# Patient Record
Sex: FEMALE | Race: WHITE | NOT HISPANIC OR LATINO | Employment: OTHER | URBAN - METROPOLITAN AREA
[De-identification: names, ages, dates, MRNs, and addresses within clinical notes are randomized per-mention and may not be internally consistent; named-entity substitution may affect disease eponyms.]

---

## 2016-10-12 LAB
BASOPHILS NFR BLD AUTO: ABNORMAL %
EOSINOPHIL NFR BLD AUTO: ABNORMAL %
ERYTHROCYTE [DISTWIDTH] IN BLOOD BY AUTOMATED COUNT: 16.1 %
HCT VFR BLD AUTO: 41 %
HEMOGLOBIN: 12.6 G/DL (ref 11.7–15.7)
LYMPHOCYTES NFR BLD AUTO: ABNORMAL %
MCH RBC QN AUTO: 25.7 PG
MCHC RBC AUTO-ENTMCNC: 30.7 G/DL
MCV RBC AUTO: 84 FL
MONOCYTES NFR BLD AUTO: ABNORMAL %
NEUTROPHILS NFR BLD AUTO: ABNORMAL %
PLATELET COUNT - QUEST: 345 10^9/L (ref 150–450)
RBC # BLD AUTO: 4.9 10^12/L
WBC # BLD AUTO: 7.5 10^9/L

## 2017-01-03 ENCOUNTER — OFFICE VISIT - HEALTHEAST (OUTPATIENT)
Dept: GERIATRICS | Facility: CLINIC | Age: 77
End: 2017-01-03

## 2017-01-03 ENCOUNTER — OFFICE VISIT (OUTPATIENT)
Dept: NEUROSURGERY | Facility: CLINIC | Age: 77
End: 2017-01-03

## 2017-01-03 VITALS — HEIGHT: 59 IN | DIASTOLIC BLOOD PRESSURE: 71 MMHG | HEART RATE: 71 BPM | SYSTOLIC BLOOD PRESSURE: 133 MMHG

## 2017-01-03 DIAGNOSIS — R53.81 PHYSICAL DECONDITIONING: ICD-10-CM

## 2017-01-03 DIAGNOSIS — G91.2 NPH (NORMAL PRESSURE HYDROCEPHALUS) (H): Primary | ICD-10-CM

## 2017-01-03 DIAGNOSIS — M25.552 HIP PAIN, LEFT: ICD-10-CM

## 2017-01-03 DIAGNOSIS — D50.8 OTHER IRON DEFICIENCY ANEMIA: ICD-10-CM

## 2017-01-03 DIAGNOSIS — M54.41 ACUTE MIDLINE LOW BACK PAIN WITH RIGHT-SIDED SCIATICA: ICD-10-CM

## 2017-01-03 DIAGNOSIS — E55.9 VITAMIN D DEFICIENCY: ICD-10-CM

## 2017-01-03 DIAGNOSIS — R29.898 LEG WEAKNESS, BILATERAL: ICD-10-CM

## 2017-01-03 DIAGNOSIS — C18.7 MALIGNANT NEOPLASM OF SIGMOID COLON (H): ICD-10-CM

## 2017-01-03 DIAGNOSIS — I10 ESSENTIAL HYPERTENSION: ICD-10-CM

## 2017-01-03 DIAGNOSIS — R26.81 GAIT INSTABILITY: ICD-10-CM

## 2017-01-03 DIAGNOSIS — R26.9 GAIT DISTURBANCE: ICD-10-CM

## 2017-01-03 ASSESSMENT — PAIN SCALES - GENERAL: PAINLEVEL: MILD PAIN (3)

## 2017-01-03 NOTE — PROGRESS NOTES
January 3, 2017            Parvez Francis MD    [facility]    [facility] [place]  [address]       RE: Amparo Pachecoobdulia       Dear Dr. Francis:      I had the pleasure of meeting with Ms. Sharma in Neurosurgery Clinic today for evaluation of possible normal pressure hydrocephalus.  As you are aware, Ms. Sharma is a 76-year-old female with a history of chronically progressive gait instability, incontinence and memory problems.  The patient states that these symptoms have been present over the last couple of years.  Most recently, the patient has had a number of falls, which have resulted in her being wheelchair bound at current.  She is incontinent of urine 100% of the time now.      She was initially evaluated by Dr. Gruber, the neurologist present.  At that time, it was felt by Dr. Gruber that Ms. Sharma was suffering from normal pressure hydrocephalus, and was referred to Neurosurgery Clinic.  As a result of one of her falls, she ended up dehiscing a wound on her arm, which resulted in her developing cellulitis.  She was again seen while she was hospitalized for cellulitis for normal pressure hydrocephalus; however, at that time it was determined that she would not be a good surgical candidate given ongoing treatment for bacterial infection.  She has currently concluded her course of antibiotics, and no longer has active cellulitis.  The patient denies currently being on any blood thinners.      On physical exam today in the clinic, Ms. Sharma is alert and appropriately communicative.  Cranial nerves II-XII are intact.  Sensation intact in upper and lower extremities bilaterally.  Strength 5/5 in upper and lower extremities bilaterally.  The patient does have delayed rapid alternating hand movements, as well as apraxia with finger-to-nose testing.  The patient did present in a wheelchair.  DTRs were normal and symmetric throughout.      An MRI dated 10/31/2016 was available  for me to review today.  This study demonstrates clear bilateral ventriculomegaly with subependymal fluid collections.  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  Anterior horns are particularly blunted.      In my assessment, Ms. Coppola may indeed be suffering from a normal pressure hydrocephalus.  As you are aware, there is not a good diagnostic test for normal pressure hydrocephalus, so the patient will need to be admitted for a trial of CSF diversion and assessment for resolution of her symptoms.     My plan for Ms. Coppola is to have her undergo neuropsych testing.  Following this initial round of testing, she will be admitted to the hospital, and a lumbar drain will be placed for approximately 5 days.  We will divert CSF, and monitor her while she is inpatient for improvement of her ability to ambulate and other such metrics.  Near the end of her 5-day hospital course, she will also receive a repeat neuropsych evaluation.  Following this trial of CSF diversion, the results will be assessed, and I will have a repeat conversation with the patient regarding the potential efficacy of shunt placement for permanent CSF diversion.      Thank you very much for allowing me to be involved in the care of this patient.  In total, I spent approximately 35 minutes with this patient, with the majority of that time spent in consultation and development of a treatment plan.       Dictated by Chelly Adler MD, Resident         ZAHRAA BERMEO MD       As dictated by CHELLY ADLER MD            D: 2017 13:16   T: 2017 15:02   MT: LUCIE      Name:     ATILIO COPPOLA   MRN:      [id number]        Account:      [id number]   :      1940           Service Date: 2017      Document: [id number]

## 2017-01-03 NOTE — Clinical Note
1/3/2017       RE: Amparo Sharma  [address]   [place] [address]     Dear Colleague,    Thank you for referring your patient, Amparo Sharma, to the [facility] NEUROSURGERY at [facility]. Please see a copy of my visit note below.    January 3, 2017            Parvez Francis MD    [facility]    [address] [place]  [address]       RE: Amparo Zachary       Dear Dr. Francis:      I had the pleasure of meeting with Ms. Sharma in Neurosurgery Clinic today for evaluation of possible normal pressure hydrocephalus.  As you are aware, Ms. Sharma is a 76-year-old female with a history of chronically progressive gait instability, incontinence and memory problems.  The patient states that these symptoms have been present over the last couple of years.  Most recently, the patient has had a number of falls, which have resulted in her being wheelchair bound at current.  She is incontinent of urine 100% of the time now.      She was initially evaluated by Dr. Gruber, the neurologist present.  At that time, it was felt by Dr. Gruber that Ms. Sharma was suffering from normal pressure hydrocephalus, and was referred to Neurosurgery Clinic.  As a result of one of her falls, she ended up dehiscing a wound on her arm, which resulted in her developing cellulitis.  She was again seen while she was hospitalized for cellulitis for normal pressure hydrocephalus; however, at that time it was determined that she would not be a good surgical candidate given ongoing treatment for bacterial infection.  She has currently concluded her course of antibiotics, and no longer has active cellulitis.  The patient denies currently being on any blood thinners.      On physical exam today in the clinic, Ms. Sharma is alert and appropriately communicative.  Cranial nerves II-XII are intact.  Sensation intact in upper and lower extremities  bilaterally.  Strength 5/5 in upper and lower extremities bilaterally.  The patient does have delayed rapid alternating hand movements, as well as apraxia with finger-to-nose testing.  The patient did present in a wheelchair.  DTRs were normal and symmetric throughout.      An MRI dated 10/31/2016 was available for me to review today.  This study demonstrates clear bilateral ventriculomegaly with subependymal fluid collections.  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  Anterior horns are particularly blunted.      In my assessment, Ms. Coppola may indeed be suffering from a normal pressure hydrocephalus.  As you are aware, there is not a good diagnostic test for normal pressure hydrocephalus, so the patient will need to be admitted for a trial of CSF diversion and assessment for resolution of her symptoms.     My plan for Ms. Coppola is to have her undergo neuropsych testing.  Following this initial round of testing, she will be admitted to the hospital, and a lumbar drain will be placed for approximately 5 days.  We will divert CSF, and monitor her while she is inpatient for improvement of her ability to ambulate and other such metrics.  Near the end of her 5-day hospital course, she will also receive a repeat neuropsych evaluation.  Following this trial of CSF diversion, the results will be assessed, and I will have a repeat conversation with the patient regarding the potential efficacy of shunt placement for permanent CSF diversion.      Thank you very much for allowing me to be involved in the care of this patient.  In total, I spent approximately 35 minutes with this patient, with the majority of that time spent in consultation and development of a treatment plan.       Dictated by Chelly Adler MD, Resident         ZAHRAA BERMEO MD       As dictated by CHELLY ADLER MD            D: 01/03/2017 13:16   T: 01/03/2017 15:02   MT: LUCIE      Name:     TAILIO COPPOLA   MRN:      [id number]         Account:      [id number]   :      1940           Service Date: 2017      Document: [id number]      Clinic visit note was dictated.  Dictation #[id number].

## 2017-01-03 NOTE — NURSING NOTE
Chief Complaint   Patient presents with     Consult     abnormal MRI of head, gait tiuratraci Scruggs, ARCHANA

## 2017-01-06 ENCOUNTER — OFFICE VISIT - HEALTHEAST (OUTPATIENT)
Dept: GERIATRICS | Facility: CLINIC | Age: 77
End: 2017-01-06

## 2017-01-06 DIAGNOSIS — R26.81 GAIT INSTABILITY: ICD-10-CM

## 2017-01-06 DIAGNOSIS — R53.81 PHYSICAL DECONDITIONING: ICD-10-CM

## 2017-01-06 DIAGNOSIS — I10 ESSENTIAL HYPERTENSION: ICD-10-CM

## 2017-01-10 ENCOUNTER — OFFICE VISIT - HEALTHEAST (OUTPATIENT)
Dept: GERIATRICS | Facility: CLINIC | Age: 77
End: 2017-01-10

## 2017-01-10 DIAGNOSIS — R32 URINARY INCONTINENCE: ICD-10-CM

## 2017-01-10 DIAGNOSIS — I10 ESSENTIAL HYPERTENSION: ICD-10-CM

## 2017-01-10 DIAGNOSIS — R26.81 GAIT INSTABILITY: ICD-10-CM
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
## 2017-01-10 DIAGNOSIS — R53.81 PHYSICAL DECONDITIONING: ICD-10-CM

## 2017-01-18 ENCOUNTER — OFFICE VISIT - HEALTHEAST (OUTPATIENT)
Dept: GERIATRICS | Facility: CLINIC | Age: 77
End: 2017-01-18

## 2017-01-18 DIAGNOSIS — R25.1 TREMOR: ICD-10-CM

## 2017-01-18 DIAGNOSIS — F31.9 BIPOLAR DISORDER (H): ICD-10-CM

## 2017-01-18 DIAGNOSIS — R32 URINARY INCONTINENCE: ICD-10-CM

## 2017-01-18 DIAGNOSIS — R26.81 GAIT INSTABILITY: ICD-10-CM

## 2017-01-20 ENCOUNTER — OFFICE VISIT - HEALTHEAST (OUTPATIENT)
Dept: GERIATRICS | Facility: CLINIC | Age: 77
End: 2017-01-20

## 2017-01-20 DIAGNOSIS — J45.909 ASTHMA: ICD-10-CM

## 2017-01-20 DIAGNOSIS — R25.1 TREMOR: ICD-10-CM

## 2017-01-20 DIAGNOSIS — R26.81 GAIT INSTABILITY: ICD-10-CM

## 2017-01-20 DIAGNOSIS — R53.81 PHYSICAL DECONDITIONING: ICD-10-CM

## 2017-01-20 DIAGNOSIS — I10 ESSENTIAL HYPERTENSION: ICD-10-CM

## 2017-01-20 DIAGNOSIS — R29.898 LEG WEAKNESS, BILATERAL: ICD-10-CM

## 2017-01-20 DIAGNOSIS — I73.9 PVD (PERIPHERAL VASCULAR DISEASE) (H): ICD-10-CM

## 2017-01-20 DIAGNOSIS — R29.6 FREQUENT FALLS: ICD-10-CM

## 2017-01-20 DIAGNOSIS — G89.29 CHRONIC PAIN: ICD-10-CM

## 2017-01-23 ENCOUNTER — DOCUMENTATION ONLY (OUTPATIENT)
Dept: NEUROSURGERY | Facility: CLINIC | Age: 77
End: 2017-01-23

## 2017-01-24 ENCOUNTER — OFFICE VISIT - HEALTHEAST (OUTPATIENT)
Dept: GERIATRICS | Facility: CLINIC | Age: 77
End: 2017-01-24

## 2017-01-24 DIAGNOSIS — R26.81 GAIT INSTABILITY: ICD-10-CM

## 2017-01-24 DIAGNOSIS — J45.909 ASTHMA: ICD-10-CM

## 2017-01-24 DIAGNOSIS — R41.89 COGNITIVE DECLINE: ICD-10-CM

## 2017-01-24 DIAGNOSIS — G91.2 NPH (NORMAL PRESSURE HYDROCEPHALUS) (H): ICD-10-CM

## 2017-01-30 ENCOUNTER — ANESTHESIA EVENT (OUTPATIENT)
Dept: SURGERY | Facility: CLINIC | Age: 77
DRG: 033 | End: 2017-01-30
Payer: MEDICARE

## 2017-01-30 ENCOUNTER — OFFICE VISIT (OUTPATIENT)
Dept: SURGERY | Facility: CLINIC | Age: 77
End: 2017-01-30

## 2017-01-30 ENCOUNTER — RESULTS ONLY (OUTPATIENT)
Dept: SURGERY | Facility: CLINIC | Age: 77
End: 2017-01-30

## 2017-01-30 ENCOUNTER — ALLIED HEALTH/NURSE VISIT (OUTPATIENT)
Dept: SURGERY | Facility: CLINIC | Age: 77
End: 2017-01-30

## 2017-01-30 VITALS
BODY MASS INDEX: 33.38 KG/M2 | HEART RATE: 70 BPM | HEIGHT: 60 IN | DIASTOLIC BLOOD PRESSURE: 78 MMHG | WEIGHT: 170 LBS | RESPIRATION RATE: 16 BRPM | OXYGEN SATURATION: 95 % | TEMPERATURE: 98.3 F | SYSTOLIC BLOOD PRESSURE: 134 MMHG

## 2017-01-30 DIAGNOSIS — Z01.818 PREOP GENERAL PHYSICAL EXAM: ICD-10-CM

## 2017-01-30 DIAGNOSIS — Z01.818 PREOP GENERAL PHYSICAL EXAM: Primary | ICD-10-CM

## 2017-01-30 LAB
ANION GAP SERPL CALCULATED.3IONS-SCNC: 7 MMOL/L (ref 3–14)
BUN SERPL-MCNC: 18 MG/DL (ref 7–30)
CALCIUM SERPL-MCNC: 9.3 MG/DL (ref 8.5–10.1)
CHLORIDE SERPL-SCNC: 100 MMOL/L (ref 94–109)
CO2 SERPL-SCNC: 28 MMOL/L (ref 20–32)
CREAT SERPL-MCNC: 0.71 MG/DL (ref 0.52–1.04)
ERYTHROCYTE [DISTWIDTH] IN BLOOD BY AUTOMATED COUNT: 15.4 % (ref 10–15)
GFR SERPL CREATININE-BSD FRML MDRD: 80 ML/MIN/1.7M2
GLUCOSE SERPL-MCNC: 94 MG/DL (ref 70–99)
HCT VFR BLD AUTO: 40.6 % (ref 35–47)
HGB BLD-MCNC: 12.6 G/DL (ref 11.7–15.7)
INR PPP: 0.95 (ref 0.86–1.14)
INTERPRETATION ECG - MUSE: NORMAL
MCH RBC QN AUTO: 27.4 PG (ref 26.5–33)
MCHC RBC AUTO-ENTMCNC: 31 G/DL (ref 31.5–36.5)
MCV RBC AUTO: 88 FL (ref 78–100)
PLATELET # BLD AUTO: 354 10E9/L (ref 150–450)
POTASSIUM SERPL-SCNC: 4.6 MMOL/L (ref 3.4–5.3)
RBC # BLD AUTO: 4.6 10E12/L (ref 3.8–5.2)
SODIUM SERPL-SCNC: 135 MMOL/L (ref 133–144)
WBC # BLD AUTO: 8.7 10E9/L (ref 4–11)

## 2017-01-30 ASSESSMENT — LIFESTYLE VARIABLES: TOBACCO_USE: 1

## 2017-01-30 NOTE — PATIENT INSTRUCTIONS
Using an Incentive Spirometer: 5x'/s day and each time, 5-10 x's.  Soon after your surgery, a nurse or therapist will teach you breathing exercises. These keep your lungs clear, strengthen your breathing muscles, and help prevent complications.  The exercises include doing a deep-breathing exercise using a device called an incentive spirometer.  To do these exercises, you will breathe in through your mouth and not your nose. The incentive spirometer only works correctly if you breathe in through your mouth.  Four steps to clear lungs     Deep breathing expands the lungs, aids circulation, and helps prevent pneumonia.   1. Exhale normally.    Relax and breathe out.  2. Place your lips tightly around the mouthpiece.    Make sure the device is upright and not tilted.  3. Inhale as much air as you can through the mouthpiece (don't breath through your nose).    Inhale slowly and deeply.    Hold your breath long enough to keep the balls or disk raised for at least 3 seconds.    If you re inhaling too quickly, your device may make a tone. If you hear this tone, inhale more slowly.  4. Repeat the exercise regularly.    Do this exercise every hour while you're awake, or as your health care provider instructs.    You will also be taught coughing exercises and be asked to do them regularly on your own.    3797-4523 [employer]. [address] [place] [address], [place] [id number]. All rights reserved. This information is not intended as a substitute for professional medical care. Always follow your healthcare professional's instructions.    AFTER YOUR SURGERY  Breathing exercises   Breathing exercises help you recover faster. Take deep breaths and let the air out slowly. This will:     Help you wake up after surgery.    Help prevent complications like pneumonia.  Preventing complications will help you go home sooner.   We may give you a breathing device (incentive spirometer) to encourage you to breathe deeply.   Nausea  and vomiting   You may feel sick to your stomach after surgery; if so, let your nurse know.    Pain control:  After surgery, you may have pain. Our goal is to help you manage your pain. Pain medicine will help you feel comfortable enough to do activities that will help you heal.  These activities may include breathing exercises, walking and physical therapy.   To help your health care team treat your pain we will ask: 1) If you have pain  2) where it is located 3) describe your pain in your words  Methods of pain control include medications given by mouth, vein or by nerve block for some surgeries.  We may give you a pain control pump that will:  1) Deliver the medicine through a tube placed in your vein  2) Control the amount of medicine you receive  3) Allow you to push a button to deliver a dose of pain medicine  Sequential Compression Device (SCD) or Pneumo Boots:  You may need to wear SCD S on your legs or feet. These are wraps connected to a machine that pumps in air and releases it. The repeated pumping helps prevent blood clots from forming.   Preparing for Your Surgery      Name:  Amparo Sharma   MRN:  [id number]   :  1940   Today's Date:  2017     Arriving for surgery:  Surgery date:  17  Surgery time: 0900 AM  Arrival time:  0700 AM  Please come to:       [facility] [address] [place] [address]  [address]    -   parking is available in front of the hospital from 5:15 am to 8:00 pm    -  Stop at the Information Desk in the lobby    -   Inform the information person that you are here for surgery. An escort to  will be provided. If you would not like an escort, please proceed to 3C on the 3rd floor. [phone]     What can I eat or drink?  -  You may have solid food or milk products until 8 hours prior to your surgery. 12   -  You may have water, apple juice or 7up/Sprite until 2 hours prior to your surgery. 0700 AM  Friday    Which medicines can I take?  -  Do NOT take these medications in the morning, the day of surgery:  STOP EXEDRINE, HOLD FOLIC ACID AND MULTI VIT. As of 01/30/17     -  Please take these medications the day of surgery:  SCHEDULED MEDS, MAY TAKE ALEVE OR IBUPROFEN UNTIL 02/02/17 (REVIEW WITH PRIMARY/KARLA SAMPSON)    How do I prepare myself?  -  Take two showers: one the night before surgery; and one the morning of surgery.         Use Scrubcare or Hibiclens to wash from neck down.  You may use your own shampoo and conditioner. No other hair products.   -  Do NOT use lotion, powder, deodorant, or antiperspirant the day of your surgery.  -  Do NOT wear any makeup, fingernail polish or jewelry.  -  Begin using Incentive Spirometer 1 week prior to surgery.  Use 4 times per day, up to 5-10 breaths each time.  Bring Incentive Spirometer to hospital.  -Do not bring your own medications to the hospital, except for inhalers and eye drops.  -  Bring your ID and insurance card.    Questions or Concerns:  If you have questions or concerns, please call the  Preoperative Assessment Center, Monday-Friday 7AM-7PM:  [phone]

## 2017-01-30 NOTE — MR AVS SNAPSHOT
After Visit Summary   1/30/2017    Amparo Sharma    MRN: [id number]           Patient Information     Date Of Birth          1940        Visit Information        Provider Department      1/30/2017 10:00 AM Rn, [facility]        Care Instructions      Using an Incentive Spirometer: 5x'/s day and each time, 5-10 x's.  Soon after your surgery, a nurse or therapist will teach you breathing exercises. These keep your lungs clear, strengthen your breathing muscles, and help prevent complications.  The exercises include doing a deep-breathing exercise using a device called an incentive spirometer.  To do these exercises, you will breathe in through your mouth and not your nose. The incentive spirometer only works correctly if you breathe in through your mouth.  Four steps to clear lungs     Deep breathing expands the lungs, aids circulation, and helps prevent pneumonia.   1. Exhale normally.    Relax and breathe out.  2. Place your lips tightly around the mouthpiece.    Make sure the device is upright and not tilted.  3. Inhale as much air as you can through the mouthpiece (don't breath through your nose).    Inhale slowly and deeply.    Hold your breath long enough to keep the balls or disk raised for at least 3 seconds.    If you re inhaling too quickly, your device may make a tone. If you hear this tone, inhale more slowly.  4. Repeat the exercise regularly.    Do this exercise every hour while you're awake, or as your health care provider instructs.    You will also be taught coughing exercises and be asked to do them regularly on your own.    7049-5709 [employer]. [address] [place] [address], [place] [address]. All rights reserved. This information is not intended as a substitute for professional medical care. Always follow your healthcare professional's instructions.    AFTER YOUR SURGERY  Breathing exercises   Breathing exercises help you  recover faster. Take deep breaths and let the air out slowly. This will:     Help you wake up after surgery.    Help prevent complications like pneumonia.  Preventing complications will help you go home sooner.   We may give you a breathing device (incentive spirometer) to encourage you to breathe deeply.   Nausea and vomiting   You may feel sick to your stomach after surgery; if so, let your nurse know.    Pain control:  After surgery, you may have pain. Our goal is to help you manage your pain. Pain medicine will help you feel comfortable enough to do activities that will help you heal.  These activities may include breathing exercises, walking and physical therapy.   To help your health care team treat your pain we will ask: 1) If you have pain  2) where it is located 3) describe your pain in your words  Methods of pain control include medications given by mouth, vein or by nerve block for some surgeries.  We may give you a pain control pump that will:  1) Deliver the medicine through a tube placed in your vein  2) Control the amount of medicine you receive  3) Allow you to push a button to deliver a dose of pain medicine  Sequential Compression Device (SCD) or Pneumo Boots:  You may need to wear SCD S on your legs or feet. These are wraps connected to a machine that pumps in air and releases it. The repeated pumping helps prevent blood clots from forming.   Preparing for Your Surgery      Name:  Amparo Sharma   MRN:  [id number]   :  1940   Today's Date:  2017     Arriving for surgery:  Surgery date:  17  Surgery time: 0900 AM  Arrival time:  0700 AM  Please come to:       [facility] [address] [place] [address]  [address]       parking is available in front of the hospital from 5:15 am to 8:00 pm    -  Stop at the Information Desk in the lobby    -   Inform the information person that you are here for surgery. An escort to  will be  provided. If you would not like an escort, please proceed to 3C on the 3rd floor. [phone]     What can I eat or drink?  -  You may have solid food or milk products until 8 hours prior to your surgery. 12 [place] Thursday  -  You may have water, apple juice or 7up/Sprite until 2 hours prior to your surgery. 0700 AM Friday    Which medicines can I take?  -  Do NOT take these medications in the morning, the day of surgery:  STOP EXEDRINE, HOLD FOLIC ACID AND MULTI VIT. As of 01/30/17     -  Please take these medications the day of surgery:  SCHEDULED MEDS, MAY TAKE ALEVE OR IBUPROFEN UNTIL 02/02/17 (REVIEW WITH PRIMARY/KARLA SAMPSON)    How do I prepare myself?  -  Take two showers: one the night before surgery; and one the morning of surgery.         Use Scrubcare or Hibiclens to wash from neck down.  You may use your own shampoo and conditioner. No other hair products.   -  Do NOT use lotion, powder, deodorant, or antiperspirant the day of your surgery.  -  Do NOT wear any makeup, fingernail polish or jewelry.  -  Begin using Incentive Spirometer 1 week prior to surgery.  Use 4 times per day, up to 5-10 breaths each time.  Bring Incentive Spirometer to hospital.  -Do not bring your own medications to the hospital, except for inhalers and eye drops.  -  Bring your ID and insurance card.    Questions or Concerns:  If you have questions or concerns, please call the  Preoperative Assessment Center, Monday-Friday 7AM-7PM:  [phone]                        Follow-ups after your visit        Your next 10 appointments already scheduled     Jan 30, 2017 11:40 AM   (Arrive by 11:25 AM)   PAC Anesthesia Consult with  Pac Anesthesiologist   [facility] Preoperative Assessment Center ([facility] [place])    [address]  4th Floor  [facility] [address] [phone]            Jan 30, 2017 12:00 PM   (Arrive by 11:45 AM)   PAC Pharmacist with  Pac Pharmacist   [facility] [place] (  [facility] [place])    [address]  4th Floor  [facility] [id number] [phone]            Jan 30, 2017 12:30 PM   LAB with UC LAB   [facility] ([facility]    [address]  1st Floor  [facility] [id number] [phone]           Patient must bring picture ID.  Patient should be prepared to give a urine specimen  Please do not eat 10-12 hours before your appointment if you are coming in fasting for labs on lipids, cholesterol, or glucose (sugar).  Pregnant women should follow their Care Team instructions. Water with medications is okay. Do not drink coffee or other fluids.   If you have concerns about taking  your medications, please ask at office or if scheduling via [employer], send a message by clicking on Secure Messaging, Message Your Care Team.            Feb 03, 2017   Procedure with Rc Dozier MD   [facility], [place], Same Day Surgery (--)    [address] [id number] [phone]              Who to contact     Please call your clinic at [phone] to:    Ask questions about your health    Make or cancel appointments    Discuss your medicines    Learn about your test results    Speak to your doctor   If you have compliments or concerns about an experience at your clinic, or if you wish to file a complaint, please contact [facility] Patient Relations at [phone] or email us at [email or web address]         Additional Information About Your Visit        [employer] Information     [employer] is an electronic gateway that provides easy, online access to your medical records. With [employer], you can request a clinic appointment, read your test results, renew a prescription or communicate with your care team.     To sign up for [employer] visit the website at [email or web address]   You will be asked to enter the access code listed below, as well as some personal information. Please  follow the directions to create your username and password.     Your access code is: [email or web address]  Expires: 2017  6:30 AM     Your access code will  in 90 days. If you need help or a new code, please contact your [facility] or call [phone] for assistance.        Care EveryWhere ID     This is your Care EveryWhere ID. This could be used by other organizations to access your [place] medical records  [id number]         Blood Pressure from Last 3 Encounters:   17 134/78   17 133/71   12/15/16 161/84    Weight from Last 3 Encounters:   17 77.111 kg (170 lb)   16 77.52 kg (170 lb 14.4 oz)              Today, you had the following     No orders found for display       Primary Care Provider Office Phone # Fax #    Parvez Francis [phone]       [place] [address] [facility] [address]        Thank you!     Thank you for choosing [facility] PREOPERAT[facility]ESSMENT [facility]  for your care. Our goal is always to provide you with excellent care. Hearing back from our patients is one way we can continue to improve our services. Please take a few minutes to complete the written survey that you may receive in the mail after your visit with us. Thank you!             Your Updated Medication List - Protect others around you: Learn how to safely use, store and throw away your medicines at www.disposemymeds.org.          This list is accurate as of: 17 10:51 AM.  Always use your most recent med list.                   Brand Name Dispense Instructions for use    ABILIFY PO      Take 5 mg by mouth every morning       AMLODIPINE BESYLATE PO      Take 10 mg by mouth every morning       aspirin-acetaminophen-caffeine 250-250-65 MG per tablet    EXCEDRIN MIGRAINE     Take 1 tablet by mouth every 6 hours as needed for headaches       DONEPEZIL HCL PO      Take 10 mg by mouth At Bedtime       fluticasone 110 MCG/ACT Inhaler     FLOVENT HFA     Inhale 2 puffs into the lungs as needed       FOLIC ACID PO      Take 1 mg by mouth       hydrocortisone 2.5 % cream      Apply topically 2 times daily       lactobacillus rhamnosus (GG) capsule      Take 1 capsule by mouth 2 times daily       LAMICTAL PO      Take 150 mg by mouth in the morning and 100 mg by mouth in the evening       Levothyroxine Sodium 75 MCG Caps      Take 75 mcg by mouth every morning       MULTIVITAMIN PO      Take by mouth daily       PROZAC PO      Take 40 mg by mouth every morning       RANITIDINE HCL PO      Take 300 mg by mouth 2 times daily       SPIRIVA HANDIHALER IN      Inhale 1 puff into the lungs as needed       TYLENOL PO      Take 500 mg by mouth every 8 hours as needed for mild pain or fever       WELLBUTRIN XL PO      Take 150 mg by mouth every morning

## 2017-01-30 NOTE — ANESTHESIA PREPROCEDURE EVALUATION
Anesthesia Evaluation     . Pt has had prior anesthetic. Type: General and MAC      ROS/MED HX    ENT/Pulmonary:     (+)tobacco use, Past use 0.25 PP for 25 years, quit 1997 packs/day  Intermittent asthma Treatment: Inhaler prn,  , . .    Neurologic:     (+)other neuro short-term memory loss, gait imbalance    Cardiovascular:  - neg cardiovascular ROS   (+) ----. : . . . :. . Previous cardiac testing Echodate:7/10/2017results:EF 60%date: results:ECG reviewed date:1/30/2017 results: date: results:          METS/Exercise Tolerance:  1 - Eating, dressing   Hematologic:     (+) History of Transfusion no previous transfusion reaction -      Musculoskeletal:   (+) , , other musculoskeletal- W/C due to gait instability      GI/Hepatic:     (+) GERD Asymptomatic on medication, Other GI/Hepatic umbilical hernia      Renal/Genitourinary:     (+) Other Renal/ Genitourinary, incontinence      Endo:     (+) Obesity, .      Psychiatric:     (+) psychiatric history depression      Infectious Disease:  - neg infectious disease ROS       Malignancy:   (+) Malignancy History of GI  GI CA status post Surgery,         Other:    (+) No chance of pregnancy C-spine cleared: N/A, no H/O Chronic Pain,other significant disability Wheelchair bound             Physical Exam  Normal systems: cardiovascular, pulmonary and dental    Airway   Mallampati: II  TM distance: >3 FB  Neck ROM: full    Dental     Cardiovascular   Rhythm and rate: regular and normal      Pulmonary    breath sounds clear to auscultation    Other findings: WBC      8.7   1/30/2017  RBC     4.60   1/30/2017  HGB     12.6   1/30/2017  HCT     40.6   1/30/2017  MCV       88   1/30/2017  MCH     27.4   1/30/2017  MCHC     31.0   1/30/2017  RDW     15.4   1/30/2017  PLT      354   1/30/2017  PLT      345   10/12/2016    Last Basic Metabolic Panel:  NA      135   1/30/2017   POTASSIUM      4.6   1/30/2017  CHLORIDE      100   1/30/2017  MARTHA      9.3   1/30/2017  CO2       28    1/30/2017  BUN       18   1/30/2017  CR     0.71   1/30/2017  GLC       94   1/30/2017    INR    0.95    1/30/2017      Echo 7/10/2016  Conclusions    Summary  Normal left ventricular size.  Normal left ventricular wall thickness.  No regional wall motion abnormalities.  Left ventricular ejection fraction is visually estimated at 60%.  Normal right ventricular size with preserved systolic function.    Technically difficult examination. No previous study for comparison.    Findings    Left Ventricle  Normal left ventricular size.  Normal left ventricular wall thickness.  No regional wall motion abnormalities.  Left ventricular ejection fraction is visually estimated at 60%.    Right Ventricle  Normal right ventricular size with preserved systolic function.    Left Atrium  Mildly enlarged left atrium.  No evidence of atrial septal defect.    Right Atrium  Right atrium is not clearly visualized.    Great Vessels  Aortic root dimension is within normal limits.    Aortic Valve  Tricuspid aortic valve.  No aortic stenosis present.  No evidence of aortic valve regurgitation.    Mitral Valve  Mitral valve leaflets are mildly thickened with preserved leaflet  mobility.  Trace mitral regurgitation is present.    Tricuspid Valve  Tricuspid valve is structurally normal.  No evidence of tricuspid stenosis.  Trace tricuspid regurgitation.    Pulmonic Valve  The pulmonic valve is not well visualized.    Pericardial Effusion  No pericardial effusion.               PAC Discussion and Assessment    ASA Classification: 3  Case is suitable for:   Anesthetic techniques and relevant risks discussed: GA  Invasive monitoring and risk discussed: Yes  Types:   Possibility and Risk of blood transfusion discussed: Yes  NPO instructions given:   Additional anesthetic preparation and risks discussed:   Needs early admission to pre-op area:   Other:     PAC Resident/NP Anesthesia Assessment:  Amparo Sharma is a 77 yo female scheduled for Lumbar  Drain for Spinal Fluid Diversion on 2/3/2017 by Dr. Dozier. PAC referral by Dr. Dozier for assessment and optimization of anesthesia due to asthma. Previous anesthesia without complications.     Cardiac: HTN, well controlled wit amlodipine. EKG today NSR. Echo done at [facility] 2016 with EF 60%, results above.  Pulmonary: asthma, intermittent. Uses inhaler infrequently in spring and fall.  GI: GERD, PRN ranitidine with relief. Large abdominal hernia colon cancer, s/p resection 2015.  Endo: hypothyroidism, daily synthroid.  Neuro/psych: history of ETOH abuse, in remission. Depression. Pt also has short term memory loss and gait imbalance.   Use W/C most of the time due to balance issue. Feasible airway.     Pt also evaluated by Dr. Collado. See recommendations below. CBC, BMP and INR drawn today.  I spent 20 minutes face to face with pt, assessing, examining, and educating      Reviewed and Signed by PAC Mid-Level Provider/Resident  Mid-Level Provider/Resident: KASANDRA De La Fuente  Date: 1/30/2017  Time: 1130    Attending Anesthesiologist Anesthesia Assessment:  76 year old for lumbar drain in management of unsteadiness, gait imbalance and normal pressure hydrocephalus. Chart reviewed, patient seen and evaluated; agree with above assessment.    Patient is appropriate for the planned procedure without further workup or medical management change. The final anesthesia plan will be determined by the physician anesthesiologist caring for the patient on the day of surgery.    Reviewed and Signed by PAC Anesthesiologist  Anesthesiologist: Gilda Collado MD  Date: 1/30/2017  Time:   Pass/Fail: Pass  Disposition:     PAC Pharmacist Assessment:        Pharmacist:   Date:   Time:      Anesthesia Plan      History & Physical Review      ASA Status:  3 .        Plan for MAC with Intravenous induction. Maintenance will be Balanced.  Reason for MAC:  Deep or markedly invasive procedure (G8)  PONV prophylaxis:  Ondansetron (or other  5HT-3) and Other (See comment) (TIVA)       Postoperative Care      Consents                          .

## 2017-01-30 NOTE — ADDENDUM NOTE
Addendum  created 01/30/17 1307 by Gilda Collado MD    Modules edited: Clinical Notes    Clinical Notes:  File: [id number]

## 2017-01-30 NOTE — H&P
Pre-Operative H & P     CC:  Preoperative exam to assess for increased cardiopulmonary risk while undergoing surgery and anesthesia.    Date of Encounter: 1/30/2017  Primary Care Physician:  Parvez Francis  Amparo Sharma is a 76 year old female who presents for pre-operative H & P in preparation for Lumbar Drain for Spinal Fluid Diversion  with Dr. Dozier on 2/3/17 at [facility].     History is obtained from the patient.   Pt currently at [facility]. Concern about increased loss of balance and falling. Claims that PT has not helped. Neuro evaluating if hydrocephalus cause for symptoms.        Past Medical History  Past Medical History   Diagnosis Date     Falls frequently      Loss of balance      Memory loss      Bladder incontinence 2013     History of colon cancer      s/p resection 1/2015, treated with 5-6 cycles of Folfox     Arthritis      Asthma      Alcoholism in remission (H)      Depression      GERD (gastroesophageal reflux disease)      Abdominal hernia      HTN (hypertension)        Past Surgical History  Past Surgical History   Procedure Laterality Date     Hysterectomy       Multiple knee surgeries       Rotator cuff surgeries       Cholecystectomy       Colon cancer resection  1/2015       Hx of Blood transfusions/reactions: yes, 10 years ago, no reaction     Hx of abnormal bleeding or anti-platelet use: none      Menstrual history: No LMP recorded. Patient has had a hysterectomy.:     Steroid use in the last year: none    Personal or FH with difficulty with Anesthesia:  none    Prior to Admission Medications  Current Outpatient Prescriptions   Medication Sig Dispense Refill     lactobacillus rhamnosus, GG, (CULTURELL) capsule Take 1 capsule by mouth 2 times daily       AMLODIPINE BESYLATE PO Take 10 mg by mouth every morning        DONEPEZIL HCL PO Take 10 mg by mouth At Bedtime       FOLIC ACID PO Take 1 mg by mouth       RANITIDINE  HCL PO Take 300 mg by mouth 2 times daily        Tiotropium Bromide Monohydrate (SPIRIVA HANDIHALER IN) Inhale 1 puff into the lungs as needed        hydrocortisone 2.5 % cream Apply topically 2 times daily       aspirin-acetaminophen-caffeine (EXCEDRIN MIGRAINE) 250-250-65 MG per tablet Take 1 tablet by mouth every 6 hours as needed for headaches       fluticasone (FLOVENT HFA) 110 MCG/ACT inhaler Inhale 2 puffs into the lungs as needed       ARIPiprazole (ABILIFY PO) Take 5 mg by mouth every morning        BuPROPion HCl (WELLBUTRIN XL PO) Take 150 mg by mouth every morning        Multiple Vitamins-Minerals (MULTIVITAMIN PO) Take by mouth daily       LamoTRIgine (LAMICTAL PO) Take 150 mg by mouth in the morning and 100 mg by mouth in the evening       Levothyroxine Sodium 75 MCG CAPS Take 75 mcg by mouth every morning        FLUoxetine HCl (PROZAC PO) Take 40 mg by mouth every morning        Acetaminophen (TYLENOL PO) Take 500 mg by mouth every 8 hours as needed for mild pain or fever          Allergies  Allergies   Allergen Reactions     Amantadine      Antihistamine Allergy Relief [Diphenhydramine] Other (See Comments)     hyperactivity     Codeine Itching     Demerol [Meperidine] Nausea     Hmg-Coa-R Inhibitors Other (See Comments)     Rhabdo     Talwin [Pentazocine] Other (See Comments)     drowsiness     Tramadol        Social History  Social History     Social History     Marital Status:      Spouse Name: N/A     Number of Children: N/A     Years of Education: N/A     Occupational History     Not on file.     Social History Main Topics     Smoking status: Former Smoker -- 0.25 packs/day     Types: Cigarettes     Start date: 01/01/1980     Quit date: 01/01/1997     Smokeless tobacco: Not on file     Alcohol Use: Not on file     Drug Use: Not on file     Sexual Activity: Not on file     Other Topics Concern     Not on file     Social History Narrative       Family History  No family history on  "file.          Anesthesia Evaluation     . Pt has had prior anesthetic.       ROS/MED HX    ENT/Pulmonary:     (+)tobacco use, Past use 0.25 PP for 25 years, quit 1997 packs/day  Intermittent asthma Treatment: Inhaler prn,  , . .    Neurologic:     (+)other neuro short-term memory loss, gait imbalance    Cardiovascular:  - neg cardiovascular ROS   (+) ----. : . . . :. . Previous cardiac testing date:results:date: results:ECG reviewed date:1/30/2017 results: date: results:          METS/Exercise Tolerance:  1 - Eating, dressing   Hematologic:     (+) History of Transfusion no previous transfusion reaction -      Musculoskeletal:   (+) , , other musculoskeletal- W/C due to gait instability      GI/Hepatic:     (+) GERD Asymptomatic on medication, Other GI/Hepatic umbilical hernia      Renal/Genitourinary:     (+) Other Renal/ Genitourinary, incontinence      Endo:     (+) Obesity, .      Psychiatric:     (+) psychiatric history depression      Infectious Disease:  - neg infectious disease ROS       Malignancy:   (+) Malignancy History of GI  GI CA status post Surgery,         Other:    (+) No chance of pregnancy C-spine cleared: N/A, no H/O Chronic Pain,other significant disability Wheelchair bound             Physical Exam  Normal systems: cardiovascular, pulmonary and dental    Airway   Mallampati: II  TM distance: >3 FB  Neck ROM: full    Dental   Normal    Cardiovascular   Rhythm and rate: regular and normal      Pulmonary    breath sounds clear to auscultation               The complete review of systems is negative other than noted in the HPI or here.   Temp: 98.3  F (36.8  C) Temp src: Oral BP: 134/78 mmHg Pulse: 70   Resp: 16 SpO2: 95 %         170 lbs 0 oz  5' 0\"   Body mass index is 33.2 kg/(m^2).       Physical Exam  Constitutional: Awake, alert, cooperative, no apparent distress, and appears stated age.  Eyes: Pupils equal, round and reactive to light, extra ocular muscles intact, sclera clear, conjunctiva " normal.  HENT: Normocephalic, oral pharynx with moist mucus membranes, good dentition. No goiter appreciated.   Respiratory: Clear to auscultation bilaterally, no crackles or wheezing.  Cardiovascular: Regular rate and rhythm, normal S1 and S2, and no murmur noted.  Carotids +2, no bruits. No edema. Palpable pulses to radial  DP and PT arteries.   GI: Normal bowel sounds, soft, non-distended, non-tender, no masses palpated, no hepatosplenomegaly.  Large ventral hernia. Surgical scars: abdomen, bilateral shoulder, bilateral knees  Lymph/Hematologic: No cervical lymphadenopathy and no supraclavicular lymphadenopathy.  Genitourinary:  deferred  Skin: Warm and dry.  No rashes at anticipated surgical site.   Musculoskeletal: Full ROM of neck. There is no redness, warmth, or swelling of the joints. General weakness  Neurologic: Awake, alert, oriented to name, place and time. Cranial nerves II-XII are grossly intact. Gait is unsteady.  Neuropsychiatric: Calm, cooperative. Normal affect.     Labs: (personally reviewed)  WBC      8.7   2017  RBC     4.60   2017  HGB     12.6   2017  HCT     40.6   2017  MCV       88   2017  MCH     27.4   2017  MCHC     31.0   2017  RDW     15.4   2017  PLT      354   2017  PLT      345   10/12/2016    Last Basic Metabolic Panel:  NA      135   2017   POTASSIUM      4.6   2017  CHLORIDE      100   2017  MARTHA      9.3   2017  CO2       28   2017  BUN       18   2017  CR     0.71   2017  GLC       94   2017    INR    0.95    2017    EKG: Personally reviewed but formal cardiology read pendin2017 NSR    Cardiac echo:   Echo 7/10/2016  Conclusions    Summary  Normal left ventricular size.  Normal left ventricular wall thickness.  No regional wall motion abnormalities.  Left ventricular ejection fraction is visually estimated at 60%.  Normal right ventricular size with preserved systolic  function.    Technically difficult examination. No previous study for comparison.    Findings    Left Ventricle  Normal left ventricular size.  Normal left ventricular wall thickness.  No regional wall motion abnormalities.  Left ventricular ejection fraction is visually estimated at 60%.    Right Ventricle  Normal right ventricular size with preserved systolic function.    Left Atrium  Mildly enlarged left atrium.  No evidence of atrial septal defect.    Right Atrium  Right atrium is not clearly visualized.    Great Vessels  Aortic root dimension is within normal limits.    Aortic Valve  Tricuspid aortic valve.  No aortic stenosis present.  No evidence of aortic valve regurgitation.    Mitral Valve  Mitral valve leaflets are mildly thickened with preserved leaflet  mobility.  Trace mitral regurgitation is present.    Tricuspid Valve  Tricuspid valve is structurally normal.  No evidence of tricuspid stenosis.  Trace tricuspid regurgitation.    Pulmonic Valve  The pulmonic valve is not well visualized.    Pericardial Effusion  No pericardial effusion.        ASSESSMENT and PLAN  Amparo Sharma is a 76 year old female scheduled to undergo Lumbar Drain for Spinal Fluid Diversion. She has the following specific operative considerations:   - RCRI : Low serious cardiac risks.  0.4% risk of major adverse cardiac event.   - Anesthesia considerations:  Refer to PAC assessment in anesthesia records  - VTE risk: 0.5% due to age, however, immobility may put pt at increased risk  - CECELIA # of risks 2/8 = 25%  - Post-op delirium risk: high risk due to age  - Risk of PONV score = 2.  If > 2, anti-emetic intervention recommended.        Previous anesthesia without complications.   Cardiac: HTN, well controlled wit amlodipine. EKG today NSR. Echo done at [facility] 2016 with EF 60%, results above.  Pulmonary: asthma, intermittent. Uses inhaler infrequently in spring and fall.  GI: GERD, PRN ranitidine with relief. Large abdominal  hernia colon cancer, s/p resection 2015.  Endo: hypothyroidism, daily synthroid.  Neuro/psych: history of ETOH abuse, in remission. Depression. Pt also has short term memory loss and gait imbalance.   Use W/C most of the time due to balance issue. Feasible airway.    Pt optimized for surgery. AVS with information on surgery time/arrival time, meds and NPO status given by nursing staff      Patient was discussed with Dr Collado.    KASANDRA Arriola CNS  Preoperative Assessment Center  [facility]  Clinic and Surgery Center  Phone: [phone]  Fax: [phone]

## 2017-01-31 ENCOUNTER — OFFICE VISIT - HEALTHEAST (OUTPATIENT)
Dept: GERIATRICS | Facility: CLINIC | Age: 77
End: 2017-01-31

## 2017-01-31 DIAGNOSIS — R26.81 GAIT INSTABILITY: ICD-10-CM

## 2017-01-31 DIAGNOSIS — R53.81 PHYSICAL DECONDITIONING: ICD-10-CM

## 2017-01-31 DIAGNOSIS — I10 ESSENTIAL HYPERTENSION: ICD-10-CM

## 2017-01-31 DIAGNOSIS — G91.2 NPH (NORMAL PRESSURE HYDROCEPHALUS) (H): ICD-10-CM

## 2017-01-31 LAB — INTERPRETATION ECG - MUSE: NORMAL

## 2017-02-01 ENCOUNTER — TELEPHONE (OUTPATIENT)
Dept: NEUROSURGERY | Facility: CLINIC | Age: 77
End: 2017-02-01

## 2017-02-03 ENCOUNTER — ANESTHESIA (OUTPATIENT)
Dept: SURGERY | Facility: CLINIC | Age: 77
DRG: 033 | End: 2017-02-03
Payer: MEDICARE

## 2017-02-03 ENCOUNTER — HOSPITAL ENCOUNTER (INPATIENT)
Facility: CLINIC | Age: 77
LOS: 6 days | Discharge: NURSING, REHAB OR LONG-TERM CARE | DRG: 033 | End: 2017-02-10
Attending: NEUROLOGICAL SURGERY | Admitting: NEUROLOGICAL SURGERY
Payer: MEDICARE

## 2017-02-03 ENCOUNTER — APPOINTMENT (OUTPATIENT)
Dept: OCCUPATIONAL THERAPY | Facility: CLINIC | Age: 77
DRG: 033 | End: 2017-02-03
Attending: STUDENT IN AN ORGANIZED HEALTH CARE EDUCATION/TRAINING PROGRAM
Payer: MEDICARE

## 2017-02-03 DIAGNOSIS — Z98.2 S/P VENTRICULOPERITONEAL SHUNT: Primary | ICD-10-CM

## 2017-02-03 PROBLEM — G91.2 NPH (NORMAL PRESSURE HYDROCEPHALUS) (H): Status: ACTIVE | Noted: 2017-02-03

## 2017-02-03 LAB
ABO + RH BLD: NORMAL
ABO + RH BLD: NORMAL
BLD GP AB SCN SERPL QL: NORMAL
BLOOD BANK CMNT PATIENT-IMP: NORMAL
SPECIMEN EXP DATE BLD: NORMAL

## 2017-02-03 PROCEDURE — 97165 OT EVAL LOW COMPLEX 30 MIN: CPT | Mod: GO | Performed by: OCCUPATIONAL THERAPIST

## 2017-02-03 PROCEDURE — 25000132 ZZH RX MED GY IP 250 OP 250 PS 637: Mod: GY | Performed by: STUDENT IN AN ORGANIZED HEALTH CARE EDUCATION/TRAINING PROGRAM

## 2017-02-03 PROCEDURE — 86850 RBC ANTIBODY SCREEN: CPT | Performed by: STUDENT IN AN ORGANIZED HEALTH CARE EDUCATION/TRAINING PROGRAM

## 2017-02-03 PROCEDURE — 36415 COLL VENOUS BLD VENIPUNCTURE: CPT | Performed by: STUDENT IN AN ORGANIZED HEALTH CARE EDUCATION/TRAINING PROGRAM

## 2017-02-03 PROCEDURE — A9270 NON-COVERED ITEM OR SERVICE: HCPCS | Mod: GY | Performed by: STUDENT IN AN ORGANIZED HEALTH CARE EDUCATION/TRAINING PROGRAM

## 2017-02-03 PROCEDURE — 86900 BLOOD TYPING SEROLOGIC ABO: CPT | Performed by: STUDENT IN AN ORGANIZED HEALTH CARE EDUCATION/TRAINING PROGRAM

## 2017-02-03 PROCEDURE — 40000133 ZZH STATISTIC OT WARD VISIT: Performed by: OCCUPATIONAL THERAPIST

## 2017-02-03 PROCEDURE — 86901 BLOOD TYPING SEROLOGIC RH(D): CPT | Performed by: STUDENT IN AN ORGANIZED HEALTH CARE EDUCATION/TRAINING PROGRAM

## 2017-02-03 PROCEDURE — 97535 SELF CARE MNGMENT TRAINING: CPT | Mod: GO | Performed by: OCCUPATIONAL THERAPIST

## 2017-02-03 PROCEDURE — 25000128 H RX IP 250 OP 636: Performed by: STUDENT IN AN ORGANIZED HEALTH CARE EDUCATION/TRAINING PROGRAM

## 2017-02-03 RX ORDER — ACETAMINOPHEN 500 MG
500 TABLET ORAL EVERY 8 HOURS PRN
Status: DISCONTINUED | OUTPATIENT
Start: 2017-02-03 | End: 2017-02-03

## 2017-02-03 RX ORDER — ACETAMINOPHEN 325 MG/1
650 TABLET ORAL EVERY 6 HOURS PRN
Status: DISCONTINUED | OUTPATIENT
Start: 2017-02-03 | End: 2017-02-08 | Stop reason: DRUGHIGH

## 2017-02-03 RX ORDER — LEVOTHYROXINE SODIUM 75 UG/1
75 TABLET ORAL EVERY MORNING
Status: DISCONTINUED | OUTPATIENT
Start: 2017-02-04 | End: 2017-02-10 | Stop reason: HOSPADM

## 2017-02-03 RX ORDER — SODIUM CHLORIDE 9 MG/ML
INJECTION, SOLUTION INTRAVENOUS CONTINUOUS
Status: DISCONTINUED | OUTPATIENT
Start: 2017-02-03 | End: 2017-02-08

## 2017-02-03 RX ORDER — DONEPEZIL HYDROCHLORIDE 10 MG/1
10 TABLET, FILM COATED ORAL AT BEDTIME
Status: DISCONTINUED | OUTPATIENT
Start: 2017-02-03 | End: 2017-02-10 | Stop reason: HOSPADM

## 2017-02-03 RX ORDER — ONDANSETRON 4 MG/1
4 TABLET, ORALLY DISINTEGRATING ORAL EVERY 6 HOURS PRN
Status: DISCONTINUED | OUTPATIENT
Start: 2017-02-03 | End: 2017-02-10 | Stop reason: HOSPADM

## 2017-02-03 RX ORDER — AMLODIPINE BESYLATE 10 MG/1
10 TABLET ORAL EVERY MORNING
Status: DISCONTINUED | OUTPATIENT
Start: 2017-02-04 | End: 2017-02-10 | Stop reason: HOSPADM

## 2017-02-03 RX ORDER — FOLIC ACID 1 MG/1
1 TABLET ORAL EVERY MORNING
Status: DISCONTINUED | OUTPATIENT
Start: 2017-02-04 | End: 2017-02-10 | Stop reason: HOSPADM

## 2017-02-03 RX ORDER — TIOTROPIUM BROMIDE 18 UG/1
18 CAPSULE ORAL; RESPIRATORY (INHALATION) DAILY
Status: DISCONTINUED | OUTPATIENT
Start: 2017-02-03 | End: 2017-02-10 | Stop reason: HOSPADM

## 2017-02-03 RX ORDER — NALOXONE HYDROCHLORIDE 0.4 MG/ML
.1-.4 INJECTION, SOLUTION INTRAMUSCULAR; INTRAVENOUS; SUBCUTANEOUS
Status: DISCONTINUED | OUTPATIENT
Start: 2017-02-03 | End: 2017-02-10 | Stop reason: HOSPADM

## 2017-02-03 RX ORDER — ARIPIPRAZOLE 5 MG/1
5 TABLET ORAL AT BEDTIME
Status: DISCONTINUED | OUTPATIENT
Start: 2017-02-03 | End: 2017-02-10 | Stop reason: HOSPADM

## 2017-02-03 RX ORDER — PROCHLORPERAZINE MALEATE 5 MG
5 TABLET ORAL EVERY 6 HOURS PRN
Status: DISCONTINUED | OUTPATIENT
Start: 2017-02-03 | End: 2017-02-10 | Stop reason: HOSPADM

## 2017-02-03 RX ORDER — FLUTICASONE PROPIONATE 110 UG/1
2 AEROSOL, METERED RESPIRATORY (INHALATION) EVERY 12 HOURS
Status: DISCONTINUED | OUTPATIENT
Start: 2017-02-03 | End: 2017-02-10 | Stop reason: HOSPADM

## 2017-02-03 RX ORDER — LAMOTRIGINE 150 MG/1
150 TABLET ORAL EVERY MORNING
Status: DISCONTINUED | OUTPATIENT
Start: 2017-02-04 | End: 2017-02-10 | Stop reason: HOSPADM

## 2017-02-03 RX ORDER — LAMOTRIGINE 100 MG/1
100 TABLET ORAL EVERY EVENING
Status: DISCONTINUED | OUTPATIENT
Start: 2017-02-03 | End: 2017-02-10 | Stop reason: HOSPADM

## 2017-02-03 RX ORDER — ONDANSETRON 2 MG/ML
4 INJECTION INTRAMUSCULAR; INTRAVENOUS EVERY 6 HOURS PRN
Status: DISCONTINUED | OUTPATIENT
Start: 2017-02-03 | End: 2017-02-10 | Stop reason: HOSPADM

## 2017-02-03 RX ORDER — BUPROPION HYDROCHLORIDE 150 MG/1
150 TABLET ORAL EVERY MORNING
Status: DISCONTINUED | OUTPATIENT
Start: 2017-02-04 | End: 2017-02-10 | Stop reason: HOSPADM

## 2017-02-03 RX ADMIN — MULTIVITAMIN 15 ML: LIQUID ORAL at 21:04

## 2017-02-03 RX ADMIN — DONEPEZIL HYDROCHLORIDE 10 MG: 10 TABLET, FILM COATED ORAL at 21:04

## 2017-02-03 RX ADMIN — ACETAMINOPHEN 650 MG: 325 TABLET, FILM COATED ORAL at 21:04

## 2017-02-03 RX ADMIN — FLUTICASONE PROPIONATE 2 PUFF: 110 AEROSOL, METERED RESPIRATORY (INHALATION) at 21:04

## 2017-02-03 RX ADMIN — ARIPIPRAZOLE 5 MG: 5 TABLET ORAL at 21:04

## 2017-02-03 RX ADMIN — RANITIDINE HYDROCHLORIDE 150 MG: 150 TABLET, FILM COATED ORAL at 21:04

## 2017-02-03 RX ADMIN — LAMOTRIGINE 100 MG: 100 TABLET ORAL at 22:05

## 2017-02-03 RX ADMIN — SODIUM CHLORIDE: 9 INJECTION, SOLUTION INTRAVENOUS at 15:20

## 2017-02-03 ASSESSMENT — PAIN DESCRIPTION - DESCRIPTORS
DESCRIPTORS: CONSTANT
DESCRIPTORS: ACHING;CONSTANT

## 2017-02-03 NOTE — PROGRESS NOTES
[facility]      OUTPATIENT OCCUPATIONAL THERAPY  EVALUATION  PLAN OF TREATMENT FOR OUTPATIENT REHABILITATION  (COMPLETE FOR INITIAL CLAIMS ONLY)  Patient's Last Name, First Name, M.I.  YOB: 1940  Amparo Sharma                          Provider's Name  [facility] Medical Record No.  [id number]                               Onset Date:  02/03/17   Start of Care Date:   2/3/17     Type:     ___PT   _X_OT   ___SLP Medical Diagnosis:                        NPH   OT Diagnosis:  decreased ADL I and safety   Visits from SOC:  1   _________________________________________________________________________________  Plan of Treatment/Functional Goals    Planned Interventions: ADL retraining, cognition,       Goals: See Occupational Therapy Goals on Care Plan in [employer] electronic health record.    Therapy Frequency: daily  Predicted Duration of Therapy Intervention: 2/17/17  _________________________________________________________________________________    I CERTIFY THE NEED FOR THESE SERVICES FURNISHED UNDER        THIS PLAN OF TREATMENT AND WHILE UNDER MY CARE     (Physician co-signature of this document indicates review and certification of the therapy plan).                 ,      Referring Physician: Deanna Hayward MD            Initial Assessment        See Occupational Therapy evaluation dated   in Epic electronic health record.

## 2017-02-03 NOTE — LETTER
Transition Communication Hand-off for Care Transitions to Next Level of Care Provider    Name: Amparo Sharma  MRN #: [id number]  Primary Care Provider: Parvez Francis     Primary Clinic: [facility] [address] [address]     Reason for Hospitalization:  NPH (normal pressure hydrocephalus) [G91.2]  S/P ventriculoperitoneal shunt [Z98.2]  Admit Date/Time: 2/3/2017 10:48 AM  Discharge Date: 2/10/17  Payor Source: Payor: MEDICARE / Plan: MEDICARE / Product Type: Medicare /              Reason for Communication Hand-off Referral:     Social Work Services Discharge Note      Patient Name:  Amparo Sharma     Anticipated Discharge Date:  2/10/17    Discharge Disposition:   [facility] ([phone])    Following MD:  Facility Assignment     Pre-Admission Screening (PAS) online form has been completed.  The Level of Care (LOC) is:  Determined  Confirmation Code is:  Not indicated as pt is a return to the care facility       Additional Services/Equipment Arranged:  Pt offered w/c transport.  Pt declined and indicates that her daughter's boyfriend will provide her transport to the receiving facility between 8:30 and 9am.     Patient / Family response to discharge plan:  Pt voices understanding of the discharge plan and agreement with the discharge plan.     Persons notified of above discharge plan:  Pt, 6A nursing and Dr. Mckenzie.  Pt independently informed her daughter of the discharge plan.  SW confirmed acceptance to [facility], with Pilar, Admissions Coordinator.    Staff Discharge Instructions:  Please fax discharge orders and signed hard scripts for any controlled substances (SW will complete this task).  Please print a packet and send with patient.     CTS Handoff completed:  YES    Medicare Notice of Rights provided to the patient/family:  YES    CINDY Pittman  Social Work, 6A  Phone:   [phone]  Pager:  [phone]  2/10/2017

## 2017-02-03 NOTE — IP AVS SNAPSHOT
` `     UNIT [facility] [facility]: [phone]                 INTERAGENCY TRANSFER FORM - NOTES (H&P, Discharge Summary, Consults, Procedures, Therapies)   2/3/2017                    Hospital Admission Date: 2/3/2017  AMPARO COPPOLA   : 1940  Sex: Female        Patient PCP Information     Provider PCP Type    [facility]         History & Physicals      H&P by Antonietta Daniels APRN CNS at 2017 11:07 AM     Author:  Antonietta Daniels APRN CNS Service:  (none) Author Type:  Clinical Nurse Specialist    Filed:  2017 12:47 PM Note Time:  2017 11:07 AM Status:  Signed    :  Antonietta Daniels APRN CNS (Clinical Nurse Specialist)             Pre-Operative H & P     CC:  Preoperative exam to assess for increased cardiopulmonary risk while undergoing surgery and anesthesia.    Date of Encounter: 2017  Primary Care Physician:  Parvez Francis    [facility]  Amparo Coppola is a 76 year old female who presents for pre-operative H & P in preparation for Lumbar Drain for Spinal Fluid Diversion  with Dr. Dozier on 2/3/17 at [facility].     History is obtained from the patient.   Pt currently at [facility]. Concern about increased loss of balance and falling. Claims that PT has not helped. Neuro evaluating if hydrocephalus cause for symptoms.        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 Patient has had a hysterectomy.:     Steroid use in the last year: none    Personal or FH with difficulty with Anesthesia:  none    Prior to Admission Medications  Current Outpatient Prescriptions   Medication Sig Dispense Refill     lactobacillus rhamnosus, GG, (CULTURELL) capsule Take 1 capsule by mouth 2 times daily       AMLODIPINE BESYLATE PO Take 10 mg by mouth every morning        DONEPEZIL HCL PO Take 10 mg by mouth At Bedtime       FOLIC ACID PO Take 1 mg by mouth       RANITIDINE HCL PO Take 300 mg by mouth 2 times daily        Tiotropium Bromide Monohydrate (SPIRIVA HANDIHALER IN) Inhale 1 puff into the lungs as needed        hydrocortisone 2.5 % cream Apply topically 2 times daily       aspirin-acetaminophen-caffeine (EXCEDRIN MIGRAINE) 250-250-65 MG per tablet Take 1 tablet by mouth every 6 hours as needed for headaches       fluticasone (FLOVENT HFA) 110 MCG/ACT inhaler Inhale 2 puffs into the lungs as needed       ARIPiprazole (ABILIFY PO) Take 5 mg by mouth every morning        BuPROPion HCl (WELLBUTRIN XL PO) Take 150 mg by mouth every morning        Multiple Vitamins-Minerals (MULTIVITAMIN PO) Take by mouth daily       LamoTRIgine (LAMICTAL PO) Take 150 mg by mouth in the morning and 100 mg by mouth in the evening       Levothyroxine Sodium 75 MCG CAPS Take 75 mcg by mouth every morning        FLUoxetine HCl (PROZAC PO) Take 40 mg by mouth every morning        Acetaminophen (TYLENOL PO) Take 500 mg by mouth every 8 hours as needed for mild pain or fever          Allergies  Allergies   Allergen Reactions     Amantadine      Antihistamine Allergy Relief [Diphenhydramine] Other (See Comments)     hyperactivity     Codeine Itching     Demerol [Meperidine] Nausea     Hmg-Coa-R  Inhibitors Other (See Comments)     Rhabdo     Leona [Pentazocine] Other (See Comments)     drowsiness     Tramadol        Social History  Social History     Social History     Marital Status:      Spouse Name: N/A     Number of Children: N/A     Years of Education: N/A     Occupational History     Not on file.     Social History Main Topics     Smoking status: Former Smoker -- 0.25 packs/day     Types: Cigarettes     Start date: 01/01/1980     Quit date: 01/01/1997     Smokeless tobacco: Not on file     Alcohol Use: Not on file     Drug Use: Not on file     Sexual Activity: Not on file     Other Topics Concern     Not on file     Social History Narrative       Family History  No family history on file.          Anesthesia Evaluation     . Pt has had prior anesthetic.       ROS/MED HX    ENT/Pulmonary:     (+)tobacco use, Past use 0.25 PP for 25 years, quit 1997 packs/day  Intermittent asthma Treatment: Inhaler prn,  , . .    Neurologic:     (+)other neuro short-term memory loss, gait imbalance    Cardiovascular:  - neg cardiovascular ROS   (+) ----. : . . . :. . Previous cardiac testing date:results:date: results:ECG reviewed date:1/30/2017 results: date: results:          METS/Exercise Tolerance:  1 - Eating, dressing   Hematologic:     (+) History of Transfusion no previous transfusion reaction -      Musculoskeletal:   (+) , , other musculoskeletal- W/C due to gait instability      GI/Hepatic:     (+) GERD Asymptomatic on medication, Other GI/Hepatic umbilical hernia      Renal/Genitourinary:     (+) Other Renal/ Genitourinary, incontinence      Endo:     (+) Obesity, .      Psychiatric:     (+) psychiatric history depression      Infectious Disease:  - neg infectious disease ROS       Malignancy:   (+) Malignancy History of GI  GI CA status post Surgery,         Other:    (+) No chance of pregnancy C-spine cleared: N/A, no H/O Chronic Pain,other significant disability Wheelchair bound    "          Physical Exam  Normal systems: cardiovascular, pulmonary and dental    Airway   Mallampati: II  TM distance: >3 FB  Neck ROM: full    Dental   Normal    Cardiovascular   Rhythm and rate: regular and normal      Pulmonary    breath sounds clear to auscultation               The complete review of systems is negative other than noted in the HPI or here.   Temp: 98.3  F (36.8  C) Temp src: Oral BP: 134/78 mmHg Pulse: 70   Resp: 16 SpO2: 95 %         170 lbs 0 oz  5' 0\"   Body mass index is 33.2 kg/(m^2).       Physical Exam  Constitutional: Awake, alert, cooperative, no apparent distress, and appears stated age.  Eyes: Pupils equal, round and reactive to light, extra ocular muscles intact, sclera clear, conjunctiva normal.  HENT: Normocephalic, oral pharynx with moist mucus membranes, good dentition. No goiter appreciated.   Respiratory: Clear to auscultation bilaterally, no crackles or wheezing.  Cardiovascular: Regular rate and rhythm, normal S1 and S2, and no murmur noted.  Carotids +2, no bruits. No edema. Palpable pulses to radial  DP and PT arteries.   GI: Normal bowel sounds, soft, non-distended, non-tender, no masses palpated, no hepatosplenomegaly.  Large ventral hernia. Surgical scars: abdomen, bilateral shoulder, bilateral knees  Lymph/Hematologic: No cervical lymphadenopathy and no supraclavicular lymphadenopathy.  Genitourinary:  deferred  Skin: Warm and dry.  No rashes at anticipated surgical site.   Musculoskeletal: Full ROM of neck. There is no redness, warmth, or swelling of the joints. General weakness  Neurologic: Awake, alert, oriented to name, place and time. Cranial nerves II-XII are grossly intact. Gait is unsteady.  Neuropsychiatric: Calm, cooperative. Normal affect.     Labs: (personally reviewed)  WBC      8.7   1/30/2017  RBC     4.60   1/30/2017  HGB     12.6   1/30/2017  HCT     40.6   1/30/2017  MCV       88   1/30/2017  MCH     27.4   1/30/2017  MCHC     31.0   1/30/2017  RDW    "  15.4   2017  PLT      354   2017  PLT      345   10/12/2016    Last Basic Metabolic Panel:  NA      135   2017   POTASSIUM      4.6   2017  CHLORIDE      100   2017  MARTHA      9.3   2017  CO2       28   2017  BUN       18   2017  CR     0.71   2017  GLC       94   2017    INR    0.95    2017    EKG: Personally reviewed but formal cardiology read pendin2017 NSR    Cardiac echo:   Echo 7/10/2016  Conclusions    Summary  Normal left ventricular size.  Normal left ventricular wall thickness.  No regional wall motion abnormalities.  Left ventricular ejection fraction is visually estimated at 60%.  Normal right ventricular size with preserved systolic function.    Technically difficult examination. No previous study for comparison.    Findings    Left Ventricle  Normal left ventricular size.  Normal left ventricular wall thickness.  No regional wall motion abnormalities.  Left ventricular ejection fraction is visually estimated at 60%.    Right Ventricle  Normal right ventricular size with preserved systolic function.    Left Atrium  Mildly enlarged left atrium.  No evidence of atrial septal defect.    Right Atrium  Right atrium is not clearly visualized.    Great Vessels  Aortic root dimension is within normal limits.    Aortic Valve  Tricuspid aortic valve.  No aortic stenosis present.  No evidence of aortic valve regurgitation.    Mitral Valve  Mitral valve leaflets are mildly thickened with preserved leaflet  mobility.  Trace mitral regurgitation is present.    Tricuspid Valve  Tricuspid valve is structurally normal.  No evidence of tricuspid stenosis.  Trace tricuspid regurgitation.    Pulmonic Valve  The pulmonic valve is not well visualized.    Pericardial Effusion  No pericardial effusion.        ASSESSMENT and PLAN  Amparo Sharma is a 76 year old female scheduled to undergo Lumbar Drain for Spinal Fluid Diversion. She has the following  specific operative considerations:   - RCRI : Low serious cardiac risks.  0.4% risk of major adverse cardiac event.   - Anesthesia considerations:  Refer to PAC assessment in anesthesia records  - VTE risk: 0.5% due to age, however, immobility may put pt at increased risk  - CECELIA # of risks 2/8 = 25%  - Post-op delirium risk: high risk due to age  - Risk of PONV score = 2.  If > 2, anti-emetic intervention recommended.        Previous anesthesia without complications.   Cardiac: HTN, well controlled wit amlodipine. EKG today NSR. Echo done at [facility] 2016 with EF 60%, results above.  Pulmonary: asthma, intermittent. Uses inhaler infrequently in spring and fall.  GI: GERD, PRN ranitidine with relief. Large abdominal hernia colon cancer, s/p resection 2015.  Endo: hypothyroidism, daily synthroid.  Neuro/psych: history of ETOH abuse, in remission. Depression. Pt also has short term memory loss and gait imbalance.   Use W/C most of the time due to balance issue. Feasible airway.    Pt optimized for surgery. AVS with information on surgery time/arrival time, meds and NPO status given by nursing staff      Patient was discussed with Dr Collado.    KASANDRA Arriola CNS  Preoperative Assessment Center  [facility]  Phone: [phone]  Fax: [phone]             Discharge Summaries     No notes of this type exist for this encounter.      Consult Notes     No notes of this type exist for this encounter.         Progress Notes - Physician (Notes from 02/07/17 through 02/10/17)      Progress Notes by Lilian Smith BSW at 2/9/2017  4:35 PM     Author:  Lilian Smith BSW Service:  (none) Author Type:      Filed:  2/9/2017  4:48 PM Note Time:  2/9/2017  4:35 PM Status:  Signed    :  Lilian Smith BSW ()           Social Work Services Progress Note    Hospital Day: 7    Collaborated with:  Dr. Mckenzie, pt, Admissions at [facility] (Pilar [phone])    Data:  Pt was admitted to [facility] from [facility].  Pt [facility] at [facility].    Intervention:  Spoke with Dr. Mckenzie who indicates that pt is medically ready for discharge. Spoke with Pilar in Admissions at [facility], who indicates that pt has a bed hold. Pilar indicates that they can accept pt for admit today.  Met with pt.  Pt voices frustration that she was not told by MD this am that she was ready for discharge.  Pt states that she was previously told that she would not discharge until tomorrow.  Pt states that she will appeal the discharge if she cannot remain hospitalized until tomorrow as she now feels rushed and does not feel that this is good customer service.  Based on this, arrangements will be made for pt to discharge tomorrow to [facility] [address].  Dr. Mckenzie voices agreement with this plan. Pt will arrange for her daughters boyfriend to provide her with transport to [facility], in the am. SW has updated Pilar at [facility] and 6A nursing.    Assessment:  Pt has a bed hold at [facility].  Pt is not open to discharge today as she states that MD did not tell her this am that she was ready for discharge and thus, she didn't have time to prepare for it.    Plan:    Anticipated Disposition:  Placement at [facility] [address] [facility] on 2/10/17.    Barriers to d/c plan:  Pt is unwilling to discharge today as she states that MD did not tell her this am that she was ready for discharge.  Thus, pt states that she now feels rushed and will appeal the discharge if she cannot remain hospitalized until tomorrow.    Follow Up:  SW will coordinate discharge.    CINDY Pittman  Social Work, 6A  Phone:  [phone]  Pager:  [phone]  2/9/2017             Progress Notes by Deanna Hayward MD at 2/8/2017  1:29 PM     Author:  Deanna Hayward MD Service:  Neurosurgery Author Type:  Resident    Filed:   2/8/2017  1:30 PM Note Time:  2/8/2017  1:29 PM Status:  Attested    :  Deanna Hayward MD (Resident) Cosigner:  Emeka Godoy MD at 2/8/2017  4:30 PM    Attestation signed by Emeka Godoy MD at 2/8/2017  4:30 PM        Attestation:  ----- Service Performed and Documented by Resident or Fellow ------                        Neurosurgery Brief Note    Removed lumbar drain in operating room after completion of ventriculoperitoneal shunt placement. Absorbable suture placed.     Deanna Hayward MD       Progress Notes by Schwab, Natice, RN at 2/8/2017  9:45 AM     Author:  Schwab, Natice, RN Service:  (none) Author Type:  Registered Nurse    Filed:  2/8/2017  9:46 AM Note Time:  2/8/2017  9:45 AM Status:  Signed    :  Schwab, Natice, RN (Registered Nurse)           Pt sent to  at 0930. LD clamped for transport; 4mL out prior to transfer.        Progress Notes by Tian Miranda OT at 2/3/2017  1:56 PM     Author:  Tian Miranda OT Service:  Acute IP Rehab Author Type:  Occupational Therapist    Filed:  2/3/2017  1:56 PM Note Time:  2/3/2017  1:56 PM Status:  Attested    :  Tian Miranda OT (Occupational Therapist) Cosigner:  Deanna Hayward MD at 2/7/2017 12:42 PM    Attestation signed by Deanna Hayward MD at 2/7/2017 12:42 PM        Attestation:  Physician Attestation  I agree with the information in this note.    Deanna Hayward                                                                                                      [facility]      OUTPATIENT OCCUPATIONAL THERAPY  EVALUATION  PLAN OF TREATMENT FOR OUTPATIENT REHABILITATION  (COMPLETE FOR INITIAL CLAIMS ONLY)  Patient's Last Name, First Name, M.I.  YOB: 1940  Amparo Sharma                          Provider's Name  [facility] Medical Record No.  [id number]                               Onset Date:  02/03/17   Start of Care Date:   2/3/17      Type:     ___PT   _X_OT   ___SLP Medical Diagnosis:                        NPH   OT Diagnosis:  decreased ADL I and safety   Visits from SOC:  1   _________________________________________________________________________________  Plan of Treatment/Functional Goals    Planned Interventions: ADL retraining, cognition,       Goals: See Occupational Therapy Goals on Care Plan in [facility] electronic health record.    Therapy Frequency: daily  Predicted Duration of Therapy Intervention: 2/17/17  _________________________________________________________________________________    I CERTIFY THE NEED FOR THESE SERVICES FURNISHED UNDER        THIS PLAN OF TREATMENT AND WHILE UNDER MY CARE     (Physician co-signature of this document indicates review and certification of the therapy plan).                 ,      Referring Physician: Deanna Hayward MD            Initial Assessment        See Occupational Therapy evaluation dated   in Epic electronic health record.                             Procedure Notes     No notes of this type exist for this encounter.         Progress Notes - Therapies (Notes from 02/07/17 through 02/10/17)      Progress Notes by Tian Miranda OT at 2/3/2017  1:56 PM     Author:  Tian Miranda OT Service:  Acute IP Rehab Author Type:  Occupational Therapist    Filed:  2/3/2017  1:56 PM Note Time:  2/3/2017  1:56 PM Status:  Attested    :  Tian Miranda OT (Occupational Therapist) Cosigner:  Deanna Hayward MD at 2/7/2017 12:42 PM    Attestation signed by Deanna Hayward MD at 2/7/2017 12:42 PM        Attestation:  Physician Attestation  I agree with the information in this note.    Deanna Hayward                                                                                                      [facility]      OUTPATIENT OCCUPATIONAL THERAPY  EVALUATION  PLAN OF TREATMENT FOR OUTPATIENT REHABILITATION  (COMPLETE FOR INITIAL CLAIMS ONLY)  Patient's Last  Name, First Name, M.I.  YOB: 1940  Amparo Sharma                          Provider's Name  [facility] Medical Record No.  [id number]                               Onset Date:  02/03/17   Start of Care Date:   2/3/17     Type:     ___PT   _X_OT   ___SLP Medical Diagnosis:                        NPH   OT Diagnosis:  decreased ADL I and safety   Visits from SOC:  1   _________________________________________________________________________________  Plan of Treatment/Functional Goals    Planned Interventions: ADL retraining, cognition,       Goals: See Occupational Therapy Goals on Care Plan in [facility] electronic health record.    Therapy Frequency: daily  Predicted Duration of Therapy Intervention: 2/17/17  _________________________________________________________________________________    I CERTIFY THE NEED FOR THESE SERVICES FURNISHED UNDER        THIS PLAN OF TREATMENT AND WHILE UNDER MY CARE     (Physician co-signature of this document indicates review and certification of the therapy plan).                 ,      Referring Physician: Deanna Hayward MD            Initial Assessment        See Occupational Therapy evaluation dated   in Epic electronic health record.

## 2017-02-03 NOTE — IP AVS SNAPSHOT
UNIT 6A [facility] BANK: [phone]                                              INTERAGENCY TRANSFER FORM - LAB / IMAGING / EKG / EMG RESULTS   2/3/2017                    Hospital Admission Date: 2/3/2017  ATILIO COPPOLA   : 1940  Sex: Female        Attending Provider: Rc Dozier MD     Allergies:  Amantadine, Antihistamine Allergy Relief, Codeine, Demerol, Hmg-coa-r Inhibitors, Talwin, Tramadol, Valium    Infection:  MRSA-Contact Isolation   Service:  NEUROSURGERY    Ht:  --   Wt:  77.792 kg (171 lb 8 oz)   Admission Wt:  77.792 kg (171 lb 8 oz)    BMI:  33.49 kg/m 2   BSA:  1.81 m 2            Patient PCP Information     Provider PCP Type    [facility]         Lab Results - 3 Days (17 - 17)      Basic metabolic panel [[id number]] (Abnormal)  Resulted: 17 1202, Result status: Final result    Ordering provider: Deanna Hayward MD  17 Resulting lab: [facility]    Specimen Information    Type Source Collected On   Blood  17 1105          Components       Value Reference Range Flag Lab   Sodium 135 133 - 144 mmol/L  51   Potassium 4.0 3.4 - 5.3 mmol/L  51   Chloride 100 94 - 109 mmol/L  51   Carbon Dioxide 26 20 - 32 mmol/L  51   Anion Gap 9 3 - 14 mmol/L  51   Glucose 100 70 - 99 mg/dL H 51   Urea Nitrogen 19 7 - 30 mg/dL  51   Creatinine 0.82 0.52 - 1.04 mg/dL  51   GFR Estimate 68 >60 mL/min/1.7m2  51   Comment:  Non  GFR Calc   GFR Estimate If Black 82 >60 mL/min/1.7m2  51   Comment:  African American GFR Calc   Calcium 9.0 8.5 - 10.1 mg/dL  51            Magnesium [[id number]]  Resulted: 17 1202, Result status: Final result    Ordering provider: Deanna Hayward MD  17 Resulting lab: [facility]    Specimen Information    Type Source Collected On   Blood  17 1105          Components       Value Reference Range Flag Lab    Magnesium 2.0 1.6 - 2.3 mg/dL  51            Phosphorus [[id number]]  Resulted: 02/09/17 1202, Result status: Final result    Ordering provider: Deanna Hayward MD  02/09/17 0100 Resulting lab: [facility]    Specimen Information    Type Source Collected On   Blood  02/09/17 1105          Components       Value Reference Range Flag Lab   Phosphorus 3.3 2.5 - 4.5 mg/dL  51            INR [[id number]]  Resulted: 02/09/17 1149, Result status: Final result    Ordering provider: Deanna Hayward MD  02/09/17 0100 Resulting lab: [facility]    Specimen Information    Type Source Collected On   Blood  02/09/17 1105          Components       Value Reference Range Flag Lab   INR 1.12 0.86 - 1.14   51            Partial thromboplastin time [[id number]]  Resulted: 02/09/17 1149, Result status: Final result    Ordering provider: Deanna Hayward MD  02/09/17 0100 Resulting lab: [facility]    Specimen Information    Type Source Collected On   Blood  02/09/17 1105          Components       Value Reference Range Flag Lab   PTT 30 22 - 37 sec  51            CBC with platelets differential [[id number]] (Abnormal)  Resulted: 02/09/17 1138, Result status: Final result    Ordering provider: Deanna Hayward MD  02/09/17 0100 Resulting lab: [facility]    Specimen Information    Type Source Collected On   Blood  02/09/17 1105          Components       Value Reference Range Flag Lab   WBC 11.6 4.0 - 11.0 10e9/L H 51   RBC Count 4.23 3.8 - 5.2 10e12/L  51   Hemoglobin 11.6 11.7 - 15.7 g/dL L 51   Hematocrit 37.6 35.0 - 47.0 %  51   MCV 89 78 - 100 fl  51   MCH 27.4 26.5 - 33.0 pg  51   MCHC 30.9 31.5 - 36.5 g/dL L 51   RDW 15.9 10.0 - 15.0 % H 51   Platelet Count 294 150 - 450 10e9/L  51   Diff Method Automated Method   51   % Neutrophils 73.8 %  51   % Lymphocytes 11.6 %  51   % Monocytes 12.9 %  51   %  Eosinophils 0.9 %  51   % Basophils 0.5 %  51   % Immature Granulocytes 0.3 %  51   Nucleated RBCs 0 0 /100  51   Absolute Neutrophil 8.6 1.6 - 8.3 10e9/L H 51   Absolute Lymphocytes 1.4 0.8 - 5.3 10e9/L  51   Absolute Monocytes 1.5 0.0 - 1.3 10e9/L H 51   Absolute Eosinophils 0.1 0.0 - 0.7 10e9/L  51   Absolute Basophils 0.1 0.0 - 0.2 10e9/L  51   Abs Immature Granulocytes 0.0 0 - 0.4 10e9/L  51   Absolute Nucleated RBC 0.0   51            Basic metabolic panel [[id number]] (Abnormal)  Resulted: 02/08/17 0755, Result status: Final result    Ordering provider: Rc Dozier MD  02/08/17 0531 Resulting lab: [facility]    Specimen Information    Type Source Collected On   Blood  02/08/17 0705          Components       Value Reference Range Flag Lab   Sodium 137 133 - 144 mmol/L  51   Potassium 4.4 3.4 - 5.3 mmol/L  51   Chloride 104 94 - 109 mmol/L  51   Carbon Dioxide 25 20 - 32 mmol/L  51   Anion Gap 8 3 - 14 mmol/L  51   Glucose 102 70 - 99 mg/dL H 51   Urea Nitrogen 20 7 - 30 mg/dL  51   Creatinine 0.69 0.52 - 1.04 mg/dL  51   GFR Estimate 83 >60 mL/min/1.7m2  51   Comment:  Non  GFR Calc   GFR Estimate If Black -- >60 mL/min/1.7m2  51   Result:         >90   GFR Calc     Calcium 9.0 8.5 - 10.1 mg/dL  51   Result:              INR [[id number]]  Resulted: 02/08/17 0735, Result status: Final result    Ordering provider: Rc Dozier MD  02/08/17 0531 Resulting lab: [facility]    Specimen Information    Type Source Collected On   Blood  02/08/17 0705          Components       Value Reference Range Flag Lab   INR 1.03 0.86 - 1.14   51            CBC with platelets differential [[id number]] (Abnormal)  Resulted: 02/08/17 0733, Result status: Final result    Ordering provider: Rc Dozier MD  02/08/17 0531 Resulting lab: [facility]    Specimen Information    Type  Source Collected On   Blood  02/08/17 0705          Components       Value Reference Range Flag Lab   WBC 8.0 4.0 - 11.0 10e9/L  51   RBC Count 4.25 3.8 - 5.2 10e12/L  51   Hemoglobin 11.7 11.7 - 15.7 g/dL  51   Hematocrit 37.8 35.0 - 47.0 %  51   MCV 89 78 - 100 fl  51   MCH 27.5 26.5 - 33.0 pg  51   MCHC 31.0 31.5 - 36.5 g/dL L 51   RDW 15.8 10.0 - 15.0 % H 51   Platelet Count 283 150 - 450 10e9/L  51   Diff Method Automated Method   51   % Neutrophils 72.9 %  51   % Lymphocytes 15.1 %  51   % Monocytes 6.3 %  51   % Eosinophils 4.8 %  51   % Basophils 0.5 %  51   % Immature Granulocytes 0.4 %  51   Nucleated RBCs 0 0 /100  51   Absolute Neutrophil 5.8 1.6 - 8.3 10e9/L  51   Absolute Lymphocytes 1.2 0.8 - 5.3 10e9/L  51   Absolute Monocytes 0.5 0.0 - 1.3 10e9/L  51   Absolute Eosinophils 0.4 0.0 - 0.7 10e9/L  51   Absolute Basophils 0.0 0.0 - 0.2 10e9/L  51   Abs Immature Granulocytes 0.0 0 - 0.4 10e9/L  51   Absolute Nucleated RBC 0.0   51            Testing Performed By     Lab - Abbreviation Name Director Address Valid Date Range    51 - Unknown [facility] Unknown [address] [facility] [id number] 12/31/14 1010 - Present            Unresulted Labs     None         Imaging Results - 3 Days (02/09/17 - 02/08/17)      XR Shunt Malfunction Surgery Survey [[id number]]  Resulted: 02/09/17 0914, Result status: Final result    Ordering provider: Carson Mckenzie MD  02/09/17 0712 Resulted by: Moses Galindo MD    Performed: 02/09/17 0825 - 02/09/17 0839 Resulting lab: RADIOLOGY RESULTS    Narrative:       Shunt malfunction survey    History: post-op shunt placement.  Comparison:   none    Findings: There is a right frontal shunt catheter in the skull that  appears continuous in the cranial and cervical portions. [place]  present.    In the thorax, the shunt appears continous. Cardiac silhouette appears  unremarkable. Pulmonary vasculature appears unremarkable.   There is  questionable infiltrate in the left lower lobe along left  hemidiaphragm, and there may be fluid in the right minor fissure  suggesting pleural fluid. While there is no previous shunt series  study, there is a previous chest x-ray that shows these findings are  new, from one day earlier.    In the abdomen, the course of the shunt appears continuous. Bowel gas  pattern appears within normal limits, although a fair amount of bowel  gas and dilatation of the colon is present. The tip of the catheter is  visualized, and is located in the right lower quadrant. Hip prosthesis  present on the right, and multiple surgical clips scattered throughout  the abdomen and pelvis present. Multilevel lumbar degenerative disease  present.      Impression:       Impression:   1.  The shunt appears continuous throughout its course in the head,  neck, chest and abdomen.   2.  Within the chest, right pleural effusion is suspected.   3. Within the chest, there is likely a left lower lobe new infiltrate.  4. Mildly enlarged bowel gas pattern within the colon on the right.  5. Lumbar vertebral degenerative disease.    CAMILO AGEE MD    Specimen Information    Type Source Collected On                  CT Head w/o Contrast [[id number]]  Resulted: 02/08/17 1007, Result status: Final result    Ordering provider: Deanna Hayward MD  02/08/17 0005 Resulted by: Carson Riley MD Salavati, Ali, MD    Performed: 02/08/17 0448 - 02/08/17 0739 Resulting lab: RADIOLOGY RESULTS    Narrative:       Stealth CT with imaging for purposes of stereotactic evaluation    History: surgical planning for  shunt.  Please acquire thin slices  for stealth WITH fiducials    Comparison: MRI outside on 10/31/2016    Technique: CT imaging performed with axial, sagittal, and coronal  reconstructed images obtained without intravenous contrast.    Findings: No intracranial hemorrhage or midline shift or mass effect.  Redemonstration of enlarged  lateral and third ventricles out  proportionate to the cerebral sulci, widening of sylvian fissures and  effacement of superior frontal gyri, suggestive of NPH.      Impression:       Impression: No acute intracranial pathology. 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 Imaging performed  primarily for the purposes of stereotactic localization.    I have personally reviewed the examination and initial interpretation  and I agree with the findings.    ESCOBAR MCKENNA MD    Specimen Information    Type Source Collected On                  XR Chest Port 1 View [[id number]]  Resulted: 02/08/17 0752, Result status: Final result    Ordering provider: Pascual Guzman MD  02/08/17 0530 Resulted by: Javi Edmondson MD Smith, Kendell Miller DO    Performed: 02/08/17 0607 - 02/08/17 0621 Resulting lab: RADIOLOGY RESULTS    Narrative:       XR CHEST PORT 1 VW  2/8/2017 6:21 AM      HISTORY: Pre-op    COMPARISON: None    FINDINGS:   The lungs and pleural spaces are clear. No focal airspace opacity. No  pleural effusion. The central airway is clear. No pneumothorax.     The heart and pulmonary vascularity appear unremarkable. Relative  elevation of the right hemidiaphragm, no previous studies to compare.    No acute bony abnormality other than degenerative changes of the left  humeral head with sclerosis and osteophyte formation. Gentle mid  thoracic scoliosis convex to the right.      Impression:       IMPRESSION:   No acute cardiopulmonary abnormality.     I have personally reviewed the examination and initial interpretation  and I agree with the findings.    JAVI EDMONDSON MD    Specimen Information    Type Source Collected On                  Testing Performed By     Lab - Abbreviation Name Director Address Valid Date Range    104 - Rad lts RADIOLOGY RESULTS Unknown Unknown 02/16/05 1553 - Present            Encounter-Level Documents:     There are no encounter-level  documents.      Order-Level Documents:     There are no order-level documents.

## 2017-02-03 NOTE — PROGRESS NOTES
" 02/03/17 1155   Quick Adds   Type of Visit Initial Occupational Therapy Evaluation   Living Environment   Lives With alone   Living Arrangements independent living facility   Home Accessibility no concerns;grab bars present (bathtub);grab bars present (toilet)   Number of Stairs to Enter Home 0   Number of Stairs Within Home 0   Transportation Available family or friend will provide   Living Environment Comment No A at [facility]   Self-Care   Dominant Hand right   Usual Activity Tolerance good   Current Activity Tolerance poor   Regular Exercise yes   Activity/Exercise Type (5xweek PT at [facility])   Exercise Amount/Frequency 5-7 times/wk   Equipment Currently Used at Home raised toilet;grab bar;shower chair;walker, rolling;wheelchair   Activity/Exercise/Self-Care Comment recently \"passed\" OT at [facility] within 2 weeks   Functional Level Prior   Ambulation 3-->assistive equipment and person   Transferring 2-->assistive person   Toileting 1-->assistive equipment   Bathing 3-->assistive equipment and person   Dressing 0-->independent   Eating 0-->independent   Communication 0-->understands/communicates without difficulty   Swallowing 0-->swallows foods/liquids without difficulty   Cognition 1 - attention or memory deficits   Fall history within last six months yes   Number of times patient has fallen within last six months (2-3 x day since last june)   Which of the above functional risks had a recent onset or change? ambulation;transferring;fall history   General Information   Onset of Illness/Injury or Date of Surgery - Date 02/03/17   Referring Physician Deanna Hayward MD   Patient/Family Goals Statement to get home safely/walk   Additional Occupational Profile Info/Pertinent History of Current Problem no infomation available   Precautions/Limitations fall precautions   Cognitive Status Examination   Orientation orientation to person, place and time   Level of Consciousness alert   Visual Perception   Visual Perception Wears " glasses;No deficits were identified   Sensory Examination   Sensory Comments Pt reports occasional ulnar numbness when leaning on elbows (encouraged to avoid or use cushion)   Pain Assessment   Patient Currently in Pain No   Integumentary/Edema   Integumentary/Edema no deficits were identifed   Range of Motion (ROM)   ROM Comment R rotator cuff deficit, shoulder flexion limited to 45 deg   Strength   Strength Comments WFL, generally weak   Muscle Tone Assessment   Muscle Tone Comments WNL   Coordination   Upper Extremity Coordination No deficits were identified   Transfer Skill: Sit to Stand   Level of [place]: Sit/Stand minimum assist (75% patients effort)   Lower Body Dressing   Level of [place]: Dress Lower Body moderate assist (50% patients effort)   Instrumental Activities of Daily Living (IADL)   Previous Responsibilities (Pt reported previous I, some A from family)   Activities of Daily Living Analysis   Impairments Contributing to Impaired Activities of Daily Living balance impaired;strength decreased;ROM decreased   General Therapy Interventions   Planned Therapy Interventions ADL retraining;cognition   Clinical Impression   Criteria for Skilled Therapeutic Interventions Met yes, treatment indicated   OT Diagnosis decreased ADL I and safety   Influenced by the following impairments cognition, weakness, balance   Assessment of Occupational Performance 3-5 Performance Deficits   Identified Performance Deficits dressing, bathing, leisure, home mgmt, toileting   Clinical Decision Making (Complexity) Low complexity   Therapy Frequency daily   Predicted Duration of Therapy Intervention (days/wks) 2/17/17   Anticipated Equipment Needs at Discharge (none)   Anticipated Discharge Disposition Transitional Care Facility   Risks and Benefits of Treatment have been explained. Yes   Patient, Family & other staff in agreement with plan of care Yes   Clinical Impression Comments Pt would benefit from skilled OT  "to help increase ADL I and tolerance   [facility] AM-PAC TM \"6 Clicks\"   2016, Trustees of [facility], under license to [employer].  All rights reserved.   6 Clicks Short Forms Daily Activity Inpatient Short Form   [facility] AM-PAC  \"6 Clicks\" Daily Activity Inpatient Short Form   1. Putting on and taking off regular lower body clothing? 2 - A Lot   2. Bathing (including washing, rinsing, drying)? 2 - A Lot   3. Toileting, which includes using toilet, bedpan or urinal? 2 - A Lot   4. Putting on and taking off regular upper body clothing? 3 - A Little   5. Taking care of personal grooming such as brushing teeth? 3 - A Little   6. Eating meals? 4 - None   Daily Activity Raw Score (Score out of 24.Lower scores equate to lower levels of function) 16   Total Evaluation Time   Total Evaluation Time (Minutes) 10     "

## 2017-02-03 NOTE — IP AVS SNAPSHOT
` `     [address] [facility] BANK: [phone]            Medication Administration Report for Amparo Sharma as of 02/10/17 0754   Legend:    Given Hold Not Given Due Canceled Entry Other Actions    Time Time (Time) Time  Time-Action       Inactive    Active    Linked        Medications 02/04/17 02/05/17 02/06/17 02/07/17 02/08/17 02/09/17 02/10/17    acetaminophen (TYLENOL) tablet 975 mg  Dose: 975 mg Freq: EVERY 8 HOURS Route: PO  Start: 02/08/17 1600   End: 02/11/17 1559   Admin Instructions: Do not use if patient has an active opioid/acetaminophen analgesic order for pain  Maximum acetaminophen dose from all sources = 75 mg/kg/day not to exceed 4 grams/day.         1648 (975 mg)-Given        0205 (975 mg)-Given       0749 (975 mg)-Given       1456 (975 mg)-Given              (2357)-Not Given        0751 (975 mg)-Given       [ ] 1600           amLODIPine (NORVASC) tablet 10 mg  Dose: 10 mg Freq: EVERY MORNING Route: PO  Start: 02/04/17 0800    0812 (10 mg)-Given        0733-Auto Hold       0800-Automatically Held       1006-Unhold       1050 (10 mg)-Given        0727 (10 mg)-Given        0737 (10 mg)-Given        0743 (10 mg)-Given       0942-Auto Hold       1516-Unhold        0749 (10 mg)-Given        0751 (10 mg)-Given           ARIPiprazole (ABILIFY) tablet 5 mg  Dose: 5 mg Freq: AT BEDTIME Route: PO  Start: 02/03/17 2200    2138 (5 mg)-Given        0733-Auto Hold       1006-Unhold       2106 (5 mg)-Given        2100 (5 mg)-Given        2109 (5 mg)-Given        0942-Auto Hold       1516-Unhold       2142 (5 mg)-Given        2126 (5 mg)-Given        [ ] 2000           buPROPion (WELLBUTRIN XL) 24 hr tablet 150 mg  Dose: 150 mg Freq: EVERY MORNING Route: PO  Start: 02/04/17 0800    0811 (150 mg)-Given        0733-Auto Hold       0800-Automatically Held       1006-Unhold       1050 (150 mg)-Given        0727 (150 mg)-Given        0737 (150 mg)-Given        0744 (150 mg)-Given       0942-Auto Hold        1516-Unhold        0749 (150 mg)-Given        0752 (150 mg)-Given           donepezil (ARICEPT) tablet 10 mg  Dose: 10 mg Freq: AT BEDTIME Route: PO  Start: 02/03/17 2200    2138 (10 mg)-Given        0733-Auto Hold       1006-Unhold       2106 (10 mg)-Given        2100 (10 mg)-Given        2109 (10 mg)-Given        0942-Auto Hold       1516-Unhold       2142 (10 mg)-Given        2130 (10 mg)-Given        [ ] 2200           FLUoxetine (PROzac) capsule 40 mg  Dose: 40 mg Freq: EVERY MORNING Route: PO  Start: 02/04/17 0800    0812 (40 mg)-Given        0733-Auto Hold       0800-Automatically Held       1006-Unhold       1049 (40 mg)-Given        0727 (40 mg)-Given        0737 (40 mg)-Given        0743 (40 mg)-Given       0942-Auto Hold       1516-Unhold        0749 (40 mg)-Given        0751 (40 mg)-Given           fluticasone (FLOVENT HFA) 110 MCG/ACT Inhaler 2 puff  Dose: 2 puff Freq: EVERY 12 HOURS Route: IN  Start: 02/03/17 2000   Admin Instructions: Rinse mouth after use.     (0813)-Not Given       1944 (2 puff)-Given        0733-Auto Hold       0800-Automatically Held       1006-Unhold       1049 (2 puff)-Given       1926 (2 puff)-Given        0728 (2 puff)-Given       (2010)-Not Given        0737 (2 puff)-Given       (2108)-Not Given        0744 (2 puff)-Given       0942-Auto Hold       1516-Unhold       2326 (2 puff)-Given        (0750)-Not Given       2130 (2 puff)-Given        0750 (2 puff)-Given       [ ] 2000           folic acid (FOLVITE) tablet 1 mg  Dose: 1 mg Freq: EVERY MORNING Route: PO  Start: 02/04/17 0800    0812 (1 mg)-Given        0733-Auto Hold       0800-Automatically Held       1006-Unhold       1050 (1 mg)-Given        0727 (1 mg)-Given        0737 (1 mg)-Given        0743 (1 mg)-Given       0942-Auto Hold       1516-Unhold        0749 (1 mg)-Given        0751 (1 mg)-Given           lamoTRIgine (LaMICtal) tablet 100 mg  Dose: 100 mg Freq: EVERY EVENING Route: PO  Start: 02/03/17 2045    1940  "(100 mg)-Given        0733-Auto Hold       1006-Unhold       1926 (100 mg)-Given        2009 (100 mg)-Given        2109 (100 mg)-Given        0942-Auto Hold       1516-Unhold       2142 (100 mg)-Given        2125 (100 mg)-Given        [ ] 2000           lamoTRIgine (LaMICtal) tablet 150 mg  Dose: 150 mg Freq: EVERY MORNING Route: PO  Start: 02/04/17 0800    0811 (150 mg)-Given        0733-Auto Hold       0800-Automatically Held       1006-Unhold       1049 (150 mg)-Given        0727 (150 mg)-Given        0737 (150 mg)-Given        0743 (150 mg)-Given       0942-Auto Hold       1516-Unhold        0749 (150 mg)-Given        0752 (150 mg)-Given           levothyroxine (SYNTHROID/LEVOTHROID) tablet 75 mcg  Dose: 75 mcg Freq: EVERY MORNING Route: PO  Start: 02/04/17 0800   Admin Instructions: Formulary alternate for LEVOTHYROXINE SODIUM 75 MCG PO CAPS     0812 (75 mcg)-Given        0733-Auto Hold       0800-Automatically Held       1006-Unhold       1050 (75 mcg)-Given        0727 (75 mcg)-Given        0737 (75 mcg)-Given        0743 (75 mcg)-Given       0942-Auto Hold       1516-Unhold        0750 (75 mcg)-Given        0751 (75 mcg)-Given           lidocaine (LMX4) kit  Freq: EVERY 1 HOUR PRN Route: Top  PRN Reason: pain  PRN Comment: with VAD insertion or accessing implanted port.  Start: 02/08/17 1516   Admin Instructions: Do NOT give if patient has a history of allergy to any local anesthetic or any \"iker\" product.   Apply 30 minutes prior to VAD insertion or port access.  MAX Dose:  2.5 g (  of 5 g tube)               lidocaine 1 % 1 mL  Dose: 1 mL Freq: EVERY 1 HOUR PRN Route: OTHER  PRN Comment: mild pain with VAD insertion or accessing implanted port  Start: 02/08/17 1516   Admin Instructions: Do NOT give if patient has a history of allergy to any local anesthetic or any \"iker\" product. MAX dose 1 mL subcutaneous OR intradermal in divided doses.               multivitamin, therapeutic (THERA-VIT) tablet 1 " tablet  Dose: 1 tablet Freq: DAILY Route: PO  Start: 02/05/17 1115             0727 (1 tablet)-Given        0737 (1 tablet)-Given        0743 (1 tablet)-Given       0942-Auto Hold       1516-Unhold        0750 (1 tablet)-Given        0751 (1 tablet)-Given           naloxone (NARCAN) injection 0.1-0.4 mg  Dose: 0.1-0.4 mg Freq: EVERY 2 MIN PRN Route: IV  PRN Reason: opioid reversal  Start: 02/03/17 1109   Admin Instructions: For respiratory rate LESS than or EQUAL to 8.  Partial reversal dose:  0.1 mg titrated q 2 minutes for Analgesia Side Effects Monitoring Sedation Level of 3 (frequently drowsy, arousable, drifts to sleep during conversation).Full reversal dose:  0.4 mg bolus for Analgesia Side Effects Monitoring Sedation Level of 4 (somnolent, minimal or no response to stimulation).      0733-Auto Hold       1006-Unhold          0942-Auto Hold       1516-Unhold             ondansetron (ZOFRAN-ODT) ODT tab 4 mg  Dose: 4 mg Freq: EVERY 6 HOURS PRN Route: PO  PRN Reason: nausea  Start: 02/03/17 1109   Admin Instructions: This is Step 1 of nausea and vomiting management.  If nausea not resolved in 15 minutes, go to Step 2 prochlorperazine (COMPAZINE). Do not push through foil backing. Peel back foil and gently remove. Place on tongue immediately. Administration with liquid unnecessary      0733-Auto Hold       1006-Unhold          0942-Auto Hold       1516-Unhold            Or  ondansetron (ZOFRAN) injection 4 mg  Dose: 4 mg Freq: EVERY 6 HOURS PRN Route: IV  PRN Reasons: nausea,vomiting  Start: 02/03/17 1109   Admin Instructions: This is Step 1 of nausea and vomiting management.  If nausea not resolved in 15 minutes, go to Step 2 prochlorperazine (COMPAZINE).      0733-Auto Hold       1006-Unhold          0942-Auto Hold       1516-Unhold             oxyCODONE (ROXICODONE) IR tablet 5-10 mg  Dose: 5-10 mg Freq: EVERY 4 HOURS PRN Route: PO  PRN Reason: moderate to severe pain  Start: 02/08/17 1516   Admin  Instructions: Hold while on PCA or with regular IV opioid dosing.  IF CrCl UNKNOWN start at lowest end of dosing range.         1648 (10 mg)-Given       2143 (10 mg)-Given        0205 (10 mg)-Given       0634 (10 mg)-Given       1739 (5 mg)-Given            phenol (CHLORASEPTIC) 1.4 % spray 1 mL  Dose: 1 spray Freq: EVERY 1 HOUR PRN Route: MT  PRN Reason: sore throat  Start: 02/08/17 1637        2143 (1 mL)-Given       2328 (1 mL)-Given        0205 (1 mL)-Given       0441 (1 mL)-Given       0634 (1 mL)-Given       2133 (1 mL)-Given            prochlorperazine (COMPAZINE) injection 5 mg  Dose: 5 mg Freq: EVERY 6 HOURS PRN Route: IV  PRN Reasons: nausea,vomiting  Start: 02/03/17 1109   Admin Instructions: This is Step 2 of nausea and vomiting management.   If nausea not resolved in 15 minutes, give metoclopramide (REGLAN) if ordered (step 3 of nausea and vomiting management)      0733-Auto Hold       1006-Unhold          0942-Auto Hold       1516-Unhold            Or  prochlorperazine (COMPAZINE) tablet 5 mg  Dose: 5 mg Freq: EVERY 6 HOURS PRN Route: PO  PRN Reasons: nausea,vomiting  Start: 02/03/17 1109   Admin Instructions: This is Step 2 of nausea and vomiting management.   If nausea not resolved in 15 minutes, give metoclopramide (REGLAN) if ordered (step 3 of nausea and vomiting management)      0733-Auto Hold       1006-Unhold          0942-Auto Hold       1516-Unhold             ranitidine (ZANTAC) tablet 150 mg  Dose: 150 mg Freq: 2 TIMES DAILY Route: PO  Start: 02/03/17 1115   Admin Instructions: May also be given IV     0812 (150 mg)-Given       1944 (150 mg)-Given        0733-Auto Hold       0800-Automatically Held       1006-Unhold       1050 (150 mg)-Given       1925 (150 mg)-Given        0727 (150 mg)-Given       2009 (150 mg)-Given        0841 (150 mg)-Given       2109 (150 mg)-Given        0743 (150 mg)-Given       0942-Auto Hold       1516-Unhold       2143 (150 mg)-Given        0749 (150  mg)-Given       2126 (150 mg)-Given        0751 (150 mg)-Given       [ ] 2000           senna-docusate (SENOKOT-S;PERICOLACE) 8.6-50 MG per tablet 1-2 tablet  Dose: 1-2 tablet Freq: 2 TIMES DAILY Route: PO  Start: 02/08/17 2000   Admin Instructions: Start with 1 tablet PO BID, If no bowel movement in 24 hours, increase to 2 tablets PO BID.  Hold for loose stools.         2143 (1 tablet)-Given        (0750)-Not Given       (2126)-Not Given               0751 (2 tablet)-Given       [ ] 2000           sodium chloride (PF) 0.9% PF flush 3 mL  Dose: 3 mL Freq: EVERY 8 HOURS Route: IV  Start: 02/03/17 1115   Admin Instructions: to lock peripheral IV dormant line.  Also Ordered Q1H PRN     (0216)-Not Given       (1236)-Not Given       (1940)-Not Given        (0449)-Not Given       0733-Auto Hold       1006-Unhold       (1116)-Not Given       (1922)-Not Given        0005 (3 mL)-Given       0729 (3 mL)-Given       (1547)-Not Given [C]       2349 (3 mL)-Given        0737 (3 mL)-Given       (1524)-Not Given [C]        0136 (3 mL)-Given       0744 (3 mL)-Given       0942-Auto Hold       1516-Unhold       (1600)-Not Given       (2329)-Not Given        (0751)-Not Given       1719 (3 mL)-Given       2357 (3 mL)-Given        (0756)-Not Given       [ ] 1600           tiotropium (SPIRIVA) capsule 18 mcg  Dose: 18 mcg Freq: DAILY Route: IN  Start: 02/03/17 1845    (0809)-Not Given        0733-Auto Hold       0800-Automatically Held       1006-Unhold        0728 (18 mcg)-Given        0737 (18 mcg)-Given        0744 (18 mcg)-Given       0942-Auto Hold       1516-Unhold        (0750)-Not Given        0749 (18 mcg)-Given          Future Medications  Medications 02/04/17 02/05/17 02/06/17 02/07/17 02/08/17 02/09/17 02/10/17       acetaminophen (TYLENOL) tablet 650 mg  Dose: 650 mg Freq: EVERY 4 HOURS PRN Route: PO  PRN Reason: other  PRN Comment: surgical pain  Start: 02/11/17 0000   Admin Instructions: May give first dose 4 hours after  last scheduled dose of acetaminophen.  Maximum acetaminophen dose from all sources = 75 mg/kg/day not to exceed 4 grams/day.          (2124)-Not Given [C]           Completed Medications  Medications 02/04/17 02/05/17 02/06/17 02/07/17 02/08/17 02/09/17 02/10/17         Dose: 2 g Freq: PRE-OP/PRE-PROCEDURE Route: IV  Indications of Use: SURGICAL PROPHYLAXIS  Start: 02/08/17 0530   End: 02/08/17 1145   Admin Instructions: Give first dose within 1 hour PRIOR to incision. If patient weight is greater than or equal to 120 kg increase dose to 3 g.         1145 (2 g)-Given               Dose: 10 mg Freq: ONCE PRN Route: IV  PRN Reason: high blood pressure  PRN Comment: for Systemic Blood Pressure greater than 160 and Heart Rate greater than 60 bpm.    Start: 02/08/17 1404   End: 02/08/17 1413   Admin Instructions: For PACU USE ONLY.  DC WHEN TRANSFERRED TO FLOOR.         1413 (10 mg)-Given            Discontinued Medications  Medications 02/04/17 02/05/17 02/06/17 02/07/17 02/08/17 02/09/17 02/10/17         Rate: 50 mL/hr Freq: CONTINUOUS Route: IV  Start: 02/08/17 1400   End: 02/09/17 0614        1458 ( )-New Bag       1600 ( )-Rate/Dose Verify        0614-Med Discontinued          Rate: 75 mL/hr Freq: CONTINUOUS Route: IV  Last Dose: Stopped (02/05/17 1926)  Start: 02/03/17 1115   End: 02/08/17 1812    0000 ( )-Rate/Dose Verify       0100 ( )-Rate/Dose Verify       0200 ( )-Rate/Dose Verify       0300 ( )-Rate/Dose Verify       0408 ( )-New Bag       0500 ( )-Rate/Dose Verify       0600 ( )-Rate/Dose Verify       0700 ( )-Rate/Dose Verify       0813 ( )-Rate/Dose Verify       1237 ( )-Rate/Dose Verify       1342 ( )-Rate/Dose Verify       2138 ( )-New Bag        0958 ( )-Restarted       1117 ( )-Rate/Dose Verify       1926-Stopped [C]          1812-Med Discontinued           Dose: 650 mg Freq: EVERY 6 HOURS PRN Route: PO  PRN Reason: mild pain  Start: 02/03/17 1109   End: 02/08/17 1532   Admin Instructions: Do not use  if patient has an active opioid/acetaminophen analgesic order for pain.  Maximum acetaminophen dose from all sources = 75 mg/kg/day not to exceed 4 grams/day.     0225 (650 mg)-Given       0818 (650 mg)-Given       1836 (650 mg)-Given        0231 (650 mg)-Given       0733-Auto Hold       1006-Unhold       1459 (650 mg)-Given       2106 (650 mg)-Given        1648 (650 mg)-Given        1254 (650 mg)-Given        0942-Auto Hold       1516-Unhold       1532-Med Discontinued           Dose: 1 g Freq: SEE ADMIN INSTRUCTIONS Route: IV  Indications of Use: SURGICAL PROPHYLAXIS  Start: 02/08/17 0530   End: 02/08/17 1337   Admin Instructions: Intra-Op Dose.  Give every 2 hours while patient in surgery, starting 2 hours after pre-op dose.  DO NOT GIVE intra-op dose if CrCl less than 10 mL/min (on dialysis).  If CrCL less than 50 mL/min, double the time interval between doses.         1337-Med Discontinued           Dose: 25-50 mcg Freq: EVERY 2 MIN PRN Route: IV  PRN Reason: other  PRN Comment: acute pain  Start: 02/08/17 1404   End: 02/08/17 1516   Admin Instructions: MAX cumulative dose = 250 mcg.    Use Fentanyl initially, as a short acting agent for acute pain control.  If insufficient, or a longer acting agent is needed, begin Morphine or Hydromorphone if ordered.         1516-Med Discontinued           Freq: PRN  Start: 02/08/17 1215   End: 02/08/17 1516        1215 (500 mL)-Given [C]       1516-Med Discontinued           Dose: 2.5-5 mg Freq: EVERY 10 MIN PRN Route: IV  PRN Reason: high blood pressure  PRN Comment: for Systolic Blood Pressure greater than 160 and Heart Rate greater than 60 bpm.  Start: 02/08/17 1404   End: 02/08/17 1516   Admin Instructions: Max cumulative dose = 20 mg.  FOR USE IN PACU ONLY, DC WHEN TRANSFERRED TO FLOOR.         1516-Med Discontinued           Rate: 100 mL/hr Freq: CONTINUOUS Route: IV  Start: 02/08/17 1415   End: 02/08/17 1516   Admin Instructions: Continue until IV catheter is weaned                 1516-Med Discontinued           Freq: PRN  Start: 02/08/17 1205   End: 02/08/17 1516        1205 (10 mL)-Given       1516-Med Discontinued           Dose: 0.1-0.4 mg Freq: EVERY 2 MIN PRN Route: IV  PRN Reason: opioid reversal  Start: 02/08/17 1516   End: 02/08/17 1531   Admin Instructions: For respiratory rate LESS than or EQUAL to 8.  Partial reversal dose:  0.1 mg titrated q 2 minutes for Analgesia Side Effects Monitoring Sedation Level of 3 (frequently drowsy, arousable, drifts to sleep during conversation).Full reversal dose:  0.4 mg bolus for Analgesia Side Effects Monitoring Sedation Level of 4 (somnolent, minimal or no response to stimulation).         1531-Med Discontinued           Dose: 4 mg Freq: EVERY 30 MIN PRN Route: PO  PRN Reason: nausea  Start: 02/08/17 1404   End: 02/08/17 1516   Admin Instructions: MAX total dose = 8 mg, including OR dosing. If not resolved in 15 minutes, then go to step 2 (Prochlorperazine if ordered).         1516-Med Discontinued        Or    Dose: 4 mg Freq: EVERY 30 MIN PRN Route: IV  PRN Reason: nausea  Start: 02/08/17 1404   End: 02/08/17 1516   Admin Instructions: MAX total dose = 8 mg, including OR dosing. If not resolved in 15 minutes, then go to step 2 (Prochlorperazine if ordered).         1516-Med Discontinued           Dose: 4 mg Freq: EVERY 6 HOURS PRN Route: PO  PRN Reason: nausea  Start: 02/08/17 1516   End: 02/08/17 1533   Admin Instructions: This is Step 1 of nausea and vomiting management.  If nausea not resolved in 15 minutes, go to Step 2 prochlorperazine (COMPAZINE). Do not push through foil backing. Peel back foil and gently remove. Place on tongue immediately. Administration with liquid unnecessary         1533-Med Discontinued        Or    Dose: 4 mg Freq: EVERY 6 HOURS PRN Route: IV  PRN Reasons: nausea,vomiting  Start: 02/08/17 1516   End: 02/08/17 1533   Admin Instructions: This is Step 1 of nausea and vomiting management.  If nausea not  resolved in 15 minutes, go to Step 2 prochlorperazine (COMPAZINE).         1533-Med Discontinued           Dose: 5 mg Freq: EVERY 6 HOURS PRN Route: IV  PRN Reasons: nausea,vomiting  Start: 02/08/17 1404   End: 02/08/17 1516   Admin Instructions: This is Step 2 of the nausea and vomiting protocol.   If nausea not resolved in 15 minutes, give Metoclopramide if ordered (step 3 of nausea and vomiting protocol)         1516-Med Discontinued           Dose: 5 mg Freq: EVERY 6 HOURS PRN Route: IV  PRN Reasons: nausea,vomiting  Start: 02/08/17 1516   End: 02/08/17 1533   Admin Instructions: This is Step 2 of nausea and vomiting management.   If nausea not resolved in 15 minutes, give metoclopramide (REGLAN) if ordered (step 3 of nausea and vomiting management)         1533-Med Discontinued        Or    Dose: 5 mg Freq: EVERY 6 HOURS PRN Route: PO  PRN Reasons: nausea,vomiting  Start: 02/08/17 1516   End: 02/08/17 1533   Admin Instructions: This is Step 2 of nausea and vomiting management.   If nausea not resolved in 15 minutes, give metoclopramide (REGLAN) if ordered (step 3 of nausea and vomiting management)         1533-Med Discontinued           Dose: 3 mL Freq: EVERY 8 HOURS Route: IK  Start: 02/08/17 1530   End: 02/08/17 1531   Admin Instructions: And Q1H PRN, to lock peripheral IV dormant line.                1531-Med Discontinued           Dose: 3 mL Freq: EVERY 1 HOUR PRN Route: IK  PRN Reason: line flush  PRN Comment: for peripheral IV flush post IV meds  Start: 02/08/17 1516   End: 02/08/17 1531        1531-Med Discontinued           Dose: 3 mL Freq: EVERY 1 HOUR PRN Route: IV  PRN Reason: line flush  PRN Comment: to lock peripheral IV dormant line.  Start: 02/03/17 1109   End: 02/08/17 1811   Admin Instructions: Also Ordered EVERY 8 HOURS.      0733-Auto Hold       1006-Unhold          0942-Auto Hold       1516-Unhold       1811-Med Discontinued           Dose: 3 mL Freq: EVERY 1 HOUR PRN Route: IV  PRN Reasons:  line flush,post meds or blood draw  Start: 02/03/17 1109   End: 02/08/17 1811   Admin Instructions: for peripheral IV line flush post IV meds      0733-Auto Hold       1006-Unhold          0942-Auto Hold       1516-Unhold       1811-Med Discontinued      Medications 02/04/17 02/05/17 02/06/17 02/07/17 02/08/17 02/09/17 02/10/17

## 2017-02-03 NOTE — IP AVS SNAPSHOT
` ` Patient Information     Patient Name Sex     Amparo Sharma ([id number]) Female 1940       Room Bed    6218 6218-01      Patient Demographics     Address Phone    [address] \[facility]  [facility] [address] [phone] (Home)  [phone] (Mobile)      Patient Ethnicity & Race     Ethnic Group Patient Race    American White      Emergency Contact(s)     Name Relation Home Work Mobile    Nohemi Elliott Daughter [phone]    Enoch Elliott Son   [phone]      Documents on File        Status Date Received Description       Documents for the Patient    Consent for Services - [facility] Received 11/10/15     External Medication Information Consent       Consent for Services - [facility] Received 12/09/15 GENERAL CONSENT FORM: SHARED EHR - ENGLISH    Consent for Services/Privacy Notice - Hospital/Clinic-[facility] Received 16     Privacy Notice - [facility] Received 16     Consent for EHR Access Received 16     Insurance Card Received 16 medicare    Patient ID Received 16     [facility] Specified Other       Consent for Services/Privacy Notice - Hospital/Clinic       Insurance Card Received 16 bcbs    Occupational Therapy Certification Sent 02/03/17 2/3/17       Documents for the Encounter    CMS IM for Patient Signature Received 17       Admission Information     Attending Provider Admitting Provider Admission Type Admission Date/Time    Rc Dozier MD Grande, Andrew Walker, MD Urgent 17  1048    Discharge Date Hospital Service Auth/Cert Status Service Area     [facility]    Unit Room/Bed Admission Status    UU U6A 6218/6218-01 Admission (Confirmed)            Admission     Complaint    normal pressure hydrocephalus evaluation, NPH (normal pressure hydrocephalus), NPH (normal pressure hydrocephalus), Normal pressure hydrocephalus, Normal Pressure Hydrocephalus , S/P ventriculoperitoneal shunt       Hospital Account     Name Acct ID Class Status Primary Coverage    Amparo Sharma Margaret [id number] Inpatient Open MEDICARE - MEDICARE            Guarantor Account (for Hospital Account #[id number])     Name Relation to Pt Service Area Active? Acct Type    Amparo Sharma Latashahi Self FCS Yes Personal/Family    Address Phone          [address]   [place], [place] [id number] [phone](H)              Coverage Information (for Hospital Account #[id number])     1. MEDICARE/MEDICARE     F/O Payor/Plan Precert #    MEDICARE/MEDICARE     Subscriber Subscriber #    Amparo Sharma [id number]    Address Phone    [employer]  [address]  [place] [address] [phone]          2. BCBS/BCBS OUT OF STATE     F/O Payor/Plan Precert #    BCBS/BCBS OUT OF STATE     Subscriber Subscriber #    Amparo Sharma [id number]    Address Phone    [address] [address]  [place], [place] [id number] [phone]

## 2017-02-03 NOTE — PLAN OF CARE
Problem: Goal Outcome Summary  Goal: Goal Outcome Summary  OT6D: Recommend pt discharge to TCU. Pt limited by weakness, R UE ROM, balance and cognition. Pt reports falling 2-3x a day since last June. Pt scored 18/30 on the MoCA which indicates a moderate neurocognitive deficit.

## 2017-02-03 NOTE — PROGRESS NOTES
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  She was evaluated on the inpatient unit.    BACKGROUND INFORMATION / INTERVIEW FINDINGS    Records indicate that Ms. Sharma has been admitted to [facility] due to probable normal pressure hydrocephalus. MRI of her brain on December 19, 2016 documented  1. Unchanged, moderate lateral and third ventricular dilation slightly out of proportion to the degree of cerebral cortical sulcal depth. While a component of this, undoubtedly, due to a component of parietal predominant cerebral parenchymal volume loss, an underlying component of hydrocephalus cannot be completely excluded. Please correlate clinically. 2. Scattered areas of punctate and confluent white matter T2 hyperintensity are unchanged, likely reflecting moderate microangiopathic change. More confluent areas of bilateral periventricular white matter T2 hyperintensity could also represent transependymal extravasation CSF in the setting of hydrocephalus.  Neurosurgery plans to place a lumbar drain. Her other medical history includes hypertension, GERD, bipolar/major depressive disorder, and hypothyroidism. Concerns have been expressed about her cognition in the setting of probable normal pressure hydrocephalus. The current evaluation was requested by Simon Hayward and Rc Dozier, in this context.    On interview, Ms. Sharma reported that she began noticing balance problems approximately five years ago following an episode of rhabdomyolysis. She noted that over the years she began having increased falls, and has now been having several falls per day. She reported that she has  incontinence, which started four years ago. She indicated that she underwent a number of treatments for this issue, but it did not resolve. Regarding cognition, she described increased difficulties with mathematics, focus, maintaining her thought process, memory, and fine motor dexterity. With respect to mood, she noted that she had ups and downs in her emotions over the years, and self-medicated with alcohol. She stated that she had two psychiatric hospitalizations (in 2007 and 2009) due to suicidal ideation. She reported that she was diagnosed with bipolar disorder in approximate 2008 or 2009. She reported that she is currently being treated for depression with a number of psychotropic medications. She had been under the care of a psychiatrist, but her medications are now managed by her primary care doctor after she [place] a different part of the [place]. She indicated that her psychiatric symptoms are currently controlled. She denied suicidal ideation.    Regarding other medical background, she denied prior TBI with loss of consciousness, stroke, or seizure. Per records, her current medications include acetaminophen, amlodipine, aripiprazole, aspirin-acetaminophen-caffeine, bupropion, donepezil, fluoxetine, fluticasone, folic acid, hydrocortisone, lactobacillus rhamnosus, lamotrigine, levothyroxine, multivitamin, naloxone, ondansetron, prochlorperazine, ranitidine, and tiotropium bromide. She denied current alcohol, tobacco, or illicit drug use. She reported that she has been sober from alcohol use for three years. She stated that she has had a number of alcohol treatments over the years, dating back to 1974. She reported that she had her last relapse with alcohol three years ago.    Ms. Sharma is currently living in a transitional care facility, but had been living in an independent living apartment. She reported that she hopes to return to independent living following her treatments in the hospital. She  was forced to move to a facility with increased support due to her falls. She now has assistance with some basic daily activities. She manages her own medications. Her son is her power of  and manages her finances. She prepares her own meals. She does not drive. By way of background, she has been  twice. She has a 47-year-old son, and a 43-year-old daughter. Her daughter lives locally. She graduated from high school, and then completed a three-year nursing school program to become a registered nurse. 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 Three years ago, she completed a bachelor s degree in psychology from the [employer]. 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 She retired four years ago.    BEHAVIORAL OBSERVATIONS  Ms. Sharma was polite and cooperative with the exam. Her speech was notable for mild difficulties with word finding, but was otherwise normal. Thought processes were broadly normal. Fine motor dexterity was slowed. Her mood was euthymic with congruent affect. Her effort was good. The current results are felt to be an accurate depiction of her cognitive functioning.     RESULTS OF EXAM  Ms. Sharma s performances on measures of neuropsychological functioning were as follows:      She was fully oriented to time, place, and various aspects of personal information. Performance on a measure of single word reading was average. Auditory attention for digits was low average. Mental calculations were average. Learning of words in a list format was average. Short delayed recall of list words was low average. Percent retention of list words was borderline impaired. Delayed recognition of list words was low average. Learning of simple geometric figures and their spatial locations was borderline impaired. Delayed recall of these figures and their locations was  borderline impaired. Delayed recognition of the figures was performed within normal limits. Her drawing of a complicated geometric figure was borderline impaired, and notable for inattention to the figure s details and spatial relations. Visual problem solving with blocks was borderline impaired. Comprehension of phrases and short stories was impaired. Verbal associative fluency was impaired. Speeded visual sequencing under focused attention was average. A similar measure with a divided attention component was impaired, and discontinued at the test s time limit. Speeded word reading was average. Speeded color naming was performed in the low average to average range. Speeded inhibition of an overlearned response was borderline impaired. Speeded matching and cancellation was average. Speeded visuomotor coding was average. Fine motor dexterity was severely impaired, bilaterally, with four peg drops for the right hand, and five peg drops for the left hand.    She endorsed items consistent with mild symptoms of depression and moderate symptoms of anxiety on self-report measures.    IMPRESSIONS  Ms. Sharma demonstrated weaknesses that are consistent with, but not  classic  for normal pressure hydrocephalus. It is conceivable that there are also contributions from cerebrovascular disease, past alcohol abuse, and severe mental illness.  In the context of an average range baseline, deficits were identified in executive functioning and bilateral fine motor dexterity. Relative weaknesses were identified in visual problem solving, memory, and verbal comprehension. Other cognitive abilities were normal and performed in keeping with her baseline. She is reporting elevated symptoms of depression and anxiety. It could be the case that acute psychological factors are contributing to some of the variability in her profile, although I do not think that these factors are entirely responsible for the above noted cognitive  deficits.    RECOMMENDATIONS  Preliminary results were provided to Dr. Pascual Guzman over the telephone on 02/03/2017, and all questions were answered.    1. If medically indicated, her cognition could benefit from neurosurgical intervention for her hydrocephalus.     2. In spite of treatment, Ms. Sharma remains depressed and anxious. If indicated, consideration could be given to modified treatment of her mental health.    3. Along similar lines, consideration could be given to referral for psychotherapy services. The patients is encouraged to contact me at the number above if she is interested in a referral.    4. Follow-up neuropsychological evaluation is recommended following placement of the lumbar drain. Additionally, I would be pleased to see her in the future if changes are noted or if clinically indicated.    Jose Juarez, Ph.D., L.P., [facility]   / Licensed Psychologist [id number]  Department of Rehabilitation Medicine  Division of Adult Neuropsychology  [facility]    Time spent:  three hours professional time, including interview, record review, data integration, and report writing (CPT 19248); two hours of testing administered by a psychometrist and interpreted by a neuropsychologist (CPT 30734). Diagnoses: G91.2, R41.844, F33.0, F41.9.

## 2017-02-03 NOTE — IP AVS SNAPSHOT
` `           [address] [facility]: [phone]                                              INTERAGENCY TRANSFER FORM - NURSING   2/3/2017                    Hospital Admission Date: 2/3/2017  ATILIO COPPOLA   : 1940  Sex: Female        Attending Provider: Rc Dozier MD     Allergies:  Amantadine, Antihistamine Allergy Relief, Codeine, Demerol, Hmg-coa-r Inhibitors, Talwin, Tramadol, Valium    Infection:  MRSA-Contact Isolation   Service:  NEUROSURGERY    Ht:  --   Wt:  77.792 kg (171 lb 8 oz)   Admission Wt:  77.792 kg (171 lb 8 oz)    BMI:  33.49 kg/m 2   BSA:  1.81 m 2            Patient PCP Information     Provider PCP Type    Parvez Lyons      Current Code Status     Date Active Code Status Order ID Comments User Context       Prior      Code Status History     Date Active Date Inactive Code Status Order ID Comments User Context    2/10/2017  6:53 AM  Full Code [id number]  Carson Mckenzie MD Outpatient    2017  4:17 PM 2/10/2017  6:53 AM Full Code [id number]  Carson Mckenzie MD Outpatient    2/3/2017 11:09 AM 2017  4:17 PM Full Code [id number]  Deanna Hayward MD Inpatient    12/15/2016  1:29 PM 2/3/2017 11:09 AM Full Code [id number]  Wilmer High DO Outpatient    2016  8:01 PM 12/15/2016  1:29 PM Full Code [id number]  Tyra Eastman MD Inpatient      Advance Directives        Does patient have a scanned Advance Directive/ACP document in EPIC?           Yes        Hospital Problems as of 2/10/2017              Priority Class Noted POA    NPH (normal pressure hydrocephalus) Medium  2/3/2017 Yes    S/P ventriculoperitoneal shunt Medium  2017 Yes      Non-Hospital Problems as of 2/10/2017              Priority Class Noted    Gait disturbance Medium  11/10/2015    Malignant neoplasm of sigmoid colon (H) Medium  11/10/2015    Memory loss Medium  11/10/2015    Knee pain, unspecified laterality Medium  2015    Acute midline low back pain with  "right-sided sciatica Medium  10/12/2016    Abnormal magnetic resonance imaging of head Medium  11/16/2016    Wound infection Medium  12/12/2016      Immunizations     None         END      ASSESSMENT     Discharge Profile Flowsheet     EXPECTED DISCHARGE     Patient's communication style  spoken language (English or Bilingual) 01/30/17 1045    Expected Discharge Date  02/10/17 02/07/17 0542   SKIN      DISCHARGE NEEDS ASSESSMENT     Inspection  Full 02/10/17 0317    Equipment Currently Used at Home  raised toilet;grab bar;shower chair;walker, rolling;wheelchair 02/04/17 1149   Skin WDL  ex 02/10/17 0317    Transportation Available  family or friend will provide 02/04/17 1149   Skin Integrity  incision(s) 02/10/17 0317    GASTROINTESTINAL (ADULT,PEDIATRIC,OB)     Additional Documentation  Drains (LDA) (lumbar drain) 02/05/17 1027    GI WDL  WDL 02/10/17 0317   Skin Temperature  warm 02/10/17 0317    Abdominal Appearance  other (see comments) (hernia) 02/09/17 0135   SAFETY      Last Bowel Movement  02/08/17 02/09/17 1734   Safety WDL  WDL 02/10/17 0317    COMMUNICATION ASSESSMENT     Safety Factors  bed in low position;wheels locked;call light in reach;upper side rails raised x 2;ID band on 02/10/17 0317                 Assessment WDL (Within Defined Limits) Definitions           Safety WDL     Effective: 09/28/15    Row Information: <b>WDL Definition:</b> Bed in low position, wheels locked; call light in reach; upper side rails up x 2; ID band on<br> <font color=\"gray\"><i>Item=AS safety wdl>>List=AS safety wdl>>Version=F14</i></font>      Skin WDL     Effective: 09/28/15    Row Information: <b>WDL Definition:</b> Warm; dry; intact; elastic; without discoloration; pressure points without redness<br> <font color=\"gray\"><i>Item=AS skin wdl>>List=AS skin wdl>>Version=F14</i></font>      Vitals     Vital Signs Flowsheet     QUICK ADDS     CLINICALLY ALIGNED PAIN ASSESSMENT (CAPA) ([facility] ADULTS ONLY)   "    Quick Adds  Comments 02/04/17 1403   Comfort  comfortably manageable 02/09/17 2241    COMMENTS     Change in Pain  getting better 02/09/17 2241    Comments  PT -standing BP 02/09/17 1014   Pain Control  fully effective 02/09/17 2241    VITAL SIGNS     Functioning  can do most things, but pain gets in the way of some 02/09/17 2241    Temp  99.3  F (37.4  C) 02/10/17 0732   Sleep  awake with occasional pain 02/07/17 1305    Temp src  Axillary 02/10/17 0732   ANALGESIA SIDE EFFECTS MONITORING      Resp  24 02/10/17 0732   Side Effects Monitoring: Respiratory Quality  R 02/09/17 2241    Pulse  80 02/09/17 1550   Side Effects Monitoring: Respiratory Depth  N 02/09/17 2241    Heart Rate  66 02/10/17 0732   Side Effects Monitoring: Sedation Level  1 02/09/17 2241    Pulse/Heart Rate Source  Monitor 02/10/17 0732   HEIGHT AND WEIGHT      BP  143/72 mmHg 02/10/17 0732   Height  1.524 m (5') 01/30/17 1017    BP Location  Right arm 02/10/17 0732   Weight  77.792 kg (171 lb 8 oz) (with shoes) 02/07/17 1248    Patient Position  Lying 02/08/17 1515   POSITIONING      OXYGEN THERAPY     Body Position  independently positioning 02/10/17 0220    SpO2  93 % 02/10/17 0732   Head of Bed (HOB)  HOB at 20-30 degrees 02/10/17 0317    O2 Device  None (Room air) 02/10/17 0732   Chair  Upright in chair 02/09/17 1116    Oxygen Delivery  1 LPM 02/09/17 1355   Positioning/Transfer Devices  pillows 02/09/17 0135    PAIN/COMFORT     DAILY CARE      Patient Currently in Pain  denies 02/10/17 0220   Activity Type  up to commode 02/10/17 0317    Preferred Pain Scale  CAPA (Clinically Aligned Pain Assessment) ([facility] Adults Only) 02/09/17 1741   Activity Level of Assistance  assistance, 1 person 02/10/17 0317    Pain Location  Head (head and abdomen incision pain.) 02/09/17 1741   ECG      Pain Orientation  Right 02/09/17 1741   ECG Rhythm  Normal sinus rhythm 02/08/17 1515    Pain Descriptors  Aching;Sharp 02/09/17 1741    Ectopy  None 02/08/17 1515    Pain Intervention(s)  Medication (See eMAR) 02/09/17 0443   Lead Monitored  Lead II;V 5 02/08/17 1515    Response to Interventions  Decrease in pain 02/09/17 0814                 Patient Lines/Drains/Airways Status    Active LINES/DRAINS/AIRWAYS     Name: Placement date: Placement time: Site: Days: Last dressing change:    Peripheral IV 12/13/16 Right;Posterior Upper forearm 12/13/16  1738  Upper forearm  58     Peripheral IV 02/03/17 Right Upper forearm 02/03/17  1354  Upper forearm  6     Wound 12/12/16 Left Elbow Laceration 12/12/16    Elbow  60     Wound 02/04/17 Lower Back 02/04/17  1747  Back  5     Incision/Surgical Site 02/08/17 Right Abdomen 02/08/17  1304   1     Incision/Surgical Site 02/08/17 Right Head 02/08/17  1304   1             Patient Lines/Drains/Airways Status    Active PICC/CVC     **None**            Intake/Output Detail Report     Date Intake     Output     Net    Shift P.O. I.V. IV Piggyback Total Urine Drains Blood Total       Day 02/09/17 0000 - 02/09/17 0659 220 400 -- 620 -- -- -- -- 620    Lashawn 02/09/17 0700 - 02/09/17 1459 700 -- -- 700 550 -- -- 550 150    Noc 02/09/17 1500 - 02/09/17 2359 720 -- -- 720 600 -- -- 600 120    Day 02/10/17 0000 - 02/10/17 0659 -- -- -- -- 400 -- -- 400 -400    Lashawn 02/10/17 0700 - 02/10/17 1459 -- -- -- -- -- -- -- -- 0      Last Void/BM       Most Recent Value    Urine Occurrence 1 at 02/10/2017 0300    Stool Occurrence 1 at 02/06/2017 1400      Case Management/Discharge Planning     Case Management/Discharge Planning Flowsheet     REFERRAL INFORMATION     FINAL RESOURCES      Admission Type  inpatient 02/06/17 1343   Equipment Currently Used at Home  raised toilet;grab bar;shower chair;walker, rolling;wheelchair 02/04/17 1149    LIVING ENVIRONMENT     [facility] CAREGIVER      Lives With  alone 02/04/17 1149   Filed Complexity Screen Score  8 02/06/17 1351    Living Arrangements  independent living facility 02/04/17 1149   ABUSE  RISK SCREEN      COPING/STRESS     QUESTION TO PATIENT:  Has a member of your family or a partner(now or in the past) intimidated, hurt, manipulated, or controlled you in any way?  no 02/03/17 1113    Major Change/Loss/Stressor  denies 02/03/17 1113   QUESTION TO PATIENT: Do you feel safe going back to the place where you are living?  yes 02/03/17 1113    EXPECTED DISCHARGE     OBSERVATION: Is there reason to believe there has been maltreatment of a vulnerable adult (ie. Physical/Sexual/Emotional abuse, self neglect, lack of adequate food, shelter, medical care, or financial exploitation)?  no 02/03/17 1113    Expected Discharge Date  02/10/17 02/07/17 0542   (R) MENTAL HEALTH SUICIDE RISK      DISCHARGE PLANNING     Are you depressed or being treated for depression?  No 02/03/17 1113    Transportation Available  family or friend will provide 02/04/17 1143

## 2017-02-03 NOTE — IP AVS SNAPSHOT
UNIT [address] [facility]: [phone]                                              INTERAGENCY TRANSFER FORM - PHYSICIAN ORDERS   2/3/2017                    Hospital Admission Date: 2/3/2017  ATILIO COPPOLA   : 1940  Sex: Female        Attending Provider: Rc Dozier MD     Allergies:  Amantadine, Antihistamine Allergy Relief, Codeine, Demerol, Hmg-coa-r Inhibitors, Talwin, Tramadol, Valium    Infection:  MRSA-Contact Isolation   Service:  NEUROSURGERY    Ht:  --   Wt:  77.792 kg (171 lb 8 oz)   Admission Wt:  77.792 kg (171 lb 8 oz)    BMI:  33.49 kg/m 2   BSA:  1.81 m 2            Patient PCP Information     Provider PCP Type    [facility]      ED Clinical Impression     Diagnosis Description Comment Added By Time Added    S/P ventriculoperitoneal shunt [Z98.2] S/P ventriculoperitoneal shunt [Z98.2]  Carson Mckenzie MD 2017  4:05 PM      Hospital Problems as of 2/10/2017              Priority Class Noted POA    NPH (normal pressure hydrocephalus) Medium  2/3/2017 Yes    S/P ventriculoperitoneal shunt Medium  2017 Yes      Non-Hospital Problems as of 2/10/2017              Priority Class Noted    Gait disturbance Medium  11/10/2015    Malignant neoplasm of sigmoid colon (H) Medium  11/10/2015    Memory loss Medium  11/10/2015    Knee pain, unspecified laterality Medium  2015    Acute midline low back pain with right-sided sciatica Medium  10/12/2016    Abnormal magnetic resonance imaging of head Medium  2016    Wound infection Medium  2016      Code Status History     Date Active Date Inactive Code Status Order ID Comments User Context    2/10/2017  6:53 AM  Full Code [id number]  Carson Mckenzie MD Outpatient    2017  4:17 PM 2/10/2017  6:53 AM Full Code [id number]  Carson Mckenzie MD Outpatient    2/3/2017 11:09 AM 2017  4:17 PM Full Code [id number]  Deanna Hayward MD Inpatient    12/15/2016  1:29 PM 2/3/2017 11:09 AM Full Code  [id number]  Lennox WilmerDO Outpatient    12/12/2016  8:01 PM 12/15/2016  1:29 PM Full Code [id number]  Tyra Eastman MD Inpatient         Medication Review      START taking        Dose / Directions Comments    ondansetron 4 MG ODT tab   Commonly known as:  ZOFRAN-ODT   Used for:  S/P ventriculoperitoneal shunt        Dose:  4 mg   Take 1 tablet (4 mg) by mouth every 6 hours as needed for nausea   Quantity:  40 tablet   Refills:  0        oxyCODONE 5 MG IR tablet   Commonly known as:  ROXICODONE   Used for:  S/P ventriculoperitoneal shunt        Dose:  5-10 mg   Take 1-2 tablets (5-10 mg) by mouth every 4 hours as needed for moderate to severe pain   Quantity:  50 tablet   Refills:  0        senna-docusate 8.6-50 MG per tablet   Commonly known as:  SENOKOT-S;PERICOLACE   Used for:  S/P ventriculoperitoneal shunt        Dose:  1-2 tablet   Take 1-2 tablets by mouth 2 times daily   Quantity:  40 tablet   Refills:  0    Take while on narcotics.         CONTINUE these medications which have NOT CHANGED        Dose / Directions Comments    ABILIFY PO   Used for:  Hip pain, left, Malignant neoplasm of sigmoid colon (H), Gait disturbance, Other iron deficiency anemia, Acute midline low back pain with right-sided sciatica, Leg weakness, bilateral, Vitamin D deficiency        Dose:  5 mg   Take 5 mg by mouth At Bedtime   Refills:  0        AMLODIPINE BESYLATE PO        Dose:  10 mg   Take 10 mg by mouth every morning   Refills:  0        aspirin-acetaminophen-caffeine 250-250-65 MG per tablet   Commonly known as:  EXCEDRIN MIGRAINE   Used for:  Hip pain, left, Malignant neoplasm of sigmoid colon (H), Gait disturbance, Other iron deficiency anemia, Acute midline low back pain with right-sided sciatica, Leg weakness, bilateral, Vitamin D deficiency        Dose:  1 tablet   Take 1 tablet by mouth every 6 hours as needed for headaches   Refills:  0        DONEPEZIL HCL PO        Dose:  10 mg   Take 10 mg by mouth At Bedtime    Refills:  0        fluticasone 110 MCG/ACT Inhaler   Commonly known as:  FLOVENT HFA   Used for:  Hip pain, left, Malignant neoplasm of sigmoid colon (H), Gait disturbance, Other iron deficiency anemia, Acute midline low back pain with right-sided sciatica, Leg weakness, bilateral, Vitamin D deficiency        Dose:  2 puff   Inhale 2 puffs into the lungs as needed   Refills:  0        FOLIC ACID PO        Dose:  1 mg   Take 1 mg by mouth   Refills:  0        hydrocortisone 2.5 % cream        Apply topically 2 times daily   Refills:  0        lactobacillus rhamnosus (GG) capsule   Used for:  Cellulitis of left elbow        Dose:  1 capsule   Take 1 capsule by mouth 2 times daily   Refills:  0        LAMICTAL PO   Used for:  Gait disturbance, Memory loss, Malignant neoplasm of sigmoid colon (H)        Take 150 mg by mouth in the morning and 100 mg by mouth in the evening   Refills:  0        Levothyroxine Sodium 75 MCG Caps   Used for:  Gait disturbance, Memory loss, Malignant neoplasm of sigmoid colon (H)        Dose:  75 mcg   Take 75 mcg by mouth every morning   Refills:  0        MULTIVITAMIN PO   Used for:  Gait disturbance, Memory loss, Malignant neoplasm of sigmoid colon (H)        Take by mouth daily   Refills:  0        PROZAC PO   Used for:  Gait disturbance, Memory loss, Malignant neoplasm of sigmoid colon (H)        Dose:  40 mg   Take 40 mg by mouth every morning   Refills:  0        RANITIDINE HCL PO        Dose:  300 mg   Take 300 mg by mouth 2 times daily   Refills:  0        SPIRIVA HANDIHALER IN        Dose:  1 puff   Inhale 1 puff into the lungs as needed   Refills:  0        TYLENOL PO   Used for:  Gait disturbance, Memory loss, Malignant neoplasm of sigmoid colon (H)        Dose:  500 mg   Take 500 mg by mouth every 8 hours as needed for mild pain or fever   Refills:  0        WELLBUTRIN XL PO   Used for:  Hip pain, left, Malignant neoplasm of sigmoid colon (H), Gait disturbance, Other iron  deficiency anemia, Acute midline low back pain with right-sided sciatica, Leg weakness, bilateral, Vitamin D deficiency        Dose:  150 mg   Take 150 mg by mouth every morning   Refills:  0                Summary of Visit     Reason for your hospital stay       You had a ventriculperitoneal shunt placed on 2/8/2017 by Dr. Dozier.       Reason for your hospital stay       For a  shunt             After Care     Activity - Up with nursing assistance           Activity       Your activity upon discharge: activity as tolerated and no driving for today       Advance Diet as Tolerated       Follow this diet upon discharge: Regular       Diet Instructions       Resume pre-procedure diet       Diet       Follow this diet upon discharge: Regular       Dressing       Keep dressing clean and dry.  Dressing / incisional care as instructed by RN and or Surgeon       Fall precautions           General info for SNF       Length of Stay Estimate: Short Term Care: Estimated # of Days <30  Condition at Discharge: Improving  Level of care:skilled   Rehabilitation Potential: Excellent  Admission H&P remains valid and up-to-date: Yes  Recent Chemotherapy: N/A  Use Nursing Home Standing Orders: Yes       Ice to affected area       Ice to operative site PRN       No Alcohol       For 24 hours post procedure       Wound care (specify)       Site:   Abdomen and Scalp  Instructions:  Keep clean and dry.       Wound care and dressings       Instructions to care for your wound at home: Keep scalp incision clean and dry. Sutures will be removed at your post-operative visit in 2 weeks.             Referrals     Occupational Therapy Adult Consult       Evaluate and treat as clinically indicated.    Reason:  Pt with normal pressure hydrocephalus; s/p  shunt.       Physical Therapy Adult Consult       Evaluate and treat as clinically indicated.    Reason: Pt with normal pressure hydrocephalus; s/p  shunt.             Follow-Up Appointment  Instructions     Future Labs/Procedures    Adult [facility] Follow-up and recommended labs and tests     Comments:    You underwent surgery place a  ventriculoperitoneal by Rc Dozier MD    - You should be seen by your primary care physician or our clinic nurse 2 weeks after surgery for suture removal and wound check.   - You will have follow up with your surgeon in 4-6 weeks after surgery, and at that time we may have a CT scan done for routine follow up. This will be decided at your first visit back to clinic.   - Many shunts have programmable valves which are changed using strong magnets. If you are scheduled to undergo an MRI, please call our office so that we can make an appointment to have this reprogrammed within 48 hours.    - Please ask your surgeon or clinic for a card to identify the type of shunt you have and it s setting.    -If you have not heard from our clinic about your follow up visit by 3-4 days following your discharge, please call our clinic at [phone] to schedule an appointment with Rc Dozier MD    - If you have not heard from our clinic about your follow up visit by 3-4 days following your discharge, please call our clinic at [phone] to schedule an appointment with the Neurosurgery teams.       After discharge, your activity restrictions are:   -We encourage short frequent walks, increasing as tolerated.  - No driving until you are seen in clinic and cleared by your neurosurgeon.  If you have had a seizure, you may not drive for at least 3 months according to [place] law.    - No strenuous activity.  - No lifting more than 10 pounds until you are seen in clinic (a gallon of milk weighs approximately 8 pounds)    Wound cares after surgery  - You are ok to shower, but do not soak your incisions. Pat them dry if they get damp.   - Avoid coloring your hair or permanent styling until cleared by your surgeon  - No baths, hot tubs or pools for 4-6 weeks after surgery.        Call [phone] or after 5:00 pm or on weekends call [phone] and ask for the neurosurgery resident on call. Thank You.    Follow Up and recommended labs and tests     Comments:    You underwent surgery place a  ventriculoperitoneal by Rc Dozier MD    - You should be seen by your primary care physician or our clinic nurse 2 weeks after surgery for suture removal and wound check.   - You will have follow up with your surgeon in 4-6 weeks after surgery, and at that time we may have a CT scan done for routine follow up. This will be decided at your first visit back to clinic.   - Many shunts have programmable valves which are changed using strong magnets. If you are scheduled to undergo an MRI, please call our office so that we can make an appointment to have this reprogrammed within 48 hours.    - Please ask your surgeon or clinic for a card to identify the type of shunt you have and it s setting.    -If you have not heard from our clinic about your follow up visit by 3-4 days following your discharge, please call our clinic at [phone] to schedule an appointment with Rc Dozier MD    - If you have not heard from our clinic about your follow up visit by 3-4 days following your discharge, please call our clinic at [phone] to schedule an appointment with the Neurosurgery teams.       After discharge, your activity restrictions are:   -We encourage short frequent walks, increasing as tolerated.  - No driving until you are seen in clinic and cleared by your neurosurgeon.  If you have had a seizure, you may not drive for at least 3 months according to [place] law.    - No strenuous activity.  - No lifting more than 10 pounds until you are seen in clinic (a gallon of milk weighs approximately 8 pounds)    Wound cares after surgery  - You are ok to shower, but do not soak your incisions. Pat them dry if they get damp.   - Avoid coloring your hair or permanent styling until cleared by your  surgeon  - No baths, hot tubs or pools for 4-6 weeks after surgery.       Call [phone] or after 5:00 pm or on weekends call [phone] and ask for the neurosurgery resident on call. Thank You.      Follow-Up Appointment Instructions     Adult [facility] Follow-up and recommended labs and tests       You underwent surgery place a  ventriculoperitoneal by Rc Dozier MD    - You should be seen by your primary care physician or our clinic nurse 2 weeks after surgery for suture removal and wound check.   - You will have follow up with your surgeon in 4-6 weeks after surgery, and at that time we may have a CT scan done for routine follow up. This will be decided at your first visit back to clinic.   - Many shunts have programmable valves which are changed using strong magnets. If you are scheduled to undergo an MRI, please call our office so that we can make an appointment to have this reprogrammed within 48 hours.    - Please ask your surgeon or clinic for a card to identify the type of shunt you have and it s setting.    -If you have not heard from our clinic about your follow up visit by 3-4 days following your discharge, please call our clinic at [phone] to schedule an appointment with Rc Dozier MD    - If you have not heard from our clinic about your follow up visit by 3-4 days following your discharge, please call our clinic at [phone] to schedule an appointment with the Neurosurgery teams.       After discharge, your activity restrictions are:   -We encourage short frequent walks, increasing as tolerated.  - No driving until you are seen in clinic and cleared by your neurosurgeon.  If you have had a seizure, you may not drive for at least 3 months according to [place] law.    - No strenuous activity.  - No lifting more than 10 pounds until you are seen in clinic (a gallon of milk weighs approximately 8 pounds)    Wound cares after surgery  - You are ok to shower, but do not soak your  incisions. Pat them dry if they get damp.   - Avoid coloring your hair or permanent styling until cleared by your surgeon  - No baths, hot tubs or pools for 4-6 weeks after surgery.       Call [phone] or after 5:00 pm or on weekends call [phone] and ask for the neurosurgery resident on call. Thank You.       Follow Up and recommended labs and tests       You underwent surgery place a  ventriculoperitoneal by Rc Dozier MD    - You should be seen by your primary care physician or our clinic nurse 2 weeks after surgery for suture removal and wound check.   - You will have follow up with your surgeon in 4-6 weeks after surgery, and at that time we may have a CT scan done for routine follow up. This will be decided at your first visit back to clinic.   - Many shunts have programmable valves which are changed using strong magnets. If you are scheduled to undergo an MRI, please call our office so that we can make an appointment to have this reprogrammed within 48 hours.    - Please ask your surgeon or clinic for a card to identify the type of shunt you have and it s setting.    -If you have not heard from our clinic about your follow up visit by 3-4 days following your discharge, please call our clinic at [phone] to schedule an appointment with Rc Dozier MD    - If you have not heard from our clinic about your follow up visit by 3-4 days following your discharge, please call our clinic at [phone] to schedule an appointment with the Neurosurgery teams.       After discharge, your activity restrictions are:   -We encourage short frequent walks, increasing as tolerated.  - No driving until you are seen in clinic and cleared by your neurosurgeon.  If you have had a seizure, you may not drive for at least 3 months according to [place] law.    - No strenuous activity.  - No lifting more than 10 pounds until you are seen in clinic (a gallon of milk weighs approximately 8 pounds)    Wound cares  after surgery  - You are ok to shower, but do not soak your incisions. Pat them dry if they get damp.   - Avoid coloring your hair or permanent styling until cleared by your surgeon  - No baths, hot tubs or pools for 4-6 weeks after surgery.       Call [phone] or after 5:00 pm or on weekends call [phone] and ask for the neurosurgery resident on call. Thank You.             Statement of Approval     Ordered          02/10/17 0654  I have reviewed and agree with all the recommendations and orders detailed in this document.   EFFECTIVE NOW     Approved and electronically signed by:  Carson Mckenzie MD

## 2017-02-04 ENCOUNTER — APPOINTMENT (OUTPATIENT)
Dept: PHYSICAL THERAPY | Facility: CLINIC | Age: 77
DRG: 033 | End: 2017-02-04
Attending: STUDENT IN AN ORGANIZED HEALTH CARE EDUCATION/TRAINING PROGRAM
Payer: MEDICARE

## 2017-02-04 PROCEDURE — 12000008 ZZH R&B INTERMEDIATE UMMC

## 2017-02-04 PROCEDURE — 40000193 ZZH STATISTIC PT WARD VISIT

## 2017-02-04 PROCEDURE — 25000132 ZZH RX MED GY IP 250 OP 250 PS 637: Mod: GY | Performed by: STUDENT IN AN ORGANIZED HEALTH CARE EDUCATION/TRAINING PROGRAM

## 2017-02-04 PROCEDURE — A9270 NON-COVERED ITEM OR SERVICE: HCPCS | Mod: GY | Performed by: STUDENT IN AN ORGANIZED HEALTH CARE EDUCATION/TRAINING PROGRAM

## 2017-02-04 PROCEDURE — 97116 GAIT TRAINING THERAPY: CPT | Mod: GP

## 2017-02-04 PROCEDURE — 97162 PT EVAL MOD COMPLEX 30 MIN: CPT | Mod: GP

## 2017-02-04 PROCEDURE — 25000125 ZZHC RX 250

## 2017-02-04 PROCEDURE — 25000125 ZZHC RX 250: Performed by: STUDENT IN AN ORGANIZED HEALTH CARE EDUCATION/TRAINING PROGRAM

## 2017-02-04 PROCEDURE — 25000128 H RX IP 250 OP 636: Performed by: STUDENT IN AN ORGANIZED HEALTH CARE EDUCATION/TRAINING PROGRAM

## 2017-02-04 PROCEDURE — 97112 NEUROMUSCULAR REEDUCATION: CPT | Mod: GP

## 2017-02-04 RX ORDER — LIDOCAINE HYDROCHLORIDE 10 MG/ML
INJECTION, SOLUTION EPIDURAL; INFILTRATION; INTRACAUDAL; PERINEURAL
Status: COMPLETED
Start: 2017-02-04 | End: 2017-02-04

## 2017-02-04 RX ORDER — FENTANYL CITRATE 50 UG/ML
25-50 INJECTION, SOLUTION INTRAMUSCULAR; INTRAVENOUS
Status: COMPLETED | OUTPATIENT
Start: 2017-02-04 | End: 2017-02-04

## 2017-02-04 RX ORDER — FLUMAZENIL 0.1 MG/ML
0.2 INJECTION, SOLUTION INTRAVENOUS
Status: DISPENSED | OUTPATIENT
Start: 2017-02-04 | End: 2017-02-06

## 2017-02-04 RX ORDER — FENTANYL CITRATE 50 UG/ML
25 INJECTION, SOLUTION INTRAMUSCULAR; INTRAVENOUS EVERY 5 MIN PRN
Status: DISPENSED | OUTPATIENT
Start: 2017-02-04 | End: 2017-02-05

## 2017-02-04 RX ORDER — NALOXONE HYDROCHLORIDE 0.4 MG/ML
.1-.4 INJECTION, SOLUTION INTRAMUSCULAR; INTRAVENOUS; SUBCUTANEOUS
Status: DISCONTINUED | OUTPATIENT
Start: 2017-02-04 | End: 2017-02-04

## 2017-02-04 RX ADMIN — ARIPIPRAZOLE 5 MG: 5 TABLET ORAL at 21:38

## 2017-02-04 RX ADMIN — FLUTICASONE PROPIONATE 2 PUFF: 110 AEROSOL, METERED RESPIRATORY (INHALATION) at 19:44

## 2017-02-04 RX ADMIN — ACETAMINOPHEN 650 MG: 325 TABLET, FILM COATED ORAL at 02:25

## 2017-02-04 RX ADMIN — RANITIDINE HYDROCHLORIDE 150 MG: 150 TABLET, FILM COATED ORAL at 19:44

## 2017-02-04 RX ADMIN — DONEPEZIL HYDROCHLORIDE 10 MG: 10 TABLET, FILM COATED ORAL at 21:38

## 2017-02-04 RX ADMIN — ACETAMINOPHEN 650 MG: 325 TABLET, FILM COATED ORAL at 18:36

## 2017-02-04 RX ADMIN — AMLODIPINE BESYLATE 10 MG: 10 TABLET ORAL at 08:12

## 2017-02-04 RX ADMIN — RANITIDINE HYDROCHLORIDE 150 MG: 150 TABLET, FILM COATED ORAL at 08:12

## 2017-02-04 RX ADMIN — SODIUM CHLORIDE: 9 INJECTION, SOLUTION INTRAVENOUS at 21:38

## 2017-02-04 RX ADMIN — BUPROPION HYDROCHLORIDE 150 MG: 150 TABLET, FILM COATED, EXTENDED RELEASE ORAL at 08:11

## 2017-02-04 RX ADMIN — MIDAZOLAM 3 MG: 1 INJECTION INTRAMUSCULAR; INTRAVENOUS at 17:42

## 2017-02-04 RX ADMIN — LIDOCAINE HYDROCHLORIDE: 10 INJECTION, SOLUTION EPIDURAL; INFILTRATION; INTRACAUDAL; PERINEURAL at 17:44

## 2017-02-04 RX ADMIN — FENTANYL CITRATE 125 MCG: 50 INJECTION, SOLUTION INTRAMUSCULAR; INTRAVENOUS at 17:39

## 2017-02-04 RX ADMIN — LEVOTHYROXINE SODIUM 75 MCG: 75 TABLET ORAL at 08:12

## 2017-02-04 RX ADMIN — LAMOTRIGINE 150 MG: 150 TABLET ORAL at 08:11

## 2017-02-04 RX ADMIN — SODIUM CHLORIDE: 9 INJECTION, SOLUTION INTRAVENOUS at 04:08

## 2017-02-04 RX ADMIN — FLUOXETINE 40 MG: 20 CAPSULE ORAL at 08:12

## 2017-02-04 RX ADMIN — LAMOTRIGINE 100 MG: 100 TABLET ORAL at 19:44

## 2017-02-04 RX ADMIN — FOLIC ACID 1 MG: 1 TABLET ORAL at 08:12

## 2017-02-04 RX ADMIN — ACETAMINOPHEN 650 MG: 325 TABLET, FILM COATED ORAL at 08:18

## 2017-02-04 RX ADMIN — MIDAZOLAM 1 MG: 1 INJECTION INTRAMUSCULAR; INTRAVENOUS at 17:41

## 2017-02-04 RX ADMIN — FENTANYL CITRATE 50 MCG: 50 INJECTION, SOLUTION INTRAMUSCULAR; INTRAVENOUS at 17:38

## 2017-02-04 RX ADMIN — MULTIVITAMIN 15 ML: LIQUID ORAL at 08:11

## 2017-02-04 ASSESSMENT — VISUAL ACUITY
OU: NORMAL ACUITY;BASELINE;GLASSES

## 2017-02-04 ASSESSMENT — PAIN DESCRIPTION - DESCRIPTORS
DESCRIPTORS: CONSTANT;SHARP
DESCRIPTORS: ACHING

## 2017-02-04 NOTE — PROGRESS NOTES
[facility], [place]   Neurosurgery Daily Note          Assessment and Plan:   The patient is admitted from Dr. Dozier's clinic for NPH workup; Neuropsychiatry testing suggests cognitive impairment that may be on the spectrum of NPH.      - lumbar drain placement today   - will need post procedure neuropsych testing  - will continue to monitor       John (Jack) M. Leschke        Interval History:   No events overnight.             Review of Systems:   The Review of Systems is otherwise negative beyond that which has been previously stated.          Medications:   I have reviewed this patient's current medications.    Current Facility-Administered Medications   Medication Dose Route Frequency     sodium chloride (PF)  3 mL Intravenous Q8H     ranitidine  150 mg Oral BID     amLODIPine (NORVASC) tablet 10 mg  10 mg Oral QAM     buPROPion (WELLBUTRIN XL) 24 hr tablet 150 mg  150 mg Oral QAM     donepezil (ARICEPT) tablet 10 mg  10 mg Oral At Bedtime     FLUoxetine (PROzac) capsule 40 mg  40 mg Oral QAM     fluticasone  2 puff Inhalation Q12H     folic acid (FOLVITE) tablet 1 mg  1 mg Oral QAM     lamoTRIgine (LaMICtal) tablet 100 mg  100 mg Oral QPM     levothyroxine  75 mcg Oral QAM     multivitamins with minerals   Oral Daily     tiotropium  18 mcg Inhalation Daily     lamoTRIgine  150 mg Oral QAM     ARIPiprazole  5 mg Oral At Bedtime        Physical Exam:  General:   Comfortable respiratory effort; normal cardiac rhythm.     Mental Status:   awake, alert and oriented x3.   fluent, communicative, intact comprehension.    Cranial Nerves:  equal and reactive pupils   extraocular movements preserved   no dysarthria.     Motor/Sensory:  5/5 strength throughout upper and lower extremities.  No deficits to pain, soft touch.     Reflexes: symmetric          Data:   The labs and imaging for this patient have been reviewed directly.

## 2017-02-04 NOTE — PROGRESS NOTES
Admitted for lumbar drain placement.NPO since midnight. IVF infusing. C/o of R knee pain treated with Tylenol with relief. Pt A&O,assist x2 to commode. Will continue plan of care

## 2017-02-04 NOTE — PROGRESS NOTES
Patient admitted for pre-procedure prep for lumbar drain. OT and Neurp psych consults done. A&Ox4 but forgetful. Assist x2. Commode at bedside. Incontinent at times. Pain reported in right knee. Declined pain medications. Regular diet ordered. NPO at midnight. VSS. NPO at midnight.

## 2017-02-04 NOTE — PROGRESS NOTES
02/04/17 1100   Quick Adds   Type of Visit Initial PT Evaluation   Living Environment   Lives With alone   Living Arrangements independent living facility   Home Accessibility no concerns;grab bars present (bathtub);grab bars present (toilet)   Number of Stairs to Enter Home 0   Number of Stairs Within Home 0   Stair Railings at Home none   Transportation Available family or friend will provide   Living Environment Comment Pt lives IND in apt.  Recently this year has been expiriencing multiple falls /day   Self-Care   Usual Activity Tolerance good   Current Activity Tolerance fair   Regular Exercise no   Equipment Currently Used at Home raised toilet;grab bar;shower chair;walker, rolling;wheelchair   Activity/Exercise/Self-Care Comment Recently admitted from TCU.     Functional Level Prior   Ambulation 1-->assistive equipment   Transferring 1-->assistive equipment   Toileting 1-->assistive equipment   Cognition 0 - no cognition issues reported   Fall history within last six months yes   Number of times patient has fallen within last six months (2-3 x day since last june)   Which of the above functional risks had a recent onset or change? ambulation;transferring;fall history   Prior Functional Level Comment Patient currently uses wheelchair for primary mode of mobility, previously used walker and before that a cane. Patient verbalizes independence with transfers but highly unsafe due to history of falls.    General Information   Onset of Illness/Injury or Date of Surgery - Date 02/03/17   Referring Physician Deanna Hayward MD   Patient/Family Goals Statement return to apt IND   Pertinent History of Current Problem (include personal factors and/or comorbidities that impact the POC) Records indicate that Ms. Sharma has been admitted to [facility] due to probable normal pressure hydrocephalus. MRI of her brain on December 19, 2016 documented  1. Unchanged, moderate lateral and third ventricular dilation  slightly out of proportion to the degree of cerebral cortical sulcal depth. While a component of this, undoubtedly, due to a component of parietal predominant cerebral parenchymal volume loss, an underlying component of hydrocephalus cannot be completely excluded. Please correlate clinically. 2. Scattered areas of punctate and confluent white matter T2 hyperintensity are unchanged, likely reflecting moderate microangiopathic change. More confluent areas of bilateral periventricular white matter T2 hyperintensity could also represent transependymal extravasation CSF in the setting of hydrocephalus.  Neurosurgery plans to place a lumbar drain. Her other medical history includes hypertension, GERD, bipolar/major depressive disorder, and hypothyroidism. Concerns have been expressed about her cognition in the setting of probable normal pressure hydrocephalus. The current evaluation was requested by Simon Hayward and Rc Dozier, in this context.   Precautions/Limitations fall precautions   Heart Disease Risk Factors Age;Medical history   General Observations Pt is fully oriented with appropriate communication during session.  RN states that pt PLOF reports are conflicting as to what was reported to PT.  Pt states significant lower previous functinon to RN.    Cognitive Status Examination   Orientation orientation to person, place and time   Level of Consciousness alert   Follows Commands and Answers Questions 100% of the time   Personal Safety and Judgment intact   Memory intact   Posture    Posture Not impaired   Range of Motion (ROM)   ROM Comment WFL.  Difficulty with AROM during R knee extension due to pain   Strength   Strength Comments LLE: 5/5 throughout. RLE: knee flex/ex:4/5; df/pf:4-/5; hip abd/flex:4-/5.   Bed Mobility   Bed Mobility Comments IND with HOB flat   Transfer Skills   Transfer Comments STS with FWW and SBAx1   Gait   Gait Comments Ambulates 150ft with FWW and CGAx1. Reports pain in R knee.   "  Balance   Balance Comments Knee buckling and leans L during Rhomberg stance   Sensory Examination   Sensory Perception Comments LLE: intact.  RLE: dull sensation throughout. decreased sensation present proximal thigh comparted to distal leg.    General Therapy Interventions   Planned Therapy Interventions balance training;gait training;neuromuscular re-education;strengthening;transfer training;risk factor education;home program guidelines;progressive activity/exercise   Clinical Impression   Criteria for Skilled Therapeutic Intervention yes, treatment indicated   PT Diagnosis impaired functional mobility   Influenced by the following impairments medical diagnosis, R sided weakness and diminished sensation, occasional cognitive changes   Functional limitations due to impairments IND mobility   Clinical Presentation Evolving/Changing   Clinical Presentation Rationale Pt being tested with lumbar drain in order to find appropriate course of medical treatment.    Clinical Decision Making (Complexity) Moderate complexity   Therapy Frequency` daily   Predicted Duration of Therapy Intervention (days/wks) 2/11/17   Anticipated Discharge Disposition Transitional Care Facility   Risk & Benefits of therapy have been explained Yes   Patient, Family & other staff in agreement with plan of care Yes   [facility] [employer] TM \"6 Clicks\"   2016, Trustees of [facility], under license to [employer].  All rights reserved.   6 Clicks Short Forms Basic Mobility Inpatient Short Form   [facility] AM-PAC  \"6 Clicks\" V.2 Basic Mobility Inpatient Short Form   1. Turning from your back to your side while in a flat bed without using bedrails? 4 - None   2. Moving from lying on your back to sitting on the side of a flat bed without using bedrails? 4 - None   3. Moving to and from a bed to a chair (including a wheelchair)? 3 - A Little   4. Standing up from a chair using your arms (e.g., wheelchair, or bedside chair)? 3 - A " Little   5. To walk in hospital room? 3 - A Little   6. Climbing 3-5 steps with a railing? 1 - Total   Basic Mobility Raw Score (Score out of 24.Lower scores equate to lower levels of function) 18   Total Evaluation Time   Total Evaluation Time (Minutes) 10

## 2017-02-04 NOTE — PLAN OF CARE
Problem: Goal Outcome Summary  Goal: Goal Outcome Summary  Pt transferred from  at 1330. Plan for LD placement. Hx of increased falls and gait instability since June. Participating in rehab at [facility]. R side slightly weaker and L. All other neuros intact. AOx4. Pleasant. Explained LD procedure and precautions following placement. Up with 1 walker to bedside commode. IVF 75ml/hr. NPO since midnight. Continue to monitor.

## 2017-02-04 NOTE — PLAN OF CARE
Problem: Goal Outcome Summary  Goal: Goal Outcome Summary  PT 6C: PT orders received, eval and treat completed.  Ambulates 150ft with FWW and CGAx1.  Balance interventions performed in order to increase stability with functional mobility. RLE weakness and sensation deficits.  Reports LOB toward R with 2-3 falls/day.  Cognition appropriate and oriented x3 during session.  PT recommends d/c to TCU when medically stable.

## 2017-02-05 ENCOUNTER — APPOINTMENT (OUTPATIENT)
Dept: GENERAL RADIOLOGY | Facility: CLINIC | Age: 77
DRG: 033 | End: 2017-02-05
Attending: NEUROLOGICAL SURGERY
Payer: MEDICARE

## 2017-02-05 ENCOUNTER — APPOINTMENT (OUTPATIENT)
Dept: OCCUPATIONAL THERAPY | Facility: CLINIC | Age: 77
DRG: 033 | End: 2017-02-05
Attending: NEUROLOGICAL SURGERY
Payer: MEDICARE

## 2017-02-05 ENCOUNTER — APPOINTMENT (OUTPATIENT)
Dept: PHYSICAL THERAPY | Facility: CLINIC | Age: 77
DRG: 033 | End: 2017-02-05
Attending: NEUROLOGICAL SURGERY
Payer: MEDICARE

## 2017-02-05 PROCEDURE — 40000133 ZZH STATISTIC OT WARD VISIT

## 2017-02-05 PROCEDURE — 97530 THERAPEUTIC ACTIVITIES: CPT | Mod: GP

## 2017-02-05 PROCEDURE — 40000170 ZZH STATISTIC PRE-PROCEDURE ASSESSMENT II: Performed by: NEUROLOGICAL SURGERY

## 2017-02-05 PROCEDURE — 97532 ZZHC OT COGNITIVE SKILLS EA 15 MIN: CPT | Mod: GO

## 2017-02-05 PROCEDURE — 71000015 ZZH RECOVERY PHASE 1 LEVEL 2 EA ADDTL HR: Performed by: NEUROLOGICAL SURGERY

## 2017-02-05 PROCEDURE — 25000125 ZZHC RX 250: Performed by: NURSE ANESTHETIST, CERTIFIED REGISTERED

## 2017-02-05 PROCEDURE — 71000014 ZZH RECOVERY PHASE 1 LEVEL 2 FIRST HR: Performed by: NEUROLOGICAL SURGERY

## 2017-02-05 PROCEDURE — 25000132 ZZH RX MED GY IP 250 OP 250 PS 637: Mod: GY | Performed by: STUDENT IN AN ORGANIZED HEALTH CARE EDUCATION/TRAINING PROGRAM

## 2017-02-05 PROCEDURE — 40000277 XR SURGERY CARM FLUORO LESS THAN 5 MIN W STILLS: Mod: TC

## 2017-02-05 PROCEDURE — A9270 NON-COVERED ITEM OR SERVICE: HCPCS | Mod: GY | Performed by: STUDENT IN AN ORGANIZED HEALTH CARE EDUCATION/TRAINING PROGRAM

## 2017-02-05 PROCEDURE — 36000059 ZZH SURGERY LEVEL 3 EA 15 ADDTL MIN UMMC: Performed by: NEUROLOGICAL SURGERY

## 2017-02-05 PROCEDURE — 37000009 ZZH ANESTHESIA TECHNICAL FEE, EACH ADDTL 15 MIN: Performed by: NEUROLOGICAL SURGERY

## 2017-02-05 PROCEDURE — 27210794 ZZH OR GENERAL SUPPLY STERILE: Performed by: NEUROLOGICAL SURGERY

## 2017-02-05 PROCEDURE — 97110 THERAPEUTIC EXERCISES: CPT | Mod: GP

## 2017-02-05 PROCEDURE — 12000003 ZZH R&B CRITICAL UMMC

## 2017-02-05 PROCEDURE — 37000008 ZZH ANESTHESIA TECHNICAL FEE, 1ST 30 MIN: Performed by: NEUROLOGICAL SURGERY

## 2017-02-05 PROCEDURE — 36000057 ZZH SURGERY LEVEL 3 1ST 30 MIN - UMMC: Performed by: NEUROLOGICAL SURGERY

## 2017-02-05 PROCEDURE — 40000193 ZZH STATISTIC PT WARD VISIT

## 2017-02-05 PROCEDURE — 25000125 ZZHC RX 250: Performed by: NEUROLOGICAL SURGERY

## 2017-02-05 PROCEDURE — 25000128 H RX IP 250 OP 636: Performed by: NURSE ANESTHETIST, CERTIFIED REGISTERED

## 2017-02-05 PROCEDURE — 97112 NEUROMUSCULAR REEDUCATION: CPT | Mod: GP

## 2017-02-05 PROCEDURE — 009U30Z DRAINAGE OF SPINAL CANAL WITH DRAINAGE DEVICE, PERCUTANEOUS APPROACH: ICD-10-PCS | Performed by: NEUROLOGICAL SURGERY

## 2017-02-05 RX ORDER — ONDANSETRON 2 MG/ML
4 INJECTION INTRAMUSCULAR; INTRAVENOUS EVERY 30 MIN PRN
Status: DISCONTINUED | OUTPATIENT
Start: 2017-02-05 | End: 2017-02-05 | Stop reason: HOSPADM

## 2017-02-05 RX ORDER — FENTANYL CITRATE 50 UG/ML
INJECTION, SOLUTION INTRAMUSCULAR; INTRAVENOUS PRN
Status: DISCONTINUED | OUTPATIENT
Start: 2017-02-05 | End: 2017-02-05

## 2017-02-05 RX ORDER — LIDOCAINE HYDROCHLORIDE AND EPINEPHRINE 10; 10 MG/ML; UG/ML
INJECTION, SOLUTION INFILTRATION; PERINEURAL PRN
Status: DISCONTINUED | OUTPATIENT
Start: 2017-02-05 | End: 2017-02-05 | Stop reason: HOSPADM

## 2017-02-05 RX ORDER — SODIUM CHLORIDE, SODIUM LACTATE, POTASSIUM CHLORIDE, CALCIUM CHLORIDE 600; 310; 30; 20 MG/100ML; MG/100ML; MG/100ML; MG/100ML
INJECTION, SOLUTION INTRAVENOUS CONTINUOUS
Status: DISCONTINUED | OUTPATIENT
Start: 2017-02-05 | End: 2017-02-05 | Stop reason: HOSPADM

## 2017-02-05 RX ORDER — CEFAZOLIN SODIUM 1 G/3ML
1 INJECTION, POWDER, FOR SOLUTION INTRAMUSCULAR; INTRAVENOUS SEE ADMIN INSTRUCTIONS
Status: DISCONTINUED | OUTPATIENT
Start: 2017-02-05 | End: 2017-02-05 | Stop reason: HOSPADM

## 2017-02-05 RX ORDER — SODIUM CHLORIDE 9 MG/ML
INJECTION, SOLUTION INTRAVENOUS CONTINUOUS
Status: DISCONTINUED | OUTPATIENT
Start: 2017-02-05 | End: 2017-02-05 | Stop reason: HOSPADM

## 2017-02-05 RX ORDER — CEFAZOLIN SODIUM 2 G/100ML
2 INJECTION, SOLUTION INTRAVENOUS
Status: DISCONTINUED | OUTPATIENT
Start: 2017-02-05 | End: 2017-02-05 | Stop reason: HOSPADM

## 2017-02-05 RX ORDER — SODIUM CHLORIDE 9 MG/ML
INJECTION, SOLUTION INTRAVENOUS CONTINUOUS PRN
Status: DISCONTINUED | OUTPATIENT
Start: 2017-02-05 | End: 2017-02-05

## 2017-02-05 RX ORDER — MULTIVITAMIN,THERAPEUTIC
1 TABLET ORAL DAILY
Status: DISCONTINUED | OUTPATIENT
Start: 2017-02-05 | End: 2017-02-10 | Stop reason: HOSPADM

## 2017-02-05 RX ORDER — PROPOFOL 10 MG/ML
INJECTION, EMULSION INTRAVENOUS PRN
Status: DISCONTINUED | OUTPATIENT
Start: 2017-02-05 | End: 2017-02-05

## 2017-02-05 RX ORDER — ONDANSETRON 4 MG/1
4 TABLET, ORALLY DISINTEGRATING ORAL EVERY 30 MIN PRN
Status: DISCONTINUED | OUTPATIENT
Start: 2017-02-05 | End: 2017-02-05 | Stop reason: HOSPADM

## 2017-02-05 RX ORDER — LABETALOL HYDROCHLORIDE 5 MG/ML
10 INJECTION, SOLUTION INTRAVENOUS
Status: DISCONTINUED | OUTPATIENT
Start: 2017-02-05 | End: 2017-02-05 | Stop reason: HOSPADM

## 2017-02-05 RX ORDER — FENTANYL CITRATE 50 UG/ML
25-50 INJECTION, SOLUTION INTRAMUSCULAR; INTRAVENOUS
Status: DISCONTINUED | OUTPATIENT
Start: 2017-02-05 | End: 2017-02-05 | Stop reason: HOSPADM

## 2017-02-05 RX ADMIN — ACETAMINOPHEN 650 MG: 325 TABLET, FILM COATED ORAL at 02:31

## 2017-02-05 RX ADMIN — FOLIC ACID 1 MG: 1 TABLET ORAL at 10:50

## 2017-02-05 RX ADMIN — ACETAMINOPHEN 650 MG: 325 TABLET, FILM COATED ORAL at 21:06

## 2017-02-05 RX ADMIN — LAMOTRIGINE 100 MG: 100 TABLET ORAL at 19:26

## 2017-02-05 RX ADMIN — PROPOFOL 40 MG: 10 INJECTION, EMULSION INTRAVENOUS at 08:45

## 2017-02-05 RX ADMIN — LEVOTHYROXINE SODIUM 75 MCG: 75 TABLET ORAL at 10:50

## 2017-02-05 RX ADMIN — RANITIDINE HYDROCHLORIDE 150 MG: 150 TABLET, FILM COATED ORAL at 19:25

## 2017-02-05 RX ADMIN — FLUTICASONE PROPIONATE 2 PUFF: 110 AEROSOL, METERED RESPIRATORY (INHALATION) at 19:26

## 2017-02-05 RX ADMIN — PROPOFOL 50 MG: 10 INJECTION, EMULSION INTRAVENOUS at 08:32

## 2017-02-05 RX ADMIN — ARIPIPRAZOLE 5 MG: 5 TABLET ORAL at 21:06

## 2017-02-05 RX ADMIN — ACETAMINOPHEN 650 MG: 325 TABLET, FILM COATED ORAL at 14:59

## 2017-02-05 RX ADMIN — DONEPEZIL HYDROCHLORIDE 10 MG: 10 TABLET, FILM COATED ORAL at 21:06

## 2017-02-05 RX ADMIN — FENTANYL CITRATE 100 MCG: 50 INJECTION, SOLUTION INTRAMUSCULAR; INTRAVENOUS at 08:12

## 2017-02-05 RX ADMIN — FLUTICASONE PROPIONATE 2 PUFF: 110 AEROSOL, METERED RESPIRATORY (INHALATION) at 10:49

## 2017-02-05 RX ADMIN — RANITIDINE HYDROCHLORIDE 150 MG: 150 TABLET, FILM COATED ORAL at 10:50

## 2017-02-05 RX ADMIN — SODIUM CHLORIDE: 9 INJECTION, SOLUTION INTRAVENOUS at 08:08

## 2017-02-05 RX ADMIN — AMLODIPINE BESYLATE 10 MG: 10 TABLET ORAL at 10:50

## 2017-02-05 RX ADMIN — PROPOFOL 30 MG: 10 INJECTION, EMULSION INTRAVENOUS at 08:38

## 2017-02-05 RX ADMIN — PROPOFOL 40 MG: 10 INJECTION, EMULSION INTRAVENOUS at 08:52

## 2017-02-05 RX ADMIN — LAMOTRIGINE 150 MG: 150 TABLET ORAL at 10:49

## 2017-02-05 RX ADMIN — BUPROPION HYDROCHLORIDE 150 MG: 150 TABLET, FILM COATED, EXTENDED RELEASE ORAL at 10:50

## 2017-02-05 RX ADMIN — FLUOXETINE 40 MG: 20 CAPSULE ORAL at 10:49

## 2017-02-05 ASSESSMENT — VISUAL ACUITY
OU: NORMAL ACUITY;GLASSES
OU: NORMAL ACUITY;GLASSES
OU: NORMAL ACUITY;BASELINE;GLASSES
OU: NORMAL ACUITY;BASELINE;GLASSES

## 2017-02-05 NOTE — BRIEF OP NOTE
[facility], [place]    Brief Operative Note    Pre-operative diagnosis: Normal pressure hydrocephalus  Post-operative diagnosis Normal pressure hydrocephalus  Procedure: Procedure(s):  Lumbar drain placement with fluroscopy - Wound Class: I-Clean  Surgeon: Surgeon(s) and Role:     * Raji Newton MD - Primary     * Cnorad Pang MD  Anesthesia: MAC   Estimated blood loss: 1 ml  Drains: LD drain  Specimens: * No specimens in log *  Findings:   Good CSF flow.  Complications: None.

## 2017-02-05 NOTE — PLAN OF CARE
Problem: Goal Outcome Summary  Goal: Goal Outcome Summary  OT6A:  Administered MoCA.  Pt scored 24/30  (26/30 is the norm) with deficits in attention, language fluency, memory and delayed recall.  Reviewed results and how they may apply in functional tasks.  Pt verbalized understanding.  D/C: TCU due to need for assistance in ADLs and added cog deficits.

## 2017-02-05 NOTE — PLAN OF CARE
Problem: Goal Outcome Summary  Goal: Goal Outcome Summary  Outcome: No Change  Hx of increased falls and gait instability since June. Participating in rehab at [facility]. POD #0 LD placement. LD at 5cm above shoulder, draining between 10-12 ml clear drainage. Dressing CDI. LD precautions explained, pt states she understands. Denies pain. R side slightly weaker and L. All other neuros intact. Pt reports increased RLE activity and no pain while up after LD placement.  AOx4. Up with 1 walker to bedside commode. Voiding and incontinence (baseline). IVF 75ml/hr. Regular diet. Up in chair this afternoon. Continue to monitor.

## 2017-02-05 NOTE — PLAN OF CARE
Problem: Goal Outcome Summary  Goal: Goal Outcome Summary  Pt is A&O X4. VS WNL. Neuro unchanged: RLE and LLE 4/5, denies numbness and tingling. Complained of headache after LP, Tylenol given with relief.  On a regular diet,  good appetite. Pt will be NPO midnight for LD placement. Transfer two person assist, commode voiding spontaneously, incontinent at times (baseline) and had a BM. Lower back LP dressing changed X2 with minimal bloody drainage, charge nurse aware.  Bed alarm on for safety. PIV NS infusing at 75 ml/hr. Continue to monitor and follow POC.

## 2017-02-05 NOTE — OP NOTE
DATE OF SURGERY:  02/05/2017      PREOPERATIVE DIAGNOSIS:  Normal pressure hydrocephalus.      POSTOPERATIVE DIAGNOSIS:  Normal pressure hydrocephalus.      PROCEDURE:   1.  Lumbar drain placement.   2.  Intraoperative use of C-arm     STAFF SURGEON:  Raji Newton MD      RESIDENT SURGEON:  Conrad Pang MD      ANESTHESIA:  MAC.      ESTIMATED BLOOD LOSS:  1 mL      INDICATIONS FOR SURGERY:  Ms. Amparo Sharma is a 76-year-old lady who has been coming to Dr. Dozier's clinic for normal pressure hydrocephalus workup.  To further evaluate NPH, the patient consented for above-mentioned procedure after risks and benefits were explained to her and her family.      PROCEDURE:  The patient was brought to the operating room table by our Anesthesia colleagues.  She underwent conscious sedation.  She was then positioned for the procedure.  Surgical site was then prepped and draped in the standard sterile fashion.  Appropriate timeout was conducted.  Six mL of 1% lidocaine with 1:100,000 epinephrine was given for local.  We then brought the C-arm in. Under the guidance of C-arm, we initially advanced the Tuohy needle toward the interlaminar space at L4-L5, and into intradural space.  However, we did not get any CSF return.  We pulled out through the needle and we redirected toward L3-4 level.  We got into the intradural space and good flow of CSF was noted.  We then advanced the lumbar drain catheter.  Its good intradural location was confirmed with x-ray image.  The Tuohy needle was then pulled out.  The lumbar drain catheter was secured to the patient's skin and then it was connected to the Soto drain.  The surgical site was then cleaned with wet and dry.  Dry dressing was applied.  The patient was then placed on a hospital bed and then taken to the recovery room in stable condition.  The patient tolerated the procedure well without complication.  Counts were correct x2 at the end of procedure.  There was a good CSF flow  at the end of procedure.      Dr. Roberts was present for the entire procedure.         BALJEET ROBERTS MD       As dictated by RADHA SHANE MD            D: 2017 09:27   T: 2017 09:54   MT: [id number]      Name:     ATILIO COPPOLA   MRN:      [id number]        Account:        [id number]   :      1940           Procedure Date: 2017      Document: [id number]

## 2017-02-05 NOTE — PLAN OF CARE
Problem: Goal Outcome Summary  Goal: Goal Outcome Summary  PT 6A: Ambulates 160ft in 3 min, 15 sec with FWW and CGAx1.  RN present during session to manage lumbar drain.  Rhomberg balance activities performed with noted improvements on R side.  Sensation and MMT tests conclude improved results on RLE.  Sensation is normal and equal to LLE.  HEP performed today.  Pt denies any pain in R knee today and states RLE feels stronger with normal sensation.  PT recommends d/c to TCU in order to continue improvements with functional endurance and strength.

## 2017-02-05 NOTE — PLAN OF CARE
Problem: Goal Outcome Summary  Goal: Goal Outcome Summary  Outcome: No Change  AVSS. Pt sleepy throughout shift - Oriented to self and hospital. Neuros unchanged with garbled speech, LUE 3/5, RUE 2/5, BLE 3/5. Denies pain and nausea   Zonegram given late d/t nausea/ emesis on evening shift. On DD3 with nectar thick liquids per report . Room service with assist ordered. Needs to be fed and encouragement to eat when not nauseated. R PICC infusing D5 at 150mL/hr.  Pt up with heavy assist of 2, GB, and walker   to wear helmet when OOB. Wetzel intact with good UOP. 2+ edema in BLE and scrotum. On strict I&O. T&R q 2hrs. Does not use call light; frequent rounding done and near nurse station. Continue with POC.

## 2017-02-05 NOTE — PLAN OF CARE
Problem: Goal Outcome Summary  Goal: Goal Outcome Summary  Outcome: No Change  Pt is A&O X4. AVSS. Neuro unchanged: RLE and LLE 4/5, denies numbness and tingling. Complained of back pain at puncture site from LD attempt, Tylenol given with relief. Pt NPO since midnight for LD placement later today. Transfers two person assist, voiding spontaneously via commode, incontinent at times (baseline). Lower back LP dressing c-d-i. PIV NS infusing at 75 ml/hr. Will continue to monitor and follow with POC.

## 2017-02-05 NOTE — ANESTHESIA CARE TRANSFER NOTE
Patient: Amparo Sharma    Procedure(s):  Lumbar drain placement with fluroscopy - Wound Class: I-Clean    Diagnosis: Normal pressure hydrocephalus  Diagnosis Additional Information: No value filed.    Anesthesia Type:   MAC     Note:  Airway :Room Air  Patient transferred to:PACU  Comments: Anesthesia Care Transfer Note    Patient: Amparo Sharma    Transferred to: PACU    Patient vital signs: stable    Airway: none    Monitors on, VSS, stable spontaneous respirations  Rohit Whyte CRNA  2/5/2017 9:15 AM              Vitals: (Last set prior to Anesthesia Care Transfer)    CRNA VITALS  2/5/2017 0842 - 2/5/2017 0915      2/5/2017             Resp Rate (set): 10                Electronically Signed By: KASANDRA Dey CRNA  February 5, 2017  9:15 AM

## 2017-02-05 NOTE — PROGRESS NOTES
[facility], [place]   Neurosurgery Daily Note          Assessment and Plan:   The patient is admitted from Dr. Dozier's clinic for NPH workup; Neuropsychiatry testing suggests cognitive impairment that may be on the spectrum of NPH. PPD #0 for lumbar drain placement today, for NPH trial.     - Monitor LD output  - Neuro checks Q 4 hr  - will need post procedure neuropsych testing daily  - PT daily  - OT daily  - Ok for diet  - No anticoagulation while lumbar drain in place   - PT/OT    Deanna Hayward MD        Interval History:   No events overnight.             Review of Systems:   The Review of Systems is otherwise negative beyond that which has been previously stated.          Medications:   I have reviewed this patient's current medications.    Current Facility-Administered Medications   Medication Dose Route Frequency     multivitamin, therapeutic  1 tablet Oral Daily     sodium chloride (PF)  3 mL Intravenous Q8H     ranitidine  150 mg Oral BID     amLODIPine (NORVASC) tablet 10 mg  10 mg Oral QAM     buPROPion (WELLBUTRIN XL) 24 hr tablet 150 mg  150 mg Oral QAM     donepezil (ARICEPT) tablet 10 mg  10 mg Oral At Bedtime     FLUoxetine (PROzac) capsule 40 mg  40 mg Oral QAM     fluticasone  2 puff Inhalation Q12H     folic acid (FOLVITE) tablet 1 mg  1 mg Oral QAM     lamoTRIgine (LaMICtal) tablet 100 mg  100 mg Oral QPM     levothyroxine  75 mcg Oral QAM     tiotropium  18 mcg Inhalation Daily     lamoTRIgine  150 mg Oral QAM     ARIPiprazole  5 mg Oral At Bedtime        Physical Exam:  General:   Comfortable respiratory effort; normal cardiac rhythm.     Mental Status:   awake, alert and oriented x3.   fluent, communicative, intact comprehension.    Cranial Nerves:  equal and reactive pupils   extraocular movements preserved   no dysarthria.     Motor/Sensory:  5/5 strength throughout upper and lower extremities.  No deficits to pain, soft touch.     Reflexes: symmetric           Data:   The labs and imaging for this patient have been reviewed directly.

## 2017-02-05 NOTE — ANESTHESIA POSTPROCEDURE EVALUATION
Patient: Amparo Masciocchi Sacino    Procedure(s):  Lumbar drain placement with fluroscopy - Wound Class: I-Clean    Diagnosis:Normal pressure hydrocephalus  Diagnosis Additional Information: No value filed.    Anesthesia Type:  MAC    Note:  Anesthesia Post Evaluation    Patient location during evaluation: PACU  Patient participation: Able to fully participate in evaluation  Level of consciousness: awake and alert  Pain management: adequate  Airway patency: patent  Cardiovascular status: acceptable  Respiratory status: acceptable  Hydration status: acceptable  PONV: none     Anesthetic complications: None          Last vitals:  Filed Vitals:    02/04/17 2253 02/04/17 2300 02/05/17 0357   BP: 114/48  133/56   Pulse:      Temp: 36.4  C (97.5  F)  36.4  C (97.6  F)   Resp: 16  16   SpO2: 89% 94% 96%         Electronically Signed By: Vimal Tabor MD  February 5, 2017  9:22 AM

## 2017-02-06 ENCOUNTER — APPOINTMENT (OUTPATIENT)
Dept: PHYSICAL THERAPY | Facility: CLINIC | Age: 77
DRG: 033 | End: 2017-02-06
Attending: NEUROLOGICAL SURGERY
Payer: MEDICARE

## 2017-02-06 ENCOUNTER — APPOINTMENT (OUTPATIENT)
Dept: OCCUPATIONAL THERAPY | Facility: CLINIC | Age: 77
DRG: 033 | End: 2017-02-06
Attending: NEUROLOGICAL SURGERY
Payer: MEDICARE

## 2017-02-06 LAB
ALBUMIN UR-MCNC: ABNORMAL MG/DL
ALBUMIN UR-MCNC: NEGATIVE MG/DL
APPEARANCE UR: ABNORMAL
APPEARANCE UR: CLEAR
BACTERIA #/AREA URNS HPF: ABNORMAL /HPF
BILIRUB UR QL STRIP: ABNORMAL
BILIRUB UR QL STRIP: NEGATIVE
COLOR UR AUTO: ABNORMAL
COLOR UR AUTO: ABNORMAL
GLUCOSE UR STRIP-MCNC: ABNORMAL MG/DL
GLUCOSE UR STRIP-MCNC: NEGATIVE MG/DL
HGB UR QL STRIP: ABNORMAL
HGB UR QL STRIP: NEGATIVE
KETONES UR STRIP-MCNC: ABNORMAL MG/DL
KETONES UR STRIP-MCNC: NEGATIVE MG/DL
LEUKOCYTE ESTERASE UR QL STRIP: ABNORMAL
LEUKOCYTE ESTERASE UR QL STRIP: NEGATIVE
MUCOUS THREADS #/AREA URNS LPF: PRESENT /LPF
NITRATE UR QL: ABNORMAL
NITRATE UR QL: NEGATIVE
PH UR STRIP: 5.5 PH (ref 5–7)
PH UR STRIP: ABNORMAL PH (ref 5–7)
RBC #/AREA URNS AUTO: 1 /HPF (ref 0–2)
SP GR UR STRIP: 1.01 (ref 1–1.03)
SP GR UR STRIP: ABNORMAL (ref 1–1.03)
SQUAMOUS #/AREA URNS AUTO: 1 /HPF (ref 0–1)
URN SPEC COLLECT METH UR: ABNORMAL
URN SPEC COLLECT METH UR: ABNORMAL
UROBILINOGEN UR STRIP-MCNC: ABNORMAL MG/DL (ref 0–2)
UROBILINOGEN UR STRIP-MCNC: NORMAL MG/DL (ref 0–2)
WBC #/AREA URNS AUTO: <1 /HPF (ref 0–2)

## 2017-02-06 PROCEDURE — 25000132 ZZH RX MED GY IP 250 OP 250 PS 637: Mod: GY | Performed by: STUDENT IN AN ORGANIZED HEALTH CARE EDUCATION/TRAINING PROGRAM

## 2017-02-06 PROCEDURE — 12000003 ZZH R&B CRITICAL UMMC

## 2017-02-06 PROCEDURE — A9270 NON-COVERED ITEM OR SERVICE: HCPCS | Mod: GY | Performed by: STUDENT IN AN ORGANIZED HEALTH CARE EDUCATION/TRAINING PROGRAM

## 2017-02-06 PROCEDURE — 40000133 ZZH STATISTIC OT WARD VISIT: Performed by: OCCUPATIONAL THERAPIST

## 2017-02-06 PROCEDURE — 40000193 ZZH STATISTIC PT WARD VISIT

## 2017-02-06 PROCEDURE — 97530 THERAPEUTIC ACTIVITIES: CPT | Mod: GP

## 2017-02-06 PROCEDURE — 81001 URINALYSIS AUTO W/SCOPE: CPT | Performed by: STUDENT IN AN ORGANIZED HEALTH CARE EDUCATION/TRAINING PROGRAM

## 2017-02-06 PROCEDURE — 97535 SELF CARE MNGMENT TRAINING: CPT | Mod: GO | Performed by: OCCUPATIONAL THERAPIST

## 2017-02-06 PROCEDURE — 97116 GAIT TRAINING THERAPY: CPT | Mod: GP

## 2017-02-06 RX ADMIN — LAMOTRIGINE 100 MG: 100 TABLET ORAL at 20:09

## 2017-02-06 RX ADMIN — FOLIC ACID 1 MG: 1 TABLET ORAL at 07:27

## 2017-02-06 RX ADMIN — AMLODIPINE BESYLATE 10 MG: 10 TABLET ORAL at 07:27

## 2017-02-06 RX ADMIN — THERA TABS 1 TABLET: TAB at 07:27

## 2017-02-06 RX ADMIN — TIOTROPIUM BROMIDE 18 MCG: 18 CAPSULE ORAL; RESPIRATORY (INHALATION) at 07:28

## 2017-02-06 RX ADMIN — RANITIDINE HYDROCHLORIDE 150 MG: 150 TABLET, FILM COATED ORAL at 20:09

## 2017-02-06 RX ADMIN — LEVOTHYROXINE SODIUM 75 MCG: 75 TABLET ORAL at 07:27

## 2017-02-06 RX ADMIN — RANITIDINE HYDROCHLORIDE 150 MG: 150 TABLET, FILM COATED ORAL at 07:27

## 2017-02-06 RX ADMIN — FLUOXETINE 40 MG: 20 CAPSULE ORAL at 07:27

## 2017-02-06 RX ADMIN — LAMOTRIGINE 150 MG: 150 TABLET ORAL at 07:27

## 2017-02-06 RX ADMIN — BUPROPION HYDROCHLORIDE 150 MG: 150 TABLET, FILM COATED, EXTENDED RELEASE ORAL at 07:27

## 2017-02-06 RX ADMIN — FLUTICASONE PROPIONATE 2 PUFF: 110 AEROSOL, METERED RESPIRATORY (INHALATION) at 07:28

## 2017-02-06 RX ADMIN — DONEPEZIL HYDROCHLORIDE 10 MG: 10 TABLET, FILM COATED ORAL at 21:00

## 2017-02-06 RX ADMIN — ARIPIPRAZOLE 5 MG: 5 TABLET ORAL at 21:00

## 2017-02-06 RX ADMIN — ACETAMINOPHEN 650 MG: 325 TABLET, FILM COATED ORAL at 16:48

## 2017-02-06 ASSESSMENT — VISUAL ACUITY
OU: NORMAL ACUITY
OU: NORMAL ACUITY
OU: NORMAL ACUITY;GLASSES
OU: NORMAL ACUITY
OU: NORMAL ACUITY

## 2017-02-06 NOTE — PLAN OF CARE
Problem: Goal Outcome Summary  Goal: Goal Outcome Summary  OT:  Patient seen for daily cognitive screen:    2/6/17: MOCA version 7.2: 21/30 2/5/17: MOCA  24/30  2/3/17: MOCA 18/30

## 2017-02-06 NOTE — PLAN OF CARE
Problem: Goal Outcome Summary  Goal: Goal Outcome Summary  Outcome: Improving  Pt is A&O X4. VS WNL. Neuro unchanged generalized weakness. LD at 5 cm, dressing CDI, output ranging from 10-20 ml clear. Complained of back pain, Tylenol given with relief.  On a regular diet,  good appetite. Transfer one person assist only with RN, ambulated hallway and sat in the chair for 2 hours. Commode voiding spontaneously.  Incontinent at times but baseline per pt.  Calm and cooperative. Pulse oximetry  and PCD s on. PIV SL . Continue to monitor and follow POC

## 2017-02-06 NOTE — PLAN OF CARE
Problem: Goal Outcome Summary  Goal: Goal Outcome Summary  PT 6A: Continued progress made in PT session today.  Sensation in BLE continues to be intact with 100% accuracy; no differences L to R.  All MMT 5/5 besides R hip ABD: 4/5.  Ambulates 160ft with FWW, CGAx1 with shoes donned in order to decrease R lateral foot pain.  Performs ambulation in 1 min 48 sec today; significant improvement.  Rhomberg balance activities with continued increased stability on RLE.  PT currently recommends TCU in order to continue maximizing functional mobility.  Possibility with continued participation in PT, pt could return home with OP PT and PCA services.

## 2017-02-06 NOTE — PLAN OF CARE
Problem: Goal Outcome Summary  Goal: Goal Outcome Summary  Outcome: No Change  POD #1 LD placement. VSS; maintaining sats in mid 90's on RA. Neuro's intact aside from mild R sided wkns. LD at 5cm above shoulder; outputs ranging 9-19 mL of clear-pink tinged drainage; dressing c/d/i. C/o HA this evening; well managed with Tylenol x1. PIV SL. Adequate PO. Voiding spontaneously; occasionally incont; brief in place. BM x2. Up with assist of 1 GB and walker; up in chair much of shift; ambulated x3. Will continue to monitor and update MD's accordingly.

## 2017-02-06 NOTE — PROGRESS NOTES
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  She was evaluated on the inpatient unit.    BACKGROUND INFORMATION / INTERVIEW FINDINGS    Ms. Sharma underwent placement of the lumbar drain for likely normal pressure hydrocephalus yesterday, February 5, 2017. I saw her for a neuropsychological evaluation on February 3, 2017, prior to placement of this drain. In brief, my evaluation documented weaknesses that were felt to be consistent with, but not  classic  for normal pressure hydrocephalus. The possibility was raised that there could also have been contributions from cerebrovascular disease, past alcohol abuse, and severe mental illness. In the context of an average range baseline, deficits were identified in executive functioning and bilateral fine motor dexterity. Relative weaknesses were identified in visual problem solving, memory, and verbal comprehension. Other cognitive abilities were normal and performed in keeping with her baseline. She also reported elevated symptoms of depression and anxiety. Please see my evaluation note for more details and history. As previously noted, her other medical history includes hypertension, GERD, bipolar/major depressive disorder, and hypothyroidism. The current follow-up evaluation was requested by Dr. Deanna Hayward, in this context.    On interview, Ms. Sharma confirmed that she had undergone placement of the lumbar drain yesterday. She reported that she feels better since this procedure. She noted that she was able to walk around the nurse s station yesterday, whereas she was unable to walk at all prior to her procedure. She noted ongoing troubles with her  knee and her leg, but again indicated that there has been considerable improvement in her ability to ambulate. She denied having any falls. She stated that her bladder is still giving her problems. Regarding cognition, she reported that her thinking is clearer. She described improved memory, concentration, and focus. She otherwise denied having noticed cognitive changes. She reported that her mood is okay. She reported feeling better than she did prior to the procedure.    Ms. Sharma otherwise denied changes in medical background, living situation, independence, educational or occupational history since the time of the previous evaluation.    BEHAVIORAL OBSERVATIONS  Ms. Sharma was polite and cooperative with the exam. A mild tremor was noted in her hands. Her speech was notable for mild difficulties with word finding, but was otherwise normal. Comprehension was broadly normal. Thought processes were notable for forgetting of task instructions on a couple of measures. Fine motor dexterity was slowed. Her mood was euthymic with congruent affect. Her effort was good. The current results are felt to be an accurate depiction of her cognitive functioning.     RESULTS OF EXAM  Ms. Sharma s performances on measures of neuropsychological functioning were as follows:      She was fully oriented to time, place, and various aspects of personal information. Auditory attention for digits was low average. Learning of words in a list format was low average. Delayed recall of list words was low average. Percent retention of list words was borderline impaired. Delayed recognition of list words was low average. Learning of simple geometric figures and their spatial locations was impaired. Delayed recall of these figures and their locations was impaired. Percent retention of the figures was borderline impaired. Delayed recognition of the figures was borderline impaired. Her drawing of a complicated geometric figure was severely  impaired and notable for poor planning and appreciation of the figure s gestalt. Visual problem solving with blocks was average. Verbal associative fluency was impaired. Speeded visual sequencing under focused attention was performed in the low average to average range. A similar measure with a divided attention component was impaired, with four errors. Speeded word reading was average. Speeded color naming was average. Speeded inhibition of an overlearned response was performed in the borderline impaired to low average range. Speeded visuomotor coding was low average. Fine motor dexterity was impaired, bilaterally.    She endorsed items consistent with minimal symptoms of depression and minimal symptoms of anxiety on self-report measures.    IMPRESSIONS  Ms. Sharma demonstrated stable to slightly worsened cognition relative to her pre-procedure exam on 02/03/2017. Slightly worsened performances were notable on measures of memory, visual problem solving, and some aspects of executive functioning. Fine motor skills and other cognitive abilities were essentially the same. These results  contrast to the patient s report of improvements. She is, however, reporting reduced symptoms of depression and anxiety. It could be the case that this improvement in her mood and her report of improved gait are providing the impression to the patient that she has made improvements in cognition as well. Overall, her cognitive weaknesses could be multifactorial, with contributions from longstanding NPH, cerebrovascular disease, past alcohol abuse, and mental illness.    RECOMMENDATIONS    1. Follow-up neuropsychological evaluation could be considered in the future as indicated.     Jose Juarez, Ph.D., L.P., ABPP-CN   / Licensed Psychologist [id number]  Department of Rehabilitation Medicine  Division of Adult Neuropsychology  [facility]    Time spent:  two hours professional time, including  interview, record review, data integration, and report writing (CPT 83119); two hours of testing administered by a psychometrist and interpreted by a neuropsychologist (CPT 66376). Diagnoses: G91.2, R41.844.

## 2017-02-06 NOTE — PROGRESS NOTES
[facility], [place]   Neurosurgery Daily Note          Assessment and Plan:   The patient is admitted from Dr. Dozier's clinic for NPH workup; Neuropsychiatry testing suggests cognitive impairment that may be on the spectrum of NPH. PPD #1 for lumbar drain placement today, for NPH trial.     - Monitor LD output  - Neuro checks Q 4 hr  - will need post procedure neuropsych testing daily  - PT daily  - OT daily  - Ok for diet  - No anticoagulation while lumbar drain in place   - PT/OT  - follow up final plans with Dr. Dozier         Interval History:   No events overnight.             Review of Systems:   The Review of Systems is otherwise negative beyond that which has been previously stated.          Medications:   I have reviewed this patient's current medications.    Current Facility-Administered Medications   Medication Dose Route Frequency     multivitamin, therapeutic  1 tablet Oral Daily     sodium chloride (PF)  3 mL Intravenous Q8H     ranitidine  150 mg Oral BID     amLODIPine (NORVASC) tablet 10 mg  10 mg Oral QAM     buPROPion (WELLBUTRIN XL) 24 hr tablet 150 mg  150 mg Oral QAM     donepezil (ARICEPT) tablet 10 mg  10 mg Oral At Bedtime     FLUoxetine (PROzac) capsule 40 mg  40 mg Oral QAM     fluticasone  2 puff Inhalation Q12H     folic acid (FOLVITE) tablet 1 mg  1 mg Oral QAM     lamoTRIgine (LaMICtal) tablet 100 mg  100 mg Oral QPM     levothyroxine  75 mcg Oral QAM     tiotropium  18 mcg Inhalation Daily     lamoTRIgine  150 mg Oral QAM     ARIPiprazole  5 mg Oral At Bedtime        Physical Exam:  General:   Comfortable respiratory effort; normal cardiac rhythm.     Mental Status:   awake, alert and oriented x3.   fluent, communicative, intact comprehension.    Cranial Nerves:  equal and reactive pupils   extraocular movements preserved   no dysarthria.     Motor/Sensory:  5/5 strength throughout upper and lower extremities.  No deficits to pain, soft touch.      Reflexes: symmetric          Data:   The labs and imaging for this patient have been reviewed directly.

## 2017-02-07 ENCOUNTER — APPOINTMENT (OUTPATIENT)
Dept: OCCUPATIONAL THERAPY | Facility: CLINIC | Age: 77
DRG: 033 | End: 2017-02-07
Attending: NEUROLOGICAL SURGERY
Payer: MEDICARE

## 2017-02-07 ENCOUNTER — ANESTHESIA EVENT (OUTPATIENT)
Dept: SURGERY | Facility: CLINIC | Age: 77
DRG: 033 | End: 2017-02-07
Payer: MEDICARE

## 2017-02-07 ENCOUNTER — APPOINTMENT (OUTPATIENT)
Dept: PHYSICAL THERAPY | Facility: CLINIC | Age: 77
DRG: 033 | End: 2017-02-07
Attending: NEUROLOGICAL SURGERY
Payer: MEDICARE

## 2017-02-07 PROCEDURE — 97530 THERAPEUTIC ACTIVITIES: CPT | Mod: GP

## 2017-02-07 PROCEDURE — 40000133 ZZH STATISTIC OT WARD VISIT: Performed by: OCCUPATIONAL THERAPIST

## 2017-02-07 PROCEDURE — 12000003 ZZH R&B CRITICAL UMMC

## 2017-02-07 PROCEDURE — 25000132 ZZH RX MED GY IP 250 OP 250 PS 637: Mod: GY | Performed by: STUDENT IN AN ORGANIZED HEALTH CARE EDUCATION/TRAINING PROGRAM

## 2017-02-07 PROCEDURE — 40000193 ZZH STATISTIC PT WARD VISIT

## 2017-02-07 PROCEDURE — A9270 NON-COVERED ITEM OR SERVICE: HCPCS | Mod: GY | Performed by: STUDENT IN AN ORGANIZED HEALTH CARE EDUCATION/TRAINING PROGRAM

## 2017-02-07 PROCEDURE — 97535 SELF CARE MNGMENT TRAINING: CPT | Mod: GO | Performed by: OCCUPATIONAL THERAPIST

## 2017-02-07 PROCEDURE — 97112 NEUROMUSCULAR REEDUCATION: CPT | Mod: GP

## 2017-02-07 PROCEDURE — 97116 GAIT TRAINING THERAPY: CPT | Mod: GP

## 2017-02-07 RX ADMIN — TIOTROPIUM BROMIDE 18 MCG: 18 CAPSULE ORAL; RESPIRATORY (INHALATION) at 07:37

## 2017-02-07 RX ADMIN — FOLIC ACID 1 MG: 1 TABLET ORAL at 07:37

## 2017-02-07 RX ADMIN — ARIPIPRAZOLE 5 MG: 5 TABLET ORAL at 21:09

## 2017-02-07 RX ADMIN — THERA TABS 1 TABLET: TAB at 07:37

## 2017-02-07 RX ADMIN — RANITIDINE HYDROCHLORIDE 150 MG: 150 TABLET, FILM COATED ORAL at 08:41

## 2017-02-07 RX ADMIN — DONEPEZIL HYDROCHLORIDE 10 MG: 10 TABLET, FILM COATED ORAL at 21:09

## 2017-02-07 RX ADMIN — FLUOXETINE 40 MG: 20 CAPSULE ORAL at 07:37

## 2017-02-07 RX ADMIN — RANITIDINE HYDROCHLORIDE 150 MG: 150 TABLET, FILM COATED ORAL at 21:09

## 2017-02-07 RX ADMIN — BUPROPION HYDROCHLORIDE 150 MG: 150 TABLET, FILM COATED, EXTENDED RELEASE ORAL at 07:37

## 2017-02-07 RX ADMIN — LAMOTRIGINE 100 MG: 100 TABLET ORAL at 21:09

## 2017-02-07 RX ADMIN — LEVOTHYROXINE SODIUM 75 MCG: 75 TABLET ORAL at 07:37

## 2017-02-07 RX ADMIN — LAMOTRIGINE 150 MG: 150 TABLET ORAL at 07:37

## 2017-02-07 RX ADMIN — ACETAMINOPHEN 650 MG: 325 TABLET, FILM COATED ORAL at 12:54

## 2017-02-07 RX ADMIN — AMLODIPINE BESYLATE 10 MG: 10 TABLET ORAL at 07:37

## 2017-02-07 RX ADMIN — FLUTICASONE PROPIONATE 2 PUFF: 110 AEROSOL, METERED RESPIRATORY (INHALATION) at 07:37

## 2017-02-07 ASSESSMENT — VISUAL ACUITY
OU: NORMAL ACUITY

## 2017-02-07 NOTE — PLAN OF CARE
Problem: Goal Outcome Summary  Goal: Goal Outcome Summary  OT:  Patient seen for daily cognitive screen:    2/7/16:  18/30 MOCA version 7.3  2/6/17:  21/30 MOCA version 7.2  2/5/17:  24/30 MOCA   2/3/17:  18/30 MOCA

## 2017-02-07 NOTE — PLAN OF CARE
"Problem: Goal Outcome Summary  Goal: Goal Outcome Summary  PT 6A: Ambulates 320ft with FWW and CGAx1.  160ft walked in 2 min. 8 seconds.  Remainder of gait is much slower.  C/o pain/ache in R knee and hip during mobility.  States it is \"arthritic pain from the weather\".  MMT and sensation show no changes from yesterday.  Demonstrates decreased stability today during balance activities secondary to arthritic joint pain in R knee and hip.  PT reccommended d/c to TCU when medically stable in order to increase functional mobility and activity tolerance.          "

## 2017-02-07 NOTE — PLAN OF CARE
Problem: Goal Outcome Summary  Goal: Goal Outcome Summary  Outcome: Improving  AVSS, on 1.5L NC while sleeping. A&Ox4. Neuros intact besides right generalized weakness. Pt stated strength is improving. LD open at 5cm above shoulder with clear output ranging from 5-17ml/hr. LD dressing CDI. Denied any HA pain overnight. Up with A1, GB to pivot to commode, when up ambulating needs walker. IV SL. On a regular diet with good PO intake. Voiding spon. Slight incontinence x1, brief in place. Pt stated she is feeling slightly anxious about the idea of a shunt, education and support given. Pulse ox and BA on. Continue with POC.

## 2017-02-07 NOTE — PROGRESS NOTES
[facility], [place]   Neurosurgery Daily Note          Assessment and Plan:   The patient is admitted from Dr. Dozier's clinic for NPH workup; Neuropsychiatry testing suggests cognitive impairment that may be on the spectrum of NPH. PPD #2 for lumbar drain placement today, for NPH trial.     - Monitor LD output  - Neuro checks Q 4 hr  - neuropsych testing - cognition largely unchanged, but PT shows improvement and improved mobility  - schedule OR for Wednesday   - Ok for diet  - No anticoagulation while lumbar drain in place   - PT/OT        Interval History:   No acute events overnight              Review of Systems:   The Review of Systems is otherwise negative beyond that which has been previously stated.          Medications:   I have reviewed this patient's current medications.    Current Facility-Administered Medications   Medication Dose Route Frequency     multivitamin, therapeutic  1 tablet Oral Daily     sodium chloride (PF)  3 mL Intravenous Q8H     ranitidine  150 mg Oral BID     amLODIPine (NORVASC) tablet 10 mg  10 mg Oral QAM     buPROPion (WELLBUTRIN XL) 24 hr tablet 150 mg  150 mg Oral QAM     donepezil (ARICEPT) tablet 10 mg  10 mg Oral At Bedtime     FLUoxetine (PROzac) capsule 40 mg  40 mg Oral QAM     fluticasone  2 puff Inhalation Q12H     folic acid (FOLVITE) tablet 1 mg  1 mg Oral QAM     lamoTRIgine (LaMICtal) tablet 100 mg  100 mg Oral QPM     levothyroxine  75 mcg Oral QAM     tiotropium  18 mcg Inhalation Daily     lamoTRIgine  150 mg Oral QAM     ARIPiprazole  5 mg Oral At Bedtime        Physical Exam:  General:   Comfortable respiratory effort; normal cardiac rhythm.     Mental Status:   awake, alert and oriented x3.   fluent, communicative, intact comprehension.    Cranial Nerves:  equal and reactive pupils   extraocular movements preserved   no dysarthria.     Motor/Sensory:  5/5 strength throughout upper and lower extremities.  No deficits to pain, soft  touch.     Reflexes: symmetric          Data:   The labs and imaging for this patient have been reviewed directly.

## 2017-02-07 NOTE — PLAN OF CARE
Problem: Goal Outcome Summary  Goal: Goal Outcome Summary  Outcome: No Change  POD #2 LD placement. VSS; maintaining sats in mid 90's on RA. Neuro's intact aside from mild R sided wkns. LD at 5cm above shoulder; outputs ranging 7-20 mL of clear drainage; dressing c/d/i. C/o HA x1; well managed with Tylenol. PIV SL. Adequate PO. Voiding spontaneously; occasionally incont; brief in place. BM x1. Up with assist of 1 GB and walker; up in chair much of shift; ambulated x2. Possible shunt placement tmrw. Will continue to monitor and update MD's accordingly.

## 2017-02-08 ENCOUNTER — ANESTHESIA (OUTPATIENT)
Dept: SURGERY | Facility: CLINIC | Age: 77
DRG: 033 | End: 2017-02-08
Payer: MEDICARE

## 2017-02-08 ENCOUNTER — APPOINTMENT (OUTPATIENT)
Dept: GENERAL RADIOLOGY | Facility: CLINIC | Age: 77
DRG: 033 | End: 2017-02-08
Attending: NEUROLOGICAL SURGERY
Payer: MEDICARE

## 2017-02-08 ENCOUNTER — APPOINTMENT (OUTPATIENT)
Dept: CT IMAGING | Facility: CLINIC | Age: 77
DRG: 033 | End: 2017-02-08
Attending: STUDENT IN AN ORGANIZED HEALTH CARE EDUCATION/TRAINING PROGRAM
Payer: MEDICARE

## 2017-02-08 PROBLEM — Z98.2 S/P VENTRICULOPERITONEAL SHUNT: Status: ACTIVE | Noted: 2017-02-08

## 2017-02-08 LAB
ABO + RH BLD: NORMAL
ABO + RH BLD: NORMAL
ANION GAP SERPL CALCULATED.3IONS-SCNC: 8 MMOL/L (ref 3–14)
BASOPHILS # BLD AUTO: 0 10E9/L (ref 0–0.2)
BASOPHILS NFR BLD AUTO: 0.5 %
BLD GP AB SCN SERPL QL: NORMAL
BLOOD BANK CMNT PATIENT-IMP: NORMAL
BUN SERPL-MCNC: 20 MG/DL (ref 7–30)
CALCIUM SERPL-MCNC: 9 MG/DL (ref 8.5–10.1)
CHLORIDE SERPL-SCNC: 104 MMOL/L (ref 94–109)
CO2 SERPL-SCNC: 25 MMOL/L (ref 20–32)
CREAT SERPL-MCNC: 0.69 MG/DL (ref 0.52–1.04)
DIFFERENTIAL METHOD BLD: ABNORMAL
EOSINOPHIL # BLD AUTO: 0.4 10E9/L (ref 0–0.7)
EOSINOPHIL NFR BLD AUTO: 4.8 %
ERYTHROCYTE [DISTWIDTH] IN BLOOD BY AUTOMATED COUNT: 15.8 % (ref 10–15)
GFR SERPL CREATININE-BSD FRML MDRD: 83 ML/MIN/1.7M2
GLUCOSE SERPL-MCNC: 102 MG/DL (ref 70–99)
HCT VFR BLD AUTO: 37.8 % (ref 35–47)
HGB BLD-MCNC: 11.7 G/DL (ref 11.7–15.7)
IMM GRANULOCYTES # BLD: 0 10E9/L (ref 0–0.4)
IMM GRANULOCYTES NFR BLD: 0.4 %
INR PPP: 1.03 (ref 0.86–1.14)
INTERPRETATION ECG - MUSE: NORMAL
LYMPHOCYTES # BLD AUTO: 1.2 10E9/L (ref 0.8–5.3)
LYMPHOCYTES NFR BLD AUTO: 15.1 %
MCH RBC QN AUTO: 27.5 PG (ref 26.5–33)
MCHC RBC AUTO-ENTMCNC: 31 G/DL (ref 31.5–36.5)
MCV RBC AUTO: 89 FL (ref 78–100)
MONOCYTES # BLD AUTO: 0.5 10E9/L (ref 0–1.3)
MONOCYTES NFR BLD AUTO: 6.3 %
NEUTROPHILS # BLD AUTO: 5.8 10E9/L (ref 1.6–8.3)
NEUTROPHILS NFR BLD AUTO: 72.9 %
NRBC # BLD AUTO: 0 10*3/UL
NRBC BLD AUTO-RTO: 0 /100
PLATELET # BLD AUTO: 283 10E9/L (ref 150–450)
POTASSIUM SERPL-SCNC: 4.4 MMOL/L (ref 3.4–5.3)
RBC # BLD AUTO: 4.25 10E12/L (ref 3.8–5.2)
SODIUM SERPL-SCNC: 137 MMOL/L (ref 133–144)
SPECIMEN EXP DATE BLD: NORMAL
WBC # BLD AUTO: 8 10E9/L (ref 4–11)

## 2017-02-08 PROCEDURE — 36415 COLL VENOUS BLD VENIPUNCTURE: CPT | Performed by: NEUROLOGICAL SURGERY

## 2017-02-08 PROCEDURE — 70450 CT HEAD/BRAIN W/O DYE: CPT

## 2017-02-08 PROCEDURE — 25000125 ZZHC RX 250: Performed by: NURSE ANESTHETIST, CERTIFIED REGISTERED

## 2017-02-08 PROCEDURE — A9270 NON-COVERED ITEM OR SERVICE: HCPCS | Mod: GY | Performed by: STUDENT IN AN ORGANIZED HEALTH CARE EDUCATION/TRAINING PROGRAM

## 2017-02-08 PROCEDURE — 80048 BASIC METABOLIC PNL TOTAL CA: CPT | Performed by: NEUROLOGICAL SURGERY

## 2017-02-08 PROCEDURE — 25800025 ZZH RX 258: Performed by: NURSE ANESTHETIST, CERTIFIED REGISTERED

## 2017-02-08 PROCEDURE — 71000017 ZZH RECOVERY PHASE 1 LEVEL 3 EA ADDTL HR: Performed by: NEUROLOGICAL SURGERY

## 2017-02-08 PROCEDURE — 25000565 ZZH ISOFLURANE, EA 15 MIN: Performed by: NEUROLOGICAL SURGERY

## 2017-02-08 PROCEDURE — 37000009 ZZH ANESTHESIA TECHNICAL FEE, EACH ADDTL 15 MIN: Performed by: NEUROLOGICAL SURGERY

## 2017-02-08 PROCEDURE — 25000132 ZZH RX MED GY IP 250 OP 250 PS 637: Mod: GY | Performed by: NEUROLOGICAL SURGERY

## 2017-02-08 PROCEDURE — 71000016 ZZH RECOVERY PHASE 1 LEVEL 3 FIRST HR: Performed by: NEUROLOGICAL SURGERY

## 2017-02-08 PROCEDURE — 86850 RBC ANTIBODY SCREEN: CPT | Performed by: NEUROLOGICAL SURGERY

## 2017-02-08 PROCEDURE — 25000128 H RX IP 250 OP 636: Performed by: ANESTHESIOLOGY

## 2017-02-08 PROCEDURE — 36000076 ZZH SURGERY LEVEL 6 EA 15 ADDTL MIN - UMMC: Performed by: NEUROLOGICAL SURGERY

## 2017-02-08 PROCEDURE — 86900 BLOOD TYPING SEROLOGIC ABO: CPT | Performed by: NEUROLOGICAL SURGERY

## 2017-02-08 PROCEDURE — 27210794 ZZH OR GENERAL SUPPLY STERILE: Performed by: NEUROLOGICAL SURGERY

## 2017-02-08 PROCEDURE — 27810169 ZZH OR IMPLANT GENERAL: Performed by: NEUROLOGICAL SURGERY

## 2017-02-08 PROCEDURE — 00160J6 BYPASS CEREBRAL VENTRICLE TO PERITONEAL CAVITY WITH SYNTHETIC SUBSTITUTE, OPEN APPROACH: ICD-10-PCS | Performed by: NEUROLOGICAL SURGERY

## 2017-02-08 PROCEDURE — 85025 COMPLETE CBC W/AUTO DIFF WBC: CPT | Performed by: NEUROLOGICAL SURGERY

## 2017-02-08 PROCEDURE — 40000275 ZZH STATISTIC RCP TIME EA 10 MIN

## 2017-02-08 PROCEDURE — 86901 BLOOD TYPING SEROLOGIC RH(D): CPT | Performed by: NEUROLOGICAL SURGERY

## 2017-02-08 PROCEDURE — 25000128 H RX IP 250 OP 636: Performed by: NURSE ANESTHETIST, CERTIFIED REGISTERED

## 2017-02-08 PROCEDURE — 25000132 ZZH RX MED GY IP 250 OP 250 PS 637: Mod: GY | Performed by: STUDENT IN AN ORGANIZED HEALTH CARE EDUCATION/TRAINING PROGRAM

## 2017-02-08 PROCEDURE — C1713 ANCHOR/SCREW BN/BN,TIS/BN: HCPCS | Performed by: NEUROLOGICAL SURGERY

## 2017-02-08 PROCEDURE — 36000074 ZZH SURGERY LEVEL 6 1ST 30 MIN - UMMC: Performed by: NEUROLOGICAL SURGERY

## 2017-02-08 PROCEDURE — 80048 BASIC METABOLIC PNL TOTAL CA: CPT | Performed by: STUDENT IN AN ORGANIZED HEALTH CARE EDUCATION/TRAINING PROGRAM

## 2017-02-08 PROCEDURE — 25000128 H RX IP 250 OP 636: Performed by: NEUROLOGICAL SURGERY

## 2017-02-08 PROCEDURE — 27210995 ZZH RX 272: Performed by: NEUROLOGICAL SURGERY

## 2017-02-08 PROCEDURE — 71010 XR CHEST PORT 1 VW: CPT

## 2017-02-08 PROCEDURE — 85610 PROTHROMBIN TIME: CPT | Performed by: NEUROLOGICAL SURGERY

## 2017-02-08 PROCEDURE — 40000171 ZZH STATISTIC PRE-PROCEDURE ASSESSMENT III: Performed by: NEUROLOGICAL SURGERY

## 2017-02-08 PROCEDURE — 12000008 ZZH R&B INTERMEDIATE UMMC

## 2017-02-08 PROCEDURE — 25000125 ZZHC RX 250: Performed by: NEUROLOGICAL SURGERY

## 2017-02-08 PROCEDURE — 8E09XBG COMPUTER ASSISTED PROCEDURE OF HEAD AND NECK REGION, WITH COMPUTERIZED TOMOGRAPHY: ICD-10-PCS | Performed by: NEUROLOGICAL SURGERY

## 2017-02-08 PROCEDURE — 93005 ELECTROCARDIOGRAM TRACING: CPT

## 2017-02-08 PROCEDURE — 25000128 H RX IP 250 OP 636: Performed by: STUDENT IN AN ORGANIZED HEALTH CARE EDUCATION/TRAINING PROGRAM

## 2017-02-08 PROCEDURE — 37000008 ZZH ANESTHESIA TECHNICAL FEE, 1ST 30 MIN: Performed by: NEUROLOGICAL SURGERY

## 2017-02-08 DEVICE — IMP SCR SYN MATRIX LOW PRO 1.5X04MM SELF DRILL 04.503.104.01: Type: IMPLANTABLE DEVICE | Site: SKULL | Status: FUNCTIONAL

## 2017-02-08 DEVICE — IMP BUR HOLE COVER FOR SHUNT 17MM LOW PROFILE TI 421.554: Type: IMPLANTABLE DEVICE | Site: SKULL | Status: FUNCTIONAL

## 2017-02-08 DEVICE — SHUNT CATH VENTRICULAR BACTISEAL 82-3073: Type: IMPLANTABLE DEVICE | Site: BRAIN | Status: FUNCTIONAL

## 2017-02-08 DEVICE — SHUNT CATH PERITONEAL BACTISEAL 82-3074: Type: IMPLANTABLE DEVICE | Site: BRAIN | Status: FUNCTIONAL

## 2017-02-08 DEVICE — SHUNT VALVE STRATA ADJ REGULAR 42866: Type: IMPLANTABLE DEVICE | Site: BRAIN | Status: FUNCTIONAL

## 2017-02-08 RX ORDER — ACETAMINOPHEN 325 MG/1
975 TABLET ORAL EVERY 8 HOURS
Status: DISCONTINUED | OUTPATIENT
Start: 2017-02-08 | End: 2017-02-10 | Stop reason: HOSPADM

## 2017-02-08 RX ORDER — SODIUM CHLORIDE, SODIUM LACTATE, POTASSIUM CHLORIDE, CALCIUM CHLORIDE 600; 310; 30; 20 MG/100ML; MG/100ML; MG/100ML; MG/100ML
INJECTION, SOLUTION INTRAVENOUS CONTINUOUS
Status: DISCONTINUED | OUTPATIENT
Start: 2017-02-08 | End: 2017-02-08 | Stop reason: HOSPADM

## 2017-02-08 RX ORDER — ONDANSETRON 2 MG/ML
INJECTION INTRAMUSCULAR; INTRAVENOUS PRN
Status: DISCONTINUED | OUTPATIENT
Start: 2017-02-08 | End: 2017-02-08

## 2017-02-08 RX ORDER — NEOSTIGMINE METHYLSULFATE 1 MG/ML
VIAL (ML) INJECTION PRN
Status: DISCONTINUED | OUTPATIENT
Start: 2017-02-08 | End: 2017-02-08

## 2017-02-08 RX ORDER — SODIUM CHLORIDE 9 MG/ML
INJECTION, SOLUTION INTRAVENOUS CONTINUOUS
Status: DISCONTINUED | OUTPATIENT
Start: 2017-02-08 | End: 2017-02-09

## 2017-02-08 RX ORDER — FENTANYL CITRATE 50 UG/ML
INJECTION, SOLUTION INTRAMUSCULAR; INTRAVENOUS PRN
Status: DISCONTINUED | OUTPATIENT
Start: 2017-02-08 | End: 2017-02-08

## 2017-02-08 RX ORDER — OXYCODONE HYDROCHLORIDE 5 MG/1
5-10 TABLET ORAL EVERY 4 HOURS PRN
Status: DISCONTINUED | OUTPATIENT
Start: 2017-02-08 | End: 2017-02-10 | Stop reason: HOSPADM

## 2017-02-08 RX ORDER — ONDANSETRON 4 MG/1
4 TABLET, ORALLY DISINTEGRATING ORAL EVERY 6 HOURS PRN
Status: DISCONTINUED | OUTPATIENT
Start: 2017-02-08 | End: 2017-02-08

## 2017-02-08 RX ORDER — ONDANSETRON 4 MG/1
4 TABLET, ORALLY DISINTEGRATING ORAL EVERY 30 MIN PRN
Status: DISCONTINUED | OUTPATIENT
Start: 2017-02-08 | End: 2017-02-08 | Stop reason: HOSPADM

## 2017-02-08 RX ORDER — AMOXICILLIN 250 MG
1-2 CAPSULE ORAL 2 TIMES DAILY
Status: DISCONTINUED | OUTPATIENT
Start: 2017-02-08 | End: 2017-02-10 | Stop reason: HOSPADM

## 2017-02-08 RX ORDER — FENTANYL CITRATE 50 UG/ML
25-50 INJECTION, SOLUTION INTRAMUSCULAR; INTRAVENOUS
Status: DISCONTINUED | OUTPATIENT
Start: 2017-02-08 | End: 2017-02-08 | Stop reason: HOSPADM

## 2017-02-08 RX ORDER — LIDOCAINE HYDROCHLORIDE 20 MG/ML
INJECTION, SOLUTION INFILTRATION; PERINEURAL PRN
Status: DISCONTINUED | OUTPATIENT
Start: 2017-02-08 | End: 2017-02-08

## 2017-02-08 RX ORDER — ONDANSETRON 2 MG/ML
4 INJECTION INTRAMUSCULAR; INTRAVENOUS EVERY 6 HOURS PRN
Status: DISCONTINUED | OUTPATIENT
Start: 2017-02-08 | End: 2017-02-08

## 2017-02-08 RX ORDER — CEFAZOLIN SODIUM 1 G/3ML
1 INJECTION, POWDER, FOR SOLUTION INTRAMUSCULAR; INTRAVENOUS SEE ADMIN INSTRUCTIONS
Status: DISCONTINUED | OUTPATIENT
Start: 2017-02-08 | End: 2017-02-08 | Stop reason: HOSPADM

## 2017-02-08 RX ORDER — ONDANSETRON 2 MG/ML
4 INJECTION INTRAMUSCULAR; INTRAVENOUS EVERY 30 MIN PRN
Status: DISCONTINUED | OUTPATIENT
Start: 2017-02-08 | End: 2017-02-08 | Stop reason: HOSPADM

## 2017-02-08 RX ORDER — PROPOFOL 10 MG/ML
INJECTION, EMULSION INTRAVENOUS PRN
Status: DISCONTINUED | OUTPATIENT
Start: 2017-02-08 | End: 2017-02-08

## 2017-02-08 RX ORDER — LIDOCAINE 40 MG/G
CREAM TOPICAL
Status: DISCONTINUED | OUTPATIENT
Start: 2017-02-08 | End: 2017-02-10 | Stop reason: HOSPADM

## 2017-02-08 RX ORDER — LIDOCAINE HYDROCHLORIDE AND EPINEPHRINE 10; 10 MG/ML; UG/ML
INJECTION, SOLUTION INFILTRATION; PERINEURAL PRN
Status: DISCONTINUED | OUTPATIENT
Start: 2017-02-08 | End: 2017-02-08 | Stop reason: HOSPADM

## 2017-02-08 RX ORDER — SODIUM CHLORIDE, SODIUM LACTATE, POTASSIUM CHLORIDE, CALCIUM CHLORIDE 600; 310; 30; 20 MG/100ML; MG/100ML; MG/100ML; MG/100ML
INJECTION, SOLUTION INTRAVENOUS CONTINUOUS PRN
Status: DISCONTINUED | OUTPATIENT
Start: 2017-02-08 | End: 2017-02-08

## 2017-02-08 RX ORDER — HYDRALAZINE HYDROCHLORIDE 20 MG/ML
2.5-5 INJECTION INTRAMUSCULAR; INTRAVENOUS EVERY 10 MIN PRN
Status: DISCONTINUED | OUTPATIENT
Start: 2017-02-08 | End: 2017-02-08 | Stop reason: HOSPADM

## 2017-02-08 RX ORDER — ACETAMINOPHEN 325 MG/1
650 TABLET ORAL EVERY 4 HOURS PRN
Status: DISCONTINUED | OUTPATIENT
Start: 2017-02-11 | End: 2017-02-10 | Stop reason: HOSPADM

## 2017-02-08 RX ORDER — PROCHLORPERAZINE MALEATE 5 MG
5 TABLET ORAL EVERY 6 HOURS PRN
Status: DISCONTINUED | OUTPATIENT
Start: 2017-02-08 | End: 2017-02-08

## 2017-02-08 RX ORDER — DEXAMETHASONE SODIUM PHOSPHATE 4 MG/ML
INJECTION, SOLUTION INTRA-ARTICULAR; INTRALESIONAL; INTRAMUSCULAR; INTRAVENOUS; SOFT TISSUE PRN
Status: DISCONTINUED | OUTPATIENT
Start: 2017-02-08 | End: 2017-02-08

## 2017-02-08 RX ORDER — CEFAZOLIN SODIUM 2 G/100ML
2 INJECTION, SOLUTION INTRAVENOUS
Status: COMPLETED | OUTPATIENT
Start: 2017-02-08 | End: 2017-02-08

## 2017-02-08 RX ORDER — GLYCOPYRROLATE 0.2 MG/ML
INJECTION, SOLUTION INTRAMUSCULAR; INTRAVENOUS PRN
Status: DISCONTINUED | OUTPATIENT
Start: 2017-02-08 | End: 2017-02-08

## 2017-02-08 RX ORDER — LABETALOL HYDROCHLORIDE 5 MG/ML
10 INJECTION, SOLUTION INTRAVENOUS
Status: COMPLETED | OUTPATIENT
Start: 2017-02-08 | End: 2017-02-08

## 2017-02-08 RX ORDER — NALOXONE HYDROCHLORIDE 0.4 MG/ML
.1-.4 INJECTION, SOLUTION INTRAMUSCULAR; INTRAVENOUS; SUBCUTANEOUS
Status: DISCONTINUED | OUTPATIENT
Start: 2017-02-08 | End: 2017-02-08

## 2017-02-08 RX ADMIN — ROCURONIUM BROMIDE 40 MG: 10 INJECTION INTRAVENOUS at 11:09

## 2017-02-08 RX ADMIN — FENTANYL CITRATE 75 MCG: 50 INJECTION, SOLUTION INTRAMUSCULAR; INTRAVENOUS at 11:09

## 2017-02-08 RX ADMIN — PHENOL 1 ML: 1.5 LIQUID ORAL at 23:28

## 2017-02-08 RX ADMIN — LAMOTRIGINE 100 MG: 100 TABLET ORAL at 21:42

## 2017-02-08 RX ADMIN — FLUOXETINE 40 MG: 20 CAPSULE ORAL at 07:43

## 2017-02-08 RX ADMIN — LEVOTHYROXINE SODIUM 75 MCG: 75 TABLET ORAL at 07:43

## 2017-02-08 RX ADMIN — RANITIDINE HYDROCHLORIDE 150 MG: 150 TABLET, FILM COATED ORAL at 07:43

## 2017-02-08 RX ADMIN — FLUTICASONE PROPIONATE 2 PUFF: 110 AEROSOL, METERED RESPIRATORY (INHALATION) at 07:44

## 2017-02-08 RX ADMIN — CEFAZOLIN SODIUM 2 G: 2 INJECTION, SOLUTION INTRAVENOUS at 11:45

## 2017-02-08 RX ADMIN — FENTANYL CITRATE 25 MCG: 50 INJECTION, SOLUTION INTRAMUSCULAR; INTRAVENOUS at 12:13

## 2017-02-08 RX ADMIN — FOLIC ACID 1 MG: 1 TABLET ORAL at 07:43

## 2017-02-08 RX ADMIN — AMLODIPINE BESYLATE 10 MG: 10 TABLET ORAL at 07:43

## 2017-02-08 RX ADMIN — FENTANYL CITRATE 50 MCG: 50 INJECTION, SOLUTION INTRAMUSCULAR; INTRAVENOUS at 13:25

## 2017-02-08 RX ADMIN — FLUTICASONE PROPIONATE 2 PUFF: 110 AEROSOL, METERED RESPIRATORY (INHALATION) at 23:26

## 2017-02-08 RX ADMIN — THERA TABS 1 TABLET: TAB at 07:43

## 2017-02-08 RX ADMIN — LAMOTRIGINE 150 MG: 150 TABLET ORAL at 07:43

## 2017-02-08 RX ADMIN — LABETALOL HYDROCHLORIDE 10 MG: 5 INJECTION, SOLUTION INTRAVENOUS at 14:13

## 2017-02-08 RX ADMIN — PHENOL 1 ML: 1.5 LIQUID ORAL at 21:43

## 2017-02-08 RX ADMIN — FENTANYL CITRATE 50 MCG: 50 INJECTION, SOLUTION INTRAMUSCULAR; INTRAVENOUS at 13:10

## 2017-02-08 RX ADMIN — TIOTROPIUM BROMIDE 18 MCG: 18 CAPSULE ORAL; RESPIRATORY (INHALATION) at 07:44

## 2017-02-08 RX ADMIN — RANITIDINE HYDROCHLORIDE 150 MG: 150 TABLET, FILM COATED ORAL at 21:43

## 2017-02-08 RX ADMIN — PROPOFOL 150 MG: 10 INJECTION, EMULSION INTRAVENOUS at 11:09

## 2017-02-08 RX ADMIN — DEXAMETHASONE SODIUM PHOSPHATE 6 MG: 4 INJECTION, SOLUTION INTRAMUSCULAR; INTRAVENOUS at 11:45

## 2017-02-08 RX ADMIN — OXYCODONE HYDROCHLORIDE 10 MG: 5 TABLET ORAL at 21:43

## 2017-02-08 RX ADMIN — SODIUM CHLORIDE: 9 INJECTION, SOLUTION INTRAVENOUS at 14:58

## 2017-02-08 RX ADMIN — BUPROPION HYDROCHLORIDE 150 MG: 150 TABLET, FILM COATED, EXTENDED RELEASE ORAL at 07:44

## 2017-02-08 RX ADMIN — ONDANSETRON 4 MG: 2 INJECTION INTRAMUSCULAR; INTRAVENOUS at 11:45

## 2017-02-08 RX ADMIN — ROCURONIUM BROMIDE 20 MG: 10 INJECTION INTRAVENOUS at 11:56

## 2017-02-08 RX ADMIN — ACETAMINOPHEN 975 MG: 325 TABLET, FILM COATED ORAL at 16:48

## 2017-02-08 RX ADMIN — PHENYLEPHRINE HYDROCHLORIDE 100 MCG: 10 INJECTION, SOLUTION INTRAMUSCULAR; INTRAVENOUS; SUBCUTANEOUS at 11:59

## 2017-02-08 RX ADMIN — GLYCOPYRROLATE 0.8 MG: 0.2 INJECTION, SOLUTION INTRAMUSCULAR; INTRAVENOUS at 12:57

## 2017-02-08 RX ADMIN — Medication 4 MG: at 12:57

## 2017-02-08 RX ADMIN — ARIPIPRAZOLE 5 MG: 5 TABLET ORAL at 21:42

## 2017-02-08 RX ADMIN — DONEPEZIL HYDROCHLORIDE 10 MG: 10 TABLET, FILM COATED ORAL at 21:42

## 2017-02-08 RX ADMIN — SENNOSIDES AND DOCUSATE SODIUM 1 TABLET: 8.6; 5 TABLET ORAL at 21:43

## 2017-02-08 RX ADMIN — OXYCODONE HYDROCHLORIDE 10 MG: 5 TABLET ORAL at 16:48

## 2017-02-08 RX ADMIN — FENTANYL CITRATE 50 MCG: 50 INJECTION, SOLUTION INTRAMUSCULAR; INTRAVENOUS at 12:30

## 2017-02-08 RX ADMIN — LIDOCAINE HYDROCHLORIDE 80 MG: 20 INJECTION, SOLUTION INFILTRATION; PERINEURAL at 11:09

## 2017-02-08 RX ADMIN — SODIUM CHLORIDE, POTASSIUM CHLORIDE, SODIUM LACTATE AND CALCIUM CHLORIDE: 600; 310; 30; 20 INJECTION, SOLUTION INTRAVENOUS at 13:25

## 2017-02-08 RX ADMIN — SODIUM CHLORIDE, POTASSIUM CHLORIDE, SODIUM LACTATE AND CALCIUM CHLORIDE: 600; 310; 30; 20 INJECTION, SOLUTION INTRAVENOUS at 10:51

## 2017-02-08 ASSESSMENT — VISUAL ACUITY
OU: BASELINE
OU: NORMAL ACUITY
OU: BASELINE
OU: NORMAL ACUITY
OU: NORMAL ACUITY
OU: BASELINE
OU: BASELINE

## 2017-02-08 NOTE — PROGRESS NOTES
Neurosurgery Brief Note    Removed lumbar drain in operating room after completion of ventriculoperitoneal shunt placement. Absorbable suture placed.     Deanna Hayward MD

## 2017-02-08 NOTE — PLAN OF CARE
Problem: Goal Outcome Summary  Goal: Goal Outcome Summary  Outcome: No Change  VSS. Neuro's intact aside from mild R sided wkns. LD at 5cm above shoulder; outputs ranging 7-20 mL of clear drainage; dressing c/d/i. No c/o pain. PIV SL. NPO pending shunt placement. Voiding spontaneously; occasionally incont; brief in place. Up with assist of 1 GB and walker. Sent to  for shunt placement at 0930.     Arrived from PACU 1515. VSS; 2 L NC; incont upon arrival. Brief changed. Report given to on-coming nurse

## 2017-02-08 NOTE — PLAN OF CARE
Problem: Goal Outcome Summary  Goal: Goal Outcome Summary  PT 6A: Cancel. Pt in OR most of day and unavailable to be seen.  Will reschedule tomorrow.

## 2017-02-08 NOTE — PLAN OF CARE
Problem: Goal Outcome Summary  Goal: Goal Outcome Summary  OT 6A: Cancel. Patient off floor at procedure throughout day, will reschedule OT treatment for tomorrow.

## 2017-02-08 NOTE — ANESTHESIA POSTPROCEDURE EVALUATION
Patient: Amparo Sharma    Procedure(s):  Stealth Assisted Right Frontal Ventriculoperitoneal Shunt,removal of lumbar drain - Wound Class: I-Clean    Diagnosis:Normal Pressure Hydrocephalus   Diagnosis Additional Information: No value filed.    Anesthesia Type:  General    Note:  Anesthesia Post Evaluation    Patient location during evaluation: PACU  Patient participation: Able to fully participate in evaluation  Level of consciousness: awake and alert  Pain management: adequate  Airway patency: patent  Cardiovascular status: acceptable and hemodynamically stable  Respiratory status: acceptable and spontaneous ventilation  Hydration status: acceptable  PONV: none     Anesthetic complications: None          Last vitals:  Filed Vitals:    02/08/17 0742 02/08/17 1338 02/08/17 1345   BP: 138/78 198/112 178/102   Pulse: 74     Temp: 36.9  C (98.4  F) 36.4  C (97.5  F)    Resp: 16 14 16   SpO2: 97% 92% 100%         Electronically Signed By: Nano Grullon MD  February 8, 2017  2:07 PM

## 2017-02-08 NOTE — PROGRESS NOTES
[facility], [place]   Neurosurgery Daily Note          Assessment and Plan:   The patient is admitted from Dr. Dozier's clinic for NPH workup; Neuropsychiatry testing suggests cognitive impairment that may be on the spectrum of NPH. PPD #3 for lumbar drain placement today, for NPH trial.       - neuropsych testing - cognition largely unchanged, but PT shows improvement and improved mobility  - OR today         Interval History:   No acute events overnight              Review of Systems:   The Review of Systems is otherwise negative beyond that which has been previously stated.          Medications:   I have reviewed this patient's current medications.    Current Facility-Administered Medications   Medication Dose Route Frequency     ceFAZolin  2 g Intravenous Pre-Op/Pre-procedure x 1 dose     ceFAZolin  1 g Intravenous See Admin Instructions     multivitamin, therapeutic  1 tablet Oral Daily     sodium chloride (PF)  3 mL Intravenous Q8H     ranitidine  150 mg Oral BID     amLODIPine (NORVASC) tablet 10 mg  10 mg Oral QAM     buPROPion (WELLBUTRIN XL) 24 hr tablet 150 mg  150 mg Oral QAM     donepezil (ARICEPT) tablet 10 mg  10 mg Oral At Bedtime     FLUoxetine (PROzac) capsule 40 mg  40 mg Oral QAM     fluticasone  2 puff Inhalation Q12H     folic acid (FOLVITE) tablet 1 mg  1 mg Oral QAM     lamoTRIgine (LaMICtal) tablet 100 mg  100 mg Oral QPM     levothyroxine  75 mcg Oral QAM     tiotropium  18 mcg Inhalation Daily     lamoTRIgine  150 mg Oral QAM     ARIPiprazole  5 mg Oral At Bedtime        Physical Exam:  General:   Comfortable respiratory effort; normal cardiac rhythm.     Mental Status:   awake, alert and oriented x3.   fluent, communicative, intact comprehension.    Cranial Nerves:  equal and reactive pupils   extraocular movements preserved   no dysarthria.     Motor/Sensory:  5/5 strength throughout upper and lower extremities.  No deficits to pain, soft touch.     Reflexes:  symmetric          Data:   The labs and imaging for this patient have been reviewed directly.

## 2017-02-08 NOTE — ANESTHESIA CARE TRANSFER NOTE
Patient: Amparo Sharma    Procedure(s):  Stealth Assisted Right Frontal Ventriculoperitoneal Shunt,removal of lumbar drain - Wound Class: I-Clean    Diagnosis: Normal Pressure Hydrocephalus   Diagnosis Additional Information: No value filed.    Anesthesia Type:   General     Note:  Airway :Face Mask  Patient transferred to:PACU  Comments: Pt denies pain or nausea. Report given to RN.       Vitals: (Last set prior to Anesthesia Care Transfer)    CRNA VITALS  2/8/2017 1303 - 2/8/2017 1340      2/8/2017             Pulse: 114    SpO2: 98 %    Resp Rate (observed): (!) 6                Electronically Signed By: KASANDRA Mera CRNA  February 8, 2017  1:40 PM

## 2017-02-08 NOTE — BRIEF OP NOTE
[facility], [place]    Brief Operative Note    Pre-operative diagnosis: Normal Pressure Hydrocephalus   Post-operative diagnosis Normal Pressure Hydrocephalus  Procedure: Procedure(s):  Stealth Assisted Right Frontal Ventriculoperitoneal Shunt  - Wound Class: I-Clean  Surgeon: Surgeon(s) and Role:     * Rc Dozier MD - Primary     * Deanna Hayward MD - Resident - Assisting     * Emeka Godoy MD - Resident - Assisting  Anesthesia: General   Estimated blood loss: 20 ml  Drains: None  Specimens: None  Findings:   None.  Complications: None.  Implants: Codman Bactiseal  shunt catheter and Medtronic strata valve set at 1.0.

## 2017-02-08 NOTE — ANESTHESIA PREPROCEDURE EVALUATION
Anesthesia Evaluation     . Pt has had prior anesthetic. Type: General and MAC      ROS/MED HX    ENT/Pulmonary:       Neurologic:     (+)dementia, other neuro hydrocephalus, fall history    Cardiovascular:     (+) hypertension----. : . . . :. .       METS/Exercise Tolerance:     Hematologic:     (+) History of Transfusion no previous transfusion reaction -      Musculoskeletal:         GI/Hepatic:     (+) GERD Asymptomatic on medication,       Renal/Genitourinary:         Endo:     (+) thyroid problem hypothyroidism, .      Psychiatric:     (+) psychiatric history other (comment)      Infectious Disease:   (+) VRE,       Malignancy:         Other:               Physical Exam  Normal systems: cardiovascular, pulmonary and dental    Airway   Mallampati: II  TM distance: >3 FB  Neck ROM: full    Dental     Cardiovascular       Pulmonary                     Anesthesia Plan      History & Physical Review  History and physical reviewed and following examination; no interval change.    ASA Status:  2 .    NPO Status:  > 8 hours    Plan for General with Intravenous and Propofol induction. Maintenance will be Balanced.    PONV prophylaxis:  Ondansetron (or other 5HT-3) and Dexamethasone or Solumedrol  Additional equipment: 2nd IV      Postoperative Care      Consents                          .

## 2017-02-08 NOTE — PLAN OF CARE
Problem: Goal Outcome Summary  Goal: Goal Outcome Summary  Outcome: No Change  AVSS, did not need O2 overnight. A&Ox4. Neuros intact besides right generalized weakness. Pt stated strength is improving. LD open at 5cm above shoulder with clear output ranging from 0-17ml/hr. Please see previous note. LD dressing CDI. Denied any HA pain overnight. Up with A1to pivot to commode, when up ambulating needs walker and GB. IV SL. Has been NPO since midnight for shunt placement today. Voiding spon, occasional incontinence, brief on. Scrub, EKG, and chest Xray completed this AM. Awaiting transport to go to CT and have fiducials placed. OR will get pt around 0900. Pulse ox and BA on. Continue with POC.    .

## 2017-02-09 ENCOUNTER — APPOINTMENT (OUTPATIENT)
Dept: GENERAL RADIOLOGY | Facility: CLINIC | Age: 77
DRG: 033 | End: 2017-02-09
Attending: NEUROLOGICAL SURGERY
Payer: MEDICARE

## 2017-02-09 ENCOUNTER — APPOINTMENT (OUTPATIENT)
Dept: PHYSICAL THERAPY | Facility: CLINIC | Age: 77
DRG: 033 | End: 2017-02-09
Attending: NEUROLOGICAL SURGERY
Payer: MEDICARE

## 2017-02-09 ENCOUNTER — APPOINTMENT (OUTPATIENT)
Dept: OCCUPATIONAL THERAPY | Facility: CLINIC | Age: 77
DRG: 033 | End: 2017-02-09
Attending: NEUROLOGICAL SURGERY
Payer: MEDICARE

## 2017-02-09 LAB
ANION GAP SERPL CALCULATED.3IONS-SCNC: 9 MMOL/L (ref 3–14)
APTT PPP: 30 SEC (ref 22–37)
BASOPHILS # BLD AUTO: 0.1 10E9/L (ref 0–0.2)
BASOPHILS NFR BLD AUTO: 0.5 %
BUN SERPL-MCNC: 19 MG/DL (ref 7–30)
CALCIUM SERPL-MCNC: 9 MG/DL (ref 8.5–10.1)
CHLORIDE SERPL-SCNC: 100 MMOL/L (ref 94–109)
CO2 SERPL-SCNC: 26 MMOL/L (ref 20–32)
CREAT SERPL-MCNC: 0.82 MG/DL (ref 0.52–1.04)
DIFFERENTIAL METHOD BLD: ABNORMAL
EOSINOPHIL # BLD AUTO: 0.1 10E9/L (ref 0–0.7)
EOSINOPHIL NFR BLD AUTO: 0.9 %
ERYTHROCYTE [DISTWIDTH] IN BLOOD BY AUTOMATED COUNT: 15.9 % (ref 10–15)
GFR SERPL CREATININE-BSD FRML MDRD: 68 ML/MIN/1.7M2
GLUCOSE SERPL-MCNC: 100 MG/DL (ref 70–99)
HCT VFR BLD AUTO: 37.6 % (ref 35–47)
HGB BLD-MCNC: 11.6 G/DL (ref 11.7–15.7)
IMM GRANULOCYTES # BLD: 0 10E9/L (ref 0–0.4)
IMM GRANULOCYTES NFR BLD: 0.3 %
INR PPP: 1.12 (ref 0.86–1.14)
LYMPHOCYTES # BLD AUTO: 1.4 10E9/L (ref 0.8–5.3)
LYMPHOCYTES NFR BLD AUTO: 11.6 %
MAGNESIUM SERPL-MCNC: 2 MG/DL (ref 1.6–2.3)
MCH RBC QN AUTO: 27.4 PG (ref 26.5–33)
MCHC RBC AUTO-ENTMCNC: 30.9 G/DL (ref 31.5–36.5)
MCV RBC AUTO: 89 FL (ref 78–100)
MONOCYTES # BLD AUTO: 1.5 10E9/L (ref 0–1.3)
MONOCYTES NFR BLD AUTO: 12.9 %
NEUTROPHILS # BLD AUTO: 8.6 10E9/L (ref 1.6–8.3)
NEUTROPHILS NFR BLD AUTO: 73.8 %
NRBC # BLD AUTO: 0 10*3/UL
NRBC BLD AUTO-RTO: 0 /100
PHOSPHATE SERPL-MCNC: 3.3 MG/DL (ref 2.5–4.5)
PLATELET # BLD AUTO: 294 10E9/L (ref 150–450)
POTASSIUM SERPL-SCNC: 4 MMOL/L (ref 3.4–5.3)
RBC # BLD AUTO: 4.23 10E12/L (ref 3.8–5.2)
SODIUM SERPL-SCNC: 135 MMOL/L (ref 133–144)
WBC # BLD AUTO: 11.6 10E9/L (ref 4–11)

## 2017-02-09 PROCEDURE — 85025 COMPLETE CBC W/AUTO DIFF WBC: CPT | Performed by: STUDENT IN AN ORGANIZED HEALTH CARE EDUCATION/TRAINING PROGRAM

## 2017-02-09 PROCEDURE — 12000008 ZZH R&B INTERMEDIATE UMMC

## 2017-02-09 PROCEDURE — A9270 NON-COVERED ITEM OR SERVICE: HCPCS | Mod: GY | Performed by: STUDENT IN AN ORGANIZED HEALTH CARE EDUCATION/TRAINING PROGRAM

## 2017-02-09 PROCEDURE — 85730 THROMBOPLASTIN TIME PARTIAL: CPT | Performed by: STUDENT IN AN ORGANIZED HEALTH CARE EDUCATION/TRAINING PROGRAM

## 2017-02-09 PROCEDURE — 80048 BASIC METABOLIC PNL TOTAL CA: CPT | Performed by: STUDENT IN AN ORGANIZED HEALTH CARE EDUCATION/TRAINING PROGRAM

## 2017-02-09 PROCEDURE — 97530 THERAPEUTIC ACTIVITIES: CPT | Mod: GP

## 2017-02-09 PROCEDURE — 97116 GAIT TRAINING THERAPY: CPT | Mod: GP

## 2017-02-09 PROCEDURE — 70250 X-RAY EXAM OF SKULL: CPT

## 2017-02-09 PROCEDURE — 40000133 ZZH STATISTIC OT WARD VISIT: Performed by: OCCUPATIONAL THERAPIST

## 2017-02-09 PROCEDURE — 97535 SELF CARE MNGMENT TRAINING: CPT | Mod: GO | Performed by: OCCUPATIONAL THERAPIST

## 2017-02-09 PROCEDURE — 83735 ASSAY OF MAGNESIUM: CPT | Performed by: STUDENT IN AN ORGANIZED HEALTH CARE EDUCATION/TRAINING PROGRAM

## 2017-02-09 PROCEDURE — 97530 THERAPEUTIC ACTIVITIES: CPT | Mod: GO | Performed by: OCCUPATIONAL THERAPIST

## 2017-02-09 PROCEDURE — 36415 COLL VENOUS BLD VENIPUNCTURE: CPT | Performed by: STUDENT IN AN ORGANIZED HEALTH CARE EDUCATION/TRAINING PROGRAM

## 2017-02-09 PROCEDURE — 74020 XR SHUNT MALFUNCTION SURVEY: CPT

## 2017-02-09 PROCEDURE — 40000193 ZZH STATISTIC PT WARD VISIT

## 2017-02-09 PROCEDURE — 84100 ASSAY OF PHOSPHORUS: CPT | Performed by: STUDENT IN AN ORGANIZED HEALTH CARE EDUCATION/TRAINING PROGRAM

## 2017-02-09 PROCEDURE — 85610 PROTHROMBIN TIME: CPT | Performed by: STUDENT IN AN ORGANIZED HEALTH CARE EDUCATION/TRAINING PROGRAM

## 2017-02-09 PROCEDURE — 25000132 ZZH RX MED GY IP 250 OP 250 PS 637: Mod: GY | Performed by: STUDENT IN AN ORGANIZED HEALTH CARE EDUCATION/TRAINING PROGRAM

## 2017-02-09 RX ORDER — OXYCODONE HYDROCHLORIDE 5 MG/1
5-10 TABLET ORAL EVERY 4 HOURS PRN
Qty: 50 TABLET | Refills: 0 | Status: SHIPPED | OUTPATIENT
Start: 2017-02-09 | End: 2017-02-10

## 2017-02-09 RX ORDER — ONDANSETRON 4 MG/1
4 TABLET, ORALLY DISINTEGRATING ORAL EVERY 6 HOURS PRN
Qty: 40 TABLET | Refills: 0 | Status: SHIPPED
Start: 2017-02-09 | End: 2022-04-08

## 2017-02-09 RX ORDER — AMOXICILLIN 250 MG
1-2 CAPSULE ORAL 2 TIMES DAILY
Qty: 40 TABLET | Refills: 0 | Status: SHIPPED
Start: 2017-02-09 | End: 2017-02-27

## 2017-02-09 RX ADMIN — PHENOL 1 ML: 1.5 LIQUID ORAL at 06:34

## 2017-02-09 RX ADMIN — LEVOTHYROXINE SODIUM 75 MCG: 75 TABLET ORAL at 07:50

## 2017-02-09 RX ADMIN — PHENOL 1 ML: 1.5 LIQUID ORAL at 02:05

## 2017-02-09 RX ADMIN — PHENOL 1 ML: 1.5 LIQUID ORAL at 04:41

## 2017-02-09 RX ADMIN — FLUOXETINE 40 MG: 20 CAPSULE ORAL at 07:49

## 2017-02-09 RX ADMIN — OXYCODONE HYDROCHLORIDE 10 MG: 5 TABLET ORAL at 06:34

## 2017-02-09 RX ADMIN — RANITIDINE HYDROCHLORIDE 150 MG: 150 TABLET, FILM COATED ORAL at 07:49

## 2017-02-09 RX ADMIN — ACETAMINOPHEN 975 MG: 325 TABLET, FILM COATED ORAL at 14:56

## 2017-02-09 RX ADMIN — ACETAMINOPHEN 975 MG: 325 TABLET, FILM COATED ORAL at 02:05

## 2017-02-09 RX ADMIN — FLUTICASONE PROPIONATE 2 PUFF: 110 AEROSOL, METERED RESPIRATORY (INHALATION) at 21:30

## 2017-02-09 RX ADMIN — FOLIC ACID 1 MG: 1 TABLET ORAL at 07:49

## 2017-02-09 RX ADMIN — DONEPEZIL HYDROCHLORIDE 10 MG: 10 TABLET, FILM COATED ORAL at 21:30

## 2017-02-09 RX ADMIN — RANITIDINE HYDROCHLORIDE 150 MG: 150 TABLET, FILM COATED ORAL at 21:26

## 2017-02-09 RX ADMIN — LAMOTRIGINE 100 MG: 100 TABLET ORAL at 21:25

## 2017-02-09 RX ADMIN — OXYCODONE HYDROCHLORIDE 10 MG: 5 TABLET ORAL at 02:05

## 2017-02-09 RX ADMIN — ARIPIPRAZOLE 5 MG: 5 TABLET ORAL at 21:26

## 2017-02-09 RX ADMIN — BUPROPION HYDROCHLORIDE 150 MG: 150 TABLET, FILM COATED, EXTENDED RELEASE ORAL at 07:49

## 2017-02-09 RX ADMIN — AMLODIPINE BESYLATE 10 MG: 10 TABLET ORAL at 07:49

## 2017-02-09 RX ADMIN — OXYCODONE HYDROCHLORIDE 5 MG: 5 TABLET ORAL at 17:39

## 2017-02-09 RX ADMIN — LAMOTRIGINE 150 MG: 150 TABLET ORAL at 07:49

## 2017-02-09 RX ADMIN — THERA TABS 1 TABLET: TAB at 07:50

## 2017-02-09 RX ADMIN — ACETAMINOPHEN 975 MG: 325 TABLET, FILM COATED ORAL at 07:49

## 2017-02-09 RX ADMIN — PHENOL 1 ML: 1.5 LIQUID ORAL at 21:33

## 2017-02-09 ASSESSMENT — VISUAL ACUITY
OU: BASELINE
OU: BASELINE

## 2017-02-09 NOTE — PROGRESS NOTES
[facility], [place]   Neurosurgery Daily Note          Assessment and Plan:   The patient is admitted from Dr. Dozier's clinic for NPH workup; Neuropsychiatry testing suggests cognitive impairment that may be on the spectrum of NPH. Lumbar drain was converted to a  shunt on 2/8.        - monitor neurologic status post op  - shunt series  - follow up in 2 weeks for suture removal and 1 month with repeat HCT  - advance activity, diet, ensure voiding, pain control         Interval History:   No acute events overnight              Review of Systems:   The Review of Systems is otherwise negative beyond that which has been previously stated.          Medications:   I have reviewed this patient's current medications.    Current Facility-Administered Medications   Medication Dose Route Frequency     acetaminophen  975 mg Oral Q8H     senna-docusate  1-2 tablet Oral BID     multivitamin, therapeutic  1 tablet Oral Daily     sodium chloride (PF)  3 mL Intravenous Q8H     ranitidine  150 mg Oral BID     amLODIPine (NORVASC) tablet 10 mg  10 mg Oral QAM     buPROPion (WELLBUTRIN XL) 24 hr tablet 150 mg  150 mg Oral QAM     donepezil (ARICEPT) tablet 10 mg  10 mg Oral At Bedtime     FLUoxetine (PROzac) capsule 40 mg  40 mg Oral QAM     fluticasone  2 puff Inhalation Q12H     folic acid (FOLVITE) tablet 1 mg  1 mg Oral QAM     lamoTRIgine (LaMICtal) tablet 100 mg  100 mg Oral QPM     levothyroxine  75 mcg Oral QAM     tiotropium  18 mcg Inhalation Daily     lamoTRIgine  150 mg Oral QAM     ARIPiprazole  5 mg Oral At Bedtime        Physical Exam:  General:   Comfortable respiratory effort; normal cardiac rhythm.     Mental Status:   awake, alert and oriented x3.   fluent, communicative, intact comprehension.    Cranial Nerves:  equal and reactive pupils   extraocular movements preserved   no dysarthria.     Motor/Sensory:  5/5 strength throughout upper and lower extremities.  No deficits to pain,  soft touch.     Reflexes: symmetric          Data:   The labs and imaging for this patient have been reviewed directly.

## 2017-02-09 NOTE — PLAN OF CARE
Problem: Pain, Acute (Adult)  Goal: Identify Related Risk Factors and Signs and Symptoms  Related risk factors and signs and symptoms are identified upon initiation of Human Response Clinical Practice Guideline (CPG)   VSS, pt requiring 1-2L O2 to keeps sats above 90%. Pain controlled with tylenol. Neuros unchanged; generalized weakness. Head, abd and back dressing CDI. Good po intake. Last scanned bladder for 50 ml at 1130, pt will try and void in the next half hour. Had BM yesterday. Up with 1, gb and walker. Walked halls with therapy, sat in chair most of day. Good po intake. Plan is to DC to rehab. Pt called approprietly during day.

## 2017-02-09 NOTE — PLAN OF CARE
Problem: Goal Outcome Summary  Goal: Goal Outcome Summary  OT:  Recommend TCU.  Patient pleasant, cognitively doing well.  Patient stood at sink 20 minutes for ADLs with CGA, assisted with LB dressing as patient typically uses AE.  Patient ambulated 75' after this with slow mobility, need to sit down due to fatigue and O2 at 88% on RA when returned to room.

## 2017-02-09 NOTE — PROGRESS NOTES
Social Work Services Progress Note    Hospital Day: 7    Collaborated with:  Dr. Mckenzie, pt, Admissions at [facility] (Pilar [phone])    Data:  Pt was admitted to [facility] from [facility].  Pt was placed at [facility] for rehab placement.    Intervention:  Spoke with Dr. Mckenzie who indicates that pt is medically ready for discharge. Spoke with Pilar in Admissions at [facility], who indicates that pt has a bed hold. Pilar indicates that they can accept pt for admit today.  Met with pt.  Pt voices frustration that she was not told by MD this am that she was ready for discharge.  Pt states that she was previously told that she would not discharge until tomorrow.  Pt states that she will appeal the discharge if she cannot remain hospitalized until tomorrow as she now feels rushed and does not feel that this is good customer service.  Based on this, arrangements will be made for pt to discharge tomorrow to [facility] on [address].  Dr. Mckenzie voices agreement with this plan. Pt will arrange for her daughters boyfriend to provide her with transport to [facility], in the am. SW has updated Pilar at [facility] and 6A nursing.    Assessment:  Pt has a bed hold at [facility].  Pt is not open to discharge today as she states that MD did not tell her this am that she was ready for discharge and thus, she didn't have time to prepare for it.    Plan:    Anticipated Disposition:  Placement at [facility] on 2/10/17.    Barriers to d/c plan:  Pt is unwilling to discharge today as she states that MD did not tell her this am that she was ready for discharge.  Thus, pt states that she now feels rushed and will appeal the discharge if she cannot remain hospitalized until tomorrow.    Follow Up:  [facility] will coordinate discharge.    CINDY Pittman  Social Work, 6A  Phone:  [phone]  Pager:  [phone]  2/9/2017

## 2017-02-09 NOTE — OP NOTE
DATE OF PROCEDURE:  2/8/2017      PREOPERATIVE DIAGNOSIS:  Normal pressure hydrocephalus.      POSTOPERATIVE DIAGNOSIS:  Normal pressure hydrocephalus.      PROCEDURE PERFORMED:   1.  Right-sided frontal ventriculostomy placement with Strata set at 1.0.     2.  Use of intraoperative ventriculoperitoneal shunt placement.   3.  Use of intraoperative neuronavigation.      SURGEON:  Rc Dozier MD      ASSISTANTS:   1.  Deanna Hayward MD   2.  Emeka Godoy MD      OPERATIVE INDICATIONS:  Ms. Amparo Sharma is a 76-year-old lady who presented to the Neurosurgery Clinic and saw Dr. Rc Dozier for concerns of normal pressure hydrocephalus.  She underwent admission to the Neurosurgery Service and a lumbar drain placement and evaluation by physical therapy and cognitive therapy.  Cognitively she was seen to be slightly worsened after lumbar drainage, but on physical therapy assessment she was seen to have improvement in her gait and strength.  She was therefore consented for placement of a ventriculoperitoneal shunt after risks, benefits and alternatives for the procedure were explained to them in detail.      SUMMARY:  Ms. Sharma was brought to the operating room by our Anesthesia colleagues.  Her lumbar drain was clamped prior to her arrival.  She received a dose of preoperative antibiotics and underwent general endotracheal anesthesia and was positioned supine on the operative bed with her head turned to the left and placed on a horseshoe.  The fiducial marks in her head were used to register her head in the Stealth system and the incision was planned in a curvilinear manner in the right frontal aspect of her scalp.  Also an abdominal incision was planned about 3 cm above the level of the umbilicus and 4 cm lateral to the right side.  Sterile prepping and draping was completed of the scalp as well as neck and chest and abdomen.  Then local anesthetic was injected along the curvilinear scalp incision and as well as  the abdominal incision.  A #10 blade was used to open up the abdominal  incision and dissection was performed with the help of monopolar cautery to the underlying subcutaneous, Venus's fascia and Camper's fascia was identified.  Using mosquito the external oblique and internal oblique fascia were identified and cut.  The preperitoneal layer was also confirmed, this was cut and the peritoneum entered and the peritoneum itself with the peritoneal fat was evident.  A Penfield 4 was placed here to confirm the same.      Then the curvilinear incision on the scalp was opened up and subcutaneous pocket created in the right frontal aspect.  Then tunneling was performed from the abdominal incision up to the right posterior aspect of the scalp in the parietal region and a #10 blade was used to make a small new incision in the right posterior parietal aspect where the tunneler seemed to point.  Then the stylet of the tunneler was removed and through the tubing Bactiseal peritoneal catheter was passed.  The end of the peritoneal catheter was passed from the right parietal incision up to the right frontal incision with the help of snap.  This was then attached to a Strata valve, which was set at 1.0 and secured with the above silk tie.  Then drilling with the help of  was performed in the right frontal aspect and the dura was coagulated with the bipolar cautery and opened with the help of an 11 blade and again coagulated.  Then, under Stealth guidance ventricular catheter was placed in the frontal horn of the right lateral ventricle and easy CSF flow was noted.  The ventricular catheter was cut appropriately and secured to the valve with the help of silk tie.  The elbow connector was left on the ventricular catheter.      Irrigation was performed at this and then at the abdominal incision.  The peritoneal catheter was placed inside and once good CSF drainage was confirmed from the distal end of the peritoneal  catheter, it was placed inside the peritoneum. The preperitoneal layer was closed with the help of 3-0 silk stitch as a pursestring stitch.  The 2 layers of fascia were also closed with help of 3-0 silk stitches in a pursestring manner and the subcutaneous tissue was closed with 3-0 Vicryl in an inverted interrupted manner.  The skin incision was closed with 4-0 Monocryl in running subcuticular manner.  Wet and dry dressings were applied and Dermabond was placed on top.      Then the scalp irrigation was performed and the subgaleal tissue was closed with 2-0 Vicryl in an inverted interrupted manner and the skin itself was closed in a running continuous fashion.  Wet and dry dressings were applied and ChloraPrep was used to clean the incision and right parietal aspect of the scalp was also open.      Dr. Zahraa Bermeo was present and scrubbed in for most of procedure, including all its critical portions, and immediately available for the rest of it.         ZAHRAA BERMEO MD       As dictated by CHRIS BRAR MD            D: 2017 16:37   T: 2017 17:29   MT: NISREEN      Name:     ATILIO COPPOLA   MRN:      [id number]        Account:        [id number]   :      1940           Procedure Date: 2017      Document: [id number]

## 2017-02-09 NOTE — PLAN OF CARE
Problem: Goal Outcome Summary  Goal: Goal Outcome Summary  Outcome: Improving  Temp high 100.4, pulse , SaO2 91% on 2 lpm/NC-95% on 3 lpm/NC, BP high 191/89 (patient in pain)- stable at 135/61 (pain relieved).  Sore throat pain controlled with scheduled tylenol, oxycodone and Chloraseptic throat spray.  Patient denied any other pain.  Tolerated liquids and applesauce fair but no appetite.  IV fluid infused at 50 ml/hr.  Neuros intact except right side 4/5 strength.  Urine incontinence, bladder scan 362 ml, straight cath 700 ml.  No BM but passed flatus.  TCDB Q 2hr, encouraged use of IS.  SCD's on.  Refused to get out of bed this evening.  Right abdominal incision intact with liquid bandage, no drainage.  Right head incision dressing intact, no new drainage.  LD site dressing intact, no new drainage.  Denied drainage from nose, down back of throat.  Plan for shunt survey in AM.  Continue to monitor as ordered and follow POC.

## 2017-02-09 NOTE — PLAN OF CARE
Problem: Goal Outcome Summary  Goal: Goal Outcome Summary  Outcome: No Change  AVSS, on 2L O2 stating mid 90's. A&Ox4. Neuros intact besides slight right generalized weakness. Pt stated strength keeps improving. POD #1 right frontal  shunt. Right head dressing CDI. RLQ abdominal incision CDI with liquid bandage, no drainage. LD removed yesterday in OR, dressing CDI. Denied any s/s of CSF leak. C/o throat and abdominal pain. Using PRN oxy and chloraseptic spray, also has scheduled tylenol. Up with A1 to pivot to commode, when up ambulating needs walker and GB. IV infusing 50ml/hr. Regular diet, good PO intake. No incontinence overnight, bladder scanned at 0700 for 600ml. Pt voided 250ml on bedside commode and was straight cathed for 300ml. Pulse ox, PCD's, and BA on. Repositioned per pt request. Continue with POC.

## 2017-02-09 NOTE — PLAN OF CARE
Problem: Goal Outcome Summary  Goal: Goal Outcome Summary  PT 6A: Pt agreeable to PT. No reports of dizziness. LE strength is 5/5. Standing orthostatic BP taken and was within normal limits. Directed her in work on sit to stand movements to improve transfer technique and safety along with LE strength. She achieved a normal transfer technique with normal balance. She ambulated ~ 160' with FWW and CGA of 1 and worked on gait pattern, posture, and usman. She fatigued by ~ 140 - 150' and needed a short seated rest.    Recommend TCU when ready for discharge to progress work on balance, LE strengthening, and functional mobility.

## 2017-02-10 ENCOUNTER — TELEPHONE (OUTPATIENT)
Dept: NEUROSURGERY | Facility: CLINIC | Age: 77
End: 2017-02-10

## 2017-02-10 VITALS
HEART RATE: 80 BPM | OXYGEN SATURATION: 93 % | WEIGHT: 171.5 LBS | RESPIRATION RATE: 24 BRPM | SYSTOLIC BLOOD PRESSURE: 143 MMHG | DIASTOLIC BLOOD PRESSURE: 72 MMHG | TEMPERATURE: 99.3 F | BODY MASS INDEX: 33.49 KG/M2

## 2017-02-10 DIAGNOSIS — Z98.2 S/P VENTRICULOPERITONEAL SHUNT: Primary | ICD-10-CM

## 2017-02-10 DIAGNOSIS — G91.2 NPH (NORMAL PRESSURE HYDROCEPHALUS) (H): Primary | ICD-10-CM

## 2017-02-10 PROCEDURE — A9270 NON-COVERED ITEM OR SERVICE: HCPCS | Mod: GY | Performed by: STUDENT IN AN ORGANIZED HEALTH CARE EDUCATION/TRAINING PROGRAM

## 2017-02-10 PROCEDURE — 25000132 ZZH RX MED GY IP 250 OP 250 PS 637: Mod: GY | Performed by: STUDENT IN AN ORGANIZED HEALTH CARE EDUCATION/TRAINING PROGRAM

## 2017-02-10 RX ORDER — OXYCODONE HYDROCHLORIDE 5 MG/1
5-10 TABLET ORAL EVERY 4 HOURS PRN
Qty: 50 TABLET | Refills: 0 | Status: SHIPPED | OUTPATIENT
Start: 2017-02-10 | End: 2017-02-27

## 2017-02-10 RX ADMIN — ACETAMINOPHEN 975 MG: 325 TABLET, FILM COATED ORAL at 07:51

## 2017-02-10 RX ADMIN — AMLODIPINE BESYLATE 10 MG: 10 TABLET ORAL at 07:51

## 2017-02-10 RX ADMIN — SENNOSIDES AND DOCUSATE SODIUM 2 TABLET: 8.6; 5 TABLET ORAL at 07:51

## 2017-02-10 RX ADMIN — RANITIDINE HYDROCHLORIDE 150 MG: 150 TABLET, FILM COATED ORAL at 07:51

## 2017-02-10 RX ADMIN — BUPROPION HYDROCHLORIDE 150 MG: 150 TABLET, FILM COATED, EXTENDED RELEASE ORAL at 07:52

## 2017-02-10 RX ADMIN — THERA TABS 1 TABLET: TAB at 07:51

## 2017-02-10 RX ADMIN — FLUOXETINE 40 MG: 20 CAPSULE ORAL at 07:51

## 2017-02-10 RX ADMIN — FOLIC ACID 1 MG: 1 TABLET ORAL at 07:51

## 2017-02-10 RX ADMIN — TIOTROPIUM BROMIDE 18 MCG: 18 CAPSULE ORAL; RESPIRATORY (INHALATION) at 07:49

## 2017-02-10 RX ADMIN — FLUTICASONE PROPIONATE 2 PUFF: 110 AEROSOL, METERED RESPIRATORY (INHALATION) at 07:50

## 2017-02-10 RX ADMIN — LAMOTRIGINE 150 MG: 150 TABLET ORAL at 07:52

## 2017-02-10 RX ADMIN — LEVOTHYROXINE SODIUM 75 MCG: 75 TABLET ORAL at 07:51

## 2017-02-10 NOTE — TELEPHONE ENCOUNTER
Neurosurgery Discharge Coordination Note       Responsible Attending physician: Rc Dozier MD    Operation performed: LUMBAR DRAIN/CSF DIVERSION    Date of Discharge: 2/10/17    Discharge to: [facility]     Current status: Staff states she feels ok.  Denies redness, swelling,increased tenderness or elevated temp. Reports Incision CDI without signs of infection.   Denies current bowel or bladder issues      Discharge instructions and medications reviewed with patient.  Follow up appointments/imaging/testes needed:     RN triage/on call number given: [phone]/ [phone]

## 2017-02-10 NOTE — PLAN OF CARE
Problem: Goal Outcome Summary  Goal: Goal Outcome Summary  Outcome: No Change  Pt sleeping well overnight, VSS, Up to BSC x2 with assist of 1. SL x1. Able to make needs known, plans to d/c around 0800 when son arrives.

## 2017-02-10 NOTE — PROGRESS NOTES
Social Work Services Discharge Note      Patient Name:  Amparo Sharma     Anticipated Discharge Date:  2/10/17    Discharge Disposition:   [facility] ([phone])    Following MD:  Facility Assignment     Pre-Admission Screening (PAS) online form has been completed.  The Level of Care (LOC) is:  Determined  Confirmation Code is:  Not indicated as pt is a return to the care facility       Additional Services/Equipment Arranged:  Pt offered w/c transport.  Pt declined and indicates that her daughter's boyfriend will provide her transport to the receiving facility between 8:30 and 9am.     Patient / Family response to discharge plan:  Pt voices understanding of the discharge plan and agreement with the discharge plan.     Persons notified of above discharge plan:  Pt, 6A nursing and Dr. Mckenzie.  Pt independently informed her daughter of the discharge plan.  SW confirmed acceptance to [facility], with Pilar, Admissions Coordinator.    Staff Discharge Instructions:  Please fax discharge orders and signed hard scripts for any controlled substances (SW will complete this task).  Please print a packet and send with patient.     CTS Handoff completed:  YES    Medicare Notice of Rights provided to the patient/family:  YES    CINDY Pittman  Social Work, 6A  Phone:  [phone]  Pager:  [phone]  2/10/2017

## 2017-02-10 NOTE — PLAN OF CARE
Problem: Goal Outcome Summary  Goal: Goal Outcome Summary  OT 6A: Cancel. Patient discharged to TCU.  Occupational Therapy Discharge Summary    Reason for therapy discharge:    Discharged to transitional care facility.    Progress towards therapy goal(s). See goals on Care Plan in [facility] electronic health record for goal details.  Goals partially met.  Barriers to achieving goals:   discharge from facility.    Therapy recommendation(s):    Continued therapy is recommended.  Rationale/Recommendations:  Cognition and to increased independence in ADLs..

## 2017-02-10 NOTE — PLAN OF CARE
Problem: Goal Outcome Summary  Goal: Goal Outcome Summary  Physical Therapy Discharge Summary    Reason for therapy discharge:    Discharged to transitional care facility.    Progress towards therapy goal(s). See goals on Care Plan in [facility] electronic health record for goal details.  Goals not met.  Barriers to achieving goals:   discharge from facility.    Therapy recommendation(s):    Continued therapy is recommended.  Rationale/Recommendations:  TCU to increase functional strength, gait, ADL (I).

## 2017-02-10 NOTE — PLAN OF CARE
Problem: Goal Outcome Summary  Goal: Goal Outcome Summary  Outcome: Adequate for Discharge Date Met:  02/10/17  D/I:Abdomen incision with glue in place. Head incision with sutures. Back dressing removed and has suture in place there. Instructed on showering tomorrow.Got up with one assist and walker, got dressed and daughter came to get to take to [facility].  P:See discharge note.    Problem: Discharge Planning  Goal: Discharge Planning (Adult, OB, Behavioral, Peds)  Outcome: Completed Date Met:  02/10/17  D/I:Discussed showering for tomorrow. Discussed RTC for suture removal. Daughter given packet of DC instructions for [facility].  P:Daughter is driving her there.

## 2017-02-10 NOTE — PLAN OF CARE
Problem: Goal Outcome Summary  Goal: Goal Outcome Summary  Outcome: Improving  Alert and oriented x 4. Vital signs stable. On room air. Sat. Above 92 % on room air. Head and abdomen incisional pain fully managed. Good oral intake.Transfers to chair/commode/bed with assist of one person and gait belt. Voided 250 cc this shift. Patient encouraged to increase oral fluid intake. Plan: Discharge tomorrow, private ride/daughter will be here  at 8 am and this made aware to charge RN  .Continue care.

## 2017-02-10 NOTE — PROGRESS NOTES
[facility], [place]   Neurosurgery Daily Note          Assessment and Plan:   The patient is admitted from Dr. Dozier's clinic for NPH workup; Neuropsychiatry testing suggests cognitive impairment that may be on the spectrum of NPH. Lumbar drain was converted to a  shunt on 2/8.        - planning for discharge today   - shunt series stable  - follow up in 2 weeks for suture removal and 1 month with repeat HCT  - advance activity, diet, ensure voiding, pain control         Interval History:   No acute events overnight              Review of Systems:   The Review of Systems is otherwise negative beyond that which has been previously stated.          Medications:   I have reviewed this patient's current medications.    Current Facility-Administered Medications   Medication Dose Route Frequency     acetaminophen  975 mg Oral Q8H     senna-docusate  1-2 tablet Oral BID     multivitamin, therapeutic  1 tablet Oral Daily     sodium chloride (PF)  3 mL Intravenous Q8H     ranitidine  150 mg Oral BID     amLODIPine (NORVASC) tablet 10 mg  10 mg Oral QAM     buPROPion (WELLBUTRIN XL) 24 hr tablet 150 mg  150 mg Oral QAM     donepezil (ARICEPT) tablet 10 mg  10 mg Oral At Bedtime     FLUoxetine (PROzac) capsule 40 mg  40 mg Oral QAM     fluticasone  2 puff Inhalation Q12H     folic acid (FOLVITE) tablet 1 mg  1 mg Oral QAM     lamoTRIgine (LaMICtal) tablet 100 mg  100 mg Oral QPM     levothyroxine  75 mcg Oral QAM     tiotropium  18 mcg Inhalation Daily     lamoTRIgine  150 mg Oral QAM     ARIPiprazole  5 mg Oral At Bedtime        Physical Exam:  General:   Comfortable respiratory effort; normal cardiac rhythm.     Mental Status:   awake, alert and oriented x3.   fluent, communicative, intact comprehension.    Cranial Nerves:  equal and reactive pupils   extraocular movements preserved   no dysarthria.     Motor/Sensory:  5/5 strength throughout upper and lower extremities.  No deficits to pain,  soft touch.     Reflexes: symmetric          Data:   The labs and imaging for this patient have been reviewed directly.

## 2017-02-13 ENCOUNTER — CARE COORDINATION (OUTPATIENT)
Dept: CARDIOLOGY | Facility: CLINIC | Age: 77
End: 2017-02-13

## 2017-02-13 ENCOUNTER — AMBULATORY - HEALTHEAST (OUTPATIENT)
Dept: ADMINISTRATIVE | Facility: CLINIC | Age: 77
End: 2017-02-13

## 2017-02-13 NOTE — DISCHARGE SUMMARY
[facility] Discharge Summary and Instructions    Amparo Sharma MRN# [id number]   Age: 76 year old YOB: 1940     Date of Admission:  2/3/2017  Date of Discharge::  2/10/2017  9:24 AM  Admitting Physician:  Rc Dozier MD  Discharge Physician:  Rc Dozier MD          Admission Diagnoses:   NPH (normal pressure hydrocephalus) [G91.2]  S/P ventriculoperitoneal shunt [Z98.2]          Discharge Diagnosis:   NPH (normal pressure hydrocephalus) [G91.2]  S/P ventriculoperitoneal shunt [Z98.2]          Procedures:   1. Right-sided frontal ventriculostomy placement with Strata set at 1.0.   2. Use of intraoperative ventriculoperitoneal shunt placement.   3. Use of intraoperative neuronavigation.   4. Lumbar puncture.          Brief History of Illness:   Ms. Amparo Sharma is a 76-year-old lady who presented to the Neurosurgery Clinic and saw Dr. Rc Dozier for concerns of normal pressure hydrocephalus. She underwent admission to the Neurosurgery Service and a lumbar drain placement and evaluation by physical therapy and cognitive therapy. Cognitively she was seen to be slightly worsened after lumbar drainage, but on physical therapy assessment she was seen to have improvement in her gait and strength. She was therefore consented for placement of a ventriculoperitoneal shunt after risks, benefits and alternatives for the procedure were explained to them in detail.           Hospital Course:   Patient underwent lumbar drain placement under fluoroscopy on 2/5/2017 to assess improvement of symptoms. The patient reported subjective improvement and physcial and occupational therapy notes from the time also describe more objective improvement. She then agreed to shunt placement.    The operation was uncomplicated and she was admitted to the floor. On post operative day 2, she was ambulating, voiding without a shelton, eating a regular diet, pain was well controlled and therefore she  was discharged back to TCU.           Discharge Medications:     Discharge Medication List as of 2/10/2017  8:43 AM      START taking these medications    Details   ondansetron (ZOFRAN-ODT) 4 MG ODT tab Take 1 tablet (4 mg) by mouth every 6 hours as needed for nausea, Disp-40 tablet, R-0, Fax      senna-docusate (SENOKOT-S;PERICOLACE) 8.6-50 MG per tablet Take 1-2 tablets by mouth 2 times daily, Disp-40 tablet, R-0, FaxTake while on narcotics.      oxyCODONE (ROXICODONE) 5 MG IR tablet Take 1-2 tablets (5-10 mg) by mouth every 4 hours as needed for moderate to severe pain, Disp-50 tablet, R-0, Local Print         CONTINUE these medications which have NOT CHANGED    Details   lactobacillus rhamnosus, GG, (CULTURELL) capsule Take 1 capsule by mouth 2 times daily, Transitional      AMLODIPINE BESYLATE PO Take 10 mg by mouth every morning , Historical      DONEPEZIL HCL PO Take 10 mg by mouth At Bedtime, Historical      FOLIC ACID PO Take 1 mg by mouth, Historical      RANITIDINE HCL PO Take 300 mg by mouth 2 times daily , Historical      Tiotropium Bromide Monohydrate (SPIRIVA HANDIHALER IN) Inhale 1 puff into the lungs as needed , Historical      hydrocortisone 2.5 % cream Apply topically 2 times dailyHistorical      aspirin-acetaminophen-caffeine (EXCEDRIN MIGRAINE) 250-250-65 MG per tablet Take 1 tablet by mouth every 6 hours as needed for headaches, Historical      fluticasone (FLOVENT HFA) 110 MCG/ACT inhaler Inhale 2 puffs into the lungs as needed, Historical      ARIPiprazole (ABILIFY PO) Take 5 mg by mouth At Bedtime , Historical      BuPROPion HCl (WELLBUTRIN XL PO) Take 150 mg by mouth every morning , Historical      Multiple Vitamins-Minerals (MULTIVITAMIN PO) Take by mouth daily, Historical      LamoTRIgine (LAMICTAL PO) Take 150 mg by mouth in the morning and 100 mg by mouth in the evening, Historical      Levothyroxine Sodium 75 MCG CAPS Take 75 mcg by mouth every morning , Historical      FLUoxetine HCl  (PROZAC PO) Take 40 mg by mouth every morning , Historical      Acetaminophen (TYLENOL PO) Take 500 mg by mouth every 8 hours as needed for mild pain or fever , Historical                     Discharge Instructions and Follow-Up:   Discharge diet: Regular   Discharge activity: You may advance activity as tolerated. No strenuous exercise or heay lifting greater than 10 lbs for 4 weeks or until seen and cleared in clinic.   Discharge follow-up: Follow-up with Dr. Rc Dozier MD in 2 weeks   Wound care: Ok to shower,however no scrubbing of the wound and no soaking of the wound, meaning no bathtubs or swimming pools. Pat dry only. Leave wound open to air.  Sutures are not absorbable and need to be removed in 2 weeks. If patient still at rehab by this time, the sutures may be removed by the rehab physician if he or she considers that the wound has healed completely.     Please call if you have:  1. increased pain, redness, drainage, swelling at your incision  2. fevers > 101.5 F degrees  3. with any questions or concerns.  You may reach the Neurosurgery clinic at [phone] during regular work hours. ER at [phone].    and ask for the Neurosurgery Resident on call at [phone], during off hours or weekends.         Discharge Disposition:   Discharged back to previous TCU.

## 2017-02-14 ENCOUNTER — OFFICE VISIT - HEALTHEAST (OUTPATIENT)
Dept: GERIATRICS | Facility: CLINIC | Age: 77
End: 2017-02-14

## 2017-02-14 DIAGNOSIS — J45.909 ASTHMA: ICD-10-CM

## 2017-02-14 DIAGNOSIS — I10 ESSENTIAL HYPERTENSION: ICD-10-CM

## 2017-02-14 DIAGNOSIS — R26.81 GAIT INSTABILITY: ICD-10-CM

## 2017-02-14 DIAGNOSIS — R05.9 COUGH: ICD-10-CM

## 2017-02-14 DIAGNOSIS — G91.2 NPH (NORMAL PRESSURE HYDROCEPHALUS) (H): ICD-10-CM

## 2017-02-14 DIAGNOSIS — R53.81 PHYSICAL DECONDITIONING: ICD-10-CM

## 2017-02-15 ENCOUNTER — OFFICE VISIT (OUTPATIENT)
Dept: NEUROSURGERY | Facility: CLINIC | Age: 77
End: 2017-02-15

## 2017-02-15 ENCOUNTER — AMBULATORY - HEALTHEAST (OUTPATIENT)
Dept: GERIATRICS | Facility: CLINIC | Age: 77
End: 2017-02-15

## 2017-02-15 VITALS
BODY MASS INDEX: 33.75 KG/M2 | SYSTOLIC BLOOD PRESSURE: 123 MMHG | RESPIRATION RATE: 20 BRPM | TEMPERATURE: 97.7 F | OXYGEN SATURATION: 92 % | WEIGHT: 171.9 LBS | HEART RATE: 70 BPM | DIASTOLIC BLOOD PRESSURE: 76 MMHG | HEIGHT: 60 IN

## 2017-02-15 DIAGNOSIS — G91.2 NPH (NORMAL PRESSURE HYDROCEPHALUS) (H): Primary | ICD-10-CM

## 2017-02-15 DIAGNOSIS — Z98.2 S/P VENTRICULOPERITONEAL SHUNT: ICD-10-CM

## 2017-02-15 ASSESSMENT — PAIN SCALES - GENERAL: PAINLEVEL: NO PAIN (1)

## 2017-02-15 NOTE — MR AVS SNAPSHOT
After Visit Summary   2/15/2017    Amparo Sharma    MRN: [id number]           Patient Information     Date Of Birth          1940        Visit Information        Provider Department      2/15/2017 4:30 PM Natalie Mackey PA-C M [facility] Neurosurgery         Follow-ups after your visit        Your next 10 appointments already scheduled     Feb 15, 2017  4:30 PM CST   (Arrive by 4:15 PM)   Return Visit with KAYLI Bosch [facility] Neurosurgery ([facility])    [address] [place] [id number] [phone]            Mar 15, 2017  3:20 PM CDT   (Arrive by 3:05 PM)   Post-Op with MD JOESPH Lozano [facility] Neurosurgery ([facility])    [address] [place] [id number] [phone]              Who to contact     Please call your clinic at [phone] to:    Ask questions about your health    Make or cancel appointments    Discuss your medicines    Learn about your test results    Speak to your doctor   If you have compliments or concerns about an experience at your clinic, or if you wish to file a complaint, please contact [facility] Phys[facility] Patient Relations at [phone] or email us at [email or web address]         Additional Information About Your Visit        MyChart Information     Qosmost is an electronic gateway that provides easy, online access to your medical records. With GridNetworks, you can request a clinic appointment, read your test results, renew a prescription or communicate with your care team.     To sign up for Qosmost visit the website at [email or web address]   You will be asked to enter the access code listed below, as well as some personal information. Please follow the directions to create your username and password.     Your access code is: [email or web address]  Expires: 2017  6:30 AM     Your access code will   in 90 days. If you need help or a new code, please contact your [facility] or call [phone] for assistance.        Care EveryWhere ID     This is your Care EveryWhere ID. This could be used by other organizations to access your [place] medical records  [id number]        Your Vitals Were     Pulse Temperature Respirations Height Pulse Oximetry Breastfeeding?    70 97.7  F (36.5  C) (Oral) 20 1.524 m (5') 92% No    BMI (Body Mass Index)                   33.57 kg/m2            Blood Pressure from Last 3 Encounters:   02/15/17 123/76   02/10/17 143/72   01/30/17 134/78    Weight from Last 3 Encounters:   02/15/17 78 kg (171 lb 14.4 oz)   02/07/17 77.8 kg (171 lb 8 oz)   01/30/17 77.1 kg (170 lb)              Today, you had the following     No orders found for display       Primary Care Provider Office Phone # Fax #    Parvez PORTER Francis [phone]       [place] [address] [place] [address]        Thank you!     Thank you for choosing [facility]  for your care. Our goal is always to provide you with excellent care. Hearing back from our patients is one way we can continue to improve our services. Please take a few minutes to complete the written survey that you may receive in the mail after your visit with us. Thank you!             Your Updated Medication List - Protect others around you: Learn how to safely use, store and throw away your medicines at www.disposemymeds.org.          This list is accurate as of: 2/15/17  4:13 PM.  Always use your most recent med list.                   Brand Name Dispense Instructions for use    ABILIFY PO      Take 5 mg by mouth At Bedtime       AMLODIPINE BESYLATE PO      Take 10 mg by mouth every morning       aspirin-acetaminophen-caffeine 250-250-65 MG per tablet    EXCEDRIN MIGRAINE     Take 1 tablet by mouth every 6 hours as needed for headaches       DONEPEZIL HCL PO      Take 10 mg by mouth  At Bedtime       fluticasone 110 MCG/ACT Inhaler    FLOVENT HFA     Inhale 2 puffs into the lungs as needed       FOLIC ACID PO      Take 1 mg by mouth       hydrocortisone 2.5 % cream      Apply topically 2 times daily       lactobacillus rhamnosus (GG) capsule      Take 1 capsule by mouth 2 times daily       LAMICTAL PO      Take 150 mg by mouth in the morning and 100 mg by mouth in the evening       Levothyroxine Sodium 75 MCG Caps      Take 75 mcg by mouth every morning       MULTIVITAMIN PO      Take by mouth daily       ondansetron 4 MG ODT tab    ZOFRAN-ODT    40 tablet    Take 1 tablet (4 mg) by mouth every 6 hours as needed for nausea       oxyCODONE 5 MG IR tablet    ROXICODONE    50 tablet    Take 1-2 tablets (5-10 mg) by mouth every 4 hours as needed for moderate to severe pain       PROZAC PO      Take 40 mg by mouth every morning       RANITIDINE HCL PO      Take 300 mg by mouth 2 times daily       senna-docusate 8.6-50 MG per tablet    SENOKOT-S;PERICOLACE    40 tablet    Take 1-2 tablets by mouth 2 times daily       SPIRIVA HANDIHALER IN      Inhale 1 puff into the lungs as needed       TYLENOL PO      Take 500 mg by mouth every 8 hours as needed for mild pain or fever       WELLBUTRIN XL PO      Take 150 mg by mouth every morning

## 2017-02-15 NOTE — LETTER
2/15/2017       RE: Amparo Sharma  [address]   [place] [address]     Dear Colleague,    Thank you for referring your patient, Amparo Sharma, to the [facility] NEUROSURGERY at [facility]. Please see a copy of my visit note below.      Dictated    Again, thank you for allowing me to participate in the care of your patient.      Sincerely,    Natalie Mackey PA-C

## 2017-02-15 NOTE — NURSING NOTE
Chief Complaint   Patient presents with     RECHECK     UMP- 2 WEEKS POST-OP/ SUTURE REMOVAL AND WOUND CHECK

## 2017-02-15 NOTE — LETTER
2/15/2017      RE: Amparo Sharma  [address]   [place] [address]         Dictated    COMPLAINT:  Followup from  shunt placement.      SURGERY PERFORMED:  02/05/2017 by Dr. Dozier for normal pressure hydrocephalus.  Codman Bactiseal  shunt catheter and Medtronic Strata valve set at 1.0.      Postoperatively, this nice lady did very well with her indicating that her thinking is clearer, her walking is more stable, she has not had much change in her urinary incontinence but overall, she is feeling much better.  She had a CT scan of the head today and presents for suture removal.      Past medical history, family history, social history, problem list, medication, allergies are all reviewed in Epic.      PHYSICAL EXAMINATION:   VITAL SIGNS:  Blood pressure 123/76, pulse 70, respirations 20.   This is a well-developed, well-nourished, pleasant, white female in no acute distress, found seated comfortably in a wheelchair.  She is able to sit and rise with some minimal assistance.  Her gait is still a little unsteady.  She has beautifully healed incisions on the scalp and in the abdomen.  The Medtronic Strata valve was checked today and is set at the intraoperative level, 1.0.  It is left the same.      IMAGING:  CT scan of the head performed today is compared with a CT head from 02/03/2017.  There is the ventriculostomy catheter with the tip in the foramen of Monro, ventricles are the same size, no intracranial pathology is noted.      IMPRESSION:  Doing well status post  shunt with her Medtronic Strata valve set at 1.0.  Her followup CT scan is stable, but clinically she is improved and is feeling better about things in general.      PLAN:  We will ask this nice lady to return to see Dr. Dozier in about a month.  She does not need any new imaging at that time.  She has been supplied with business cards and telephone numbers and is aware that she should call should she have questions,  comments or concerns.  We appreciate the opportunity to participate in the care of this very pleasant patient.  We thank you for your confidence in the [facility] Department of Neurosurgery.         CEE SIM PA-C             D: 02/15/2017 16:54   T: 2017 11:22   MT: BAUDILIO      Name:     ATILIO COPPOLA   MRN:      [id number]        Account:      [id number]   :      1940           Service Date: 02/15/2017      Document: [id number]

## 2017-02-16 NOTE — PROGRESS NOTES
NEUROSURGERY POST OP VISIT  DATE OF VISIT  2/15/17    SURGERY PERFORMED:  02/05/2017 by Dr. Dozier for normal pressure hydrocephalus.  Codman Bactiseal  shunt catheter and Medtronic Strata valve set at 1.0.    HPI: Followup from  shunt placement. Postoperatively, this nice lady did very well with her indicating that her thinking is clearer, her walking is more stable, she has not had much change in her urinary incontinence but overall, she is feeling much better.  She had a CT scan of the head today and presents for suture removal.      HISTORIES:  Past medical history, family history, social history, problem list, medication, allergies are all reviewed in Epic.      PHYSICAL EXAMINATION:   VITAL SIGNS:  Blood pressure 123/76, pulse 70, respirations 20.   This is a well-developed, well-nourished, pleasant, white female in no acute distress, found seated comfortably in a wheelchair.  She is able to sit and rise with some minimal assistance.  Her gait is still a little unsteady.  She has beautifully healed incisions on the scalp and in the abdomen.  The Medtronic Strata valve was checked today and is set at the intraoperative level, 1.0.  It is left the same.      IMAGING:  CT scan of the head performed today is compared with a CT head from 02/03/2017.  There is the ventriculostomy catheter with the tip in the foramen of Monro, ventricles are the same size, no intracranial pathology is noted.      IMPRESSION:  Doing well status post  shunt with her Medtronic Strata valve set at 1.0.  Her followup CT scan is stable, but clinically she is improved and is feeling better about things in general.      PLAN:  We will ask this nice lady to return to see Dr. Dozier in about a month.  Assuming all is well she may be able to return PRN after that. She does not need any new imaging at that time.  She has been supplied with business cards and telephone numbers and is aware that she should call should she have questions,  comments or concerns.  We appreciate the opportunity to participate in the care of this very pleasant patient.  We thank you for your confidence in the [facility] Department of Neurosurgery.         CEE SIM PA-C             D: 02/15/2017 16:54   T: 2017 11:22   MT: BAUDILIO      Name:     ATILIO COPPOLA   MRN:      [id number]        Account:      [id number]   :      1940           Service Date: 02/15/2017      Document: [id number]

## 2017-02-17 ENCOUNTER — OFFICE VISIT - HEALTHEAST (OUTPATIENT)
Dept: GERIATRICS | Facility: CLINIC | Age: 77
End: 2017-02-17

## 2017-02-17 DIAGNOSIS — G91.2 NPH (NORMAL PRESSURE HYDROCEPHALUS) (H): ICD-10-CM

## 2017-02-17 DIAGNOSIS — I73.9 PVD (PERIPHERAL VASCULAR DISEASE) (H): ICD-10-CM

## 2017-02-17 DIAGNOSIS — R26.81 GAIT INSTABILITY: ICD-10-CM

## 2017-02-17 DIAGNOSIS — K46.9 ABDOMINAL HERNIA: ICD-10-CM

## 2017-02-17 DIAGNOSIS — I10 ESSENTIAL HYPERTENSION: ICD-10-CM

## 2017-02-21 ENCOUNTER — OFFICE VISIT - HEALTHEAST (OUTPATIENT)
Dept: GERIATRICS | Facility: CLINIC | Age: 77
End: 2017-02-21

## 2017-02-21 DIAGNOSIS — R26.89 BALANCE DISORDER: ICD-10-CM

## 2017-02-21 DIAGNOSIS — K46.9 ABDOMINAL HERNIA: ICD-10-CM

## 2017-02-21 DIAGNOSIS — G91.2 NPH (NORMAL PRESSURE HYDROCEPHALUS) (H): ICD-10-CM

## 2017-02-21 DIAGNOSIS — I10 ESSENTIAL HYPERTENSION: ICD-10-CM

## 2017-02-24 ENCOUNTER — OFFICE VISIT - HEALTHEAST (OUTPATIENT)
Dept: GERIATRICS | Facility: CLINIC | Age: 77
End: 2017-02-24

## 2017-02-24 DIAGNOSIS — R26.81 GAIT INSTABILITY: ICD-10-CM

## 2017-02-24 DIAGNOSIS — R53.81 PHYSICAL DECONDITIONING: ICD-10-CM

## 2017-02-24 DIAGNOSIS — K46.9 ABDOMINAL HERNIA: ICD-10-CM

## 2017-02-24 DIAGNOSIS — G91.2 NPH (NORMAL PRESSURE HYDROCEPHALUS) (H): ICD-10-CM

## 2017-02-27 ENCOUNTER — OFFICE VISIT (OUTPATIENT)
Dept: SURGERY | Facility: CLINIC | Age: 77
End: 2017-02-27

## 2017-02-27 ENCOUNTER — RECORDS - HEALTHEAST (OUTPATIENT)
Dept: ADMINISTRATIVE | Facility: OTHER | Age: 77
End: 2017-02-27

## 2017-02-27 VITALS
BODY MASS INDEX: 30.73 KG/M2 | TEMPERATURE: 98.2 F | DIASTOLIC BLOOD PRESSURE: 69 MMHG | HEART RATE: 68 BPM | HEIGHT: 60 IN | SYSTOLIC BLOOD PRESSURE: 128 MMHG | WEIGHT: 156.5 LBS

## 2017-02-27 DIAGNOSIS — K43.9 ABDOMINAL WALL HERNIA: Primary | ICD-10-CM

## 2017-02-27 ASSESSMENT — PAIN SCALES - GENERAL: PAINLEVEL: MILD PAIN (2)

## 2017-02-27 NOTE — NURSING NOTE
Chief Complaint   Patient presents with     Consult     consult       Vitals:    02/27/17 1619   BP: 128/69   BP Location: Right arm   Patient Position: Chair   Pulse: 68   Temp: 98.2  F (36.8  C)   Weight: 156 lb 8 oz   Height: 5'       Body mass index is 30.56 kg/(m^2).    Kolton Peterson MA

## 2017-02-27 NOTE — LETTER
2/27/2017       RE: Amparo Sharma  [address]   [place] [address]     Dear Colleague,    Thank you for referring your patient, Amparo Sharma, to the [facility] GENERAL SURGERY at [facility]. Please see a copy of my visit note below.    Patient here for assessment of hernia.  Had colon cancer surgery a few years ago which went well.  Developed a bulge in the incision which has been getting bigger over past month.  Some pressure feeling in abdomen and area of the hernia, worse on eating.  Ranitidine helps.  loose bowel movements over past month.  Some of her clothes dont fit as well in light of this increased size of hernia.  pmh - signifcant for normal pressure hydrocephalus.   Nonsmoker.  On exam, she has a definite hernia, possible two defects on each side of incision from her hernia surgery.    A/p - ventral hernia, mild symptoms.  It is possible that her symptoms are not from this.   I think it is reasonable to proceed with CT scan to assess the extent of this hernia.  If her symptoms go away over next few weeks, then watchful waiting would be appropriate.  If however they persist, then she may benefit from surgery.   This may have to be laparoscopic/robotic, the decision would be based on CT findings.  This was discussed with her.  Signs/symptoms of incarceration/strangulation was discussed extensively with patient and she knows to seek immediate medial assessment if that occurs.   Will discuss her on our Tuesday meetings when her CT scan is done to plan next step.    Again, thank you for allowing me to participate in the care of your patient.      Sincerely,    Linh Ramos MD

## 2017-02-28 ENCOUNTER — OFFICE VISIT - HEALTHEAST (OUTPATIENT)
Dept: GERIATRICS | Facility: CLINIC | Age: 77
End: 2017-02-28

## 2017-02-28 DIAGNOSIS — K46.9 ABDOMINAL HERNIA: ICD-10-CM

## 2017-02-28 DIAGNOSIS — R26.89 BALANCE DISORDER: ICD-10-CM

## 2017-02-28 DIAGNOSIS — R26.81 GAIT INSTABILITY: ICD-10-CM

## 2017-02-28 DIAGNOSIS — I10 ESSENTIAL HYPERTENSION: ICD-10-CM

## 2017-03-03 ENCOUNTER — OFFICE VISIT - HEALTHEAST (OUTPATIENT)
Dept: GERIATRICS | Facility: CLINIC | Age: 77
End: 2017-03-03

## 2017-03-03 DIAGNOSIS — R29.898 LEG WEAKNESS, BILATERAL: ICD-10-CM

## 2017-03-03 DIAGNOSIS — R26.81 GAIT INSTABILITY: ICD-10-CM

## 2017-03-03 DIAGNOSIS — I87.8 VENOUS STASIS: ICD-10-CM

## 2017-03-03 DIAGNOSIS — G91.2 NPH (NORMAL PRESSURE HYDROCEPHALUS) (H): ICD-10-CM

## 2017-03-03 NOTE — PROGRESS NOTES
Patient here for assessment of hernia.  Had colon cancer surgery a few years ago which went well.  Developed a bulge in the incision which has been getting bigger over past month.  Some pressure feeling in abdomen and area of the hernia, worse on eating.  Ranitidine helps.  loose bowel movements over past month.  Some of her clothes dont fit as well in light of this increased size of hernia.  pmh - signifcant for normal pressure hydrocephalus.   Nonsmoker.  On exam, she has a definite hernia, possible two defects on each side of incision from her hernia surgery.    A/p - ventral hernia, mild symptoms.  It is possible that her symptoms are not from this.   I think it is reasonable to proceed with CT scan to assess the extent of this hernia.  If her symptoms go away over next few weeks, then watchful waiting would be appropriate.  If however they persist, then she may benefit from surgery.   This may have to be laparoscopic/robotic, the decision would be based on CT findings.  This was discussed with her.  Signs/symptoms of incarceration/strangulation was discussed extensively with patient and she knows to seek immediate medial assessment if that occurs.   Will discuss her on our Tuesday meetings when her CT scan is done to plan next step.

## 2017-03-06 ENCOUNTER — AMBULATORY - HEALTHEAST (OUTPATIENT)
Dept: GERIATRICS | Facility: CLINIC | Age: 77
End: 2017-03-06

## 2017-03-13 ENCOUNTER — CARE COORDINATION (OUTPATIENT)
Dept: SURGERY | Facility: CLINIC | Age: 77
End: 2017-03-13

## 2017-03-13 DIAGNOSIS — R14.0 ABDOMINAL BLOATING: Primary | ICD-10-CM

## 2017-03-14 ENCOUNTER — TELEPHONE (OUTPATIENT)
Dept: ADMINISTRATIVE | Facility: CLINIC | Age: 77
End: 2017-03-14

## 2017-03-14 NOTE — TELEPHONE ENCOUNTER
Called patient.  Still has similar symptoms, some slightly better.  No nausea nor vomiting.  Feels bloated after eating.  As this is not bothering her too much, I thiink it is reasonable to have her see GI as I don't want to pin all her symptoms on her hernia.  Discussed plan with her.  Will have one of my clinic nurses set up a visit for patient.  She was understanding of discussion and agreeable to proceed.

## 2017-03-14 NOTE — PROGRESS NOTES
Referral placed for GI Consult per Dr. Ramos's request.     Once the patient has been evaluated by GI Dr. Ramos would like to review opinion and then will make further recommendations regarding the hernia.

## 2017-03-27 ENCOUNTER — RECORDS - HEALTHEAST (OUTPATIENT)
Dept: ADMINISTRATIVE | Facility: OTHER | Age: 77
End: 2017-03-27

## 2017-03-27 ENCOUNTER — OFFICE VISIT (OUTPATIENT)
Dept: NEUROSURGERY | Facility: CLINIC | Age: 77
End: 2017-03-27

## 2017-03-27 VITALS — DIASTOLIC BLOOD PRESSURE: 79 MMHG | HEIGHT: 60 IN | SYSTOLIC BLOOD PRESSURE: 130 MMHG | HEART RATE: 64 BPM

## 2017-03-27 DIAGNOSIS — Z98.2 S/P VENTRICULOPERITONEAL SHUNT: Primary | ICD-10-CM

## 2017-03-27 ASSESSMENT — PAIN SCALES - GENERAL: PAINLEVEL: MILD PAIN (2)

## 2017-03-27 NOTE — MR AVS SNAPSHOT
After Visit Summary   3/27/2017    Amparo Sharma    MRN: [id number]           Patient Information     Date Of Birth          1940        Visit Information        Provider Department      3/27/2017 12:40 PM Rc Dozier MD [facility] Neurosurgery        Today's Diagnoses     S/P ventriculoperitoneal shunt    -  1       Follow-ups after your visit        Follow-up notes from your care team     Return in about 6 months (around 2017).      Your next 10 appointments already scheduled     2017 10:00 AM CDT   (Arrive by 9:45 AM)   New Patient Visit with KASANDRA Martínez [facility] Gastroenterology and IBD ([facility])    [address] [place] [id number] [phone]              Who to contact     Please call your clinic at [phone] to:    Ask questions about your health    Make or cancel appointments    Discuss your medicines    Learn about your test results    Speak to your doctor   If you have compliments or concerns about an experience at your clinic, or if you wish to file a complaint, please contact [facility] Physicians Patient Relations at [phone] or email us at [email or web address]         Additional Information About Your Visit        MyChart Information     Everestt is an electronic gateway that provides easy, online access to your medical records. With Guidesly, you can request a clinic appointment, read your test results, renew a prescription or communicate with your care team.     To sign up for Everestt visit the website at [email or web address]   You will be asked to enter the access code listed below, as well as some personal information. Please follow the directions to create your username and password.     Your access code is: [email or web address]  Expires: 2017  7:30 AM     Your access code will  in 90 days. If you need help or a new code, please  contact your [facility] Clinic or call [phone] for assistance.        Care EveryWhere ID     This is your Care EveryWhere ID. This could be used by other organizations to access your [place] medical records  [id number]        Your Vitals Were     Pulse Height                64 1.524 m (5')           Blood Pressure from Last 3 Encounters:   03/27/17 130/79   02/27/17 128/69   02/15/17 123/76    Weight from Last 3 Encounters:   02/27/17 71 kg (156 lb 8 oz)   02/15/17 78 kg (171 lb 14.4 oz)   02/07/17 77.8 kg (171 lb 8 oz)              Today, you had the following     No orders found for display       Primary Care Provider Office Phone # Fax #    Parvez Francis [phone]       [facility] [address] [place] [address]        Thank you!     Thank you for choosing [facility]  for your care. Our goal is always to provide you with excellent care. Hearing back from our patients is one way we can continue to improve our services. Please take a few minutes to complete the written survey that you may receive in the mail after your visit with us. Thank you!             Your Updated Medication List - Protect others around you: Learn how to safely use, store and throw away your medicines at www.disposemymeds.org.          This list is accurate as of: 3/27/17 11:59 PM.  Always use your most recent med list.                   Brand Name Dispense Instructions for use    ABILIFY PO      Take 5 mg by mouth At Bedtime       AMLODIPINE BESYLATE PO      Take 10 mg by mouth every morning       aspirin-acetaminophen-caffeine 250-250-65 MG per tablet    EXCEDRIN MIGRAINE     Take 1 tablet by mouth every 6 hours as needed for headaches       DONEPEZIL HCL PO      Take 10 mg by mouth At Bedtime       fluticasone 110 MCG/ACT Inhaler    FLOVENT HFA     Inhale 2 puffs into the lungs as needed       FOLIC ACID PO      Take 1 mg by mouth       hydrocortisone 2.5 %  cream      Apply topically 2 times daily       lactobacillus rhamnosus (GG) capsule      Take 1 capsule by mouth 2 times daily       LAMICTAL PO      Take 150 mg by mouth in the morning and 100 mg by mouth in the evening       Levothyroxine Sodium 75 MCG Caps      Take 75 mcg by mouth every morning       MOBIC PO      Take 15 mg by mouth daily       MULTIVITAMIN PO      Take by mouth daily       ondansetron 4 MG ODT tab    ZOFRAN-ODT    40 tablet    Take 1 tablet (4 mg) by mouth every 6 hours as needed for nausea       PROZAC PO      Take 40 mg by mouth every morning       RANITIDINE HCL PO      Take 300 mg by mouth 2 times daily       SPIRIVA HANDIHALER IN      Inhale 1 puff into the lungs as needed       TYLENOL PO      Take 500 mg by mouth every 8 hours as needed for mild pain or fever       WELLBUTRIN XL PO      Take 150 mg by mouth every morning

## 2017-03-27 NOTE — LETTER
3/27/2017       RE: Amparo Sharma  [address]   [place] [address]     Dear Colleague,    Thank you for referring your patient, Amparo Sharma, to the [facility] NEUROSURGERY at [facility]. Please see a copy of my visit note below.    March 27, 2017            Alex Gruber MD   [facility]    [address]   [place] [address]      RE:  Amparo Sharma      Dear Dr. Gruber:      I had the pleasure to see Amparo today in followup after placement of ventriculoperitoneal shunt.      Briefly, Amparo is a 76-year-old woman who lives over the [facility].  She has had a history of gait imbalance, memory difficulty and incontinence which prompted a workup of normal pressure hydrocephalus.  She is felt to have normal pressure hydrocephalus, and you had sent her to me for a lumbar drain trial which we had performed at the [facility].  The trial seemed to help her, and so we moved forward with placement of ventriculoperitoneal shunt.  She is now seen in 6-week followup.        Since the placement of the shunt, she and her daughter both say that she is walking significantly better.  Her memory has improved, and overall she feels a lot better.  I am very encouraged by this at this point in time.  She has still been a transitional care facility and will be moving home, I believe, here shortly.  I have encouraged her to continue to work with Physical Therapy and Occupational Therapy in an outpatient setting.      She lives about 1/2 mile from me, and so it has been fund talking to her about the [place] area.  In fact there is a path that runs essentially from where she lives to where my house is.  I have told her that a good goal for the summer would be to walk up to our house and check in with me.  She said she will take on that challenge.  Otherwise, I will plan to see her in 6 months in clinic for routine  followup.      Overall, I spent approximately 30 minutes with Amparo the majority of this time spent in consultation and developing a treatment plan.           ZAHRAA BERMEO MD       cc: Parvez Francis MD         D: 2017 12:52   T: 2017 10:33   MT: NATIVIDAD      Name:     AMPARO COPPOLA   MRN:      [id number]        Account:      [id number]   :      1940           Service Date: 2017      Document: [id number]

## 2017-03-27 NOTE — NURSING NOTE
Chief Complaint   Patient presents with     RECHECK     6 WK POST OP SUTURE REMOVAL AND WOUND CHECK/ DOS 02/05/2017/  SHUNT/ NPH    Esperanza Scruggs CMA

## 2017-03-28 NOTE — PROGRESS NOTES
March 27, 2017            Alex Gruber MD   [facility]    [address]   [place] [address]      RE:  Amparo Coppola      Dear Dr. Gruber:      I had the pleasure to see Amparo today in followup after placement of ventriculoperitoneal shunt.      Briefly, Amparo is a 76-year-old woman who lives over the [facility].  She has had a history of gait imbalance, memory difficulty and incontinence which prompted a workup of normal pressure hydrocephalus.  She is felt to have normal pressure hydrocephalus, and you had sent her to me for a lumbar drain trial which we had performed at the [facility].  The trial seemed to help her, and so we moved forward with placement of ventriculoperitoneal shunt.  She is now seen in 6-week followup.        Since the placement of the shunt, she and her daughter both say that she is walking significantly better.  Her memory has improved, and overall she feels a lot better.  I am very encouraged by this at this point in time.  She has still been a transitional care facility and will be moving home, I believe, here shortly.  I have encouraged her to continue to work with Physical Therapy and Occupational Therapy in an outpatient setting.      She lives about 1/2 mile from me, and so it has been fund talking to her about the [place] area.  In fact there is a path that runs essentially from where she lives to where my house is.  I have told her that a good goal for the summer would be to walk up to our house and check in with me.  She said she will take on that challenge.  Otherwise, I will plan to see her in 6 months in clinic for routine followup.      Overall, I spent approximately 30 minutes with Amparo the majority of this time spent in consultation and developing a treatment plan.      cc: MD ZAHRAA Null MD             D: 03/27/2017 12:52   T: 03/28/2017 10:33   MT: NATIVIDAD      Name:     PEDRO COPPOLACY   MRN:       [id number]        Account:      [id number]   :      1940           Service Date: 2017      Document: [id number]

## 2017-04-07 ENCOUNTER — TRANSFERRED RECORDS (OUTPATIENT)
Dept: HEALTH INFORMATION MANAGEMENT | Facility: CLINIC | Age: 77
End: 2017-04-07

## 2017-04-17 ENCOUNTER — TELEPHONE (OUTPATIENT)
Dept: NEUROSURGERY | Facility: CLINIC | Age: 77
End: 2017-04-17

## 2017-04-17 NOTE — TELEPHONE ENCOUNTER
Amparo has fallen 3 times in the last week.  She is also complaining of dizziness.  She has a fairly new shunt.  We will bring her into see Dr Dozier this week

## 2017-04-19 ENCOUNTER — RECORDS - HEALTHEAST (OUTPATIENT)
Dept: ADMINISTRATIVE | Facility: OTHER | Age: 77
End: 2017-04-19

## 2017-04-21 ENCOUNTER — TELEPHONE (OUTPATIENT)
Dept: NEUROSURGERY | Facility: CLINIC | Age: 77
End: 2017-04-21

## 2017-04-21 NOTE — TELEPHONE ENCOUNTER
Called to check in with patient to see how she is doing after her shunt was re-programmed to the setting of 1.5 on 4/19/17 by Sujata Mccoy NP. Patient states that she has seen much improvement in her dizziness as well as her unsteadiness. She reports that she has not had a fall and feels better since her reprogramming. She denies any headaches, increased confusion, or worsening gait or balance. She reports that her incontinence has not improved. Patient was advised to continue to monitor and call us if there are any new or worsening symptoms or seek medical attention at the ED after hours. She verbalized understanding and was encouraged to call with further questions or concerns. Writer will update Sujata Mccoy NP.     Chacha Mcelroy RN

## 2017-05-01 ENCOUNTER — TELEPHONE (OUTPATIENT)
Dept: NEUROSURGERY | Facility: CLINIC | Age: 77
End: 2017-05-01

## 2017-05-01 DIAGNOSIS — G91.2 NPH (NORMAL PRESSURE HYDROCEPHALUS) (H): Primary | ICD-10-CM

## 2017-05-01 NOTE — TELEPHONE ENCOUNTER
Received a call from patient who states that in the last few days she feels that she has fallen 2 times and become more unsteady. This was reviewed with Dr. Dozier and Sujata Mccoy. We will plan to obtain a head CT and  her for follow-up in clinic with Sujata on Wednesday Patient agreeable to plan and will call with further questions or concerns.     Chacha Mcelroy RN

## 2017-05-03 ENCOUNTER — OFFICE VISIT (OUTPATIENT)
Dept: NEUROSURGERY | Facility: CLINIC | Age: 77
End: 2017-05-03

## 2017-05-03 VITALS
HEART RATE: 62 BPM | SYSTOLIC BLOOD PRESSURE: 136 MMHG | DIASTOLIC BLOOD PRESSURE: 65 MMHG | HEIGHT: 60 IN | WEIGHT: 166 LBS | BODY MASS INDEX: 32.59 KG/M2

## 2017-05-03 DIAGNOSIS — G91.9 HYDROCEPHALUS (H): Primary | ICD-10-CM

## 2017-05-03 DIAGNOSIS — Z09 SURGICAL FOLLOWUP: ICD-10-CM

## 2017-05-03 ASSESSMENT — PAIN SCALES - GENERAL: PAINLEVEL: MILD PAIN (2)

## 2017-05-03 NOTE — PROGRESS NOTES
PROGRESS NOTE    CHIEF COMPLAINT: Recurrent light headedness and s/p fall x 4.    HISTORY OF PRESENT ILLNESS:  I saw Mrs. 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    This is a 76 year old female with several year history of gait imbalance, poor memory, and urinary incontinence. Her neurologist Alex Patel suspected that she has normal pressure hydrocephalus and referred her to Dr. Dozier for a lumbar drain (LD) trial. Her gait and balance reportedly improved during the trial and the decision was made to place a right  shunt on 2/8/2017.    The patient was seen by Dr. Dozier on 3/27/2017 and was happy with the improved gait and balance training with PT at a shunt pressure setting of 1.0 until about the 2nd week of 4/2017 when she started to have intermittent light headiness and fell three times within a week without LOC and changes in memory from her baseline.  I saw her on 4/19/2017 for this reason and had increased her shunt pressure setting to 1.5 base on her symptoms. Her light headedness and associated imbalance reportedly improved for a couple days and then regressed. She said she had since fallen four more times due to light headedness; this occured when she arised from sitting to standing position and then tried to turn around without using a walker. One time she hit the back of her head on a corner of a wall and it bled. She denies open wounds on the scalp at present, however, the area is still painful. She denies LOC associated with any of the falls. She said her PT noticed that her balance has gotten worse. She admits that the intermittent light headedness made her feel unsafe to walk. She denies headache, nausea, vomiting, and changes in vision. She further denies a sensation of heaviness in her feet when she walked (like what she experienced before the surgery). She is seeing a urologist for management of her chronic incontinence.    FOCUS NEUROLOGIC EXAMINATION:  She  is alert and oriented to person, place, and time. Mood and affect are normal. Speech is fluent. Fund of knowledge is appropriate to her age. She is able to name current and past [place] president, perform word recalls and serial subtraction. Head is normal cephalic.The previously noted small marble size bump in the mid inferior occiput is healed There is a small dry blood scab noted in the right mid occiput area.    Cranial nerve II- XII are grossly intact. She exhibits no finger to nose dysmetria and no pronator drift. She had to rock her self back and forth a few times to get to the edge of chair before getting up and holding on to her walker. Gait is wide, slow and unsteady. She immediately complained of increase light headedness upon standing. She has slight Romberg sway.   Her shunt site on the right upper lateral skull is nicely healed. Strata pressure setting was verified at the level of 1.5.    IMAGING: I reviewed her CT and report obtained today- 5/3/2017. The study showed no intracranial hemorrhage, no mass effect, no midline shift, and no change in size and configuration of the ventricles.     ASSESSMENT AND PLAN OF CARE:  Based on her clinical presentation and examination, I think it is reasonable to re-programm her setting up to 2.0 to see this will improve her condition over the next few days. I will have our RN coordinator call and follow up with her. I have also asked her and her friend to monitor and report any unusual headache, increased confusion, and worsening gait/balance to our office immediately or ED after hours. She agreed.    Case and plan of care were discussed with Dr. Dozier.      Sujata Mccoy, DNP, APRN, FNP-BC  [facility]  Department of Neurosurgery

## 2017-05-03 NOTE — PATIENT INSTRUCTIONS
Patient will call our office in a couple days with her progress or earlier with any concerns and/or questions.

## 2017-05-03 NOTE — MR AVS SNAPSHOT
After Visit Summary   5/3/2017    Amparo Sharma    MRN: [id number]           Patient Information     Date Of Birth          1940        Visit Information        Provider Department      5/3/2017 1:00 PM Sujata Mccoy APRN CNP [facility] Neurosurgery        Today's Diagnoses     Hydrocephalus    -  1    Surgical followup          Care Instructions    Patient will call our office in a couple days with her progress or earlier with any concerns and/or questions.        Follow-ups after your visit        Your next 10 appointments already scheduled     Jul 13, 2017 10:00 AM CDT   (Arrive by 9:45 AM)   New Patient Visit with KASANDRA Martínez CNP [facility] Gastroenterology and IBD ([facility])    [address] [place] [address]  [address] [facility] [address] [phone]            Sep 25, 2017  1:20 PM CDT   (Arrive by 1:05 PM)   Return Visit with MD JOESPH Lozano [facility] Neurosurgery ([facility])    [address] [place] [address]  [address] [facility] [address] [phone]              Who to contact     Please call your clinic at [phone] to:    Ask questions about your health    Make or cancel appointments    Discuss your medicines    Learn about your test results    Speak to your doctor   If you have compliments or concerns about an experience at your clinic, or if you wish to file a complaint, please contact [facility] Patient Relations at [phone] or email us at [email or web address]         Additional Information About Your Visit        MyChart Information     [employer] is an electronic gateway that provides easy, online access to your medical records. With [employer], you can request a clinic appointment, read your test results, renew a prescription or communicate with your care team.     To sign up for [employer] visit the website at [email or web address]   You will be  asked to enter the access code listed below, as well as some personal information. Please follow the directions to create your username and password.     Your access code is: [email or web address]  Expires: 2017  6:30 AM     Your access code will  in 90 days. If you need help or a new code, please contact your [facility] or call [phone] for assistance.        Care EveryWhere ID     This is your Care EveryWhere ID. This could be used by other organizations to access your [facility] medical records  [id number]        Your Vitals Were     Pulse Height BMI (Body Mass Index)             62 1.524 m (5') 32.42 kg/m2          Blood Pressure from Last 3 Encounters:   17 136/65   17 140/84   17 130/79    Weight from Last 3 Encounters:   17 75.3 kg (166 lb)   17 71 kg (156 lb 8 oz)   02/15/17 78 kg (171 lb 14.4 oz)              Today, you had the following     No orders found for display       Primary Care Provider Office Phone # Fax #    Parvez Francis [phone]       [facility] [address] [id number]        Thank you!     Thank you for choosing [facility]  for your care. Our goal is always to provide you with excellent care. Hearing back from our patients is one way we can continue to improve our services. Please take a few minutes to complete the written survey that you may receive in the mail after your visit with us. Thank you!             Your Updated Medication List - Protect others around you: Learn how to safely use, store and throw away your medicines at www.disposemymeds.org.          This list is accurate as of: 5/3/17 11:59 PM.  Always use your most recent med list.                   Brand Name Dispense Instructions for use    ABILIFY PO      Take 5 mg by mouth At Bedtime       AMLODIPINE BESYLATE PO      Take 10 mg by mouth every morning       aspirin-acetaminophen-caffeine 250-250-65 MG per  tablet    EXCEDRIN MIGRAINE     Take 1 tablet by mouth every 6 hours as needed for headaches       DONEPEZIL HCL PO      Take 10 mg by mouth At Bedtime       fluticasone 110 MCG/ACT Inhaler    FLOVENT HFA     Inhale 2 puffs into the lungs as needed       FOLIC ACID PO      Take 1 mg by mouth       hydrocortisone 2.5 % cream      Apply topically 2 times daily       lactobacillus rhamnosus (GG) capsule      Take 1 capsule by mouth 2 times daily       LAMICTAL PO      Take 150 mg by mouth in the morning and 100 mg by mouth in the evening       Levothyroxine Sodium 75 MCG Caps      Take 75 mcg by mouth every morning       MOBIC PO      Take 15 mg by mouth daily       MULTIVITAMIN PO      Take by mouth daily       ondansetron 4 MG ODT tab    ZOFRAN-ODT    40 tablet    Take 1 tablet (4 mg) by mouth every 6 hours as needed for nausea       PROZAC PO      Take 40 mg by mouth every morning       RANITIDINE HCL PO      Take 300 mg by mouth 2 times daily       SPIRIVA HANDIHALER IN      Inhale 1 puff into the lungs as needed       TYLENOL PO      Take 500 mg by mouth every 8 hours as needed for mild pain or fever       WELLBUTRIN XL PO      Take 150 mg by mouth every morning

## 2017-05-03 NOTE — NURSING NOTE
Chief Complaint   Patient presents with     RECHECK     [facility] RETURN - Shunt Recheck     Raiza Marino MA

## 2017-05-08 ENCOUNTER — TELEPHONE (OUTPATIENT)
Dept: NEUROSURGERY | Facility: CLINIC | Age: 77
End: 2017-05-08

## 2017-05-08 NOTE — TELEPHONE ENCOUNTER
Called patient to check in to see how she is doing since her shunt readjustment last week with Sujata Mccoy NP. Patient reports that she is doing well and believes that her balance is improving. She denies any falls, unusual headache, increased confusion, and worsening gait/balance. Patient was encouraged to continue to monitor and call if there are any new or worsening symptoms or seek medical attention at ER on off clinic hours. Patient was encouraged to call with further questions or concerns. Will update Sujata Mccoy NP.     Chacha Mcelroy RN

## 2017-05-18 ENCOUNTER — TRANSFERRED RECORDS (OUTPATIENT)
Dept: HEALTH INFORMATION MANAGEMENT | Facility: CLINIC | Age: 77
End: 2017-05-18

## 2017-05-19 ENCOUNTER — TELEPHONE (OUTPATIENT)
Dept: NEUROSURGERY | Facility: CLINIC | Age: 77
End: 2017-05-19

## 2017-05-19 NOTE — TELEPHONE ENCOUNTER
Patient called to ask Dr. Dozier if it would be okay for her to undergo an interstim trial of neurosacral modulation to help with her incontinence. She states that she saw her urologist who requested that she ask Dr. Dozier prior to moving forward with the interstim trial of neurosacral modulation. Reviewed with Dr. Dozier and he okayed patient to have this with her urologist. This was shared with patient and she was encouraged to call with further questions or concerns.     Chacha Mcelroy RN

## 2017-06-02 ENCOUNTER — DOCUMENTATION ONLY (OUTPATIENT)
Dept: OTHER | Facility: CLINIC | Age: 77
End: 2017-06-02

## 2017-06-02 PROBLEM — Z71.89 ADVANCED DIRECTIVES, COUNSELING/DISCUSSION: Chronic | Status: ACTIVE | Noted: 2017-06-02

## 2017-06-05 ENCOUNTER — TRANSFERRED RECORDS (OUTPATIENT)
Dept: HEALTH INFORMATION MANAGEMENT | Facility: CLINIC | Age: 77
End: 2017-06-05

## 2017-07-06 ENCOUNTER — PRE VISIT (OUTPATIENT)
Dept: GASTROENTEROLOGY | Facility: CLINIC | Age: 77
End: 2017-07-06

## 2017-07-06 NOTE — TELEPHONE ENCOUNTER
1.  Date/reason for appt: 7/13/17 - Abd Bloating  2.  Referring provider: Dr. Linh Ramos  3.  Call to patient (Yes / No - short description): no, pt is referred  4.  Previous care at / records requested from: [email or web address] -- Records and CT Abd/Pelvis 3/6/17 in Epic/Pacs

## 2017-07-12 ENCOUNTER — TELEPHONE (OUTPATIENT)
Dept: GASTROENTEROLOGY | Facility: CLINIC | Age: 77
End: 2017-07-12

## 2017-07-13 ENCOUNTER — OFFICE VISIT (OUTPATIENT)
Dept: GASTROENTEROLOGY | Facility: CLINIC | Age: 77
End: 2017-07-13

## 2017-07-13 ENCOUNTER — RECORDS - HEALTHEAST (OUTPATIENT)
Dept: ADMINISTRATIVE | Facility: OTHER | Age: 77
End: 2017-07-13

## 2017-07-13 ENCOUNTER — TELEPHONE (OUTPATIENT)
Dept: GASTROENTEROLOGY | Facility: CLINIC | Age: 77
End: 2017-07-13

## 2017-07-13 VITALS
HEIGHT: 60 IN | OXYGEN SATURATION: 96 % | TEMPERATURE: 98.5 F | SYSTOLIC BLOOD PRESSURE: 138 MMHG | DIASTOLIC BLOOD PRESSURE: 77 MMHG | HEART RATE: 55 BPM | WEIGHT: 167.9 LBS | BODY MASS INDEX: 32.96 KG/M2

## 2017-07-13 DIAGNOSIS — R13.19 ESOPHAGEAL DYSPHAGIA: Primary | ICD-10-CM

## 2017-07-13 DIAGNOSIS — R11.0 NAUSEA: ICD-10-CM

## 2017-07-13 DIAGNOSIS — K21.9 GASTROESOPHAGEAL REFLUX DISEASE, ESOPHAGITIS PRESENCE NOT SPECIFIED: ICD-10-CM

## 2017-07-13 RX ORDER — DIFLORASONE DIACETATE 0.5 MG/G
1 OINTMENT TOPICAL DAILY
COMMUNITY
End: 2018-09-11

## 2017-07-13 ASSESSMENT — ENCOUNTER SYMPTOMS
HEADACHES: 0
DISTURBANCES IN COORDINATION: 1
BOWEL INCONTINENCE: 0
DYSURIA: 0
DIARRHEA: 1
CONSTIPATION: 1
MUSCLE WEAKNESS: 0
JOINT SWELLING: 0
LOSS OF CONSCIOUSNESS: 0
NUMBNESS: 0
MYALGIAS: 0
WEAKNESS: 0
DIZZINESS: 0
BLOOD IN STOOL: 0
MEMORY LOSS: 1
DIFFICULTY URINATING: 1
PARALYSIS: 0
HEMATURIA: 0
BLOATING: 1
TREMORS: 0
SPEECH CHANGE: 0
ABDOMINAL PAIN: 1
NECK PAIN: 0
JAUNDICE: 0
RECTAL PAIN: 0
VOMITING: 0
STIFFNESS: 1
SEIZURES: 0
TINGLING: 0
BACK PAIN: 0
ARTHRALGIAS: 1
MUSCLE CRAMPS: 0
HEARTBURN: 1
FLANK PAIN: 0
RECTAL BLEEDING: 0
NAUSEA: 1

## 2017-07-13 ASSESSMENT — PAIN SCALES - GENERAL: PAINLEVEL: MILD PAIN (2)

## 2017-07-13 NOTE — MR AVS SNAPSHOT
After Visit Summary   7/13/2017    Amparo Sharma    MRN: [id number]           Patient Information     Date Of Birth          1940        Visit Information        Provider Department      7/13/2017 10:00 AM Maria De Jesus Seo APRN CNP [facility]        Today's Diagnoses     Esophageal dysphagia    -  1    Gastroesophageal reflux disease, esophagitis presence not specified        Nausea          Care Instructions    UPPER ENDOSCOPY (also known as EGD- esophageogastroduodenoscopy)    If you do not receive a call to schedule this within two business days, please call Endoscopy .       You will need a  to bring you home from the exam.  You may not take a cab home unless you have an adult with you.    If you have diabetes or are on blood thinners: talk with your doctor at least one week before the exam.  They may want you to change your medicine before the test.    This exam looks at the lining of your esophagus, stomach and duodenum (first part of the small intestine).    Do not eat of drink anything for 6-8 hours before your exam.    During this procedure, the doctor will help you to swallow a flexible tube called an endoscope. This endoscope has a small camera that lets the doctor see inside your body. The exam last from 10-20 minutes.    A small IV will be placed in your hand or arm, and medicine will be given to you through this IV to help you relax and reduce pain. They will spray your throat with numbing medicine and ask you to swallow to assist the doctor in passing the tube into your stomach. Also, biopsies may be taken to test in the lab and pictures may be taken of your esophagus, stomach, and duodenum.    You will rest in the recovery room for 30-60 minutes following the exam. Your throat may be numb for a short while and may be sore for a few days.       Re BM   Soluble fiber sops up water and gives soft stool and formed stool.    This also  "supports healthy gut bacteria.    Is a prebiotic that supports the \"pro-biotics\"  Psyllium (Metamucil) is most natural.   Powder - mix and drink immediately, -  or use the capsules or tablets.  or  Wheat dextrin (Benefiber) which has no taste or texture.   Available as powder, capsule, chewable.  When in doubt, return to powder.    Take the dose on the label.  If it causes distress, start at half the dose and work up to full dose.  If you have no improvement after two weeks, increase the dose and be sure to use it twice daily.  You may feel bloat at the beginning.   Improvement comes gradually.  Continue this regularly for a couple months before deciding whether it is helpful for you.    Any time there is urgent loose stool, write down what you have eaten and drunk in the previous 24 hours and note what your bowel habit has been the 3 days before. Keep these records for review to see whether a food or food group is causing the episode or whether stool overload is causing a natural \"clean out\".    Call as needed    Return to GI Clinic 2-3 months.      [facility]   For appointments call Tia, [phone]  Coordinator  [phone] Gloria or call -  GI Nurse Triage  [phone] for Medical Questions, # 3  Fax results to                   Follow-ups after your visit        Additional Services     GASTROENTEROLOGY ADULT REF PROCEDURE ONLY       Last Lab Result: Creatinine (mg/dL)       Date                     Value                 02/09/2017               0.82             ----------  Body mass index is 32.79 kg/(m^2).     Needed:  No  Language:  English    Patient will be contacted to schedule procedure.     Please be aware that coverage of these services is subject to the terms and limitations of your health insurance plan.  Call member services at your health plan with any benefit or coverage questions.  Any procedures must be performed at a [facility] facility OR " coordinated by your clinic's referral office.    Please bring the following with you to your appointment:    (1) Any X-Rays, CTs or MRIs which have been performed.  Contact the facility where they were done to arrange for  prior to your scheduled appointment.    (2) List of current medications   (3) This referral request   (4) Any documents/labs given to you for this referral                  Follow-up notes from your care team     Return in about 2 months (around 2017).      Your next 10 appointments already scheduled     Sep 25, 2017  1:20 PM CDT   (Arrive by 1:05 PM)   Return Visit with Rc Dozier MD   [facility] ([facility])    [address] [facility] [address] [phone]              Who to contact     Please call your clinic at [phone] to:    Ask questions about your health    Make or cancel appointments    Discuss your medicines    Learn about your test results    Speak to your doctor   If you have compliments or concerns about an experience at your clinic, or if you wish to file a complaint, please contact [facility] Patient Relations at [phone] or email us at [email or web address]         Additional Information About Your Visit        MyChart Information     [employer] is an electronic gateway that provides easy, online access to your medical records. With [employer], you can request a clinic appointment, read your test results, renew a prescription or communicate with your care team.     To sign up for [employer] visit the website at [email or web address]   You will be asked to enter the access code listed below, as well as some personal information. Please follow the directions to create your username and password.     Your access code is: [email or web address]  Expires: 2017  6:30 AM     Your access code will  in 90 days. If you need help or a new code, please contact your  [facility] or call [phone] for assistance.        Care EveryWhere ID     This is your Care EveryWhere ID. This could be used by other organizations to access your [place] medical records  [id number]        Your Vitals Were     Pulse Temperature Height Pulse Oximetry BMI (Body Mass Index)       55 98.5  F (36.9  C) 1.524 m (5') 96% 32.79 kg/m2        Blood Pressure from Last 3 Encounters:   07/13/17 138/77   05/03/17 136/65   04/19/17 140/84    Weight from Last 3 Encounters:   07/13/17 76.2 kg (167 lb 14.4 oz)   05/03/17 75.3 kg (166 lb)   02/27/17 71 kg (156 lb 8 oz)              We Performed the Following     GASTROENTEROLOGY ADULT REF PROCEDURE ONLY        Primary Care Provider Office Phone # Fax #    Parvez Francis [phone]       [facility] [address] [address]        Equal Access to Services     FAWN WALSH : Hadii aad ku hadasho Soomaali, waaxda luqadaha, qaybta kaalmada adeegyada, waxay idiin hayaan adalid sanchez . So [facility] [phone].    ATENCIÓN: Si habla español, tiene a mcdonald disposición servicios gratuitos de asistencia lingüística. [facility] [phone].    We comply with applicable federal civil rights laws and [place] laws. We do not discriminate on the basis of race, color, national origin, age, disability sex, sexual orientation or gender identity.            Thank you!     Thank you for choosing [facility] GASTROENTEROLOGY [facility] IBD  for your care. Our goal is always to provide you with excellent care. Hearing back from our patients is one way we can continue to improve our services. Please take a few minutes to complete the written survey that you may receive in the mail after your visit with us. Thank you!             Your Updated Medication List - Protect others around you: Learn how to safely use, store and throw away your medicines at www.disposemymeds.org.          This list is accurate as of: 7/13/17  11:12 AM.  Always use your most recent med list.                   Brand Name Dispense Instructions for use Diagnosis    ABILIFY PO      Take 5 mg by mouth At Bedtime    Hip pain, left, Malignant neoplasm of sigmoid colon (H), Gait disturbance, Other iron deficiency anemia, Acute midline low back pain with right-sided sciatica, Leg weakness, bilateral, Vitamin D deficiency       AMLODIPINE BESYLATE PO      Take 10 mg by mouth every morning        aspirin-acetaminophen-caffeine 250-250-65 MG per tablet    EXCEDRIN MIGRAINE     Take 1 tablet by mouth every 6 hours as needed for headaches    Hip pain, left, Malignant neoplasm of sigmoid colon (H), Gait disturbance, Other iron deficiency anemia, Acute midline low back pain with right-sided sciatica, Leg weakness, bilateral, Vitamin D deficiency       diflorasone 0.05 % ointment    PSORCON     Apply 1 applicator topically daily        DONEPEZIL HCL PO      Take 10 mg by mouth At Bedtime        fluticasone 110 MCG/ACT Inhaler    FLOVENT HFA     Inhale 2 puffs into the lungs as needed    Hip pain, left, Malignant neoplasm of sigmoid colon (H), Gait disturbance, Other iron deficiency anemia, Acute midline low back pain with right-sided sciatica, Leg weakness, bilateral, Vitamin D deficiency       FOLIC ACID PO      Take 1 mg by mouth        hydrocortisone 2.5 % cream      Apply topically 2 times daily        lactobacillus rhamnosus (GG) capsule      Take 1 capsule by mouth 2 times daily    Cellulitis of left elbow       LAMICTAL PO      Take 150 mg by mouth in the morning and 100 mg by mouth in the evening    Gait disturbance, Memory loss, Malignant neoplasm of sigmoid colon (H)       Levothyroxine Sodium 75 MCG Caps      Take 75 mcg by mouth every morning    Gait disturbance, Memory loss, Malignant neoplasm of sigmoid colon (H)       MULTIVITAMIN PO      Take by mouth daily    Gait disturbance, Memory loss, Malignant neoplasm of sigmoid colon (H)       ondansetron 4 MG ODT  tab    ZOFRAN-ODT    40 tablet    Take 1 tablet (4 mg) by mouth every 6 hours as needed for nausea    S/P ventriculoperitoneal shunt       PROZAC PO      Take 40 mg by mouth every morning    Gait disturbance, Memory loss, Malignant neoplasm of sigmoid colon (H)       RANITIDINE HCL PO      Take 300 mg by mouth 2 times daily        SPIRIVA HANDIHALER IN      Inhale 1 puff into the lungs as needed        TYLENOL PO      Take 500 mg by mouth every 8 hours as needed for mild pain or fever    Gait disturbance, Memory loss, Malignant neoplasm of sigmoid colon (H)       WELLBUTRIN XL PO      Take 150 mg by mouth every morning    Hip pain, left, Malignant neoplasm of sigmoid colon (H), Gait disturbance, Other iron deficiency anemia, Acute midline low back pain with right-sided sciatica, Leg weakness, bilateral, Vitamin D deficiency

## 2017-07-13 NOTE — PATIENT INSTRUCTIONS
"UPPER ENDOSCOPY (also known as EGD- esophageogastroduodenoscopy)    If you do not receive a call to schedule this within two business days, please call Endoscopy .       You will need a  to bring you home from the exam.  You may not take a cab home unless you have an adult with you.    If you have diabetes or are on blood thinners: talk with your doctor at least one week before the exam.  They may want you to change your medicine before the test.    This exam looks at the lining of your esophagus, stomach and duodenum (first part of the small intestine).    Do not eat of drink anything for 6-8 hours before your exam.    During this procedure, the doctor will help you to swallow a flexible tube called an endoscope. This endoscope has a small camera that lets the doctor see inside your body. The exam last from 10-20 minutes.    A small IV will be placed in your hand or arm, and medicine will be given to you through this IV to help you relax and reduce pain. They will spray your throat with numbing medicine and ask you to swallow to assist the doctor in passing the tube into your stomach. Also, biopsies may be taken to test in the lab and pictures may be taken of your esophagus, stomach, and duodenum.    You will rest in the recovery room for 30-60 minutes following the exam. Your throat may be numb for a short while and may be sore for a few days.       Re BM   Soluble fiber sops up water and gives soft stool and formed stool.    This also supports healthy gut bacteria.    Is a prebiotic that supports the \"pro-biotics\"  Psyllium (Metamucil) is most natural.   Powder - mix and drink immediately, -  or use the capsules or tablets.  or  Wheat dextrin (Benefiber) which has no taste or texture.   Available as powder, capsule, chewable.  When in doubt, return to powder.    Take the dose on the label.  If it causes distress, start at half the dose and work up to full dose.  If you have no improvement after " "two weeks, increase the dose and be sure to use it twice daily.  You may feel bloat at the beginning.   Improvement comes gradually.  Continue this regularly for a couple months before deciding whether it is helpful for you.    Any time there is urgent loose stool, write down what you have eaten and drunk in the previous 24 hours and note what your bowel habit has been the 3 days before. Keep these records for review to see whether a food or food group is causing the episode or whether stool overload is causing a natural \"clean out\".    Call as needed    Return to GI Clinic 2-3 months.      [facility]   For appointments call Tia, [phone]  Coordinator  [phone] Gloria or call -  GI Nurse Triage  [phone] for Medical Questions, # 3  Fax results to           "

## 2017-07-13 NOTE — NURSING NOTE
Chief Complaint   Patient presents with     Consult     consult       Vitals:    07/13/17 1019   BP: 138/77   Pulse: 55   Temp: 98.5  F (36.9  C)   SpO2: 96%   Weight: 167 lb 14.4 oz   Height: 5'       Body mass index is 32.79 kg/(m^2).    Kolton Peterson MA

## 2017-07-13 NOTE — PROGRESS NOTES
Chief Complaint: Bloat    Referring Physician: Dr. Teran    History Of Present Illness: Amparo is a 76-year-old woman seen by  [facility] regarding ventral hernia in February. Thereafter she had CT abdomen demonstrating complex upper abdominal hernia with intestine and lower abdominal hernias with fat. She comes now with her daughter, Nohemi. She describes worsening of her hernias with increasing pain since her visit with Dr. Teran. She reports recommendation from Dr. Teran for her to have surgery for her hernias.    Amparo has a couple kinds of pain. She has pain in the hernia itself much of the time. She also has a retroxiphoid pain with intensity which may decrease with significant pressure or is it a distraction?. Amparo was having vomiting frequently while in rehabilitation for her knees and hips but this has stopped. The change decreasing vomiting was to a bland diet with no further spice or salad. She tolerates oatmeal, whole gr bread but not white bread, a few fruits. The pain in the hernia becomes quite intense after eating and resolves gradually over an hour or so.    Amparo has history of heartburn and is now using ranitidine 150 mg 3 times daily most often. This fairly well controls her heartburn. Omeprazole was of no benefit when at rehabilitation in [place]. Dose unknown. She does not use alcohol or tobacco but uses mint, never in the evening.    Amparo has nausea occasionally after meals, yesterday 1.5 hours after eating. It decreased an hour or more later when she returned home and had ranitidine and evening medications. She has early satiety but clear since of hunger and appetite at other times. She has never had an endoscopy and does not believe she was ever tested for H. pylori.    Amparo has dysphagia to solids including hard-boiled eggs, chicken, a hamburger. This is ongoing and she has needed to drink foods down. She has never had upper GI endoscopy. She does not choke with  liquids or cough with ingestions.    Amparo has alternating diarrhea and constipation. She has cramping abdomen primarily with bowel movements. She has history of segmental resection and chemotherapy for sigmoid cancer and is followed now by Dr. Stauffer in [place]. Last colonoscopy at [facility] was February or March 2016. Laboratory studies have been negative for her specific cancer markers since then.     Medical History: Reviewed and annotated. She denies significant cardiac or respiratory history, no obstructive sleep apnea.  Surgical History: Reviewed and partially annotated.   Medications reviewed. None is likely to contribute to her symptoms. She is not on any anticoagulant or insulin.  Adverse drug reactions reviewed.    Family History: Father had peptic ulcer, brother had many GI symptoms in earlier years. Negative for Autoimmune Disease other than her own thyroid disease. Family history is negative for colon cancer other than her own.    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 No current alcohol or tobacco; quit tobacco in the 1990's. A former alcoholic.    REVIEW OF SYSTEMS: Review of Systems   Gastrointestinal: Positive for abdominal pain, constipation, diarrhea, heartburn and nausea. Negative for blood in stool, melena and vomiting.   Genitourinary: Positive for urgency. Negative for dysuria, flank pain and hematuria.   Musculoskeletal: Negative for back pain, myalgias and neck pain.   Neurological: Negative for dizziness, tingling, tremors, speech change, seizures, loss of consciousness, weakness and headaches.   Psychiatric/Behavioral: Positive for memory loss.     OBJECTIVE: /77  Pulse 55  Temp 98.5  F (36.9  C)  Ht 1.524 m (5')  Wt 76.2 kg (167 lb 14.4 oz)  SpO2 96%  BMI 32.79 kg/m2  Clean, pleasant, somewhat stooped woman here with her daughter, Nohemi, and using a walker.  Eyes are clear.  Speech is fluent and logical and she  acknowledges memory loss at times.  Breathing is grossly normal.  Abdomen has protuberant hernia in the upper abdomen where she also points to her tenderness and pain.    Results:  CT abdomen with no contrast 06-Mar-17 with complex upper abdominal hernia containing primarily small intestine, minor lower abdominal hernias which are fat-containing. Stomach protrudes into onset of the upper hernia. Upper hernia content rests below the level of the abdominal wall herniation. There is only possibly a very small hiatal hernia, nothing more than that. There is considerable vascular calcification in the lower aorta.     Assessment:  76-year-old woman with both stomach and upper intestinal pain likely related to ingestion and her hernia who also has a retroxiphoid pain and esophageal dysphagia after long-standing history of smoking, alcohol abuse, and acid reflux. She no longer focuses on the bloat for which she was referred. She does have alternating loose stool and constipation which are not further detailed today.    Plan:  1. Upper GI endoscopy with monitored anesthesia care due to dysphagia  2. Continue her ranitidine, 3 times daily okay.  3. Follow GERD precautions, as she is with the exception of using mint.  4. Add soluble fiber. Notes provided.  5. Any time she has more urgent loose stool, record ingestions of the previous 24 hours and bowel habit of the previous 3 days.  6. Schedule GI return appointment within 2 months.  7. I will make contact with Dr. Teran regarding questions pertinent to her comfort for the meantime. Use of any abdominal binder would require cautious placement with upward placement of the hernia content, a difficult and risky action.    We discussed the history/results, assessment and plan. We reviewed his CT images directly. Questions were answered. Written notes were provided.    There were no barriers to learning found.    Total visit 45 minutes with counseling time 30 minutes.

## 2017-07-13 NOTE — LETTER
7/13/2017       RE: Amparo Sharma    [address] [facility] [address]     Dear Colleague,    Thank you for referring your patient, Amparo Sharma, to the [facility] GASTROENTEROLOGY AND IBD at [facility]. Please see a copy of my visit note below.    Chief Complaint: Bloat    Referring Physician: Dr. Teran    History Of Present Illness: Amparo is a 76-year-old woman seen by  [facility] regarding ventral hernia in February. Thereafter she had CT abdomen demonstrating complex upper abdominal hernia with intestine and lower abdominal hernias with fat. She comes now with her daughter, Nohemi. She describes worsening of her hernias with increasing pain since her visit with Dr. Teran. She reports recommendation from Dr. Teran for her to have surgery for her hernias.    Amparo has a couple kinds of pain. She has pain in the hernia itself much of the time. She also has a retroxiphoid pain with intensity which may decrease with significant pressure or is it a distraction?. Amparo was having vomiting frequently while in rehabilitation for her knees and hips but this has stopped. The change decreasing vomiting was to a bland diet with no further spice or salad. She tolerates oatmeal, whole gr bread but not white bread, a few fruits. The pain in the hernia becomes quite intense after eating and resolves gradually over an hour or so.    Amparo has history of heartburn and is now using ranitidine 150 mg 3 times daily most often. This fairly well controls her heartburn. Omeprazole was of no benefit when at rehabilitation in [place]. Dose unknown. She does not use alcohol or tobacco but uses mint, never in the evening.    Amparo has nausea occasionally after meals, yesterday 1.5 hours after eating. It decreased an hour or more later when she returned home and had ranitidine and evening medications. She has early satiety but clear since of hunger  and appetite at other times. She has never had an endoscopy and does not believe she was ever tested for H. pylori.    Amparo has dysphagia to solids including hard-boiled eggs, chicken, a hamburger. This is ongoing and she has needed to drink foods down. She has never had upper GI endoscopy. She does not choke with liquids or cough with ingestions.    Amparo has alternating diarrhea and constipation. She has cramping abdomen primarily with bowel movements. She has history of segmental resection and chemotherapy for sigmoid cancer and is followed now by Dr. Stauffer in [place]. Last colonoscopy at [facility]-year tl was February or March 2016. Laboratory studies have been negative for her specific cancer markers since then.     Medical History: Reviewed and annotated. She denies significant cardiac or respiratory history, no obstructive sleep apnea.  Surgical History: Reviewed and partially annotated.   Medications reviewed. None is likely to contribute to her symptoms. She is not on any anticoagulant or insulin.  Adverse drug reactions reviewed.    Family History: Father had peptic ulcer, brother had many GI symptoms in earlier years. Negative for Autoimmune Disease other than her own thyroid disease. Family history is negative for colon cancer other than her own.    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 No current alcohol or tobacco; quit tobacco in the 1990's. A former alcoholic.    REVIEW OF SYSTEMS: Review of Systems   Gastrointestinal: Positive for abdominal pain, constipation, diarrhea, heartburn and nausea. Negative for blood in stool, melena and vomiting.   Genitourinary: Positive for urgency. Negative for dysuria, flank pain and hematuria.   Musculoskeletal: Negative for back pain, myalgias and neck pain.   Neurological: Negative for dizziness, tingling, tremors, speech change, seizures, loss of consciousness, weakness and headaches.   Psychiatric/Behavioral:  Positive for memory loss.     OBJECTIVE: /77  Pulse 55  Temp 98.5  F (36.9  C)  Ht 1.524 m (5')  Wt 76.2 kg (167 lb 14.4 oz)  SpO2 96%  BMI 32.79 kg/m2  Clean, pleasant, somewhat stooped woman here with her daughter, Nohemi, and using a walker.  Eyes are clear.  Speech is fluent and logical and she acknowledges memory loss at times.  Breathing is grossly normal.  Abdomen has protuberant hernia in the upper abdomen where she also points to her tenderness and pain.    Results:  CT abdomen with no contrast 06-Mar-17 with complex upper abdominal hernia containing primarily small intestine, minor lower abdominal hernias which are fat-containing. Stomach protrudes into onset of the upper hernia. Upper hernia content rests below the level of the abdominal wall herniation. There is only possibly a very small hiatal hernia, nothing more than that. There is considerable vascular calcification in the lower aorta.     Assessment:  76-year-old woman with both stomach and upper intestinal pain likely related to ingestion and her hernia who also has a retroxiphoid pain and esophageal dysphagia after long-standing history of smoking, alcohol abuse, and acid reflux. She no longer focuses on the bloat for which she was referred. She does have alternating loose stool and constipation which are not further detailed today.    Plan:  1. Upper GI endoscopy with monitored anesthesia care due to dysphagia  2. Continue her ranitidine, 3 times daily okay.  3. Follow GERD precautions, as she is with the exception of using mint.  4. Add soluble fiber. Notes provided.  5. Any time she has more urgent loose stool, record ingestions of the previous 24 hours and bowel habit of the previous 3 days.  6. Schedule GI return appointment within 2 months.  7. I will make contact with Dr. Teran regarding questions pertinent to her comfort for the meantime. Use of any abdominal binder would require cautious placement with upward placement  of the hernia content, a difficult and risky action.    We discussed the history/results, assessment and plan. We reviewed his CT images directly. Questions were answered. Written notes were provided.    There were no barriers to learning found.    Total visit 45 minutes with counseling time 30 minutes.    Again, thank you for allowing me to participate in the care of your patient.      Sincerely,    KASANDRA Martínez CNP

## 2017-07-14 ENCOUNTER — CARE COORDINATION (OUTPATIENT)
Dept: SURGERY | Facility: CLINIC | Age: 77
End: 2017-07-14

## 2017-07-14 ENCOUNTER — TELEPHONE (OUTPATIENT)
Dept: GASTROENTEROLOGY | Facility: CLINIC | Age: 77
End: 2017-07-14

## 2017-07-14 ENCOUNTER — TELEPHONE (OUTPATIENT)
Dept: SURGERY | Facility: CLINIC | Age: 77
End: 2017-07-14

## 2017-07-14 NOTE — PROGRESS NOTES
Dr. Ramos reviewed GI consult note and would like this patient to meet with Dr. Astorga to discuss possible hernia repair.  She is becoming increasingly symptomatic.  Spoke with patient and arranged consultation with Dr. Astorga.

## 2017-07-14 NOTE — TELEPHONE ENCOUNTER
Patient saw Clare Seo of GI yesterday, endoscopy scheduled for end of August.  Called patient to see how she is doing, her pain associated with the hernia is clearly worse, the hernia is bigger.  Using an abdominal binder has made pain worse, therefore she should just avoid that.  Will have her see one of my partners for assessment of laparoscopic hernia repair.  She is agreeable to that plan.

## 2017-07-24 ENCOUNTER — RECORDS - HEALTHEAST (OUTPATIENT)
Dept: ADMINISTRATIVE | Facility: OTHER | Age: 77
End: 2017-07-24

## 2017-07-24 ENCOUNTER — OFFICE VISIT (OUTPATIENT)
Dept: SURGERY | Facility: CLINIC | Age: 77
End: 2017-07-24

## 2017-07-24 VITALS
BODY MASS INDEX: 33.12 KG/M2 | HEART RATE: 65 BPM | HEIGHT: 60 IN | OXYGEN SATURATION: 95 % | DIASTOLIC BLOOD PRESSURE: 76 MMHG | SYSTOLIC BLOOD PRESSURE: 132 MMHG | WEIGHT: 168.7 LBS | TEMPERATURE: 98.3 F

## 2017-07-24 DIAGNOSIS — K43.9 VENTRAL HERNIA WITHOUT OBSTRUCTION OR GANGRENE: Primary | ICD-10-CM

## 2017-07-24 ASSESSMENT — PAIN SCALES - GENERAL: PAINLEVEL: MILD PAIN (3)

## 2017-07-24 NOTE — MR AVS SNAPSHOT
After Visit Summary   7/24/2017    Amparo Sharma    MRN: [id number]           Patient Information     Date Of Birth          1940        Visit Information        Provider Department      7/24/2017 11:00 AM Que Astorga MD [facility] General Surgery        Today's Diagnoses     Ventral hernia without obstruction or gangrene    -  1       Follow-ups after your visit        Your next 10 appointments already scheduled     Sep 07, 2017   Procedure with Brook Marsh MD   [facility], [place], Endoscopy ([facility], [facility])    [address] [id number] [phone]           The [facility] is located on the corner of [address] and [address] on the [address] [facility]. It is easily accessible from virtually any point in the [place] area, via [address].            Sep 25, 2017  1:20 PM CDT   (Arrive by 1:05 PM)   Return Visit with Rc Dozier MD   [facility] Neurosurgery ([facility])    [address] [id number] [phone]              Who to contact     Please call your clinic at [phone] to:    Ask questions about your health    Make or cancel appointments    Discuss your medicines    Learn about your test results    Speak to your doctor   If you have compliments or concerns about an experience at your clinic, or if you wish to file a complaint, please contact [facility] Patient Relations at [phone] or email us at [email or web address]         Additional Information About Your Visit        MyChart Information     [employer] is an electronic gateway that provides easy, online access to your medical records. With [employer], you can request a clinic appointment, read your test results, renew a prescription or communicate with your care team.     To sign up for [employer] visit  the website at [email or web address]   You will be asked to enter the access code listed below, as well as some personal information. Please follow the directions to create your username and password.     Your access code is: [email or web address]  Expires: 2017  6:30 AM     Your access code will  in 90 days. If you need help or a new code, please contact your [facility] or call [phone] for assistance.        Care EveryWhere ID     This is your Care EveryWhere ID. This could be used by other organizations to access your [place] medical records  [id number]        Your Vitals Were     Pulse Temperature Height Pulse Oximetry BMI (Body Mass Index)       65 98.3  F (36.8  C) 5' 95% 32.95 kg/m2        Blood Pressure from Last 3 Encounters:   17 132/76   17 138/77   17 136/65    Weight from Last 3 Encounters:   17 168 lb 11.2 oz   17 167 lb 14.4 oz   17 166 lb              Today, you had the following     No orders found for display         Today's Medication Changes          These changes are accurate as of: 17 11:42 AM.  If you have any questions, ask your nurse or doctor.               Start taking these medicines.        Dose/Directions    order for DME   Used for:  Ventral hernia without obstruction or gangrene   Started by:  Que Astorga MD        Abdominal Binder - Medium   Quantity:  1 each   Refills:  3            Where to get your medicines      Some of these will need a paper prescription and others can be bought over the counter.  Ask your nurse if you have questions.     Bring a paper prescription for each of these medications     order for DME                Primary Care Provider Office Phone # Fax #    Parvez Francis [phone]        [address] [address]        Equal Access to Services     FAWN WALSH : narda Villalba,  dom carrillo, param sanchez ah. [facility] [phone].    ATENCIÓN: Si keturah connor, tiene a mcdonald disposición servicios gratuitos de asistencia lingüística. Killian al [phone].    We comply with applicable federal civil rights laws and [place] laws. We do not discriminate on the basis of race, color, national origin, age, disability sex, sexual orientation or gender identity.            Thank you!     Thank you for choosing [facility]  for your care. Our goal is always to provide you with excellent care. Hearing back from our patients is one way we can continue to improve our services. Please take a few minutes to complete the written survey that you may receive in the mail after your visit with us. Thank you!             Your Updated Medication List - Protect others around you: Learn how to safely use, store and throw away your medicines at www.disposemymeds.org.          This list is accurate as of: 7/24/17 11:42 AM.  Always use your most recent med list.                   Brand Name Dispense Instructions for use Diagnosis    ABILIFY PO      Take 5 mg by mouth At Bedtime    Hip pain, left, Malignant neoplasm of sigmoid colon (H), Gait disturbance, Other iron deficiency anemia, Acute midline low back pain with right-sided sciatica, Leg weakness, bilateral, Vitamin D deficiency       AMLODIPINE BESYLATE PO      Take 10 mg by mouth every morning        aspirin-acetaminophen-caffeine 250-250-65 MG per tablet    EXCEDRIN MIGRAINE     Take 1 tablet by mouth every 6 hours as needed for headaches    Hip pain, left, Malignant neoplasm of sigmoid colon (H), Gait disturbance, Other iron deficiency anemia, Acute midline low back pain with right-sided sciatica, Leg weakness, bilateral, Vitamin D deficiency       diflorasone 0.05 % ointment    PSORCON     Apply 1 applicator topically daily        DONEPEZIL HCL PO      Take 10 mg by mouth At Bedtime        fluticasone 110  MCG/ACT Inhaler    FLOVENT HFA     Inhale 2 puffs into the lungs as needed    Hip pain, left, Malignant neoplasm of sigmoid colon (H), Gait disturbance, Other iron deficiency anemia, Acute midline low back pain with right-sided sciatica, Leg weakness, bilateral, Vitamin D deficiency       FOLIC ACID PO      Take 1 mg by mouth        hydrocortisone 2.5 % cream      Apply topically 2 times daily        lactobacillus rhamnosus (GG) capsule      Take 1 capsule by mouth 2 times daily    Cellulitis of left elbow       LAMICTAL PO      Take 150 mg by mouth in the morning and 100 mg by mouth in the evening    Gait disturbance, Memory loss, Malignant neoplasm of sigmoid colon (H)       Levothyroxine Sodium 75 MCG Caps      Take 75 mcg by mouth every morning    Gait disturbance, Memory loss, Malignant neoplasm of sigmoid colon (H)       MULTIVITAMIN PO      Take by mouth daily    Gait disturbance, Memory loss, Malignant neoplasm of sigmoid colon (H)       ondansetron 4 MG ODT tab    ZOFRAN-ODT    40 tablet    Take 1 tablet (4 mg) by mouth every 6 hours as needed for nausea    S/P ventriculoperitoneal shunt       order for DME     1 each    Abdominal Binder - Medium    Ventral hernia without obstruction or gangrene       PROZAC PO      Take 40 mg by mouth every morning    Gait disturbance, Memory loss, Malignant neoplasm of sigmoid colon (H)       RANITIDINE HCL PO      Take 300 mg by mouth 2 times daily        SPIRIVA HANDIHALER IN      Inhale 1 puff into the lungs as needed        TYLENOL PO      Take 500 mg by mouth every 8 hours as needed for mild pain or fever    Gait disturbance, Memory loss, Malignant neoplasm of sigmoid colon (H)       WELLBUTRIN XL PO      Take 150 mg by mouth every morning    Hip pain, left, Malignant neoplasm of sigmoid colon (H), Gait disturbance, Other iron deficiency anemia, Acute midline low back pain with right-sided sciatica, Leg weakness, bilateral, Vitamin D deficiency

## 2017-07-24 NOTE — PROGRESS NOTES
Amparo Sharma is a 76 year old female with a 1 year history of a epigastric abdominal mass associated with the following symptoms of lump.    Finding was not work related.  Onset did not occur with lifting.  Obstructive symptoms:  no  Urinary difficulties:  no  Chronic cough: no  Constipation:  occasionally  Current level of activity:  Low, knee problems, had gained weight in last few months.    Past medical history, medications, allergies, family history, and social history were reviewed with the patient. Colon ca s/p resection.    ROS: 10 point review of systems negative except noted in HPI  PHYSICAL EXAM  BMI 33  General appearance- pleasant, alert, and in no distress.  Skin- Skin color, texture, and turgor decreased  Neck- Neck is supple with no obvious adenopathy.  Lungs- Respiratory effort unlabored.  Gait- uses walker  Abdomen - overweight, upper abdomen hernia wide based.  CT reviewed, hernia has diastasis associated.  Impression: Incisional ventral hernia in elderly patient with pain symptoms probably multifactorial. Doubt hernia is primary cause.  Given increased risk would recommend weight to be optimal I.e., 20 lb loss to goal of BMI 30.  Binder for now.  Follow up with me in 6 months.  The total time spent with this patient was 30 minutes.  Of this time, greater than 50% was spent counseling and coordinating care.

## 2017-07-24 NOTE — LETTER
7/24/2017       RE: mAparo Sharma    [address] [place]. \[facility]"" [id number]     Dear Colleague,    Thank you for referring your patient, Amparo Sharma, to the [facility] GENERAL SURGERY at [facility]. Please see a copy of my visit note below.    Amparo Sharma is a 76 year old female with a 1 year history of a epigastric abdominal mass associated with the following symptoms of lump.    Finding was not work related.  Onset did not occur with lifting.  Obstructive symptoms:  no  Urinary difficulties:  no  Chronic cough: no  Constipation:  occasionally  Current level of activity:  Low, knee problems, had gained weight in last few months.    Past medical history, medications, allergies, family history, and social history were reviewed with the patient. Colon ca s/p resection.    ROS: 10 point review of systems negative except noted in HPI  PHYSICAL EXAM  BMI 33  General appearance- pleasant, alert, and in no distress.  Skin- Skin color, texture, and turgor decreased  Neck- Neck is supple with no obvious adenopathy.  Lungs- Respiratory effort unlabored.  Gait- uses walker  Abdomen - overweight, upper abdomen hernia wide based.  CT reviewed, hernia has diastasis associated.  Impression: Incisional ventral hernia in elderly patient with pain symptoms probably multifactorial. Doubt hernia is primary cause.  Given increased risk would recommend weight to be optimal I.e., 20 lb loss to goal of BMI 30.  Binder for now.  Follow up with me in 6 months.  The total time spent with this patient was 30 minutes.  Of this time, greater than 50% was spent counseling and coordinating care.      Again, thank you for allowing me to participate in the care of your patient.      Sincerely,    Que Astorga MD

## 2017-07-24 NOTE — NURSING NOTE
Chief Complaint   Patient presents with     RECHECK     f/u hernia       Vitals:    07/24/17 1111   BP: 132/76   BP Location: Left arm   Patient Position: Chair   Cuff Size: Adult Regular   Pulse: 65   Temp: 98.3  F (36.8  C)   SpO2: 95%   Weight: 168 lb 11.2 oz   Height: 5'       Body mass index is 32.95 kg/(m^2).    Kolton Peterson MA

## 2017-07-31 ENCOUNTER — CARE COORDINATION (OUTPATIENT)
Dept: SURGERY | Facility: CLINIC | Age: 77
End: 2017-07-31

## 2017-07-31 NOTE — PROGRESS NOTES
Received a call from Leslie from [facility]. She had a few questions regarding patients use of an abdominal binder. The questions are new Medicare guidelines. Questions were answered.

## 2017-08-24 ENCOUNTER — RECORDS - HEALTHEAST (OUTPATIENT)
Dept: ADMINISTRATIVE | Facility: OTHER | Age: 77
End: 2017-08-24

## 2017-08-24 ENCOUNTER — OFFICE VISIT (OUTPATIENT)
Dept: NEUROSURGERY | Facility: CLINIC | Age: 77
End: 2017-08-24

## 2017-08-24 VITALS
HEIGHT: 61 IN | WEIGHT: 168 LBS | BODY MASS INDEX: 31.72 KG/M2 | HEART RATE: 66 BPM | SYSTOLIC BLOOD PRESSURE: 126 MMHG | DIASTOLIC BLOOD PRESSURE: 62 MMHG

## 2017-08-24 DIAGNOSIS — G91.9 HYDROCEPHALUS (H): Primary | ICD-10-CM

## 2017-08-24 DIAGNOSIS — Z98.2 S/P VP SHUNT: ICD-10-CM

## 2017-08-24 NOTE — MR AVS SNAPSHOT
After Visit Summary   8/24/2017    Amparo Sharma    MRN: [id number]           Patient Information     Date Of Birth          1940        Visit Information        Provider Department      8/24/2017 9:00 AM Sujata Mccoy APRN CNP [facility] Neurosurgery        Today's Diagnoses     Hydrocephalus    -  1    S/P  shunt          Care Instructions    1. Follow up with orthopedist in [facility] for your right knee and right hip issues.  2. Discontinue the abdominal binder.  3. Follow up with Dr. Dozier on 9/25/2017 as schedule or sooner if you have concerns and or questions.          Follow-ups after your visit        Your next 10 appointments already scheduled     Sep 07, 2017   Procedure with Brook Marsh MD   [facility] [place], Endoscopy ([facility])    [address] [address] [phone]           The [facility] is located on the corner of [address] and [address] on the [facility]. It is easily accessible from virtually any point in the [facility] area, via [employer] and [employer].            Sep 25, 2017  1:20 PM CDT   (Arrive by 1:05 PM)   Return Visit with Rc Dozier MD   [facility] Neurosurgery ([facility])    [address] [address] [phone]              Who to contact     Please call your clinic at [phone] to:    Ask questions about your health    Make or cancel appointments    Discuss your medicines    Learn about your test results    Speak to your doctor   If you have compliments or concerns about an experience at your clinic, or if you wish to file a complaint, please contact [facility] Patient Relations at [phone] or email us at [email or web address]         Additional Information About Your Visit        MyChart Information      "[employer] is an electronic gateway that provides easy, online access to your medical records. With [employer], you can request a clinic appointment, read your test results, renew a prescription or communicate with your care team.     To sign up for [employer] visit the website at [email or web address]   You will be asked to enter the access code listed below, as well as some personal information. Please follow the directions to create your username and password.     Your access code is: [email or web address]  Expires: 2017  6:30 AM     Your access code will  in 90 days. If you need help or a new code, please contact your [facility] or call [phone] for assistance.        Care EveryWhere ID     This is your Care EveryWhere ID. This could be used by other organizations to access your [place] medical records  [id number]        Your Vitals Were     Pulse Height BMI (Body Mass Index)             66 1.549 m (5' 1\") 31.74 kg/m2          Blood Pressure from Last 3 Encounters:   17 126/62   17 132/76   17 138/77    Weight from Last 3 Encounters:   17 76.2 kg (168 lb)   17 76.5 kg (168 lb 11.2 oz)   17 76.2 kg (167 lb 14.4 oz)              Today, you had the following     No orders found for display       Primary Care Provider Office Phone # Fax #    Parvez Francis [phone]       [facility] [address] [address]        Equal Access to Services     COY WALSH : Hadii yury phoenix hadasho Soomaali, waaxda luqadaha, qaybta kaalmada ademarlon, param jiménez. So [facility] [phone].    ATENCIÓN: Si habla español, tiene a mcdonald disposición servicios gratuitos de asistencia lingüística. Llame al [phone].    We comply with applicable federal civil rights laws and [place] laws. We do not discriminate on the basis of race, color, national origin, age, disability sex, sexual orientation or " gender identity.            Thank you!     Thank you for choosing [facility] NEUROSURGERY  for your care. Our goal is always to provide you with excellent care. Hearing back from our patients is one way we can continue to improve our services. Please take a few minutes to complete the written survey that you may receive in the mail after your visit with us. Thank you!             Your Updated Medication List - Protect others around you: Learn how to safely use, store and throw away your medicines at www.disposemymeds.org.          This list is accurate as of: 8/24/17 11:59 PM.  Always use your most recent med list.                   Brand Name Dispense Instructions for use Diagnosis    ABILIFY PO      Take 5 mg by mouth At Bedtime    Hip pain, left, Malignant neoplasm of sigmoid colon (H), Gait disturbance, Other iron deficiency anemia, Acute midline low back pain with right-sided sciatica, Leg weakness, bilateral, Vitamin D deficiency       AMLODIPINE BESYLATE PO      Take 10 mg by mouth every morning        aspirin-acetaminophen-caffeine 250-250-65 MG per tablet    EXCEDRIN MIGRAINE     Take 1 tablet by mouth every 6 hours as needed for headaches    Hip pain, left, Malignant neoplasm of sigmoid colon (H), Gait disturbance, Other iron deficiency anemia, Acute midline low back pain with right-sided sciatica, Leg weakness, bilateral, Vitamin D deficiency       diflorasone 0.05 % ointment    PSORCON     Apply 1 applicator topically daily        DONEPEZIL HCL PO      Take 10 mg by mouth At Bedtime        fluticasone 110 MCG/ACT Inhaler    FLOVENT HFA     Inhale 2 puffs into the lungs as needed    Hip pain, left, Malignant neoplasm of sigmoid colon (H), Gait disturbance, Other iron deficiency anemia, Acute midline low back pain with right-sided sciatica, Leg weakness, bilateral, Vitamin D deficiency       FOLIC ACID PO      Take 1 mg by mouth        hydrocortisone 2.5 % cream      Apply topically 2 times daily         lactobacillus rhamnosus (GG) capsule      Take 1 capsule by mouth 2 times daily    Cellulitis of left elbow       LAMICTAL PO      Take 150 mg by mouth in the morning and 100 mg by mouth in the evening    Gait disturbance, Memory loss, Malignant neoplasm of sigmoid colon (H)       Levothyroxine Sodium 75 MCG Caps      Take 75 mcg by mouth every morning    Gait disturbance, Memory loss, Malignant neoplasm of sigmoid colon (H)       MULTIVITAMIN PO      Take by mouth daily    Gait disturbance, Memory loss, Malignant neoplasm of sigmoid colon (H)       ondansetron 4 MG ODT tab    ZOFRAN-ODT    40 tablet    Take 1 tablet (4 mg) by mouth every 6 hours as needed for nausea    S/P ventriculoperitoneal shunt       order for DME     1 each    Abdominal Binder - Medium    Ventral hernia without obstruction or gangrene       PROZAC PO      Take 40 mg by mouth every morning    Gait disturbance, Memory loss, Malignant neoplasm of sigmoid colon (H)       RANITIDINE HCL PO      Take 300 mg by mouth 2 times daily        SPIRIVA HANDIHALER IN      Inhale 1 puff into the lungs as needed        TYLENOL PO      Take 500 mg by mouth every 8 hours as needed for mild pain or fever    Gait disturbance, Memory loss, Malignant neoplasm of sigmoid colon (H)       WELLBUTRIN XL PO      Take 150 mg by mouth every morning    Hip pain, left, Malignant neoplasm of sigmoid colon (H), Gait disturbance, Other iron deficiency anemia, Acute midline low back pain with right-sided sciatica, Leg weakness, bilateral, Vitamin D deficiency

## 2017-08-24 NOTE — LETTER
8/24/2017       RE: Amparo Sharma  [address]  [place] [address]     Dear Colleague,    Thank you for referring your patient, Amparo Sharma, to the [facility] NEUROSURGERY at [facility]. Please see a copy of my visit note below.    NEUROSURGERY PROGRESS NOTE    CHIEF COMPLAINT: Recurrent light headedness and s/p fall x 5 since July, 2017.    HISTORY OF PRESENT ILLNESS:  I saw Mrs. Sharma accompanied by her future son-in-law in further followup of her shunted NPH.    In brief, this is a pleasant 76 year old female with history of NPH, who underwent  shunt placement by Dr. Dozier on 2/8/2017. The shunt pressure was originally set at 1.0. Postoperatively, her balance seemed to wax and wane periodically. As a result, she has had several falls. In addition, she complained of light headedness upon standing. For this reason, we readjusted the pressure setting to 1.5 on 4/19/2017 and then 2.0 on 5/3/2017. She was doing well after the last adjustment until late July, 2017. She said she fell three times at home when she turned around to reach the ironing board without holding on to her walker. She recently moved into a SNF about 2 weeks ago where she had fallen twice. She said her right knee felt wobbly before she fell to the floor. She sustained quite a bit of bruise in the right arm subsequent to the last fall. She denies LOC with each fall. She has chronic pain and limited mobility in the joints after the right rotator cuff repair, right hip placement and several right knee surgeries. She has not seen her orthopedist Dr. Thornton of [facility] since 10/2016. She also complains of light headedness when she bends her head down. This seems to be more noticeable after she started to wear the tight abdominal binder, which was prescribed for her enlarged ventral hernia in mid July. She denies headache, nausea, vomiting, and changes in vision. She  further denies a sensation of heaviness in her feet when she walks. She said she used to feel that her feet are stuck on the floor when she walks before the shunt, which is not the case over the past couple of months.   FOCUS NEUROLOGIC EXAMINATION:  She is alert and oriented to person, place, and time. Mood and affect are normal. Speech is fluent. Fund of knowledge is appropriate to her age. She has bruise in the right upper extremity from the fall.  Cranial nerve II- XII are grossly intact. She exhibits no finger to nose dysmetria and no pronator drift. She was able to hold on to the walker and stood up from the chair slowly. It was noted that her right knee is somewhat bent inward in the upright position. Gait is wide, slow but steady. She was able to take full steps with a 2 wheel walker and walked out of the exam room onto the hallway for about 45 feet. She then turned around without difficulties and walk the same distance back to the exam room. She has no Romberg sway.   Her shunt site on the right upper lateral skull is nicely healed. Strata pressure setting was verified at the level of 2.0.      IMAGING: I reviewed her CT and report from 5/3/2017 again. The study showed no intracranial hemorrhage, no mass effect, no midline shift, and no change in size and configuration of the ventricles.       ASSESSMENT AND PLAN OF CARE:  Based on her clinical presentation and examination, I told her that I do not think her light headedness and balance is shunt related. I think the abdominal binder and her chronic right knee problem may be attributing to the symptoms that she has been experiencing. For this reason, I am in favor to leave the shunt setting at 2.0 as it is.     I suggest that she stops using the abdominal binder, which may have been the cause of her light headedness when she bends her head over. In fact, review of Dr. Veras, her general surgeon's phone call note from 7/14/2017, indicated that he  wanted her to stop wearing the binder. In addition, I recommend that she makes a follow up appointment with her orthopedist Dr. Thornton in [facility] to recheck her right knee and hip and perhaps seeing a physiatrist. I told her that weight loss would be beneficial for her knee as well. She is agreeable to the plan.     She already has a routine follow up scheduled with Dr. Dozier on 9/25/2017. I encouraged her to complete the orthopedic checkup before then. If her symptoms still persist or do not improve, we can reassess the function of her shunt.     Again, thank you for allowing me to participate in the care of your patient.      Sincerely,    KASANDRA Head CNP

## 2017-08-24 NOTE — PROGRESS NOTES
NEUROSURGERY PROGRESS NOTE    CHIEF COMPLAINT: Recurrent light headedness and s/p fall x 5 since July, 2017.    HISTORY OF PRESENT ILLNESS:  I saw Mrs. Sharma accompanied by her future son-in-law in further followup of her shunted NPH.    In brief, this is a pleasant 76 year old female with history of NPH, who underwent  shunt placement by Dr. Dozier on 2/8/2017. The shunt pressure was originally set at 1.0. Postoperatively, her balance seemed to wax and wane periodically. As a result, she has had several falls. In addition, she complained of light headedness upon standing. For this reason, we readjusted the pressure setting to 1.5 on 4/19/2017 and then 2.0 on 5/3/2017. She was doing well after the last adjustment until late July, 2017. She said she fell three times at home when she turned around to reach the ironing board without holding on to her walker. She recently moved into a SNF about 2 weeks ago where she had fallen twice. She said her right knee felt wobbly before she fell to the floor. She sustained quite a bit of bruise in the right arm subsequent to the last fall. She denies LOC with each fall. She has chronic pain and limited mobility in the joints after the right rotator cuff repair, right hip placement and several right knee surgeries. She has not seen her orthopedist Dr. Thornton of [facility] since 10/2016. She also complains of light headedness when she bends her head down. This seems to be more noticeable after she started to wear the tight abdominal binder, which was prescribed for her enlarged ventral hernia in mid July. She denies headache, nausea, vomiting, and changes in vision. She further denies a sensation of heaviness in her feet when she walks. She said she used to feel that her feet are stuck on the floor when she walks before the shunt, which is not the case over the past couple of months.       FOCUS NEUROLOGIC EXAMINATION:  She is alert and oriented to person, place, and  time. Mood and affect are normal. Speech is fluent. Fund of knowledge is appropriate to her age. She has bruise in the right upper extremity from the fall.  Cranial nerve II- XII are grossly intact. She exhibits no finger to nose dysmetria and no pronator drift. She was able to hold on to the walker and stood up from the chair slowly. It was noted that her right knee is somewhat bent inward in the upright position. Gait is wide, slow but steady. She was able to take full steps with a 2 wheel walker and walked out of the exam room onto the hallway for about 45 feet. She then turned around without difficulties and walk the same distance back to the exam room. She has no Romberg sway.   Her shunt site on the right upper lateral skull is nicely healed. Strata pressure setting was verified at the level of 2.0.      IMAGING: I reviewed her CT and report from 5/3/2017 again. The study showed no intracranial hemorrhage, no mass effect, no midline shift, and no change in size and configuration of the ventricles.       ASSESSMENT AND PLAN OF CARE:  Based on her clinical presentation and examination, I told her that I do not think her light headedness and balance is shunt related. I think the abdominal binder and her chronic right knee problem may be attributing to the symptoms that she has been experiencing. For this reason, I am in favor to leave the shunt setting at 2.0 as it is.     I suggest that she stops using the abdominal binder, which may have been the cause of her light headedness when she bends her head over. In fact, review of Dr. Veras, her general surgeon's phone call note from 7/14/2017, indicated that he wanted her to stop wearing the binder. In addition, I recommend that she makes a follow up appointment with her orthopedist Dr. Thornton in [facility] to recheck her right knee and hip and perhaps seeing a physiatrist. I told her that weight loss would be beneficial for her knee as well. She is  agreeable to the plan.     She already has a routine follow up scheduled with Dr. Dozier on 9/25/2017. I encouraged her to complete the orthopedic checkup before then. If her symptoms still persist or do not improve, we can reassess the function of her shunt.       Sujata Mccoy, DNP, APRN, FNP-BC  [facility]  Department of Neurosurgery

## 2017-08-25 NOTE — PATIENT INSTRUCTIONS
1. Follow up with orthopedist in [facility] for your right knee and right hip issues.  2. Discontinue the abdominal binder.  3. Follow up with Dr. Dozier on 9/25/2017 as schedule or sooner if you have concerns and or questions.

## 2017-08-30 ENCOUNTER — TELEPHONE (OUTPATIENT)
Dept: GASTROENTEROLOGY | Facility: CLINIC | Age: 77
End: 2017-08-30

## 2017-08-30 NOTE — TELEPHONE ENCOUNTER
Patient scheduled for EGD    Indication for procedure. Esophageal dysphagia, Gastroesophageal reflux disease, esophagitis presence not specified    Referring Provider. Maria De Jesus Seo APRN CNP    ? Not Needed    Arrival time verified? Yes, 0930    Facility location verified? Yes, [address]    Instructions given regarding prep and procedure    Prep Type NPO    Are you taking any anticoagulants or blood thinners? No    Instructions given? N/A    Electronic implanted devices? No    Pre procedure teaching completed? Yes    Transportation from procedure? Daughter    H&P / Pre op physical completed? Completed on 08/16/17

## 2017-09-06 ENCOUNTER — ANESTHESIA EVENT (OUTPATIENT)
Dept: GASTROENTEROLOGY | Facility: CLINIC | Age: 77
End: 2017-09-06
Payer: MEDICARE

## 2017-09-07 ENCOUNTER — SURGERY (OUTPATIENT)
Age: 77
End: 2017-09-07

## 2017-09-07 ENCOUNTER — HOSPITAL ENCOUNTER (OUTPATIENT)
Facility: CLINIC | Age: 77
Discharge: HOME | End: 2017-09-07
Attending: INTERNAL MEDICINE | Admitting: INTERNAL MEDICINE
Payer: MEDICARE

## 2017-09-07 ENCOUNTER — ANESTHESIA (OUTPATIENT)
Dept: GASTROENTEROLOGY | Facility: CLINIC | Age: 77
End: 2017-09-07
Payer: MEDICARE

## 2017-09-07 VITALS
TEMPERATURE: 97.8 F | SYSTOLIC BLOOD PRESSURE: 125 MMHG | DIASTOLIC BLOOD PRESSURE: 73 MMHG | RESPIRATION RATE: 5 BRPM | HEART RATE: 59 BPM | OXYGEN SATURATION: 91 %

## 2017-09-07 LAB — UPPER GI ENDOSCOPY: NORMAL

## 2017-09-07 PROCEDURE — 88342 IMHCHEM/IMCYTCHM 1ST ANTB: CPT | Performed by: INTERNAL MEDICINE

## 2017-09-07 PROCEDURE — 25000125 ZZHC RX 250: Performed by: NURSE ANESTHETIST, CERTIFIED REGISTERED

## 2017-09-07 PROCEDURE — 88305 TISSUE EXAM BY PATHOLOGIST: CPT | Performed by: INTERNAL MEDICINE

## 2017-09-07 PROCEDURE — 37000008 ZZH ANESTHESIA TECHNICAL FEE, 1ST 30 MIN: Performed by: INTERNAL MEDICINE

## 2017-09-07 PROCEDURE — 25000128 H RX IP 250 OP 636: Performed by: NURSE ANESTHETIST, CERTIFIED REGISTERED

## 2017-09-07 PROCEDURE — 43239 EGD BIOPSY SINGLE/MULTIPLE: CPT | Performed by: INTERNAL MEDICINE

## 2017-09-07 RX ORDER — PROPOFOL 10 MG/ML
INJECTION, EMULSION INTRAVENOUS CONTINUOUS PRN
Status: DISCONTINUED | OUTPATIENT
Start: 2017-09-07 | End: 2017-09-07

## 2017-09-07 RX ORDER — MEPERIDINE HYDROCHLORIDE 25 MG/ML
12.5 INJECTION INTRAMUSCULAR; INTRAVENOUS; SUBCUTANEOUS
Status: CANCELLED | OUTPATIENT
Start: 2017-09-07

## 2017-09-07 RX ORDER — SODIUM CHLORIDE, SODIUM LACTATE, POTASSIUM CHLORIDE, CALCIUM CHLORIDE 600; 310; 30; 20 MG/100ML; MG/100ML; MG/100ML; MG/100ML
INJECTION, SOLUTION INTRAVENOUS CONTINUOUS PRN
Status: DISCONTINUED | OUTPATIENT
Start: 2017-09-07 | End: 2017-09-07

## 2017-09-07 RX ORDER — ONDANSETRON 2 MG/ML
INJECTION INTRAMUSCULAR; INTRAVENOUS PRN
Status: DISCONTINUED | OUTPATIENT
Start: 2017-09-07 | End: 2017-09-07

## 2017-09-07 RX ORDER — SODIUM CHLORIDE, SODIUM LACTATE, POTASSIUM CHLORIDE, CALCIUM CHLORIDE 600; 310; 30; 20 MG/100ML; MG/100ML; MG/100ML; MG/100ML
INJECTION, SOLUTION INTRAVENOUS CONTINUOUS
Status: CANCELLED | OUTPATIENT
Start: 2017-09-07

## 2017-09-07 RX ORDER — FENTANYL CITRATE 50 UG/ML
25-50 INJECTION, SOLUTION INTRAMUSCULAR; INTRAVENOUS
Status: CANCELLED | OUTPATIENT
Start: 2017-09-07

## 2017-09-07 RX ORDER — LIDOCAINE HYDROCHLORIDE 20 MG/ML
INJECTION, SOLUTION INFILTRATION; PERINEURAL PRN
Status: DISCONTINUED | OUTPATIENT
Start: 2017-09-07 | End: 2017-09-07

## 2017-09-07 RX ORDER — PROPOFOL 10 MG/ML
INJECTION, EMULSION INTRAVENOUS PRN
Status: DISCONTINUED | OUTPATIENT
Start: 2017-09-07 | End: 2017-09-07

## 2017-09-07 RX ORDER — ONDANSETRON 4 MG/1
4 TABLET, ORALLY DISINTEGRATING ORAL EVERY 30 MIN PRN
Status: CANCELLED | OUTPATIENT
Start: 2017-09-07

## 2017-09-07 RX ORDER — GLYCOPYRROLATE 0.2 MG/ML
INJECTION, SOLUTION INTRAMUSCULAR; INTRAVENOUS PRN
Status: DISCONTINUED | OUTPATIENT
Start: 2017-09-07 | End: 2017-09-07

## 2017-09-07 RX ORDER — ONDANSETRON 2 MG/ML
4 INJECTION INTRAMUSCULAR; INTRAVENOUS EVERY 30 MIN PRN
Status: CANCELLED | OUTPATIENT
Start: 2017-09-07

## 2017-09-07 RX ORDER — NALOXONE HYDROCHLORIDE 0.4 MG/ML
.1-.4 INJECTION, SOLUTION INTRAMUSCULAR; INTRAVENOUS; SUBCUTANEOUS
Status: CANCELLED | OUTPATIENT
Start: 2017-09-07 | End: 2017-09-08

## 2017-09-07 RX ADMIN — PROPOFOL 30 MG: 10 INJECTION, EMULSION INTRAVENOUS at 10:49

## 2017-09-07 RX ADMIN — BENZOCAINE 1 EACH: 220 SPRAY, METERED PERIODONTAL at 10:40

## 2017-09-07 RX ADMIN — Medication 0.1 MG: at 10:46

## 2017-09-07 RX ADMIN — PROPOFOL 20 MG: 10 INJECTION, EMULSION INTRAVENOUS at 10:50

## 2017-09-07 RX ADMIN — SODIUM CHLORIDE, POTASSIUM CHLORIDE, SODIUM LACTATE AND CALCIUM CHLORIDE: 600; 310; 30; 20 INJECTION, SOLUTION INTRAVENOUS at 10:40

## 2017-09-07 RX ADMIN — ONDANSETRON 4 MG: 2 INJECTION INTRAMUSCULAR; INTRAVENOUS at 10:54

## 2017-09-07 RX ADMIN — LIDOCAINE HYDROCHLORIDE 40 MG: 20 INJECTION, SOLUTION INFILTRATION; PERINEURAL at 10:47

## 2017-09-07 RX ADMIN — PROPOFOL 75 MCG/KG/MIN: 10 INJECTION, EMULSION INTRAVENOUS at 10:48

## 2017-09-07 NOTE — ANESTHESIA PREPROCEDURE EVALUATION
Anesthesia Evaluation     . Pt has had prior anesthetic. Type: General and MAC           ROS/MED HX    ENT/Pulmonary:       Neurologic:     (+)dementia, other neuro hydrocephalus, fall history    Cardiovascular:     (+) hypertension----. : . . . :. .       METS/Exercise Tolerance:     Hematologic:     (+) History of Transfusion no previous transfusion reaction -      Musculoskeletal:         GI/Hepatic:     (+) GERD Asymptomatic on medication,       Renal/Genitourinary:         Endo:     (+) thyroid problem hypothyroidism, .      Psychiatric:     (+) psychiatric history other (comment)      Infectious Disease:   (+) VRE,       Malignancy:         Other:                     Physical Exam  Normal systems: cardiovascular, pulmonary and dental    Airway   Mallampati: II  TM distance: >3 FB  Neck ROM: full    Dental     Cardiovascular   Rhythm and rate: regular and normal      Pulmonary                     Anesthesia Plan      History & Physical Review  History and physical reviewed and following examination; no interval change.    ASA Status:  3 .        Plan for MAC with Propofol and Intravenous induction. Maintenance will be TIVA.  Reason for MAC:  Chronic cardiopulmonary disease (G9)  PONV prophylaxis:  Ondansetron (or other 5HT-3)       Postoperative Care      Consents  Anesthetic plan, risks, benefits and alternatives discussed with:  Patient.  Use of blood products discussed: No .   .

## 2017-09-07 NOTE — ANESTHESIA POSTPROCEDURE EVALUATION
Patient: Amparo Sharma    Procedure(s):  EGD - Wound Class: II-Clean Contaminated    Diagnosis:Esophageal Dysphagia, Gastroesophageal reflux disease, esophagitis presence not specified,   Diagnosis Additional Information: No value filed.    Anesthesia Type:  MAC    Note:  Anesthesia Post Evaluation    Patient location during evaluation: Phase 2  Patient participation: Able to fully participate in evaluation  Level of consciousness: awake and alert  Pain management: adequate  Airway patency: patent  Cardiovascular status: acceptable  Respiratory status: acceptable  Hydration status: acceptable  PONV: none     Anesthetic complications: None          Last vitals:  Vitals:    09/07/17 1023 09/07/17 1108   BP: 114/54 113/50   Pulse: 59    Resp: 16 13   Temp: 36.6  C (97.8  F)    SpO2: 96% 98%         Electronically Signed By: Oly Otoole MD  September 7, 2017  11:13 AM

## 2017-09-07 NOTE — DISCHARGE INSTRUCTIONS
[facility] Upper Endoscopy (EGD) with Monitored Anesthesia Care  For 24 hours after your procedure  Sedation:  1. Get plenty of rest. A responsible adult must stay with you for at least 24 hours after you leave the hospital.   2. Do not drive or use heavy equipment. If you have weakness or tingling, don't drive or use heavy equipment until this feeling goes away.  3. Do not drink alcohol.  4. Avoid strenuous or risky activities. Ask for help when climbing stairs.   5. You may feel lightheaded. IF so, sit for a few minutes before standing. Have someone help you get up.   6. If you have nausea (feel sick to your stomach): Drink only clear liquids such as apple juice, ginger ale, broth or 7-Up. Rest may also help. Be sure to drink enough fluids. Move to a regular diet as you feel able.  7. You may have a slight fever. Call the doctor if your fever is over 100 F (37.7 C) (taken under the tongue) or lasts longer than 24 hours.  8. You may have a dry mouth, a sore throat, muscle aches or trouble sleeping. These should go away after 24 hours.  9. Do not make important or legal decisions.   Procedural:  1. Wait one hour before eating or drinking. Start with sips of water. When your gag reflex has returned, you may return to your normal diet, medicines, and light exercise.  2. Some bloating is normal. You may have large burps or pass air.  3. You may have a sore throat for 2 to 3 days. If so, it may help to:    Avoid hot liquids for 24 hours.    Use sore throat lozenges.    Gargle as need with salt water up to 4 times a day. Mix 1 cup of warm water with 1 teaspoon of salt. Do not swallow.  4. You may take Tylenol (acetaminophen) for pain unless your doctor has told you not to.   Do not take aspirin or ibuprofen (Advil, Motrin, or other anti-inflammatory  drugs) for __3___ days.  Call right away if you have:  1. Unusual pain in belly or chest pain not relieved with passing air.  2. Severe throat pain or trouble  swallowing.  3. Black stools (tar-like looking bowel movement).  4. Headache for over 24 hours.    Follow-up:  _x___ We took small tissue or fluid samples. Your doctor will call you with the results within two weeks.    If you have severe pain, bleeding, vomit blood, or shortness of breath, go to an emergency room.  If you have questions, call:  Endoscopy: Monday to Friday, 7 a.m. to 4:30 p.m. [phone] (We may have to call you back)  After hours: Hospital  [phone] (Ask for the GI fellow on call)

## 2017-09-07 NOTE — IP AVS SNAPSHOT
MRN:[id number]                      After Visit Summary   9/7/2017    Amparo Sharma    MRN: [id number]           Thank you!     Thank you for choosing [facility] for your care. Our goal is always to provide you with excellent care. Hearing back from our patients is one way we can continue to improve our services. Please take a few minutes to complete the written survey that you may receive in the mail after you visit with us. Thank you!        Patient Information     Date Of Birth          1940        About your hospital stay     You were admitted on:  September 7, 2017 You last received care in the:  [facility], [place], Endoscopy    You were discharged on:  September 7, 2017       Who to Call     For medical emergencies, please call 911.  For non-urgent questions about your medical care, please call your primary care provider or clinic, [phone]  For questions related to your surgery, please call your surgery clinic        Attending Provider     Provider Specialty    Brook Marsh MD Internal Medicine       Primary Care Provider Office Phone # Fax #    Parvez Francis [phone]      Your next 10 appointments already scheduled     Sep 25, 2017  1:20 PM CDT   (Arrive by 1:05 PM)   Return Visit with Rc Dozier MD   [facility] Neurosurgery ([facility])    [address] [place] [id number] [phone]              Further instructions from your care team       [facility] Upper Endoscopy (EGD) with Monitored Anesthesia Care  For 24 hours after your procedure  Sedation:  1. Get plenty of rest. A responsible adult must stay with you for at least 24 hours after you leave the hospital.   2. Do not drive or use heavy equipment. If you have weakness or tingling, don't drive or use heavy equipment until this feeling goes away.  3. Do not drink alcohol.  4. Avoid strenuous or risky activities. Ask for help when  climbing stairs.   5. You may feel lightheaded. IF so, sit for a few minutes before standing. Have someone help you get up.   6. If you have nausea (feel sick to your stomach): Drink only clear liquids such as apple juice, ginger ale, broth or 7-Up. Rest may also help. Be sure to drink enough fluids. Move to a regular diet as you feel able.  7. You may have a slight fever. Call the doctor if your fever is over 100 F (37.7 C) (taken under the tongue) or lasts longer than 24 hours.  8. You may have a dry mouth, a sore throat, muscle aches or trouble sleeping. These should go away after 24 hours.  9. Do not make important or legal decisions.   Procedural:  1. Wait one hour before eating or drinking. Start with sips of water. When your gag reflex has returned, you may return to your normal diet, medicines, and light exercise.  2. Some bloating is normal. You may have large burps or pass air.  3. You may have a sore throat for 2 to 3 days. If so, it may help to:    Avoid hot liquids for 24 hours.    Use sore throat lozenges.    Gargle as need with salt water up to 4 times a day. Mix 1 cup of warm water with 1 teaspoon of salt. Do not swallow.  4. You may take Tylenol (acetaminophen) for pain unless your doctor has told you not to.   Do not take aspirin or ibuprofen (Advil, Motrin, or other anti-inflammatory  drugs) for __3___ days.  Call right away if you have:  1. Unusual pain in belly or chest pain not relieved with passing air.  2. Severe throat pain or trouble swallowing.  3. Black stools (tar-like looking bowel movement).  4. Headache for over 24 hours.    Follow-up:  _x___ We took small tissue or fluid samples. Your doctor will call you with the results within two weeks.    If you have severe pain, bleeding, vomit blood, or shortness of breath, go to an emergency room.  If you have questions, call:  Endoscopy: Monday to Friday, 7 a.m. to 4:30 p.m. [phone] (We may have to call you back)  After hours: [facility]  " [phone] (Ask for the GI fellow on call)            Pending Results     No orders found from 2017 to 2017.            Admission Information     Date & Time Provider Department Dept. Phone    2017 Brook Marsh MD [facility], [place], Endoscopy [phone]      Your Vitals Were     Blood Pressure Pulse Temperature Respirations Pulse Oximetry       113/50 59 97.8  F (36.6  C) (Oral) 13 98%       MyChart Information     [employer] lets you send messages to your doctor, view your test results, renew your prescriptions, schedule appointments and more. To sign up, go to [email or web address] . Click on \"Log in\" on the left side of the screen, which will take you to the Welcome page. Then click on \"Sign up Now\" on the right side of the page.     You will be asked to enter the access code listed below, as well as some personal information. Please follow the directions to create your username and password.     Your access code is: [email or web address]  Expires: 2017  6:30 AM     Your access code will  in 90 days. If you need help or a new code, please call your [place] clinic or [phone].        Care EveryWhere ID     This is your Care EveryWhere ID. This could be used by other organizations to access your [place] medical records  [id number]        Equal Access to Services     [facility]: Hadii yury phoenix hadasho Soemiliali, waaxda luqadaha, qaybta kaalmada adejamiayada, param sanchez . So [facility] [phone].    ATENCIÓN: Si habla español, tiene a mcdonald disposición servicios gratuitos de asistencia lingüística. Llame al [phone].    We comply with applicable federal civil rights laws and [place] laws. We do not discriminate on the basis of race, color, national origin, age, disability sex, sexual orientation or gender identity.               Review of your medicines      UNREVIEWED medicines. Ask your doctor about these medicines        Dose / Directions "    ABILIFY PO   Used for:  Hip pain, left, Malignant neoplasm of sigmoid colon (H), Gait disturbance, Other iron deficiency anemia, Acute midline low back pain with right-sided sciatica, Leg weakness, bilateral, Vitamin D deficiency        Dose:  5 mg   Take 5 mg by mouth At Bedtime   Refills:  0       AMLODIPINE BESYLATE PO        Dose:  10 mg   Take 10 mg by mouth every morning   Refills:  0       aspirin-acetaminophen-caffeine 250-250-65 MG per tablet   Commonly known as:  EXCEDRIN MIGRAINE   Used for:  Hip pain, left, Malignant neoplasm of sigmoid colon (H), Gait disturbance, Other iron deficiency anemia, Acute midline low back pain with right-sided sciatica, Leg weakness, bilateral, Vitamin D deficiency        Dose:  1 tablet   Take 1 tablet by mouth every 6 hours as needed for headaches   Refills:  0       diflorasone 0.05 % ointment   Commonly known as:  PSORCON        Dose:  1 applicator   Apply 1 applicator topically daily   Refills:  0       DONEPEZIL HCL PO        Dose:  10 mg   Take 10 mg by mouth At Bedtime   Refills:  0       fluticasone 110 MCG/ACT Inhaler   Commonly known as:  FLOVENT HFA   Used for:  Hip pain, left, Malignant neoplasm of sigmoid colon (H), Gait disturbance, Other iron deficiency anemia, Acute midline low back pain with right-sided sciatica, Leg weakness, bilateral, Vitamin D deficiency        Dose:  2 puff   Inhale 2 puffs into the lungs as needed   Refills:  0       FOLIC ACID PO        Dose:  1 mg   Take 1 mg by mouth   Refills:  0       hydrocortisone 2.5 % cream        Apply topically 2 times daily   Refills:  0       lactobacillus rhamnosus (GG) capsule   Used for:  Cellulitis of left elbow        Dose:  1 capsule   Take 1 capsule by mouth 2 times daily   Refills:  0       LAMICTAL PO   Used for:  Gait disturbance, Memory loss, Malignant neoplasm of sigmoid colon (H)        Take 150 mg by mouth in the morning and 100 mg by mouth in the evening   Refills:  0       Levothyroxine  Sodium 75 MCG Caps   Used for:  Gait disturbance, Memory loss, Malignant neoplasm of sigmoid colon (H)        Dose:  75 mcg   Take 75 mcg by mouth every morning   Refills:  0       MULTIVITAMIN PO   Used for:  Gait disturbance, Memory loss, Malignant neoplasm of sigmoid colon (H)        Take by mouth daily   Refills:  0       ondansetron 4 MG ODT tab   Commonly known as:  ZOFRAN-ODT   Used for:  S/P ventriculoperitoneal shunt        Dose:  4 mg   Take 1 tablet (4 mg) by mouth every 6 hours as needed for nausea   Quantity:  40 tablet   Refills:  0       PROZAC PO   Used for:  Gait disturbance, Memory loss, Malignant neoplasm of sigmoid colon (H)        Dose:  40 mg   Take 40 mg by mouth every morning   Refills:  0       RANITIDINE HCL PO        Dose:  300 mg   Take 300 mg by mouth 2 times daily   Refills:  0       SPIRIVA HANDIHALER IN        Dose:  1 puff   Inhale 1 puff into the lungs as needed   Refills:  0       TYLENOL PO   Used for:  Gait disturbance, Memory loss, Malignant neoplasm of sigmoid colon (H)        Dose:  500 mg   Take 500 mg by mouth every 8 hours as needed for mild pain or fever   Refills:  0       WELLBUTRIN XL PO   Used for:  Hip pain, left, Malignant neoplasm of sigmoid colon (H), Gait disturbance, Other iron deficiency anemia, Acute midline low back pain with right-sided sciatica, Leg weakness, bilateral, Vitamin D deficiency        Dose:  150 mg   Take 150 mg by mouth every morning   Refills:  0         CONTINUE these medicines which have NOT CHANGED        Dose / Directions    order for DME   Used for:  Ventral hernia without obstruction or gangrene        Abdominal Binder - Medium   Quantity:  1 each   Refills:  3                Protect others around you: Learn how to safely use, store and throw away your medicines at www.disposemymeds.org.             Medication List: This is a list of all your medications and when to take them. Check marks below indicate your daily home schedule. Keep  this list as a reference.      Medications           Morning Afternoon Evening Bedtime As Needed    ABILIFY PO   Take 5 mg by mouth At Bedtime                                AMLODIPINE BESYLATE PO   Take 10 mg by mouth every morning                                aspirin-acetaminophen-caffeine 250-250-65 MG per tablet   Commonly known as:  EXCEDRIN MIGRAINE   Take 1 tablet by mouth every 6 hours as needed for headaches                                diflorasone 0.05 % ointment   Commonly known as:  PSORCON   Apply 1 applicator topically daily                                DONEPEZIL HCL PO   Take 10 mg by mouth At Bedtime                                fluticasone 110 MCG/ACT Inhaler   Commonly known as:  FLOVENT HFA   Inhale 2 puffs into the lungs as needed                                FOLIC ACID PO   Take 1 mg by mouth                                hydrocortisone 2.5 % cream   Apply topically 2 times daily                                lactobacillus rhamnosus (GG) capsule   Take 1 capsule by mouth 2 times daily                                LAMICTAL PO   Take 150 mg by mouth in the morning and 100 mg by mouth in the evening                                Levothyroxine Sodium 75 MCG Caps   Take 75 mcg by mouth every morning                                MULTIVITAMIN PO   Take by mouth daily                                ondansetron 4 MG ODT tab   Commonly known as:  ZOFRAN-ODT   Take 1 tablet (4 mg) by mouth every 6 hours as needed for nausea                                order for DME   Abdominal Binder - Medium                                PROZAC PO   Take 40 mg by mouth every morning                                RANITIDINE HCL PO   Take 300 mg by mouth 2 times daily                                SPIRIVA HANDIHALER IN   Inhale 1 puff into the lungs as needed                                TYLENOL PO   Take 500 mg by mouth every 8 hours as needed for mild pain or fever                                 WELLBUTRIN XL PO   Take 150 mg by mouth every morning

## 2017-09-07 NOTE — ANESTHESIA CARE TRANSFER NOTE
Patient: Amparo Sharma    Procedure(s):  EGD - Wound Class: II-Clean Contaminated    Diagnosis: Esophageal Dysphagia, Gastroesophageal reflux disease, esophagitis presence not specified,   Diagnosis Additional Information: No value filed.    Anesthesia Type:   MAC     Note:  Airway :Nasal Cannula  Patient transferred to:Phase II  Comments: Pt alert, conversing with staff, breathing spontaneously on 3L O2 via NC. VSS. Report shared with RN.      Vitals: (Last set prior to Anesthesia Care Transfer)    CRNA VITALS  9/7/2017 1034 - 9/7/2017 1108      9/7/2017             Pulse: 55    Ht Rate: 55    SpO2: 96 %    Resp Rate (observed): (!)  2    Resp Rate (set): 10                Electronically Signed By: KASANDRA Sawant CRNA  September 7, 2017  11:08 AM

## 2017-09-07 NOTE — LETTER
September 12, 2017       TO: Amparo Coppola  [address]  [place] [address]       Dear Ms. Coppola,    We are writing to inform you of your test results.  Biopsies were unremarkable. No infection. No cancer. For the ulcer, recommend daily ppi (omeprazole 20 mg po daily or equivalent) and repeat EGD in 2-3 months to ensure healing.    Please follow-up with Maria De Jesus Seo CNP or Dr. Francis about starting PPI if you have not heard from either of them by the time you receive this letter and to ensure order is in for follow-up EGD.    Resulted Orders   Surgical pathology exam   Result Value Ref Range    Copath Report       Patient Name: AMPARO COPPOLA  MR#: [id number]  Specimen #: [id number]  Collected: 9/7/2017  Received: 9/7/2017  Reported: 9/8/2017 16:48  Ordering Phy(s): UMBERTO ALVAREZ    For improved result formatting, select 'View Enhanced Report Format'  under Linked Documents section.    SPECIMEN(S):  A: Duodenal biopsy  B: Pre-pyloric ulcer biopsy  C: Esophageal biopsy, middle    FINAL DIAGNOSIS:  A. Duodenum, biopsy:  - Small intestinal mucosa without diagnostic abnormality  - Negative for celiac disease, active or chronic duodenitis.    B. Stomach, pre-pyloric ulcer, biopsy:  - Mild reactive gastropathy without significant inflammation  - Immunohistochemical stain negative for H. Pylori organisms  - Negative for metaplasia, dysplasia or malignancy.    C. Esophageal, middle, biopsy:  - Esophageal mucosa without diagnostic abnormality  - Negative for reflux changes or eosinophilic esophagitis  - Negative for dysplasia or malignancy.    I have personally reviewed all specimen s and/or slides, including the  listed special stains, and used them with my medical judgement to  determine or confirm the final diagnosis.    Electronically signed out by:    Jose Byers M.D., [facility]    CLINICAL HISTORY:  The patient is a 77 year old female who presented with   "dysphagia,  diarrhea and dyspepsia.  Upper GI endoscopy revealed mildly tortuous esophagus with mildly  abnormal esophageal motility. Bleeding gastric ulcers were present.  Duodenum was unremarkable.  GROSS:  A: The specimen is received in formalin with proper patient  identification, labeled \"small intestine, duodenum\".  The specimen  consists of three pink-tan soft tissues ranging in size from 0.1-0.2 cm  in greatest dimension, which are entirely submitted in cassette A1.    B: The specimen is received in formalin with proper patient  identification, labeled \"prepyloric ulcer\".  The specimen consists of  two pink-tan soft tissues averaging 0.1 cm in greatest dimension, which  are entirely submitted i n cassette B1.    C: The specimen is received in formalin with proper patient  identification, labeled \"esophagus, middle\".  The specimen consists of  five white-tan soft tissues range in size from 0.2-0.4 cm in greatest  dimension, which are entirely submitted in cassette C1. (Dictated by:  Shahana Ni 9/7/2017 03:46 PM)    MICROSCOPIC:  Microscopic examination was performed.    CPT Codes:  A: 01665-HQ2  B: 55827-FQ3, 33524-TGG  C: 71039-DB3    TESTING LAB LOCATION:  [facility] [address] [place]   [id number] [phone]    COLLECTION SITE:  Client: [facility]  Location: [facility] (B)             It was a pleasure to see you at your recent visit. Please let me know if you have any questions or concerns.     Clinic Staff - [phone]     Sincerely,     Brook Marsh MD  [address] [place] [id number]          "

## 2017-09-07 NOTE — IP AVS SNAPSHOT
[facility], [place], Endoscopy    [address] [address]    Phone:  [phone]                                       After Visit Summary   9/7/2017    Amparo Sharma    MRN: [id number]           After Visit Summary Signature Page     I have received my discharge instructions, and my questions have been answered. I have discussed any challenges I see with this plan with the nurse or doctor.    ..........................................................................................................................................  Patient/Patient Representative Signature      ..........................................................................................................................................  Patient Representative Print Name and Relationship to Patient    ..................................................               ................................................  Date                                            Time    ..........................................................................................................................................  Reviewed by Signature/Title    ...................................................              ..............................................  Date                                                            Time

## 2017-09-08 LAB — COPATH REPORT: NORMAL

## 2017-09-12 NOTE — ADDENDUM NOTE
Encounter addended by: Sarah Germain LPN on: 9/12/2017  8:40 AM<BR>     Actions taken: Results reviewed in IB, Letter status changed

## 2017-09-25 ENCOUNTER — OFFICE VISIT (OUTPATIENT)
Dept: NEUROSURGERY | Facility: CLINIC | Age: 77
End: 2017-09-25

## 2017-09-25 ENCOUNTER — RECORDS - HEALTHEAST (OUTPATIENT)
Dept: ADMINISTRATIVE | Facility: OTHER | Age: 77
End: 2017-09-25

## 2017-09-25 VITALS
DIASTOLIC BLOOD PRESSURE: 64 MMHG | WEIGHT: 174 LBS | SYSTOLIC BLOOD PRESSURE: 129 MMHG | HEART RATE: 70 BPM | HEIGHT: 61 IN | BODY MASS INDEX: 32.85 KG/M2

## 2017-09-25 DIAGNOSIS — Z98.2 S/P VP SHUNT: Primary | ICD-10-CM

## 2017-09-25 ASSESSMENT — PAIN SCALES - GENERAL: PAINLEVEL: MODERATE PAIN (4)

## 2017-09-25 NOTE — LETTER
2017       RE: Amparo Coppola  [address]   [place] [address]     Dear Colleague,    Thank you for referring your patient, Amparo Coppola, to the [facility] NEUROSURGERY at [facility]. Please see a copy of my visit note below.    2017            Parvez Francis MD   [facility]    [address] [place] [address]      RE:  Amparo Coppola      Dear Dr. Francis:      I had the pleasure to see Amparo today in my Neurosurgical Clinic for followup evaluation of a ventriculoperitoneal shunt for normal pressure hydrocephalus.      Amparo had originally presented to me with normal pressure hydrocephalus.  We placed a ventriculoperitoneal shunt, and it was programmed at 1.0 at that time.  Following placement of this, she and also her daughter noticed significant improvement in her gait and in her memory as well.  Over the last several months, she seems to be falling more.  This does correspond to a period of time where we were increasing her shunt setting to 2.0.  Today, she notes that her memory also is worse than it has been over the last couple months.  In general, it sounds like she is falling more and her memory is worse, and this is collaborated from her daughter.  Given the fact that she is at 2.0 right now, I am going to decrease her shunt setting today to 1.5 and then have her come back and see me in 3 months and get a repeat CT scan at that time.      In clinic, I have dialed her down from 2.0 to 1.5 today.      Overall, I spent approximately 30 minutes with Amparo with the majority of this time spent in consultation and developing a treatment plan.         ZAHRAA BERMEO MD             D: 2017 13:55   T: 2017 07:14   MT: NATIVIDAD      Name:     AMPARO COPPOLA   MRN:      [id number]        Account:      [id number]   :      1940           Service Date: 2017      Document: [id number]

## 2017-09-25 NOTE — MR AVS SNAPSHOT
After Visit Summary   9/25/2017    Amparo Sharma    MRN: [id number]           Patient Information     Date Of Birth          1940        Visit Information        Provider Department      9/25/2017 1:20 PM Rc Dozier MD [facility]        Today's Diagnoses     S/P  shunt    -  1       Follow-ups after your visit        Follow-up notes from your care team     Return in about 3 months (around 12/25/2017).      Your next 10 appointments already scheduled     Jan 03, 2018 12:40 PM CST   CT HEAD W/O CONTRAST with UCCT1   [facility] ([facility])    [address] [place] [address] [phone]           Please bring any scans or X-rays taken at other hospitals, if similar tests were done. Also bring a list of your medicines, including vitamins, minerals and over-the-counter drugs. It is safest to leave personal items at home.  Be sure to tell your doctor:   If you have any allergies.   If there s any chance you are pregnant.   If you are breastfeeding.   If you have any special needs.  You do not need to do anything special to prepare.  Please wear loose clothing, such as a sweat suit or jogging clothes. Avoid snaps, zippers and other metal. We may ask you to undress and put on a hospital gown.            Jan 03, 2018  1:20 PM CST   (Arrive by 1:05 PM)   Return Visit with Rc Dozier MD   [facility] ([facility])    [address] [place] [address] [phone]              Future tests that were ordered for you today     Open Future Orders        Priority Expected Expires Ordered    CT Head w/o Contrast Routine  9/25/2018 9/25/2017            Who to contact     Please call your clinic at [phone] to:    Ask questions about your health    Make or cancel appointments    Discuss your medicines    Learn about your test  "results    Speak to your doctor   If you have compliments or concerns about an experience at your clinic, or if you wish to file a complaint, please contact [facility] Patient Relations at [phone] or email us at [email or web address]         Additional Information About Your Visit        Logicworkshart Information     Quepasat is an electronic gateway that provides easy, online access to your medical records. With TastyKhana, you can request a clinic appointment, read your test results, renew a prescription or communicate with your care team.     To sign up for TastyKhana visit the website at [email or web address]   You will be asked to enter the access code listed below, as well as some personal information. Please follow the directions to create your username and password.     Your access code is: [email or web address]  Expires: 2017  6:30 AM     Your access code will  in 90 days. If you need help or a new code, please contact your [facility] or call [phone] for assistance.        Care EveryWhere ID     This is your Care EveryWhere ID. This could be used by other organizations to access your [place] medical records  [id number]        Your Vitals Were     Pulse Height BMI (Body Mass Index)             70 1.549 m (5' 1\") 32.88 kg/m2          Blood Pressure from Last 3 Encounters:   17 129/64   17 125/73   17 126/62    Weight from Last 3 Encounters:   17 78.9 kg (174 lb)   17 76.2 kg (168 lb)   17 76.5 kg (168 lb 11.2 oz)               Primary Care Provider Office Phone # Fax #    Parvez PORTER Tito [phone]       [facility] [address] [facility] [address]        Equal Access to Services     FAWN WALSH : Sheng Ann, wamalikda luqadaha, qaybta kaalmainocente carrillo, param jiménez. [facility] [phone].    ATENCIÓN: Si keturah connor, " tiene a mcdonald disposición servicios gratuitos de asistencia lingüística. Killian grace [phone].    We comply with applicable federal civil rights laws and [place] laws. We do not discriminate on the basis of race, color, national origin, age, disability sex, sexual orientation or gender identity.            Thank you!     Thank you for choosing [facility]  for your care. Our goal is always to provide you with excellent care. Hearing back from our patients is one way we can continue to improve our services. Please take a few minutes to complete the written survey that you may receive in the mail after your visit with us. Thank you!             Your Updated Medication List - Protect others around you: Learn how to safely use, store and throw away your medicines at www.disposemymeds.org.          This list is accurate as of: 9/25/17  2:03 PM.  Always use your most recent med list.                   Brand Name Dispense Instructions for use Diagnosis    ABILIFY PO      Take 5 mg by mouth At Bedtime    Hip pain, left, Malignant neoplasm of sigmoid colon (H), Gait disturbance, Other iron deficiency anemia, Acute midline low back pain with right-sided sciatica, Leg weakness, bilateral, Vitamin D deficiency       AMLODIPINE BESYLATE PO      Take 10 mg by mouth every morning        aspirin-acetaminophen-caffeine 250-250-65 MG per tablet    EXCEDRIN MIGRAINE     Take 1 tablet by mouth every 6 hours as needed for headaches    Hip pain, left, Malignant neoplasm of sigmoid colon (H), Gait disturbance, Other iron deficiency anemia, Acute midline low back pain with right-sided sciatica, Leg weakness, bilateral, Vitamin D deficiency       diflorasone 0.05 % ointment    PSORCON     Apply 1 applicator topically daily        DONEPEZIL HCL PO      Take 10 mg by mouth At Bedtime        fluticasone 110 MCG/ACT Inhaler    FLOVENT HFA     Inhale 2 puffs into the lungs as needed    Hip pain, left, Malignant neoplasm of sigmoid  colon (H), Gait disturbance, Other iron deficiency anemia, Acute midline low back pain with right-sided sciatica, Leg weakness, bilateral, Vitamin D deficiency       FOLIC ACID PO      Take 1 mg by mouth        hydrocortisone 2.5 % cream      Apply topically 2 times daily        lactobacillus rhamnosus (GG) capsule      Take 1 capsule by mouth 2 times daily    Cellulitis of left elbow       LAMICTAL PO      Take 150 mg by mouth in the morning and 100 mg by mouth in the evening    Gait disturbance, Memory loss, Malignant neoplasm of sigmoid colon (H)       Levothyroxine Sodium 75 MCG Caps      Take 75 mcg by mouth every morning    Gait disturbance, Memory loss, Malignant neoplasm of sigmoid colon (H)       MULTIVITAMIN PO      Take by mouth daily    Gait disturbance, Memory loss, Malignant neoplasm of sigmoid colon (H)       ondansetron 4 MG ODT tab    ZOFRAN-ODT    40 tablet    Take 1 tablet (4 mg) by mouth every 6 hours as needed for nausea    S/P ventriculoperitoneal shunt       order for DME     1 each    Abdominal Binder - Medium    Ventral hernia without obstruction or gangrene       PROZAC PO      Take 40 mg by mouth every morning    Gait disturbance, Memory loss, Malignant neoplasm of sigmoid colon (H)       RANITIDINE HCL PO      Take 300 mg by mouth 2 times daily        SPIRIVA HANDIHALER IN      Inhale 1 puff into the lungs as needed        TYLENOL PO      Take 500 mg by mouth every 8 hours as needed for mild pain or fever    Gait disturbance, Memory loss, Malignant neoplasm of sigmoid colon (H)       WELLBUTRIN XL PO      Take 150 mg by mouth every morning    Hip pain, left, Malignant neoplasm of sigmoid colon (H), Gait disturbance, Other iron deficiency anemia, Acute midline low back pain with right-sided sciatica, Leg weakness, bilateral, Vitamin D deficiency

## 2017-09-26 NOTE — PROGRESS NOTES
2017            Parvez Francis MD   [facility]    [address] [place] [address]      RE:  Amparo Zachary      Dear Dr. Francis:      I had the pleasure to see Amparo today in my Neurosurgical Clinic for followup evaluation of a ventriculoperitoneal shunt for normal pressure hydrocephalus.      Amparo had originally presented to me with normal pressure hydrocephalus.  We placed a ventriculoperitoneal shunt, and it was programmed at 1.0 at that time.  Following placement of this, she and also her daughter noticed significant improvement in her gait and in her memory as well.  Over the last several months, she seems to be falling more.  This does correspond to a period of time where we were increasing her shunt setting to 2.0.  Today, she notes that her memory also is worse than it has been over the last couple months.  In general, it sounds like she is falling more and her memory is worse, and this is collaborated from her daughter.  Given the fact that she is at 2.0 right now, I am going to decrease her shunt setting today to 1.5 and then have her come back and see me in 3 months and get a repeat CT scan at that time.      In clinic, I have dialed her down from 2.0 to 1.5 today.      Overall, I spent approximately 30 minutes with Amparo with the majority of this time spent in consultation and developing a treatment plan.         ZAHRAA BERMEO MD             D: 2017 13:55   T: 2017 07:14   MT: NATIVIDAD      Name:     AMPARO COPPOLA   MRN:      [id number]        Account:      [id number]   :      1940           Service Date: 2017      Document: [id number]

## 2017-12-22 ENCOUNTER — TELEPHONE (OUTPATIENT)
Dept: GASTROENTEROLOGY | Facility: CLINIC | Age: 77
End: 2017-12-22

## 2017-12-22 DIAGNOSIS — K25.9 GASTRIC ULCER WITHOUT HEMORRHAGE OR PERFORATION, UNSPECIFIED CHRONICITY: Primary | ICD-10-CM

## 2018-01-03 ENCOUNTER — RECORDS - HEALTHEAST (OUTPATIENT)
Dept: ADMINISTRATIVE | Facility: OTHER | Age: 78
End: 2018-01-03

## 2018-01-03 ENCOUNTER — OFFICE VISIT (OUTPATIENT)
Dept: NEUROSURGERY | Facility: CLINIC | Age: 78
End: 2018-01-03
Payer: MEDICARE

## 2018-01-03 ENCOUNTER — RADIANT APPOINTMENT (OUTPATIENT)
Dept: CT IMAGING | Facility: CLINIC | Age: 78
End: 2018-01-03
Attending: NEUROLOGICAL SURGERY
Payer: MEDICARE

## 2018-01-03 ENCOUNTER — TELEPHONE (OUTPATIENT)
Dept: GASTROENTEROLOGY | Facility: CLINIC | Age: 78
End: 2018-01-03

## 2018-01-03 VITALS
SYSTOLIC BLOOD PRESSURE: 121 MMHG | BODY MASS INDEX: 33.62 KG/M2 | HEIGHT: 61 IN | HEART RATE: 65 BPM | DIASTOLIC BLOOD PRESSURE: 63 MMHG | WEIGHT: 178.1 LBS

## 2018-01-03 DIAGNOSIS — Z98.2 S/P VP SHUNT: ICD-10-CM

## 2018-01-03 DIAGNOSIS — Z98.2 S/P VP SHUNT: Primary | ICD-10-CM

## 2018-01-03 ASSESSMENT — PAIN SCALES - GENERAL: PAINLEVEL: MILD PAIN (3)

## 2018-01-03 NOTE — LETTER
1/3/2018       RE: Amparo Coppola  [address] \[facility]""Y   [place] [address]     Dear Colleague,    Thank you for referring your patient, Amparo Coppola, to the [facility]UROS[facility]ERY at [facility]. Please see a copy of my visit note below.    January 3, 2018      RE:  Amparo Coppola       Dear Dr. Francis:      I had the pleasure to see Amparo back my clinic today for followup after normal pressure hydrocephalus and placement of a ventricular peritoneal shunt.  I think overall she is doing better.  She says that her falling has stopped, although she does continue to use a walker.  Her daughter or her niece who is with her says that her memory is much better than it was before.  She does still have trouble with walking, but she has bad knees.  She was recently at the orthopedic surgeon's office who said that knee replacements are okay but the muscles around the knees weak.  They have recommended that she return to physical therapy.  I would agree with the assessment to go back to physical therapy to help her with her ambulation.      I reviewed her CT from today, and it shows that the ventricles are stable in size.      Overall, I think she is doing very well status post  shunt.  I will plan to see her back in around  in the summertime.      Overall, I spent approximately 25 minutes with Amparo with the majority of this time spent in consultation and developing a treatment plan.      Thank you for your trust and the opportunity to participate in Amparo's care.  If you have any further questions or concerns, please feel free to contact me on my cell phone at [phone].         ZAHRAA BERMEO MD             D: 2018 13:51   T: 2018 07:11   MT: NATIVIDAD      Name:     AMPARO COPPOLA   MRN:      [id number]        Account:      [id number]   :      1940           Service Date: 2018      Document: [id number]       Again, thank you for  allowing me to participate in the care of your patient.      Sincerely,    Rc Dozier MD    CC:  Parvez Francis MD   [facility]    [address] [place] [address]

## 2018-01-03 NOTE — MR AVS SNAPSHOT
"              After Visit Summary   1/3/2018    Amparo Sharma    MRN: [id number]           Patient Information     Date Of Birth          1940        Visit Information        Provider Department      1/3/2018 1:20 PM Rc Dozier MD [facility]        Today's Diagnoses     S/P  shunt    -  1       Follow-ups after your visit        Who to contact     Please call your clinic at [phone] to:    Ask questions about your health    Make or cancel appointments    Discuss your medicines    Learn about your test results    Speak to your doctor   If you have compliments or concerns about an experience at your clinic, or if you wish to file a complaint, please contact [facility] Patient Relations at [phone] or email us at [email or web address]         Additional Information About Your Visit        MyChart Information     Issue is an electronic gateway that provides easy, online access to your medical records. With Issue, you can request a clinic appointment, read your test results, renew a prescription or communicate with your care team.     To sign up for "Mobile Location, IP"t visit the website at [email or web address]   You will be asked to enter the access code listed below, as well as some personal information. Please follow the directions to create your username and password.     Your access code is: [email or web address]  Expires: 3/20/2018  6:30 AM     Your access code will  in 90 days. If you need help or a new code, please contact your [facility] or call [phone] for assistance.        Care EveryWhere ID     This is your Care EveryWhere ID. This could be used by other organizations to access your [place] medical records  [id number]        Your Vitals Were     Pulse Height BMI (Body Mass Index)             65 1.549 m (5' 1\") 33.65 kg/m2          Blood Pressure from Last 3 Encounters:   18 121/63 "   09/25/17 129/64   09/07/17 125/73    Weight from Last 3 Encounters:   01/03/18 80.8 kg (178 lb 1.6 oz)   09/25/17 78.9 kg (174 lb)   08/24/17 76.2 kg (168 lb)              Today, you had the following     No orders found for display       Primary Care Provider Office Phone # Fax #    Parvez Francis [phone]       [facility] [address] [facility] [address]        Equal Access to Services     FAWN WALSH : Hadii aad ku hadasho Soomaali, waaxda luqadaha, qaybta kaalmada adeegyada, waxlionel steiner haymagn adalid sanchez . So [facility] [phone].    ATENCIÓN: Si habla español, tiene a mcdonald disposición servicios gratuitos de asistencia lingüística. Llame al [phone].    We comply with applicable federal civil rights laws and [place] laws. We do not discriminate on the basis of race, color, national origin, age, disability, sex, sexual orientation, or gender identity.            Thank you!     Thank you for choosing [facility]  for your care. Our goal is always to provide you with excellent care. Hearing back from our patients is one way we can continue to improve our services. Please take a few minutes to complete the written survey that you may receive in the mail after your visit with us. Thank you!             Your Updated Medication List - Protect others around you: Learn how to safely use, store and throw away your medicines at www.disposemymeds.org.          This list is accurate as of: 1/3/18 11:59 PM.  Always use your most recent med list.                   Brand Name Dispense Instructions for use Diagnosis    ABILIFY PO      Take 5 mg by mouth At Bedtime    Hip pain, left, Malignant neoplasm of sigmoid colon (H), Gait disturbance, Other iron deficiency anemia, Acute midline low back pain with right-sided sciatica, Leg weakness, bilateral, Vitamin D deficiency       ALBUTEROL IN           AMLODIPINE BESYLATE PO      Take 10 mg by mouth every morning         AMOXICILLIN PO      Take 500 mg by mouth as needed        ARTIFICIAL TEARS OP           aspirin-acetaminophen-caffeine 250-250-65 MG per tablet    EXCEDRIN MIGRAINE     Take 1 tablet by mouth every 6 hours as needed for headaches    Hip pain, left, Malignant neoplasm of sigmoid colon (H), Gait disturbance, Other iron deficiency anemia, Acute midline low back pain with right-sided sciatica, Leg weakness, bilateral, Vitamin D deficiency       COQ10 PO      Take 200 mg by mouth daily        DICLOFENAC SODIUM PO      Take 50 mg by mouth 2 times daily        diflorasone 0.05 % ointment    PSORCON     Apply 1 applicator topically daily        DONEPEZIL HCL PO      Take 10 mg by mouth At Bedtime        fluticasone 110 MCG/ACT Inhaler    FLOVENT HFA     Inhale 2 puffs into the lungs as needed    Hip pain, left, Malignant neoplasm of sigmoid colon (H), Gait disturbance, Other iron deficiency anemia, Acute midline low back pain with right-sided sciatica, Leg weakness, bilateral, Vitamin D deficiency       FOLIC ACID PO      Take 1 mg by mouth        hydrocortisone 2.5 % cream      Apply topically 2 times daily        lactobacillus rhamnosus (GG) capsule      Take 1 capsule by mouth 2 times daily    Cellulitis of left elbow       LAMICTAL PO      Take 150 mg by mouth in the morning and 100 mg by mouth in the evening    Gait disturbance, Memory loss, Malignant neoplasm of sigmoid colon (H)       Levothyroxine Sodium 75 MCG Caps      Take 75 mcg by mouth every morning    Gait disturbance, Memory loss, Malignant neoplasm of sigmoid colon (H)       MULTIVITAMIN PO      Take by mouth daily    Gait disturbance, Memory loss, Malignant neoplasm of sigmoid colon (H)       ondansetron 4 MG ODT tab    ZOFRAN-ODT    40 tablet    Take 1 tablet (4 mg) by mouth every 6 hours as needed for nausea    S/P ventriculoperitoneal shunt       order for DME     1 each    Abdominal Binder - Medium    Ventral hernia without obstruction or gangrene        PROZAC PO      Take 40 mg by mouth every morning    Gait disturbance, Memory loss, Malignant neoplasm of sigmoid colon (H)       RANITIDINE HCL PO      Take 300 mg by mouth 2 times daily        SPIRIVA HANDIHALER IN      Inhale 1 puff into the lungs as needed        TYLENOL PO      Take 500 mg by mouth every 8 hours as needed for mild pain or fever    Gait disturbance, Memory loss, Malignant neoplasm of sigmoid colon (H)       WELLBUTRIN XL PO      Take 150 mg by mouth every morning    Hip pain, left, Malignant neoplasm of sigmoid colon (H), Gait disturbance, Other iron deficiency anemia, Acute midline low back pain with right-sided sciatica, Leg weakness, bilateral, Vitamin D deficiency

## 2018-01-03 NOTE — TELEPHONE ENCOUNTER
Follow up call. Patient states she is taking zantac instead of Prilosec and is feeling well. She stated that she does not want to have another endoscopy. Continues to take excedrin for her knee pain. Advised that NSAIDS are not recommended.

## 2018-01-11 ENCOUNTER — TRANSFERRED RECORDS (OUTPATIENT)
Dept: HEALTH INFORMATION MANAGEMENT | Facility: CLINIC | Age: 78
End: 2018-01-11

## 2018-01-15 ENCOUNTER — TELEPHONE (OUTPATIENT)
Dept: SURGERY | Facility: CLINIC | Age: 78
End: 2018-01-15

## 2018-01-15 NOTE — TELEPHONE ENCOUNTER
Per task, I called the patient and left a message for her with an appointment for her to see Dr. Astorga on 01/19/2018 at 11:30 am. I asked her to call [phone] to confirm this appointment.

## 2018-01-16 NOTE — TELEPHONE ENCOUNTER
Patient called and cannot make appointment on 01/19/2018. She also informed me that she has not lost the weight and felt that she should not see Dr. Astorga until she lost twenty pounds. I let her know that was my understanding as well. I have cancelled her appointment. We spoke about her contacting us should she find that she has lost weight. She will call [phone] when she feels she can schedule.

## 2018-02-01 ENCOUNTER — TRANSFERRED RECORDS (OUTPATIENT)
Dept: HEALTH INFORMATION MANAGEMENT | Facility: CLINIC | Age: 78
End: 2018-02-01

## 2018-03-16 ENCOUNTER — OFFICE VISIT (OUTPATIENT)
Dept: NEUROLOGY | Facility: CLINIC | Age: 78
End: 2018-03-16
Payer: MEDICARE

## 2018-03-16 VITALS
DIASTOLIC BLOOD PRESSURE: 74 MMHG | SYSTOLIC BLOOD PRESSURE: 132 MMHG | WEIGHT: 178 LBS | BODY MASS INDEX: 33.61 KG/M2 | HEART RATE: 76 BPM | HEIGHT: 61 IN

## 2018-03-16 DIAGNOSIS — Z98.2 S/P VENTRICULOPERITONEAL SHUNT: Primary | ICD-10-CM

## 2018-03-16 DIAGNOSIS — G93.40 ENCEPHALOPATHY: ICD-10-CM

## 2018-03-16 ASSESSMENT — PAIN SCALES - GENERAL: PAINLEVEL: MILD PAIN (2)

## 2018-03-16 NOTE — LETTER
"3/16/2018       RE: Amparo Sharma  [address]   [place] [address]     Dear Colleague,    Thank you for referring your patient, Amparo Sharma, to the [facility] NEUROLOGY at [facility]. Please see a copy of my visit note below.    Service Date: 2018       RE: Amparo Sharma   MRN: [id number]   : 1940      Dear Dr. Francis:      Thank you for referring Amparo Sharma back for neurologic consultation on 2018. Her chief complaint is that of increasing gait disturbance and memory loss.     I have not seen the patient since she had her ventriculoperitoneal shunt placed for normal pressure hydrocephalus.  I had referred the patient to Dr. Dozier and they are quite pleased with his care.  The patient has now been in assisted living as you are aware at [facility].  She did improve markedly in terms of her balance after the shunt that was placed on .  Her memory also improved.  Unfortunately, she had no improvement in her chronic urinary incontinence.  She seems to have now gotten worse.  She has suffered a number of falls.  Fortunately, she has not had any head trauma.  She is now confined basically to a wheelchair as the help at the nursing home are quite convinced that she may strike her head.  She said she has been \"held up many times.\"  She feels lightheaded at times.  She denied to me true vertigo.  There is nothing to suggest headache.  She has had no stroke symptoms with focal weakness or paresthesias.  I reviewed on the Epic website her blood tests and on 12/15/2016 and her B12 level was 814 picograms.  On 2018, her hemoglobin was 11.7.  She had a normal metabolic profile except her sodium was 130.8 and her glucose was 130.2.  Her TSH was last checked on 2016 and it was 1.74.  The patient and her daughter did discuss her memory issues.  She sometimes does seem to lose train of " thought during conversations and she definitely has a short-term memory issue.  She has been on amantadine in the past but it was raised inappropriately and she became quite confused on the medication.  I cannot tell when it was stopped.  The patient has been on Abilify 10 mg once a day, amlodipine, bupropion  mg, Centrum Silver, Coenzyme Q10, Voltaren, and donepezil 10 mg.  She has been on it for 6 weeks.  This probably has not helped.  The patient has had no dyspepsia from the medication nor has she had any unusual dreams or diarrhea.  She did see Dr. Alexandre since Dr. Smith left [facility].  He felt that she was not given enough Botox on 08/17/2017.  This evidently worked for 48 hours.  She is scheduled now to have a larger dose of Botox for her urinary incontinence problems.  The patient said that her psychiatric disease seems to be under good control.  She denied any ups or downs emotionally or any significant depression.  She has not had any overt seizures.      Neurologic examination revealed a pleasant woman.  Her blood pressure supine was 146/73 with a pulse 58 seated.  After waiting 3 minutes it was 138/55 with a pulse of 72, then standing and waiting 3 minutes it was 177/82 with a pulse of 78.  She had a normal Mini Cog and scored 5/5.  She could tell me about Trump and recent world affairs and situations that have surrounded him.  She was able to tell me the states that touch [place].  Her gait is better, although it is still impaired.  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  She does rely on her wheelchair.  She had swaying on Romberg and a positive pull test.  She does not have rigidity.  Strength testing showed she cannot elevate her arms because of rotator cuff disease.  Otherwise, she had normal strength.  She had mild palmomental reflexes but no other frontal lobe release signs.  Cranial nerve examination was unremarkable and she had normal extraocular movements, including upward gaze.  She has  symmetric face and fluent speech.  Muscle stretch reflexes were hypoactive in the arms and absent in the legs.  Her toes were downward going to plantar stimulation.  She had normal vibratory and position sense testing.      IMPRESSION:   1.  Possible shunt malfunction.   2.  Chronic urinary incontinence.      It is hard to tell if the patient has a shunt malfunction or could just have progression of her neurologic disease and may have underlying degenerative brain disease.  She did have significant atrophy on previous MRI scans, but did have also findings to suggest normal pressure hydrocephalus.  I recommended that she have now evaluation by Dr. Dozier again.  She is scheduled for early summer.  Hopefully, he will be able to get this done sooner.  The patient is going to have a 3-hour EEG to help make sure she does not have any nonconvulsive seizures causing her issues.  I have asked that she have followup with me when she sees Dr. Dozier.      Thank you for allowing me to see this patient.      I spent 35 minutes with the patient today.  Over 50% of the time this involved counseling and coordination of care.         D: 2018   T: 2018   MT: SHONA      Name:     ATILIO COPPOLA   MRN:      [id number]        Account:      [id number]   :      1940           Service Date: 2018      Document: [id number]        Again, thank you for allowing me to participate in the care of your patient.      Sincerely,    Alex Gruber MD    CC:  Parvez Francis MD   [facility]    [address] [place] [address]

## 2018-03-16 NOTE — MR AVS SNAPSHOT
After Visit Summary   3/16/2018    Amparo Sharma    MRN: [id number]           Patient Information     Date Of Birth          1940        Visit Information        Provider Department      3/16/2018 9:30 AM Alex Gruber MD [facility] Neurology        Today's Diagnoses     S/P ventriculoperitoneal shunt    -  1    Encephalopathy           Follow-ups after your visit        Additional Services     NEUROSURGERY REFERRAL       Your provider has referred you to: [facility]: Neurosurgery Clinic [facility] [phone]   [email or web address]    Please be aware that coverage of these services is subject to the terms and limitations of your health insurance plan.  Call member services at your health plan with any benefit or coverage questions.      Please bring the following with you to your appointment:    (1) Any X-Rays, CTs or MRIs which have been performed.  Contact the facility where they were done to arrange for  prior to your scheduled appointment.   (2) List of current medications  (3) This referral request   (4) Any documents/labs given to you for this referral                  Follow-up notes from your care team     Return in about 7 weeks (around 5/3/2018).      Your next 10 appointments already scheduled     Apr 05, 2018  8:30 AM CDT   (Arrive by 8:15 AM)   In Lab Video Visit with  EEG TECH 2   EEG [facility] OUTPATIENT ([facility])    [address] [place] [address] [id number] [phone]            Apr 11, 2018 12:00 PM CDT   (Arrive by 11:45 AM)   Return Visit with Natalie Mackey PA-C   [facility] Neurosurgery ([facility] [place])    [address] [place] [address] [id number] [phone]            May 04, 2018  9:30 AM CDT   (Arrive by 9:15 AM)   Return Visit with Alex Gruber MD   [facility] Neurology ([facility]  "Center)    [address] [facility] [id number] [phone]            2018  1:20 PM CDT   (Arrive by 1:05 PM)   Return Visit with Rc Dozier MD   [facility] ([facility] [place])    [address]  [address] [facility] [id number] [phone]              Future tests that were ordered for you today     Open Future Orders        Priority Expected Expires Ordered    EEG Routine  3/16/2019 3/16/2018            Who to contact     Please call your clinic at [phone] to:    Ask questions about your health    Make or cancel appointments    Discuss your medicines    Learn about your test results    Speak to your doctor            Additional Information About Your Visit        Tuan800hart Information     Washiot is an electronic gateway that provides easy, online access to your medical records. With ClearRisk, you can request a clinic appointment, read your test results, renew a prescription or communicate with your care team.     To sign up for ClearRisk visit the website at [email or web address]   You will be asked to enter the access code listed below, as well as some personal information. Please follow the directions to create your username and password.     Your access code is: [email or web address]  Expires: 3/20/2018  7:30 AM     Your access code will  in 90 days. If you need help or a new code, please contact your [facility] or call [phone] for assistance.        Care EveryWhere ID     This is your Care EveryWhere ID. This could be used by other organizations to access your [facility] medical records  [id number]        Your Vitals Were     Pulse Height BMI (Body Mass Index)             76 1.549 m (5' 1\") 33.63 kg/m2          Blood Pressure from Last 3 Encounters:   18 132/74   18 121/63   17 129/64    Weight from Last 3 Encounters:   18 80.7 kg (178 lb)   18 80.8 kg " (178 lb 1.6 oz)   09/25/17 78.9 kg (174 lb)              We Performed the Following     NEUROSURGERY REFERRAL        Primary Care Provider Office Phone # Fax #    Parvez Francis [phone]       [facility] [address] [address]        Equal Access to Services     FAWN WALSH : Hadii aad ku hadasho Soomaali, waaxda luqadaha, qaybta kaalmada adeegyada, waxay idiin hayaan adeeg khbhanush la'aan ah. So [facility] [phone].    ATENCIÓN: Si habla español, tiene a mcdonald disposición servicios gratuitos de asistencia lingüística. [facility] [phone].    We comply with applicable federal civil rights laws and [place] laws. We do not discriminate on the basis of race, color, national origin, age, disability, sex, sexual orientation, or gender identity.            Thank you!     Thank you for choosing [facility] NEUROLOGY  for your care. Our goal is always to provide you with excellent care. Hearing back from our patients is one way we can continue to improve our services. Please take a few minutes to complete the written survey that you may receive in the mail after your visit with us. Thank you!             Your Updated Medication List - Protect others around you: Learn how to safely use, store and throw away your medicines at www.disposemymeds.org.          This list is accurate as of 3/16/18 10:50 AM.  Always use your most recent med list.                   Brand Name Dispense Instructions for use Diagnosis    ABILIFY PO      Take 5 mg by mouth At Bedtime    Hip pain, left, Malignant neoplasm of sigmoid colon (H), Gait disturbance, Other iron deficiency anemia, Acute midline low back pain with right-sided sciatica, Leg weakness, bilateral, Vitamin D deficiency       ALBUTEROL IN           AMLODIPINE BESYLATE PO      Take 10 mg by mouth every morning        AMOXICILLIN PO      Take 500 mg by mouth as needed        ARTIFICIAL TEARS OP           aspirin-acetaminophen-caffeine 250-250-65  MG per tablet    EXCEDRIN MIGRAINE     Take 1 tablet by mouth every 6 hours as needed for headaches    Hip pain, left, Malignant neoplasm of sigmoid colon (H), Gait disturbance, Other iron deficiency anemia, Acute midline low back pain with right-sided sciatica, Leg weakness, bilateral, Vitamin D deficiency       COQ10 PO      Take 200 mg by mouth daily        DICLOFENAC SODIUM PO      Take 50 mg by mouth 2 times daily        diflorasone 0.05 % ointment    PSORCON     Apply 1 applicator topically daily        DONEPEZIL HCL PO      Take 10 mg by mouth At Bedtime        fluticasone 110 MCG/ACT Inhaler    FLOVENT HFA     Inhale 2 puffs into the lungs as needed    Hip pain, left, Malignant neoplasm of sigmoid colon (H), Gait disturbance, Other iron deficiency anemia, Acute midline low back pain with right-sided sciatica, Leg weakness, bilateral, Vitamin D deficiency       FOLIC ACID PO      Take 1 mg by mouth        hydrocortisone 2.5 % cream      Apply topically 2 times daily        lactobacillus rhamnosus (GG) capsule      Take 1 capsule by mouth 2 times daily    Cellulitis of left elbow       LAMICTAL PO      Take 150 mg by mouth in the morning and 100 mg by mouth in the evening    Gait disturbance, Memory loss, Malignant neoplasm of sigmoid colon (H)       Levothyroxine Sodium 75 MCG Caps      Take 75 mcg by mouth every morning    Gait disturbance, Memory loss, Malignant neoplasm of sigmoid colon (H)       MULTIVITAMIN PO      Take by mouth daily    Gait disturbance, Memory loss, Malignant neoplasm of sigmoid colon (H)       ondansetron 4 MG ODT tab    ZOFRAN-ODT    40 tablet    Take 1 tablet (4 mg) by mouth every 6 hours as needed for nausea    S/P ventriculoperitoneal shunt       order for DME     1 each    Abdominal Binder - Medium    Ventral hernia without obstruction or gangrene       PROZAC PO      Take 40 mg by mouth every morning    Gait disturbance, Memory loss, Malignant neoplasm of sigmoid colon (H)        RANITIDINE HCL PO      Take 300 mg by mouth 2 times daily        SPIRIVA HANDIHALER IN      Inhale 1 puff into the lungs as needed        TYLENOL PO      Take 500 mg by mouth every 8 hours as needed for mild pain or fever    Gait disturbance, Memory loss, Malignant neoplasm of sigmoid colon (H)       WELLBUTRIN XL PO      Take 150 mg by mouth every morning    Hip pain, left, Malignant neoplasm of sigmoid colon (H), Gait disturbance, Other iron deficiency anemia, Acute midline low back pain with right-sided sciatica, Leg weakness, bilateral, Vitamin D deficiency

## 2018-04-23 ENCOUNTER — OFFICE VISIT (OUTPATIENT)
Dept: NEUROLOGY | Facility: CLINIC | Age: 78
End: 2018-04-23
Attending: PSYCHIATRY & NEUROLOGY
Payer: MEDICARE

## 2018-04-23 DIAGNOSIS — G93.40 ENCEPHALOPATHY: ICD-10-CM

## 2018-04-23 DIAGNOSIS — Z98.2 S/P VENTRICULOPERITONEAL SHUNT: ICD-10-CM

## 2018-04-23 NOTE — MR AVS SNAPSHOT
After Visit Summary   4/23/2018    Amparo Sharma    MRN: [id number]           Patient Information     Date Of Birth          1940        Visit Information        Provider Department      4/23/2018 8:30 AM  EEG TECH 2 EEG [facility] OUTPATIENT        Today's Diagnoses     Encephalopathy        S/P ventriculoperitoneal shunt           Follow-ups after your visit        Your next 10 appointments already scheduled     May 02, 2018 11:00 AM CDT   (Arrive by 10:45 AM)   Return Visit with KASANDRA Almanzar CNP   [facility] Neurosurgery [facility])    [address] [place] [id number] [phone]            May 04, 2018  9:30 AM CDT   (Arrive by 9:15 AM)   Return Visit with Alex Gruber MD   [facility] Neurology [facility])    [address] [place] [id number] [phone]            Jun 04, 2018  1:20 PM CDT   (Arrive by 1:05 PM)   Return Visit with Rc Dozier MD   [facility] Neurosurgery [facility])    [address] [place] [id number] [phone]              Who to contact     Please call your clinic at [phone] to:    Ask questions about your health    Make or cancel appointments    Discuss your medicines    Learn about your test results    Speak to your doctor            Additional Information About Your Visit        MyChart Information     Catapultt is an electronic gateway that provides easy, online access to your medical records. With Giving Assistant, you can request a clinic appointment, read your test results, renew a prescription or communicate with your care team.     To sign up for Catapultt visit the website at [email or web address]   You will be asked to enter the access code listed below, as well as some personal information. Please follow the directions to create your username and password.     Your  access code is: [email or web address]  Expires: 2018  6:30 AM     Your access code will  in 90 days. If you need help or a new code, please contact your [facility] or call [phone] for assistance.        Care EveryWhere ID     This is your Care EveryWhere ID. This could be used by other organizations to access your [facility] medical records  [id number]         Blood Pressure from Last 3 Encounters:   No data found for BP    Weight from Last 3 Encounters:   No data found for Wt              Today, you had the following     No orders found for display       Primary Care Provider Office Phone # Fax #    Parvez Francis [phone]       [place] [address] [facility] [address]        Equal Access to Services     FAWN WALSH : Hadii aad ku hadasho Soomaali, waaxda luqadaha, qaybta kaalmada adeegyada, waxay idiin hayaan adejamia sanchez . So [facility] [phone].    ATENCIÓN: Si habla español, tiene a mcdonald disposición servicios gratuitos de asistencia lingüística. Llame al [phone].    We comply with applicable federal civil rights laws and [place] laws. We do not discriminate on the basis of race, color, national origin, age, disability, sex, sexual orientation, or gender identity.            Thank you!     Thank you for choosing EEG [facility] OUTPATIENT  for your care. Our goal is always to provide you with excellent care. Hearing back from our patients is one way we can continue to improve our services. Please take a few minutes to complete the written survey that you may receive in the mail after your visit with us. Thank you!             Your Updated Medication List - Protect others around you: Learn how to safely use, store and throw away your medicines at www.disposemymeds.org.          This list is accurate as of 18 11:59 PM.  Always use your most recent med list.                   Brand Name Dispense Instructions for use Diagnosis     ABILIFY PO      Take 5 mg by mouth At Bedtime    Hip pain, left, Malignant neoplasm of sigmoid colon (H), Gait disturbance, Other iron deficiency anemia, Acute midline low back pain with right-sided sciatica, Leg weakness, bilateral, Vitamin D deficiency       ALBUTEROL IN           AMLODIPINE BESYLATE PO      Take 10 mg by mouth every morning        AMOXICILLIN PO      Take 500 mg by mouth as needed        ARTIFICIAL TEARS OP           aspirin-acetaminophen-caffeine 250-250-65 MG per tablet    EXCEDRIN MIGRAINE     Take 1 tablet by mouth every 6 hours as needed for headaches    Hip pain, left, Malignant neoplasm of sigmoid colon (H), Gait disturbance, Other iron deficiency anemia, Acute midline low back pain with right-sided sciatica, Leg weakness, bilateral, Vitamin D deficiency       COQ10 PO      Take 200 mg by mouth daily        DICLOFENAC SODIUM PO      Take 50 mg by mouth 2 times daily        diflorasone 0.05 % ointment    PSORCON     Apply 1 applicator topically daily        DONEPEZIL HCL PO      Take 10 mg by mouth At Bedtime        fluticasone 110 MCG/ACT Inhaler    FLOVENT HFA     Inhale 2 puffs into the lungs as needed    Hip pain, left, Malignant neoplasm of sigmoid colon (H), Gait disturbance, Other iron deficiency anemia, Acute midline low back pain with right-sided sciatica, Leg weakness, bilateral, Vitamin D deficiency       FOLIC ACID PO      Take 1 mg by mouth        hydrocortisone 2.5 % cream      Apply topically 2 times daily        lactobacillus rhamnosus (GG) capsule      Take 1 capsule by mouth 2 times daily    Cellulitis of left elbow       LAMICTAL PO      Take 150 mg by mouth in the morning and 100 mg by mouth in the evening    Gait disturbance, Memory loss, Malignant neoplasm of sigmoid colon (H)       Levothyroxine Sodium 75 MCG Caps      Take 75 mcg by mouth every morning    Gait disturbance, Memory loss, Malignant neoplasm of sigmoid colon (H)       MULTIVITAMIN PO      Take by mouth  daily    Gait disturbance, Memory loss, Malignant neoplasm of sigmoid colon (H)       ondansetron 4 MG ODT tab    ZOFRAN-ODT    40 tablet    Take 1 tablet (4 mg) by mouth every 6 hours as needed for nausea    S/P ventriculoperitoneal shunt       order for DME     1 each    Abdominal Binder - Medium    Ventral hernia without obstruction or gangrene       PROZAC PO      Take 40 mg by mouth every morning    Gait disturbance, Memory loss, Malignant neoplasm of sigmoid colon (H)       RANITIDINE HCL PO      Take 300 mg by mouth 2 times daily        SPIRIVA HANDIHALER IN      Inhale 1 puff into the lungs as needed        TYLENOL PO      Take 500 mg by mouth every 8 hours as needed for mild pain or fever    Gait disturbance, Memory loss, Malignant neoplasm of sigmoid colon (H)       WELLBUTRIN XL PO      Take 150 mg by mouth every morning    Hip pain, left, Malignant neoplasm of sigmoid colon (H), Gait disturbance, Other iron deficiency anemia, Acute midline low back pain with right-sided sciatica, Leg weakness, bilateral, Vitamin D deficiency

## 2018-04-23 NOTE — Clinical Note
4/23/2018       RE: Amparo Sharma  [address]   [place] [address]     Dear Colleague,    Thank you for referring your patient, Amparo Sharma, to the EEG [facility] OUTPATIENT at [facility]. Please see a copy of my visit note below.    CPT: 30038-76 (3 hour in lab video)  CSC/VEEG/CHRISTAL Ashley reading    Again, thank you for allowing me to participate in the care of your patient.      Sincerely,    EEG Tech

## 2018-04-23 NOTE — LETTER
Date:May 1, 2018      Patient was self referred, no letter generated. Do not send.        [facility] Health Information

## 2018-04-24 NOTE — PROCEDURES
Procedure Date: 2018      TYPE OF STUDY:  Outpatient video EEG monitoring.        EEG #:           DURATION OF STUDY:  2 hours 1 minute 4 seconds.      HISTORY:  Video EEG monitoring performed on Amparo Coppola who is a 77-year-old with periods of memory loss, falls and intermittent confusion.  She is being evaluated for epilepsy.  She is currently being treated with lamotrigine.  Referring physician is Alex Gruber MD.        TECHNICAL SUMMARY:  EEG was recorded from scalp electrodes placed according to the 10-20 international system.  Additional electrodes were utilized for referencing, artifact detection, and recording from other cerebral regions.  Video was continuously recorded.  Video was reviewed for clinical correlation and to assist with interpretation.      FINDINGS:  Low voltage EEG while patient is alert and awake.  With eye closure, brief periods of 9 Hz posterior dominant rhythm are seen but these are never sustained for more than 3 seconds or so.  The patient is drowsy during most of the recording with slow eye movements, desynchronized background and shifting slow activity.  Occasional 3-4 Hz delta is seen during drowsiness but persistence is less than 5% and it shifts from side-to-side.  Somewhat deeper sleep is recorded for a period of time with abundant positive occipital sharp transients of sleep and poorly formed vertex waves.  Sleep spindles are not seen.      Hyperventilation is not performed.  Photic stimulation does not induce any abnormalities.        OTHER INTERICTAL ABNORMALITIES:  No epileptiform discharges.      ICTAL ABNORMALITIES:  No electrographic seizures.      IMPRESSION:  Normal.  No epileptiform activity or seizures.         JAMAL VAUGHN MD             D: 2018   T: 2018   MT: evan      Name:     AMPARO COPPOLA   MRN:      [id number]        Account:        [id number]   :      1940           Procedure Date: 2018      Document:  [id number]

## 2018-05-04 ENCOUNTER — OFFICE VISIT (OUTPATIENT)
Dept: NEUROLOGY | Facility: CLINIC | Age: 78
End: 2018-05-04
Payer: MEDICARE

## 2018-05-04 ENCOUNTER — RECORDS - HEALTHEAST (OUTPATIENT)
Dept: ADMINISTRATIVE | Facility: OTHER | Age: 78
End: 2018-05-04

## 2018-05-04 VITALS
SYSTOLIC BLOOD PRESSURE: 146 MMHG | DIASTOLIC BLOOD PRESSURE: 87 MMHG | WEIGHT: 174.4 LBS | HEIGHT: 61 IN | HEART RATE: 77 BPM | BODY MASS INDEX: 32.93 KG/M2

## 2018-05-04 DIAGNOSIS — G91.2 NPH (NORMAL PRESSURE HYDROCEPHALUS) (H): Primary | ICD-10-CM

## 2018-05-04 RX ORDER — DOCUSATE SODIUM 100 MG/1
100 CAPSULE, LIQUID FILLED ORAL 2 TIMES DAILY PRN
COMMUNITY
Start: 2018-04-20 | End: 2020-10-06

## 2018-05-04 ASSESSMENT — PAIN SCALES - GENERAL: PAINLEVEL: MILD PAIN (2)

## 2018-05-04 NOTE — MR AVS SNAPSHOT
"              After Visit Summary   2018    Amparo Sharma    MRN: [id number]           Patient Information     Date Of Birth          1940        Visit Information        Provider Department      2018 9:30 AM Alex Gruber MD [facility] Neurology        Today's Diagnoses     NPH (normal pressure hydrocephalus)    -  1       Follow-ups after your visit        Future tests that were ordered for you today     Open Future Orders        Priority Expected Expires Ordered    NM CSF Shunt Evaluation Routine 5/15/2018 2019 2018            Who to contact     Please call your clinic at [phone] to:    Ask questions about your health    Make or cancel appointments    Discuss your medicines    Learn about your test results    Speak to your doctor            Additional Information About Your Visit        MyChart Information     [employer] is an electronic gateway that provides easy, online access to your medical records. With [employer], you can request a clinic appointment, read your test results, renew a prescription or communicate with your care team.     To sign up for [employer] visit the website at [email or web address]   You will be asked to enter the access code listed below, as well as some personal information. Please follow the directions to create your username and password.     Your access code is: [email or web address]  Expires: 2018  6:30 AM     Your access code will  in 90 days. If you need help or a new code, please contact your [facility] or call [phone] for assistance.        Care EveryWhere ID     This is your Care EveryWhere ID. This could be used by other organizations to access your [facility] medical records  [id number]        Your Vitals Were     Pulse Height BMI (Body Mass Index)             77 1.549 m (5' 1\") 32.95 kg/m2          Blood Pressure from Last 3 Encounters:   18 150/90   18 146/87   18 132/74    " Weight from Last 3 Encounters:   05/08/18 78.9 kg (174 lb)   05/04/18 79.1 kg (174 lb 6.4 oz)   03/16/18 80.7 kg (178 lb)              Today, you had the following     No orders found for display       Primary Care Provider Office Phone # Fax #    Parvez Francis [phone]       [facility] [address] [address]        Equal Access to Services     FAWN WALSH : Hadii aad ku hadasho Soomaali, waaxda luqadaha, qaybta kaalmada adeegyada, waxay idiin hayaan adeeg kharash la'paulino . So [facility] [phone].    ATENCIÓN: Si habla español, tiene a mcdonald disposición servicios gratuitos de asistencia lingüística. [facility] [phone].    We comply with applicable federal civil rights laws and [place] laws. We do not discriminate on the basis of race, color, national origin, age, disability, sex, sexual orientation, or gender identity.            Thank you!     Thank you for choosing [facility] NEUROLOGY  for your care. Our goal is always to provide you with excellent care. Hearing back from our patients is one way we can continue to improve our services. Please take a few minutes to complete the written survey that you may receive in the mail after your visit with us. Thank you!             Your Updated Medication List - Protect others around you: Learn how to safely use, store and throw away your medicines at www.disposemymeds.org.          This list is accurate as of 5/4/18 11:59 PM.  Always use your most recent med list.                   Brand Name Dispense Instructions for use Diagnosis    ABILIFY PO      Take 5 mg by mouth At Bedtime    Hip pain, left, Malignant neoplasm of sigmoid colon (H), Gait disturbance, Other iron deficiency anemia, Acute midline low back pain with right-sided sciatica, Leg weakness, bilateral, Vitamin D deficiency       ALBUTEROL IN           AMLODIPINE BESYLATE PO      Take 10 mg by mouth every morning        AMOXICILLIN PO      Take 500 mg by mouth as  needed        ARTIFICIAL TEARS OP           aspirin-acetaminophen-caffeine 250-250-65 MG per tablet    EXCEDRIN MIGRAINE     Take 1 tablet by mouth every 6 hours as needed for headaches    Hip pain, left, Malignant neoplasm of sigmoid colon (H), Gait disturbance, Other iron deficiency anemia, Acute midline low back pain with right-sided sciatica, Leg weakness, bilateral, Vitamin D deficiency       COQ10 PO      Take 200 mg by mouth daily        DICLOFENAC SODIUM PO      Take 50 mg by mouth 2 times daily        diflorasone 0.05 % ointment    PSORCON     Apply 1 applicator topically daily        docusate sodium 100 MG capsule    COLACE     Take 100 mg by mouth        DONEPEZIL HCL PO      Take 10 mg by mouth At Bedtime        fluticasone 110 MCG/ACT Inhaler    FLOVENT HFA     Inhale 2 puffs into the lungs as needed    Hip pain, left, Malignant neoplasm of sigmoid colon (H), Gait disturbance, Other iron deficiency anemia, Acute midline low back pain with right-sided sciatica, Leg weakness, bilateral, Vitamin D deficiency       FOLIC ACID PO      Take 1 mg by mouth        hydrocortisone 2.5 % cream      Apply topically 2 times daily        lactobacillus rhamnosus (GG) capsule      Take 1 capsule by mouth 2 times daily    Cellulitis of left elbow       LAMICTAL PO      Take 150 mg by mouth in the morning and 100 mg by mouth in the evening    Gait disturbance, Memory loss, Malignant neoplasm of sigmoid colon (H)       Levothyroxine Sodium 75 MCG Caps      Take 75 mcg by mouth every morning    Gait disturbance, Memory loss, Malignant neoplasm of sigmoid colon (H)       MULTIVITAMIN PO      Take by mouth daily    Gait disturbance, Memory loss, Malignant neoplasm of sigmoid colon (H)       ondansetron 4 MG ODT tab    ZOFRAN-ODT    40 tablet    Take 1 tablet (4 mg) by mouth every 6 hours as needed for nausea    S/P ventriculoperitoneal shunt       order for DME     1 each    Abdominal Binder - Medium    Ventral hernia without  obstruction or gangrene       PROZAC PO      Take 40 mg by mouth every morning    Gait disturbance, Memory loss, Malignant neoplasm of sigmoid colon (H)       RANITIDINE HCL PO      Take 300 mg by mouth 2 times daily        SPIRIVA HANDIHALER IN      Inhale 1 puff into the lungs as needed        TYLENOL PO      Take 500 mg by mouth every 8 hours as needed for mild pain or fever    Gait disturbance, Memory loss, Malignant neoplasm of sigmoid colon (H)       WELLBUTRIN XL PO      Take 150 mg by mouth every morning    Hip pain, left, Malignant neoplasm of sigmoid colon (H), Gait disturbance, Other iron deficiency anemia, Acute midline low back pain with right-sided sciatica, Leg weakness, bilateral, Vitamin D deficiency

## 2018-05-04 NOTE — LETTER
2018       RE: Amparo Sharma  [address]   [place] [address]     Dear Colleague,    Thank you for referring your patient, Amparo Sharma, to the [facility] NEUROLOGY at [facility]. Please see a copy of my visit note below.    Service Date: 2018      RE: Amparo Sharma   MRN: [id number]    1940      Dear Dr. Francis:      This is in regard to followup on Amparo Sharma.  The patient returned with chief complaint of worsening balance, prior treated normal pressure hydrocephalus, as well as memory loss.      The patient is due to see Dr. Dozier, her neurosurgeon, shortly.  Unfortunately, her gait has continued to worsen.  She has now totally walker dependent.  She said in the nursing home the aides follow her with the wheelchair so she can fall back into it if she has an issue.  She is not safe to walk alone.  The patient feels that her memory is about the same and she said she has good long-term memory, but poor short-term memory.  The patient did have benefit in terms of her chronic urinary incontinence with botulinum injections through her urologist, Dr. Bailey.  She felt that probably the dose was not enough as she is now incontinent again and she is looking forward to having another injection.  The patient denied any headache or stroke-like symptoms.  She did discuss with me prior benefit from using amantadine, but then becoming quite confused when evidently the dosage was increased by another physician.  It was not directly involved in her care at that time.  I did explain to her that amantadine did have a narrow therapeutic level and unfortunately at higher doses it can cause severe encephalopathy.        Neurologic examination revealed a pleasant woman.  She has a severe dyspraxia of gait and has a tendency to fall backwards.  She had a positive pull test.  She has mild cogwheeling and some minimal rigidity involving  her limbs.  She has good facial expression and negative Myerson sign.  The patient had normal extraocular movements except for decreased upward gaze.  The patient's blood pressure initially was 146/87 with a pulse of 77.  Then with repeat testing by me with a soft cuff, it was 120/80 with a regular pulse.  She has pain in the right rotator cuff and had trouble elevating the right arm.  Otherwise, strength testing was normal.  The patient had a regular cardiac rhythm without gallops or murmurs.  She knew it was May but missed the date in May, but knew it was  and the correct day of the week.  She could recall 2/3 objects after 5 minutes and with prompting 3/3 objects.  She could tell me about Trump and issues with the presidency.  She was able to tell me the 4 states that touch [place].  She initially had some trouble drawing a clock correctly and added an extra hand but then she realized that she made a mistake and could draw it correctly.      IMPRESSION:  Normal pressure hydrocephalus.      The patient has normal pressure hydrocephalus and unfortunately her gait has started to deteriorate again.  If this cannot be corrected by adjustment of her shunt, I have recommended that she be seen in our specialty [facility] here at [facility] in the Department of Neurology.  She is going to keep in touch with me by phone through her daughter.  The patient does understand my concerns and I will continue to follow her if needed.  At this point, I will not restart her amantadine.      Thank you for allowing me to see this patient.      I spent 25 minutes with the patient today.  Over 50% of the time this involved counseling and coordination of care.         D: 2018   T: 2018   MT: SHONA      Name:     ATILIO COPPOLA   MRN:      [id number]        Account:      [id number]   :      1940           Service Date: 2018      Document: [id number]       Again, thank you for allowing me to participate in the  care of your patient.      Sincerely,    Alex Gruber MD    CC:  Parvez Francis MD   [facility]   [address] [place] [address]

## 2018-05-07 NOTE — PROGRESS NOTES
Service Date: 2018      Parvez Francis MD   [facility]   [address] [place] [address]      RE: Amparo Sharma   MRN: [id number]    1940      Dear Dr. Francis:      This is in regard to followup on Amparo Sharma.  The patient returned with chief complaint of worsening balance, prior treated normal pressure hydrocephalus, as well as memory loss.      The patient is due to see Dr. Dozier, her neurosurgeon, shortly.  Unfortunately, her gait has continued to worsen.  She has now totally walker dependent.  She said in the nursing home the aides follow her with the wheelchair so she can fall back into it if she has an issue.  She is not safe to walk alone.  The patient feels that her memory is about the same and she said she has good long-term memory, but poor short-term memory.  The patient did have benefit in terms of her chronic urinary incontinence with botulinum injections through her urologist, Dr. Bailey.  She felt that probably the dose was not enough as she is now incontinent again and she is looking forward to having another injection.  The patient denied any headache or stroke-like symptoms.  She did discuss with me prior benefit from using amantadine, but then becoming quite confused when evidently the dosage was increased by another physician.  It was not directly involved in her care at that time.  I did explain to her that amantadine did have a narrow therapeutic level and unfortunately at higher doses it can cause severe encephalopathy.        Neurologic examination revealed a pleasant woman.  She has a severe dyspraxia of gait and has a tendency to fall backwards.  She had a positive pull test.  She has mild cogwheeling and some minimal rigidity involving her limbs.  She has good facial expression and negative Myerson sign.  The patient had normal extraocular movements except for decreased upward gaze.  The patient's blood pressure initially was 146/87 with a  pulse of 77.  Then with repeat testing by me with a soft cuff, it was 120/80 with a regular pulse.  She has pain in the right rotator cuff and had trouble elevating the right arm.  Otherwise, strength testing was normal.  The patient had a regular cardiac rhythm without gallops or murmurs.  She knew it was May but missed the date in May, but knew it was  and the correct day of the week.  She could recall 2/3 objects after 5 minutes and with prompting 3/3 objects.  She could tell me about Trump and issues with the presidency.  She was able to tell me the 4 states that touch [place].  She initially had some trouble drawing a clock correctly and added an extra hand but then she realized that she made a mistake and could draw it correctly.      IMPRESSION:  Normal pressure hydrocephalus.      The patient has normal pressure hydrocephalus and unfortunately her gait has started to deteriorate again.  If this cannot be corrected by adjustment of her shunt, I have recommended that she be seen in our specialty [facility] here at [facility] in the Department of Neurology.  She is going to keep in touch with me by phone through her daughter.  The patient does understand my concerns and I will continue to follow her if needed.  At this point, I will not restart her amantadine.      Thank you for allowing me to see this patient.      Sincerely yours,      Fernandez Gruber MD        I spent 25 minutes with the patient today.  Over 50% of the time this involved counseling and coordination of care.         FERNANDEZ GRUBER MD             D: 2018   T: 2018   MT: SHONA      Name:     ATILIO COPPOLA   MRN:      [id number]        Account:      [id number]   :      1940           Service Date: 2018      Document: [id number]

## 2018-05-08 ENCOUNTER — OFFICE VISIT (OUTPATIENT)
Dept: NEUROSURGERY | Facility: CLINIC | Age: 78
End: 2018-05-08
Payer: MEDICARE

## 2018-05-08 ENCOUNTER — RECORDS - HEALTHEAST (OUTPATIENT)
Dept: ADMINISTRATIVE | Facility: OTHER | Age: 78
End: 2018-05-08

## 2018-05-08 VITALS
SYSTOLIC BLOOD PRESSURE: 150 MMHG | HEIGHT: 61 IN | BODY MASS INDEX: 32.85 KG/M2 | HEART RATE: 80 BPM | DIASTOLIC BLOOD PRESSURE: 90 MMHG | WEIGHT: 174 LBS

## 2018-05-08 DIAGNOSIS — Z98.2 PRESENCE OF CEREBROSPINAL FLUID DRAINAGE DEVICE: Primary | ICD-10-CM

## 2018-05-08 ASSESSMENT — PAIN SCALES - GENERAL: PAINLEVEL: NO PAIN (0)

## 2018-05-08 NOTE — LETTER
"5/8/2018       RE: Amparo Sharma  [address]   [place] [address]     Dear Colleague,    Thank you for referring your patient, Amparo Sharma, to the [facility] NEUROSURGERY at [facility]. Please see a copy of my visit note below.    Dictated note: #[id number]    Webster \"Eyal\" MD [place]   Neurosurgery, PGY-1    Please contact neurosurgery resident on call with questions.    Dial * * [phone], enter [phone] when prompted.       Service Date: 05/08/2018      Parvez Francis MD   [facility]    [address] [place] [address]      RE:  Amparo Sharma      Dear Dr. Francis:      Thank you for the opportunity to see Amparo Sharma in Neurosurgery Clinic today.  As you know, she is a 77-year-old female with a history of normal pressure hydrocephalus, status post ventriculoperitoneal shunt in 02/2017.  This was performed after a lumbar drain trial which demonstrated improvement in her gait.  However, the patient states that she has been having worsening of her gait necessitating use of a walker more frequently.  She states that she is being followed with a wheelchair at her assisted living facility, which she was not previously using.  She has also had issues with urinary incontinence which has been ongoing since at least the 90s.  She has undergone Botox injections which provide temporary relief of her incontinence for several months.  However, she has noticed recurrence of her symptoms.  She has been seen by Dr. Gruber and has undergone an electroencephalogram which did not demonstrate any seizures or abnormal findings.      On physical exam, the patient is awake, alert and in no acute distress.  She is oriented x2 to self and place.  She is not oriented to date.  She has a symmetric brow lift, tongue protrusion, smile, and palate elevation.  She has symmetric sternocleidomastoid strength.  She has 5/5 strength in the bilateral upper and lower " extremities.  Her sensation is intact.  She does exhibit some bradykinesia, especially in the lower extremities.  She is ambulating today with a walker.  Her reflexes are 0+ in the bilateral biceps, brachioradialis and patellar tendons.  She has no clonus bilaterally.  She has no Christianson's bilaterally.      She has no imaging for review today.      Amparo Coppola is a 77-year-old female, status post ventriculoperitoneal shunt in 2017 for normal pressure hydrocephalus who presents in clinic with concern of ongoing gait difficulties.  Given her persistent symptoms, we will pursue investigation of her shunt with a nuclear medicine study to definitively establish whether the shunt is functional.  We will plan to see her back in clinic at the completion of the study to discuss the results and the next options for management of her ongoing gait disturbance.      The patient was seen and discussed with Rc Dozier MD.      Dictated by Darin Burrell MD, Resident       D: 2018   T: 2018   MT: NATIVIDAD      Name:     AMPARO COPPOLA   MRN:      [id number]        Account:      [id number]   :      1940           Service Date: 2018      Document: [id number]

## 2018-05-08 NOTE — MR AVS SNAPSHOT
After Visit Summary   2018    Amprao Sharma    MRN: [id number]           Patient Information     Date Of Birth          1940        Visit Information        Provider Department      2018 10:50 AM Rc Dozier MD [facility] Neurosurgery        Today's Diagnoses     Presence of cerebrospinal fluid drainage device    -  1      Care Instructions    1.  Schedule Nuclear Medicine CSF Shunt Evaluation Test  2.  Follow up with Dr. Dozier post testing, in about 2-3 weeks.    3.  Call Heather Care Coordinator for questions/concerns .    Thank you            Follow-ups after your visit        Follow-up notes from your care team     Return in about 2 weeks (around 2018).      Future tests that were ordered for you today     Open Future Orders        Priority Expected Expires Ordered    NM CSF Shunt Evaluation Routine 5/15/2018 2019 2018            Who to contact     Please call your clinic at [phone] to:    Ask questions about your health    Make or cancel appointments    Discuss your medicines    Learn about your test results    Speak to your doctor            Additional Information About Your Visit        MyChart Information     TrustAlert is an electronic gateway that provides easy, online access to your medical records. With TrustAlert, you can request a clinic appointment, read your test results, renew a prescription or communicate with your care team.     To sign up for TrustAlert visit the website at [email or web address]   You will be asked to enter the access code listed below, as well as some personal information. Please follow the directions to create your username and password.     Your access code is: [email or web address]  Expires: 2018  6:30 AM     Your access code will  in 90 days. If you need help or a new code, please contact your [facility] or call [phone] for assistance.        Care EveryWhere ID     This  "is your Care EveryWhere ID. This could be used by other organizations to access your [place] medical records  [id number]        Your Vitals Were     Pulse Height BMI (Body Mass Index)             80 1.549 m (5' 1\") 32.88 kg/m2          Blood Pressure from Last 3 Encounters:   05/08/18 150/90   05/04/18 146/87   03/16/18 132/74    Weight from Last 3 Encounters:   05/08/18 78.9 kg (174 lb)   05/04/18 79.1 kg (174 lb 6.4 oz)   03/16/18 80.7 kg (178 lb)               Primary Care Provider Office Phone # Fax #    Parvez Francis [phone]       [place] [id number] [facility] [address]        Equal Access to Services     FAWN WALSH : Hadii yury phoenix hadasho Soomaali, waaxda luqadaha, qaybta kaalmada adeegyada, param sanchez . So [facility] [phone].    ATENCIÓN: Si habla español, tiene a mcdonald disposición servicios gratuitos de asistencia lingüística. Killian al [phone].    We comply with applicable federal civil rights laws and [place] laws. We do not discriminate on the basis of race, color, national origin, age, disability, sex, sexual orientation, or gender identity.            Thank you!     Thank you for choosing [facility]  for your care. Our goal is always to provide you with excellent care. Hearing back from our patients is one way we can continue to improve our services. Please take a few minutes to complete the written survey that you may receive in the mail after your visit with us. Thank you!             Your Updated Medication List - Protect others around you: Learn how to safely use, store and throw away your medicines at www.disposemymeds.org.          This list is accurate as of 5/8/18 11:38 AM.  Always use your most recent med list.                   Brand Name Dispense Instructions for use Diagnosis    ABILIFY PO      Take 5 mg by mouth At Bedtime    Hip pain, left, Malignant neoplasm of sigmoid colon (H), Gait disturbance, " Other iron deficiency anemia, Acute midline low back pain with right-sided sciatica, Leg weakness, bilateral, Vitamin D deficiency       ALBUTEROL IN           AMLODIPINE BESYLATE PO      Take 10 mg by mouth every morning        AMOXICILLIN PO      Take 500 mg by mouth as needed        ARTIFICIAL TEARS OP           aspirin-acetaminophen-caffeine 250-250-65 MG per tablet    EXCEDRIN MIGRAINE     Take 1 tablet by mouth every 6 hours as needed for headaches    Hip pain, left, Malignant neoplasm of sigmoid colon (H), Gait disturbance, Other iron deficiency anemia, Acute midline low back pain with right-sided sciatica, Leg weakness, bilateral, Vitamin D deficiency       COQ10 PO      Take 200 mg by mouth daily        DICLOFENAC SODIUM PO      Take 50 mg by mouth 2 times daily        diflorasone 0.05 % ointment    PSORCON     Apply 1 applicator topically daily        docusate sodium 100 MG capsule    COLACE     Take 100 mg by mouth        DONEPEZIL HCL PO      Take 10 mg by mouth At Bedtime        fluticasone 110 MCG/ACT Inhaler    FLOVENT HFA     Inhale 2 puffs into the lungs as needed    Hip pain, left, Malignant neoplasm of sigmoid colon (H), Gait disturbance, Other iron deficiency anemia, Acute midline low back pain with right-sided sciatica, Leg weakness, bilateral, Vitamin D deficiency       FOLIC ACID PO      Take 1 mg by mouth        hydrocortisone 2.5 % cream      Apply topically 2 times daily        lactobacillus rhamnosus (GG) capsule      Take 1 capsule by mouth 2 times daily    Cellulitis of left elbow       LAMICTAL PO      Take 150 mg by mouth in the morning and 100 mg by mouth in the evening    Gait disturbance, Memory loss, Malignant neoplasm of sigmoid colon (H)       Levothyroxine Sodium 75 MCG Caps      Take 75 mcg by mouth every morning    Gait disturbance, Memory loss, Malignant neoplasm of sigmoid colon (H)       MULTIVITAMIN PO      Take by mouth daily    Gait disturbance, Memory loss, Malignant  neoplasm of sigmoid colon (H)       ondansetron 4 MG ODT tab    ZOFRAN-ODT    40 tablet    Take 1 tablet (4 mg) by mouth every 6 hours as needed for nausea    S/P ventriculoperitoneal shunt       order for DME     1 each    Abdominal Binder - Medium    Ventral hernia without obstruction or gangrene       PROZAC PO      Take 40 mg by mouth every morning    Gait disturbance, Memory loss, Malignant neoplasm of sigmoid colon (H)       RANITIDINE HCL PO      Take 300 mg by mouth 2 times daily        SPIRIVA HANDIHALER IN      Inhale 1 puff into the lungs as needed        TYLENOL PO      Take 500 mg by mouth every 8 hours as needed for mild pain or fever    Gait disturbance, Memory loss, Malignant neoplasm of sigmoid colon (H)       WELLBUTRIN XL PO      Take 150 mg by mouth every morning    Hip pain, left, Malignant neoplasm of sigmoid colon (H), Gait disturbance, Other iron deficiency anemia, Acute midline low back pain with right-sided sciatica, Leg weakness, bilateral, Vitamin D deficiency

## 2018-05-08 NOTE — PATIENT INSTRUCTIONS
1.  Schedule Nuclear Medicine CSF Shunt Evaluation Test  2.  Follow up with Dr. Dozier post testing, in about 2-3 weeks.    3.  Call Heather Care Coordinator for questions/concerns .    Thank you

## 2018-05-08 NOTE — PROGRESS NOTES
"Dictated note: #[id number]    Webster \"Eyal\" MD [facility]   Neurosurgery, PGY-1    Please contact neurosurgery resident on call with questions.    Dial * * *[phone], enter [phone] when prompted.     "

## 2018-05-09 NOTE — PROGRESS NOTES
Service Date: 05/08/2018      Parvez Francis MD   [facility]    [address] [place] [address]      RE:  Amparo Pachecoobdulia      Dear Dr. Francis:      Thank you for the opportunity to see Amparo Sharma in Neurosurgery Clinic today.  As you know, she is a 77-year-old female with a history of normal pressure hydrocephalus, status post ventriculoperitoneal shunt in 02/2017.  This was performed after a lumbar drain trial which demonstrated improvement in her gait.  However, the patient states that she has been having worsening of her gait necessitating use of a walker more frequently.  She states that she is being followed with a wheelchair at her assisted living facility, which she was not previously using.  She has also had issues with urinary incontinence which has been ongoing since at least the 90s.  She has undergone Botox injections which provide temporary relief of her incontinence for several months.  However, she has noticed recurrence of her symptoms.  She has been seen by Dr. Gruber and has undergone an electroencephalogram which did not demonstrate any seizures or abnormal findings.      On physical exam, the patient is awake, alert and in no acute distress.  She is oriented x2 to self and place.  She is not oriented to date.  She has a symmetric brow lift, tongue protrusion, smile, and palate elevation.  She has symmetric sternocleidomastoid strength.  She has 5/5 strength in the bilateral upper and lower extremities.  Her sensation is intact.  She does exhibit some bradykinesia, especially in the lower extremities.  She is ambulating today with a walker.  Her reflexes are 0+ in the bilateral biceps, brachioradialis and patellar tendons.  She has no clonus bilaterally.  She has no Christianson's bilaterally.      She has no imaging for review today.      Amparo Sharma is a 77-year-old female, status post ventriculoperitoneal shunt in 02/2017 for normal pressure hydrocephalus who presents in  clinic with concern of ongoing gait difficulties.  Given her persistent symptoms, we will pursue investigation of her shunt with a nuclear medicine study to definitively establish whether the shunt is functional.  We will plan to see her back in clinic at the completion of the study to discuss the results and the next options for management of her ongoing gait disturbance.      The patient was seen and discussed with Rc Dozier MD.      Dictated by Darin Burrell MD, Resident       D: 2018   T: 2018   MT: NATIVIDAD      Name:     ATILIO COPPOLA   MRN:      [id number]        Account:      [id number]   :      1940           Service Date: 2018      Document: [id number]

## 2018-05-14 ENCOUNTER — HOSPITAL ENCOUNTER (OUTPATIENT)
Dept: NUCLEAR MEDICINE | Facility: CLINIC | Age: 78
Setting detail: NUCLEAR MEDICINE
Discharge: HOME | End: 2018-05-14
Attending: STUDENT IN AN ORGANIZED HEALTH CARE EDUCATION/TRAINING PROGRAM | Admitting: STUDENT IN AN ORGANIZED HEALTH CARE EDUCATION/TRAINING PROGRAM
Payer: MEDICARE

## 2018-05-14 DIAGNOSIS — Z98.2 PRESENCE OF CEREBROSPINAL FLUID DRAINAGE DEVICE: ICD-10-CM

## 2018-05-14 PROCEDURE — A9548 IN111 PENTETATE: HCPCS | Performed by: STUDENT IN AN ORGANIZED HEALTH CARE EDUCATION/TRAINING PROGRAM

## 2018-05-14 PROCEDURE — 34300033 ZZH RX 343: Performed by: STUDENT IN AN ORGANIZED HEALTH CARE EDUCATION/TRAINING PROGRAM

## 2018-05-14 PROCEDURE — 61070 BRAIN CANAL SHUNT PROCEDURE: CPT

## 2018-05-14 RX ORDER — INDIUM IN-111 PENTETATE DISODIUM 1 MCI/ML
500 SOLUTION INTRATHECAL ONCE
Status: COMPLETED | OUTPATIENT
Start: 2018-05-14 | End: 2018-05-14

## 2018-05-14 RX ADMIN — INDIUM IN-111 PENTETATE DISODIUM 500 UCI.: 1 SOLUTION INTRATHECAL at 14:05

## 2018-05-22 ENCOUNTER — RECORDS - HEALTHEAST (OUTPATIENT)
Dept: ADMINISTRATIVE | Facility: OTHER | Age: 78
End: 2018-05-22

## 2018-06-04 ENCOUNTER — RECORDS - HEALTHEAST (OUTPATIENT)
Dept: ADMINISTRATIVE | Facility: OTHER | Age: 78
End: 2018-06-04

## 2018-06-04 ENCOUNTER — OFFICE VISIT (OUTPATIENT)
Dept: NEUROSURGERY | Facility: CLINIC | Age: 78
End: 2018-06-04
Payer: MEDICARE

## 2018-06-04 VITALS
HEART RATE: 77 BPM | DIASTOLIC BLOOD PRESSURE: 87 MMHG | SYSTOLIC BLOOD PRESSURE: 174 MMHG | BODY MASS INDEX: 32.47 KG/M2 | WEIGHT: 172 LBS | HEIGHT: 61 IN

## 2018-06-04 DIAGNOSIS — G91.2 NPH (NORMAL PRESSURE HYDROCEPHALUS) (H): Primary | ICD-10-CM

## 2018-06-04 NOTE — MR AVS SNAPSHOT
"              After Visit Summary   6/4/2018    Amparo Sharma    MRN: [id number]           Patient Information     Date Of Birth          1940        Visit Information        Provider Department      6/4/2018 1:20 PM Rc Dozier MD [facility] Neurosurgery        Care Instructions    Please make a f/u appointment with Dr. Gruber and our schedulers can help you with that today before you leave.     As needed follow up with Dr. Dozier.    Please don't hesitate to contact Heather Narayan RN at [phone]    Thank you for trusting us with your care.           Follow-ups after your visit        Follow-up notes from your care team     Return if symptoms worsen or fail to improve.      Your next 10 appointments already scheduled     Jun 06, 2018  9:30 AM CDT   (Arrive by 9:15 AM)   Return Visit with Alex Gruber MD   [facility] Neurology ([facility] [place])    [address] [place] [id number] [phone]              Who to contact     Please call your clinic at [phone] to:    Ask questions about your health    Make or cancel appointments    Discuss your medicines    Learn about your test results    Speak to your doctor            Additional Information About Your Visit        Care EveryWhere ID     This is your Care EveryWhere ID. This could be used by other organizations to access your [place] medical records  [id number]        Your Vitals Were     Pulse Height BMI (Body Mass Index)             77 1.549 m (5' 1\") 32.5 kg/m2          Blood Pressure from Last 3 Encounters:   06/04/18 174/87   05/22/18 136/83   05/08/18 150/90    Weight from Last 3 Encounters:   06/04/18 78 kg (172 lb)   05/22/18 79.8 kg (176 lb)   05/08/18 78.9 kg (174 lb)              Today, you had the following     No orders found for display       Primary Care Provider Office Phone # Fax #    Parvez Francis [phone]       [facility] [address] [facility] [address]        Equal Access to Services     FAWN WALSH : Hadii aad ku hadrachelalberta Seth, wamalikda luqadaha, qaybta sandramayankinocente carrillo, param perezbhanueliseo jiménez. So [facility] [phone].    ATENCIÓN: Si habla español, tiene a mcdonald disposición servicios gratuitos de asistencia lingüística. [facility] [phone].    We comply with applicable federal civil rights laws and [place] laws. We do not discriminate on the basis of race, color, national origin, age, disability, sex, sexual orientation, or gender identity.            Thank you!     Thank you for choosing [facility]  for your care. Our goal is always to provide you with excellent care. Hearing back from our patients is one way we can continue to improve our services. Please take a few minutes to complete the written survey that you may receive in the mail after your visit with us. Thank you!             Your Updated Medication List - Protect others around you: Learn how to safely use, store and throw away your medicines at www.disposemymeds.org.          This list is accurate as of 6/4/18  2:36 PM.  Always use your most recent med list.                   Brand Name Dispense Instructions for use Diagnosis    ABILIFY PO      Take 15 mg by mouth daily    Hip pain, left, Malignant neoplasm of sigmoid colon (H), Gait disturbance, Other iron deficiency anemia, Acute midline low back pain with right-sided sciatica, Leg weakness, bilateral, Vitamin D deficiency       ALBUTEROL IN           AMLODIPINE BESYLATE PO      Take 10 mg by mouth every morning        AMOXICILLIN PO      Take 500 mg by mouth as needed        ARTIFICIAL TEARS OP           aspirin-acetaminophen-caffeine 250-250-65 MG per tablet    EXCEDRIN MIGRAINE     Take 1 tablet by mouth every 6 hours as needed for headaches    Hip pain, left, Malignant neoplasm of sigmoid colon (H), Gait disturbance, Other iron deficiency anemia, Acute midline low back pain  with right-sided sciatica, Leg weakness, bilateral, Vitamin D deficiency       COQ10 PO      Take 200 mg by mouth daily        DICLOFENAC SODIUM PO      Take 50 mg by mouth 2 times daily        diflorasone 0.05 % ointment    PSORCON     Apply 1 applicator topically daily        docusate sodium 100 MG capsule    COLACE     Take 100 mg by mouth        DONEPEZIL HCL PO      Take 10 mg by mouth At Bedtime        fluticasone 110 MCG/ACT Inhaler    FLOVENT HFA     Inhale 2 puffs into the lungs as needed    Hip pain, left, Malignant neoplasm of sigmoid colon (H), Gait disturbance, Other iron deficiency anemia, Acute midline low back pain with right-sided sciatica, Leg weakness, bilateral, Vitamin D deficiency       FOLIC ACID PO      Take 1 mg by mouth        hydrocortisone 2.5 % cream      Apply topically 2 times daily        lactobacillus rhamnosus (GG) capsule      Take 1 capsule by mouth 2 times daily    Cellulitis of left elbow       LAMICTAL PO      Take 150 mg by mouth in the morning and 100 mg by mouth in the evening    Gait disturbance, Memory loss, Malignant neoplasm of sigmoid colon (H)       Levothyroxine Sodium 75 MCG Caps      Take 75 mcg by mouth every morning    Gait disturbance, Memory loss, Malignant neoplasm of sigmoid colon (H)       MULTIVITAMIN PO      Take by mouth daily    Gait disturbance, Memory loss, Malignant neoplasm of sigmoid colon (H)       ondansetron 4 MG ODT tab    ZOFRAN-ODT    40 tablet    Take 1 tablet (4 mg) by mouth every 6 hours as needed for nausea    S/P ventriculoperitoneal shunt       order for DME     1 each    Abdominal Binder - Medium    Ventral hernia without obstruction or gangrene       PROZAC PO      Take 40 mg by mouth every morning    Gait disturbance, Memory loss, Malignant neoplasm of sigmoid colon (H)       RANITIDINE HCL PO      Take 300 mg by mouth 2 times daily        SPIRIVA HANDIHALER IN      Inhale 1 puff into the lungs as needed        TYLENOL PO      Take 500  mg by mouth every 8 hours as needed for mild pain or fever    Gait disturbance, Memory loss, Malignant neoplasm of sigmoid colon (H)       WELLBUTRIN XL PO      Take 150 mg by mouth every morning    Hip pain, left, Malignant neoplasm of sigmoid colon (H), Gait disturbance, Other iron deficiency anemia, Acute midline low back pain with right-sided sciatica, Leg weakness, bilateral, Vitamin D deficiency

## 2018-06-04 NOTE — PATIENT INSTRUCTIONS
Please make a f/u appointment with Dr. Gruber and our schedulers can help you with that today before you leave.     As needed follow up with Dr. Dozier.    Please don't hesitate to contact Heather Narayan RN at [phone]    Thank you for trusting us with your care.

## 2018-06-04 NOTE — LETTER
2018       RE: Amparo Coppola  [address]  [place] [address]     Dear Colleague,    Thank you for referring your patient, Amparo Coppola, to the [facility] NEUROSURGERY at [facility]. Please see a copy of my visit note below.    Service Date: 2018      Parvez Francis MD      RE:  Amparonilay Coppola      Dear Dr. Francis:        I had the pleasure of seeing Amparo today in my Neurosurgical Clinic for evaluation of normal pressure hydrocephalus.      She has been having worsening gait over the last several months and the question is whether or not she has an occlusion of her ventriculoperitoneal shunt that was placed for normal pressure hydrocephalus.  I had this evaluated with a nuclear medicine study that demonstrates that the shunt is working well.      She continues to have balance problems and has had increased tremor at this point in time.  I do not think that this is associated with a shunt malfunction, nor with her normal pressure hydrocephalus.  My recommendation is that she follow up again with Dr. Alex Gruber for further evaluation of the balance and trembling.      She will follow up on a p.r.n. basis.      Overall, I spent approximately 25 minutes with Amparo, with the majority of this time spent in consultation and developing a treatment plan.      Thank you for your trust and the opportunity to participate in Amparo's care.  If you have any further questions or concerns, please feel free to contact me on my cell phone at [phone].         ZAHRAA BERMEO MD             D: 2018   T: 2018   MT: LG      Name:     AMPARO COPPOLA   MRN:      [id number]        Account:      [id number]   :      1940           Service Date: 2018      Document: [id number]       Again, thank you for allowing me to participate in the care of your patient.      Sincerely,    Zahraa Bermeo MD

## 2018-06-05 NOTE — PROGRESS NOTES
Service Date: 2018      Parvez Francis MD      RE:  Amparo Coppola      Dear Dr. Francis:        I had the pleasure of seeing Amparo today in my Neurosurgical Clinic for evaluation of normal pressure hydrocephalus.      She has been having worsening gait over the last several months and the question is whether or not she has an occlusion of her ventriculoperitoneal shunt that was placed for normal pressure hydrocephalus.  I had this evaluated with a nuclear medicine study that demonstrates that the shunt is working well.      She continues to have balance problems and has had increased tremor at this point in time.  I do not think that this is associated with a shunt malfunction, nor with her normal pressure hydrocephalus.  My recommendation is that she follow up again with Dr. Alex Gruber for further evaluation of the balance and trembling.      She will follow up on a p.r.n. basis.      Overall, I spent approximately 25 minutes with Amparo, with the majority of this time spent in consultation and developing a treatment plan.      Thank you for your trust and the opportunity to participate in Amparo's care.  If you have any further questions or concerns, please feel free to contact me on my cell phone at [phone].         ZAHRAA BERMEO MD             D: 2018   T: 2018   MT: SYLVESTER      Name:     AMPARO COPPOLA   MRN:      [id number]        Account:      [id number]   :      1940           Service Date: 2018      Document: [id number]

## 2018-06-06 ENCOUNTER — TELEPHONE (OUTPATIENT)
Dept: NEUROLOGY | Facility: CLINIC | Age: 78
End: 2018-06-06

## 2018-06-06 ENCOUNTER — OFFICE VISIT (OUTPATIENT)
Dept: NEUROLOGY | Facility: CLINIC | Age: 78
End: 2018-06-06
Payer: MEDICARE

## 2018-06-06 ENCOUNTER — RECORDS - HEALTHEAST (OUTPATIENT)
Dept: ADMINISTRATIVE | Facility: OTHER | Age: 78
End: 2018-06-06

## 2018-06-06 VITALS
DIASTOLIC BLOOD PRESSURE: 86 MMHG | WEIGHT: 171.96 LBS | HEIGHT: 61 IN | SYSTOLIC BLOOD PRESSURE: 154 MMHG | HEART RATE: 74 BPM | BODY MASS INDEX: 32.47 KG/M2

## 2018-06-06 DIAGNOSIS — G91.2 NPH (NORMAL PRESSURE HYDROCEPHALUS) (H): Primary | ICD-10-CM

## 2018-06-06 DIAGNOSIS — R27.0 ATAXIA: ICD-10-CM

## 2018-06-06 ASSESSMENT — PAIN SCALES - GENERAL: PAINLEVEL: MILD PAIN (2)

## 2018-06-06 NOTE — MR AVS SNAPSHOT
"              After Visit Summary   6/6/2018    Amparo Sharma    MRN: [id number]           Patient Information     Date Of Birth          1940        Visit Information        Provider Department      6/6/2018 9:30 AM Alex Gruber MD [facility]        Today's Diagnoses     NPH (normal pressure hydrocephalus)    -  1    Ataxia           Follow-ups after your visit        Additional Services     NEUROLOGY ADULT REFERRAL       Your provider has referred you for the following:   Consult at Ataxia clinic [facility]    Please be aware that coverage of these services is subject to the terms and limitations of your health insurance plan.  Call member services at your health plan with any benefit or coverage questions.      Please bring the following with you to your appointment:    (1) Any X-Rays, CTs or MRIs which have been performed.  Contact the facility where they were done to arrange for  prior to your scheduled appointment.    (2) List of current medications  (3) This referral request   (4) Any documents/labs given to you for this referral                  Your next 10 appointments already scheduled     Jul 05, 2018  9:30 AM CDT   (Arrive by 9:15 AM)   Return Visit with Alex Gruber MD   [facility] ([facility])    [address] [place] [id number] [phone]              Who to contact     Please call your clinic at [phone] to:    Ask questions about your health    Make or cancel appointments    Discuss your medicines    Learn about your test results    Speak to your doctor            Additional Information About Your Visit        Care EveryWhere ID     This is your Care EveryWhere ID. This could be used by other organizations to access your [facility] medical records  [id number]        Your Vitals Were     Pulse Height BMI (Body Mass Index)             74 1.549 m (5' 1\") 32.49 kg/m2          Blood " Pressure from Last 3 Encounters:   06/06/18 154/86   06/04/18 174/87   05/22/18 136/83    Weight from Last 3 Encounters:   06/06/18 78 kg (171 lb 15.3 oz)   06/04/18 78 kg (172 lb)   05/22/18 79.8 kg (176 lb)              We Performed the Following     NEUROLOGY ADULT REFERRAL          Today's Medication Changes          These changes are accurate as of 6/6/18 10:39 AM.  If you have any questions, ask your nurse or doctor.               Start taking these medicines.        Dose/Directions    amantadine 50 MG/5ML syrup   Commonly known as:  SYMMETREL   Used for:  NPH (normal pressure hydrocephalus), Ataxia   Started by:  Alex Gruber MD        Dose:  50 mg   Take 5 mLs (50 mg) by mouth 2 times daily   Quantity:  600 mL   Refills:  3            Where to get your medicines      These medications were sent to [facility] - [place], [place] - [address] [facility] [address] [facility] [address], [facility] [address]     Phone:  [phone]     amantadine 50 MG/5ML syrup                Primary Care Provider Office Phone # Fax #    Parvez Francis [phone]       [facility] [address] [facility] [address]        Equal Access to Services     FAWN WALSH AH: Hadii yury ku hadasho Soomaali, waaxda luqadaha, qaybta kaalmada adeegyada, waxay idiin hayaan adalid jiménez. So [facility] [phone].    ATENCIÓN: Si habla español, tiene a mcdonald disposición servicios gratuitos de asistencia lingüística. Llame al [phone].    We comply with applicable federal civil rights laws and [place] laws. We do not discriminate on the basis of race, color, national origin, age, disability, sex, sexual orientation, or gender identity.            Thank you!     Thank you for choosing [facility] NEUROLOGY  for your care. Our goal is always to provide you with excellent care. Hearing back from our patients is one way we can continue to improve our services. Please take a few minutes to complete the written  survey that you may receive in the mail after your visit with us. Thank you!             Your Updated Medication List - Protect others around you: Learn how to safely use, store and throw away your medicines at www.disposemymeds.org.          This list is accurate as of 6/6/18 10:39 AM.  Always use your most recent med list.                   Brand Name Dispense Instructions for use Diagnosis    ABILIFY PO      Take 15 mg by mouth daily    Hip pain, left, Malignant neoplasm of sigmoid colon (H), Gait disturbance, Other iron deficiency anemia, Acute midline low back pain with right-sided sciatica, Leg weakness, bilateral, Vitamin D deficiency       ALBUTEROL IN           amantadine 50 MG/5ML syrup    SYMMETREL    600 mL    Take 5 mLs (50 mg) by mouth 2 times daily    NPH (normal pressure hydrocephalus), Ataxia       AMLODIPINE BESYLATE PO      Take 10 mg by mouth every morning        AMOXICILLIN PO      Take 500 mg by mouth as needed        ARTIFICIAL TEARS OP           aspirin-acetaminophen-caffeine 250-250-65 MG per tablet    EXCEDRIN MIGRAINE     Take 1 tablet by mouth every 6 hours as needed for headaches    Hip pain, left, Malignant neoplasm of sigmoid colon (H), Gait disturbance, Other iron deficiency anemia, Acute midline low back pain with right-sided sciatica, Leg weakness, bilateral, Vitamin D deficiency       COQ10 PO      Take 200 mg by mouth daily        DICLOFENAC SODIUM PO      Take 50 mg by mouth 2 times daily        diflorasone 0.05 % ointment    PSORCON     Apply 1 applicator topically daily        docusate sodium 100 MG capsule    COLACE     Take 100 mg by mouth        DONEPEZIL HCL PO      Take 10 mg by mouth At Bedtime        fluticasone 110 MCG/ACT Inhaler    FLOVENT HFA     Inhale 2 puffs into the lungs as needed    Hip pain, left, Malignant neoplasm of sigmoid colon (H), Gait disturbance, Other iron deficiency anemia, Acute midline low back pain with right-sided sciatica, Leg weakness,  bilateral, Vitamin D deficiency       FOLIC ACID PO      Take 1 mg by mouth        hydrocortisone 2.5 % cream      Apply topically 2 times daily        lactobacillus rhamnosus (GG) capsule      Take 1 capsule by mouth 2 times daily    Cellulitis of left elbow       LAMICTAL PO      Take 150 mg by mouth in the morning and 100 mg by mouth in the evening    Gait disturbance, Memory loss, Malignant neoplasm of sigmoid colon (H)       Levothyroxine Sodium 75 MCG Caps      Take 75 mcg by mouth every morning    Gait disturbance, Memory loss, Malignant neoplasm of sigmoid colon (H)       MULTIVITAMIN PO      Take by mouth daily    Gait disturbance, Memory loss, Malignant neoplasm of sigmoid colon (H)       ondansetron 4 MG ODT tab    ZOFRAN-ODT    40 tablet    Take 1 tablet (4 mg) by mouth every 6 hours as needed for nausea    S/P ventriculoperitoneal shunt       order for DME     1 each    Abdominal Binder - Medium    Ventral hernia without obstruction or gangrene       PROZAC PO      Take 40 mg by mouth every morning    Gait disturbance, Memory loss, Malignant neoplasm of sigmoid colon (H)       RANITIDINE HCL PO      Take 300 mg by mouth 2 times daily        SPIRIVA HANDIHALER IN      Inhale 1 puff into the lungs as needed        TYLENOL PO      Take 500 mg by mouth every 8 hours as needed for mild pain or fever    Gait disturbance, Memory loss, Malignant neoplasm of sigmoid colon (H)       WELLBUTRIN XL PO      Take 150 mg by mouth every morning    Hip pain, left, Malignant neoplasm of sigmoid colon (H), Gait disturbance, Other iron deficiency anemia, Acute midline low back pain with right-sided sciatica, Leg weakness, bilateral, Vitamin D deficiency

## 2018-06-06 NOTE — LETTER
2018       RE: Amparo Sharma  [address]  [place] [address]     Dear Colleague,    Thank you for referring your patient, Amparo Sharma, to the [facility] NEUROLOGY at [facility]. Please see a copy of my visit note below.    Service Date: 2018      RE: Amparo Sharma   MRN: [id number]   : 1940       Dear Dr. Francis:      This is in regard to followup on Amparo Sharma.  The patient returns today with a chief complaint of known treated normal hydrocephalus as well as worsening gait disturbance.      The patient did have followup now with Dr. Dozier, her neurosurgeon.  She was seen on 2018.  He noted that she did have evaluation with nuclear medicine study that demonstrated her shunt was working quite well.  The patient unfortunately continues to have increased balance issues.  She is basically dependent on her walker and does need assist standby.  She has not been seen yet in our Ataxia Clinic.  She did review with me her previous use of Sinemet, which did not help at all.  She had benefit from amantadine in low dosage but she said it was increased inappropriately when she was in a nursing home and she suffered confusion on a much higher dose.  I talked with her again about her issues in general and did suggest that she could retry amantadine, but make sure that the dosage was less than 100 mg per day in divided dosage or even less than that.  I did talk about the low therapeutic threshold and the problems with encephalopathy related to medication as well as worsening myoclonus and tremor that can sometimes happen with higher dosage medication.  She continues to suffer from bilateral knee pain.  She is not a candidate for further surgery evidently.  She saw Dr. Schafer, her surgeon and she has had now a total of 3 procedures on both knees separately.  She did note that she does have some episodic shaking, but she  described this to trying to get up.  She has not had any rand syncope.  The patient did have on 02/09/2018 a creatinine of 0.82 and a BUN of 19.      Neurologic examination showed the patient's initial blood pressure using the machine by the medical assistant was 154/86 with a pulse of 74.  Then she ended up having blood pressure for orthostasis.  Supine it was 167/90 and her pulse was 74.  Seated and waiting 3 minutes it was 144/85 with a pulse of 77 and then standing and waiting 3 minutes it was 163/92 with a pulse of 82.  She has severe gait instability and has a tendency to fall backward.  She cannot really get up out of a chair without assistance.  The patient needed assistance to get onto the exam table.  She did tell me that she feels her memory is now stable.  She was oriented to date and to time and to place.  She had excellent recall about Trump and issues regarding the presidency.  She could tell me the states that touch [place].  Initially she could recall 2/3 objects after 5 minutes and then with prompting 3/3 objects.  It took her a long time to draw a clock correctly but eventually she could draw one to make it show 4:30 as I suggested.  She does have mild rigidity.  The patient had good facial expression and negative glabellar tap sign.  She has good extraocular movements.  Strength testing was unremarkable.  She had a regular cardiac rhythm without gallops or murmurs and did not have cervical bruits.      IMPRESSION:  Worsening gait disturbance in the setting of previous partial success with shunt placement.      Unfortunately, the patient continues to deteriorate.  She will cautiously try low dose amantadine.  I have asked that she have a followup now in our Ataxia Clinic and I told her I would be happy to see her on a p.r.n. basis and again after she is seen there by other [facility] staff neurologists.      I did review with the patient her medications.  I did discuss the possibility of adjusting  them.  I did also go over from the Internet potential interactions of her medications with the use of amantadine.       Thank you for allowing me to see this patient.         I spent 30 minutes with the patient.  Over 50% of the time this involved counseling and coordination of care.         D: 2018   T: 2018   MT: AKA      Name:     ATILIO COPPOLA   MRN:      [id number]        Account:      [id number]   :      1940           Service Date: 2018      Document: [id number]        Again, thank you for allowing me to participate in the care of your patient.      Sincerely,    Alex Gruber MD    CC:  Parvez Francis MD   [facility]    [address] [place] [address]

## 2018-06-06 NOTE — TELEPHONE ENCOUNTER
[facility] Call Center    Phone Message    May a detailed message be left on voicemail: yes    Reason for Call: Other: Per call from Ching is requesting a call back to change amantadine (SYMMETREL) 50 MG/5ML syrup Rx to pill form.  She can be reached at the above #      Action Taken: Message routed to:  [facility]): Cardiology

## 2018-06-06 NOTE — TELEPHONE ENCOUNTER
[facility] Call Center    Phone Message    May a detailed message be left on voicemail: yes    Reason for Call: Other: Ching is following up on earlier calls. Wanting to hear back as soon as possible.  Her phone number is [phone].     Action Taken: Message routed to:  [facility]): Neurology

## 2018-06-06 NOTE — TELEPHONE ENCOUNTER
Called and told Ching we would follow up with Yasmani regarding the allergy concern and changing from syrup to pill form.    Vicky Tapia CMA  Patient Care Supervisor  Endocrinology & Diabetes   Neurology, Neurosurgery, PM&R

## 2018-06-06 NOTE — TELEPHONE ENCOUNTER
[facility] Call Center    Phone Message    May a detailed message be left on voicemail: no    Reason for Call: Medication Question or concern regarding medication   Prescription Clarification  Name of Medication: amantadine (SYMMETREL) 50 MG/5ML syrup  Prescribing Provider: Dr. Gruber   Pharmacy: [place], [place]   What on the order needs clarification? Ching from [facility] is following up on medication, but after sending the original message she also found out this medication is listed on her allergy list. She wants a call back asap to discuss what to do about prescribing this med due to an allergy.          Action Taken: Message routed to:  [facility]): [facility] UROLOGY ADULT [facility]

## 2018-06-07 ENCOUNTER — MEDICAL CORRESPONDENCE (OUTPATIENT)
Dept: HEALTH INFORMATION MANAGEMENT | Facility: CLINIC | Age: 78
End: 2018-06-07

## 2018-06-07 ENCOUNTER — CARE COORDINATION (OUTPATIENT)
Dept: NEUROLOGY | Facility: CLINIC | Age: 78
End: 2018-06-07

## 2018-06-07 ENCOUNTER — TELEPHONE (OUTPATIENT)
Dept: NEUROLOGY | Facility: CLINIC | Age: 78
End: 2018-06-07

## 2018-06-07 DIAGNOSIS — G91.2 NPH (NORMAL PRESSURE HYDROCEPHALUS) (H): Primary | ICD-10-CM

## 2018-06-07 RX ORDER — AMANTADINE HYDROCHLORIDE 100 MG/1
TABLET ORAL
Qty: 90 TABLET | Refills: 3 | Status: SHIPPED | OUTPATIENT
Start: 2018-06-07 | End: 2018-07-06 | Stop reason: SINTOL

## 2018-06-07 NOTE — TELEPHONE ENCOUNTER
Health Call Center    Phone Message    May a detailed message be left on voicemail: yes    Reason for Call: Medication Question or concern regarding medication   Prescription Clarification  Name of Medication: amantadine (SYMMETREL) 50 MG/5ML syrup   Prescribing Provider: Dr Gruber   Pharmacy: N/A   What on the order needs clarification? Ching - Nurse at [facility] where Pt lives needs call back asap today to change this medication from liquid to tablet -  # [phone]. She is not allergic to this medication but has overdosed on it before when liquid, so they need it in pills instead today asap please. Said she has called several times. Advised her that Nurse said she would talk to Dr Gruber today and get back with her (per notes in chart). Thanks!      Action Taken: Message routed to:  [facility]): Neurology Clinic

## 2018-06-07 NOTE — TELEPHONE ENCOUNTER
Talked with dontrell Tapia about this medication that Dr. Gruber ordered for patient and she is putting through the medication in Pill form for Dr. Gruber to sign. Patient can NOT have syrup form because she overdosed on it and the pill form is easier to reduce that. The Nursing home also requires a form for the Doctors instructions on the medication for them to dispense it. Awaiting the fax and forms to send back.

## 2018-06-07 NOTE — PROGRESS NOTES
Received fax today from [facility] for patient from Dr. Welsh removing Amantadine from the allergy list. Sent to [employer] to be uploaded into media.

## 2018-06-07 NOTE — TELEPHONE ENCOUNTER
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 Gema needs to discharge her from home care before she starts. Pt has referral from Dr Gruber to be seen by Dr Sommer. Gema can be reached at [phone].     Action Taken: Message routed to:  [facility]): Neurology

## 2018-06-08 NOTE — PROGRESS NOTES
Service Date: 2018      Parvez Francis MD   [facility]    [address] [place] [address]      RE: Amparo Sharma   MRN: [id number]   : 1940       Dear Dr. Francis:      This is in regard to followup on Amparo Sharma.  The patient returns today with a chief complaint of known treated normal hydrocephalus as well as worsening gait disturbance.      The patient did have followup now with Dr. Dozier, her neurosurgeon.  She was seen on 2018.  He noted that she did have evaluation with nuclear medicine study that demonstrated her shunt was working quite well.  The patient unfortunately continues to have increased balance issues.  She is basically dependent on her walker and does need assist standby.  She has not been seen yet in our Ataxia Clinic.  She did review with me her previous use of Sinemet, which did not help at all.  She had benefit from amantadine in low dosage but she said it was increased inappropriately when she was in a nursing home and she suffered confusion on a much higher dose.  I talked with her again about her issues in general and did suggest that she could retry amantadine, but make sure that the dosage was less than 100 mg per day in divided dosage or even less than that.  I did talk about the low therapeutic threshold and the problems with encephalopathy related to medication as well as worsening myoclonus and tremor that can sometimes happen with higher dosage medication.  She continues to suffer from bilateral knee pain.  She is not a candidate for further surgery evidently.  She saw Dr. Schafer, her surgeon and she has had now a total of 3 procedures on both knees separately.  She did note that she does have some episodic shaking, but she described this to trying to get up.  She has not had any rand syncope.  The patient did have on 2018 a creatinine of 0.82 and a BUN of 19.      Neurologic examination showed the patient's initial blood pressure  using the machine by the medical assistant was 154/86 with a pulse of 74.  Then she ended up having blood pressure for orthostasis.  Supine it was 167/90 and her pulse was 74.  Seated and waiting 3 minutes it was 144/85 with a pulse of 77 and then standing and waiting 3 minutes it was 163/92 with a pulse of 82.  She has severe gait instability and has a tendency to fall backward.  She cannot really get up out of a chair without assistance.  The patient needed assistance to get onto the exam table.  She did tell me that she feels her memory is now stable.  She was oriented to date and to time and to place.  She had excellent recall about Trump and issues regarding the presidency.  She could tell me the states that touch [place].  Initially she could recall 2/3 objects after 5 minutes and then with prompting 3/3 objects.  It took her a long time to draw a clock correctly but eventually she could draw one to make it show 4:30 as I suggested.  She does have mild rigidity.  The patient had good facial expression and negative glabellar tap sign.  She has good extraocular movements.  Strength testing was unremarkable.  She had a regular cardiac rhythm without gallops or murmurs and did not have cervical bruits.      IMPRESSION:  Worsening gait disturbance in the setting of previous partial success with shunt placement.      Unfortunately, the patient continues to deteriorate.  She will cautiously try low dose amantadine.  I have asked that she have a followup now in our Ataxia Clinic and I told her I would be happy to see her on a p.r.n. basis and again after she is seen there by other [email or web address] staff neurologists.      I did review with the patient her medications.  I did discuss the possibility of adjusting them.  I did also go over from the Internet potential interactions of her medications with the use of amantadine.       Thank you for allowing me to see this patient.      Sincerely yours,       Alex Gruber MD       I spent 30 minutes with the patient.  Over 50% of the time this involved counseling and coordination of care.         FERNANDEZ LASSITER MD             D: 2018   T: 2018   MT: AKA      Name:     ATILIO COPPOLA   MRN:      [id number]        Account:      [id number]   :      1940           Service Date: 2018      Document: [id number]

## 2018-06-08 NOTE — TELEPHONE ENCOUNTER
Called Gema from home care. She said she needs to cancel homecare before pt is seen in the ataxia clinic.  Gema was told this writer was unable to reach pt's daughter or pt so appointment has not been made. She will be called when pt is scheduled.

## 2018-06-08 NOTE — TELEPHONE ENCOUNTER
[facility] Call Center    Phone Message    May a detailed message be left on voicemail: yes    Reason for Call: Other: PT called to schedule appt with Dr. Sommer.  Please call her back to set up.     Action Taken: Message routed to:  [facility]): Neurology

## 2018-06-08 NOTE — TELEPHONE ENCOUNTER
Called pt again, no answer, left voice message to return call so an appointment can be set up in the ataxia clinic. Pt called back and an appointment was made with Dr. Casimiro Casillas for July 6th at 11am

## 2018-06-21 ENCOUNTER — TELEPHONE (OUTPATIENT)
Dept: NEUROLOGY | Facility: CLINIC | Age: 78
End: 2018-06-21

## 2018-06-21 NOTE — TELEPHONE ENCOUNTER
Health Call Center    Phone Message    May a detailed message be left on voicemail: yes    Reason for Call: Other: Pt is wondering if she should wait to see Dr. Gruber for her set appointment or if she should be seen sooner. Please call her back     Action Taken: Message routed to:  [facility]): Neurology

## 2018-06-21 NOTE — TELEPHONE ENCOUNTER
Left a message to tell her to sit if she feels dizzy to reduce chances of falling. I told her I would send a message to Dr. Gruber asking his recommendations or suggestions regarding them medication and that I would call her back.

## 2018-06-21 NOTE — TELEPHONE ENCOUNTER
Health Call Center    Phone Message    May a detailed message be left on voicemail: yes    Reason for Call: Other: Patient is calling regarding side effects she is having taking her medication amantadine HCl 100 MG TABS. Patient states she is having tremors, inability to hold silverware, and walking problems. Patient is having multiple symptoms and requesting a call back regarding this. Please follow up with patient       Action Taken: Message routed to:  [facility]): Neuro

## 2018-06-22 ENCOUNTER — MEDICAL CORRESPONDENCE (OUTPATIENT)
Dept: HEALTH INFORMATION MANAGEMENT | Facility: CLINIC | Age: 78
End: 2018-06-22

## 2018-06-22 ENCOUNTER — CARE COORDINATION (OUTPATIENT)
Dept: NEUROLOGY | Facility: CLINIC | Age: 78
End: 2018-06-22

## 2018-06-22 ENCOUNTER — TELEPHONE (OUTPATIENT)
Dept: NEUROLOGY | Facility: CLINIC | Age: 78
End: 2018-06-22

## 2018-06-22 NOTE — TELEPHONE ENCOUNTER
Talked with patient she gave me the number to talk to [employer] or other manager for a form that has to be signed by Dr. Gruber authorizing her to be able to stop taking the medication.     Called [phone] and they told me they will fax the form for him to sign and I will fax it back.     Amparo has an appt in the ataxia clinic in July.

## 2018-06-22 NOTE — PROGRESS NOTES
Faxed to [facility], (ray barrett RN) fax # [phone]. The signed form by Dr. Gruber for patient to stop the medication. Send today at 6462 on 6/22/18

## 2018-06-22 NOTE — TELEPHONE ENCOUNTER
----- Message from Alex Gruber MD sent at 6/22/2018  8:40 AM CDT -----  Regarding: FW: medication  Stop amantadine now; proceed with ataxia clinic consult[should be ordered]  ----- Message -----     From: Yaneth Kauffman LPN     Sent: 6/21/2018   3:49 PM       To: Alex Gruber MD  Subject: medication                                       Called patient and left a voicemail. Would you like her to go down on this medication due to side effects or would like her to stop it? Please let me know so I can call her back.     Thank you.     ========================================  [facility]     Phone Message     May a detailed message be left on voicemail: yes     Reason for Call: Other: Patient is calling regarding side effects she is having taking her medication amantadine HCl 100 MG TABS. Patient states she is having tremors, inability to hold silverware, and walking problems. Patient is having multiple symptoms and requesting a call back regarding this. Please follow up with patient        Action Taken: Message routed to:  [facility] ([facility] Neuro

## 2018-07-02 NOTE — TELEPHONE ENCOUNTER
FUTURE VISIT INFORMATION      FUTURE VISIT INFORMATION:    Date: 07/06/2018    Time:     Location:   REFERRAL INFORMATION:    Referring provider:  FERNANDEZ LASSITER    Referring providers clinic:      Reason for visit/diagnosis          RECORDS STATUS      Internal Records: [facility], Care Everywhere and PACS.

## 2018-07-06 ENCOUNTER — OFFICE VISIT (OUTPATIENT)
Dept: NEUROLOGY | Facility: CLINIC | Age: 78
End: 2018-07-06
Payer: MEDICARE

## 2018-07-06 ENCOUNTER — PRE VISIT (OUTPATIENT)
Dept: NEUROLOGY | Facility: CLINIC | Age: 78
End: 2018-07-06

## 2018-07-06 ENCOUNTER — TELEPHONE (OUTPATIENT)
Dept: NEUROLOGY | Facility: CLINIC | Age: 78
End: 2018-07-06

## 2018-07-06 ENCOUNTER — MEDICAL CORRESPONDENCE (OUTPATIENT)
Dept: HEALTH INFORMATION MANAGEMENT | Facility: CLINIC | Age: 78
End: 2018-07-06

## 2018-07-06 ENCOUNTER — RECORDS - HEALTHEAST (OUTPATIENT)
Dept: ADMINISTRATIVE | Facility: OTHER | Age: 78
End: 2018-07-06

## 2018-07-06 VITALS
BODY MASS INDEX: 32.13 KG/M2 | HEART RATE: 70 BPM | SYSTOLIC BLOOD PRESSURE: 154 MMHG | DIASTOLIC BLOOD PRESSURE: 80 MMHG | WEIGHT: 170.2 LBS | HEIGHT: 61 IN

## 2018-07-06 DIAGNOSIS — R26.9 GAIT DISTURBANCE: Primary | ICD-10-CM

## 2018-07-06 RX ORDER — DALFAMPRIDINE 10 MG/1
10 TABLET, FILM COATED, EXTENDED RELEASE ORAL 2 TIMES DAILY
Qty: 60 TABLET | Status: SHIPPED | OUTPATIENT
Start: 2018-07-06 | End: 2019-07-08

## 2018-07-06 ASSESSMENT — PAIN SCALES - GENERAL: PAINLEVEL: NO PAIN (1)

## 2018-07-06 NOTE — NURSING NOTE
Pt is here for an ataxia evaulation. Pt states she has occasional headaches. She said she double vision, her last eye exam was in 2017. She said she does not choke when she eats or drinks. She said her arm strength is weaker than a year ago. She said she last fell about 34 weeks ago, she was on amantidine and she had bad side effects, light headed, dizzy and fell 3 times in one weeks. She said she has problems with urination, she is being treated by a neurologist and is getting botox injections. She did try medications but they didn't work. She said she sleeps fine during the night, unless she has to get up and go to the bathroom. She just finished physical therapy yesterday.

## 2018-07-06 NOTE — MR AVS SNAPSHOT
"              After Visit Summary   7/6/2018    Amparo Sharma    MRN: [id number]           Patient Information     Date Of Birth          1940        Visit Information        Provider Department      7/6/2018 11:00 AM Lan Casillas MD [facility]        Today's Diagnoses     Gait disturbance    -  1       Follow-ups after your visit        Your next 10 appointments already scheduled     Aug 17, 2018 11:00 AM CDT   (Arrive by 10:45 AM)   Return Ataxia with MD [facility] ([facility])    [address] [place] [id number] [phone]              Who to contact     Please call your clinic at [phone] to:    Ask questions about your health    Make or cancel appointments    Discuss your medicines    Learn about your test results    Speak to your doctor            Additional Information About Your Visit        Care EveryWhere ID     This is your Care EveryWhere ID. This could be used by other organizations to access your [facility] medical records  [id number]        Your Vitals Were     Pulse Height BMI (Body Mass Index)             70 1.549 m (5' 1\") 32.16 kg/m2          Blood Pressure from Last 3 Encounters:   07/06/18 154/80   06/06/18 154/86   06/04/18 174/87    Weight from Last 3 Encounters:   07/06/18 77.2 kg (170 lb 3.2 oz)   06/06/18 78 kg (171 lb 15.3 oz)   06/04/18 78 kg (172 lb)              Today, you had the following     No orders found for display         Today's Medication Changes          These changes are accurate as of 7/6/18 11:59 PM.  If you have any questions, ask your nurse or doctor.               Start taking these medicines.        Dose/Directions    Dalfampridine 10 MG Tb12   Commonly known as:  AMPYRA   Used for:  Gait disturbance   Started by:  Lan Casillas MD        Dose:  10 mg   Take 1 tablet (10 mg) by mouth 2 times daily   Quantity:  60 tablet   Refills:  prn         Stop taking " these medicines if you haven't already. Please contact your care team if you have questions.     amantadine HCl 100 MG Tabs   Stopped by:  Lan Casillsa MD                Where to get your medicines      These medications were sent to [facility] - [place], [place] - [address]   [address] [facility] [address]    Hours:  Mon-Fri 8:30am-5:00pm Toll Free [phone] Phone:  [phone]     Dalfampridine 10 MG Tb12                Primary Care Provider Office Phone # Fax #    Parvez Francis [phone]       [facility] [address] [facility] [address]        Equal Access to Services     FAWN WALSH : Hadii yury ku hadasho Soomaali, waaxda luqadaha, qaybta kaalmada adeegyada, waxay idiin haypaulino sanchez . [facility] [phone].    ATENCIÓN: Si habla español, tiene a mcdonald disposición servicios gratuitos de asistencia lingüística. [facility] [phone].    We comply with applicable federal civil rights laws and [place] laws. We do not discriminate on the basis of race, color, national origin, age, disability, sex, sexual orientation, or gender identity.            Thank you!     Thank you for choosing [facility]  for your care. Our goal is always to provide you with excellent care. Hearing back from our patients is one way we can continue to improve our services. Please take a few minutes to complete the written survey that you may receive in the mail after your visit with us. Thank you!             Your Updated Medication List - Protect others around you: Learn how to safely use, store and throw away your medicines at www.disposemymeds.org.          This list is accurate as of 7/6/18 11:59 PM.  Always use your most recent med list.                   Brand Name Dispense Instructions for use Diagnosis    ABILIFY PO      Take 15 mg by mouth daily    Hip pain, left, Malignant neoplasm of sigmoid colon (H), Gait  disturbance, Other iron deficiency anemia, Acute midline low back pain with right-sided sciatica, Leg weakness, bilateral, Vitamin D deficiency       ALBUTEROL IN           AMLODIPINE BESYLATE PO      Take 10 mg by mouth every morning        AMOXICILLIN PO      Take 500 mg by mouth as needed        ARTIFICIAL TEARS OP           aspirin-acetaminophen-caffeine 250-250-65 MG per tablet    EXCEDRIN MIGRAINE     Take 1 tablet by mouth every 6 hours as needed for headaches    Hip pain, left, Malignant neoplasm of sigmoid colon (H), Gait disturbance, Other iron deficiency anemia, Acute midline low back pain with right-sided sciatica, Leg weakness, bilateral, Vitamin D deficiency       COQ10 PO      Take 200 mg by mouth daily        Dalfampridine 10 MG Tb12    AMPYRA    60 tablet    Take 1 tablet (10 mg) by mouth 2 times daily    Gait disturbance       DICLOFENAC SODIUM PO      Take 50 mg by mouth 2 times daily        diflorasone 0.05 % ointment    PSORCON     Apply 1 applicator topically daily        docusate sodium 100 MG capsule    COLACE     Take 100 mg by mouth        DONEPEZIL HCL PO      Take 10 mg by mouth At Bedtime        fluticasone 110 MCG/ACT Inhaler    FLOVENT HFA     Inhale 2 puffs into the lungs as needed    Hip pain, left, Malignant neoplasm of sigmoid colon (H), Gait disturbance, Other iron deficiency anemia, Acute midline low back pain with right-sided sciatica, Leg weakness, bilateral, Vitamin D deficiency       FOLIC ACID PO      Take 1 mg by mouth        hydrocortisone 2.5 % cream      Apply topically 2 times daily        lactobacillus rhamnosus (GG) capsule      Take 1 capsule by mouth 2 times daily    Cellulitis of left elbow       LAMICTAL PO      Take 150 mg by mouth in the morning and 100 mg by mouth in the evening    Gait disturbance, Memory loss, Malignant neoplasm of sigmoid colon (H)       Levothyroxine Sodium 75 MCG Caps      Take 75 mcg by mouth every morning    Gait disturbance, Memory  loss, Malignant neoplasm of sigmoid colon (H)       MULTIVITAMIN PO      Take by mouth daily    Gait disturbance, Memory loss, Malignant neoplasm of sigmoid colon (H)       ondansetron 4 MG ODT tab    ZOFRAN-ODT    40 tablet    Take 1 tablet (4 mg) by mouth every 6 hours as needed for nausea    S/P ventriculoperitoneal shunt       order for DME     1 each    Abdominal Binder - Medium    Ventral hernia without obstruction or gangrene       PROZAC PO      Take 40 mg by mouth every morning    Gait disturbance, Memory loss, Malignant neoplasm of sigmoid colon (H)       RANITIDINE HCL PO      Take 300 mg by mouth 2 times daily        SPIRIVA HANDIHALER IN      Inhale 1 puff into the lungs as needed        TYLENOL PO      Take 500 mg by mouth every 8 hours as needed for mild pain or fever    Gait disturbance, Memory loss, Malignant neoplasm of sigmoid colon (H)       WELLBUTRIN XL PO      Take 150 mg by mouth every morning    Hip pain, left, Malignant neoplasm of sigmoid colon (H), Gait disturbance, Other iron deficiency anemia, Acute midline low back pain with right-sided sciatica, Leg weakness, bilateral, Vitamin D deficiency
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
## 2018-07-07 ASSESSMENT — PATIENT HEALTH QUESTIONNAIRE - PHQ9: SUM OF ALL RESPONSES TO PHQ QUESTIONS 1-9: 3

## 2018-07-09 NOTE — TELEPHONE ENCOUNTER
Prior Authorization Approval    Authorization Effective Date: 7/9/2018  Authorization Expiration Date: 7/9/2019  Medication: dalfampridine (Ampyra) 10mg ER tablets   Approved Dose/Quantity: 60 per 30 days  Reference #: [facility] key# [id number]   Insurance Company: [employer] - Phone [phone] Fax [phone]  Expected CoPay: $0     CoPay Card Available: No   Foundation Assistance Needed:    Which Pharmacy is filling the prescription (Not needed for infusion/clinic administered): [facility] - [address] [place] [address]  Pharmacy Notified: Yes  Patient Notified:

## 2018-07-09 NOTE — TELEPHONE ENCOUNTER
PA Initiation    Medication: dalfampridine (Ampyra) 10mg ER tablets  Insurance Company: [employer] - Phone [phone] Fax [phone]  Pharmacy Filling the Rx: [facility] - [place], [place] - [address]  Filling Pharmacy Phone: [phone]  Filling Pharmacy Fax:    Start Date: 7/9/2018

## 2018-07-17 ENCOUNTER — TELEPHONE (OUTPATIENT)
Dept: NEUROLOGY | Facility: CLINIC | Age: 78
End: 2018-07-17

## 2018-07-17 NOTE — TELEPHONE ENCOUNTER
Health Call Center    Phone Message    May a detailed message be left on voicemail: yes    Reason for Call: Medication Question or concern regarding medication   Prescription Clarification  Name of Medication: Ampyra  Prescribing Provider: Dr. Casillas   Pharmacy: [address]   What on the order needs clarification? Pt is requesting to speak directly with a [facility] nurse about this.  Please call this Pt back.          Action Taken: Message routed to:  [facility] ([facility] Neuro

## 2018-07-17 NOTE — TELEPHONE ENCOUNTER
Letter of prior auth was scanned into pt's chart. Called [facility] order pharmacy and said they had spoken to pt twice this morning and gave her the phone number for [facility] pharmacy for her to call and tell them where to deliver her medication. This writer called pt, no answer, message was left for her to return the call.  Spoke to pt and then again to [facility] pharmacy. All questions were answered, pt will not have to pay anything for this med but she has to call [facility] back to answer questions on where she wants med to be mailed. The will send it overnight mail after they have spoken to pt. Pt was given this information and will call [facility].
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
## 2018-08-17 ENCOUNTER — RECORDS - HEALTHEAST (OUTPATIENT)
Dept: ADMINISTRATIVE | Facility: OTHER | Age: 78
End: 2018-08-17

## 2018-08-17 ENCOUNTER — OFFICE VISIT (OUTPATIENT)
Dept: NEUROLOGY | Facility: CLINIC | Age: 78
End: 2018-08-17
Payer: MEDICARE

## 2018-08-17 VITALS
WEIGHT: 170 LBS | SYSTOLIC BLOOD PRESSURE: 138 MMHG | HEIGHT: 61 IN | HEART RATE: 70 BPM | OXYGEN SATURATION: 94 % | BODY MASS INDEX: 32.1 KG/M2 | DIASTOLIC BLOOD PRESSURE: 83 MMHG

## 2018-08-17 DIAGNOSIS — R26.9 GAIT DISTURBANCE: Primary | ICD-10-CM

## 2018-08-17 ASSESSMENT — ANXIETY QUESTIONNAIRES
IF YOU CHECKED OFF ANY PROBLEMS ON THIS QUESTIONNAIRE, HOW DIFFICULT HAVE THESE PROBLEMS MADE IT FOR YOU TO DO YOUR WORK, TAKE CARE OF THINGS AT HOME, OR GET ALONG WITH OTHER PEOPLE: NOT DIFFICULT AT ALL
GAD7 TOTAL SCORE: 2
3. WORRYING TOO MUCH ABOUT DIFFERENT THINGS: SEVERAL DAYS
6. BECOMING EASILY ANNOYED OR IRRITABLE: NOT AT ALL
7. FEELING AFRAID AS IF SOMETHING AWFUL MIGHT HAPPEN: NOT AT ALL
5. BEING SO RESTLESS THAT IT IS HARD TO SIT STILL: NOT AT ALL
1. FEELING NERVOUS, ANXIOUS, OR ON EDGE: SEVERAL DAYS
2. NOT BEING ABLE TO STOP OR CONTROL WORRYING: NOT AT ALL

## 2018-08-17 ASSESSMENT — PATIENT HEALTH QUESTIONNAIRE - PHQ9: 5. POOR APPETITE OR OVEREATING: NOT AT ALL

## 2018-08-17 ASSESSMENT — PAIN SCALES - GENERAL: PAINLEVEL: NO PAIN (1)

## 2018-08-17 NOTE — LETTER
"8/17/2018       RE: Amparo Sharma  [address]  [place] [address]     Dear Colleague,    Thank you for referring your patient, Amparo Sharma, to the [facility] NEUROLOGY at [facility]. Please see a copy of my visit note below.    Service Date: 08/17/2018      HISTORY OF PRESENT ILLNESS:  Amparo Sharma was seen by me for the first time on 08/17/2018, having been referred by my colleague in the Neurology Department, Dr. Alex Gruber.  She was seen because of decline in her ability to walk, having a history of normal pressure hydrocephalus with improvement initially but then deterioration after that.  At this time, the patient provides a coherent interim history since her last visit with me.      At the time I saw Amparo she was showing considerable difficulty with gait imbalance and really was having trouble walking, not only because of her neurologic status, but because of knee joint problems as well.  I recommended that she continue physical therapy and that her therapist report back to me regarding her ability to use a walker.  Also, she was started on Ampyra for her gait disorder as it has worked quite effectively in patients with multiple sclerosis and who have a spastic type gait and some weakness associated.  Surprisingly, her insurance company, [employer], approved the Ampyra.      Ms. Sharma reports that she had significant improvement in her gait after she started the Ampyra, which was 4 days after I saw her.  She was taking 10 mg at 8:00 a.m. and 10 mg at 9:00 p.m.  She was more stable when walking and felt stronger on her feet.  She had no further falling but her balance was still \"shaky\".  Unfortunately, her right knee has continued to be a major problem for her.      The patient carried a note from Gema Issa, physical therapist with a home care agency called [facility].  Gema's call back phone number is " [phone].  Gema wanted to know what I would think about Amparo being cleared to walk around her apartment with a walker without assistance.  Ms. Issa also stated that she would like to continue with home care physical therapy.  She recommended twice a week versus 3 times a week.      CURRENT MEDICATIONS:   1.  Acetaminophen 500 mg p.o every 8 hours as needed for pain or fever.   2.  Albuterol inhaler to be taken as directed.   3.  Amlodipine besylate 10 mg in the morning.   4.  Amoxicillin 500 mg p.o as needed.   5.  Aripiprazole (Abilify) 15 mg daily.   6.  Excedrin Migraine (aspirin-acetaminophen-caffeine 250-250-65) 1 every 6 hours p.r.n. for headaches.   7.  Bupropion HCL (Wellbutrin-XL) 150 mg p.o every morning.   8.  Coenzyme Q10 (CoQ10 p.o.) 200 mg p.o. daily.   9.  Dalfampridine (Ampyra) 10 mg 1 p.o. 2 times daily as noted above.   10.  Diclofenac sodium 50 mg p.o. daily.   11.  Diflorasone (Psorcon) 0.05% ointment to be applied topically daily.   12.  Docusate sodium (Colace) 100 mg daily.   13.  Donepezil hydrochloride (Aricept) 10 mg at bedtime.   14.  Fluoxetine HCL (Prozac) 40 mg p.o. every morning.   15.  Fluticasone (Flovent HFA) 100 mcg per actuator, 2 puffs inhaled as needed.   16.  Folic acid 1 mg daily.   17.  Hydrocortisone 2.5% cream to be applied topically 2 times daily.   18.  Hypromellose (artificial tears) p.r.n.   19.  Lactobacillus rhamnosus capsule 1 capsule by mouth 2 times daily.   20.  Lamotrigine (Lamictal) 150 mg in the morning and 100 mg in the evening.   21.  Levothyroxine sodium (Synthroid) 75 mcg every morning before breakfast.   22.  Multivitamin and minerals 1 daily by mouth.   23.  Ondansetron (Zofran) 4 mg every 6 hours as needed for nausea.   24.  Ranitidine 300 mg 2 times daily.   25.  Spiriva Handihaler 1 puff inhaled into the lungs as needed.      PHYSICAL EXAMINATION:  Amparo is a very pleasant woman who is very interactive and a person who establishes good eye  contact and provides a sufficient chronologic history supported by report from her physical therapist.   VITAL SIGNS:  Blood pressure 138/83.  Pulse 70.  Height 5 feet 1 inch.  Weight 170 pounds.  BMI 30.12 kg/m2.   GENERAL:   A general physical examination was not performed as the patient has no new complaints in this respect.   NEUROLOGIC:  Reveals that the patient is quite well oriented and has a little better memory than at her previous exam when she had difficulty recalling 3 objects after distraction.  She was able to do better at the time of this examination.  Cranial nerve examination reveals diminished convergence and 2 beats of nystagmus on lateral gaze, but otherwise the cranial nerves are normal.  Reflexes are 2+ in the upper extremities, equal bilaterally, although the right brachioradialis is absent.  Knee jerks are 1+ and ankle jerks are absent.  There is no upgoing toe to plantar stimulation.  Muscle testing reveals marked weakness of the left anterior deltoid.  Coordination is good with respect to rapidity of eye blink, tongue wiggle and alternating movements in the upper extremities.  She has no dysmetria on finger-to-nose testing.  There is no evidence of dysarthria.  With respect to gait and station, she now is able to walk with some difficulty but traverses 25 feet in 7 seconds.  She is unable to 1-foot stand, nor can she tandem walk.      IMPRESSION:   1.  Gait disturbance associated with a history of normal pressure hydrocephalus, status post shunt surgery.   2.  Improvement in her gait with the addition of Ampyra to her regimen.   3.  Continuing knee joint problems.   4.  There may be some additional central nervous system dysfunction not related to normal pressure hydrocephalus since she had shown deterioration, even after initial improvement from the shunt, one could consider small vessel ischemic disease.      PLAN:   1.  A note is provided to Gema Issa PT, with respect to Amparo  being able to walk in her apartment with a walker without assistance.   2.  It is recommended the patient continue home care physical therapy 2 days a week (versus 3 days).   3.  An order is written for weekly to provide the patient with a knee orthotic brace.      Lan Russ MD       EVALUATION AND MANAGEMENT (E/M):   35345.         LAN RUSS MD             D: 2018   T: 2018   MT: AKA      Name:     ATILIO COPPOLA   MRN:      [id number]        Account:      [id number]   :      1940           Service Date: 2018      Document: [id number]

## 2018-08-17 NOTE — NURSING NOTE
Pt is here for a follow up after starting Ampyra in July. She said she can tell a difference in that her gait is stronger, no falls, physical therapy is finished as of 8/16/2018, and she said the therapist can even tell a difference. She is still in a wheel chair at the long term care facility and she is ready to use a walker and wants Dr. Casillas to write a letter stating that is ok. The physical therapist at her facility needs a written order. She has not experienced any side effects from the medication.

## 2018-08-17 NOTE — MR AVS SNAPSHOT
After Visit Summary   2018    Amparo Sharma    MRN: [id number]           Patient Information     Date Of Birth          1940        Visit Information        Provider Department      2018 11:00 AM Lan Casillas MD [facility] Neurology        Today's Diagnoses     Gait disturbance    -  1       Follow-ups after your visit        Your next 10 appointments already scheduled     Sep 11, 2018 11:30 AM CDT   (Arrive by 11:15 AM)   RETURN HERNIA SURGERY with Que Astorga MD   [facility] General Surgery ([facility])    [address] [place] [id number] [phone]           Do not wear perfume.            Oct 05, 2018 11:00 AM CDT   (Arrive by 10:45 AM)   Return Ataxia with Lan Casillas MD   [facility] Neurology ([facility])    [address] [place] [id number] [phone]              Who to contact     Please call your clinic at [phone] to:    Ask questions about your health    Make or cancel appointments    Discuss your medicines    Learn about your test results    Speak to your doctor            Additional Information About Your Visit        MyChart Information     [employer] is an electronic gateway that provides easy, online access to your medical records. With [employer]Magic, you can request a clinic appointment, read your test results, renew a prescription or communicate with your care team.     To sign up for [employer] visit the website at [email or web address]   You will be asked to enter the access code listed below, as well as some personal information. Please follow the directions to create your username and password.     Your access code is: [email or web address]  Expires: 2018 12:39 PM     Your access code will  in 90 days. If you need help or a new code, please contact your [facility] or call [phone] for assistance.       "  Care EveryWhere ID     This is your Care EveryWhere ID. This could be used by other organizations to access your [facility] medical records  [id number]        Your Vitals Were     Pulse Height Pulse Oximetry BMI (Body Mass Index)          70 1.549 m (5' 1\") 94% 32.12 kg/m2         Blood Pressure from Last 3 Encounters:   08/17/18 138/83   07/06/18 154/80   06/06/18 154/86    Weight from Last 3 Encounters:   08/17/18 77.1 kg (170 lb)   07/06/18 77.2 kg (170 lb 3.2 oz)   06/06/18 78 kg (171 lb 15.3 oz)              Today, you had the following     No orders found for display       Primary Care Provider Office Phone # Fax #    Parvez Francis [phone]       [facility] [address] [facility] [address]        Equal Access to Services     COY WALSH : Hadii yury ku hadasho Sojacek, waaxda luqadaha, qaybta kaalmada adeegyada, param steiner haypaulino sanchez . So [facility] [phone].    ATENCIÓN: Si habla español, tiene a mcdonald disposición servicios gratuitos de asistencia lingüística. Killian al [phone].    We comply with applicable federal civil rights laws and [place] laws. We do not discriminate on the basis of race, color, national origin, age, disability, sex, sexual orientation, or gender identity.            Thank you!     Thank you for choosing [facility] NEUROLOGY  for your care. Our goal is always to provide you with excellent care. Hearing back from our patients is one way we can continue to improve our services. Please take a few minutes to complete the written survey that you may receive in the mail after your visit with us. Thank you!             Your Updated Medication List - Protect others around you: Learn how to safely use, store and throw away your medicines at www.disposemymeds.org.          This list is accurate as of 8/17/18 11:59 PM.  Always use your most recent med list.                   Brand Name Dispense Instructions for use Diagnosis    ABILIFY PO "      Take 15 mg by mouth daily    Hip pain, left, Malignant neoplasm of sigmoid colon (H), Gait disturbance, Other iron deficiency anemia, Acute midline low back pain with right-sided sciatica, Leg weakness, bilateral, Vitamin D deficiency       ALBUTEROL IN           AMLODIPINE BESYLATE PO      Take 10 mg by mouth every morning        AMOXICILLIN PO      Take 500 mg by mouth as needed        ARTIFICIAL TEARS OP           aspirin-acetaminophen-caffeine 250-250-65 MG per tablet    EXCEDRIN MIGRAINE     Take 1 tablet by mouth every 6 hours as needed for headaches    Hip pain, left, Malignant neoplasm of sigmoid colon (H), Gait disturbance, Other iron deficiency anemia, Acute midline low back pain with right-sided sciatica, Leg weakness, bilateral, Vitamin D deficiency       COQ10 PO      Take 200 mg by mouth daily        Dalfampridine 10 MG Tb12    AMPYRA    60 tablet    Take 1 tablet (10 mg) by mouth 2 times daily    Gait disturbance       DICLOFENAC SODIUM PO      Take 50 mg by mouth 2 times daily        diflorasone 0.05 % ointment    PSORCON     Apply 1 applicator topically daily        docusate sodium 100 MG capsule    COLACE     Take 100 mg by mouth        DONEPEZIL HCL PO      Take 10 mg by mouth At Bedtime        fluticasone 110 MCG/ACT Inhaler    FLOVENT HFA     Inhale 2 puffs into the lungs as needed    Hip pain, left, Malignant neoplasm of sigmoid colon (H), Gait disturbance, Other iron deficiency anemia, Acute midline low back pain with right-sided sciatica, Leg weakness, bilateral, Vitamin D deficiency       FOLIC ACID PO      Take 1 mg by mouth        hydrocortisone 2.5 % cream      Apply topically 2 times daily        lactobacillus rhamnosus (GG) capsule      Take 1 capsule by mouth 2 times daily    Cellulitis of left elbow       LAMICTAL PO      Take 150 mg by mouth in the morning and 100 mg by mouth in the evening    Gait disturbance, Memory loss, Malignant neoplasm of sigmoid colon (H)        Levothyroxine Sodium 75 MCG Caps      Take 75 mcg by mouth every morning    Gait disturbance, Memory loss, Malignant neoplasm of sigmoid colon (H)       MULTIVITAMIN PO      Take by mouth daily    Gait disturbance, Memory loss, Malignant neoplasm of sigmoid colon (H)       ondansetron 4 MG ODT tab    ZOFRAN-ODT    40 tablet    Take 1 tablet (4 mg) by mouth every 6 hours as needed for nausea    S/P ventriculoperitoneal shunt       order for DME     1 each    Abdominal Binder - Medium    Ventral hernia without obstruction or gangrene       PROZAC PO      Take 40 mg by mouth every morning    Gait disturbance, Memory loss, Malignant neoplasm of sigmoid colon (H)       RANITIDINE HCL PO      Take 300 mg by mouth 2 times daily        SPIRIVA HANDIHALER IN      Inhale 1 puff into the lungs as needed        TYLENOL PO      Take 500 mg by mouth every 8 hours as needed for mild pain or fever    Gait disturbance, Memory loss, Malignant neoplasm of sigmoid colon (H)       WELLBUTRIN XL PO      Take 150 mg by mouth every morning    Hip pain, left, Malignant neoplasm of sigmoid colon (H), Gait disturbance, Other iron deficiency anemia, Acute midline low back pain with right-sided sciatica, Leg weakness, bilateral, Vitamin D deficiency

## 2018-08-17 NOTE — LETTER
August 17, 2018    Amparo Sharma  [address]  [place] [address]        Dear Gema Issa P.T  [facility]    In regards to Amparo Sharma, she may walk in her apartment with a walker unassisted.    She should continue with physical therapy at home 2 x per week.  She will be returning to the [facility] in October 2018.  I am writing a separate order for a knee brace to [employer].      DX: Gait disturbance with a history of falls      Thank you,        Marlen Casillas MD

## 2018-08-17 NOTE — LETTER
August 17, 2018    Amparo Sharma  [address]  [place] [address]      [facility]    Please fit Amparo Sharma with appropriate right knee brace for stability.    DX: Gait disturbance with history of falls.      Sincerely,        Dr. Marlen Casillas

## 2018-08-18 ASSESSMENT — ANXIETY QUESTIONNAIRES: GAD7 TOTAL SCORE: 2

## 2018-08-21 ENCOUNTER — TELEPHONE (OUTPATIENT)
Dept: NEUROLOGY | Facility: CLINIC | Age: 78
End: 2018-08-21

## 2018-08-21 NOTE — TELEPHONE ENCOUNTER
Health Call Center    Phone Message    May a detailed message be left on voicemail: yes    Reason for Call: Other: Patient called in to talk to [facility], she wants to know a date to see Dr. Casillas in October. Please give her a call back today between 10 & 11 or 1 & 2. Thanks!     Action Taken: Message routed to:  [facility] ([facility] Neurology

## 2018-08-21 NOTE — TELEPHONE ENCOUNTER
Called pt and informed her that she is scheduled to see Dr. Casimiro Casillas on Oct 5 at 11am.  She said that works in her schedule and will come to that appointment.

## 2018-08-30 ENCOUNTER — RECORDS - HEALTHEAST (OUTPATIENT)
Dept: LAB | Facility: CLINIC | Age: 78
End: 2018-08-30

## 2018-08-30 LAB
BASOPHILS # BLD AUTO: 0.1 THOU/UL (ref 0–0.2)
BASOPHILS NFR BLD AUTO: 1 % (ref 0–2)
C REACTIVE PROTEIN LHE: 0.4 MG/DL (ref 0–0.8)
EOSINOPHIL # BLD AUTO: 0.3 THOU/UL (ref 0–0.4)
EOSINOPHIL NFR BLD AUTO: 4 % (ref 0–6)
ERYTHROCYTE [DISTWIDTH] IN BLOOD BY AUTOMATED COUNT: 14.6 % (ref 11–14.5)
ERYTHROCYTE [SEDIMENTATION RATE] IN BLOOD BY WESTERGREN METHOD: 13 MM/HR (ref 0–20)
HCT VFR BLD AUTO: 40.4 % (ref 35–47)
HGB BLD-MCNC: 13 G/DL (ref 12–16)
LYMPHOCYTES # BLD AUTO: 1.9 THOU/UL (ref 0.8–4.4)
LYMPHOCYTES NFR BLD AUTO: 29 % (ref 20–40)
MCH RBC QN AUTO: 29 PG (ref 27–34)
MCHC RBC AUTO-ENTMCNC: 32.2 G/DL (ref 32–36)
MCV RBC AUTO: 90 FL (ref 80–100)
MONOCYTES # BLD AUTO: 0.7 THOU/UL (ref 0–0.9)
MONOCYTES NFR BLD AUTO: 11 % (ref 2–10)
NEUTROPHILS # BLD AUTO: 3.6 THOU/UL (ref 2–7.7)
NEUTROPHILS NFR BLD AUTO: 55 % (ref 50–70)
PLATELET # BLD AUTO: 252 THOU/UL (ref 140–440)
PMV BLD AUTO: 10.9 FL (ref 8.5–12.5)
RBC # BLD AUTO: 4.48 MILL/UL (ref 3.8–5.4)
WBC: 6.5 THOU/UL (ref 4–11)

## 2018-09-05 NOTE — PROGRESS NOTES
"Service Date: 08/17/2018      HISTORY OF PRESENT ILLNESS:  Amparo Sharma was seen by me for the first time on 08/17/2018, having been referred by my colleague in the Neurology Department, Dr. Alex Gruber.  She was seen because of decline in her ability to walk, having a history of normal pressure hydrocephalus with improvement initially but then deterioration after that.  At this time, the patient provides a coherent interim history since her last visit with me.      At the time I saw Amparo she was showing considerable difficulty with gait imbalance and really was having trouble walking, not only because of her neurologic status, but because of knee joint problems as well.  I recommended that she continue physical therapy and that her therapist report back to me regarding her ability to use a walker.  Also, she was started on Ampyra for her gait disorder as it has worked quite effectively in patients with multiple sclerosis and who have a spastic type gait and some weakness associated.  Surprisingly, her insurance company, [employer], approved the Ampyra.      Ms. Sharma reports that she had significant improvement in her gait after she started the Ampyra, which was 4 days after I saw her.  She was taking 10 mg at 8:00 a.m. and 10 mg at 9:00 p.m.  She was more stable when walking and felt stronger on her feet.  She had no further falling but her balance was still \"shaky\".  Unfortunately, her right knee has continued to be a major problem for her.      The patient carried a note from Gema Issa, physical therapist with a home care agency called [facility].  Gema's call back phone number is [phone].  Gema wanted to know what I would think about Amparo being cleared to walk around her apartment with a walker without assistance.  Ms. Issa also stated that she would like to continue with home care physical therapy.  She recommended twice a week versus 3 times a week.      CURRENT MEDICATIONS: "   1.  Acetaminophen 500 mg p.o every 8 hours as needed for pain or fever.   2.  Albuterol inhaler to be taken as directed.   3.  Amlodipine besylate 10 mg in the morning.   4.  Amoxicillin 500 mg p.o as needed.   5.  Aripiprazole (Abilify) 15 mg daily.   6.  Excedrin Migraine (aspirin-acetaminophen-caffeine 250-250-65) 1 every 6 hours p.r.n. for headaches.   7.  Bupropion HCL (Wellbutrin-XL) 150 mg p.o every morning.   8.  Coenzyme Q10 (CoQ10 p.o.) 200 mg p.o. daily.   9.  Dalfampridine (Ampyra) 10 mg 1 p.o. 2 times daily as noted above.   10.  Diclofenac sodium 50 mg p.o. daily.   11.  Diflorasone (Psorcon) 0.05% ointment to be applied topically daily.   12.  Docusate sodium (Colace) 100 mg daily.   13.  Donepezil hydrochloride (Aricept) 10 mg at bedtime.   14.  Fluoxetine HCL (Prozac) 40 mg p.o. every morning.   15.  Fluticasone (Flovent HFA) 100 mcg per actuator, 2 puffs inhaled as needed.   16.  Folic acid 1 mg daily.   17.  Hydrocortisone 2.5% cream to be applied topically 2 times daily.   18.  Hypromellose (artificial tears) p.r.n.   19.  Lactobacillus rhamnosus capsule 1 capsule by mouth 2 times daily.   20.  Lamotrigine (Lamictal) 150 mg in the morning and 100 mg in the evening.   21.  Levothyroxine sodium (Synthroid) 75 mcg every morning before breakfast.   22.  Multivitamin and minerals 1 daily by mouth.   23.  Ondansetron (Zofran) 4 mg every 6 hours as needed for nausea.   24.  Ranitidine 300 mg 2 times daily.   25.  Spiriva Handihaler 1 puff inhaled into the lungs as needed.      PHYSICAL EXAMINATION:  Amparo is a very pleasant woman who is very interactive and a person who establishes good eye contact and provides a sufficient chronologic history supported by report from her physical therapist.   VITAL SIGNS:  Blood pressure 138/83.  Pulse 70.  Height 5 feet 1 inch.  Weight 170 pounds.  BMI 30.12 kg/m2.   GENERAL:   A general physical examination was not performed as the patient has no new complaints in  this respect.   NEUROLOGIC:  Reveals that the patient is quite well oriented and has a little better memory than at her previous exam when she had difficulty recalling 3 objects after distraction.  She was able to do better at the time of this examination.  Cranial nerve examination reveals diminished convergence and 2 beats of nystagmus on lateral gaze, but otherwise the cranial nerves are normal.  Reflexes are 2+ in the upper extremities, equal bilaterally, although the right brachioradialis is absent.  Knee jerks are 1+ and ankle jerks are absent.  There is no upgoing toe to plantar stimulation.  Muscle testing reveals marked weakness of the left anterior deltoid.  Coordination is good with respect to rapidity of eye blink, tongue wiggle and alternating movements in the upper extremities.  She has no dysmetria on finger-to-nose testing.  There is no evidence of dysarthria.  With respect to gait and station, she now is able to walk with some difficulty but traverses 25 feet in 7 seconds.  She is unable to 1-foot stand, nor can she tandem walk.      IMPRESSION:   1.  Gait disturbance associated with a history of normal pressure hydrocephalus, status post shunt surgery.   2.  Improvement in her gait with the addition of Ampyra to her regimen.   3.  Continuing knee joint problems.   4.  There may be some additional central nervous system dysfunction not related to normal pressure hydrocephalus since she had shown deterioration, even after initial improvement from the shunt, one could consider small vessel ischemic disease.      PLAN:   1.  A note is provided to Gema Issa PT, with respect to Amparo being able to walk in her apartment with a walker without assistance.   2.  It is recommended the patient continue home care physical therapy 2 days a week (versus 3 days).   3.  An order is written for weekly to provide the patient with a knee orthotic brace.      Lan Casillas MD       EVALUATION AND MANAGEMENT  (E/M):   [id number].         SCAR RUSS MD             D: 2018   T: 2018   MT: AKA      Name:     ATILIO COPPOLA   MRN:      [id number]        Account:      [id number]   :      1940           Service Date: 2018      Document: [id number]

## 2018-09-10 ENCOUNTER — TELEPHONE (OUTPATIENT)
Dept: SURGERY | Facility: CLINIC | Age: 78
End: 2018-09-10

## 2018-09-10 NOTE — TELEPHONE ENCOUNTER
Established Patient Telephone Reminder Call    Date of call:  09/10/18  Phone numbers:  Home number on file [phone] (home)    Reached patient/confirmed appointment:  Yes  Appointment with:   Dr. Que Astorga  Reason for visit:  Discuss surgery

## 2018-09-11 ENCOUNTER — OFFICE VISIT (OUTPATIENT)
Dept: SURGERY | Facility: CLINIC | Age: 78
End: 2018-09-11
Payer: MEDICARE

## 2018-09-11 ENCOUNTER — RECORDS - HEALTHEAST (OUTPATIENT)
Dept: ADMINISTRATIVE | Facility: OTHER | Age: 78
End: 2018-09-11

## 2018-09-11 VITALS — HEIGHT: 61 IN | WEIGHT: 169.9 LBS | BODY MASS INDEX: 32.08 KG/M2

## 2018-09-11 DIAGNOSIS — K43.9 VENTRAL HERNIA WITHOUT OBSTRUCTION OR GANGRENE: Primary | ICD-10-CM

## 2018-09-11 RX ORDER — CETIRIZINE HYDROCHLORIDE 10 MG/1
10 TABLET ORAL DAILY
COMMUNITY

## 2018-09-11 NOTE — NURSING NOTE
"Chief Complaint   Patient presents with     Consult     Return hernia surgery patient, discuss surgery, scheduled per patient.       Vitals:    09/11/18 1127   Weight: 77.1 kg (169 lb 14.4 oz)   Height: 1.549 m (5' 1\")       Body mass index is 32.1 kg/(m^2).      Eddie Trejo, EMT                      "

## 2018-09-11 NOTE — PATIENT INSTRUCTIONS
You met with Dr. Que Astorga.      Today's visit instructions:    Return to the Surgery Clinic when you have lost 20 pounds for discussion of possible hernia repair.        If you have questions please contact Tony RN or Rebecca RN during regular clinic hours, Monday through Friday 7:30 AM - 4:00 PM, or you can contact us via Authy at anytime.       If you have urgent needs after-hours, weekends, or holidays please call the hospital at [phone] and ask to speak with our on-call General Surgery Team.    Appointment [phone], option #1   Nurse Advice (Tony or Rebecca): [phone]   Surgery Scheduler (Jessica): [phone]  Fax: [phone]

## 2018-09-11 NOTE — LETTER
9/11/2018       RE: Amparo Sharma  [address]  [place] [address]     Dear Colleague,    Thank you for referring your patient, Amparo Sharma, to the [facility] SURGERY at [facility]. Please see a copy of my visit note below.    Patient known to me from previous visit last year. No significant change since last visit. Concerned about look of hernia. Has not been using binder.  Has not lost any weight.  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 Andrea      Thyroid disease      Past Surgical History:   Procedure Laterality Date     CHOLECYSTECTOMY       colon cancer resection  1/2015     ESOPHAGOSCOPY, GASTROSCOPY, DUODENOSCOPY (EGD), COMBINED N/A 9/7/2017    Procedure: COMBINED ESOPHAGOSCOPY, GASTROSCOPY, DUODENOSCOPY (EGD), BIOPSY SINGLE OR MULTIPLE;  EGD;  Surgeon: Brook Marsh MD;  Location: [facility] GI     GENITOURINARY SURGERY       GI SURGERY       GYN SURGERY      endometriosis, hysterectomy     HYSTERECTOMY      total, related to endometriosis     INSERT DRAIN LUMBAR N/A 2/5/2017    Procedure: INSERT DRAIN LUMBAR;  Surgeon: Raji Newton MD;  Location: [facility] OR     multiple knee surgeries      total knees 4 L, 3 right, last one 2013     OPTICAL TRACKING SYSTEM IMPLANT SHUNT VENTRICULOPERITONEAL Right 2/8/2017    Procedure: OPTICAL TRACKING SYSTEM IMPLANT SHUNT VENTRICULOPERITONEAL;  Surgeon: Rc Dozier MD;   Location: [facility] OR     ORTHOPEDIC SURGERY      right hip replacement, both knees     rotator cuff surgeries         On exam today wide based upper midline hernia.    Impression: ventral hernia on watchful waiting protocol.  Goal is weight loss to 150 lbs. Encouraged work with PCP for weight management referral.  Encouraged use of binder.  The total time spent with this patient was 15 minutes.  Of this time, greater than 50% was spent counseling and coordinating care.        Again, thank you for allowing me to participate in the care of your patient.      Sincerely,    Que Astorga MD

## 2018-09-11 NOTE — PROGRESS NOTES
Patient known to me from previous visit last year. No significant change since last visit. Concerned about look of hernia. Has not been using binder.  Has not lost any weight.  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 Andrea      Thyroid disease      Past Surgical History:   Procedure Laterality Date     CHOLECYSTECTOMY       colon cancer resection  1/2015     ESOPHAGOSCOPY, GASTROSCOPY, DUODENOSCOPY (EGD), COMBINED N/A 9/7/2017    Procedure: COMBINED ESOPHAGOSCOPY, GASTROSCOPY, DUODENOSCOPY (EGD), BIOPSY SINGLE OR MULTIPLE;  EGD;  Surgeon: Brook Marsh MD;  Location:  GI     GENITOURINARY SURGERY       GI SURGERY       GYN SURGERY      endometriosis, hysterectomy     HYSTERECTOMY      total, related to endometriosis     INSERT DRAIN LUMBAR N/A 2/5/2017    Procedure: INSERT DRAIN LUMBAR;  Surgeon: Raji Newton MD;  Location: [facility] OR     multiple knee surgeries      total knees 4 L, 3 right, last one 2013     OPTICAL TRACKING SYSTEM IMPLANT SHUNT VENTRICULOPERITONEAL Right 2/8/2017    Procedure: OPTICAL TRACKING SYSTEM IMPLANT SHUNT VENTRICULOPERITONEAL;  Surgeon: Rc Dozier MD;  Location: [facility] OR     ORTHOPEDIC SURGERY      right hip replacement, both knees     rotator cuff surgeries         On exam today wide based upper midline hernia.    Impression: ventral hernia on watchful waiting protocol.  Goal is weight loss to 150 lbs. Encouraged work with PCP for weight management  referral.  Encouraged use of binder.  The total time spent with this patient was 15 minutes.  Of this time, greater than 50% was spent counseling and coordinating care.

## 2018-09-11 NOTE — MR AVS SNAPSHOT
After Visit Summary   2018    Amparo Sharma    MRN: [id number]           Patient Information     Date Of Birth          1940        Visit Information        Provider Department      2018 11:30 AM Que Astorga MD [facility] General Surgery        Today's Diagnoses     Ventral hernia without obstruction or gangrene    -  1      Care Instructions    You met with Dr. Que Astorga.      Today's visit instructions:    Return to the Surgery Clinic when you have lost 20 pounds for discussion of possible hernia repair.        If you have questions please contact Tony RN or Rebecca RN during regular clinic hours, Monday through Friday 7:30 AM - 4:00 PM, or you can contact us via An[employer]ova at anytime.       If you have urgent needs after-hours, weekends, or holidays please call the hospital at [phone] and ask to speak with our on-call General Surgery Team.    Appointment [phone], option #1   Nurse Advice (Tony or Rebecca): [phone]   Surgery Scheduler (Tyrone): [phone]  Fax: [phone]                      Follow-ups after your visit        Your next 10 appointments already scheduled     Oct 05, 2018 11:00 AM CDT   (Arrive by 10:45 AM)   Return Ataxia with Lan Casillas MD   [facility] Neurology ([facility])    [address] [place] [id number] [phone]              Who to contact     Please call your clinic at [phone] to:    Ask questions about your health    Make or cancel appointments    Discuss your medicines    Learn about your test results    Speak to your doctor            Additional Information About Your Visit        DoYouRememberhart Information     Antenova is an electronic gateway that provides easy, online access to your medical records. With Antenova, you can request a clinic appointment, read your test results, renew a prescription or communicate with your care team.     To sign up for  "MyChart visit the website at [email or web address]   You will be asked to enter the access code listed below, as well as some personal information. Please follow the directions to create your username and password.     Your access code is: [email or web address]  Expires: 2018 12:39 PM     Your access code will  in 90 days. If you need help or a new code, please contact your [facility] or call [phone] for assistance.        Care EveryWhere ID     This is your Care EveryWhere ID. This could be used by other organizations to access your [place] medical records  [id number]        Your Vitals Were     Height BMI (Body Mass Index)                1.549 m (5' 1\") 32.1 kg/m2           Blood Pressure from Last 3 Encounters:   18 138/83   18 154/80   18 154/86    Weight from Last 3 Encounters:   18 77.1 kg (169 lb 14.4 oz)   18 77.1 kg (170 lb)   18 77.2 kg (170 lb 3.2 oz)              Today, you had the following     No orders found for display       Primary Care Provider Office Phone # Fax #    Parvez Francis [phone]       [facility] [address] [facility] [address]        Equal Access to Services     FAWN WALSH AH: Hadii yury Ann, waaxda luqadaha, qaybta kaalmainocente carrillo, param jiménez. So [facility] [phone].    ATENCIÓN: Si habla español, tiene a mcdonald disposición servicios gratuitos de asistencia lingüística. Killian al [phone].    We comply with applicable federal civil rights laws and [place] laws. We do not discriminate on the basis of race, color, national origin, age, disability, sex, sexual orientation, or gender identity.            Thank you!     Thank you for choosing [facility]  for your care. Our goal is always to provide you with excellent care. Hearing back from our patients is one way we can continue to improve our services. " Please take a few minutes to complete the written survey that you may receive in the mail after your visit with us. Thank you!             Your Updated Medication List - Protect others around you: Learn how to safely use, store and throw away your medicines at www.disposemymeds.org.          This list is accurate as of 9/11/18 11:57 AM.  Always use your most recent med list.                   Brand Name Dispense Instructions for use Diagnosis    ABILIFY PO      Take 15 mg by mouth daily    Hip pain, left, Malignant neoplasm of sigmoid colon (H), Gait disturbance, Other iron deficiency anemia, Acute midline low back pain with right-sided sciatica, Leg weakness, bilateral, Vitamin D deficiency       ALBUTEROL IN           AMLODIPINE BESYLATE PO      Take 10 mg by mouth every morning        AMOXICILLIN PO      Take 500 mg by mouth as needed        ARTIFICIAL TEARS OP           aspirin-acetaminophen-caffeine 250-250-65 MG per tablet    EXCEDRIN MIGRAINE     Take 1 tablet by mouth every 6 hours as needed for headaches    Hip pain, left, Malignant neoplasm of sigmoid colon (H), Gait disturbance, Other iron deficiency anemia, Acute midline low back pain with right-sided sciatica, Leg weakness, bilateral, Vitamin D deficiency       COQ10 PO      Take 200 mg by mouth daily        Dalfampridine 10 MG Tb12    AMPYRA    60 tablet    Take 1 tablet (10 mg) by mouth 2 times daily    Gait disturbance       DICLOFENAC SODIUM PO      Take 50 mg by mouth 2 times daily        diflorasone 0.05 % ointment    PSORCON     Apply 1 applicator topically daily        docusate sodium 100 MG capsule    COLACE     Take 100 mg by mouth        DONEPEZIL HCL PO      Take 10 mg by mouth At Bedtime        fluticasone 110 MCG/ACT Inhaler    FLOVENT HFA     Inhale 2 puffs into the lungs as needed    Hip pain, left, Malignant neoplasm of sigmoid colon (H), Gait disturbance, Other iron deficiency anemia, Acute midline low back pain with right-sided  sciatica, Leg weakness, bilateral, Vitamin D deficiency       FOLIC ACID PO      Take 1 mg by mouth        hydrocortisone 2.5 % cream      Apply topically 2 times daily        lactobacillus rhamnosus (GG) capsule      Take 1 capsule by mouth 2 times daily    Cellulitis of left elbow       LAMICTAL PO      Take 150 mg by mouth in the morning and 100 mg by mouth in the evening    Gait disturbance, Memory loss, Malignant neoplasm of sigmoid colon (H)       Levothyroxine Sodium 75 MCG Caps      Take 75 mcg by mouth every morning    Gait disturbance, Memory loss, Malignant neoplasm of sigmoid colon (H)       MULTIVITAMIN PO      Take by mouth daily    Gait disturbance, Memory loss, Malignant neoplasm of sigmoid colon (H)       ondansetron 4 MG ODT tab    ZOFRAN-ODT    40 tablet    Take 1 tablet (4 mg) by mouth every 6 hours as needed for nausea    S/P ventriculoperitoneal shunt       order for DME     1 each    Abdominal Binder - Medium    Ventral hernia without obstruction or gangrene       PROZAC PO      Take 40 mg by mouth every morning    Gait disturbance, Memory loss, Malignant neoplasm of sigmoid colon (H)       RANITIDINE HCL PO      Take 300 mg by mouth 2 times daily        SPIRIVA HANDIHALER IN      Inhale 1 puff into the lungs as needed        TYLENOL PO      Take 500 mg by mouth every 8 hours as needed for mild pain or fever    Gait disturbance, Memory loss, Malignant neoplasm of sigmoid colon (H)       WELLBUTRIN XL PO      Take 150 mg by mouth every morning    Hip pain, left, Malignant neoplasm of sigmoid colon (H), Gait disturbance, Other iron deficiency anemia, Acute midline low back pain with right-sided sciatica, Leg weakness, bilateral, Vitamin D deficiency

## 2018-09-14 ENCOUNTER — MEDICAL CORRESPONDENCE (OUTPATIENT)
Dept: HEALTH INFORMATION MANAGEMENT | Facility: CLINIC | Age: 78
End: 2018-09-14

## 2018-09-25 ENCOUNTER — TELEPHONE (OUTPATIENT)
Dept: NEUROLOGY | Facility: CLINIC | Age: 78
End: 2018-09-25

## 2018-09-25 NOTE — TELEPHONE ENCOUNTER
Health Call Center    Phone Message    May a detailed message be left on voicemail: yes    Reason for Call: Other: Pt is requesting a call back she would like clarification on the appt time and date to see Dr. Casillas.      Action Taken: Message routed to:  [facility] Neurology      Called pt and gave her the time and the date of her appointment in Oct.

## 2018-10-05 ENCOUNTER — RECORDS - HEALTHEAST (OUTPATIENT)
Dept: ADMINISTRATIVE | Facility: OTHER | Age: 78
End: 2018-10-05

## 2018-10-05 ENCOUNTER — OFFICE VISIT (OUTPATIENT)
Dept: NEUROLOGY | Facility: CLINIC | Age: 78
End: 2018-10-05
Payer: MEDICARE

## 2018-10-05 VITALS
SYSTOLIC BLOOD PRESSURE: 116 MMHG | HEART RATE: 74 BPM | DIASTOLIC BLOOD PRESSURE: 73 MMHG | OXYGEN SATURATION: 96 % | WEIGHT: 174 LBS | BODY MASS INDEX: 32.85 KG/M2 | HEIGHT: 61 IN

## 2018-10-05 DIAGNOSIS — R26.9 GAIT DISTURBANCE: Primary | ICD-10-CM

## 2018-10-05 ASSESSMENT — PAIN SCALES - GENERAL: PAINLEVEL: NO PAIN (1)

## 2018-10-05 NOTE — MR AVS SNAPSHOT
"              After Visit Summary   10/5/2018    Amparo Sharma    MRN: [id number]           Patient Information     Date Of Birth          1940        Visit Information        Provider Department      10/5/2018 11:00 AM Lan Casillas MD [facility] Neurology        Today's Diagnoses     Gait disturbance    -  1       Follow-ups after your visit        Who to contact     Please call your clinic at [phone] to:    Ask questions about your health    Make or cancel appointments    Discuss your medicines    Learn about your test results    Speak to your doctor            Additional Information About Your Visit        MyChart Information     [employer]ingCheckout is an electronic gateway that provides easy, online access to your medical records. With ZingCheckout, you can request a clinic appointment, read your test results, renew a prescription or communicate with your care team.     To sign up for ZingCheckout visit the website at [email or web address]   You will be asked to enter the access code listed below, as well as some personal information. Please follow the directions to create your username and password.     Your access code is: [email or web address]  Expires: 2018 12:39 PM     Your access code will  in 90 days. If you need help or a new code, please contact your [facility] or call [phone] for assistance.        Care EveryWhere ID     This is your Care EveryWhere ID. This could be used by other organizations to access your [place] medical records  [id number]        Your Vitals Were     Pulse Height Pulse Oximetry BMI (Body Mass Index)          74 1.549 m (5' 1\") 96% 32.88 kg/m2         Blood Pressure from Last 3 Encounters:   10/05/18 116/73   18 138/83   18 154/80    Weight from Last 3 Encounters:   10/05/18 78.9 kg (174 lb)   18 77.1 kg (169 lb 14.4 oz)   18 77.1 kg (170 lb)              Today, you had the following     No orders found for " display       Primary Care Provider Office Phone # Fax #    Parvez Francis [phone]       [facility] [address] [facility] [address]        Equal Access to Services     FAWN WALSH : Hadii aad ku hadrachelo Soomaali, waaxda luqadaha, qaybta kaalmada adeegyrleeda, param worrell laWilmapaulino jiménez. So [facility] [phone].    ATENCIÓN: Si habla español, tiene a mcdonald disposición servicios gratuitos de asistencia lingüística. Llame al [phone].    We comply with applicable federal civil rights laws and [place] laws. We do not discriminate on the basis of race, color, national origin, age, disability, sex, sexual orientation, or gender identity.            Thank you!     Thank you for choosing [facility]  for your care. Our goal is always to provide you with excellent care. Hearing back from our patients is one way we can continue to improve our services. Please take a few minutes to complete the written survey that you may receive in the mail after your visit with us. Thank you!             Your Updated Medication List - Protect others around you: Learn how to safely use, store and throw away your medicines at www.disposemymeds.org.          This list is accurate as of 10/5/18 11:59 PM.  Always use your most recent med list.                   Brand Name Dispense Instructions for use Diagnosis    ABILIFY PO      Take 15 mg by mouth daily    Hip pain, left, Malignant neoplasm of sigmoid colon (H), Gait disturbance, Other iron deficiency anemia, Acute midline low back pain with right-sided sciatica, Leg weakness, bilateral, Vitamin D deficiency       ALBUTEROL IN      Inhale 2 puffs into the lungs every 4 hours as needed        AMLODIPINE BESYLATE PO      Take 2.5 mg by mouth 2 times daily        AMOXICILLIN PO      Take 500 mg by mouth as needed (Take 1 hour prior to dental appt)        ARTIFICIAL TEARS OP           aspirin-acetaminophen-caffeine 250-250-65 MG per tablet     EXCEDRIN MIGRAINE     Take 1 tablet by mouth every 6 hours as needed for headaches    Hip pain, left, Malignant neoplasm of sigmoid colon (H), Gait disturbance, Other iron deficiency anemia, Acute midline low back pain with right-sided sciatica, Leg weakness, bilateral, Vitamin D deficiency       cetirizine 10 MG tablet    zyrTEC     Take 10 mg by mouth 2 times daily        Dalfampridine 10 MG Tb12    AMPYRA    60 tablet    Take 1 tablet (10 mg) by mouth 2 times daily    Gait disturbance       DICLOFENAC SODIUM PO      Take 50 mg by mouth 2 times daily        docusate sodium 100 MG capsule    COLACE     Take 100 mg by mouth daily        DONEPEZIL HCL PO      Take 10 mg by mouth At Bedtime        LAMICTAL PO      Take 150 mg by mouth in the morning and 100 mg by mouth in the evening    Gait disturbance, Memory loss, Malignant neoplasm of sigmoid colon (H)       Levothyroxine Sodium 75 MCG Caps      Take 75 mcg by mouth every morning    Gait disturbance, Memory loss, Malignant neoplasm of sigmoid colon (H)       MULTIVITAMIN PO      Take by mouth daily    Gait disturbance, Memory loss, Malignant neoplasm of sigmoid colon (H)       ondansetron 4 MG ODT tab    ZOFRAN-ODT    40 tablet    Take 1 tablet (4 mg) by mouth every 6 hours as needed for nausea    S/P ventriculoperitoneal shunt       order for DME     1 each    Abdominal Binder - Medium    Ventral hernia without obstruction or gangrene       PROZAC PO      Take 40 mg by mouth every morning    Gait disturbance, Memory loss, Malignant neoplasm of sigmoid colon (H)       RANITIDINE HCL PO      Take 300 mg by mouth 2 times daily        SPIRIVA HANDIHALER IN      Inhale 1 puff into the lungs as needed        TYLENOL PO      Take 500 mg by mouth every 8 hours as needed for mild pain or fever    Gait disturbance, Memory loss, Malignant neoplasm of sigmoid colon (H)       WELLBUTRIN XL PO      Take 150 mg by mouth every morning    Hip pain, left, Malignant neoplasm of  sigmoid colon (H), Gait disturbance, Other iron deficiency anemia, Acute midline low back pain with right-sided sciatica, Leg weakness, bilateral, Vitamin D deficiency

## 2018-10-05 NOTE — LETTER
10/5/2018       RE: Amparo Sharma  [address]  [place] [facility] [address]     Dear Colleague,    Thank you for referring your patient, Amparo Sharma, to the [facility] NEUROLOGY at [facility]. Please see a copy of my visit note below.    Service Date: 10/05/2018      REASON FOR VISIT:  Ms. Amparo Sharma, a right-handed lady, returns for a followup visit having last been seen on 08/17/2018.  Prior to that, she saw me for the first time on 07/06/2018.  Amparo is unaccompanied by any relative at this time.  Nevertheless, her history is reliable and consistent with previous records.      HISTORY OF PRESENT ILLNESS:  The following is a brief review of her presenting history as noted on 07/06/2018:     1.  Ms. Sharma started to lose her balance in 2010 when she was living in [place].   2.  On 1 occasion in 02/2013, she was found in her residence on the floor and because of prolonged inactivity she suffered rhabdomyolysis.  She was down at least 8 hours.   3.  The patient hospitalized in [place], [place] and subsequently went through rehabilitation for 3 months.   4.  The patient returned to alcohol dependency whereas she had been dry for a number of years, having gone through [place] in mid 1970s.     5.  In 2015, she returned to [place] and was followed by Dr. Parvez Francis and subsequently referred to the Department of Neurology at [facility].     6.  Amparo was seen by Dr. Alex Gruber and at that time did mention some difficulty with memory.  He noted that she had overdosed on drugs and had tried to commit suicide a number of times between 2005 and 2013.  She had severe dyspraxia of gait.   7.  The patient was treated with Sinemet, but I am not sure when this was started and the patient states that it did not help her at all.     8.  She was placed on amantadine later and this gave her some relief but after a while she developed  "confusion when given inappropriate dose.     9.  Dr. Gruber saw the patient again in 10/2016 at which time he ordered an MRI of the head and this showed moderate ventricular dilatation out of proportion to the degree of supratentorial cortical volume loss.  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   10.  The patient was seen by Dr. Rc Segura of the Neurosurgery Department on 01/20/2017.  He opined that her clinical picture was consistent with normal pressure hydrocephalus.   11.  The patient was admitted for lumbar drain placement and evaluated by Physical Therapy and Neuropsychology.  Her gait and strength improved after the lumbar drainage but her cognition did not seem to improve.     12.  On 02/05/2017 she underwent placement of ventriculoperitoneal shunt and 10 days later was doing well and was clinically improved.     13.  CT of the head without contrast on 05/03/2017, the impression was, \"Right frontal approach ventriculostomy catheter is unchanged in position and ventricles are unchanged in size.\"     14.  Progress notes in 07/2017 indicated that she was having falls and had to end up in a skilled nursing facility.  She had also fallen twice there.     15.  On 08/24/2017, she was seen again by Neurosurgery and complained of lightheadedness when she bent down.   16.  The patient was seen by Dr. Segura on 01/03/2018 and he noted that she was doing better.  She continued to use a walker and had not been falling.     17.  Her primary physician, Dr. Parvez Francis, saw Ms. Sharma on 01/31/2018 because of a fall the night before.  Just after she had arisen from her bed she became lightheaded and fell to the floor.  Dr. Wilkerson opined that the patient had orthostatic hypotension, but all in all, she was improving.   18.  The patient was referred back to Dr. Gruber who saw her on 03/16/2018 at which time she complained of increasing gait disturbance and memory loss.  Unfortunately, she had suffered a " number of falls but sustained no new head trauma.  She was checked for orthostatic hypotension and this was not found.     19.  On 05/04/2018, Dr. Gruber saw the patient again where she complained of worsening balance and more memory loss.  She was totally walker-dependent.  The people at her residence indicated that she was not safe to walk alone and so she always had to have an attendant when walking.  Dr. Gruber referred her to the [facility] where she saw this examiner.   20.  On 06/04/2018, the patient seen again in the Neurosurgery Clinic and Dr. Dozier noted that she was having worsened gait.  A nuclear medicine study done to evaluate the ventriculoperitoneal shunt was unremarkable and the shunt was working well.   21.  Dr. Gruber again saw her prior to her ataxic 06/06/2018 and noted that her gait was worsening.  She had severe gait instability and a tendency to fall backward.  She needed assistance to get onto the examination table.   22.  When seen on 07/06/2018 by me in the [facility], she was walking fairly well with a walker.  She had stopped taking the amantadine and she indicates that her improvement resulted when she stopped the drug.      At the time of her visit with this examiner on 07/06/2018, the impression was multifactorial gait disturbance, imbalance and history of normal pressure hydrocephalus along with cognitive decline.  The patient was continued on physical therapy and started on dalfampridine (Ampyra) 10 mg 8:00 a.m. and 10 mg 8:00 p.m.      When seen on 08/17/2018 in the [facility], she reported significant improvement in her gait after starting the Ampyra.  She was more stable and felt stronger on her feet.  Unfortunately, her right knee was bothering her to a major extent.  The assessment was that her gait was indeed improved.  Also, there was discussion about the possibility that there were other conditions contributing to her mental status dysfunction.  It was  "recommended that she be continued with physical therapy 2 days a week and a note was written that the patient should have an orthotic brace for her right knee.      At the time of this visit, the patient states, \"My gait is much better.\"  Further, she indicates that she has had no falls and feels much more stable with her walker.  The physical therapist has been doing muscle building but I had a telephone conversation with her therapist who states that her right knee pain would be helped if she had a brace.  Apparently, my previous order for this was not received by the [employer].      Amparo further states, \"My balance is a big problem right now.\"  Further, she states that merely doing muscle building will not help her balance and she would like to return to the [facility], which she attended previously, although I have no record of that.      I did a brief review of systems.  The patient states that she has had some weight gain, but states that she is really quite inactive and does not burn off calories.  She still has some GI complaints with some heartburn.  With regard to musculoskeletal condition, she has joint pain, especially of the right knee and has some weakness of her legs.  On genitourinary history, she still has some urinary leakage and frequency and has to get up at night to urinate.  Psychologically, she has been doing well, although feels anxious.  Bottom line, she states, \"I want to get off having to be accompanied by an escort in the building where I live and I want to be more independent\".        CURRENT MEDICATIONS:  The same as at the time of her last visit with the exception of Ampyra 10 mg 8 a.m. and 8:00 p.m.  Otherwise, she continues on:   1.  Acetaminophen p.r.n.   2.  Albuterol p.r.n.   3.  Amlodipine.     4.  Amoxicillin p.r.n.   5.  Abilify.   6.  Excedrin Migraine p.r.n.   7.  Bupropion (Wellbutrin).     8.  Cetirizine.     9.  Diclofenac sodium.   "   10.  Colace.     11.  Donepezil (Aricept).     12.  Fluoxetine.     13.  Lamotrigine.     14.  Levothyroxine.   15.  Multivitamins with minerals.     16.  Ondansetron p.r.n.   17.  Ranitidine.     18.  Spiriva inhaler p.r.n.      PHYSICAL EXAMINATION:  Amparo is doing very well at the time of this visit with respect to her cognition.  I did not do a formal mental status examination, but she was accurate with respect to person, time and place and also was showing evidence of good attention and concentration.   VITAL SIGNS:  Blood pressure 118/73.  Pulse 74.  Weight 174 pounds.  Height 5 feet 1 inch.  BMI 32.95.      A PHQ-9 (Patient Health Questionnaire) was administered.  She had some problem with falling asleep and was feeling a lack of energy.  Otherwise, she had no significant problems.  Prior to doing the neurologic examination I tried to evaluate her right knee which is somewhat swollen and quite tender over the medial and lateral aspects.  It is even painful when the percussion hammer strikes the infrapatellar tendon.      Neurologic examination otherwise reveals normal cranial nerves with the exception of diminished convergence.  Reflexes are 2+ in the upper extremities except for an absent right brachioradialis.  Knee jerks are 1+ above while ankle jerks are absent.  There is no upgoing toe to plantar stimulation.  She does have weakness of the left anterior deltoid.  All in all, her coordination is good in the upper extremities and somewhat difficult to evaluate in the lower extremities, especially on the right side because of her knee pain.  She walks with a walker, doing quite well on her own and transfers from chair to walker without difficulty.  She traverses 25 feet in 9 seconds, which is 2 seconds slower than the last time, but she really appears quite stable with the use of the walker.      IMPRESSION:   1.  Gait disturbance associated with normal pressure hydrocephalus by history, status post shunt  surgery.   2.  Gait improvement with Ampyra.     3.  Imbalance, which she considers to be her major problem.   4.  Knee joint problems, especially on the right.   5.  There certainly appears to be some other central nervous system problem besides the normal pressure hydrocephalus.  This could be vascular with small vessel disease.      PLAN:   1.  Once again, a right knee brace has been ordered and this examiner will call weekly [facility] directly at [phone].   2.  The patient is once again referred for balance training to [facility], phone number [phone], fax number [phone].   3.  Decrease frequency of physical therapy to 1 time a week.   4.  I will direct physical therapy to determine when Amparo can walk in her residence without escort.   5.  Followup visit on 2018.      Once again, I appreciate the referral of this patient by Dr. Gruber and also the initial referral to Neurology by Dr. Parvez Francis.           D: 10/10/2018   T: 10/11/2018   MT: SHONA      Name:     AMPARO COPPOLA   MRN:      [id number]        Account:      [id number]   :      1940           Service Date: 10/05/2018      Document: [id number]       Again, thank you for allowing me to participate in the care of your patient.      Sincerely,    Lan Casillas MD    cc:   Parvez Francis MD   [facility]    [address] [place] [address]

## 2018-10-16 ENCOUNTER — TELEPHONE (OUTPATIENT)
Dept: NEUROLOGY | Facility: CLINIC | Age: 78
End: 2018-10-16

## 2018-10-16 ENCOUNTER — APPOINTMENT (OUTPATIENT)
Dept: CT IMAGING | Facility: CLINIC | Age: 78
End: 2018-10-16
Attending: EMERGENCY MEDICINE
Payer: MEDICARE

## 2018-10-16 ENCOUNTER — HOSPITAL ENCOUNTER (OUTPATIENT)
Facility: CLINIC | Age: 78
Setting detail: OBSERVATION
Discharge: HOME | End: 2018-10-17
Attending: EMERGENCY MEDICINE | Admitting: EMERGENCY MEDICINE
Payer: MEDICARE

## 2018-10-16 ENCOUNTER — APPOINTMENT (OUTPATIENT)
Dept: GENERAL RADIOLOGY | Facility: CLINIC | Age: 78
End: 2018-10-16
Attending: EMERGENCY MEDICINE
Payer: MEDICARE

## 2018-10-16 DIAGNOSIS — R26.89 IMBALANCE: Primary | ICD-10-CM

## 2018-10-16 DIAGNOSIS — R42 DIZZINESS: ICD-10-CM

## 2018-10-16 DIAGNOSIS — Z98.2 S/P VENTRICULOPERITONEAL SHUNT: ICD-10-CM

## 2018-10-16 DIAGNOSIS — G91.2 NPH (NORMAL PRESSURE HYDROCEPHALUS) (H): ICD-10-CM

## 2018-10-16 DIAGNOSIS — R26.2 INABILITY TO WALK: ICD-10-CM

## 2018-10-16 DIAGNOSIS — I10 BENIGN ESSENTIAL HYPERTENSION: ICD-10-CM

## 2018-10-16 DIAGNOSIS — Z91.81 HISTORY OF FALLING: ICD-10-CM

## 2018-10-16 DIAGNOSIS — M62.81 MUSCLE WEAKNESS (GENERALIZED): ICD-10-CM

## 2018-10-16 DIAGNOSIS — R26.9 GAIT DISTURBANCE: ICD-10-CM

## 2018-10-16 LAB
ALBUMIN SERPL-MCNC: 3.8 G/DL (ref 3.4–5)
ALP SERPL-CCNC: 115 U/L (ref 40–150)
ALT SERPL W P-5'-P-CCNC: 43 U/L (ref 0–50)
ANION GAP SERPL CALCULATED.3IONS-SCNC: 8 MMOL/L (ref 3–14)
APTT PPP: 28 SEC (ref 22–37)
AST SERPL W P-5'-P-CCNC: 30 U/L (ref 0–45)
BASOPHILS # BLD AUTO: 0.1 10E9/L (ref 0–0.2)
BASOPHILS NFR BLD AUTO: 0.8 %
BILIRUB SERPL-MCNC: 0.4 MG/DL (ref 0.2–1.3)
BUN SERPL-MCNC: 24 MG/DL (ref 7–30)
CALCIUM SERPL-MCNC: 9.9 MG/DL (ref 8.5–10.1)
CHLORIDE SERPL-SCNC: 103 MMOL/L (ref 94–109)
CO2 SERPL-SCNC: 24 MMOL/L (ref 20–32)
CREAT SERPL-MCNC: 0.91 MG/DL (ref 0.52–1.04)
DIFFERENTIAL METHOD BLD: ABNORMAL
EOSINOPHIL # BLD AUTO: 0.3 10E9/L (ref 0–0.7)
EOSINOPHIL NFR BLD AUTO: 3.6 %
ERYTHROCYTE [DISTWIDTH] IN BLOOD BY AUTOMATED COUNT: 14.6 % (ref 10–15)
GFR SERPL CREATININE-BSD FRML MDRD: 60 ML/MIN/1.7M2
GLUCOSE SERPL-MCNC: 92 MG/DL (ref 70–99)
HCT VFR BLD AUTO: 42 % (ref 35–47)
HGB BLD-MCNC: 13.2 G/DL (ref 11.7–15.7)
IMM GRANULOCYTES # BLD: 0 10E9/L (ref 0–0.4)
IMM GRANULOCYTES NFR BLD: 0.1 %
INR PPP: 1.05 (ref 0.86–1.14)
INTERPRETATION ECG - MUSE: NORMAL
LYMPHOCYTES # BLD AUTO: 1.8 10E9/L (ref 0.8–5.3)
LYMPHOCYTES NFR BLD AUTO: 21.9 %
MAGNESIUM SERPL-MCNC: 2 MG/DL (ref 1.6–2.3)
MCH RBC QN AUTO: 28.8 PG (ref 26.5–33)
MCHC RBC AUTO-ENTMCNC: 31.4 G/DL (ref 31.5–36.5)
MCV RBC AUTO: 92 FL (ref 78–100)
MONOCYTES # BLD AUTO: 0.8 10E9/L (ref 0–1.3)
MONOCYTES NFR BLD AUTO: 8.9 %
NEUTROPHILS # BLD AUTO: 5.4 10E9/L (ref 1.6–8.3)
NEUTROPHILS NFR BLD AUTO: 64.7 %
NRBC # BLD AUTO: 0 10*3/UL
NRBC BLD AUTO-RTO: 0 /100
PLATELET # BLD AUTO: 312 10E9/L (ref 150–450)
POTASSIUM SERPL-SCNC: 4.6 MMOL/L (ref 3.4–5.3)
PROT SERPL-MCNC: 7.6 G/DL (ref 6.8–8.8)
RBC # BLD AUTO: 4.59 10E12/L (ref 3.8–5.2)
SODIUM SERPL-SCNC: 136 MMOL/L (ref 133–144)
TROPONIN I SERPL-MCNC: <0.015 UG/L (ref 0–0.04)
TSH SERPL DL<=0.005 MIU/L-ACNC: 2.39 MU/L (ref 0.4–4)
WBC # BLD AUTO: 8.4 10E9/L (ref 4–11)

## 2018-10-16 PROCEDURE — 70450 CT HEAD/BRAIN W/O DYE: CPT

## 2018-10-16 PROCEDURE — 99285 EMERGENCY DEPT VISIT HI MDM: CPT | Mod: 25 | Performed by: EMERGENCY MEDICINE

## 2018-10-16 PROCEDURE — 74021 RADEX ABDOMEN 3+ VIEWS: CPT

## 2018-10-16 PROCEDURE — 25000132 ZZH RX MED GY IP 250 OP 250 PS 637: Mod: GY | Performed by: EMERGENCY MEDICINE

## 2018-10-16 PROCEDURE — 99285 EMERGENCY DEPT VISIT HI MDM: CPT | Mod: Z6 | Performed by: EMERGENCY MEDICINE

## 2018-10-16 PROCEDURE — 83735 ASSAY OF MAGNESIUM: CPT | Performed by: EMERGENCY MEDICINE

## 2018-10-16 PROCEDURE — 93005 ELECTROCARDIOGRAM TRACING: CPT | Performed by: EMERGENCY MEDICINE

## 2018-10-16 PROCEDURE — 85610 PROTHROMBIN TIME: CPT | Performed by: EMERGENCY MEDICINE

## 2018-10-16 PROCEDURE — 85025 COMPLETE CBC W/AUTO DIFF WBC: CPT | Performed by: EMERGENCY MEDICINE

## 2018-10-16 PROCEDURE — 85730 THROMBOPLASTIN TIME PARTIAL: CPT | Performed by: EMERGENCY MEDICINE

## 2018-10-16 PROCEDURE — 84443 ASSAY THYROID STIM HORMONE: CPT | Performed by: EMERGENCY MEDICINE

## 2018-10-16 PROCEDURE — 80053 COMPREHEN METABOLIC PANEL: CPT | Performed by: EMERGENCY MEDICINE

## 2018-10-16 PROCEDURE — 84484 ASSAY OF TROPONIN QUANT: CPT | Performed by: EMERGENCY MEDICINE

## 2018-10-16 PROCEDURE — A9270 NON-COVERED ITEM OR SERVICE: HCPCS | Mod: GY | Performed by: EMERGENCY MEDICINE

## 2018-10-16 RX ORDER — ACETAMINOPHEN 325 MG/1
650 TABLET ORAL ONCE
Status: COMPLETED | OUTPATIENT
Start: 2018-10-16 | End: 2018-10-16

## 2018-10-16 RX ORDER — CLONIDINE HYDROCHLORIDE 0.1 MG/1
0.1 TABLET ORAL ONCE
Status: COMPLETED | OUTPATIENT
Start: 2018-10-16 | End: 2018-10-16

## 2018-10-16 RX ADMIN — ACETAMINOPHEN 650 MG: 325 TABLET, FILM COATED ORAL at 23:24

## 2018-10-16 RX ADMIN — CLONIDINE HYDROCHLORIDE 0.1 MG: 0.1 TABLET ORAL at 20:24

## 2018-10-16 ASSESSMENT — ENCOUNTER SYMPTOMS
TREMORS: 1
DIAPHORESIS: 1
DIZZINESS: 1

## 2018-10-16 NOTE — IP AVS SNAPSHOT
MRN:[id number]                      After Visit Summary   10/16/2018    Amparo Sharma    MRN: [id number]           Thank you!     Thank you for choosing [facility] for your care. Our goal is always to provide you with excellent care. Hearing back from our patients is one way we can continue to improve our services. Please take a few minutes to complete the written survey that you may receive in the mail after you visit with us. Thank you!        Patient Information     Date Of Birth          1940        About your hospital stay     You were admitted on:  October 17, 2018 You last received care in the:  Unit [facility] Observation [facility]    You were discharged on:  October 17, 2018        Reason for your hospital stay       Falls, gait problems.  Neurology consulted, shunt images and head CT done, which were negative for acute abnormalities.  Labs checking for heart damage were negative.  Neurology recommended continuing the ampyra, stopping donepezil.  Resting echo negative for acute abnormalities.  PT consulted and recommended discharge back to [facility].  UA negative for infection.                  Who to Call     For medical emergencies, please call 911.  For non-urgent questions about your medical care, please call your primary care provider or clinic, [phone]          Attending Provider     Provider Specialty    José Cantrell MD Emergency Medicine    Abhi Marsh MD Emergency Medicine    Gloria Contreras MD Emergency Medicine       Primary Care Provider Office Phone # Fax #    Parvez Francis [phone]       When to contact your care team       Return to the ER if you have new or worsening chest pain, shortness of breath, jaw or arm pain, or fainting, falls, pain, injury, or unsafe at assisted living.                  After Care Instructions     Activity       Your activity upon discharge: activity as tolerated, use walker if possible when  ambulating to get stronger            Diet       Follow this diet upon discharge: Orders Placed This Encounter      Regular Diet Adult                  Follow-up Appointments     Adult [facility] Follow-up and recommended labs and tests       Follow up with primary care provider, Parvez Francis, within 7 days for hospital follow- up.      Appointments on [place] and/or [facility] (with [facility] or [facility] provider or service). Call [phone] if you haven't heard regarding these appointments within 7 days of discharge.                  Additional Services     Home Care OT Referral for Hospital Discharge       [facility]    OT to eval and treat    Your provider has ordered home care - occupational therapy. If you have not been contacted within 2 days of your discharge please call the department phone number listed on the top of this document.            Home Care PT Referral for Hospital Discharge       [facility]    PT to eval and treat    Your provider has ordered home care - physical therapy. If you have not been contacted within 2 days of your discharge please call the department phone number listed on the top of this document.                  Pending Results     No orders found for last 3 day(s).            Statement of Approval     Ordered          10/17/18 4082  I have reviewed and agree with all the recommendations and orders detailed in this document.  EFFECTIVE NOW     Approved and electronically signed by:  Yadi Hammond APRN CNP             Admission Information     Date & Time Provider Department Dept. Phone    10/16/2018 Gloria Contreras MD Unit [facility]D Observation [facility] [place] [phone]      Your Vitals Were     Blood Pressure Temperature Respirations Weight Pulse Oximetry BMI (Body Mass Index)    142/73 97.7  F (36.5  C) (Oral) 12 80 kg (176 lb 5.9 oz) 94% 33.32 kg/m2      MyChart Information     [employer] lets you send messages to your doctor, view your test results, renew  "your prescriptions, schedule appointments and more. To sign up, go to [email or web address] . Click on \"Log in\" on the left side of the screen, which will take you to the Welcome page. Then click on \"Sign up Now\" on the right side of the page.     You will be asked to enter the access code listed below, as well as some personal information. Please follow the directions to create your username and password.     Your access code is: [email or web address]  Expires: 2018 12:39 PM     Your access code will  in 90 days. If you need help or a new code, please call your [place] clinic or [phone].        Care EveryWhere ID     This is your Care EveryWhere ID. This could be used by other organizations to access your [place] medical records  [id number]        Equal Access to Services     FAWN WALSH : Sheng Ann, narda rehman, dom carrillo, param sanchez . So [facility] [phone].    ATENCIÓN: Si habla español, tiene a mcdonald disposición servicios gratuitos de asistencia lingüística. Llame al [phone].    We comply with applicable federal civil rights laws and [place] laws. We do not discriminate on the basis of race, color, national origin, age, disability, sex, sexual orientation, or gender identity.               Review of your medicines      CONTINUE these medicines which have NOT CHANGED        Dose / Directions    ABILIFY PO   Used for:  Hip pain, left, Malignant neoplasm of sigmoid colon (H), Gait disturbance, Other iron deficiency anemia, Acute midline low back pain with right-sided sciatica, Leg weakness, bilateral, Vitamin D deficiency        Dose:  15 mg   Take 15 mg by mouth daily   Refills:  0       ALBUTEROL IN        Dose:  2 puff   Inhale 2 puffs into the lungs every 4 hours as needed   Refills:  0       AMLODIPINE BESYLATE PO        Dose:  2.5 mg   Take 2.5 mg by mouth 2 times daily   Refills:  0       AMOXICILLIN PO        Dose:  500 " mg   Take 500 mg by mouth as needed (Take 1 hour prior to dental appt)   Refills:  0       ARTIFICIAL TEARS OP        Refills:  0       aspirin-acetaminophen-caffeine 250-250-65 MG per tablet   Commonly known as:  EXCEDRIN MIGRAINE   Used for:  Hip pain, left, Malignant neoplasm of sigmoid colon (H), Gait disturbance, Other iron deficiency anemia, Acute midline low back pain with right-sided sciatica, Leg weakness, bilateral, Vitamin D deficiency        Dose:  1 tablet   Take 1 tablet by mouth every 6 hours as needed for headaches   Refills:  0       cetirizine 10 MG tablet   Commonly known as:  zyrTEC        Dose:  10 mg   Take 10 mg by mouth 2 times daily   Refills:  0       Dalfampridine 10 MG Tb12   Commonly known as:  AMPYRA   Used for:  Gait disturbance        Dose:  10 mg   Take 1 tablet (10 mg) by mouth 2 times daily   Quantity:  60 tablet   Refills:  prn       DICLOFENAC SODIUM PO        Dose:  50 mg   Take 50 mg by mouth 2 times daily   Refills:  0       docusate sodium 100 MG capsule   Commonly known as:  COLACE        Dose:  100 mg   Take 100 mg by mouth daily   Refills:  0       LAMICTAL PO   Used for:  Gait disturbance, Memory loss, Malignant neoplasm of sigmoid colon (H)        Take 150 mg by mouth in the morning and 100 mg by mouth in the evening   Refills:  0       Levothyroxine Sodium 75 MCG Caps   Used for:  Gait disturbance, Memory loss, Malignant neoplasm of sigmoid colon (H)        Dose:  75 mcg   Take 75 mcg by mouth every morning   Refills:  0       MULTIVITAMIN PO   Used for:  Gait disturbance, Memory loss, Malignant neoplasm of sigmoid colon (H)        Take by mouth daily   Refills:  0       ondansetron 4 MG ODT tab   Commonly known as:  ZOFRAN-ODT   Used for:  S/P ventriculoperitoneal shunt        Dose:  4 mg   Take 1 tablet (4 mg) by mouth every 6 hours as needed for nausea   Quantity:  40 tablet   Refills:  0       order for DME   Used for:  Ventral hernia without obstruction or gangrene         Abdominal Binder - Medium   Quantity:  1 each   Refills:  3       PROZAC PO   Used for:  Gait disturbance, Memory loss, Malignant neoplasm of sigmoid colon (H)        Dose:  40 mg   Take 40 mg by mouth every morning   Refills:  0       RANITIDINE HCL PO        Dose:  300 mg   Take 300 mg by mouth 2 times daily   Refills:  0       SPIRIVA HANDIHALER IN        Dose:  1 puff   Inhale 1 puff into the lungs as needed   Refills:  0       TYLENOL PO   Used for:  Gait disturbance, Memory loss, Malignant neoplasm of sigmoid colon (H)        Dose:  500 mg   Take 500 mg by mouth every 8 hours as needed for mild pain or fever   Refills:  0       WELLBUTRIN XL PO   Used for:  Hip pain, left, Malignant neoplasm of sigmoid colon (H), Gait disturbance, Other iron deficiency anemia, Acute midline low back pain with right-sided sciatica, Leg weakness, bilateral, Vitamin D deficiency        Dose:  150 mg   Take 150 mg by mouth every morning   Refills:  0         STOP taking     DONEPEZIL HCL PO                    Protect others around you: Learn how to safely use, store and throw away your medicines at www.disposemymeds.org.             Medication List: This is a list of all your medications and when to take them. Check marks below indicate your daily home schedule. Keep this list as a reference.      Medications           Morning Afternoon Evening Bedtime As Needed    ABILIFY PO   Take 15 mg by mouth daily   Last time this was given:  15 mg on 10/17/2018  8:55 AM                                ALBUTEROL IN   Inhale 2 puffs into the lungs every 4 hours as needed                                AMLODIPINE BESYLATE PO   Take 2.5 mg by mouth 2 times daily   Last time this was given:  2.5 mg on 10/17/2018  8:55 AM                                AMOXICILLIN PO   Take 500 mg by mouth as needed (Take 1 hour prior to dental appt)                                ARTIFICIAL TEARS OP                                 aspirin-acetaminophen-caffeine 250-250-65 MG per tablet   Commonly known as:  EXCEDRIN MIGRAINE   Take 1 tablet by mouth every 6 hours as needed for headaches                                cetirizine 10 MG tablet   Commonly known as:  zyrTEC   Take 10 mg by mouth 2 times daily                                Dalfampridine 10 MG Tb12   Commonly known as:  AMPYRA   Take 1 tablet (10 mg) by mouth 2 times daily                                DICLOFENAC SODIUM PO   Take 50 mg by mouth 2 times daily                                docusate sodium 100 MG capsule   Commonly known as:  COLACE   Take 100 mg by mouth daily   Last time this was given:  100 mg on 10/17/2018  8:51 AM                                LAMICTAL PO   Take 150 mg by mouth in the morning and 100 mg by mouth in the evening   Last time this was given:  150 mg on 10/17/2018  8:55 AM                                Levothyroxine Sodium 75 MCG Caps   Take 75 mcg by mouth every morning                                MULTIVITAMIN PO   Take by mouth daily                                ondansetron 4 MG ODT tab   Commonly known as:  ZOFRAN-ODT   Take 1 tablet (4 mg) by mouth every 6 hours as needed for nausea                                order for DME   Abdominal Binder - Medium                                PROZAC PO   Take 40 mg by mouth every morning   Last time this was given:  40 mg on 10/17/2018  8:52 AM                                RANITIDINE HCL PO   Take 300 mg by mouth 2 times daily   Last time this was given:  300 mg on 10/17/2018  8:55 AM                                SPIRIVA HANDIHALER IN   Inhale 1 puff into the lungs as needed                                TYLENOL PO   Take 500 mg by mouth every 8 hours as needed for mild pain or fever   Last time this was given:  650 mg on 10/16/2018 11:24 PM                                WELLBUTRIN XL PO   Take 150 mg by mouth every morning   Last time this was given:  150 mg on 10/17/2018  8:55 AM

## 2018-10-16 NOTE — IP AVS SNAPSHOT
[address] Observ[address] [address]    Phone:  [phone]    Fax:  [phone]                                       After Visit Summary   10/16/2018    Amparo Sharma    MRN: [id number]           After Visit Summary Signature Page     I have received my discharge instructions, and my questions have been answered. I have discussed any challenges I see with this plan with the nurse or doctor.    ..........................................................................................................................................  Patient/Patient Representative Signature      ..........................................................................................................................................  Patient Representative Print Name and Relationship to Patient    ..................................................               ................................................  Date                                   Time    ..........................................................................................................................................  Reviewed by Signature/Title    ...................................................              ..............................................  Date                                               Time          22EPIC Rev 08/18

## 2018-10-17 ENCOUNTER — APPOINTMENT (OUTPATIENT)
Dept: PHYSICAL THERAPY | Facility: CLINIC | Age: 78
End: 2018-10-17
Attending: PHYSICIAN ASSISTANT
Payer: MEDICARE

## 2018-10-17 ENCOUNTER — APPOINTMENT (OUTPATIENT)
Dept: CARDIOLOGY | Facility: CLINIC | Age: 78
End: 2018-10-17
Attending: PHYSICIAN ASSISTANT
Payer: MEDICARE

## 2018-10-17 VITALS
SYSTOLIC BLOOD PRESSURE: 142 MMHG | BODY MASS INDEX: 33.32 KG/M2 | RESPIRATION RATE: 12 BRPM | DIASTOLIC BLOOD PRESSURE: 73 MMHG | WEIGHT: 176.37 LBS | OXYGEN SATURATION: 94 % | TEMPERATURE: 97.7 F

## 2018-10-17 PROBLEM — R42 DIZZINESS: Status: ACTIVE | Noted: 2018-10-17

## 2018-10-17 LAB
ALBUMIN UR-MCNC: NEGATIVE MG/DL
APPEARANCE UR: CLEAR
BILIRUB UR QL STRIP: NEGATIVE
COLOR UR AUTO: ABNORMAL
GLUCOSE UR STRIP-MCNC: NEGATIVE MG/DL
HGB UR QL STRIP: NEGATIVE
KETONES UR STRIP-MCNC: NEGATIVE MG/DL
LEUKOCYTE ESTERASE UR QL STRIP: NEGATIVE
MUCOUS THREADS #/AREA URNS LPF: PRESENT /LPF
NITRATE UR QL: NEGATIVE
PH UR STRIP: 5.5 PH (ref 5–7)
RBC #/AREA URNS AUTO: 1 /HPF (ref 0–2)
SOURCE: ABNORMAL
SP GR UR STRIP: 1.01 (ref 1–1.03)
SQUAMOUS #/AREA URNS AUTO: 1 /HPF (ref 0–1)
TRANS CELLS #/AREA URNS HPF: <1 /HPF (ref 0–1)
TROPONIN I SERPL-MCNC: <0.015 UG/L (ref 0–0.04)
TROPONIN I SERPL-MCNC: <0.015 UG/L (ref 0–0.04)
UROBILINOGEN UR STRIP-MCNC: NORMAL MG/DL (ref 0–2)
VIT B12 SERPL-MCNC: 882 PG/ML (ref 193–986)
WBC #/AREA URNS AUTO: <1 /HPF (ref 0–5)

## 2018-10-17 PROCEDURE — 40000264 ECHO COMPLETE WITH OPTISON

## 2018-10-17 PROCEDURE — 97116 GAIT TRAINING THERAPY: CPT | Mod: GP,XU

## 2018-10-17 PROCEDURE — 84484 ASSAY OF TROPONIN QUANT: CPT | Performed by: PHYSICIAN ASSISTANT

## 2018-10-17 PROCEDURE — 25500064 ZZH RX 255 OP 636: Performed by: INTERNAL MEDICINE

## 2018-10-17 PROCEDURE — 97162 PT EVAL MOD COMPLEX 30 MIN: CPT | Mod: GP

## 2018-10-17 PROCEDURE — 82607 VITAMIN B-12: CPT | Performed by: PHYSICIAN ASSISTANT

## 2018-10-17 PROCEDURE — 81001 URINALYSIS AUTO W/SCOPE: CPT | Performed by: NURSE PRACTITIONER

## 2018-10-17 PROCEDURE — G0378 HOSPITAL OBSERVATION PER HR: HCPCS

## 2018-10-17 PROCEDURE — 36415 COLL VENOUS BLD VENIPUNCTURE: CPT | Performed by: PHYSICIAN ASSISTANT

## 2018-10-17 PROCEDURE — 84484 ASSAY OF TROPONIN QUANT: CPT | Performed by: EMERGENCY MEDICINE

## 2018-10-17 PROCEDURE — 25000128 H RX IP 250 OP 636: Performed by: PHYSICIAN ASSISTANT

## 2018-10-17 PROCEDURE — 25000132 ZZH RX MED GY IP 250 OP 250 PS 637: Mod: GY | Performed by: PHYSICIAN ASSISTANT

## 2018-10-17 PROCEDURE — 99236 HOSP IP/OBS SAME DATE HI 85: CPT | Mod: Z6 | Performed by: PHYSICIAN ASSISTANT

## 2018-10-17 PROCEDURE — A9270 NON-COVERED ITEM OR SERVICE: HCPCS | Mod: GY | Performed by: PHYSICIAN ASSISTANT

## 2018-10-17 PROCEDURE — 96361 HYDRATE IV INFUSION ADD-ON: CPT

## 2018-10-17 PROCEDURE — 97530 THERAPEUTIC ACTIVITIES: CPT | Mod: GP

## 2018-10-17 PROCEDURE — 96360 HYDRATION IV INFUSION INIT: CPT | Mod: 59

## 2018-10-17 PROCEDURE — 40000193 ZZH STATISTIC PT WARD VISIT

## 2018-10-17 PROCEDURE — 93306 TTE W/DOPPLER COMPLETE: CPT | Mod: 26 | Performed by: INTERNAL MEDICINE

## 2018-10-17 RX ORDER — ONDANSETRON 2 MG/ML
4 INJECTION INTRAMUSCULAR; INTRAVENOUS EVERY 6 HOURS PRN
Status: DISCONTINUED | OUTPATIENT
Start: 2018-10-17 | End: 2018-10-17 | Stop reason: HOSPADM

## 2018-10-17 RX ORDER — AMLODIPINE BESYLATE 2.5 MG/1
2.5 TABLET ORAL 2 TIMES DAILY
Status: DISCONTINUED | OUTPATIENT
Start: 2018-10-17 | End: 2018-10-17 | Stop reason: HOSPADM

## 2018-10-17 RX ORDER — LEVOTHYROXINE SODIUM 75 UG/1
75 TABLET ORAL
Status: DISCONTINUED | OUTPATIENT
Start: 2018-10-17 | End: 2018-10-17 | Stop reason: HOSPADM

## 2018-10-17 RX ORDER — NALOXONE HYDROCHLORIDE 0.4 MG/ML
.1-.4 INJECTION, SOLUTION INTRAMUSCULAR; INTRAVENOUS; SUBCUTANEOUS
Status: DISCONTINUED | OUTPATIENT
Start: 2018-10-17 | End: 2018-10-17 | Stop reason: HOSPADM

## 2018-10-17 RX ORDER — BUPROPION HYDROCHLORIDE 150 MG/1
150 TABLET ORAL EVERY MORNING
Status: DISCONTINUED | OUTPATIENT
Start: 2018-10-17 | End: 2018-10-17 | Stop reason: HOSPADM

## 2018-10-17 RX ORDER — LAMOTRIGINE 150 MG/1
150 TABLET ORAL EVERY MORNING
Status: DISCONTINUED | OUTPATIENT
Start: 2018-10-17 | End: 2018-10-17 | Stop reason: HOSPADM

## 2018-10-17 RX ORDER — LAMOTRIGINE 100 MG/1
100 TABLET ORAL EVERY EVENING
Status: DISCONTINUED | OUTPATIENT
Start: 2018-10-17 | End: 2018-10-17 | Stop reason: HOSPADM

## 2018-10-17 RX ORDER — ARIPIPRAZOLE 15 MG/1
15 TABLET ORAL DAILY
Status: DISCONTINUED | OUTPATIENT
Start: 2018-10-17 | End: 2018-10-17 | Stop reason: HOSPADM

## 2018-10-17 RX ORDER — DALFAMPRIDINE 10 MG/1
10 TABLET, FILM COATED, EXTENDED RELEASE ORAL 2 TIMES DAILY
Status: DISCONTINUED | OUTPATIENT
Start: 2018-10-17 | End: 2018-10-17 | Stop reason: HOSPADM

## 2018-10-17 RX ORDER — DOCUSATE SODIUM 100 MG/1
100 CAPSULE, LIQUID FILLED ORAL DAILY
Status: DISCONTINUED | OUTPATIENT
Start: 2018-10-17 | End: 2018-10-17 | Stop reason: HOSPADM

## 2018-10-17 RX ORDER — SODIUM CHLORIDE 9 MG/ML
INJECTION, SOLUTION INTRAVENOUS CONTINUOUS
Status: DISCONTINUED | OUTPATIENT
Start: 2018-10-17 | End: 2018-10-17 | Stop reason: HOSPADM

## 2018-10-17 RX ORDER — ONDANSETRON 4 MG/1
4 TABLET, ORALLY DISINTEGRATING ORAL EVERY 6 HOURS PRN
Status: DISCONTINUED | OUTPATIENT
Start: 2018-10-17 | End: 2018-10-17 | Stop reason: HOSPADM

## 2018-10-17 RX ORDER — DONEPEZIL HYDROCHLORIDE 10 MG/1
10 TABLET, FILM COATED ORAL AT BEDTIME
Status: DISCONTINUED | OUTPATIENT
Start: 2018-10-17 | End: 2018-10-17

## 2018-10-17 RX ADMIN — LAMOTRIGINE 150 MG: 150 TABLET ORAL at 08:55

## 2018-10-17 RX ADMIN — FLUOXETINE 40 MG: 20 CAPSULE ORAL at 08:52

## 2018-10-17 RX ADMIN — DONEPEZIL HYDROCHLORIDE 10 MG: 10 TABLET, FILM COATED ORAL at 02:28

## 2018-10-17 RX ADMIN — LAMOTRIGINE 100 MG: 100 TABLET ORAL at 02:27

## 2018-10-17 RX ADMIN — AMLODIPINE BESYLATE 2.5 MG: 2.5 TABLET ORAL at 08:55

## 2018-10-17 RX ADMIN — BUPROPION HYDROCHLORIDE 150 MG: 150 TABLET, FILM COATED, EXTENDED RELEASE ORAL at 08:55

## 2018-10-17 RX ADMIN — DOCUSATE SODIUM 100 MG: 100 CAPSULE, LIQUID FILLED ORAL at 08:51

## 2018-10-17 RX ADMIN — SODIUM CHLORIDE: 9 INJECTION, SOLUTION INTRAVENOUS at 08:48

## 2018-10-17 RX ADMIN — ARIPIPRAZOLE 15 MG: 15 TABLET ORAL at 08:55

## 2018-10-17 RX ADMIN — RANITIDINE 300 MG: 150 TABLET ORAL at 08:55

## 2018-10-17 RX ADMIN — LEVOTHYROXINE SODIUM 75 MCG: 75 TABLET ORAL at 08:55

## 2018-10-17 RX ADMIN — AMLODIPINE BESYLATE 2.5 MG: 2.5 TABLET ORAL at 02:28

## 2018-10-17 RX ADMIN — HUMAN ALBUMIN MICROSPHERES AND PERFLUTREN 4 ML: 10; .22 INJECTION, SOLUTION INTRAVENOUS at 10:00

## 2018-10-17 NOTE — PROGRESS NOTES
10/17/18 1119   Quick Adds   Type of Visit Initial PT Evaluation   Living Environment   Lives With facility resident   Living Arrangements assisted living   Home Accessibility no concerns   Living Environment Comment pt lives in [facility], receives assistance with dressing, bathing, escort to meals   Self-Care   Dominant Hand right   Usual Activity Tolerance good   Current Activity Tolerance fair   Regular Exercise yes   Activity/Exercise Type other (see comments)  (home PT)   Exercise Amount/Frequency 3-5 times/wk   Equipment Currently Used at Home wheelchair, manual;walker, rolling;shower chair;grab bar   Activity/Exercise/Self-Care Comment pt lives at [facility], receives assistance with dressing, bathing, medication, meal escort; has been participating with home PT   Functional Level Prior   Ambulation 1-->assistive equipment   Transferring 1-->assistive equipment   Toileting 1-->assistive equipment   Bathing 3-->assistive equipment and person   Dressing 2-->assistive person   Eating 0-->independent   Communication 0-->understands/communicates without difficulty   Swallowing 0-->swallows foods/liquids without difficulty   Cognition 0 - no cognition issues reported   Fall history within last six months yes   Number of times patient has fallen within last six months 8   Prior Functional Level Comment patient uses FWW for ambulation with SBA for safety provided by staff at [facility]. Previously using mwc for primary mode of mobility   General Information   Onset of Illness/Injury or Date of Surgery - Date 10/16/18   Referring Physician Teresa Juarez MD   Patient/Family Goals Statement wishes to continue using walker for mobility, does not want to resort to primary use of wc   Pertinent History of Current Problem (include personal factors and/or comorbidities that impact the POC) Amparo Sharma is a 78 year old female with a history of hydrocephalus s/p  shunt 2/8/2017, loss of balance, colon cancer, alcoholism,  COPD, HTN, dementia who presents to the Emergency Department for the evaluation of loss of balance.   General Info Comments activity: amb with assist   Cognitive Status Examination   Orientation orientation to person, place and time   Level of Consciousness alert   Follows Commands and Answers Questions 100% of the time;able to follow multistep instructions   Personal Safety and Judgment intact   Memory intact   Pain Assessment   Patient Currently in Pain No   Integumentary/Edema   Integumentary/Edema no deficits were identifed   Posture    Posture Kyphosis;Protracted shoulders;Forward head position   Strength   Strength Comments fair functional strength B LE; needs CGA - Светлана sit <> stand transfers   Bed Mobility   Bed Mobility Comments mod I, extra time needed   Transfer Skills   Transfer Comments CGA - Светлана    Gait   Gait Comments CGA with  feet   Balance   Balance Comments Standing static balance good; dynamic balance fair - needs BUE support on FWW   Sensory Examination   Sensory Perception no deficits were identified   Coordination   Coordination no deficits were identified   Muscle Tone   Muscle Tone no deficits were identified   General Therapy Interventions   Planned Therapy Interventions balance training;bed mobility training;gait training;neuromuscular re-education;strengthening;transfer training;risk factor education;home program guidelines;progressive activity/exercise   Clinical Impression   Criteria for Skilled Therapeutic Intervention yes, treatment indicated   PT Diagnosis impaired functional mobility   Influenced by the following impairments weakness, decreased activity tolerance   Functional limitations due to impairments decreased indep with functional mobility   Clinical Presentation Evolving/Changing   Clinical Presentation Rationale complex medical history   Clinical Decision Making (Complexity) Moderate complexity   Therapy Frequency` daily   Predicted Duration of Therapy Intervention  "(days/wks) 1 day   Anticipated Discharge Disposition Home with Home Therapy   Risk & Benefits of therapy have been explained Yes   Patient, Family & other staff in agreement with plan of care Yes   [facility] TM \"6 Clicks\"   2016, Trustees of [facility], under license to [employer].  All rights reserved.   6 Clicks Short Forms Basic Mobility Inpatient Short Form   [facility]  \"6 Clicks\" V.2 Basic Mobility Inpatient Short Form   1. Turning from your back to your side while in a flat bed without using bedrails? 4 - None   2. Moving from lying on your back to sitting on the side of a flat bed without using bedrails? 4 - None   3. Moving to and from a bed to a chair (including a wheelchair)? 3 - A Little   4. Standing up from a chair using your arms (e.g., wheelchair, or bedside chair)? 3 - A Little   5. To walk in hospital room? 3 - A Little   6. Climbing 3-5 steps with a railing? 1 - Total   Basic Mobility Raw Score (Score out of 24.Lower scores equate to lower levels of function) 18   Total Evaluation Time   Total Evaluation Time (Minutes) 10     "

## 2018-10-17 NOTE — PLAN OF CARE
Problem: Patient Care Overview  Goal: Plan of Care/Patient Progress Review  -diagnostic tests and consults completed and resulted: NO, to be seen by PT, OT, SW    -vital signs normal or at patient baseline: YES, /73  Temp 97.7  F (36.5  C) (Oral)  Resp 12  Wt 80 kg (176 lb 5.9 oz)  SpO2 94%  BMI 33.32 kg/m2    -returns to baseline functional status: NO, has yet to get up (walker ordered and PT will assess)     -safe disposition plan has been identified: PENDING

## 2018-10-17 NOTE — ED NOTES
[facility], [place]   ED Nurse to Floor Handoff     Amparo Sharma is a 78 year old female who speaks English and lives alone,  in a home  They arrived in the ED by car from home    ED Chief Complaint: No chief complaint on file.    ED Dx;   Final diagnoses:   Dizziness   Muscle weakness (generalized)   Benign essential hypertension   NPH (normal pressure hydrocephalus)   S/P ventriculoperitoneal shunt   Inability to walk         Needed?: No    Allergies:   Allergies   Allergen Reactions     Antihistamine Allergy Relief [Diphenhydramine] Other (See Comments)     hyperactivity     Codeine Itching     Demerol [Meperidine] Nausea     Hmg-Coa-R Inhibitors Other (See Comments)     Was hospitalized, rhabdomyolitis  Rhabdo?  Statins  Was hospitalized, rhabdomyolitis  Rhabdo?  Statins     Meloxicam Other (See Comments)     Talwin [Pentazocine] Other (See Comments)     drowsiness  drowsiness     Tramadol Nausea     Valium [Diazepam] Other (See Comments)     Hallucinations/paranoid   .  Past Medical Hx:   Past Medical History:   Diagnosis Date     Abdominal hernia      Alcoholism in remission (H)      Arthritis      Asthma      Bipolar disorder (H)      Bladder incontinence 2013     COPD (chronic obstructive pulmonary disease) (H)      Dementia      Depression      Falls frequently      GERD (gastroesophageal reflux disease)      History of blood transfusion     20 years ago     History of colon cancer     s/p resection 1/2015, treated with 5-6 cycles of Folfox     HTN (hypertension)      Hydrocephalus      Hypothyroidism      Loss of balance      Memory loss      Squamous cell cancer of multiple sites of skin of upper arm, left 2016    Dr. Andrea      Thyroid disease      Urinary incontinence       Baseline Mental status: WDL  Current Mental Status changes: at basesline    Infection present or suspected this encounter: no  Sepsis suspected: No  Isolation type: Contact      Activity level - Baseline/Home:  independent with walker   Activity Level - Current:   assist of 1 with walker     Bariatric equipment needed?: No    In the ED these meds were given:   Medications   amLODIPine (NORVASC) tablet 2.5 mg (2.5 mg Oral Given 10/17/18 0228)   aspirin-acetaminophen-caffeine (EXCEDRIN MIGRAINE) per tablet 1 tablet (not administered)   buPROPion (WELLBUTRIN XL) 24 hr tablet 150 mg (not administered)   docusate sodium (COLACE) capsule 100 mg (not administered)   donepezil (ARICEPT) tablet 10 mg (10 mg Oral Given 10/17/18 0228)   FLUoxetine (PROzac) capsule 40 mg (not administered)   lamoTRIgine (LaMICtal) tablet 150 mg (not administered)   Levothyroxine Sodium CAPS 0.075 mg (not administered)   RANITIDINE HCL  mg (not administered)   naloxone (NARCAN) injection 0.1-0.4 mg (not administered)   melatonin tablet 1 mg (not administered)   ondansetron (ZOFRAN-ODT) ODT tab 4 mg (not administered)     Or   ondansetron (ZOFRAN) injection 4 mg (not administered)   lamoTRIgine (LaMICtal) tablet 100 mg (100 mg Oral Given 10/17/18 0227)   ARIPiprazole (ABILIFY) tablet 15 mg (not administered)   sodium chloride 0.9% infusion (not administered)   cloNIDine (CATAPRES) tablet 0.1 mg (0.1 mg Oral Given 10/16/18 2024)   acetaminophen (TYLENOL) tablet 650 mg (650 mg Oral Given 10/16/18 2324)       Drips running?  No    Home pump  No    Current LDAs  Peripheral IV 10/16/18 Right Lower forearm (Active)   Number of days:1       Wound 02/04/17 Lower Back (Active)   Number of days:620       Incision/Surgical Site 02/08/17 Right Abdomen (Active)   Number of days:616       Incision/Surgical Site 02/08/17 Right Head (Active)   Number of days:616       Labs results:   Labs Ordered and Resulted from Time of ED Arrival Up to the Time of Departure from the ED   CBC WITH PLATELETS DIFFERENTIAL - Abnormal; Notable for the following:        Result Value    MCHC 31.4 (*)     All other components within normal limits    COMPREHENSIVE METABOLIC PANEL - Abnormal; Notable for the following:     GFR Estimate 60 (*)     All other components within normal limits   INR   PARTIAL THROMBOPLASTIN TIME   TROPONIN I   MAGNESIUM   TSH   TROPONIN I   PERIPHERAL IV CATHETER   CARDIAC CONTINUOUS MONITORING   IP ASSIGN PROVIDER TEAM TO TREATMENT TEAM   OBSERVATION GOALS   ASSESS   NOTIFY PHYSICIAN   MEASURE HEIGHT AND WEIGHT   VITAL SIGNS   NOTIFY   NOTIFY   ACTIVITY   TELEMETRY MONITORING CARDIOLOGY ADULT       Imaging Studies:   Recent Results (from the past 24 hour(s))   XR Shunt Malfunction Surgery Survey    Narrative    Exam: XR SHUNT MALFUNCTION SURVEY, 10/16/2018 8:48 PM    Indication:  shunt eval;     Comparison: Radiographic shunt survey 2/9/2017    Findings:   AP AP views of the skull, chest, and pelvis. Lateral views of the  sacrum and skull.    Unremarkable course of the right frontal approach ventriculoperitoneal  shunt along the right thorax and abdomen. The distal end of the  catheter is looped in the right hemipelvis. No sharp turns or kinks in  the catheter.    Elevation of the right hemidiaphragm. Cardiac silhouette is stable. No  focal airspace opacity, pleural effusion, or pneumothorax. Surgical  clips in the right upper quadrant of the abdomen. No dilated loops of  bowel. Small amount of air and stool project over the rectum. Moderate  stool burden. Surgical clips over the left abdomen and left  hemipelvis. Postoperative changes of right total hip arthroplasty.  Moderate degenerative changes of the left hip.      Impression    Impression: Unremarkable course of the right frontal approach  ventriculoperitoneal shunt with the distal tip in the right  hemipelvis.    I have personally reviewed the examination and initial interpretation  and I agree with the findings.    JAVAN MEJIAS MD   CT Head w/o Contrast    Narrative    CT HEAD W/O CONTRAST 10/16/2018 8:55 PM    Provided History: Dizzy.    Comparison: CT head  1/3/2018.    Technique: Using multidetector thin collimation helical acquisition  technique, axial, coronal and sagittal CT images from the skull base  to the vertex were obtained without intravenous contrast.     Findings:    Right frontal approach ventriculoperitoneal shunt catheter with the  tip near the foramen of Thompson, unchanged. Stable size of the shunted  ventricular system.    No intracranial hemorrhage, mass effect, or midline shift. The gray to  white matter differentiation of the cerebral hemispheres is preserved.  Generalized parenchymal volume loss. The basal cisterns are patent.    The visualized paranasal sinuses are clear. The mastoid air cells are  clear.       Impression    Impression:   1. Stable size of the shunted ventricular system.   2. No acute intracranial pathology.    I have personally reviewed the examination and initial interpretation  and I agree with the findings.    JAMESON LUNSFORD MD       Recent vital signs:   /78  Temp 98.3  F (36.8  C) (Oral)  Resp 16  Wt 80 kg (176 lb 5.9 oz)  SpO2 93%  BMI 33.32 kg/m2    Cardiac Rhythm: Normal Sinus  Pt needs tele? No  Skin/wound Issues: None    Code Status: Full Code    Pain control: pt had none    Nausea control: pt had none    Abnormal labs/tests/findings requiring intervention: see epic    Family present during ED course? Yes   Family Comments/Social Situation comments: n/a    Tasks needing completion: None      [phone] [facility]

## 2018-10-17 NOTE — ED TRIAGE NOTES
Pt comes in for evaluation after increased bilateral leg weakness, multiple falls, and low grade fevers since Sunday. Hx  shunt. Went to urgent care, sent to ED for possible shunt malfunction. Alert and oriented. BP elevated, ovss.

## 2018-10-17 NOTE — PROGRESS NOTES
Care Coordinator Progress Note    Admission Date/Time:  10/16/2018  Attending MD:  Gloria Contreras, *    Data  Chart reviewed, discussed with interdisciplinary team.   Patient was admitted for:    Dizziness  Muscle weakness (generalized)  Benign essential hypertension  NPH (normal pressure hydrocephalus)  S/P ventriculoperitoneal shunt  Inability to walk.    Concerns with insurance coverage for discharge needs: None.  Current Living Situation: Patient lives in an assisted living facility.  Support System: Supportive  Services Involved: Home Care  Transportation at Discharge: Family or friend will provide  Transportation to Medical Appointments:  family  Barriers to Discharge:     Coordination of Care and Referrals: Provided patient/family with options for Home Care.        Assessment  Patient admitted with loss of balance. History of hydrocephalus s/p  shunt, loss of balance, colon cancer, alcoholism, COPD, and HTN.   Per therapy recs, patient able to return home with home therapies. Met with patient to discuss. Patient lives at [facility] ([phone]). States they help her with medication administration, getting dressed/undressed, and provide meals. Patient already receiving home therapies with [facility]; resumption order placed.   Patient ready to be discharged home later today. Per [facility] request completed orders to be faxed ([phone]) to them prior to 430pm. Provider aware. Patients states family will provide transport home.     Plan  Anticipated Discharge Date:  Today  Anticipated Discharge Plan:  [facility]    Kia Stanley RN

## 2018-10-17 NOTE — CONSULTS
[facility]  Neurology Consultation  Patient Name: Amparo Sharma  MRN: [id number]  : 1940  PCP: Parvez Francis  Attending provider: José Cantrell  Admission Date: 10/16/2018  Date of Service: 2018    The Neurology consultation service was asked to see Amparo Sharma to evaluate gait disturbance.    Assessment:  76 year old female with PMH of HTN, GERD, colon cancer, alcoholism, COPD, HTN,  bipolar/MDD, hypothyroidism, bilateral knee replacements, rotator cuff repair right, right ankle ORIF and NPH  who presents with acute on chronic falls without new injury.      Per priors: Patient states that her Neurologist recently put her on a new medication that is meant for patient with MS (she does not have MS) to help improve her gait and has noticed that since starting this medication she has had dizziness with recent episodes of falling. Patient states she has been experiencing dizziness since , two days ago, and that today she had three episodes of dizziness followed by falling on her bottom, denies hitting head or LOC or any injuries. Patient also notes she has episodes of diaphoresis and shaking of her hands though states her ROM of extremities are at baseline. Patient is not anticoagulated.     On further interrogation, patient states that while she has had dizziness in the last few days she also has had some low grade fevers without consitutional symptoms.  No new cough, chest pain, skin irritation (has had this in the past), urinary symptoms.  During her episodes today she felt that he knees were locking more than anything else, not necessarily that her balance was worse.  She has a documented tendency to fall backward and did fall on her 'bum'.  She states that she has undertaken PT in the past and has some hesitancy to continue but understands per her orthopedist that she needs strengthening of muscle groups above and below her knee  replacements but that the replacements themselves are structurally intact.       With regard to her initiation of ampyra, I spoke with Dr. Casillas over phone who does not think that this is necessarily causing her worsening symptoms but that also it would be fine to discontinue if it is not providing much benefit, and the patient is not sure that it is providing much benefit.  No new ataxia, bradykinesia, cognitive problems, or new incontinence (incontinence is however a chronic problem for patient).    Recommendations:  - CTH  - Shunt series  - If normal consider formal PT evaluation with gait belt.  Patient did not have walker with her in the ED and it is not safe to perform formal gait evaluation in this setting  - Investigate possible infectious causes for acute worsening  - Hold ampyra moving forward  - Suggest med OBS dispo    Patient and plan was discussed with Dr. Esparza.    Barry Quintanilla MD/PhD  [facility] Neurology  [phone]    Pt seen and examined 10/17.  Case discussed with house staff last night by phone and today at bedside.  Pt does feel ampyra is helping her gait.  She has prodrome sx to fall which sound vaso-vagal, sweaty, stomach upset.   Exam shows leg weakness, right > left possibly due to ortho issues.  Knee reflexes present, ankles absent.  Toes appear upgoing. Recommend discontinue donepezil.  Needs to have orthostatic b/p checked.  Check B12.   Agree with assessment and recommendations of Dr. Quintanilla, but would continue ampyra.   PREMA Esparza MD    HPI:  Information prior to consult:   Per priors: Patient states that her Neurologist recently put her on a new medication that is meant for patient with MS (she does not have MS) to help improve her gait and has noticed that since starting this medication she has had dizziness with recent episodes of falling. Patient states she has been experiencing dizziness since Sunday, two days ago, and that today she had three episodes of  dizziness followed by falling on her bottom, denies hitting head or LOC or any injuries. Patient also notes she has episodes of diaphoresis and shaking of her hands though states her ROM of extremities are at baseline. Patient is not anticoagulated.     On further interrogation, patient states that while she has had dizziness in the last few days she also has had some low grade fevers without consitutional symptoms.  No new cough, chest pain, skin irritation (has had this in the past), urinary symptoms.  During her episodes today she felt that he knees were locking more than anything else, not necessarily that her balance was worse.  She has a documented tendency to fall backward and did fall on her 'bum'.  She states that she has undertaken PT in the past and has some hesitancy to continue but understands per her orthopedist that she needs strengthening of muscle groups above and below her knee replacements but that the replacements themselves are structurally intact.         ROS: negative except as per HPI.    PMH:  Past Medical History:   Diagnosis Date     Abdominal hernia      Alcoholism in remission (H)      Arthritis      Asthma      Bipolar disorder (H)      Bladder incontinence 2013     COPD (chronic obstructive pulmonary disease) (H)      Dementia      Depression      Falls frequently      GERD (gastroesophageal reflux disease)      History of blood transfusion     20 years ago     History of colon cancer     s/p resection 1/2015, treated with 5-6 cycles of Folfox     HTN (hypertension)      Hydrocephalus      Hypothyroidism      Loss of balance      Memory loss      Squamous cell cancer of multiple sites of skin of upper arm, left 2016    Dr. Andrea      Thyroid disease      Urinary incontinence        PSH:  Past Surgical History:   Procedure Laterality Date     CHOLECYSTECTOMY       colon cancer resection  1/2015     ESOPHAGOSCOPY, GASTROSCOPY, DUODENOSCOPY (EGD), COMBINED N/A 9/7/2017    Procedure:  COMBINED ESOPHAGOSCOPY, GASTROSCOPY, DUODENOSCOPY (EGD), BIOPSY SINGLE OR MULTIPLE;  EGD;  Surgeon: Brook Marsh MD;  Location: [facility] GI     GENITOURINARY SURGERY       GI SURGERY       GYN SURGERY      endometriosis, hysterectomy     HYSTERECTOMY      total, related to endometriosis     INSERT DRAIN LUMBAR N/A 2/5/2017    Procedure: INSERT DRAIN LUMBAR;  Surgeon: Raji Newton MD;  Location: [facility] OR     multiple knee surgeries      total knees 4 L, 3 right, last one 2013     OPTICAL TRACKING SYSTEM IMPLANT SHUNT VENTRICULOPERITONEAL Right 2/8/2017    Procedure: OPTICAL TRACKING SYSTEM IMPLANT SHUNT VENTRICULOPERITONEAL;  Surgeon: Rc Dozier MD;  Location: [facility] OR     ORTHOPEDIC SURGERY      right hip replacement, both knees     rotator cuff surgeries         Medications:  Current Facility-Administered Medications   Medication     cloNIDine (CATAPRES) tablet 0.1 mg     Current Outpatient Prescriptions   Medication     Acetaminophen (TYLENOL PO)     AMLODIPINE BESYLATE PO     ARIPiprazole (ABILIFY PO)     BuPROPion HCl (WELLBUTRIN XL PO)     Dalfampridine (AMPYRA) 10 MG TB12     DICLOFENAC SODIUM PO     DONEPEZIL HCL PO     FLUoxetine HCl (PROZAC PO)     LamoTRIgine (LAMICTAL PO)     Levothyroxine Sodium 75 MCG CAPS     RANITIDINE HCL PO     ALBUTEROL IN     AMOXICILLIN PO     aspirin-acetaminophen-caffeine (EXCEDRIN MIGRAINE) 250-250-65 MG per tablet     cetirizine (ZYRTEC) 10 MG tablet     docusate sodium (COLACE) 100 MG capsule     Hypromellose (ARTIFICIAL TEARS OP)     Multiple Vitamins-Minerals (MULTIVITAMIN PO)     ondansetron (ZOFRAN-ODT) 4 MG ODT tab     order for DME     Tiotropium Bromide Monohydrate (SPIRIVA HANDIHALER IN)       Allergies:  Allergies   Allergen Reactions     Antihistamine Allergy Relief [Diphenhydramine] Other (See Comments)     hyperactivity     Codeine Itching     Demerol [Meperidine] Nausea     Hmg-Coa-R Inhibitors Other (See Comments)     Was  hospitalized, rhabdomyolitis  Rhabdo?  Statins  Was hospitalized, rhabdomyolitis  Rhabdo?  Statins     Meloxicam Other (See Comments)     Talwin [Pentazocine] Other (See Comments)     drowsiness  drowsiness     Tramadol Nausea     Valium [Diazepam] Other (See Comments)     Hallucinations/paranoid       Social History:  Social History     Social History     Marital status:      Spouse name: N/A     Number of children: 1     Years of education: N/A     Occupational History     nurse      Social History Main Topics     Smoking status: Former Smoker     Packs/day: 0.25     Types: Cigarettes     Start date: 1980     Quit date: 1997     Smokeless tobacco: Former User     Alcohol use No      Comment: alcoholism in remission     Drug use: No     Sexual activity: Not on file     Other Topics Concern     Not on file     Social History Narrative       Family History:  Family History   Problem Relation Age of Onset     Breast Cancer Mother 60      of met. br ca 62     Depression Father 55      of suicide     Peptic Ulcer Disease Father      Prostate Cancer Brother      Colon Cancer No family hx of        Physical Examination:  Vitals: Temp: 98.3  F (36.8  C) Temp src: Oral BP: 164/82   Heart Rate: 82 Resp: 16 SpO2: 96 % O2 Device: None (Room air)    General: adult, NAD, cooperative  Neuro:  Mental status: alert and oriented to person, place, and time; language is fluent with intact comprehension and naming, mini-COG 4/5  Cranial nerves: PERRL, EOMI with intact smooth pursuit, full visual fields, fundi within normal limits, no facial asymmetry or ptosis, facial sensation intact and symmetric, hearing intact to conversation, tongue and uvula are midline, no hypophonia, no dysarthria  Motor: No abnormal posture, tone, atrophy, minor postural tremor, no dysmetria.  During examination patient has pain in her shoulders and knee and hips which partially limits the exam.  Patient has enlarged joints throughout  her body notably in hands, knees, ankles   RIGHT LEFT   Neck flexion 4+ 4+    5 5   FDI 4 4   Biceps 5 5   Triceps 4 4   Deltoid (antalgic) 4- 4-   Hip flexion 3+ 3+   Knee extension 3+ 3+   Knee flexion 3 3   Dorsiflexion 4 4   Plantar flexion 4 4    Reflexes: toes mute on plantar response. negative Christianson.   RIGHT LEFT   Brachioradialis 2+ 2+   Biceps 2+ 2+        Patellar 1+ 0+   Achilles 0+ 0+    Sensory: Diminished to vibration, pinprick, and cold up to level of the knees   Coordination: No dysmetria. No dysdiadochokinesia. No ataxia.   Gait: deferred due to patient safety    Image Studies:  Xr Shunt Malfunction Surgery Survey    Result Date: 10/16/2018  Exam: XR SHUNT MALFUNCTION SURVEY, 10/16/2018 8:48 PM Indication:  shunt eval; Comparison: Radiographic shunt survey 2/9/2017 Findings: AP AP views of the skull, chest, and pelvis. Lateral views of the sacrum and skull. Unremarkable course of the right frontal approach ventriculoperitoneal shunt along the right thorax and abdomen. The distal end of the catheter is looped in the right hemipelvis. No sharp turns or kinks in the catheter. Elevation of the right hemidiaphragm. Cardiac silhouette is stable. No focal airspace opacity, pleural effusion, or pneumothorax. Surgical clips in the right upper quadrant of the abdomen. No dilated loops of bowel. Small amount of air and stool project over the rectum. Moderate stool burden. Surgical clips over the left abdomen and left hemipelvis. Postoperative changes of right total hip arthroplasty. Moderate degenerative changes of the left hip.     Impression: Unremarkable course of the right frontal approach ventriculoperitoneal shunt with the distal tip in the right hemipelvis. I have personally reviewed the examination and initial interpretation and I agree with the findings. JAVAN MEJIAS MD    Ct Head W/o Contrast    Result Date: 10/16/2018  CT HEAD W/O CONTRAST 10/16/2018 8:55 PM Provided History: Dizzy.  Comparison: CT head 1/3/2018. Technique: Using multidetector thin collimation helical acquisition technique, axial, coronal and sagittal CT images from the skull base to the vertex were obtained without intravenous contrast. Findings:  Right frontal approach ventriculoperitoneal shunt catheter with the tip near the foramen of Thompson, unchanged. Stable size of the shunted ventricular system. No intracranial hemorrhage, mass effect, or midline shift. The gray to white matter differentiation of the cerebral hemispheres is preserved. Generalized parenchymal volume loss. The basal cisterns are patent. The visualized paranasal sinuses are clear. The mastoid air cells are clear.      Impression: 1. Stable size of the shunted ventricular system. 2. No acute intracranial pathology. I have personally reviewed the examination and initial interpretation and I agree with the findings. JAMESON LUNSFORD MD      Laboratory Studies:  CBC RESULTS:   Recent Labs   Lab Test  10/16/18   2004   WBC  8.4   RBC  4.59   HGB  13.2   HCT  42.0   MCV  92   MCH  28.8   MCHC  31.4*   RDW  14.6   PLT  312     Last Comprehensive Metabolic Panel:  Sodium   Date Value Ref Range Status   10/16/2018 136 133 - 144 mmol/L Final     Potassium   Date Value Ref Range Status   10/16/2018 4.6 3.4 - 5.3 mmol/L Final     Chloride   Date Value Ref Range Status   10/16/2018 103 94 - 109 mmol/L Final     Carbon Dioxide   Date Value Ref Range Status   10/16/2018 24 20 - 32 mmol/L Final     Anion Gap   Date Value Ref Range Status   10/16/2018 8 3 - 14 mmol/L Final     Glucose   Date Value Ref Range Status   10/16/2018 92 70 - 99 mg/dL Final     Urea Nitrogen   Date Value Ref Range Status   10/16/2018 24 7 - 30 mg/dL Final     Creatinine   Date Value Ref Range Status   10/16/2018 0.91 0.52 - 1.04 mg/dL Final     GFR Estimate   Date Value Ref Range Status   10/16/2018 60 (L) >60 mL/min/1.7m2 Final     Comment:     Non  GFR Calc     Calcium   Date Value  Ref Range Status   10/16/2018 9.9 8.5 - 10.1 mg/dL Final     Color Urine (no units)   Date Value   02/06/2017 Straw   02/06/2017 Canceled, Test credited   Duplicate request       Appearance Urine (no units)   Date Value   02/06/2017 Clear   02/06/2017 Canceled, Test credited   Duplicate request       Glucose Urine (mg/dL)   Date Value   02/06/2017 Negative   02/06/2017 Canceled, Test credited   Duplicate request   (A)     Bilirubin Urine (no units)   Date Value   02/06/2017 Negative   02/06/2017 Canceled, Test credited   Duplicate request   (A)     Ketones Urine (mg/dL)   Date Value   02/06/2017 Negative   02/06/2017 Canceled, Test credited   Duplicate request   (A)     Specific Gravity Urine (no units)   Date Value   02/06/2017 1.007   02/06/2017 Canceled, Test credited   Duplicate request       pH Urine (pH)   Date Value   02/06/2017 5.5   02/06/2017 Canceled, Test credited   Duplicate request       Protein Albumin Urine (mg/dL)   Date Value   02/06/2017 Negative   02/06/2017 Canceled, Test credited   Duplicate request   (A)     Nitrite Urine (no units)   Date Value   02/06/2017 Negative   02/06/2017 Canceled, Test credited   Duplicate request   (A)     Leukocyte Esterase Urine (no units)   Date Value   02/06/2017 Negative   02/06/2017 Canceled, Test credited   Duplicate request   (A)         This note was written with the assistance of the Dragon voice-dictation technology software. The final document, although reviewed, may contain errors. For corrections, please contact the office.

## 2018-10-17 NOTE — DISCHARGE SUMMARY
Discharge Summary    Amparo Sharma MRN# [id number]   YOB: 1940 Age: 78 year old     Date of Admission:  10/16/2018  Date of Discharge:  10/17/2018  Admitting Physician:  Abhi Marsh MD  Discharge Physician:  Gloria Contreras, *   Discharging Service:  Emergency Medicine     Primary Provider: Parvez Francis          Discharge Diagnosis:     Dizziness    * No resolved hospital problems. *               Discharge Disposition:   Discharged to assisted living           Condition on Discharge:   Discharge condition: Stable   Code status on discharge: Full Code           Procedures:   No procedures performed during this admission          Discharge Medications:     Current Discharge Medication List      CONTINUE these medications which have NOT CHANGED    Details   Acetaminophen (TYLENOL PO) Take 500 mg by mouth every 8 hours as needed for mild pain or fever     Associated Diagnoses: Gait disturbance; Memory loss; Malignant neoplasm of sigmoid colon (H)      AMLODIPINE BESYLATE PO Take 2.5 mg by mouth 2 times daily       ARIPiprazole (ABILIFY PO) Take 15 mg by mouth daily     Associated Diagnoses: Hip pain, left; Malignant neoplasm of sigmoid colon (H); Gait disturbance; Other iron deficiency anemia; Acute midline low back pain with right-sided sciatica; Leg weakness, bilateral; Vitamin D deficiency      BuPROPion HCl (WELLBUTRIN XL PO) Take 150 mg by mouth every morning     Associated Diagnoses: Hip pain, left; Malignant neoplasm of sigmoid colon (H); Gait disturbance; Other iron deficiency anemia; Acute midline low back pain with right-sided sciatica; Leg weakness, bilateral; Vitamin D deficiency      Dalfampridine (AMPYRA) 10 MG TB12 Take 1 tablet (10 mg) by mouth 2 times daily  Qty: 60 tablet, Refills: prn    Associated Diagnoses: Gait disturbance      DICLOFENAC SODIUM PO Take 50 mg by mouth 2 times daily      FLUoxetine HCl (PROZAC PO) Take 40 mg by mouth every morning     Associated  Diagnoses: Gait disturbance; Memory loss; Malignant neoplasm of sigmoid colon (H)      LamoTRIgine (LAMICTAL PO) Take 150 mg by mouth in the morning and 100 mg by mouth in the evening    Associated Diagnoses: Gait disturbance; Memory loss; Malignant neoplasm of sigmoid colon (H)      Levothyroxine Sodium 75 MCG CAPS Take 75 mcg by mouth every morning     Associated Diagnoses: Gait disturbance; Memory loss; Malignant neoplasm of sigmoid colon (H)      RANITIDINE HCL PO Take 300 mg by mouth 2 times daily       ALBUTEROL IN Inhale 2 puffs into the lungs every 4 hours as needed       AMOXICILLIN PO Take 500 mg by mouth as needed (Take 1 hour prior to dental appt)       aspirin-acetaminophen-caffeine (EXCEDRIN MIGRAINE) 250-250-65 MG per tablet Take 1 tablet by mouth every 6 hours as needed for headaches    Associated Diagnoses: Hip pain, left; Malignant neoplasm of sigmoid colon (H); Gait disturbance; Other iron deficiency anemia; Acute midline low back pain with right-sided sciatica; Leg weakness, bilateral; Vitamin D deficiency      cetirizine (ZYRTEC) 10 MG tablet Take 10 mg by mouth 2 times daily      docusate sodium (COLACE) 100 MG capsule Take 100 mg by mouth daily       Hypromellose (ARTIFICIAL TEARS OP)       Multiple Vitamins-Minerals (MULTIVITAMIN PO) Take by mouth daily    Associated Diagnoses: Gait disturbance; Memory loss; Malignant neoplasm of sigmoid colon (H)      ondansetron (ZOFRAN-ODT) 4 MG ODT tab Take 1 tablet (4 mg) by mouth every 6 hours as needed for nausea  Qty: 40 tablet, Refills: 0    Associated Diagnoses: S/P ventriculoperitoneal shunt      order for DME Abdominal Binder - Medium  Qty: 1 each, Refills: 3    Associated Diagnoses: Ventral hernia without obstruction or gangrene      Tiotropium Bromide Monohydrate (SPIRIVA HANDIHALER IN) Inhale 1 puff into the lungs as needed          STOP taking these medications       DONEPEZIL HCL PO Comments:   Reason for Stopping:                      Consultations:   Consultation during this admission received from neurology             Brief History of Illness:   Please see detailed H&P from 10/17/18, in brief:    Amparo Sharma is a 78 year old female with a history of hydrocephalus s/p  shunt 2/8/2017, loss of balance, colon cancer, alcoholism, COPD, HTN, dementia who presents to the Emergency Department for the evaluation of loss of balance.       Hospital Course:   1. Frequent falls: history of hydrocephalus s/p  shunt 2/8/2017. Followed by Dr. Gruber. Reports x 3 days of generalized weakness, dizziness and x 1 episode of falling in the bathroom. Denies LOC and trauma to the head at the time. Symptoms progressively getting worse. Pt was started on ampyra recently (MS medication) to improve her gait and since then has been noticing frequent dizziness and balance issues with associated diaphoresis and tremors in the hands. Reports subjective fever, however no cough, chills, chest pain, abdominal pain, nausea, vomiting, or constipation. In the ED, pt's vitals are stable. LAB: Na 136, K 4.6, Cr. 0.91, BUN 24. LFTs, TSH, and Mag are normal. WBC without leukocytosis or anemia. Trop x 1 negative. EKG, no ischemic changes. ED MD discussed case with neurology who recommended CT head and shunt series and if normal PT evaluation with gait belt. Shunt series shows unremarkable course of the right frontal approach ventriculoperitoneal shunt with the distal tip in the right hemipelvis. CT head, no acute intercranial pathology. PT consulted recommended discharge back to group home.  Patient used walker well walking around the unit.  She had no falls or leg weakness.  Neurology recommended that she continue her Ampyra, stop the donepezil.  Orthostatics negative. UA negative for infection, resting echo did not show acute abnormalities to explain patient's symptoms.  She was discharged back to [facility] in good condition.      Chronic medical problems:  #HTN: chronic,  stable  - Amlodipine 2.5 mg BID   #Asthma: chronic, stable. Seasonal allergy induced.   - albuterol inhaler prn  #Bipolar disorder, MDD: chronic, stable  - Continue wellbutrin 150mg daily  - Continue prozac 40mg daily  - Continue abilify 15mg daily  - Continue lamictal 100mg at bedtime, 150mg in the morning  #Acquired hypothyroidism: chronic, stable. TSH normal.   - Continue synthroid 75mcg oral daily                     Final Day of Progress before Discharge:       Physical Exam:  Blood pressure 142/73, temperature 97.7  F (36.5  C), temperature source Oral, resp. rate 12, weight 80 kg (176 lb 5.9 oz), SpO2 94 %, not currently breastfeeding.    EXAM:   Exam:  Constitutional: healthy, alert and no distress  Cardiovascular: No lifts, heaves, or thrills. RRR. No murmurs, clicks gallops or rub  Respiratory: Good diaphragmatic excursion. Lungs clear  Gastrointestinal: Abdomen soft, non-tender. BS normal. No masses, organomegaly  Musculoskeletal: extremities normal- no gross deformities noted, and normal muscle tone  Skin: no suspicious lesions or rashes  Neurologic: Alert and oriented.   Psychiatric: mentation appears normal and affect normal/bright    /73  Temp 97.7  F (36.5  C) (Oral)  Resp 12  Wt 80 kg (176 lb 5.9 oz)  SpO2 94%  BMI 33.32 kg/m2             Data:  All laboratory data reviewed             Significant Results:     Results for orders placed or performed during the hospital encounter of 10/16/18   CT Head w/o Contrast    Narrative    CT HEAD W/O CONTRAST 10/16/2018 8:55 PM    Provided History: Dizzy.    Comparison: CT head 1/3/2018.    Technique: Using multidetector thin collimation helical acquisition  technique, axial, coronal and sagittal CT images from the skull base  to the vertex were obtained without intravenous contrast.     Findings:    Right frontal approach ventriculoperitoneal shunt catheter with the  tip near the foramen of Thompson, unchanged. Stable size of the shunted  ventricular  system.    No intracranial hemorrhage, mass effect, or midline shift. The gray to  white matter differentiation of the cerebral hemispheres is preserved.  Generalized parenchymal volume loss. The basal cisterns are patent.    The visualized paranasal sinuses are clear. The mastoid air cells are  clear.       Impression    Impression:   1. Stable size of the shunted ventricular system.   2. No acute intracranial pathology.    I have personally reviewed the examination and initial interpretation  and I agree with the findings.    JAMESON LUNSFORD MD   XR Shunt Malfunction Surgery Survey    Narrative    Exam: XR SHUNT MALFUNCTION SURVEY, 10/16/2018 8:48 PM    Indication:  shunt eval;     Comparison: Radiographic shunt survey 2/9/2017    Findings:   AP AP views of the skull, chest, and pelvis. Lateral views of the  sacrum and skull.    Unremarkable course of the right frontal approach ventriculoperitoneal  shunt along the right thorax and abdomen. The distal end of the  catheter is looped in the right hemipelvis. No sharp turns or kinks in  the catheter.    Elevation of the right hemidiaphragm. Cardiac silhouette is stable. No  focal airspace opacity, pleural effusion, or pneumothorax. Surgical  clips in the right upper quadrant of the abdomen. No dilated loops of  bowel. Small amount of air and stool project over the rectum. Moderate  stool burden. Surgical clips over the left abdomen and left  hemipelvis. Postoperative changes of right total hip arthroplasty.  Moderate degenerative changes of the left hip.      Impression    Impression: Unremarkable course of the right frontal approach  ventriculoperitoneal shunt with the distal tip in the right  hemipelvis.    I have personally reviewed the examination and initial interpretation  and I agree with the findings.    JAVAN MEJIAS MD   CBC with platelets differential   Result Value Ref Range    WBC 8.4 4.0 - 11.0 10e9/L    RBC Count 4.59 3.8 - 5.2 10e12/L    Hemoglobin  13.2 11.7 - 15.7 g/dL    Hematocrit 42.0 35.0 - 47.0 %    MCV 92 78 - 100 fl    MCH 28.8 26.5 - 33.0 pg    MCHC 31.4 (L) 31.5 - 36.5 g/dL    RDW 14.6 10.0 - 15.0 %    Platelet Count 312 150 - 450 10e9/L    Diff Method Automated Method     % Neutrophils 64.7 %    % Lymphocytes 21.9 %    % Monocytes 8.9 %    % Eosinophils 3.6 %    % Basophils 0.8 %    % Immature Granulocytes 0.1 %    Nucleated RBCs 0 0 /100    Absolute Neutrophil 5.4 1.6 - 8.3 10e9/L    Absolute Lymphocytes 1.8 0.8 - 5.3 10e9/L    Absolute Monocytes 0.8 0.0 - 1.3 10e9/L    Absolute Eosinophils 0.3 0.0 - 0.7 10e9/L    Absolute Basophils 0.1 0.0 - 0.2 10e9/L    Abs Immature Granulocytes 0.0 0 - 0.4 10e9/L    Absolute Nucleated RBC 0.0    INR   Result Value Ref Range    INR 1.05 0.86 - 1.14   Partial thromboplastin time   Result Value Ref Range    PTT 28 22 - 37 sec   Comprehensive metabolic panel   Result Value Ref Range    Sodium 136 133 - 144 mmol/L    Potassium 4.6 3.4 - 5.3 mmol/L    Chloride 103 94 - 109 mmol/L    Carbon Dioxide 24 20 - 32 mmol/L    Anion Gap 8 3 - 14 mmol/L    Glucose 92 70 - 99 mg/dL    Urea Nitrogen 24 7 - 30 mg/dL    Creatinine 0.91 0.52 - 1.04 mg/dL    GFR Estimate 60 (L) >60 mL/min/1.7m2    GFR Estimate If Black 73 >60 mL/min/1.7m2    Calcium 9.9 8.5 - 10.1 mg/dL    Bilirubin Total 0.4 0.2 - 1.3 mg/dL    Albumin 3.8 3.4 - 5.0 g/dL    Protein Total 7.6 6.8 - 8.8 g/dL    Alkaline Phosphatase 115 40 - 150 U/L    ALT 43 0 - 50 U/L    AST 30 0 - 45 U/L   Troponin I   Result Value Ref Range    Troponin I ES <0.015 0.000 - 0.045 ug/L   Magnesium   Result Value Ref Range    Magnesium 2.0 1.6 - 2.3 mg/dL   TSH   Result Value Ref Range    TSH 2.39 0.40 - 4.00 mU/L   Troponin I   Result Value Ref Range    Troponin I ES <0.015 0.000 - 0.045 ug/L   Troponin I   Result Value Ref Range    Troponin I ES <0.015 0.000 - 0.045 ug/L   Vitamin B12   Result Value Ref Range    Vitamin B12 882 193 - 986 pg/mL   UA with Microscopic reflex to Culture    Result Value Ref Range    Color Urine Light Yellow     Appearance Urine Clear     Glucose Urine Negative NEG^Negative mg/dL    Bilirubin Urine Negative NEG^Negative    Ketones Urine Negative NEG^Negative mg/dL    Specific Gravity Urine 1.009 1.003 - 1.035    Blood Urine Negative NEG^Negative    pH Urine 5.5 5.0 - 7.0 pH    Protein Albumin Urine Negative NEG^Negative mg/dL    Urobilinogen mg/dL Normal 0.0 - 2.0 mg/dL    Nitrite Urine Negative NEG^Negative    Leukocyte Esterase Urine Negative NEG^Negative    Source Midstream Urine     WBC Urine <1 0 - 5 /HPF    RBC Urine 1 0 - 2 /HPF    Squamous Epithelial /HPF Urine 1 0 - 1 /HPF    Transitional Epi <1 0 - 1 /HPF    Mucous Urine Present (A) NEG^Negative /LPF   EKG 12 lead   Result Value Ref Range    Interpretation ECG Click View Image link to view waveform and result    ECHO COMPLETE WITH OPTISON    Narrative    [id number]  [id number]  [id number]  [id number]^ANAYELI^SLOAN^EVIE           [facility],[place]  Echocardiography [facility]  [address] [place] [id number]     Name: ATILIO COPPOLA  MRN: [id number]  : 1940  Study Date: 10/17/2018 09:29 AM  Age: 78 yrs  Gender: Female  Patient Location: [facility]  Reason For Study: Dizziness  Ordering Physician: SLOAN GUADALUPE  Performed By: Odilon Brooks RDCS     BSA: 1.8 m2  Height: 59 in  Weight: 179 lb  HR: 58  BP: 142/73 mmHg  _____________________________________________________________________________  __        Procedure  Complete Portable Echo Adult. Contrast Optison. Echocardiogram with two-  dimensional, color and spectral Doppler performed. Technically difficult  study. Optison (NDC #3201-4763-19) given intravenously. Patient was given 4 ml  mixture of 3 ml Optison and 6 ml saline. 5 ml wasted.  _____________________________________________________________________________  __        Interpretation Summary  Technically difficult study.  Global and regional left ventricular  function is normal with an EF of 60-65%.  Global right ventricular function is normal.  Mild tricuspid insufficiency is present.  The inferior vena cava was normal in size with preserved respiratory  variability.  No pericardial effusion is present.  _____________________________________________________________________________  __        Left Ventricle  Left ventricular size is normal. Left ventricular wall thickness is normal.  Global and regional left ventricular function is normal with an EF of 60-65%.  Left ventricular diastolic function is indeterminate. No regional wall motion  abnormalities are seen.     Right Ventricle  The right ventricle is normal size. Global right ventricular function is  normal.     Atria  The right atria appears normal. Severe left atrial enlargement is present.     Mitral Valve  The mitral valve is normal.        Aortic Valve  The aortic valve is tricuspid. Mild aortic valve sclerosis is present.     Tricuspid Valve  Mild tricuspid insufficiency is present. The peak velocity of the tricuspid  regurgitant jet is not obtainable. Pulmonary artery systolic pressure cannot  be assessed.     Pulmonic Valve  The pulmonic valve is normal.     Vessels  The aorta root is normal. The inferior vena cava was normal in size with  preserved respiratory variability.     Pericardium  No pericardial effusion is present.        Compared to Previous Study  Previous study not available for comparison.  _____________________________________________________________________________  __  MMode/2D Measurements & Calculations     IVSd: 1.1 cm  LVIDd: 4.1 cm  LVIDs: 2.4 cm  LVPWd: 1.1 cm  FS: 41.8 %  LV mass(C)d: 146.8 grams  LV mass(C)dI: 83.5 grams/m2  asc Aorta Diam: 2.9 cm  LVOT diam: 2.0 cm  LVOT area: 3.1 cm2  LA Volume (BP): 85.2 ml  LA Volume Index (BP): 48.4 ml/m2  RWT: 0.52           Doppler Measurements & Calculations  MV E max prasanna: 53.8 cm/sec  MV A max prasanna: 69.4 cm/sec  MV E/A: 0.78  MV dec time:  0.24 sec  Ao V2 max: 147.0 cm/sec  Ao max P.0 mmHg  JOSE(V,D): 2.1 cm2  LV V1 max P.0 mmHg  LV V1 max: 100.0 cm/sec  AV Deni Ratio (DI): 0.68  E/E' av.1  Lateral E/e': 6.8  Medial E/e': 11.4     _____________________________________________________________________________  __           Report approved by: Jessica Claudio 10/17/2018 10:56 AM         Recent Results (from the past 48 hour(s))   XR Shunt Malfunction Surgery Survey    Narrative    Exam: XR SHUNT MALFUNCTION SURVEY, 10/16/2018 8:48 PM    Indication:  shunt eval;     Comparison: Radiographic shunt survey 2017    Findings:   AP AP views of the skull, chest, and pelvis. Lateral views of the  sacrum and skull.    Unremarkable course of the right frontal approach ventriculoperitoneal  shunt along the right thorax and abdomen. The distal end of the  catheter is looped in the right hemipelvis. No sharp turns or kinks in  the catheter.    Elevation of the right hemidiaphragm. Cardiac silhouette is stable. No  focal airspace opacity, pleural effusion, or pneumothorax. Surgical  clips in the right upper quadrant of the abdomen. No dilated loops of  bowel. Small amount of air and stool project over the rectum. Moderate  stool burden. Surgical clips over the left abdomen and left  hemipelvis. Postoperative changes of right total hip arthroplasty.  Moderate degenerative changes of the left hip.      Impression    Impression: Unremarkable course of the right frontal approach  ventriculoperitoneal shunt with the distal tip in the right  hemipelvis.    I have personally reviewed the examination and initial interpretation  and I agree with the findings.    JAVAN MEJIAS MD   CT Head w/o Contrast    Narrative    CT HEAD W/O CONTRAST 10/16/2018 8:55 PM    Provided History: Dizzy.    Comparison: CT head 1/3/2018.    Technique: Using multidetector thin collimation helical acquisition  technique, axial, coronal and sagittal CT images from the skull base  to  the vertex were obtained without intravenous contrast.     Findings:    Right frontal approach ventriculoperitoneal shunt catheter with the  tip near the foramen of Thompson, unchanged. Stable size of the shunted  ventricular system.    No intracranial hemorrhage, mass effect, or midline shift. The gray to  white matter differentiation of the cerebral hemispheres is preserved.  Generalized parenchymal volume loss. The basal cisterns are patent.    The visualized paranasal sinuses are clear. The mastoid air cells are  clear.       Impression    Impression:   1. Stable size of the shunted ventricular system.   2. No acute intracranial pathology.    I have personally reviewed the examination and initial interpretation  and I agree with the findings.    JAMESON LUNSFORD MD                Pending Results:   Unresulted Labs Ordered in the Past 30 Days of this Admission     No orders found for last 61 day(s).                  Discharge Instructions and Follow-Up:     Discharge Procedure Orders  Home Care PT Referral for Hospital Discharge   Referral Type: Home Health Therapies & Aides     Home Care OT Referral for Hospital Discharge     Reason for your hospital stay   Order Comments: Falls, gait problems.  Neurology consulted, shunt images and head CT done, which were negative for acute abnormalities.  Labs checking for heart damage were negative.  Neurology recommended continuing the ampyra, stopping donepezil.  Resting echo negative for acute abnormalities.  PT consulted and recommended discharge back to long-term.  UA negative for infection.     Adult [facility] Follow-up and recommended labs and tests   Order Comments: Follow up with primary care provider, Parvez Francis, within 7 days for hospital follow- up.      Appointments on [place] and/or [facility] (with [facility] or [facility] provider or service). Call [phone] if you haven't heard regarding these appointments within 7 days of discharge.     Activity   Order Comments:  Your activity upon discharge: activity as tolerated   Order Specific Question Answer Comments   Is discharge order? Yes      When to contact your care team   Order Comments: Return to the ER if you have new or worsening chest pain, shortness of breath, jaw or arm pain, or fainting, falls, pain, injury, or unsafe at assisted living.     Diet   Order Comments: Follow this diet upon discharge: Orders Placed This Encounter     Regular Diet Adult   Order Specific Question Answer Comments   Is discharge order? Yes             Attestation:  Yadi Hammond.  APRN, CNP

## 2018-10-17 NOTE — H&P
[facility] ED Observation Admission Note    No chief complaint on file.      Assessment/Plan:  Amparo Sharma is a 78 year old female with a history of hydrocephalus s/p  shunt 2/8/2017, loss of balance, colon cancer, alcoholism, COPD, HTN, dementia who presents to the Emergency Department for the evaluation of loss of balance.    #H/o falling: history of hydrocephalus s/p  shunt 2/8/2017. Followed by Dr. Gruber. Reports x 3 days of generalized weakness, dizziness and x 1 episode of falling in the bathroom. Denies LOC and trauma to the head at the time. Symptoms progressively getting worse. Pt was started on ampyra recently (MS medication) to improve her gait and since then has been noticing frequent dizziness and balance issues with associated diaphoresis and tremors in the hands. Reports subjective fever, however no cough, chills, chest pain, abdominal pain, nausea, vomiting, or constipation. In the ED, pt's vitals are stable. LAB: Na 136, K 4.6, Cr. 0.91, BUN 24. LFTs, TSH, and Mag are normal. WBC without leukocytosis or anemia. Trop x 1 negative. EKG, no ischemic changes. ED MD discussed case with neurology who recommended CT head and shunt series and if normal PT evaluation with gait belt. Shunt series shows unremarkable course of the right frontal approach ventriculoperitoneal shunt with the distal tip in the right hemipelvis. CT head, no acute intercranial pathology. Pt admitted for PT to assess if higher level of care is needed given recent frequent falls.   - VS per routine  - IVFs  - Serial trop  - Echo  - Hold  Ampyra  - PT  -     #HTN: chronic, stable  - Amlodipine 2.5 mg BID      #Asthma: chronic, stable. Seasonal allergy induced.   - albuterol inhaler prn    #Bipolar disorder, MDD: chronic, stable  - Continue wellbutrin 150mg daily  - Continue prozac 40mg daily  - Continue abilify 15mg daily  - Continue lamictal 100mg at bedtime, 150mg in the morning     Acquired hypothyroidism: chronic,  "stable. TSH normal.   - Continue synthroid 75mcg oral daily    Placement  Daughter feels patient is not safe at home due to increasing falls.  plan is to see if she needs TCU placement.  - PT consult  - SW consult        HPI:    Per ED note \"Amparo Sharma is a 78 year old female with a history of hydrocephalus s/p  shunt 2/8/2017, loss of balance, colon cancer, alcoholism, COPD, HTN, dementia who presents to the Emergency Department for the evaluation of loss of balance. Patient states that her Neurologist recently put her on a new medication that is meant for patient with MS (she does not have MS) to help improve her gait and has noticed that since starting this medication she has had dizziness with recent episodes of falling. Patient states she has been experiencing dizziness since Sunday, two days ago, and that today she had three episodes of dizziness followed by falling on her bottom, denies hitting head or LOC or any injuries. Patient also notes she has episodes of diaphoresis and shaking of her hands though states her ROM of extremities are at baseline. Patient is not anticoagulated\".        In the ED, pt's vitals are stable. LAB: Na 136, K 4.6, Cr. 0.91, BUN 24. LFTs, TSH, and Mag are normal. WBC without leukocytosis or anemia. Trop x 1 negative. EKG, no ischemic changes. ED MD discussed case with neurology who recommended CT head and shunt series and if normal PT evaluation with gait belt. Shunt series shows unremarkable course of the right frontal approach ventriculoperitoneal shunt with the distal tip in the right hemipelvis. CT head, no acute intercranial pathology. Pt admitted for PT to assess if higher level of care is needed given recent frequent falls.       On admission to the observation unit the patient was stable.     History:    Past Medical History:   Diagnosis Date     Abdominal hernia      Alcoholism in remission (H)      Arthritis      Asthma      Bipolar disorder (H)      " Bladder incontinence      COPD (chronic obstructive pulmonary disease) (H)      Dementia      Depression      Falls frequently      GERD (gastroesophageal reflux disease)      History of blood transfusion     20 years ago     History of colon cancer     s/p resection 2015, treated with 5-6 cycles of Folfox     HTN (hypertension)      Hydrocephalus      Hypothyroidism      Loss of balance      Memory loss      Squamous cell cancer of multiple sites of skin of upper arm, left 2016    Dr. Andrea      Thyroid disease      Urinary incontinence        Past Surgical History:   Procedure Laterality Date     CHOLECYSTECTOMY       colon cancer resection  2015     ESOPHAGOSCOPY, GASTROSCOPY, DUODENOSCOPY (EGD), COMBINED N/A 2017    Procedure: COMBINED ESOPHAGOSCOPY, GASTROSCOPY, DUODENOSCOPY (EGD), BIOPSY SINGLE OR MULTIPLE;  EGD;  Surgeon: Brook Marsh MD;  Location: [facility]     GENITOURINARY SURGERY       GI SURGERY       GYN SURGERY      endometriosis, hysterectomy     HYSTERECTOMY      total, related to endometriosis     INSERT DRAIN LUMBAR N/A 2017    Procedure: INSERT DRAIN LUMBAR;  Surgeon: Raji Newton MD;  Location: [facility] OR     multiple knee surgeries      total knees 4 L, 3 right, last one      OPTICAL TRACKING SYSTEM IMPLANT SHUNT VENTRICULOPERITONEAL Right 2017    Procedure: OPTICAL TRACKING SYSTEM IMPLANT SHUNT VENTRICULOPERITONEAL;  Surgeon: Rc Dozier MD;  Location: [facility] OR     ORTHOPEDIC SURGERY      right hip replacement, both knees     rotator cuff surgeries         Family History   Problem Relation Age of Onset     Breast Cancer Mother 60      of met. br ca 62     Depression Father 55      of suicide     Peptic Ulcer Disease Father      Prostate Cancer Brother      Colon Cancer No family hx of        Social History     Social History     Marital status:      Spouse name: N/A     Number of children: 1     Years of education: N/A      Occupational History     nurse      Social History Main Topics     Smoking status: Former Smoker     Packs/day: 0.25     Types: Cigarettes     Start date: 1/1/1980     Quit date: 1/1/1997     Smokeless tobacco: Former User     Alcohol use No      Comment: alcoholism in remission     Drug use: No     Sexual activity: Not on file     Other Topics Concern     Not on file     Social History Narrative         No current facility-administered medications on file prior to encounter.   Current Outpatient Prescriptions on File Prior to Encounter:  Acetaminophen (TYLENOL PO) Take 500 mg by mouth every 8 hours as needed for mild pain or fever    AMLODIPINE BESYLATE PO Take 2.5 mg by mouth 2 times daily    ARIPiprazole (ABILIFY PO) Take 15 mg by mouth daily    BuPROPion HCl (WELLBUTRIN XL PO) Take 150 mg by mouth every morning    Dalfampridine (AMPYRA) 10 MG TB12 Take 1 tablet (10 mg) by mouth 2 times daily   DICLOFENAC SODIUM PO Take 50 mg by mouth 2 times daily   DONEPEZIL HCL PO Take 10 mg by mouth At Bedtime   FLUoxetine HCl (PROZAC PO) Take 40 mg by mouth every morning    LamoTRIgine (LAMICTAL PO) Take 150 mg by mouth in the morning and 100 mg by mouth in the evening   Levothyroxine Sodium 75 MCG CAPS Take 75 mcg by mouth every morning    RANITIDINE HCL PO Take 300 mg by mouth 2 times daily    ALBUTEROL IN Inhale 2 puffs into the lungs every 4 hours as needed    AMOXICILLIN PO Take 500 mg by mouth as needed (Take 1 hour prior to dental appt)    aspirin-acetaminophen-caffeine (EXCEDRIN MIGRAINE) 250-250-65 MG per tablet Take 1 tablet by mouth every 6 hours as needed for headaches   cetirizine (ZYRTEC) 10 MG tablet Take 10 mg by mouth 2 times daily   docusate sodium (COLACE) 100 MG capsule Take 100 mg by mouth daily    Hypromellose (ARTIFICIAL TEARS OP)    Multiple Vitamins-Minerals (MULTIVITAMIN PO) Take by mouth daily   ondansetron (ZOFRAN-ODT) 4 MG ODT tab Take 1 tablet (4 mg) by mouth every 6 hours as needed for  nausea   order for DME Abdominal Binder - Medium   Tiotropium Bromide Monohydrate (SPIRIVA HANDIHALER IN) Inhale 1 puff into the lungs as needed        Data:    Results for orders placed or performed during the hospital encounter of 10/16/18   CT Head w/o Contrast    Narrative    CT HEAD W/O CONTRAST 10/16/2018 8:55 PM    Provided History: Dizzy.    Comparison: CT head 1/3/2018.    Technique: Using multidetector thin collimation helical acquisition  technique, axial, coronal and sagittal CT images from the skull base  to the vertex were obtained without intravenous contrast.     Findings:    Right frontal approach ventriculoperitoneal shunt catheter with the  tip near the foramen of Thompson, unchanged. Stable size of the shunted  ventricular system.    No intracranial hemorrhage, mass effect, or midline shift. The gray to  white matter differentiation of the cerebral hemispheres is preserved.  Generalized parenchymal volume loss. The basal cisterns are patent.    The visualized paranasal sinuses are clear. The mastoid air cells are  clear.       Impression    Impression:   1. Stable size of the shunted ventricular system.   2. No acute intracranial pathology.    I have personally reviewed the examination and initial interpretation  and I agree with the findings.    JAMESON LUNSFORD MD   XR Shunt Malfunction Surgery Survey    Narrative    Exam: XR SHUNT MALFUNCTION SURVEY, 10/16/2018 8:48 PM    Indication:  shunt eval;     Comparison: Radiographic shunt survey 2/9/2017    Findings:   AP AP views of the skull, chest, and pelvis. Lateral views of the  sacrum and skull.    Unremarkable course of the right frontal approach ventriculoperitoneal  shunt along the right thorax and abdomen. The distal end of the  catheter is looped in the right hemipelvis. No sharp turns or kinks in  the catheter.    Elevation of the right hemidiaphragm. Cardiac silhouette is stable. No  focal airspace opacity, pleural effusion, or  pneumothorax. Surgical  clips in the right upper quadrant of the abdomen. No dilated loops of  bowel. Small amount of air and stool project over the rectum. Moderate  stool burden. Surgical clips over the left abdomen and left  hemipelvis. Postoperative changes of right total hip arthroplasty.  Moderate degenerative changes of the left hip.      Impression    Impression: Unremarkable course of the right frontal approach  ventriculoperitoneal shunt with the distal tip in the right  hemipelvis.    I have personally reviewed the examination and initial interpretation  and I agree with the findings.    JAVAN MEJIAS MD   CBC with platelets differential   Result Value Ref Range    WBC 8.4 4.0 - 11.0 10e9/L    RBC Count 4.59 3.8 - 5.2 10e12/L    Hemoglobin 13.2 11.7 - 15.7 g/dL    Hematocrit 42.0 35.0 - 47.0 %    MCV 92 78 - 100 fl    MCH 28.8 26.5 - 33.0 pg    MCHC 31.4 (L) 31.5 - 36.5 g/dL    RDW 14.6 10.0 - 15.0 %    Platelet Count 312 150 - 450 10e9/L    Diff Method Automated Method     % Neutrophils 64.7 %    % Lymphocytes 21.9 %    % Monocytes 8.9 %    % Eosinophils 3.6 %    % Basophils 0.8 %    % Immature Granulocytes 0.1 %    Nucleated RBCs 0 0 /100    Absolute Neutrophil 5.4 1.6 - 8.3 10e9/L    Absolute Lymphocytes 1.8 0.8 - 5.3 10e9/L    Absolute Monocytes 0.8 0.0 - 1.3 10e9/L    Absolute Eosinophils 0.3 0.0 - 0.7 10e9/L    Absolute Basophils 0.1 0.0 - 0.2 10e9/L    Abs Immature Granulocytes 0.0 0 - 0.4 10e9/L    Absolute Nucleated RBC 0.0    INR   Result Value Ref Range    INR 1.05 0.86 - 1.14   Partial thromboplastin time   Result Value Ref Range    PTT 28 22 - 37 sec   Comprehensive metabolic panel   Result Value Ref Range    Sodium 136 133 - 144 mmol/L    Potassium 4.6 3.4 - 5.3 mmol/L    Chloride 103 94 - 109 mmol/L    Carbon Dioxide 24 20 - 32 mmol/L    Anion Gap 8 3 - 14 mmol/L    Glucose 92 70 - 99 mg/dL    Urea Nitrogen 24 7 - 30 mg/dL    Creatinine 0.91 0.52 - 1.04 mg/dL    GFR Estimate 60 (L) >60  mL/min/1.7m2    GFR Estimate If Black 73 >60 mL/min/1.7m2    Calcium 9.9 8.5 - 10.1 mg/dL    Bilirubin Total 0.4 0.2 - 1.3 mg/dL    Albumin 3.8 3.4 - 5.0 g/dL    Protein Total 7.6 6.8 - 8.8 g/dL    Alkaline Phosphatase 115 40 - 150 U/L    ALT 43 0 - 50 U/L    AST 30 0 - 45 U/L   Troponin I   Result Value Ref Range    Troponin I ES <0.015 0.000 - 0.045 ug/L   Magnesium   Result Value Ref Range    Magnesium 2.0 1.6 - 2.3 mg/dL   TSH   Result Value Ref Range    TSH 2.39 0.40 - 4.00 mU/L   EKG 12 lead   Result Value Ref Range    Interpretation ECG Click View Image link to view waveform and result              EKG Interpretation:      EKG Number: 1  Interpreted by Dara Canales  Symptoms at time of EKG: None   Rhythm: Normal sinus   Rate: 65  Axis: Normal  Ectopy: None  Conduction: Normal  ST Segments/ T Waves: No ST-T wave changes and No acute ischemic changes  Q Waves: None  Comparison to prior: Unchanged    Clinical Impression: no acute changes    ROS:  REVIEW OF SYSTEMS:   General: Generalized weakness  EYES: Negative for vision changes or eye problems   ENT: No problems with ears, nose or throat. No difficulty swallowing.   RESP: No coughing, wheezing or shortness of breath   CV: No chest pains or palpitations   GI: No nausea, vomiting, heartburn, abdominal pain, diarrhea, constipation or change in bowel habits   : No urinary frequency or dysuria, bladder or kidney problems   MUSCULOSKELETAL: No significant muscle or joint pains   NEUROLOGIC: Dizziness. No headaches, numbness, tingling, problems with balance or coordination   PSYCHIATRIC: No problems with anxiety, depression or mental health   HEME/IMMUNE/ALLERGY: No history of bleeding or clotting problems or anemia. No allergies or immune system problems   ENDOCRINE: No history of thyroid disease, diabetes or other endocrine disorders   SKIN: No rashes,worrisome lesions or skin problems    PCP: BRISEIDA JACOBS  CARDIAC RISK: HTN    10 point ROS negative other  than the symptoms noted above.      Exam:    Vitals:  B/P: 121/78, T: 98.3, P: Data Unavailable, R: 16    Physical Exam   Constitutional: Pt is oriented to person, place, and time.Pt appears well-developed and well-nourished.   HENT:   Head: Normocephalic and atraumatic.   Eyes: Conjunctivae are normal. Pupils are equal, round, and reactive to light.   Neck: Normal range of motion. Neck supple.   Cardiovascular: Normal rate, regular rhythm, normal heart sounds and intact distal pulses.    Pulmonary/Chest: Effort normal and breath sounds normal. No respiratory distress. Pt has no wheezes. Pt has no rales  Abdominal: Soft. Bowel sounds are normal. Pt exhibits no distension and no mass. No tenderness. Pt has no rebound and no guarding.   Musculoskeletal: Normal range of motion. Pt exhibits no edema.   Neurological: Pt is alert and oriented to person, place, and time. Normal reflexes.   Skin: Skin is warm and dry. No rash noted.   Psychiatric: Pt has a normal mood and affect. Behavior is normal. Judgment and thought content normal.       Consults: Neurology, PT, SW  FEN: Regular   DVT prophylaxis: Early ambulation  Code Status: Full  Disposition: Stable vital signs. Patient return to baseline.  All labs/images reviewed    Signed:  Dara Canales PA-C  October 17, 2018 at 12:29 AM

## 2018-10-17 NOTE — PLAN OF CARE
Problem: Patient Care Overview  Goal: Discharge Needs Assessment  -diagnostic tests and consults completed and resulted. No. Pending  -vital signs normal or at patient baseline. Yes  -returns to baseline functional status. No. Has weakness  -safe disposition plan has been identified. No. Pending

## 2018-10-17 NOTE — PLAN OF CARE
Problem: Patient Care Overview  Goal: Plan of Care/Patient Progress Review  OT6D: OT orders received and acknowledged. Per discussion with PT pt with no acute OT needs. Per interdisciplinary discussion pt with assistance for ADLs in home environment and limited at this time for all OOB activity by dizziness. OT to defer and complete orders at this time.

## 2018-10-17 NOTE — PLAN OF CARE
Problem: Patient Care Overview  Goal: Plan of Care/Patient Progress Review  PT eval attempt x2. Patient receiving echo, then medical team arriving. Will check back.    Discharge Planner PT   Patient plan for discharge: [facility] with resuming home care vs balance center  Current status: patient demos mod I bed mobility, needs [facility] - Светлана sit <> stand transfer with FWW. Ambulates 200 feet with [facility] - SBA and use of FWW. Pt without overt balance impairments, reporting some dizziness/lightheaded at beginning of session which resolves. Verbalizes fall episodes occur with lightheadedness/dizziness, shaking, and sweating.   Barriers to return to prior living situation: medical stability  Recommendations for discharge: anticipate safe to discharge to [facility] with home care, would recommend initial SBA for safety with transfers upon return to [facility]  Rationale for recommendations: see above       Entered by: Vicky Ingram 10/17/2018 11:08 AM      Physical Therapy Discharge Summary    Reason for therapy discharge:    All goals and outcomes met, no further needs identified.    Progress towards therapy goal(s). See goals on Care Plan in [facility] electronic health record for goal details.  Goals met    Therapy recommendation(s):    Continued therapy is recommended.  Rationale/Recommendations:  home PT vs balance center.

## 2018-10-17 NOTE — ED PROVIDER NOTES
History   No chief complaint on file.    HPI  Amparo Sharma is a 78 year old female with a history of hydrocephalus s/p  shunt 2/8/2017, loss of balance, colon cancer, alcoholism, COPD, HTN, dementia who presents to the Emergency Department for the evaluation of loss of balance. Patient states that her Neurologist recently put her on a new medication that is meant for patient with MS (she does not have MS) to help improve her gait and has noticed that since starting this medication she has had dizziness with recent episodes of falling. Patient states she has been experiencing dizziness since Sunday, two days ago, and that today she had three episodes of dizziness followed by falling on her bottom, denies hitting head or LOC or any injuries. Patient also notes she has episodes of diaphoresis and shaking of her hands though states her ROM of extremities are at baseline. Patient is not anticoagulated.     I have reviewed the Medications, Allergies, Past Medical and Surgical History, and Social History in the [employer] system.  Past Medical History:   Diagnosis Date     Abdominal hernia      Alcoholism in remission (H)      Arthritis      Asthma      Bipolar disorder (H)      Bladder incontinence 2013     COPD (chronic obstructive pulmonary disease) (H)      Dementia      Depression      Falls frequently      GERD (gastroesophageal reflux disease)      History of blood transfusion     20 years ago     History of colon cancer     s/p resection 1/2015, treated with 5-6 cycles of Folfox     HTN (hypertension)      Hydrocephalus      Hypothyroidism      Loss of balance      Memory loss      Squamous cell cancer of multiple sites of skin of upper arm, left 2016    Dr. Andrea      Thyroid disease      Urinary incontinence        Past Surgical History:   Procedure Laterality Date     CHOLECYSTECTOMY       colon cancer resection  1/2015     ESOPHAGOSCOPY, GASTROSCOPY, DUODENOSCOPY (EGD), COMBINED N/A 9/7/2017     Procedure: COMBINED ESOPHAGOSCOPY, GASTROSCOPY, DUODENOSCOPY (EGD), BIOPSY SINGLE OR MULTIPLE;  EGD;  Surgeon: Brook Marsh MD;  Location: [facility]     GENITOURINARY SURGERY       GI SURGERY       GYN SURGERY      endometriosis, hysterectomy     HYSTERECTOMY      total, related to endometriosis     INSERT DRAIN LUMBAR N/A 2017    Procedure: INSERT DRAIN LUMBAR;  Surgeon: Raji Newton MD;  Location: [facility] OR     multiple knee surgeries      total knees 4 L, 3 right, last one      OPTICAL TRACKING SYSTEM IMPLANT SHUNT VENTRICULOPERITONEAL Right 2017    Procedure: OPTICAL TRACKING SYSTEM IMPLANT SHUNT VENTRICULOPERITONEAL;  Surgeon: Rc Dozier MD;  Location: [facility] OR     ORTHOPEDIC SURGERY      right hip replacement, both knees     rotator cuff surgeries         Family History   Problem Relation Age of Onset     Breast Cancer Mother 60      of met. br ca 62     Depression Father 55      of suicide     Peptic Ulcer Disease Father      Prostate Cancer Brother      Colon Cancer No family hx of        Social History   Substance Use Topics     Smoking status: Former Smoker     Packs/day: 0.25     Types: Cigarettes     Start date: 1980     Quit date: 1997     Smokeless tobacco: Former User     Alcohol use No      Comment: alcoholism in remission       No current facility-administered medications for this encounter.      Current Outpatient Prescriptions   Medication     Acetaminophen (TYLENOL PO)     AMLODIPINE BESYLATE PO     ARIPiprazole (ABILIFY PO)     BuPROPion HCl (WELLBUTRIN XL PO)     Dalfampridine (AMPYRA) 10 MG TB12     DICLOFENAC SODIUM PO     DONEPEZIL HCL PO     FLUoxetine HCl (PROZAC PO)     LamoTRIgine (LAMICTAL PO)     Levothyroxine Sodium 75 MCG CAPS     RANITIDINE HCL PO     ALBUTEROL IN     AMOXICILLIN PO     aspirin-acetaminophen-caffeine (EXCEDRIN MIGRAINE) 250-250-65 MG per tablet     cetirizine (ZYRTEC) 10 MG tablet     docusate sodium (COLACE)  100 MG capsule     Hypromellose (ARTIFICIAL TEARS OP)     Multiple Vitamins-Minerals (MULTIVITAMIN PO)     ondansetron (ZOFRAN-ODT) 4 MG ODT tab     order for DME     Tiotropium Bromide Monohydrate (SPIRIVA HANDIHALER IN)        Allergies   Allergen Reactions     Antihistamine Allergy Relief [Diphenhydramine] Other (See Comments)     hyperactivity     Codeine Itching     Demerol [Meperidine] Nausea     Hmg-Coa-R Inhibitors Other (See Comments)     Was hospitalized, rhabdomyolitis  Rhabdo?  Statins  Was hospitalized, rhabdomyolitis  Rhabdo?  Statins     Meloxicam Other (See Comments)     Talwin [Pentazocine] Other (See Comments)     drowsiness  drowsiness     Tramadol Nausea     Valium [Diazepam] Other (See Comments)     Hallucinations/paranoid       Review of Systems   Constitutional: Positive for diaphoresis.   Musculoskeletal: Positive for gait problem.   Neurological: Positive for dizziness and tremors. Negative for syncope.   All other systems reviewed and are negative.      Physical Exam   BP: 181/90  Heart Rate: 82  Temp: 98.3  F (36.8  C)  Resp: 16  Weight: 80 kg (176 lb 5.9 oz)  SpO2: 95 %      Physical Exam   Constitutional: She is oriented to person, place, and time. No distress.   HENT:   Head: Atraumatic.   Mouth/Throat: Oropharynx is clear and moist. No oropharyngeal exudate.   Eyes: Pupils are equal, round, and reactive to light. No scleral icterus.   Cardiovascular: Normal heart sounds and intact distal pulses.    Pulmonary/Chest: Breath sounds normal. No respiratory distress.   Abdominal: Soft. Bowel sounds are normal. There is no tenderness.   Musculoskeletal: She exhibits no edema or tenderness.   Neurological: She is alert and oriented to person, place, and time. Gait abnormal.   Neurovascularly intact; cranial nerve function within limit   Skin: Skin is warm. No rash noted. She is not diaphoretic.       ED Course   7:44 PM  The patient was seen and examined by José Cantrell MD in Room 23.     ED  Course   History physical examination presentation I plan to get a CAT scan of the patient's brain and I also plan to check electrolytes as well as shunt series to make sure there is no evidence of kinks or breaks on the  shunt.  Furthermore I plan to get a neurology consult so the patient can be evaluated for any evidence of acute significant neurological pathology.  Patient and daughter agree with the management    Addendum 2210 hrs. have spoken to patient regarding the fact that if she gets admitted to observation ED she will be formally evaluated for PT OT and or to see if she needs transitional care unit.  Patient and daughter do agree with getting more input and evaluation in order to try to improve the situation and will be admitted at this point to the observation ER accepted by [facility].    Procedures  None       Critical Care time:  none             Labs Ordered and Resulted from Time of ED Arrival Up to the Time of Departure from the ED   CBC WITH PLATELETS DIFFERENTIAL - Abnormal; Notable for the following:        Result Value    MCHC 31.4 (*)     All other components within normal limits   COMPREHENSIVE METABOLIC PANEL - Abnormal; Notable for the following:     GFR Estimate 60 (*)     All other components within normal limits   INR   PARTIAL THROMBOPLASTIN TIME   TROPONIN I   MAGNESIUM   TSH   PERIPHERAL IV CATHETER   CARDIAC CONTINUOUS MONITORING            Assessments & Plan (with Medical Decision Making)   This patient is a very nice 78-year-old female with a past medical history of normal pressure hydrocephalus for which she has a  shunt that was placed several years ago.  Patient came to the emergency department because she has been having very severe dizziness that lead to falls patient denies any syncope denies any chest pain and denies any difficult trauma according to patient symptoms started just a couple of days ago.  Patient does have a history of hypertension has a history of COPD asthma  gastroesophageal reflux disease and thyroid disease.  Imaging labs as well as neurological consult will be done at this point    I have reviewed the nursing notes.    I have reviewed the findings, diagnosis, plan and need for follow up with the patient.    New Prescriptions    No medications on file       Final diagnoses:   Dizziness   Muscle weakness (generalized)   Benign essential hypertension   NPH (normal pressure hydrocephalus)   S/P ventriculoperitoneal shunt   Inability to walk     I, Jam Kumar, am serving as a trained medical scribe to document services personally performed by José Cantrell MD, based on the provider's statements to me.   I, José Cantrell MD, was physically present and have reviewed and verified the accuracy of this note documented by Jam Kumar.    10/16/2018   [facility], [place], EMERGENCY DEPARTMENT     José Cantrell MD  10/16/18 [id number]

## 2018-10-17 NOTE — PLAN OF CARE
Problem: Patient Care Overview  Goal: Plan of Care/Patient Progress Review  -diagnostic tests and consults completed and resulted: NO, UA results pending    -vital signs normal or at patient baseline: YES    -returns to baseline functional status: YES, up w/ walker and SBA     -safe disposition plan has been identified: PENDING

## 2018-10-17 NOTE — PROGRESS NOTES
[facility], [place]   Neurology Daily Note          Assessment and Plan:   76 year old female with PMH of HTN, GERD, colon cancer, alcoholism, COPD,  bipolar/MDD, hypothyroidism, bilateral knee replacements, rotator cuff repair right, right ankle ORIF and NPH s/p shunt 2017  who presents with acute on chronic falls. Patient reports that for the past few months she has been having progressively worse episodes of lightheadedness with associated diaphoresis and trembling with subsequent falls. This weekend, she reports an acute increase in symptoms with multiple episodes in a four-hour period. Most episodes appear to occur after standing. Most likely multifactorial etiology of spells including polypharmacy given that patient is on several drugs (Wellbutrin, Donepezil, as well as recent initiation of bactrim for ppx for botox inj for bladder spasm) that can cause ataxia as well as possible orthostatic hypotension given the symptom and hx course (diaphoresis and trembling after standing). Normal CT and  shunt evaluation suggest episodes are not likely 2/2 to shunt malfunction.  Although patient reports subjective low grade fevers, infection not likely given no leukocytosis and no fever. Unlikely to be cardiogenic given normal troponin and no acute EKG changes. Arrhythmia possible given that patient is slightly bradycardic, however, can also be 2/2 to donezpil. TSH WNL, no recent B12.   - Discontinue Donepezil given possible bradycardia side effect, pt also has not had any subjective symptomatic improvement from medication   - Continue Ampyra. Patient reports some improvement with use, and medication has relatively safe side effect profile  - PT evaluation, will follow up with suggestions  - Orthostatic vital signs  - B12 (we ordered this for you)  - Dispo: pending, patient currently ambulates with walker and lives in assisted living with high level of support, will reassess after PT  reccomendations    Note written by [employer] student Bekah Chicas. Agree with what is written above and below. Patient was also seen and discussed with our attending, Dr. Esparza.      Teresa Juarez MD  IM PGY3  Pager [phone]    Pt seen and examined. See consult from yesterday. Case discussed with house staff. Agree with assessment and recommendations of Dr. Juarez.  PREMA Esparza MD        Interval History:   No acute events overnight. Patient has not attempted to stand due to feelings of unsteadiness and lightheadedness.             Review of Systems:   The 10 point Review of Systems is negative other than noted in the HPI          Medications:     Current Facility-Administered Medications   Medication     amLODIPine (NORVASC) tablet 2.5 mg     ARIPiprazole (ABILIFY) tablet 15 mg     aspirin-acetaminophen-caffeine (EXCEDRIN MIGRAINE) per tablet 1 tablet     buPROPion (WELLBUTRIN XL) 24 hr tablet 150 mg     Dalfampridine TB12 10 mg     docusate sodium (COLACE) capsule 100 mg     FLUoxetine (PROzac) capsule 40 mg     lamoTRIgine (LaMICtal) tablet 100 mg     lamoTRIgine (LaMICtal) tablet 150 mg     levothyroxine (SYNTHROID/LEVOTHROID) tablet 75 mcg     melatonin tablet 1 mg     naloxone (NARCAN) injection 0.1-0.4 mg     ondansetron (ZOFRAN-ODT) ODT tab 4 mg    Or     ondansetron (ZOFRAN) injection 4 mg     ranitidine (ZANTAC) tablet 300 mg     sodium chloride (PF) 0.9% PF flush 10 mL     sodium chloride 0.9% infusion             Physical Exam:   Vitals were reviewed  Temp: 97.7  F (36.5  C) Temp src: Oral BP: 142/73   Heart Rate: 58 Resp: 12 SpO2: 94 % O2 Device: None (Room air)     General: Alert, NAD  Eyes: PEERL  Cardiac: bradycardic, normal rhythm   Resp: Lung sounds clear bilaterally  Neuro  Mental status: Alert and oriented  CN: II-XII intact.  Motor: Deltoid 4/5, biceps and triceps 4+, Hip flexion 4+/5 on right, 4/5 on left, Knee extension and flexion 4-/5. Dorsiflexion and plantar flexion 4+.   Reflexes: 3+  patellar and achilles reflex bilaterally, Upwards babinski bilaterally   Sensory: Intact to light touch, patient reports diminished slightly on left compared to right   Coordination: FNF and THEODORE limited due to pain          Data:     Results for orders placed or performed during the hospital encounter of 10/16/18 (from the past 24 hour(s))   CBC with platelets differential   Result Value Ref Range    WBC 8.4 4.0 - 11.0 10e9/L    RBC Count 4.59 3.8 - 5.2 10e12/L    Hemoglobin 13.2 11.7 - 15.7 g/dL    Hematocrit 42.0 35.0 - 47.0 %    MCV 92 78 - 100 fl    MCH 28.8 26.5 - 33.0 pg    MCHC 31.4 (L) 31.5 - 36.5 g/dL    RDW 14.6 10.0 - 15.0 %    Platelet Count 312 150 - 450 10e9/L    Diff Method Automated Method     % Neutrophils 64.7 %    % Lymphocytes 21.9 %    % Monocytes 8.9 %    % Eosinophils 3.6 %    % Basophils 0.8 %    % Immature Granulocytes 0.1 %    Nucleated RBCs 0 0 /100    Absolute Neutrophil 5.4 1.6 - 8.3 10e9/L    Absolute Lymphocytes 1.8 0.8 - 5.3 10e9/L    Absolute Monocytes 0.8 0.0 - 1.3 10e9/L    Absolute Eosinophils 0.3 0.0 - 0.7 10e9/L    Absolute Basophils 0.1 0.0 - 0.2 10e9/L    Abs Immature Granulocytes 0.0 0 - 0.4 10e9/L    Absolute Nucleated RBC 0.0    INR   Result Value Ref Range    INR 1.05 0.86 - 1.14   Partial thromboplastin time   Result Value Ref Range    PTT 28 22 - 37 sec   Comprehensive metabolic panel   Result Value Ref Range    Sodium 136 133 - 144 mmol/L    Potassium 4.6 3.4 - 5.3 mmol/L    Chloride 103 94 - 109 mmol/L    Carbon Dioxide 24 20 - 32 mmol/L    Anion Gap 8 3 - 14 mmol/L    Glucose 92 70 - 99 mg/dL    Urea Nitrogen 24 7 - 30 mg/dL    Creatinine 0.91 0.52 - 1.04 mg/dL    GFR Estimate 60 (L) >60 mL/min/1.7m2    GFR Estimate If Black 73 >60 mL/min/1.7m2    Calcium 9.9 8.5 - 10.1 mg/dL    Bilirubin Total 0.4 0.2 - 1.3 mg/dL    Albumin 3.8 3.4 - 5.0 g/dL    Protein Total 7.6 6.8 - 8.8 g/dL    Alkaline Phosphatase 115 40 - 150 U/L    ALT 43 0 - 50 U/L    AST 30 0 - 45 U/L    Troponin I   Result Value Ref Range    Troponin I ES <0.015 0.000 - 0.045 ug/L   Magnesium   Result Value Ref Range    Magnesium 2.0 1.6 - 2.3 mg/dL   TSH   Result Value Ref Range    TSH 2.39 0.40 - 4.00 mU/L   EKG 12 lead   Result Value Ref Range    Interpretation ECG Click View Image link to view waveform and result    XR Shunt Malfunction Surgery Survey    Narrative    Exam: XR SHUNT MALFUNCTION SURVEY, 10/16/2018 8:48 PM    Indication:  shunt eval;     Comparison: Radiographic shunt survey 2/9/2017    Findings:   AP AP views of the skull, chest, and pelvis. Lateral views of the  sacrum and skull.    Unremarkable course of the right frontal approach ventriculoperitoneal  shunt along the right thorax and abdomen. The distal end of the  catheter is looped in the right hemipelvis. No sharp turns or kinks in  the catheter.    Elevation of the right hemidiaphragm. Cardiac silhouette is stable. No  focal airspace opacity, pleural effusion, or pneumothorax. Surgical  clips in the right upper quadrant of the abdomen. No dilated loops of  bowel. Small amount of air and stool project over the rectum. Moderate  stool burden. Surgical clips over the left abdomen and left  hemipelvis. Postoperative changes of right total hip arthroplasty.  Moderate degenerative changes of the left hip.      Impression    Impression: Unremarkable course of the right frontal approach  ventriculoperitoneal shunt with the distal tip in the right  hemipelvis.    I have personally reviewed the examination and initial interpretation  and I agree with the findings.    JAVAN MEJIAS MD   CT Head w/o Contrast    Narrative    CT HEAD W/O CONTRAST 10/16/2018 8:55 PM    Provided History: Dizzy.    Comparison: CT head 1/3/2018.    Technique: Using multidetector thin collimation helical acquisition  technique, axial, coronal and sagittal CT images from the skull base  to the vertex were obtained without intravenous contrast.     Findings:    Right  frontal approach ventriculoperitoneal shunt catheter with the  tip near the foramen of Thompson, unchanged. Stable size of the shunted  ventricular system.    No intracranial hemorrhage, mass effect, or midline shift. The gray to  white matter differentiation of the cerebral hemispheres is preserved.  Generalized parenchymal volume loss. The basal cisterns are patent.    The visualized paranasal sinuses are clear. The mastoid air cells are  clear.       Impression    Impression:   1. Stable size of the shunted ventricular system.   2. No acute intracranial pathology.    I have personally reviewed the examination and initial interpretation  and I agree with the findings.    JAMESON LUNSFORD MD   Troponin I   Result Value Ref Range    Troponin I ES <0.015 0.000 - 0.045 ug/L   Troponin I   Result Value Ref Range    Troponin I ES <0.015 0.000 - 0.045 ug/L   Vitamin B12   Result Value Ref Range    Vitamin B12 882 193 - 986 pg/mL   ECHO COMPLETE WITH OPTISON    Narrative    [id number]  [id number]  [id number]  [id number]^ANAYELI^SLOAN^EVIE           [facility],[place]  Echocardiography Laboratory  [address] [place] [id number]     Name: ATILIO COPPOLA  MRN: [id number]  : 1940  Study Date: 10/17/2018 09:29 AM  Age: 78 yrs  Gender: Female  Patient Location: [facility], [place]  Reason For Study: Dizziness  Ordering Physician: SLOAN GUADALUPE  Performed By: Odilon Brooks RDCS     BSA: 1.8 m2  Height: 59 in  Weight: 179 lb  HR: 58  BP: 142/73 mmHg  _____________________________________________________________________________  __        Procedure  Complete Portable Echo Adult. Contrast Optison. Echocardiogram with two-  dimensional, color and spectral Doppler performed. Technically difficult  study. Optison (NDC #9432-2768-77) given intravenously. Patient was given 4 ml  mixture of 3 ml Optison and 6 ml saline. 5 ml  wasted.  _____________________________________________________________________________  __        Interpretation Summary  Technically difficult study.  Global and regional left ventricular function is normal with an EF of 60-65%.  Global right ventricular function is normal.  Mild tricuspid insufficiency is present.  The inferior vena cava was normal in size with preserved respiratory  variability.  No pericardial effusion is present.  _____________________________________________________________________________  __        Left Ventricle  Left ventricular size is normal. Left ventricular wall thickness is normal.  Global and regional left ventricular function is normal with an EF of 60-65%.  Left ventricular diastolic function is indeterminate. No regional wall motion  abnormalities are seen.     Right Ventricle  The right ventricle is normal size. Global right ventricular function is  normal.     Atria  The right atria appears normal. Severe left atrial enlargement is present.     Mitral Valve  The mitral valve is normal.        Aortic Valve  The aortic valve is tricuspid. Mild aortic valve sclerosis is present.     Tricuspid Valve  Mild tricuspid insufficiency is present. The peak velocity of the tricuspid  regurgitant jet is not obtainable. Pulmonary artery systolic pressure cannot  be assessed.     Pulmonic Valve  The pulmonic valve is normal.     Vessels  The aorta root is normal. The inferior vena cava was normal in size with  preserved respiratory variability.     Pericardium  No pericardial effusion is present.        Compared to Previous Study  Previous study not available for comparison.  _____________________________________________________________________________  __  MMode/2D Measurements & Calculations     IVSd: 1.1 cm  LVIDd: 4.1 cm  LVIDs: 2.4 cm  LVPWd: 1.1 cm  FS: 41.8 %  LV mass(C)d: 146.8 grams  LV mass(C)dI: 83.5 grams/m2  asc Aorta Diam: 2.9 cm  LVOT diam: 2.0 cm  LVOT area: 3.1 cm2  LA Volume  (BP): 85.2 ml  LA Volume Index (BP): 48.4 ml/m2  RWT: 0.52           Doppler Measurements & Calculations  MV E max deni: 53.8 cm/sec  MV A max deni: 69.4 cm/sec  MV E/A: 0.78  MV dec time: 0.24 sec  Ao V2 max: 147.0 cm/sec  Ao max P.0 mmHg  JOSE(V,D): 2.1 cm2  LV V1 max P.0 mmHg  LV V1 max: 100.0 cm/sec  AV Deni Ratio (DI): 0.68  E/E' av.1  Lateral E/e': 6.8  Medial E/e': 11.4     _____________________________________________________________________________  __           Report approved by: Jessica Claudio 10/17/2018 10:56 AM

## 2018-10-30 ENCOUNTER — TRANSFERRED RECORDS (OUTPATIENT)
Dept: HEALTH INFORMATION MANAGEMENT | Facility: CLINIC | Age: 78
End: 2018-10-30

## 2018-11-16 ENCOUNTER — MEDICAL CORRESPONDENCE (OUTPATIENT)
Dept: HEALTH INFORMATION MANAGEMENT | Facility: CLINIC | Age: 78
End: 2018-11-16

## 2018-11-29 ENCOUNTER — TELEPHONE (OUTPATIENT)
Dept: NEUROLOGY | Facility: CLINIC | Age: 78
End: 2018-11-29

## 2018-11-29 NOTE — TELEPHONE ENCOUNTER
[facility] Call Center    Phone Message    May a detailed message be left on voicemail: yes    Reason for Call: Other: Pt returning call regarding appt, please call to discuss     Action Taken: Message routed to:  [facility] Clinic     Called pt and left message that Dr. Casillas would like to see her on Dec. 14 at 11am.

## 2018-12-04 ENCOUNTER — TELEPHONE (OUTPATIENT)
Dept: NEUROLOGY | Facility: CLINIC | Age: 78
End: 2018-12-04

## 2018-12-04 NOTE — TELEPHONE ENCOUNTER
Health Call Center    Phone Message    May a detailed message be left on voicemail: no    Reason for Call: Other: Pt wants to speak with Lucy Gabriel about confirming an appt date for Dr. Casillas on 12/14/18 at 11am. Please contact Pt.     Action Taken: Message routed to:  [facility]     Spoke to pt and she agreed to the scheduled appointment date with Dr. Casillas on 12/14/2018

## 2018-12-14 ENCOUNTER — MEDICAL CORRESPONDENCE (OUTPATIENT)
Dept: HEALTH INFORMATION MANAGEMENT | Facility: CLINIC | Age: 78
End: 2018-12-14

## 2018-12-14 ENCOUNTER — OFFICE VISIT (OUTPATIENT)
Dept: NEUROLOGY | Facility: CLINIC | Age: 78
End: 2018-12-14
Payer: MEDICARE

## 2018-12-14 ENCOUNTER — RECORDS - HEALTHEAST (OUTPATIENT)
Dept: ADMINISTRATIVE | Facility: OTHER | Age: 78
End: 2018-12-14

## 2018-12-14 VITALS
HEART RATE: 82 BPM | SYSTOLIC BLOOD PRESSURE: 155 MMHG | HEIGHT: 61 IN | DIASTOLIC BLOOD PRESSURE: 75 MMHG | TEMPERATURE: 98.3 F | BODY MASS INDEX: 33.23 KG/M2 | WEIGHT: 176 LBS | RESPIRATION RATE: 16 BRPM | OXYGEN SATURATION: 94 %

## 2018-12-14 DIAGNOSIS — R26.89 BALANCE PROBLEMS: Primary | ICD-10-CM

## 2018-12-14 DIAGNOSIS — R27.0 ATAXIA: ICD-10-CM

## 2018-12-14 DIAGNOSIS — R26.89 BALANCE PROBLEMS: ICD-10-CM

## 2018-12-14 PROBLEM — L97.521 FOOT ULCER, LEFT, LIMITED TO BREAKDOWN OF SKIN (H): Status: ACTIVE | Noted: 2018-12-14

## 2018-12-14 PROBLEM — K21.9 GERD (GASTROESOPHAGEAL REFLUX DISEASE): Status: ACTIVE | Noted: 2018-10-16

## 2018-12-14 PROBLEM — I10 ESSENTIAL HYPERTENSION: Status: ACTIVE | Noted: 2018-10-16

## 2018-12-14 PROBLEM — F03.90 DEMENTIA (H): Status: ACTIVE | Noted: 2018-10-16

## 2018-12-14 PROBLEM — F32.9 MAJOR DEPRESSION: Status: ACTIVE | Noted: 2018-10-16

## 2018-12-14 PROBLEM — E03.9 HYPOTHYROID: Status: ACTIVE | Noted: 2018-10-16

## 2018-12-14 PROBLEM — J44.9 COPD (CHRONIC OBSTRUCTIVE PULMONARY DISEASE) (H): Status: ACTIVE | Noted: 2018-10-16

## 2018-12-14 PROBLEM — M17.9 OSTEOARTHRITIS OF KNEE: Status: ACTIVE | Noted: 2018-10-16

## 2018-12-14 PROBLEM — J45.909 ASTHMA: Status: ACTIVE | Noted: 2018-10-16

## 2018-12-14 PROBLEM — I73.9 PVD (PERIPHERAL VASCULAR DISEASE) (H): Status: ACTIVE | Noted: 2017-01-20

## 2018-12-14 PROBLEM — K43.9 VENTRAL HERNIA: Status: ACTIVE | Noted: 2018-10-16

## 2018-12-14 PROBLEM — R32 URINARY INCONTINENCE: Status: ACTIVE | Noted: 2018-10-16

## 2018-12-14 ASSESSMENT — PAIN SCALES - GENERAL: PAINLEVEL: MILD PAIN (3)

## 2018-12-14 ASSESSMENT — MIFFLIN-ST. JEOR: SCORE: 1220.71

## 2018-12-14 NOTE — NURSING NOTE
Chief Complaint   Patient presents with     Gait Problem     [facility] RETURN ATAXIA- NORMAL PRESSURE HYDROCEPHALUS     Prasanna Ingram, EMT

## 2018-12-14 NOTE — LETTER
"12/14/2018       RE: Amparo Sharma  [address]  [place] [address]     Dear Colleague,    Thank you for referring your patient, Amparo Sharma, to the [facility] NEUROLOGY at [facility]. Please see a copy of my visit note below.    Service Date: 12/14/2018      HISTORY OF PRESENT ILLNESS:  Ms. Amparo Sharma returns for a followup visit having been last seen on 10/05/2018.  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 Alex Gruber on 03/16/2018 and complaining of increasing gait disturbance and memory loss.  She was totally walker dependent.  Dr. Gruber prescribed amantadine, which basically caused confusion and she just did not tolerate the medication, so she was referred to the [facility].  I prescribed Ampyra (dalfampridine) and she had much improvement in her gait.  However, she continued to have problems with her knee joints, especially on the right.  A right knee brace was ordered.  Also, Ms. Sharma was referred [facility].  Her frequency of physical therapy was decreased to once a week.  The patient really wanted to walk around her residence without escort and I suggested that the physical therapist make a determination whether or not she could handle that.      Since I saw her on 10/05/2018 she fell, this being on 10/16/2018.  She states that her \"walker moved\" in a way which caused her to lose her balance.  No significant injury was sustained, but she did have a little bruising over the left arm.  She nevertheless did go to the urgent care clinic at the [facility] where she was observed for 12 hours, then discharged to her home.  Then, approximately 3 weeks ago on 11/21/2018, Ms. Sharma fell " "again while getting into some sort of transport.  She was taken to [facility] Emergency Department in [place], [place] on 11/21/2018.        Since those 2 events.  The patient is quite disappointed that her walking has deteriorated.  She states, \"I am in a wheelchair all the time and just cannot walk unassisted or unescorted.\"      In reviewing her referral to [facility] where she was initially seen on 11/02/2018 she stated that she went for 4 visits.  She was given some type of exercises involving her eyes when she would move her head but not her eyes while she was focusing on some objects straight ahead.  After Dr. Gruber had referred Ms. Sharma to the [facility] before she was [facility], she went through balance exercises where she would  a corner with her eyes closed.  After about 60 seconds she became quite weak.      With respect to her memory, she says that she was to be discharged to the [facility] after being seen at the [place] but declined that.      Further, she states that the Ampyra really gave her a \"stronger gait.\"  However, after going to the [facility] for 4 weeks as mentioned above she actually thought that her imbalance was worse and that she could not walk as well.      Then on 11/21/2018 she again went to the [facility] Emergency Department via ambulance.  Reason for the visit is that she had fallen and lacerated her left shin and a contusion of her right knee.  She was transported via ambulance to the [facility] Emergency Department once again.  Quoting the triage nurse, \"[facility] walking on the sidewalk with a walker and assisted by transport worker.  Walker got caught up in sidewalk salt and caused her to lose her balance.  Transporter assisted her to the ground.  The patient sustained shin tear to the left lower leg.  The patient " "was not dizzy or off balance prior to the falling.  She reported shortness of breath after the fall and attributed that due to anxiety.  She did not hit her head, nor have other injuries.\"  In the ED Department at [facility], the patient was evaluated and treated by Raji Vences, Clinical Nurse Practitioner.  His history reported was exactly that noted by the triage nurse.  On examination, the patient had no problems relative to her head, eyes, ears, nose, throat, lungs and heart.  The musculoskeletal examination revealed, \"she is wearing a neoprene brace on the right knee.  There is some mild right knee pain with range of motion and diffuse tenderness.  No swelling, no deformity.  No tenderness, swelling or deformity in the remainder of the joints in the lower extremities.  She does have some tenderness in the anterior left shin at the site of injury.\"  An x-ray of her right knee showed \"intact total knee arthroplasty with long stem femoral and tibial components.  Chronic appearing ossification posterior to the distal femur.  No fracture or dislocation.\"  Laboratory studies included the followin. CBC without differential - normal.   2. Urinalysis macro and micro - negative.   3. Basic metabolic panel - normal.      On further review of her treatment in the ED Department of [facility] on 2018 the patient was discharged after she had repair of the left shin tear with Steri-Strips.  She was then ambulatory and discharged back to the assisted living facility.  Final impression at the time of her discharge was as follows:     1. Fall, initial encounter.   2. Laceration of left lower extremity, initial encounter.     3. Contusion of right knee, initial encounter.        At the time of this visit the patient was transported from the [facility] in [place], [place] to the [facility].      CURRENT MEDICATIONS:   1. Abilify (aripiprazole) 15 mg at 8:00 p.m. for bipolar disorder.   2. Ampyra " (dalfampridine) 10 mg at 8:00 a.m. and 8:00 p.m.   3. Bupropion XL (Wellbutrin) 150 mg 8:00 a.m. for major depression.     4. Centrum Silver 1 daily in the morning for supplementation.   5. Colace 100 mg in the morning for constipation.   6. Diclofenac sodium (Voltaren) 50 mg at 8:00 a.m. and 8:00 p.m.   7. Eucerin lotion applied every morning for dry skin.   8. Fluoxetine (Prozac) 40 mg 8:00 a.m. for major depression.   9. Lamotrigine (Lamictal) 150 mg at 8:00 a.m. and 100 mg at 8:00 p.m.   10. Levothyroxine (Synthroid) 75 mcg at 8:00 a.m. for hypothyroidism.   11. Ranitidine (Zantac) 300 mg 8 a.m. and 8:00 p.m.    12. Turmeric supplement 2 times a day.   13. Tylenol 1000 mg 3 times a day as indicated for pain.      P.R.N. MEDICATIONS/TREATMENTS:     1. Albuterol inhaler every 4 hours, 2 puffs p.r.n.     2. Amoxicillin 500 mg 1 hour prior to dental appointment taking 4 tablets for prophylaxis.   3. Clobetasol proprionate for contact dermatitis p.r.n.     4. Excedrin Extra Strength every 6 hours, taking 2 tablets p.r.n. for pain.     5. Hydrocortisone 2.5% cream twice a day as indicated for rash or itching.     6. Zofran (ondansetron) 3 times daily, taking 4 mg p.r.n. for nausea.      PHYSICAL EXAMINATION:  Amparo is her usual cheerful self in spite of her many tribulations.  She is alert and oriented in all spheres.  She does complain of some memory difficulties, but I did not detect this to be a problem.  Her attention and concentration are normal and there is no evidence of thought disorder.  She establishes good eye contact and her chronological history report is congruent with the records I have reviewed from outside.      VITAL SIGNS:  Blood pressure 155/75, pulse 82, temperature 98.3 degrees Fahrenheit, respirations 16, height 5 feet 1 inch, weight 176 pounds, pulse oximetry 94%.  Neurologic examination, as mentioned, there appears to be no cognitive impairment that is clinically relevant.  Examination of the  cranial nerves is normal with the exception of diminished convergence.  Her reflexes are 2+ in the upper extremities, although the right brachialis reflex is absent.  Her knee jerks are 1+ and ankle jerks are absent.  There is no upgoing toe to plantar stimulation.  There is some weakness of the left anterior deltoid.  Coordination in her upper and lower extremities shows no evidence of cerebellar dysfunction.  She is unable to walk at all today on her own, although can walk with assistance.      IMPRESSION:   1. Gait disturbance.     * Associated with NPH.      * Post-shunt surgery.     * Improved gait with Ampyra.     * Worsened gait since her treatment [facility].   2. Unable to stand unassisted.   3. Wheelchair at all times now.      PLAN:   1. Increasing Ampyra at this time would not be helpful.   2. Physical therapy through [facility].  Purpose is to increase strength.  Diagnosis is worsening balance.   3. Return 2019.      A total of 40 minutes was spent with Ms. Coppola, the great majority of which was dedicated to the discussion of the change in her walking ability after her treatment [facility].  Also, a considerable amount of time was spent in reviewing her old records in her presence.  Further time was spent in detailing going forward from here.      Evaluation and Management (E/M), code 25547.      SCAR RUSS MD             D: 2019   T: 2019   MT: lopez      Name:     ATILIO COPPOLA   MRN:      [id number]        Account:      [id number]   :      1940           Service Date: 2018      Document: [id number]

## 2018-12-19 ENCOUNTER — TELEPHONE (OUTPATIENT)
Dept: NEUROLOGY | Facility: CLINIC | Age: 78
End: 2018-12-19

## 2018-12-19 NOTE — TELEPHONE ENCOUNTER
[facility] Call Center    Phone Message    May a detailed message be left on voicemail: yes    Reason for Call: Order(s): Other:   Reason for requested: PT two times a week for four weeks for functional mobility and fall prevention. OT eval and treat for wheelchair positioning. Blood Pressure parameters, has hx of hypertension. VERBAL orders OK, OK to leave a detailed voicemail.  Date needed: As soon as possible.  Provider name: Dr. Casillas      Action Taken: Message routed to:  [facility] Neurology     Called pt, she said she will have to talk at a late time. She is in the hospital due to bronchitis and cellulitis in her leg. She was told that this writer would call her next week.

## 2018-12-24 ENCOUNTER — TELEPHONE (OUTPATIENT)
Dept: NEUROLOGY | Facility: CLINIC | Age: 78
End: 2018-12-24

## 2018-12-24 NOTE — TELEPHONE ENCOUNTER
[facility] Call Center    Phone Message    May a detailed message be left on voicemail: yes    Reason for Call: Order(s): Home Care Orders: Physical Therapy (PT): PT 2 times a week for four weeks, OT eval & treat for wheel chair positioning, also need BP parameters because Pt has Hx or hypertension - Please return call Serenity from [facility], Ph # [phone], with confidential vml  - Left msg on 19th with no response - needs these verbal orders asap and okay to leave vml msg - Thanks    Action Taken: Message routed to:  [facility] Neurology Clinic     Called [facility] personal, Serenity and gave verbal order for P.T. And O.T. Also left message that [facility] and this writer does not know if they cover the physical therapy if a patient no longer is in their home. Asked Serenity to call back this writer.

## 2018-12-27 ENCOUNTER — TELEPHONE (OUTPATIENT)
Dept: NEUROLOGY | Facility: CLINIC | Age: 78
End: 2018-12-27

## 2018-12-27 NOTE — TELEPHONE ENCOUNTER
[facility] Call Center    Phone Message    May a detailed message be left on voicemail: yes    Reason for Call: Other: Amparo from [facility] calling to say that the pt is actually transitioning today into [facility].  [facility] does not have a specialty pharmacy to get this medication, and the transitioning provider does not feel comfortable sending the Pt there without Dr Casillas's approval to stop the medication.  Please call Amparo at [facility] to discuss     Action Taken: Message routed to:  [facility]  Neurology     Called [facility] and spoke to the nurse that was caring for Amparo. She said there is a note in the chart from the discharge physician that states pt's daughter will bring the medication in from home on 12/28 and pt will take the medication as ordered.

## 2018-12-27 NOTE — TELEPHONE ENCOUNTER
Health Call Center    Phone Message    May a detailed message be left on voicemail: yes    Reason for Call: Medication Question or concern regarding medication   Prescription Clarification  Name of Medication: AMPYRA    Prescribing Provider: Dr. Casillas   Pharmacy: Currently w/ [facility]     What on the order needs clarification? Amparo would like to know if she is able to be off the medication for a period of time.  The medication is currently being held by [employer].  Amparo will be transferring to [facility] [address] tomorrow afternoon.          Action Taken: Message routed to:  [facility] neurology

## 2018-12-28 ENCOUNTER — COMMUNICATION - HEALTHEAST (OUTPATIENT)
Dept: RESPIRATORY THERAPY | Facility: CLINIC | Age: 78
End: 2018-12-28

## 2018-12-28 ENCOUNTER — OFFICE VISIT - HEALTHEAST (OUTPATIENT)
Dept: GERIATRICS | Facility: CLINIC | Age: 78
End: 2018-12-28

## 2018-12-28 DIAGNOSIS — I10 ESSENTIAL HYPERTENSION: ICD-10-CM

## 2018-12-28 DIAGNOSIS — R06.00 DYSPNEA, UNSPECIFIED TYPE: ICD-10-CM

## 2018-12-28 DIAGNOSIS — J44.1 COPD WITH ACUTE EXACERBATION (H): ICD-10-CM

## 2018-12-28 DIAGNOSIS — R53.81 PHYSICAL DECONDITIONING: ICD-10-CM

## 2018-12-31 ENCOUNTER — OFFICE VISIT - HEALTHEAST (OUTPATIENT)
Dept: GERIATRICS | Facility: CLINIC | Age: 78
End: 2018-12-31

## 2018-12-31 DIAGNOSIS — I10 ESSENTIAL HYPERTENSION: ICD-10-CM

## 2018-12-31 DIAGNOSIS — I87.8 VENOUS STASIS: ICD-10-CM

## 2018-12-31 DIAGNOSIS — R41.89 COGNITIVE DECLINE: ICD-10-CM

## 2018-12-31 DIAGNOSIS — R26.89 BALANCE DISORDER: ICD-10-CM

## 2018-12-31 DIAGNOSIS — R06.00 DYSPNEA, UNSPECIFIED TYPE: ICD-10-CM

## 2018-12-31 DIAGNOSIS — J44.1 COPD WITH ACUTE EXACERBATION (H): ICD-10-CM

## 2018-12-31 DIAGNOSIS — R29.898 LEG WEAKNESS, BILATERAL: ICD-10-CM

## 2018-12-31 DIAGNOSIS — G91.2 NPH (NORMAL PRESSURE HYDROCEPHALUS) (H): ICD-10-CM

## 2018-12-31 DIAGNOSIS — R53.81 PHYSICAL DECONDITIONING: ICD-10-CM

## 2019-01-02 ENCOUNTER — OFFICE VISIT - HEALTHEAST (OUTPATIENT)
Dept: GERIATRICS | Facility: CLINIC | Age: 79
End: 2019-01-02

## 2019-01-02 ENCOUNTER — TELEPHONE (OUTPATIENT)
Dept: NEUROLOGY | Facility: CLINIC | Age: 79
End: 2019-01-02

## 2019-01-02 DIAGNOSIS — R26.81 GAIT INSTABILITY: ICD-10-CM

## 2019-01-02 DIAGNOSIS — R53.1 WEAKNESS GENERALIZED: ICD-10-CM

## 2019-01-02 DIAGNOSIS — J44.1 COPD WITH ACUTE EXACERBATION (H): ICD-10-CM

## 2019-01-02 DIAGNOSIS — R05.9 COUGH: ICD-10-CM

## 2019-01-02 NOTE — TELEPHONE ENCOUNTER
[facility] Call Center    Phone Message    May a detailed message be left on voicemail: yes    Reason for Call: Other: Pt would like her appointment on 1/4/19 cancelled.  Pt stated that she will not be in town, and was not aware of this appointment.  Pt would like a call back if this appt is absolutely needed for rescheduling.  Thank you.     Action Taken: Message routed to:  [facility] Neuro     Appointment was cancelled.

## 2019-01-04 ENCOUNTER — OFFICE VISIT - HEALTHEAST (OUTPATIENT)
Dept: GERIATRICS | Facility: CLINIC | Age: 79
End: 2019-01-04

## 2019-01-04 DIAGNOSIS — J44.1 COPD WITH ACUTE EXACERBATION (H): ICD-10-CM

## 2019-01-04 DIAGNOSIS — R29.898 LEG WEAKNESS, BILATERAL: ICD-10-CM

## 2019-01-04 DIAGNOSIS — S81.801D WOUND OF RIGHT LOWER EXTREMITY, SUBSEQUENT ENCOUNTER: ICD-10-CM

## 2019-01-04 DIAGNOSIS — R29.6 FREQUENT FALLS: ICD-10-CM

## 2019-01-07 NOTE — PROGRESS NOTES
"Service Date: 12/14/2018      HISTORY OF PRESENT ILLNESS:  Ms. Amparo Sharma returns for a followup visit having been last seen on 10/05/2018.  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 Alex Gruber on 03/16/2018 and complaining of increasing gait disturbance and memory loss.  She was totally walker dependent.  Dr. Gruber prescribed amantadine, which basically caused confusion and she just did not tolerate the medication, so she was referred to the [facility].  I prescribed Ampyra (dalfampridine) and she had much improvement in her gait.  However, she continued to have problems with her knee joints, especially on the right.  A right knee brace was ordered.  Also, Ms. Sharma was referred to [facility] for [facility].  Her frequency of physical therapy was decreased to once a week.  The patient really wanted to walk around her residence without escort and I suggested that the physical therapist make a determination whether or not she could handle that.      Since I saw her on 10/05/2018 she fell, this being on 10/16/2018.  She states that her \"walker moved\" in a way which caused her to lose her balance.  No significant injury was sustained, but she did have a little bruising over the left arm.  She nevertheless did go to the urgent care clinic at the [facility] where she was observed for 12 hours, then discharged to her home.  Then, approximately 3 weeks ago on 11/21/2018, Ms. Sharma fell again while getting into some sort of transport.  She was taken to [facility] Department in [place], [place] on 11/21/2018.        Since those 2 events.  The patient is quite disappointed that her walking has deteriorated.  She states, \"I am in a wheelchair all the time and just " "cannot walk unassisted or unescorted.\"      In reviewing her referral to the [facility] where she was initially seen on 11/02/2018 she stated that she went for 4 visits.  She was given some type of exercises involving her eyes when she would move her head but not her eyes while she was focusing on some objects straight ahead.  After Dr. Gruber had referred Ms. Sharma to the [facility] before she was [facility], she went through balance exercises where she would  a corner with her eyes closed.  After about 60 seconds she became quite weak.      With respect to her memory, she says that she was to be discharged to the [facility] after being seen at the [place] but declined that.      Further, she states that the Ampyra really gave her a \"stronger gait.\"  However, after going to the [facility] for 4 weeks as mentioned above she actually thought that her imbalance was worse and that she could not walk as well.      Then on 11/21/2018 she again went to the [facility] Emergency Department via ambulance.  Reason for the visit is that she had fallen and lacerated her left shin and a contusion of her right knee.  She was transported via ambulance to the [facility] Emergency Department once again.  Quoting the triage nurse, \"patient was leaving [facility] walking on the sidewalk with a walker and assisted by transport worker.  Walker got caught up in sidewalk salt and caused her to lose her balance.  Transporter assisted her to the ground.  The patient sustained shin tear to the left lower leg.  The patient was not dizzy or off balance prior to the falling.  She reported shortness of breath after the fall and attributed that due to anxiety.  She did not hit her head, nor have other injuries.\"  In the ED Department at [facility], the patient was evaluated and treated by Raji Vences, Clinical Nurse " "Practitioner.  His history reported was exactly that noted by the triage nurse.  On examination, the patient had no problems relative to her head, eyes, ears, nose, throat, lungs and heart.  The musculoskeletal examination revealed, \"she is wearing a neoprene brace on the right knee.  There is some mild right knee pain with range of motion and diffuse tenderness.  No swelling, no deformity.  No tenderness, swelling or deformity in the remainder of the joints in the lower extremities.  She does have some tenderness in the anterior left shin at the site of injury.\"  An x-ray of her right knee showed \"intact total knee arthroplasty with long stem femoral and tibial components.  Chronic appearing ossification posterior to the distal femur.  No fracture or dislocation.\"  Laboratory studies included the followin. CBC without differential - normal.   2. Urinalysis macro and micro - negative.   3. Basic metabolic panel - normal.      On further review of her [facility] the patient was discharged after she had repair of the left shin tear with Steri-Strips.  She was then ambulatory and discharged back to the assisted living facility.  Final impression at the time of her discharge was as follows:     1. Fall, initial encounter.   2. Laceration of left lower extremity, initial encounter.     3. Contusion of right knee, initial encounter.        At the time of this visit the patient was transported from the [facility] in [place], [place] to the [facility].      CURRENT MEDICATIONS:   1. Abilify (aripiprazole) 15 mg at 8:00 p.m. for bipolar disorder.   2. Ampyra (dalfampridine) 10 mg at 8:00 a.m. and 8:00 p.m.   3. Bupropion XL (Wellbutrin) 150 mg 8:00 a.m. for major depression.     4. Centrum Silver 1 daily in the morning for supplementation.   5. Colace 100 mg in the morning for constipation.   6. Diclofenac sodium (Voltaren) 50 mg at 8:00 a.m. and 8:00 " p.m.   7. Eucerin lotion applied every morning for dry skin.   8. Fluoxetine (Prozac) 40 mg 8:00 a.m. for major depression.   9. Lamotrigine (Lamictal) 150 mg at 8:00 a.m. and 100 mg at 8:00 p.m.   10. Levothyroxine (Synthroid) 75 mcg at 8:00 a.m. for hypothyroidism.   11. Ranitidine (Zantac) 300 mg 8 a.m. and 8:00 p.m.    12. Turmeric supplement 2 times a day.   13. Tylenol 1000 mg 3 times a day as indicated for pain.      P.R.N. MEDICATIONS/TREATMENTS:     1. Albuterol inhaler every 4 hours, 2 puffs p.r.n.     2. Amoxicillin 500 mg 1 hour prior to dental appointment taking 4 tablets for prophylaxis.   3. Clobetasol proprionate for contact dermatitis p.r.n.     4. Excedrin Extra Strength every 6 hours, taking 2 tablets p.r.n. for pain.     5. Hydrocortisone 2.5% cream twice a day as indicated for rash or itching.     6. Zofran (ondansetron) 3 times daily, taking 4 mg p.r.n. for nausea.      PHYSICAL EXAMINATION:  Amparo is her usual cheerful self in spite of her many tribulations.  She is alert and oriented in all spheres.  She does complain of some memory difficulties, but I did not detect this to be a problem.  Her attention and concentration are normal and there is no evidence of thought disorder.  She establishes good eye contact and her chronological history report is congruent with the records I have reviewed from outside.      VITAL SIGNS:  Blood pressure 155/75, pulse 82, temperature 98.3 degrees Fahrenheit, respirations 16, height 5 feet 1 inch, weight 176 pounds, pulse oximetry 94%.  Neurologic examination, as mentioned, there appears to be no cognitive impairment that is clinically relevant.  Examination of the cranial nerves is normal with the exception of diminished convergence.  Her reflexes are 2+ in the upper extremities, although the right brachialis reflex is absent.  Her knee jerks are 1+ and ankle jerks are absent.  There is no upgoing toe to plantar stimulation.  There is some weakness of the  left anterior deltoid.  Coordination in her upper and lower extremities shows no evidence of cerebellar dysfunction.  She is unable to walk at all today on her own, although can walk with assistance.      IMPRESSION:   1. Gait disturbance.     * Associated with NPH.      * Post-shunt surgery.     * Improved gait with Ampyra.     * Worsened gait since her treatment [facility].   2. Unable to stand unassisted.   3. Wheelchair at all times now.      PLAN:   1. Increasing Ampyra at this time would not be helpful.   2. Physical therapy through [facility].  Purpose is to increase strength.  Diagnosis is worsening balance.   3. Return 2019.      A total of 40 minutes was spent with Ms. Coppola, the great majority of which was dedicated to the discussion of the change in her walking ability after her treatment [facility].  Also, a considerable amount of time was spent in reviewing her old records in her presence.  Further time was spent in detailing going forward from here.      Evaluation and Management (E/M), code 67109.         SCAR RUSS MD             D: 2019   T: 2019   MT: lopez      Name:     ATILIO COPPOLA   MRN:      [id number]        Account:      [id number]   :      1940           Service Date: 2018      Document: [id number]

## 2019-01-08 ENCOUNTER — TELEPHONE (OUTPATIENT)
Dept: NEUROLOGY | Facility: CLINIC | Age: 79
End: 2019-01-08

## 2019-01-08 ENCOUNTER — OFFICE VISIT - HEALTHEAST (OUTPATIENT)
Dept: GERIATRICS | Facility: CLINIC | Age: 79
End: 2019-01-08

## 2019-01-08 DIAGNOSIS — R53.81 PHYSICAL DECONDITIONING: ICD-10-CM

## 2019-01-08 DIAGNOSIS — Z86.14 HISTORY OF MRSA INFECTION: ICD-10-CM

## 2019-01-08 DIAGNOSIS — R29.6 FREQUENT FALLS: ICD-10-CM

## 2019-01-08 DIAGNOSIS — G91.2 NPH (NORMAL PRESSURE HYDROCEPHALUS) (H): ICD-10-CM

## 2019-01-08 NOTE — TELEPHONE ENCOUNTER
[facility] Call Center    Phone Message    May a detailed message be left on voicemail: no    Reason for Call: Other: [facility] get update notes w/ addendum from the last visit the Pt completed with Dr. Casillas. They faxed a request for this in December. They are looking for the notes from the last visit on 12/14/18. Please call them back. Fax # there is [phone].    Action Taken: Message routed to:  [facility]     FAxed clinic notes to [facility]. Called Amy and she believes they have everything they need unless she calls this writer back.

## 2019-01-11 ENCOUNTER — AMBULATORY - HEALTHEAST (OUTPATIENT)
Dept: GERIATRICS | Facility: CLINIC | Age: 79
End: 2019-01-11

## 2019-01-11 ENCOUNTER — COMMUNICATION - HEALTHEAST (OUTPATIENT)
Dept: RESPIRATORY THERAPY | Facility: CLINIC | Age: 79
End: 2019-01-11

## 2019-01-14 ENCOUNTER — COMMUNICATION - HEALTHEAST (OUTPATIENT)
Dept: RESPIRATORY THERAPY | Facility: CLINIC | Age: 79
End: 2019-01-14

## 2019-01-15 ENCOUNTER — MEDICAL CORRESPONDENCE (OUTPATIENT)
Dept: HEALTH INFORMATION MANAGEMENT | Facility: CLINIC | Age: 79
End: 2019-01-15

## 2019-01-24 ENCOUNTER — COMMUNICATION - HEALTHEAST (OUTPATIENT)
Dept: RESPIRATORY THERAPY | Facility: CLINIC | Age: 79
End: 2019-01-24

## 2019-01-24 NOTE — PROGRESS NOTES
"Service Date: 2018      ADDENDUM:   The patient has a knee weakness and deformity and the knee \"requires stabilization.\"  Therefore, a knee brace is being ordered.        Scar Russ MD      cc:   [facility]   [address] [place] [address]  [id number]         SCAR RUSS MD             D: 2019   T: 2019   MT: SHONA      Name:     ATILIO COPPOLA   MRN:      [id number]        Account:      [id number]   :      1940           Service Date: 2018      Document: [id number]    "

## 2019-02-11 ENCOUNTER — COMMUNICATION - HEALTHEAST (OUTPATIENT)
Dept: RESPIRATORY THERAPY | Facility: CLINIC | Age: 79
End: 2019-02-11

## 2019-03-04 ENCOUNTER — ANESTHESIA - HEALTHEAST (OUTPATIENT)
Dept: SURGERY | Facility: CLINIC | Age: 79
End: 2019-03-04

## 2019-03-05 ENCOUNTER — SURGERY - HEALTHEAST (OUTPATIENT)
Dept: SURGERY | Facility: CLINIC | Age: 79
End: 2019-03-05

## 2019-03-11 ENCOUNTER — OFFICE VISIT - HEALTHEAST (OUTPATIENT)
Dept: GERIATRICS | Facility: CLINIC | Age: 79
End: 2019-03-11

## 2019-03-11 DIAGNOSIS — J45.41 MODERATE PERSISTENT ASTHMA WITH ACUTE EXACERBATION: ICD-10-CM

## 2019-03-11 DIAGNOSIS — R53.81 PHYSICAL DECONDITIONING: ICD-10-CM

## 2019-03-11 DIAGNOSIS — R26.81 GAIT INSTABILITY: ICD-10-CM

## 2019-03-11 DIAGNOSIS — F31.9 BIPOLAR 1 DISORDER (H): ICD-10-CM

## 2019-03-11 DIAGNOSIS — E03.9 ACQUIRED HYPOTHYROIDISM: ICD-10-CM

## 2019-03-11 DIAGNOSIS — R06.00 DYSPNEA, UNSPECIFIED TYPE: ICD-10-CM

## 2019-03-11 DIAGNOSIS — K21.9 GASTROESOPHAGEAL REFLUX DISEASE WITHOUT ESOPHAGITIS: ICD-10-CM

## 2019-03-11 DIAGNOSIS — Z96.611 STATUS POST REVERSE TOTAL ARTHROPLASTY OF RIGHT SHOULDER: ICD-10-CM

## 2019-03-11 DIAGNOSIS — J44.1 COPD WITH ACUTE EXACERBATION (H): ICD-10-CM

## 2019-03-12 ENCOUNTER — COMMUNICATION - HEALTHEAST (OUTPATIENT)
Dept: GERIATRICS | Facility: CLINIC | Age: 79
End: 2019-03-12

## 2019-03-12 ENCOUNTER — RECORDS - HEALTHEAST (OUTPATIENT)
Dept: LAB | Facility: CLINIC | Age: 79
End: 2019-03-12

## 2019-03-12 LAB
ANION GAP SERPL CALCULATED.3IONS-SCNC: 8 MMOL/L (ref 5–18)
BASOPHILS # BLD AUTO: 0.1 THOU/UL (ref 0–0.2)
BASOPHILS NFR BLD AUTO: 1 % (ref 0–2)
BUN SERPL-MCNC: 16 MG/DL (ref 8–28)
CALCIUM SERPL-MCNC: 9.7 MG/DL (ref 8.5–10.5)
CHLORIDE BLD-SCNC: 106 MMOL/L (ref 98–107)
CO2 SERPL-SCNC: 27 MMOL/L (ref 22–31)
CREAT SERPL-MCNC: 0.64 MG/DL (ref 0.6–1.1)
EOSINOPHIL # BLD AUTO: 0.6 THOU/UL (ref 0–0.4)
EOSINOPHIL NFR BLD AUTO: 8 % (ref 0–6)
ERYTHROCYTE [DISTWIDTH] IN BLOOD BY AUTOMATED COUNT: 16 % (ref 11–14.5)
GFR SERPL CREATININE-BSD FRML MDRD: >60 ML/MIN/1.73M2
GLUCOSE BLD-MCNC: 100 MG/DL (ref 70–125)
HCT VFR BLD AUTO: 30.9 % (ref 35–47)
HGB BLD-MCNC: 9 G/DL (ref 12–16)
LYMPHOCYTES # BLD AUTO: 1.5 THOU/UL (ref 0.8–4.4)
LYMPHOCYTES NFR BLD AUTO: 21 % (ref 20–40)
MCH RBC QN AUTO: 27.9 PG (ref 27–34)
MCHC RBC AUTO-ENTMCNC: 29.1 G/DL (ref 32–36)
MCV RBC AUTO: 96 FL (ref 80–100)
MONOCYTES # BLD AUTO: 0.8 THOU/UL (ref 0–0.9)
MONOCYTES NFR BLD AUTO: 11 % (ref 2–10)
NEUTROPHILS # BLD AUTO: 4.2 THOU/UL (ref 2–7.7)
NEUTROPHILS NFR BLD AUTO: 59 % (ref 50–70)
PLATELET # BLD AUTO: 360 THOU/UL (ref 140–440)
PMV BLD AUTO: 10.8 FL (ref 8.5–12.5)
POTASSIUM BLD-SCNC: 4.3 MMOL/L (ref 3.5–5)
RBC # BLD AUTO: 3.23 MILL/UL (ref 3.8–5.4)
SODIUM SERPL-SCNC: 141 MMOL/L (ref 136–145)
WBC: 7.3 THOU/UL (ref 4–11)

## 2019-03-14 ENCOUNTER — OFFICE VISIT - HEALTHEAST (OUTPATIENT)
Dept: GERIATRICS | Facility: CLINIC | Age: 79
End: 2019-03-14

## 2019-03-14 DIAGNOSIS — E03.9 ACQUIRED HYPOTHYROIDISM: ICD-10-CM

## 2019-03-14 DIAGNOSIS — R53.81 PHYSICAL DECONDITIONING: ICD-10-CM

## 2019-03-14 DIAGNOSIS — F31.9 BIPOLAR 1 DISORDER (H): ICD-10-CM

## 2019-03-14 DIAGNOSIS — R41.89 COGNITIVE DECLINE: ICD-10-CM

## 2019-03-14 DIAGNOSIS — J44.1 COPD WITH ACUTE EXACERBATION (H): ICD-10-CM

## 2019-03-14 DIAGNOSIS — G91.2 NPH (NORMAL PRESSURE HYDROCEPHALUS) (H): ICD-10-CM

## 2019-03-14 DIAGNOSIS — J96.01 ACUTE RESPIRATORY FAILURE WITH HYPOXIA (H): ICD-10-CM

## 2019-03-14 DIAGNOSIS — Z96.611 STATUS POST REVERSE TOTAL ARTHROPLASTY OF RIGHT SHOULDER: ICD-10-CM

## 2019-03-14 DIAGNOSIS — R26.89 BALANCE DISORDER: ICD-10-CM

## 2019-03-14 DIAGNOSIS — I10 ESSENTIAL HYPERTENSION: ICD-10-CM

## 2019-03-15 ENCOUNTER — OFFICE VISIT - HEALTHEAST (OUTPATIENT)
Dept: GERIATRICS | Facility: CLINIC | Age: 79
End: 2019-03-15

## 2019-03-15 DIAGNOSIS — I10 ESSENTIAL HYPERTENSION: ICD-10-CM

## 2019-03-15 DIAGNOSIS — F31.9 BIPOLAR 1 DISORDER (H): ICD-10-CM

## 2019-03-15 DIAGNOSIS — R06.00 DYSPNEA, UNSPECIFIED TYPE: ICD-10-CM

## 2019-03-15 DIAGNOSIS — Z96.611 STATUS POST REVERSE TOTAL ARTHROPLASTY OF RIGHT SHOULDER: ICD-10-CM

## 2019-03-15 DIAGNOSIS — R53.81 PHYSICAL DECONDITIONING: ICD-10-CM

## 2019-03-15 DIAGNOSIS — R26.81 GAIT INSTABILITY: ICD-10-CM

## 2019-03-15 DIAGNOSIS — J44.1 COPD WITH ACUTE EXACERBATION (H): ICD-10-CM

## 2019-03-15 DIAGNOSIS — J96.01 ACUTE RESPIRATORY FAILURE WITH HYPOXIA (H): ICD-10-CM

## 2019-03-18 ENCOUNTER — COMMUNICATION - HEALTHEAST (OUTPATIENT)
Dept: GERIATRICS | Facility: CLINIC | Age: 79
End: 2019-03-18

## 2019-03-19 ENCOUNTER — RECORDS - HEALTHEAST (OUTPATIENT)
Dept: LAB | Facility: CLINIC | Age: 79
End: 2019-03-19

## 2019-03-19 ENCOUNTER — OFFICE VISIT - HEALTHEAST (OUTPATIENT)
Dept: GERIATRICS | Facility: CLINIC | Age: 79
End: 2019-03-19

## 2019-03-19 DIAGNOSIS — G91.2 NPH (NORMAL PRESSURE HYDROCEPHALUS) (H): ICD-10-CM

## 2019-03-19 DIAGNOSIS — J44.1 COPD WITH ACUTE EXACERBATION (H): ICD-10-CM

## 2019-03-19 DIAGNOSIS — R06.00 DYSPNEA, UNSPECIFIED TYPE: ICD-10-CM

## 2019-03-19 DIAGNOSIS — F31.9 BIPOLAR 1 DISORDER (H): ICD-10-CM

## 2019-03-19 DIAGNOSIS — J96.01 ACUTE RESPIRATORY FAILURE WITH HYPOXIA (H): ICD-10-CM

## 2019-03-19 DIAGNOSIS — Z96.611 STATUS POST REVERSE TOTAL ARTHROPLASTY OF RIGHT SHOULDER: ICD-10-CM

## 2019-03-19 DIAGNOSIS — R26.81 GAIT INSTABILITY: ICD-10-CM

## 2019-03-19 LAB — HGB BLD-MCNC: 10.2 G/DL (ref 12–16)

## 2019-03-21 ENCOUNTER — OFFICE VISIT - HEALTHEAST (OUTPATIENT)
Dept: GERIATRICS | Facility: CLINIC | Age: 79
End: 2019-03-21

## 2019-03-21 DIAGNOSIS — F31.9 BIPOLAR 1 DISORDER (H): ICD-10-CM

## 2019-03-21 DIAGNOSIS — R41.89 COGNITIVE DECLINE: ICD-10-CM

## 2019-03-21 DIAGNOSIS — I10 ESSENTIAL HYPERTENSION: ICD-10-CM

## 2019-03-21 DIAGNOSIS — R53.81 PHYSICAL DECONDITIONING: ICD-10-CM

## 2019-03-21 DIAGNOSIS — R26.81 GAIT INSTABILITY: ICD-10-CM

## 2019-03-21 DIAGNOSIS — J96.01 ACUTE RESPIRATORY FAILURE WITH HYPOXIA (H): ICD-10-CM

## 2019-03-21 DIAGNOSIS — J44.1 COPD WITH ACUTE EXACERBATION (H): ICD-10-CM

## 2019-03-21 DIAGNOSIS — Z96.611 STATUS POST REVERSE TOTAL ARTHROPLASTY OF RIGHT SHOULDER: ICD-10-CM

## 2019-03-21 DIAGNOSIS — E03.9 ACQUIRED HYPOTHYROIDISM: ICD-10-CM

## 2019-03-21 DIAGNOSIS — G91.2 NPH (NORMAL PRESSURE HYDROCEPHALUS) (H): ICD-10-CM

## 2019-03-22 ENCOUNTER — OFFICE VISIT - HEALTHEAST (OUTPATIENT)
Dept: GERIATRICS | Facility: CLINIC | Age: 79
End: 2019-03-22

## 2019-03-22 DIAGNOSIS — F31.9 BIPOLAR 1 DISORDER (H): ICD-10-CM

## 2019-03-22 DIAGNOSIS — I10 ESSENTIAL HYPERTENSION: ICD-10-CM

## 2019-03-22 DIAGNOSIS — E03.9 ACQUIRED HYPOTHYROIDISM: ICD-10-CM

## 2019-03-22 DIAGNOSIS — Z96.611 STATUS POST REVERSE TOTAL ARTHROPLASTY OF RIGHT SHOULDER: ICD-10-CM

## 2019-03-22 DIAGNOSIS — R41.89 COGNITIVE DECLINE: ICD-10-CM

## 2019-03-22 DIAGNOSIS — R53.81 PHYSICAL DECONDITIONING: ICD-10-CM

## 2019-03-22 DIAGNOSIS — J44.1 COPD WITH ACUTE EXACERBATION (H): ICD-10-CM

## 2019-03-22 DIAGNOSIS — I73.9 PVD (PERIPHERAL VASCULAR DISEASE) (H): ICD-10-CM

## 2019-03-22 DIAGNOSIS — R06.00 DYSPNEA, UNSPECIFIED TYPE: ICD-10-CM

## 2019-03-22 DIAGNOSIS — J45.41 MODERATE PERSISTENT ASTHMA WITH ACUTE EXACERBATION: ICD-10-CM

## 2019-03-25 ENCOUNTER — OFFICE VISIT - HEALTHEAST (OUTPATIENT)
Dept: GERIATRICS | Facility: CLINIC | Age: 79
End: 2019-03-25

## 2019-03-25 DIAGNOSIS — K21.9 GASTROESOPHAGEAL REFLUX DISEASE WITHOUT ESOPHAGITIS: ICD-10-CM

## 2019-03-25 DIAGNOSIS — R26.81 GAIT INSTABILITY: ICD-10-CM

## 2019-03-25 DIAGNOSIS — Z96.611 STATUS POST REVERSE TOTAL ARTHROPLASTY OF RIGHT SHOULDER: ICD-10-CM

## 2019-03-25 DIAGNOSIS — R41.89 COGNITIVE DECLINE: ICD-10-CM

## 2019-03-25 DIAGNOSIS — E03.9 ACQUIRED HYPOTHYROIDISM: ICD-10-CM

## 2019-03-25 DIAGNOSIS — R53.81 PHYSICAL DECONDITIONING: ICD-10-CM

## 2019-03-25 DIAGNOSIS — F31.9 BIPOLAR 1 DISORDER (H): ICD-10-CM

## 2019-03-25 DIAGNOSIS — J44.1 COPD WITH ACUTE EXACERBATION (H): ICD-10-CM

## 2019-03-25 DIAGNOSIS — I10 ESSENTIAL HYPERTENSION: ICD-10-CM

## 2019-03-27 ENCOUNTER — AMBULATORY - HEALTHEAST (OUTPATIENT)
Dept: GERIATRICS | Facility: CLINIC | Age: 79
End: 2019-03-27

## 2019-04-10 ENCOUNTER — COMMUNICATION - HEALTHEAST (OUTPATIENT)
Dept: RESPIRATORY THERAPY | Facility: CLINIC | Age: 79
End: 2019-04-10

## 2019-05-01 ENCOUNTER — PATIENT OUTREACH (OUTPATIENT)
Dept: NEUROSURGERY | Facility: CLINIC | Age: 79
End: 2019-05-01

## 2019-05-01 NOTE — PROGRESS NOTES
Patient calling with several issues.  Pt hx of NPH,  Shunt Nuclear Med study demonstrated that shunt is working well 5/14/18.  Pt complaints of gait issues and memory issues for past several months, in wheelchair for last 5 months.      Pt issues have been ongoing.  LOV with Sonja 12/14/18 Balance problems; ataxia, gait disturbance.  Plan: PT, no increase in Ampyra, Wheelchair at all times now.    Per Dr. Gruber OV note on 6/6/18   Unfortunately, the patient continues to deteriorate.  She will cautiously try low dose amantadine.  I have asked that she have a followup now in our [facility] and I told her I would be happy to see her on a p.r.n. basis and again after she is seen there by other [facility] staff neurologists.     Pt has followed up with [facility] clinic, will ask scheduling to call for F/U with Yasmani for continued gait disturbance and memory concerns.  Pt in wheelchair for past 5 months.    Pt voices understanding, will send In basket to Neurology scheduling pool to call for appointment with Dr Gruber,   Shunt Nec med study showing shunt working well, does not appear to be a shunt issue.

## 2019-06-06 ENCOUNTER — RECORDS - HEALTHEAST (OUTPATIENT)
Dept: ADMINISTRATIVE | Facility: OTHER | Age: 79
End: 2019-06-06

## 2019-06-06 ENCOUNTER — OFFICE VISIT (OUTPATIENT)
Dept: NEUROLOGY | Facility: CLINIC | Age: 79
End: 2019-06-06
Payer: MEDICARE

## 2019-06-06 VITALS
HEART RATE: 86 BPM | TEMPERATURE: 97.8 F | OXYGEN SATURATION: 95 % | WEIGHT: 170 LBS | BODY MASS INDEX: 32.1 KG/M2 | RESPIRATION RATE: 16 BRPM | DIASTOLIC BLOOD PRESSURE: 70 MMHG | HEIGHT: 61 IN | SYSTOLIC BLOOD PRESSURE: 137 MMHG

## 2019-06-06 DIAGNOSIS — G91.2 NPH (NORMAL PRESSURE HYDROCEPHALUS) (H): Primary | ICD-10-CM

## 2019-06-06 PROBLEM — B07.8 OTHER VIRAL WARTS: Status: ACTIVE | Noted: 2019-01-21

## 2019-06-06 PROBLEM — Z96.611 STATUS POST REVERSE TOTAL ARTHROPLASTY OF RIGHT SHOULDER: Status: ACTIVE | Noted: 2019-03-05

## 2019-06-06 PROBLEM — M75.121 COMPLETE TEAR OF RIGHT ROTATOR CUFF: Status: ACTIVE | Noted: 2019-01-21

## 2019-06-06 RX ORDER — HYDROCODONE BITARTRATE AND ACETAMINOPHEN 5; 325 MG/1; MG/1
1-2 TABLET ORAL
COMMUNITY
Start: 2019-04-19 | End: 2021-08-19

## 2019-06-06 RX ORDER — ASPIRIN 325 MG
325 TABLET ORAL DAILY
COMMUNITY
Start: 2019-03-06 | End: 2021-08-19

## 2019-06-06 ASSESSMENT — MIFFLIN-ST. JEOR: SCORE: 1193.49

## 2019-06-06 ASSESSMENT — PAIN SCALES - GENERAL: PAINLEVEL: MODERATE PAIN (4)

## 2019-06-06 NOTE — LETTER
"2019       RE: Amparo Sharma  [address]  [place] [address]     Dear Colleague,    Thank you for referring your patient, Amparo Sharma, to the [facility] NEUROLOGY at [facility]. Please see a copy of my visit note below.    Service Date: 2019      Parvez Francis MD   [facility]    [address] [place] [address]      RE: Amparo Sharma   MRN: [id number]   : 1940      Dear Dr. Francis:      This is in regard to followup on Amparo Sharma.  The patient returned for neurologic followup with a chief complaint of progressive imbalance.      The patient has been under the care at my suggestion with Dr. Lan Casillas, an international neurology expert in gait disorder.  She initially did quite well last summer on Ampyra.  Unfortunately, it stopped working.  She will continue to follow up with him.  She said that she wanted to see me because she wondered what my opinion would be concerning her various issues.  The patient now has been basically confined to a wheelchair at her rest home.  She only can use her walker if she has staff supervision.  She feels that she is just \"not as good.\"  She said that her right leg seems to be weaker related to her chronic knee issues.  She continues to have severe urinary incontinence.  She never had benefit from having shunt placement.  She has failed 2 treatments with Botox, but she has been told through [facility] to consider now a third trial.  She denied any trouble controlling her bowel.  The patient said that her memory is good.  She said she does not feel she has any severe depression and she generally feels her bipolar condition is treated well with Abilify.  She does have some situational depression regarding her issues.  She feels that she is dropping objects with her hands.  She has not had any major tremor nor has she had other weakness " in her right leg.  She denied any significant sensory disturbance.  She said sometimes her hands feel numb or her feet, but it is rare.  She has never had decreased perirectal or genital sensation.  She has had some fasciculations in her right shoulder area since she had a right shoulder replacement by Dr. Peterson.  This was done for pain and what sounds like rotator cuff and biceps tendon issues.  The patient said that her pain left, but she has no good use of the arm for elevation.  She has had worsening valgus deformity of her right knee and she has asked her orthopedist to consider surgery.  She saw Dr. Thornton at [place], did take this under advisement, but has not gotten back to her.      CURRENT MEDICATIONS:   The patient did go over with me her current medications.  This includes Abilify 15 mg daily, Ampyra 10 mg b.i.d., aspirin, bupropion  mg daily, Centrum Silver, Pepcid, fluoxetine 40 mg, inhaled Ellipta, Lamictal 150 mg in the morning and 100 mg at night, levothyroxine, Macrobid daily, turmeric, p.r.n. albuterol, amoxicillin, hydrocodone/acetaminophen 5/325, MiraLax and Tylenol.  She also is on scheduled Tylenol 1000 mg b.i.d.  In addition, she uses Serevent inhaler.      The patient said that it is almost impossible for her now to support herself on her right foot because of her valgus deformity of her knee.      The patient did review with me her various visits with Dr. Casillas since 07/06/2018.  This did record her prior shunt placement and also her use of medications.  I reviewed this too with her and she has tried both Sinemet and amantadine.  She had suspected side effects including confusion from medication that I had offered her, although she did have initially some improvement.      I did review at length with her her last visit from 12/14/2018.  At that time, she had suffered a fall and had been seen in the ED at [facility] on 11/21/2018 and had suffered a laceration of the left lower extremity  and contusion of the right knee.      Neurologic examination revealed a pleasant woman.  She had just a hint of facial stare.  She was slumped in her wheelchair.  She had a significant valgus deformity of her right knee.  It was hard for her to place weight on the leg, even with the help of my medical  and with me.  She was oriented to time, place and person.  She could recall 2/3 objects after 5 minutes.  She was able to tell me the states that touch [place] but then [facility].  She was able to tell me about the [facility] presidency.  The patient had trace triceps reflexes and absent biceps and brachioradialis reflexes as well as absent leg reflexes.  Her toes were downward going to plantar stimulation.  Strength testing was all unremarkable.  The patient had positive glabellar tap sign.  She had decreased upward gaze.  Otherwise, she had normal horizontal and downward gaze with trace palmomental reflexes.  She had mild cogwheeling in her arms, but does have a very mild postural tremor.  She cannot elevate her right arm and she did have some fasciculations in the right posterior deltoid, triceps area.      IMPRESSION:   1.  Mechanical issues with her right knee with significant valgus deformity and inability to place weight on the right leg now.   2.  Chronic urinary incontinence.   3.  Chronic issues with imbalance which are compounded by her orthopedic problems.   4.  Suspected nerve injury regarding her right shoulder not unexpected after her shoulder replacement.      I did talk with the patient again about her major issues.  She does need to review with Dr. Thornton whether she is a surgical candidate for her right knee issues.  I really cannot think of anything else to offer her.  I did talk with her about the concerns of her rest home regarding her restrictions.  I explained this to her and I think she has an understanding of her risk for fall.  I encouraged her to have followup with   Sonja later this summer.  I told her I really did not think her right leg was weak but she felt it was probably from not using her limbs because of her confinement. I did ask yhat she have medication management here to help ascertain if amantadine or Sinemet could be tried.      I hope this information is of use to you.  If you should have any questions, please feel free to contact me.      Sincerely yours,       Alex Gruber MD      I spent 40 minutes with the patient today.  Over 50% of the time this involved counseling and coordination of care.      D: 2019   T: 06/10/2019   MT: SHONA      Name:     ATILIO COPPOLA   MRN:      [id number]        Account:      [id number]   :      1940           Service Date: 2019      Document: [id number]

## 2019-06-06 NOTE — NURSING NOTE
Chief Complaint   Patient presents with     RECHECK     [facility] RETURN F/U GAIT ISSUE       Prasanna Ingram, EMT

## 2019-06-14 ENCOUNTER — PATIENT OUTREACH (OUTPATIENT)
Dept: NEUROSURGERY | Facility: CLINIC | Age: 79
End: 2019-06-14

## 2019-06-14 NOTE — PROGRESS NOTES
Returning patients message on voice mail stating she is having a test tomorrow.  No other information on message    Callback to patient, left detailed message to return call, IF MRI , yes can have test but her  SHunt will need checking after MRI in 1-2 days.  Please return call to .

## 2019-07-08 DIAGNOSIS — R26.9 GAIT DISTURBANCE: ICD-10-CM

## 2019-07-08 NOTE — TELEPHONE ENCOUNTER
Last Written Prescription Date:  7/6/2018  Last Fill Quantity: 60,  # refills: PRN   Last office visit: 6/6/2019   Future Office Visit:

## 2019-07-09 ENCOUNTER — TELEPHONE (OUTPATIENT)
Dept: NEUROLOGY | Facility: CLINIC | Age: 79
End: 2019-07-09

## 2019-07-09 ENCOUNTER — TELEPHONE (OUTPATIENT)
Dept: PHARMACY | Facility: CLINIC | Age: 79
End: 2019-07-09

## 2019-07-09 RX ORDER — DALFAMPRIDINE 10 MG/1
TABLET, FILM COATED, EXTENDED RELEASE ORAL
Qty: 60 TABLET | Status: SHIPPED | OUTPATIENT
Start: 2019-07-09 | End: 2019-08-05

## 2019-07-09 NOTE — TELEPHONE ENCOUNTER
Pt called this writer back. She said she fell last fall and went to ER and by the director of the nursing home where she lives she is confined to a wheel chair since Nov. 2018. She has been taking her Ampyra every night but she knows she is needed a new prescription. She said she gets her medicine from [facility] mail order pharmacy in [place]. She has had surgery on her shoulder and and will soon have surgery to remove pins in her foot that are protruding which were inserted in her foot in 2005. She made an appointment with Dr. Casillas for Aug 9 at 9:30am.   [facility]

## 2019-08-05 DIAGNOSIS — R26.9 GAIT DISTURBANCE: ICD-10-CM

## 2019-08-09 ENCOUNTER — RECORDS - HEALTHEAST (OUTPATIENT)
Dept: ADMINISTRATIVE | Facility: OTHER | Age: 79
End: 2019-08-09

## 2019-08-09 ENCOUNTER — OFFICE VISIT (OUTPATIENT)
Dept: NEUROLOGY | Facility: CLINIC | Age: 79
End: 2019-08-09
Payer: MEDICARE

## 2019-08-09 VITALS — HEART RATE: 66 BPM | SYSTOLIC BLOOD PRESSURE: 136 MMHG | DIASTOLIC BLOOD PRESSURE: 79 MMHG | OXYGEN SATURATION: 95 %

## 2019-08-09 DIAGNOSIS — F31.9 BIPOLAR DEPRESSION (H): Primary | ICD-10-CM

## 2019-08-09 DIAGNOSIS — G31.84 MILD COGNITIVE IMPAIRMENT: ICD-10-CM

## 2019-08-09 ASSESSMENT — PAIN SCALES - GENERAL: PAINLEVEL: MILD PAIN (3)

## 2019-08-09 NOTE — NURSING NOTE
Chief Complaint   Patient presents with     RECHECK     [facility] RETURN - NORMAL PRESSURE HYDROCEPHALUS       Kendell Trujillo, EMT

## 2019-08-09 NOTE — LETTER
RE: Amparo Sharma  [address]  [place] [address]     Dear Colleague,    Thank you for referring your patient, Amparo Sharma, to the [facility] NEUROLOGY at [facility] Please see a copy of my visit note below.    Service Date: 08/09/2019      HISTORY OF PRESENT ILLNESS:  Amparo Sharma is a right-handed woman who is now 79 years old.  I last saw her on 12/14/2018.  She provides the history as she is not accompanied by any other person.  Her manner of relating is clear and concise and I find no evidence that the history is erroneous.      Amparo initially saw me because of gait and balance disturbance.  Prior to that, she had seen Dr. Alex Gruber, who referred the patient to me here at the [place].  When she saw him she gave a history of symptoms which he determined were due to normal pressure hydrocephalus.  She did end up having appropriate surgery by Dr. Astorga with considerable improvement in her symptoms due to the normal pressure hydrocephalus but then she began to deteriorate as far as her gait is concerned.      I prescribed Ampyra (dalfampridine) and she had considerable improvement in her gait.      She has had no falls since last seen, but she has been primarily in a wheelchair.  Physical therapy was stopped because it was felt that it was not contributing to any improvement.  She is able to transfer from her wheelchair into a recliner or back, but she does have trouble entering or exiting a vehicle.      The patient sustained a foot fracture about 5 years ago and had a plate put in surgically along with 8 screws.  This has been a source of swelling and pain and when she had an x-ray, it was noted that the screws were coming out.  In fact, the pins and the plate were causing a protrusion and it was difficult to tolerate the pain.  Three weeks ago Dr. Mckeon removed the plate and screws and then prescribed exercises for  her right leg.      Prior to that on 03/05/2019 she had a right shoulder replacement because of severe pain and diminished range of motion.  This has improved her range of motion but she still is doing some physical therapy in that respect.      After removal of the screws and discharged from [facility] the patient has noted that virtually no postoperative pain the last 2 days.  In fact, she has not taken any pain medications for the past 2 days.  On 08/12 she will have an appointment for followup with Dr. Mckeon.      Amparo states that her memory seems to be intact.  She is not aware that she has been forgetful.  She has not been having any headaches, although once in a while with some allergic symptoms she may have a mild headache.      The following concerns were expressed by Amparo:     1. I would like to get out of physical therapy for my shoulder since it seems to have stabilized.   2. I cannot have ventral hernia surgery until I get down to 150 pounds.  (She weighed 178 pounds today, but was 176 pounds in December).   1. 3.  I would like to be more mobile but at this time I cannot yet put weight on the right foot.      CURRENT MEDICATIONS:   1. Acetaminophen (Tylenol p.o.) 500 mg by mouth every 8 hours as needed for mild pain or fever.   2. Albuterol inhaler 2 puffs every 4 hours p.r.n.   3. Aripiprazole (Abilify p.o.) 50 mg by mouth at bedtime for bipolar disorder.   4. Aspirin 325 mg by mouth 2 times daily for thrombus prophylaxis (apparently this will be discontinued fairly soon.   5. Bupropion hydrochloride (Wellbutrin-XL) 150 mg every 8 a.m.   6. Fluoxetine hydrochloride (Prozac) 40 mg by mouth every morning.   7. Docusate sodium (Colace) 100 mg by mouth 2 times daily as needed.   8. Hydrocodone/acetaminophen (Norco) 5/325 mg tablet 1-2 tablets p.r.n. for pain (last taken 2 days ago).   9. Lamotrigine (Lamictal) 150 mg 8 a.m. and 100 mg 8:00 p.m. for bipolar disorder.   10. Levothyroxine sodium  75 mcg 1 p.o. q.a.m.   11. Multiple vitamins/minerals daily.   12. Ondansetron (Zofran) 4 mg 1 by mouth every 6 hours as needed for nausea.   13. Famotidine 20 mg 8 a.m. and 8:00 p.m.   14. Tiotropium bromide monohydrate (Spiriva HandiHaler) 1 puff as needed.   15. Amoxicillin 500 mg by mouth 1 hour prior to any dental appointment.   16. Dalfampridine (Ampyra) 10 mg 1 tablet by mouth twice daily about 12 hours apart.      PHYSICAL EXAMINATION:     GENERAL:  The patient is seated in a wheelchair throughout the course of the interview and is totally clear with respect to memory.  She is oriented to time, person and place and she has no evidence of a thinking disorder.   VITAL SIGNS:  Blood pressure 136/79, pulse 66, oxygen level 95%.   HEENT:  General examination included only the head, neck and upper torso.  Cranium is grossly normal without signs of trauma or deformity.  Eye movements are normal and pupils react to light and accommodation.  ENT is normal with no active inflammation or discharge.  Neck is supple in all directions.  Carotid pulsations are equal and there are no bruits.   HEART:  Reveals a regular rhythm without murmurs.     LUNGS:  Clear to auscultation.   ABDOMEN:  Nontender.   NEUROLOGIC EXAMINATION:   MENTAL STATUS:  As mentioned previously, patient's mental status is normal.  Cranial nerves II-XII reveal essentially no abnormalities.  She does have diminished ocular convergence.  Reflexes are 2+ in the upper extremities, although the right brachial radialis reflex is absent.  Knee jerks are 1+ and ankle jerks are absent.  She has no upgoing toe to plantar stimulation.  There is some weakness of the left anterior deltoid.  The right upper extremity muscles are difficult to evaluate proximally because of the diminished range of motion and some pain with any effort exerted.  Coordination is normal in the upper extremities.  The lower extremities cannot be well tested, although she was transferred  from the wheelchair to the examining table with help.  Then we were able to evaluate the lower extremities better and she does have no evidence of sensory loss.  No significant weakness.  Gait and station were not evaluated.      IMPRESSION:   1. Gait disturbance with history of falls    - Associated with normal pressure hydrocephalus.     - Post-shunt shunt surgery.     - Improved gait with Ampyra.     - Worsened gait because of other skeletal problems such as pain in the right foot and the right knee.   2. The patient is unable to stand unassisted at the present time.   3. She is basically confined to the wheelchair when out and about.      PLAN:   1. The patient is to see Dr. Mckeon for a followup visit for her surgery as mentioned above.   2. We will be seeing her other physician, Dr. Recinos, (? spelling) regarding her right knee.   3. It would be desired for the patient to be once again involved in physical therapy after the knee and foot issues have been addressed and successfully so.   4. Resume physical therapy at her care center following approval of the other 2 physicians involved in her care and with my permission as well.   5. Return for a followup visit 10/2019 (Lucy Marin RN will arrange).      A total of 45 minutes was spent with Amparo Sharma with nearly 70-75% of the time dealing with the issues regarding her other maladies as well as the basic gait and balance disturbance.  In addition, time was spent in writing a letter to Dr. Mckeon in which I expressed the need for the patient to resume physical therapy after he and Dr. Recinos have given their approval.  I would like to start her on Ampyra once she resumes walking if physical therapy can bring her to that point.      Lan Casillas MD      EVALUATION AND MANAGEMENT CODE (E/M):  20967.

## 2019-08-14 NOTE — PROGRESS NOTES
Service Date: 08/09/2019      HISTORY OF PRESENT ILLNESS:  Amparo Sharma is a right-handed woman who is now 79 years old.  I last saw her on 12/14/2018.  She provides the history as she is not accompanied by any other person.  Her manner of relating is clear and concise and I find no evidence that the history is erroneous.      Amparo initially saw me because of gait and balance disturbance.  Prior to that, she had seen Dr. Alex Gruber, who referred the patient to me here at the [facility].  When she saw him she gave a history of symptoms which he determined were due to normal pressure hydrocephalus.  She did end up having appropriate surgery by Dr. Astorga with considerable improvement in her symptoms due to the normal pressure hydrocephalus but then she began to deteriorate as far as her gait is concerned.      I prescribed Ampyra (dalfampridine) and she had considerable improvement in her gait.      She has had no falls since last seen, but she has been primarily in a wheelchair.  Physical therapy was stopped because it was felt that it was not contributing to any improvement.  She is able to transfer from her wheelchair into a recliner or back, but she does have trouble entering or exiting a vehicle.      The patient sustained a foot fracture about 5 years ago and had a plate put in surgically along with 8 screws.  This has been a source of swelling and pain and when she had an x-ray, it was noted that the screws were coming out.  In fact, the pins and the plate were causing a protrusion and it was difficult to tolerate the pain.  Three weeks ago Dr. Mckeon removed the plate and screws and then prescribed exercises for her right leg.      Prior to that on 03/05/2019 she had a right shoulder replacement because of severe pain and diminished range of motion.  This has improved her range of motion but she still is doing some physical therapy in that respect.      After removal of the screws and discharged from [facility]  [facility] the patient has noted that virtually no postoperative pain the last 2 days.  In fact, she has not taken any pain medications for the past 2 days.  On 08/12 she will have an appointment for followup with Dr. Mckeon.      Amparo states that her memory seems to be intact.  She is not aware that she has been forgetful.  She has not been having any headaches, although once in a while with some allergic symptoms she may have a mild headache.      The following concerns were expressed by Amparo:     1. I would like to get out of physical therapy for my shoulder since it seems to have stabilized.   2. I cannot have ventral hernia surgery until I get down to 150 pounds.  (She weighed 178 pounds today, but was 176 pounds in December).   1. 3.  I would like to be more mobile but at this time I cannot yet put weight on the right foot.      CURRENT MEDICATIONS:   1. Acetaminophen (Tylenol p.o.) 500 mg by mouth every 8 hours as needed for mild pain or fever.   2. Albuterol inhaler 2 puffs every 4 hours p.r.n.   3. Aripiprazole (Abilify p.o.) 50 mg by mouth at bedtime for bipolar disorder.   4. Aspirin 325 mg by mouth 2 times daily for thrombus prophylaxis (apparently this will be discontinued fairly soon.   5. Bupropion hydrochloride (Wellbutrin-XL) 150 mg every 8 a.m.   6. Fluoxetine hydrochloride (Prozac) 40 mg by mouth every morning.   7. Docusate sodium (Colace) 100 mg by mouth 2 times daily as needed.   8. Hydrocodone/acetaminophen (Norco) 5/325 mg tablet 1-2 tablets p.r.n. for pain (last taken 2 days ago).   9. Lamotrigine (Lamictal) 150 mg 8 a.m. and 100 mg 8:00 p.m. for bipolar disorder.   10. Levothyroxine sodium 75 mcg 1 p.o. q.a.m.   11. Multiple vitamins/minerals daily.   12. Ondansetron (Zofran) 4 mg 1 by mouth every 6 hours as needed for nausea.   13. Famotidine 20 mg 8 a.m. and 8:00 p.m.   14. Tiotropium bromide monohydrate (Spiriva HandiHaler) 1 puff as needed.   15. Amoxicillin 500 mg by mouth 1  hour prior to any dental appointment.   16. Dalfampridine (Ampyra) 10 mg 1 tablet by mouth twice daily about 12 hours apart.      PHYSICAL EXAMINATION:     GENERAL:  The patient is seated in a wheelchair throughout the course of the interview and is totally clear with respect to memory.  She is oriented to time, person and place and she has no evidence of a thinking disorder.   VITAL SIGNS:  Blood pressure 136/79, pulse 66, oxygen level 95%.   HEENT:  General examination included only the head, neck and upper torso.  Cranium is grossly normal without signs of trauma or deformity.  Eye movements are normal and pupils react to light and accommodation.  ENT is normal with no active inflammation or discharge.  Neck is supple in all directions.  Carotid pulsations are equal and there are no bruits.   HEART:  Reveals a regular rhythm without murmurs.     LUNGS:  Clear to auscultation.   ABDOMEN:  Nontender.   NEUROLOGIC EXAMINATION:   MENTAL STATUS:  As mentioned previously, patient's mental status is normal.  Cranial nerves II-XII reveal essentially no abnormalities.  She does have diminished ocular convergence.  Reflexes are 2+ in the upper extremities, although the right brachial radialis reflex is absent.  Knee jerks are 1+ and ankle jerks are absent.  She has no upgoing toe to plantar stimulation.  There is some weakness of the left anterior deltoid.  The right upper extremity muscles are difficult to evaluate proximally because of the diminished range of motion and some pain with any effort exerted.  Coordination is normal in the upper extremities.  The lower extremities cannot be well tested, although she was transferred from the wheelchair to the examining table with help.  Then we were able to evaluate the lower extremities better and she does have no evidence of sensory loss.  No significant weakness.  Gait and station were not evaluated.      IMPRESSION:   1. Gait disturbance with history of falls    -  Associated with normal pressure hydrocephalus.     - Post-shunt shunt surgery.     - Improved gait with Ampyra.     - Worsened gait because of other skeletal problems such as pain in the right foot and the right knee.   2. The patient is unable to stand unassisted at the present time.   3. She is basically confined to the wheelchair when out and about.      PLAN:   1. The patient is to see Dr. Mckeon for a followup visit for her surgery as mentioned above.   2. We will be seeing her other physician, Dr. Recinos, (? spelling) regarding her right knee.   3. It would be desired for the patient to be once again involved in physical therapy after the knee and foot issues have been addressed and successfully so.   4. Resume physical therapy at her care center following approval of the other 2 physicians involved in her care and with my permission as well.   5. Return for a followup visit 10/2019 (Lucy Marin RN will arrange).      A total of 45 minutes was spent with Amparo Coppola with nearly 70-75% of the time dealing with the issues regarding her other maladies as well as the basic gait and balance disturbance.  In addition, time was spent in writing a letter to Dr. Mckeon in which I expressed the need for the patient to resume physical therapy after he and Dr. Recinos have given their approval.  I would like to start her on Ampyra once she resumes walking if physical therapy can bring her to that point.      Scar Russ MD      EVALUATION AND MANAGEMENT CODE (E/M):  13384.         SCAR RUSS MD             D: 2019   T: 2019   MT: tb      Name:     AMPARO COPPOLA   MRN:      [id number]        Account:      [id number]   :      1940           Service Date: 2019      Document: [id number]

## 2019-08-15 ENCOUNTER — TELEPHONE (OUTPATIENT)
Dept: NEUROLOGY | Facility: CLINIC | Age: 79
End: 2019-08-15

## 2019-08-15 RX ORDER — DALFAMPRIDINE 10 MG/1
TABLET, FILM COATED, EXTENDED RELEASE ORAL
Qty: 60 TABLET | Status: SHIPPED | OUTPATIENT
Start: 2019-08-15 | End: 2022-04-08

## 2019-08-15 NOTE — TELEPHONE ENCOUNTER
PA Initiation    Medication: Dalfampridine 10MG Tablet (PENDING)  Insurance Company: [employer] - Phone [phone] Fax [phone]  Pharmacy Filling the Rx: [facility] - [place], [place] - [facility] [address]  Filling Pharmacy Phone:    Filling Pharmacy Fax:    Start Date: 8/15/2019

## 2019-08-15 NOTE — TELEPHONE ENCOUNTER
Prior Authorization Approval    Authorization Effective Date: 8/15/2019  Authorization Expiration Date: 8/15/2020  Medication: Dalfampridine 10MG Tablet (APPROVED)  Approved Dose/Quantity: #60/30 days  Reference #: CMM KEY: [id number]   Insurance Company: [employer] - Phone [phone] Fax [phone]  Expected CoPay:       CoPay Card Available:      Foundation Assistance Needed:    Which Pharmacy is filling the prescription (Not needed for infusion/clinic administered): [facility] - [place], [place] - [address] [place] [address]  Pharmacy Notified: No  Patient Notified: No      Patient would like to continue filling with [facility]:

## 2019-08-21 ENCOUNTER — TELEPHONE (OUTPATIENT)
Dept: NEUROLOGY | Facility: CLINIC | Age: 79
End: 2019-08-21

## 2019-08-21 NOTE — TELEPHONE ENCOUNTER
[facility] Call Center    Phone Message    May a detailed message be left on voicemail: yes    Reason for Call: Other: Amparo calling to discuss her treatment plans.  She is wanting them changed due to new medication that she is taking.  Please call her back to discuss     Action Taken: Message routed to:  [facility] ([facility]): [facility] Neurology

## 2019-08-23 NOTE — TELEPHONE ENCOUNTER
Called pt, now answer, left message that Dr. Casillas was informed of her request and he was asked to call her.

## 2019-10-02 NOTE — TELEPHONE ENCOUNTER
[facility] Call Center    Phone Message    May a detailed message be left on voicemail: yes    Reason for Call: Other: Pt would like a call back. She has some questions about her medication.     Action Taken: Message routed to:  [facility] ([facility] [place] [place]):  neurology     Called pt, no answer, message was left for pt to return phone call.     Spoke to patient at 4:16pm. She said she has fallen down 3 times since 11am. She has called Dr. Casillas and he told her to go to Urgent Care where she is now. She was instructed to give the people at Urgent Care Dr. Casillas's number if they need information on her about the medication that she is taking. She said she would and will keep this writer updated.            Donor Site Anesthesia Type: same as repair anesthesia

## 2019-10-16 ENCOUNTER — RECORDS - HEALTHEAST (OUTPATIENT)
Dept: LAB | Facility: CLINIC | Age: 79
End: 2019-10-16

## 2019-10-16 LAB
ALBUMIN SERPL-MCNC: 3.8 G/DL (ref 3.5–5)
ALP SERPL-CCNC: 136 U/L (ref 45–120)
ALT SERPL W P-5'-P-CCNC: 38 U/L (ref 0–45)
ANION GAP SERPL CALCULATED.3IONS-SCNC: 12 MMOL/L (ref 5–18)
AST SERPL W P-5'-P-CCNC: 25 U/L (ref 0–40)
BILIRUB SERPL-MCNC: 0.4 MG/DL (ref 0–1)
BUN SERPL-MCNC: 19 MG/DL (ref 8–28)
CALCIUM SERPL-MCNC: 10.2 MG/DL (ref 8.5–10.5)
CHLORIDE BLD-SCNC: 102 MMOL/L (ref 98–107)
CHOLEST SERPL-MCNC: 279 MG/DL
CO2 SERPL-SCNC: 24 MMOL/L (ref 22–31)
CREAT SERPL-MCNC: 0.82 MG/DL (ref 0.6–1.1)
ERYTHROCYTE [DISTWIDTH] IN BLOOD BY AUTOMATED COUNT: 15.1 % (ref 11–14.5)
FASTING STATUS PATIENT QL REPORTED: ABNORMAL
GFR SERPL CREATININE-BSD FRML MDRD: >60 ML/MIN/1.73M2
GLUCOSE BLD-MCNC: 79 MG/DL (ref 70–125)
HCT VFR BLD AUTO: 44.3 % (ref 35–47)
HDLC SERPL-MCNC: 78 MG/DL
HGB BLD-MCNC: 13.5 G/DL (ref 12–16)
LDLC SERPL CALC-MCNC: 169 MG/DL
MCH RBC QN AUTO: 27.2 PG (ref 27–34)
MCHC RBC AUTO-ENTMCNC: 30.5 G/DL (ref 32–36)
MCV RBC AUTO: 89 FL (ref 80–100)
PLATELET # BLD AUTO: 374 THOU/UL (ref 140–440)
PMV BLD AUTO: 10.2 FL (ref 8.5–12.5)
POTASSIUM BLD-SCNC: 4.9 MMOL/L (ref 3.5–5)
PROT SERPL-MCNC: 7.6 G/DL (ref 6–8)
RBC # BLD AUTO: 4.96 MILL/UL (ref 3.8–5.4)
SODIUM SERPL-SCNC: 138 MMOL/L (ref 136–145)
TRIGL SERPL-MCNC: 162 MG/DL
TSH SERPL DL<=0.005 MIU/L-ACNC: 2.31 UIU/ML (ref 0.3–5)
VIT B12 SERPL-MCNC: 1175 PG/ML (ref 213–816)
WBC: 7.8 THOU/UL (ref 4–11)

## 2019-10-17 LAB — 25(OH)D3 SERPL-MCNC: 27.1 NG/ML (ref 30–80)

## 2019-12-19 ENCOUNTER — RECORDS - HEALTHEAST (OUTPATIENT)
Dept: LAB | Facility: CLINIC | Age: 79
End: 2019-12-19

## 2019-12-19 LAB
ALBUMIN UR-MCNC: ABNORMAL MG/DL
APPEARANCE UR: ABNORMAL
BACTERIA #/AREA URNS HPF: ABNORMAL HPF
BILIRUB UR QL STRIP: NEGATIVE
COLOR UR AUTO: YELLOW
GLUCOSE UR STRIP-MCNC: NEGATIVE MG/DL
HGB UR QL STRIP: ABNORMAL
KETONES UR STRIP-MCNC: NEGATIVE MG/DL
LEUKOCYTE ESTERASE UR QL STRIP: ABNORMAL
NITRATE UR QL: POSITIVE
PH UR STRIP: 5 [PH] (ref 4.5–8)
RBC #/AREA URNS AUTO: ABNORMAL HPF
SP GR UR STRIP: 1.01 (ref 1–1.03)
SQUAMOUS #/AREA URNS AUTO: ABNORMAL LPF
UROBILINOGEN UR STRIP-ACNC: ABNORMAL
WBC #/AREA URNS AUTO: >100 HPF

## 2019-12-21 LAB — BACTERIA SPEC CULT: ABNORMAL

## 2020-02-03 ENCOUNTER — HOSPITAL ENCOUNTER (OUTPATIENT)
Dept: GASTROENTEROLOGY | Facility: CLINIC | Age: 80
Discharge: NURSING, REHAB OR LONG-TERM CARE | End: 2020-02-03
Attending: INTERNAL MEDICINE | Admitting: INTERNAL MEDICINE
Payer: MEDICARE

## 2020-02-03 ENCOUNTER — SURGERY - HEALTHEAST (OUTPATIENT)
Dept: GASTROENTEROLOGY | Facility: CLINIC | Age: 80
End: 2020-02-03

## 2020-02-03 ASSESSMENT — MIFFLIN-ST. JEOR
SCORE: 1174.42
SCORE: 1174.42

## 2020-02-04 LAB
LAB AP CHARGES (HE HISTORICAL CONVERSION): NORMAL
PATH REPORT.COMMENTS IMP SPEC: NORMAL
PATH REPORT.FINAL DX SPEC: NORMAL
PATH REPORT.GROSS SPEC: NORMAL
PATH REPORT.MICROSCOPIC SPEC OTHER STN: NORMAL
PATH REPORT.RELEVANT HX SPEC: NORMAL
RESULT FLAG (HE HISTORICAL CONVERSION): NORMAL

## 2020-03-12 ENCOUNTER — TRANSFERRED RECORDS (OUTPATIENT)
Dept: HEALTH INFORMATION MANAGEMENT | Facility: CLINIC | Age: 80
End: 2020-03-12

## 2020-05-06 ENCOUNTER — RECORDS - HEALTHEAST (OUTPATIENT)
Dept: LAB | Facility: CLINIC | Age: 80
End: 2020-05-06

## 2020-05-06 LAB
ALBUMIN UR-MCNC: NEGATIVE MG/DL
APPEARANCE UR: ABNORMAL
BACTERIA #/AREA URNS HPF: ABNORMAL HPF
BILIRUB UR QL STRIP: NEGATIVE
COLOR UR AUTO: YELLOW
GLUCOSE UR STRIP-MCNC: NEGATIVE MG/DL
HGB UR QL STRIP: ABNORMAL
HYALINE CASTS #/AREA URNS LPF: ABNORMAL LPF
KETONES UR STRIP-MCNC: NEGATIVE MG/DL
LEUKOCYTE ESTERASE UR QL STRIP: ABNORMAL
NITRATE UR QL: NEGATIVE
PH UR STRIP: 5.5 [PH] (ref 4.5–8)
RBC #/AREA URNS AUTO: ABNORMAL HPF
SP GR UR STRIP: 1.02 (ref 1–1.03)
SQUAMOUS #/AREA URNS AUTO: ABNORMAL LPF
UROBILINOGEN UR STRIP-ACNC: ABNORMAL
WBC #/AREA URNS AUTO: ABNORMAL HPF

## 2020-05-07 ENCOUNTER — RECORDS - HEALTHEAST (OUTPATIENT)
Dept: LAB | Facility: CLINIC | Age: 80
End: 2020-05-07

## 2020-05-07 LAB
ALBUMIN SERPL-MCNC: 3.2 G/DL (ref 3.5–5)
ALP SERPL-CCNC: 102 U/L (ref 45–120)
ALT SERPL W P-5'-P-CCNC: 25 U/L (ref 0–45)
ANION GAP SERPL CALCULATED.3IONS-SCNC: 7 MMOL/L (ref 5–18)
AST SERPL W P-5'-P-CCNC: 20 U/L (ref 0–40)
BACTERIA SPEC CULT: NO GROWTH
BILIRUB SERPL-MCNC: 0.4 MG/DL (ref 0–1)
BUN SERPL-MCNC: 19 MG/DL (ref 8–28)
CALCIUM SERPL-MCNC: 9.4 MG/DL (ref 8.5–10.5)
CHLORIDE BLD-SCNC: 103 MMOL/L (ref 98–107)
CO2 SERPL-SCNC: 30 MMOL/L (ref 22–31)
CREAT SERPL-MCNC: 0.77 MG/DL (ref 0.6–1.1)
ERYTHROCYTE [DISTWIDTH] IN BLOOD BY AUTOMATED COUNT: 14.9 % (ref 11–14.5)
GFR SERPL CREATININE-BSD FRML MDRD: >60 ML/MIN/1.73M2
GLUCOSE BLD-MCNC: 78 MG/DL (ref 70–125)
HCT VFR BLD AUTO: 41.7 % (ref 35–47)
HGB BLD-MCNC: 12.3 G/DL (ref 12–16)
MCH RBC QN AUTO: 27.2 PG (ref 27–34)
MCHC RBC AUTO-ENTMCNC: 29.5 G/DL (ref 32–36)
MCV RBC AUTO: 92 FL (ref 80–100)
PLATELET # BLD AUTO: 256 THOU/UL (ref 140–440)
PMV BLD AUTO: 11.2 FL (ref 8.5–12.5)
POTASSIUM BLD-SCNC: 4.3 MMOL/L (ref 3.5–5)
PROT SERPL-MCNC: 6.4 G/DL (ref 6–8)
RBC # BLD AUTO: 4.53 MILL/UL (ref 3.8–5.4)
SODIUM SERPL-SCNC: 140 MMOL/L (ref 136–145)
TSH SERPL DL<=0.005 MIU/L-ACNC: 1.94 UIU/ML (ref 0.3–5)
WBC: 6.9 THOU/UL (ref 4–11)

## 2020-05-08 ENCOUNTER — RECORDS - HEALTHEAST (OUTPATIENT)
Dept: LAB | Facility: CLINIC | Age: 80
End: 2020-05-08

## 2020-05-09 LAB — LAMOTRIGINE SERPL-MCNC: 5.9 UG/ML (ref 2.5–15)

## 2020-08-04 ENCOUNTER — RECORDS - HEALTHEAST (OUTPATIENT)
Dept: LAB | Facility: CLINIC | Age: 80
End: 2020-08-04

## 2020-08-04 LAB
ALBUMIN UR-MCNC: NEGATIVE MG/DL
APPEARANCE UR: CLEAR
BACTERIA #/AREA URNS HPF: ABNORMAL HPF
BILIRUB UR QL STRIP: NEGATIVE
COLOR UR AUTO: YELLOW
GLUCOSE UR STRIP-MCNC: NEGATIVE MG/DL
HGB UR QL STRIP: ABNORMAL
HYALINE CASTS #/AREA URNS LPF: ABNORMAL LPF
KETONES UR STRIP-MCNC: NEGATIVE MG/DL
LEUKOCYTE ESTERASE UR QL STRIP: NEGATIVE
NITRATE UR QL: NEGATIVE
PH UR STRIP: 5.5 [PH] (ref 4.5–8)
RBC #/AREA URNS AUTO: ABNORMAL HPF
SP GR UR STRIP: 1.02 (ref 1–1.03)
SQUAMOUS #/AREA URNS AUTO: ABNORMAL LPF
UROBILINOGEN UR STRIP-ACNC: ABNORMAL
WBC #/AREA URNS AUTO: ABNORMAL HPF

## 2020-08-05 ENCOUNTER — MEDICAL CORRESPONDENCE (OUTPATIENT)
Dept: HEALTH INFORMATION MANAGEMENT | Facility: CLINIC | Age: 80
End: 2020-08-05

## 2020-08-05 LAB — BACTERIA SPEC CULT: NORMAL

## 2020-10-06 ENCOUNTER — OFFICE VISIT (OUTPATIENT)
Dept: SURGERY | Facility: CLINIC | Age: 80
End: 2020-10-06
Payer: MEDICARE

## 2020-10-06 VITALS
BODY MASS INDEX: 33.61 KG/M2 | HEIGHT: 61 IN | DIASTOLIC BLOOD PRESSURE: 100 MMHG | HEART RATE: 82 BPM | RESPIRATION RATE: 20 BRPM | OXYGEN SATURATION: 92 % | SYSTOLIC BLOOD PRESSURE: 172 MMHG | WEIGHT: 178 LBS

## 2020-10-06 DIAGNOSIS — C18.7 MALIGNANT NEOPLASM OF SIGMOID COLON (H): Primary | ICD-10-CM

## 2020-10-06 DIAGNOSIS — K43.2 INCISIONAL HERNIA, WITHOUT OBSTRUCTION OR GANGRENE: ICD-10-CM

## 2020-10-06 PROCEDURE — 99214 OFFICE O/P EST MOD 30 MIN: CPT | Performed by: SURGERY

## 2020-10-06 RX ORDER — FAMOTIDINE 20 MG/1
TABLET, FILM COATED ORAL
Status: ON HOLD | COMMUNITY
Start: 2019-11-19 | End: 2022-04-11

## 2020-10-06 RX ORDER — STANDARDIZED SENNA CONCENTRATE AND DOCUSATE SODIUM 8.6; 5 MG/1; MG/1
2 TABLET ORAL DAILY
COMMUNITY
Start: 2020-08-04 | End: 2022-09-27

## 2020-10-06 RX ORDER — BISACODYL 5 MG/1
TABLET, COATED ORAL
COMMUNITY
Start: 2020-01-29 | End: 2020-10-06

## 2020-10-06 ASSESSMENT — MIFFLIN-ST. JEOR: SCORE: 1219.78

## 2020-10-06 NOTE — PROGRESS NOTES
[facility]  Surgery Consultation    HPI: Patient is a 80 year old female who is here for consultation requested by Parvez Francis [phone] for evaluation of incisional hernia.  The patient underwent a open sigmoid colon resection approximately 5 years ago.  She developed a hernia shortly after.  The hernia has greatly increased in size over the years.  She had a CT in 2017 that showed a sizable hernia in the upper abdomen as well as smaller hernias below.  She now states that the hernia is much more protuberant.  She has times when she eats and to become so protuberant that she has nausea and vomiting.  She is never able to push the hernia back completely.  The pain is worse with oral intake and relieved with rest.  She denies a rand incarceration but as noted above it has become protuberant and painful.  She otherwise eats a normal diet and has been working on weight loss.  She has normal bowel movements.  She has history of COPD without oxygen which is stable.  Patient denies fevers, chills, nausea, vomiting, SOB, chest pain, abdominal pain..    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  PSH:    has a past surgical history that includes Hysterectomy; multiple knee surgeries; rotator cuff surgeries; Cholecystectomy; colon cancer resection (1/2015); GI surgery; Abdomen surgery; orthopedic surgery; Insert drain lumbar (N/A, 2/5/2017); Optical tracking system implant shunt ventriculoperitoneal (Right,  "2/8/2017); GYN surgery; and Esophagoscopy, gastroscopy, duodenoscopy (EGD), combined (N/A, 9/7/2017).  Social History:   reports that she quit smoking about 23 years ago. Her smoking use included cigarettes. She started smoking about 40 years ago. She smoked 0.25 packs per day. She has quit using smokeless tobacco. She reports that she does not drink alcohol or use drugs.  Family History:   family history includes Breast Cancer (age of onset: 60) in her mother; Depression (age of onset: 55) in her father; Peptic Ulcer Disease in her father; Prostate Cancer in her brother.  Medications/Allergies: Home medications and allergies reviewed.    ROS:  The 12 point Review of Systems is negative other than noted in the HPI.    Physical Exam:  BP (!) 172/100   Pulse 82   Resp 20   Ht 1.549 m (5' 1\")   Wt 80.7 kg (178 lb)   SpO2 92%   BMI 33.63 kg/m    GENERAL: Generally appears well.  Psych: Alert and Oriented.  Normal affect  Eyes: Sclera clear  Respiratory:  Lungs with good air excursion  Cardiovascular:  Normal peripheral pulses  GI: Abdomen soft.  Protuberant hernia in upper abdomen.  Diastases palpated upon reduction measuring at least 5 to 6 cm.  Hernia reduced but is protuberant immediately.  Lymphatic/Hematologic/Immune:  No cervical lymphadenopathy.  Integumentary:  No rashes  Neurological: grossly intact     All new lab and imaging data was reviewed.     Impression and Plan:  Patient is a 80 year old female with incisional hernia    PLAN:  I discussed the pathophysiology of hernias and options for repair including laparoscopic VS open.  The patient has a chronic incisional hernia.  Unfortunately the hernia is increasing in size and symptoms.  She has met with surgeons in the past and has been told to lose weight but she has not been successful with this.  I agree with continued weight loss and will plan for a CT scan to assure she does not have any other intra-abdominal findings giving her ongoing abdominal " pain and also to assess the size of her diastases for surgical planning.  I will also have the patient return to her primary doctor to see if she would be a candidate to undergo general anesthesia and a large surgery.  Options of hernia repair including robotic and open were discussed.  Will await final CT prior to determining.  Message was left with daughter regarding plan.  I will have the patient return in 2 months.    Thank you very much for this consult.    Conrad Martinez M.D.  [facility]  [phone]    Please route or send letter to:  Primary Care Provider (PCP) and Referring Provider

## 2020-10-12 ENCOUNTER — TRANSFERRED RECORDS (OUTPATIENT)
Dept: HEALTH INFORMATION MANAGEMENT | Facility: CLINIC | Age: 80
End: 2020-10-12

## 2020-10-28 ENCOUNTER — COMMUNICATION - HEALTHEAST (OUTPATIENT)
Dept: SCHEDULING | Facility: CLINIC | Age: 80
End: 2020-10-28

## 2020-11-09 ENCOUNTER — AMBULATORY - HEALTHEAST (OUTPATIENT)
Dept: PULMONOLOGY | Facility: OTHER | Age: 80
End: 2020-11-09

## 2020-11-09 ENCOUNTER — AMBULATORY - HEALTHEAST (OUTPATIENT)
Dept: OTHER | Facility: CLINIC | Age: 80
End: 2020-11-09

## 2020-11-09 ENCOUNTER — DOCUMENTATION ONLY (OUTPATIENT)
Dept: OTHER | Facility: CLINIC | Age: 80
End: 2020-11-09

## 2020-11-09 DIAGNOSIS — R06.02 SOB (SHORTNESS OF BREATH): ICD-10-CM

## 2020-11-09 PROBLEM — Z71.89 ADVANCED DIRECTIVES, COUNSELING/DISCUSSION: Chronic | Status: RESOLVED | Noted: 2017-06-02 | Resolved: 2020-11-09

## 2021-01-25 ENCOUNTER — HOSPITAL ENCOUNTER (OUTPATIENT)
Dept: RESPIRATORY THERAPY | Facility: CLINIC | Age: 81
Discharge: HOME | End: 2021-01-25
Attending: INTERNAL MEDICINE

## 2021-02-02 ENCOUNTER — RECORDS - HEALTHEAST (OUTPATIENT)
Dept: LAB | Facility: CLINIC | Age: 81
End: 2021-02-02

## 2021-02-03 LAB
25(OH)D3 SERPL-MCNC: 23 NG/ML (ref 30–80)
ANION GAP SERPL CALCULATED.3IONS-SCNC: 8 MMOL/L (ref 5–18)
BUN SERPL-MCNC: 16 MG/DL (ref 8–28)
CALCIUM SERPL-MCNC: 9.3 MG/DL (ref 8.5–10.5)
CHLORIDE BLD-SCNC: 103 MMOL/L (ref 98–107)
CHOLEST SERPL-MCNC: 258 MG/DL
CO2 SERPL-SCNC: 29 MMOL/L (ref 22–31)
CREAT SERPL-MCNC: 0.75 MG/DL (ref 0.6–1.1)
ERYTHROCYTE [DISTWIDTH] IN BLOOD BY AUTOMATED COUNT: 14.4 % (ref 11–14.5)
FASTING STATUS PATIENT QL REPORTED: ABNORMAL
GFR SERPL CREATININE-BSD FRML MDRD: >60 ML/MIN/1.73M2
GLUCOSE BLD-MCNC: 84 MG/DL (ref 70–125)
HCT VFR BLD AUTO: 41.1 % (ref 35–47)
HDLC SERPL-MCNC: 58 MG/DL
HGB BLD-MCNC: 12.5 G/DL (ref 12–16)
LDLC SERPL CALC-MCNC: 174 MG/DL
MCH RBC QN AUTO: 27.1 PG (ref 27–34)
MCHC RBC AUTO-ENTMCNC: 30.4 G/DL (ref 32–36)
MCV RBC AUTO: 89 FL (ref 80–100)
PLATELET # BLD AUTO: 280 THOU/UL (ref 140–440)
PMV BLD AUTO: 10.8 FL (ref 8.5–12.5)
POTASSIUM BLD-SCNC: 4.5 MMOL/L (ref 3.5–5)
RBC # BLD AUTO: 4.61 MILL/UL (ref 3.8–5.4)
SODIUM SERPL-SCNC: 140 MMOL/L (ref 136–145)
TRIGL SERPL-MCNC: 128 MG/DL
TSH SERPL DL<=0.005 MIU/L-ACNC: 2.08 UIU/ML (ref 0.3–5)
VIT B12 SERPL-MCNC: 770 PG/ML (ref 213–816)
WBC: 6.3 THOU/UL (ref 4–11)

## 2021-02-04 LAB — LAMOTRIGINE SERPL-MCNC: 4.5 UG/ML (ref 2.5–15)

## 2021-02-23 ENCOUNTER — RECORDS - HEALTHEAST (OUTPATIENT)
Dept: ADMINISTRATIVE | Facility: OTHER | Age: 81
End: 2021-02-23

## 2021-02-27 ENCOUNTER — AMBULATORY - HEALTHEAST (OUTPATIENT)
Dept: GASTROENTEROLOGY | Facility: CLINIC | Age: 81
End: 2021-02-27

## 2021-02-27 DIAGNOSIS — Z11.59 ENCOUNTER FOR SCREENING FOR OTHER VIRAL DISEASES: ICD-10-CM

## 2021-03-26 ENCOUNTER — RECORDS - HEALTHEAST (OUTPATIENT)
Dept: ADMINISTRATIVE | Facility: OTHER | Age: 81
End: 2021-03-26

## 2021-03-27 ENCOUNTER — AMBULATORY - HEALTHEAST (OUTPATIENT)
Dept: GASTROENTEROLOGY | Facility: CLINIC | Age: 81
End: 2021-03-27

## 2021-03-27 DIAGNOSIS — Z11.59 ENCOUNTER FOR SCREENING FOR OTHER VIRAL DISEASES: ICD-10-CM

## 2021-04-22 ENCOUNTER — RECORDS - HEALTHEAST (OUTPATIENT)
Dept: LAB | Facility: CLINIC | Age: 81
End: 2021-04-22

## 2021-04-25 LAB
BACTERIA SPEC CULT: ABNORMAL
BACTERIA SPEC CULT: ABNORMAL

## 2021-04-26 ENCOUNTER — RECORDS - HEALTHEAST (OUTPATIENT)
Dept: LAB | Facility: CLINIC | Age: 81
End: 2021-04-26

## 2021-04-27 LAB
ANION GAP SERPL CALCULATED.3IONS-SCNC: 6 MMOL/L (ref 5–18)
BUN SERPL-MCNC: 16 MG/DL (ref 8–28)
CALCIUM SERPL-MCNC: 9.1 MG/DL (ref 8.5–10.5)
CHLORIDE BLD-SCNC: 104 MMOL/L (ref 98–107)
CO2 SERPL-SCNC: 30 MMOL/L (ref 22–31)
CREAT SERPL-MCNC: 0.67 MG/DL (ref 0.6–1.1)
ERYTHROCYTE [DISTWIDTH] IN BLOOD BY AUTOMATED COUNT: 15.4 % (ref 11–14.5)
GFR SERPL CREATININE-BSD FRML MDRD: >60 ML/MIN/1.73M2
GLUCOSE BLD-MCNC: 88 MG/DL (ref 70–125)
HCT VFR BLD AUTO: 30.8 % (ref 35–47)
HGB BLD-MCNC: 9 G/DL (ref 12–16)
MCH RBC QN AUTO: 26.2 PG (ref 27–34)
MCHC RBC AUTO-ENTMCNC: 29.2 G/DL (ref 32–36)
MCV RBC AUTO: 90 FL (ref 80–100)
PLATELET # BLD AUTO: 256 THOU/UL (ref 140–440)
PMV BLD AUTO: 12 FL (ref 8.5–12.5)
POTASSIUM BLD-SCNC: 4.3 MMOL/L (ref 3.5–5)
RBC # BLD AUTO: 3.44 MILL/UL (ref 3.8–5.4)
SODIUM SERPL-SCNC: 140 MMOL/L (ref 136–145)
WBC: 6.9 THOU/UL (ref 4–11)

## 2021-05-04 ENCOUNTER — RECORDS - HEALTHEAST (OUTPATIENT)
Dept: LAB | Facility: CLINIC | Age: 81
End: 2021-05-04

## 2021-05-05 LAB
ANION GAP SERPL CALCULATED.3IONS-SCNC: 10 MMOL/L (ref 5–18)
BUN SERPL-MCNC: 13 MG/DL (ref 8–28)
CALCIUM SERPL-MCNC: 9.2 MG/DL (ref 8.5–10.5)
CHLORIDE BLD-SCNC: 102 MMOL/L (ref 98–107)
CO2 SERPL-SCNC: 28 MMOL/L (ref 22–31)
CREAT SERPL-MCNC: 0.65 MG/DL (ref 0.6–1.1)
ERYTHROCYTE [DISTWIDTH] IN BLOOD BY AUTOMATED COUNT: 14.6 % (ref 11–14.5)
GFR SERPL CREATININE-BSD FRML MDRD: >60 ML/MIN/1.73M2
GLUCOSE BLD-MCNC: 86 MG/DL (ref 70–125)
HCT VFR BLD AUTO: 34.4 % (ref 35–47)
HGB BLD-MCNC: 10.2 G/DL (ref 12–16)
MCH RBC QN AUTO: 25.8 PG (ref 27–34)
MCHC RBC AUTO-ENTMCNC: 29.7 G/DL (ref 32–36)
MCV RBC AUTO: 87 FL (ref 80–100)
PLATELET # BLD AUTO: 314 THOU/UL (ref 140–440)
PMV BLD AUTO: 11.5 FL (ref 8.5–12.5)
POTASSIUM BLD-SCNC: 4.2 MMOL/L (ref 3.5–5)
RBC # BLD AUTO: 3.95 MILL/UL (ref 3.8–5.4)
SODIUM SERPL-SCNC: 140 MMOL/L (ref 136–145)
WBC: 5.8 THOU/UL (ref 4–11)

## 2021-05-26 NOTE — PROGRESS NOTES
[facility]    DATE: 3/22/2019    NAME:  Amparo Sharma             :  1940  MRN: [id number]  CODE STATUS:  FULL CODE    VISIT TYPE: Problem Visit (questions about care)     FACILITY:  [facility] [[id number]]       CHIEF COMPLAIN/REASON FOR VISIT:    Chief Complaint   Patient presents with     Problem Visit     questions about care               HISTORY OF PRESENT ILLNESS: Amparo Sharma is a 78 y.o. female who was admitted 3/5-3/8 for right reverse total shoulder arthroplasty. Chest xray was done for increased cough but was negative for pneumonia. She was treated with ceftin and doxycycline. She was discharged to TCU for further rehab. She has PMH of colon cancer, hypothyroid, normal pressure hydrocephalus s/p  shunt, HTN, COPD, bipolar disorder, urinary frequency. Prior to this she lived at [facility].     Today Ms. Sharma is seen per staff relay of concerns regarding medical care during TCU stay. Her daughter was upset in care conference yesterday stating her mother's cough got worse before it got better and wanting to switch to [facility] provider in house because of this. Staff report Amparo did not express concerns with her care but her daughter did and was wanting her to switch providers. However, staff report she is planning to discharge on Tuesday. On exam Amparo is seen in her wheelchair in her room. She reports that she did feel her cough was severe and did not get better right away. She says it took a while before it got better and she felt it only started getting better after she requested the Z pack. She is questioning why Dr. Chavez's name was on her chart and ID band but never saw her while in facility. Explained level of care NP provides and laws regarding NP v MD care. Discussed her symptoms and reviewed orders from frequent visits for cough and the many med changes that occurred. Discussed she was previously on 2 antibiotics and was  "not improving which was why was started on cough syrup, Z pack, Atrovent nebs, Hypertonic saline nebs, tessalon pearls, prednisone taper. Discussed the many med changes that occurred during her stay in managing her COPD exacerbation. After reviewing NP role and med changes she reported feeling comfortable with her care and that she was misunderstanding believing NP was a \"Nurse\" and not really able to give orders. She says her biggest concern really was that she fell and staff was not responsive to helping her as quickly as she feels they should have. She agrees that it would be poor continuity of care to change providers at this time with discharge only being a few days away. She reports her daughter is upset that she is leaving on Tuesday and feels she should be staying longer. She is not sure if her daughter will want to file an appeal. Updated  after conversation and reviewed that Amparo is able to make decisions and she has chosen to remain with [facility] providers. She admits she is feeling much better today than she was a week ago.     REVIEW OF SYSTEMS:  PROBLEMS AND REVIEW OF SYSTEMS:   Today on ROS:   Currently, no fever, chills, or rigors. Decreased vision and hearing. Denies any chest pain, headaches, palpitations, lightheadedness, dizziness. Appetite is good. Denies any GERD symptoms. Denies any difficulty with swallowing, nausea, or vomiting. Positive for controlled pain, weakness, right shoulder incision, right shoulder sling, edema right arm, urinary incontinence, shortness of breath with exertion, dry cough improved      Allergies   Allergen Reactions     Amantadine Other (See Comments)     Codeine Itching     Diazepam Other (See Comments)     Hallucinations/paranoid     Diphenhydramine Other (See Comments)     hyperactivity     Meloxicam Other (See Comments)     Meperidine Nausea Only     Pentazocine Other (See Comments)     drowsiness     Statins-Hmg-Coa " Reductase Inhibitors Other (See Comments)     Was hospitalized, rhabdomyolitis  Rhabdo?  Statins     Talwin [Pentazocine Lactate]      Tramadol Nausea Only     Current Outpatient Medications   Medication Sig     acetaminophen (TYLENOL) 500 MG tablet Take 1,000 mg by mouth 4 (four) times a day.            albuterol (PROAIR HFA;PROVENTIL HFA;VENTOLIN HFA) 90 mcg/actuation inhaler Inhale 2 puffs every 4 (four) hours as needed for wheezing.     amoxicillin (AMOXIL) 500 MG tablet Take 500 mg by mouth see administration instructions. Take 1 hour prior to dental appointment     ARIPiprazole (ABILIFY) 15 MG tablet Take 15 mg by mouth every evening.     aspirin 325 MG tablet Take 1 tablet (325 mg total) by mouth 2 (two) times a day.     buPROPion (WELLBUTRIN XL) 150 MG 24 hr tablet Take 150 mg by mouth every morning.            carboxymethylcellulose (REFRESH PLUS) 0.5 % Dpet ophthalmic dropperette Administer 1 drop to both eyes 4 (four) times a day as needed (dry eyes).     dalfampridine (AMPYRA) 10 mg Tb12 Take 10 mg by mouth 2 (two) times a day.     dextromethorphan (ROBITUSSIN MAX STRENGTH) 15 mg/5 mL Liqd syrup Take 30 mg by mouth 4 (four) times a day.            famotidine (PEPCID) 20 MG tablet Take 1 tablet (20 mg total) by mouth 2 (two) times a day.     FLUoxetine (PROZAC) 20 MG capsule Take 40 mg by mouth every morning.            hydrocortisone 2.5 % cream Apply 1 application topically 2 (two) times a day as needed (rash/itching).     ipratropium (ATROVENT) 0.02 % nebulizer solution Take 500 mcg by nebulization 3 (three) times a day.     lamoTRIgine (LAMICTAL) 100 MG tablet Take 100 mg by mouth bedtime.     lamoTRIgine (LAMICTAL) 150 MG tablet Take 150 mg by mouth every morning.     lanolin-mineral oil (EUCERIN ORIGINAL) Lotn Apply 1 application topically daily.     levothyroxine 75 mcg cap Take 75 mcg by mouth every morning.     magnesium hydroxide (MILK OF MAG) 400 mg/5 mL Susp suspension Take 30 mL by mouth  daily as needed.     multivit-min-FA-lycopen-lutein (CENTRUM SILVER) 0.4-300-250 mg-mcg-mcg tablet Take 1 tablet by mouth daily.     oxyCODONE (ROXICODONE) 5 MG immediate release tablet Take 1-2 tablets (5-10 mg total) by mouth every 4 (four) hours as needed. (Patient taking differently: Take 5 mg by mouth every 6 (six) hours as needed.       )     polyethylene glycol (MIRALAX) 17 gram packet Take 17 g by mouth daily.     polyvinyl alcohol (LIQUIFILM TEARS) 1.4 % ophthalmic solution Administer 1 drop to both eyes as needed for dry eyes.     predniSONE (DELTASONE) 10 mg tablet Take 10 mg by mouth daily 40mg x 2, 30mg x 2, 20mg x2, 10mg x2 .     salmeterol (SEREVENT) 50 mcg/dose diskus inhaler Inhale 1 puff 2 (two) times a day.     umeclidinium (INCRUSE ELLIPTA) 62.5 mcg/actuation DsDv inhaler Inhale 1 puff daily.     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) 2017     Anemia      Arthritis      Asthma 1/20/2017     Bipolar 1 disorder (H)      Cancer (H)      Cellulitis      Cellulitis 12/12/2016    of left elbow     Chronic pain syndrome      COPD with acute exacerbation (H) 12/21/2018     Depression      Disease of thyroid gland      Frequent falls 2016     Frequent falls 2017     GERD (gastroesophageal reflux disease)      History of transfusion      Hydrocephalus      Hyperlipidemia      Hypertension      Hypothyroidism      Infection of skin due to methicillin resistant Staphylococcus aureus (MRSA)      Memory loss 2017     Normal pressure hydrocephalus 02/03/2017     Parkinson disease (H)      Renal insufficiency      Urinary incontinence            PHYSICAL EXAMINATION  Vitals:    03/22/19 0700   BP: 157/78   Pulse: 87   Resp: 18   Temp: 97  F (36.1  C)   SpO2: 96%   Weight: 172 lb (78 kg)       Today on physical exam:     GENERAL: Awake, Alert, oriented x3, not in any form of acute distress, answers questions appropriately, follows  simple commands, conversant  HEENT: Head is normocephalic with normal hair distribution. No evidence of trauma. Ears: No acute purulent discharge. Eyes: Conjunctivae pink with no scleral jaundice. Nose: Normal mucosa and septum. NECK: Supple with no cervical or supraclavicular lymphadenopathy. Trachea is midline.   CHEST: No tenderness or deformity, no crepitus  LUNG: dim to auscultation with good chest expansion. Dry cough improving  BACK: No kyphosis of the thoracic spine. Symmetric, no curvature, ROM normal, no CVA tenderness, no spinal tenderness   CVS: There is good S1  S2, regular rhythm, there are no murmurs, rubs, gallops, or heaves,  2+ pulses symmetric in all extremities.  ABDOMEN: Rounded and soft, nontender to palpation, non distended, no masses, no organomegaly, good bowel sounds, no rebound or guarding, no peritoneal signs.   EXTREMITIES: Right shoulder incision small amount of sanguinous drainage, covered with dressing, 1+ edema, mild numbness in rue, 1+ edema right arm, sling in place  SKIN: Warm and dry,   NEUROLOGICAL: The patient is oriented to person, place and time. Wheelchair bound            LABS:   Recent Results (from the past 168 hour(s))   Hemoglobin   Result Value Ref Range    Hemoglobin 10.2 (L) 12.0 - 16.0 g/dL     Results for orders placed or performed in visit on 03/12/19   Basic Metabolic Panel   Result Value Ref Range    Sodium 141 136 - 145 mmol/L    Potassium 4.3 3.5 - 5.0 mmol/L    Chloride 106 98 - 107 mmol/L    CO2 27 22 - 31 mmol/L    Anion Gap, Calculation 8 5 - 18 mmol/L    Glucose 100 70 - 125 mg/dL    Calcium 9.7 8.5 - 10.5 mg/dL    BUN 16 8 - 28 mg/dL    Creatinine 0.64 0.60 - 1.10 mg/dL    GFR MDRD Af Amer >60 >60 mL/min/1.73m2    GFR MDRD Non Af Amer >60 >60 mL/min/1.73m2         Lab Results   Component Value Date    WBC 7.3 03/12/2019    HGB 10.2 (L) 03/19/2019    HCT 30.9 (L) 03/12/2019    MCV 96 03/12/2019     03/12/2019       Lab Results   Component Value  Date    RZAPPFYT26 569 07/16/2016     Lab Results   Component Value Date    HGBA1C 5.7 07/15/2016     Lab Results   Component Value Date    INR 0.96 03/05/2019     No results found for: [id number]  Lab Results   Component Value Date    TSH 3.33 02/25/2018           ASSESSMENT/PLAN:    1. S/p reverse total shoulder arthroplasty: Incision c/d/i. 1+ edema right shoulder, arm, mild numbness present. Sling in place, NWB. F/u with ortho in 7-10 days. Pain controlled on tylenol and oxycodone 5mg q6h prn. On aspirin two times a day for dvt prophylaxis.   2. COPD, Acute bronchitis: completed cefdinir, doxycycline. No fevers or chills. cxr negative for pneumonia in hospital. On incruse ellipta, albuterol prn. Salmeterol added this hospital stay. Albuterol intolerance, reluctant to try other nebulizers. Completed mucinex. IS q1h while awake. spiriva not covered by insurance. dextromethorphan cough syrup four times a day scheduled, atrovent nebs three times a day and prn, saline nebs prn only now as congestion improved, prednisone taper, zithromax. Now much improved and off o2. DC tessalon pearls, dc saline nebs, completed mucinex. Overall much improved, continue other orders for now. Will review again prior to discharge and f/u with pcp if not improving at home.   3. Normal pressure hydrocephalus, s/p  shunt: Unsteady gait, on ampyra. Follows with neurology.   4. HTN: SBP 150s. No meds. Monitor.   5. Bipolar disorder: Mood stable recently. On abilify, wellbutrin, fluoxetine, lamictal.   6. H/o colon cancer: No recent concerns.   7. Constipation: on scheduled miralax daily. Improved.   8.  Acute blood loss anemia: hg 9.1 on 3/8. Recheck on 3/12-9.  9. GERD: on pepcid.   10. Hypothyroid: On levothyroxine.       Per therapy firm LCD 3/25 cga for toileting, mod for upper body dressing, cga for transfers, min for bed mobility, non ambulatory. From [facility] assisted living. Wheelchair bound at baseline. Recommending  pt, ot,  melissa.     Electronically signed by: Casie Barger NP

## 2021-05-27 NOTE — TELEPHONE ENCOUNTER
Amparo called back, she is doing well.  We will not continue calls.  She is aware she can call if she has questions.  Roseline Mccoy, LRT, COPD Educator

## 2021-05-27 NOTE — TELEPHONE ENCOUNTER
Left message with number along [facility] for when she is ready for lung testing.    Roseline Mccoy, LRT, COPD Educator

## 2021-05-27 NOTE — PROGRESS NOTES
[facility]    DATE: 3/25/2019    NAME:  Amparo Sharma             :  1940  MRN: [id number]  CODE STATUS:  FULL CODE    VISIT TYPE: Discharge Summary     FACILITY:  [facility] [[id number]]       CHIEF COMPLAIN/REASON FOR VISIT:    Chief Complaint   Patient presents with     Discharge Summary               HISTORY OF PRESENT ILLNESS: Amparo Sharma is a 78 y.o. female who was admitted 3/5-3/8 for right reverse total shoulder arthroplasty. Chest xray was done for increased cough but was negative for pneumonia. She was treated with ceftin and doxycycline. She was discharged to TCU for further rehab. She has PMH of colon cancer, hypothyroid, normal pressure hydrocephalus s/p  shunt, HTN, COPD, bipolar disorder, urinary frequency. Prior to this she lived at [facility].     TCU course:   Ms. Sharma has made limited progress with therapy due to her high level assist at baseline. She was wheelchair bound prior to surgery. She is contact guard assist for toileting, mod for upper body dressing, feeding assist, min for bed mobility, non ambulatory. She is near baseline so therapy is recommending she return to [facility] with resumption of services as prior to surgery on 3/26. During her stay she presented from hospital with COPD exacerbation and acute bronchitis. She had completed cefdinir and also completed doxycycline. Her cough continued to worsen and she was started on hypertonic saline nebs, atrovent nebs (she is intolerant to albuterol nebs), mucinex, dextromethorphan, tessalon pearls, and later prednisone taper and zithromax. She is improving at this time and has been weaned off some of these medications. She will complete her mucinex this week, prednisone taper this week, but will continue on atrovent nebs three times a day and dextromethorphan for now due to persistent cough. Her salmeterol inhaler was changed to advair for improved  maintenance management. She was recommended to f/u with her PCP by next week if this was not improved. Otherwise her pain has been improving and she is only occasionally using the oxycodone at this time. She will be sent home with small supply and encouraged to wean off. Her incision is healing well. She did have f/u with ortho on 3/22 and her pillow sling was removed and to continue simple sling until 4/2. She is non weight bearing of RUE but is able to do pendulum exercises and light activity ADL. She is to f/u on 4/2 with ortho again. Her hg was stable at 9 and vitals stable throughout stay. She will be returning to [facility] on 3/26 with  PT, OT, HHA, RN. She will f/u with pcp within 7 days.       REVIEW OF SYSTEMS:  PROBLEMS AND REVIEW OF SYSTEMS:   Today on ROS:   Currently, no fever, chills, or rigors. Decreased vision and hearing. Denies any chest pain, headaches, palpitations, lightheadedness, dizziness. Appetite is good. Denies any GERD symptoms. Denies any difficulty with swallowing, nausea, or vomiting. Positive for controlled pain, weakness, right shoulder incision, right shoulder sling, edema right arm, urinary incontinence, shortness of breath with exertion-improved, dry cough improved      Allergies   Allergen Reactions     Amantadine Other (See Comments)     Codeine Itching     Diazepam Other (See Comments)     Hallucinations/paranoid     Diphenhydramine Other (See Comments)     hyperactivity     Meloxicam Other (See Comments)     Meperidine Nausea Only     Pentazocine Other (See Comments)     drowsiness     Statins-Hmg-Coa Reductase Inhibitors Other (See Comments)     Was hospitalized, rhabdomyolitis  Rhabdo?  Statins     Talwin [Pentazocine Lactate]      Tramadol Nausea Only     Current Outpatient Medications   Medication Sig     fluticasone-salmeterol (ADVAIR) 250-50 mcg/dose DISKUS Inhale 1 puff 2 (two) times a day.     acetaminophen (TYLENOL) 500 MG tablet Take 1,000 mg by mouth 4 (four)  times a day.            albuterol (PROAIR HFA;PROVENTIL HFA;VENTOLIN HFA) 90 mcg/actuation inhaler Inhale 2 puffs every 4 (four) hours as needed for wheezing.     amoxicillin (AMOXIL) 500 MG tablet Take 500 mg by mouth see administration instructions. Take 1 hour prior to dental appointment     ARIPiprazole (ABILIFY) 15 MG tablet Take 15 mg by mouth every evening.     aspirin 325 MG tablet Take 1 tablet (325 mg total) by mouth 2 (two) times a day.     buPROPion (WELLBUTRIN XL) 150 MG 24 hr tablet Take 150 mg by mouth every morning.            carboxymethylcellulose (REFRESH PLUS) 0.5 % Dpet ophthalmic dropperette Administer 1 drop to both eyes 4 (four) times a day as needed (dry eyes).     dalfampridine (AMPYRA) 10 mg Tb12 Take 10 mg by mouth 2 (two) times a day.     dextromethorphan (ROBITUSSIN MAX STRENGTH) 15 mg/5 mL Liqd syrup Take 30 mg by mouth 4 (four) times a day.            famotidine (PEPCID) 20 MG tablet Take 1 tablet (20 mg total) by mouth 2 (two) times a day.     FLUoxetine (PROZAC) 20 MG capsule Take 40 mg by mouth every morning.            hydrocortisone 2.5 % cream Apply 1 application topically 2 (two) times a day as needed (rash/itching).     ipratropium (ATROVENT) 0.02 % nebulizer solution Take 500 mcg by nebulization 3 (three) times a day.     lamoTRIgine (LAMICTAL) 100 MG tablet Take 100 mg by mouth bedtime.     lamoTRIgine (LAMICTAL) 150 MG tablet Take 150 mg by mouth every morning.     lanolin-mineral oil (EUCERIN ORIGINAL) Lotn Apply 1 application topically daily.     levothyroxine 75 mcg cap Take 75 mcg by mouth every morning.     magnesium hydroxide (MILK OF MAG) 400 mg/5 mL Susp suspension Take 30 mL by mouth daily as needed.     multivit-min-FA-lycopen-lutein (CENTRUM SILVER) 0.4-300-250 mg-mcg-mcg tablet Take 1 tablet by mouth daily.     oxyCODONE (ROXICODONE) 5 MG immediate release tablet Take 1-2 tablets (5-10 mg total) by mouth every 4 (four) hours as needed. (Patient taking  differently: Take 5 mg by mouth every 6 (six) hours as needed.       )     polyethylene glycol (MIRALAX) 17 gram packet Take 17 g by mouth daily.     polyvinyl alcohol (LIQUIFILM TEARS) 1.4 % ophthalmic solution Administer 1 drop to both eyes as needed for dry eyes.     predniSONE (DELTASONE) 10 mg tablet Take 10 mg by mouth daily 40mg x 2, 30mg x 2, 20mg x2, 10mg x2 .     umeclidinium (INCRUSE ELLIPTA) 62.5 mcg/actuation DsDv inhaler Inhale 1 puff daily.     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) 2017     Anemia      Arthritis      Asthma 1/20/2017     Bipolar 1 disorder (H)      Cancer (H)      Cellulitis      Cellulitis 12/12/2016    of left elbow     Chronic pain syndrome      COPD with acute exacerbation (H) 12/21/2018     Depression      Disease of thyroid gland      Frequent falls 2016     Frequent falls 2017     GERD (gastroesophageal reflux disease)      History of transfusion      Hydrocephalus      Hyperlipidemia      Hypertension      Hypothyroidism      Infection of skin due to methicillin resistant Staphylococcus aureus (MRSA)      Memory loss 2017     Normal pressure hydrocephalus 02/03/2017     Parkinson disease (H)      Renal insufficiency      Urinary incontinence            PHYSICAL EXAMINATION  Vitals:    03/25/19 0700   BP: 146/83   Pulse: 85   Resp: 18   Temp: 97.9  F (36.6  C)   SpO2: 94%       Today on physical exam:     GENERAL: Awake, Alert, oriented x3, not in any form of acute distress, answers questions appropriately, follows simple commands, conversant  HEENT: Head is normocephalic with normal hair distribution. No evidence of trauma. Ears: No acute purulent discharge. Eyes: Conjunctivae pink with no scleral jaundice. Nose: Normal mucosa and septum. NECK: Supple with no cervical or supraclavicular lymphadenopathy. Trachea is midline.   CHEST: No tenderness or deformity, no crepitus  LUNG: dim with few  scattered rhonchi to auscultation with good chest expansion. Dry cough improving, No wheezes auscultated today  BACK: No kyphosis of the thoracic spine. Symmetric, no curvature, ROM normal, no CVA tenderness, no spinal tenderness   CVS: There is good S1  S2, regular rhythm, there are no murmurs, rubs, gallops, or heaves,  2+ pulses symmetric in all extremities.  ABDOMEN: Rounded and soft, nontender to palpation, non distended, no masses, no organomegaly, good bowel sounds, no rebound or guarding, no peritoneal signs.   EXTREMITIES: Right shoulder incision small amount of sanguinous drainage, covered with dressing, 1+ edema, mild numbness in rue, 1+ edema right arm, sling in place  SKIN: Warm and dry,   NEUROLOGICAL: The patient is oriented to person, place and time. Wheelchair bound            LABS:   Recent Results (from the past 168 hour(s))   Hemoglobin   Result Value Ref Range    Hemoglobin 10.2 (L) 12.0 - 16.0 g/dL     Results for orders placed or performed in visit on 03/12/19   Basic Metabolic Panel   Result Value Ref Range    Sodium 141 136 - 145 mmol/L    Potassium 4.3 3.5 - 5.0 mmol/L    Chloride 106 98 - 107 mmol/L    CO2 27 22 - 31 mmol/L    Anion Gap, Calculation 8 5 - 18 mmol/L    Glucose 100 70 - 125 mg/dL    Calcium 9.7 8.5 - 10.5 mg/dL    BUN 16 8 - 28 mg/dL    Creatinine 0.64 0.60 - 1.10 mg/dL    GFR MDRD Af Amer >60 >60 mL/min/1.73m2    GFR MDRD Non Af Amer >60 >60 mL/min/1.73m2         Lab Results   Component Value Date    WBC 7.3 03/12/2019    HGB 10.2 (L) 03/19/2019    HCT 30.9 (L) 03/12/2019    MCV 96 03/12/2019     03/12/2019       Lab Results   Component Value Date    TEFGNPIT53 569 07/16/2016     Lab Results   Component Value Date    HGBA1C 5.7 07/15/2016     Lab Results   Component Value Date    INR 0.96 03/05/2019     No results found for: [id number]  Lab Results   Component Value Date    TSH 3.33 02/25/2018           ASSESSMENT/PLAN:    1. S/p reverse total shoulder  arthroplasty: Incision c/d/i. 1+ edema right shoulder, arm, mild numbness present. Sling in place, NWB. F/u with ortho 3/22, next appt on 4/2. Pain controlled on tylenol and oxycodone 5mg q6h prn. On aspirin two times a day for dvt prophylaxis.   2. COPD, Acute bronchitis: completed cefdinir, doxycycline, zithromax. No fevers or chills. cxr negative for pneumonia in hospital. On incruse ellipta, albuterol prn, now advair. Albuterol intolerance, reluctant to try other nebulizers but was able to convince her to try atrovent and hypertonic saline with improvement. IS q1h while awake. spiriva not covered by insurance. dextromethorphan cough syrup four times a day scheduled, atrovent nebs three times a day and prn, saline nebs weaned off, prednisone taper. Overall much improved, discussed plan for after discharge to continue cough syrup and nebs as well as completing prednisone taper and switching salmeterol to advair.   3. Normal pressure hydrocephalus, s/p  shunt: Unsteady gait, on ampyra. Follows with neurology.   4. HTN: SBP 140s. No meds. Monitor.   5. Bipolar disorder: Mood stable recently. On abilify, wellbutrin, fluoxetine, lamictal.   6. H/o colon cancer: No recent concerns.   7. Constipation: on scheduled miralax daily. Improved.   8.  Acute blood loss anemia: hg 9.1 on 3/8. Recheck on 3/12-9.  9. GERD: on pepcid.   10. Hypothyroid: On levothyroxine.     Electronically signed by: Casie Barger NP    Please evaluate Amparo Sharma for admission to Home Health.    Total floor/unit time spent 35 min with 25 min spent on counseling and coordination of care. Counseling regarding copd and management, need for inhaler and nebulizer compliance. Discussed plan of care for medications and reviewed all med changes from tcu stay. Discussed what symptoms to notify primary provider of and when to see primary provider v going to ED. Discussed weaning off narcotics and pain management treatment options. Coordinated care  "with  Nursing, therapy,  for discharge planning and ordering medications and services for discharge.     Face to Face Attestation and Initial Plan of Care    The face-to-face encounter occurred on date: 3/25/19  Face to Face encounter was with: Casie Barger    Please provide brief clinical summary of reason for visit and need for home care. Deconditioning after hospital stay for total shoulder arthroplasty, COPD exacerbation    Please identify which of the following home health disciplines the patient will need AND describe the skilled services that you would like the home health agency to perform: SKILLED NURSING (RN): Other copd monitoring, cardiopulmonary assessments, education on copd, nebulizer and inhaler compliance, PHYSICAL THERAPY: strength training and gait training, OCCUPATIONAL THERAPY: ADLs and home safety and HOME HEALTH AIDE    Homebound Status (describe the functional limitations that support this patient is confined to his/her home. Medicaid recipients are not required to be homebound.):assistive device needed:  Wheelchair    Name of physician who will be responsible for the ongoing home health plan of care (CMS requires the referring physician to provide the specific name of the community physician instead of a title, such as \"PCP\"): Parvez Francis MD    Requested Start of Care Date: Within 48 hours    Other information to assist the home health agency in developing the initial Plan of Care:    I certify that services are/were furnished while this patient was under the care of a physician and that a physician or an allowed non-physician practitioner (NPP), had a face-to-face encounter that meets the physician face-to-face encounter requirements. The encounter was in whole, or in part, related to the primary reason for home health. The patient is confined to his/her home and needs intermittent skilled nursing, physical therapy, speech-language pathology, or the continued need for " occupational therapy. A plan of care has been established by a physician and is periodically reviewed by a physician.

## 2021-06-02 VITALS — WEIGHT: 168.4 LBS | BODY MASS INDEX: 34.01 KG/M2

## 2021-06-02 VITALS — WEIGHT: 166 LBS | BODY MASS INDEX: 33.53 KG/M2

## 2021-06-02 VITALS — WEIGHT: 175 LBS | BODY MASS INDEX: 35.35 KG/M2

## 2021-06-02 VITALS — WEIGHT: 178.4 LBS | HEIGHT: 59 IN | BODY MASS INDEX: 35.96 KG/M2

## 2021-06-02 VITALS — WEIGHT: 172 LBS | BODY MASS INDEX: 34.74 KG/M2

## 2021-06-02 VITALS — BODY MASS INDEX: 35.75 KG/M2 | WEIGHT: 177 LBS

## 2021-06-04 VITALS
WEIGHT: 175 LBS | WEIGHT: 175 LBS | BODY MASS INDEX: 35.28 KG/M2 | HEIGHT: 59 IN | HEIGHT: 59 IN | BODY MASS INDEX: 35.28 KG/M2

## 2021-06-08 NOTE — PROGRESS NOTES
[facility]      Facility:    [facility] [[id number]]  Code Status: FULL CODE      Chief Complaint/Reason for Visit:  Chief Complaint   Patient presents with     H & P       HPI:   Amparo is a 76 y.o. female who Amparo is a 76 y.o. female who Was admitted to the hospital on December 12, 2016 in transferred to our facility on December 15, 2016. She was treated there for left elbow cellulitis initially with IV antibiotics and transition to oral clindamycin. Several weeks ago she felt a macerated her left elbow and it became infected. Unfortunately she field oral Keflex and oral clindamycin. 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 She was later transition to oral clindamycin which she will finish on December 25, 2016.     Her left elbow infection was secondary to the fall one out of many that she has sustained over the last several years. She has been working with her neurologist Dr. Gruber since 2011 has she started having some gate imbalance in increased and false. She went from a a walker level to a wheelchair level within the last two weeks.     She displayed symptoms of gate imbalance and urinary incontinence. About a month and a half ago, she was started on amantadine hundred milligram PO b.i.d. She did not find this to benefit her balance as she continued to fall in fact was falling from onceto twice a week now she has been falling several times a day.     She currently lives in an independent apartment at [place], but has been falling several times a day and has been needing more he cares.  She was admitted to the [facility] with the hopes that Dr. turner, who Dr. Gruber has recommended could see her for possible normal pressure hydrocephalus and subsequently place a shunt. However because of acute infection, it was felt that her treating physicians that it would be better to completely treat the  cellulitis first before going any further with evaluation for NPH.     Her CRP was initially very high at 130 and it started to trend down to 54 with antibiotics. Cultures were negative. Kidney functions unremarkable.  She was seen by neurosurgery, and because of underlying infection, plan right now is to treat the infection in follow-up with Dr. turner on January 3.     She's noticed as well that for the last three weeks, her vision has been much more blurry. She's not sure if she has brought this to Dr. Gruber attention when she last saw him.     She had quit smoking. She does have a history of alcohol use, she currently lives in independent living.   .   She transferred to this transitional care unit for therapies. She was able to get an appointment with you of them Dr. turner and finally on February 3 went in to have a lumbar drink placed for diagnostic purposes. She did have some objective evidence of improvement after lumbar drink was placed although not complete resolution of her symptoms. She then underwent ventricular peritoneal shunt placement on February 5, 2016.    Postoperatively she has been coughing and wheezing. She has a history of asthma. Using inhalers. No fevers throat have been dry. Flamm had been greenish a little short of breath more than usual. But overall she has been walking with a walker without much imbalance. Still a little week.    Urinary incontinence did not entirely go away but improve the some. All movements present.  .  Has a history of hypertension, GERD, bipolar disorder, hypothyroidism, colon cancer in 2015 status post surgery and six cycles of FOLFOX.     Abdominal hernia surgery with subsequent infection with M RSA.     She had quit smoking. She does have a history of alcohol use, she currently lives in independent living.       Past Medical History:  Past Medical History:   Diagnosis Date     Abdominal hernia      Alcoholism in remission 2017     Anemia      Arthritis       Asthma 1/20/2017     Bipolar 1 disorder      Cancer      Cellulitis      Cellulitis 12/12/2016    of left elbow     Depression      Disease of thyroid gland      Frequent falls 2016     Frequent falls 2017     GERD (gastroesophageal reflux disease)      History of transfusion      Hydrocephalus      Hypertension      Hypothyroidism      Memory loss 2017     Normal pressure hydrocephalus 02/03/2017           Surgical History:  Past Surgical History:   Procedure Laterality Date     COLON SURGERY       FRACTURE SURGERY      right ankle     HYSTERECTOMY       JOINT REPLACEMENT      both knees, hip -right,      LAPAROSCOPIC CHOLECYSTECTOMY       LAPAROSCOPIC COLON RESECTION       LUMBAR PUNCTURE  02/03/2017     ROTATOR CUFF REPAIR       VENTRICULOPERITONEAL SHUNT  02/03/2017       Family History:   No family history on file.    Social History:    Social History     Social History     Marital status:      Spouse name: N/A     Number of children: N/A     Years of education: N/A     Social History Main Topics     Smoking status: Former Smoker     Packs/day: 0.25     Years: 30.00     Quit date: 7/16/1997     Smokeless tobacco: Not on file     Alcohol use No     Drug use: No     Sexual activity: Not on file     Other Topics Concern     Not on file     Social History Narrative          Review of Systems  urnemarkable  There were no vitals filed for this visit.    Physical Exam  VS noted stable  Patient is alert, awake, oriented to time, place and person, not in acute cardiorespiratory distress  Skin: Warm, and moist,  no lesions, no rash  Head: atraumatic, normocephalic,   Eyes: EOM's intact and conjugate, pink palpebral conjunctivae, anicteric sclerae, pupils reactive  Lungs : On auscultation , (+)Wheeze and ronchi at the bases R>L, no crackles, wome tight wheezes   Heart : Nornal first and second heart sounds, No murmurs, heaves, or thrills  Abdomen: Soft, non tender, non distended, no hepatosplenomegaly, no  ascites  Extremities: No edema, pulses are full and equal  Sutures noted on RIght side of scalp, no bleeding,     Medication List:  Current Outpatient Prescriptions   Medication Sig     acetaminophen (TYLENOL) 500 MG tablet Take 500 mg by mouth every 8 (eight) hours as needed for pain.     albuterol (PROVENTIL HFA;VENTOLIN HFA) 90 mcg/actuation inhaler Inhale 2 puffs every 6 (six) hours as needed for wheezing.     amLODIPine (NORVASC) 10 MG tablet Take 10 mg by mouth daily.     ARIPiprazole (ABILIFY) 10 MG tablet Take 5 mg by mouth daily.      aspirin-acetaminophen-caffeine (EXCEDRIN MIGRAINE) 250-250-65 mg per tablet Take 2 tablets by mouth every 6 (six) hours as needed for pain (Max:  8 tabs in 24 hours).     buPROPion (WELLBUTRIN XL) 150 MG 24 hr tablet Take 150 mg by mouth daily.     clobetasol (TEMOVATE) 0.05 % cream Apply topically 2 (two) times a day.     donepezil (ARICEPT) 10 MG tablet Take 10 mg by mouth bedtime.     FLUoxetine (PROZAC) 20 MG capsule Take 40 mg by mouth daily. Take 2 tablets (=40 mg) once daily     fluticasone (FLOVENT HFA) 220 mcg/actuation inhaler Inhale 1 puff 2 (two) times a day.     folic acid (FOLVITE) 1 MG tablet Take 1 mg by mouth daily.     hydrocortisone 1 % cream Topical, 2 times daily,     Lactobacillus rhamnosus GG (CULTURELLE) 15 billion cell CpSP Take 1 capsule by mouth 2 (two) times a day. For elbow cellulitis     lamoTRIgine (LAMICTAL) 100 MG tablet Take 150 mg by mouth every morning.     lamoTRIgine (LAMICTAL) 100 MG tablet Take 100 mg by mouth bedtime.     levothyroxine (SYNTHROID, LEVOTHROID) 75 MCG tablet Take 75 mcg by mouth Daily at 6:00 am.     multivitamin with minerals (THERA-M) 9 mg iron-400 mcg Tab tablet Take 1 tablet by mouth daily.     ondansetron (ZOFRAN-ODT) 4 MG disintegrating tablet Take 4 mg by mouth every 6 (six) hours as needed for nausea.     oxyCODONE (OXY-IR) 5 mg capsule Take 5 mg by mouth every 4 (four) hours as needed.     ranitidine (ZANTAC) 300  MG capsule Take 300 mg by mouth 2 (two) times a day.     senna-docusate (PERICOLACE) 8.6-50 mg tablet Take 1 tablet by mouth 2 (two) times a day.     tiotropium (SPIRIVA) 18 mcg inhalation capsule Place 18 mcg into inhaler and inhale bedtime.       Labs:  No results found for this or any previous visit (from the past 72 hour(s)).      Assessment:    ICD-10-CM    1. NPH (normal pressure hydrocephalus) G91.2    2. Gait instability R26.81    3. Physical deconditioning R53.81    4. Asthma J45.909    5. Cough R05    6. Essential hypertension I10        Plan:  Doing well with  shunt. Needs inpt Physical therapy  Chest Xray R/O VAP.   Inhalers in the meantime, Increase oral fluid intake  mucinex BID for cough,   Total time spent was 60 minutes with more than 50% spent on counseling, discussion of treatment plan and extensive review of available records  This note has been dictated using voice recognition software. Any grammatical, typographical, or context distortions are unintentional.          Electronically signed by: Lydia Newberry MD

## 2021-06-08 NOTE — PROGRESS NOTES
[facility]    Facility:   [facility] [[id number]]   Code Status: FULL CODE      CHIEF COMPLAINT/REASON FOR VISIT:  Chief Complaint   Patient presents with     Review Of Multiple Medical Conditions       HISTORY:      HPI: Amparo is a 76 y.o. female seen today in [facility]. She was admitted secondary to gait instability, worsening over the last few months. Assoc with Urin incontinence. [place] to be rel to NPH. Saw Neuro at [facility] Dr Dozier. She is seeign anesthesia on Jan31 and going to [facility] for  shunt diagnostic and potentially definitive placement on Feb 5  She has been doing well. Starting to gain weight as her tremors are less now and she is able o eat better. Incontinence is still a problem. . She can't get to the bathroom quick enough. She hopes this will improve with the shunt placement.     Has H/o bipolar disorder. Her abilify was decreased to 5 mg from 10 mg in the workup of trying to figure out why she was having tremors ( by her PCP) she asks if she can be placed back on 10 as she is feeling more down. Denies feelign unmotivted. Denies feeling hopeless. No SI         Past Medical History   Diagnosis Date     Anemia      Arthritis      Bipolar 1 disorder      Cancer      Cellulitis      Cellulitis 12/12/2016     of left elbow     Depression      Disease of thyroid gland      Frequent falls 2016     History of transfusion      Hydrocephalus      Hypertension      Hypothyroidism              No family history on file.  Social History     Social History     Marital status:      Spouse name: N/A     Number of children: N/A     Years of education: N/A     Social History Main Topics     Smoking status: Former Smoker     Packs/day: 0.25     Years: 30.00     Quit date: 7/16/1997     Smokeless tobacco: Not on file     Alcohol use No     Drug use: No     Sexual activity: Not on file     Other Topics Concern     Not on file     Social History Narrative          Review of Systems  As above  .There were no vitals filed for this visit.    Physical Exam    VS noted stable  Patient is alert, awake, oriented to time, place and person, not in acute cardiorespiratory distress  Skin: Warm, and moist,  no lesions,   Head: atraumatic, normocephalic,   Eyes: EOM's intact and conjugate, pink palpebral conjunctivae, anicteric sclerae, pupils reactive  Lungs : Clear to auscultation , no crackles, wheezes or rhonchi  Heart : Nornal first and second heart sounds, No murmurs, heaves, or thrills  Abdomen: Soft, non tender, non distended, no hepatosplenomegaly, no ascites  Extremities: No edema, pulses are full and equal      LABS:   No results found for this or any previous visit (from the past 72 hour(s)).      ASSESSMENT:      ICD-10-CM    1. Bipolar disorder F31.9    2. Tremor R25.1    3. Gait instability R26.81    4. Urinary incontinence R32        PLAN:    I discussed the potential for developing EPS symptoms with abilify. And at this point I would not want this possibility to interfere or cause confusion whether her tremors are from meds or from NPH. I suggested that perhaps we can wait till after the procedure, once her response from the shunt is established, before considering increasing the dose. She is in agreement.   We can also talk about treatments for U incont after her procedure.   At this time cont with PT, and OT      Total 25 minutes of which 55% was spent counseling and coordination of care of the above plan.    Electronically signed by: Lydia Newberry MD

## 2021-06-08 NOTE — PROGRESS NOTES
[facility]    Facility:   [facility] [[id number]]   Code Status: FULL CODE      CHIEF COMPLAINT/REASON FOR VISIT:  Chief Complaint   Patient presents with     Follow Up     rehab       HISTORY:      HPI: Amparo is a 76 y.o. female Who I had the pleasure of visiting with plus remember her from previous visits I believe last summer of 2016. She does have a history of gait instability frequent falls tremors. Recently did have a brain MRI done in December 2016 no masses although does have cervical degenerative disc disease. She has been seen by the [facility] secondary to a possible  shunt placement. She has been normotensive and afebrile. No particular questions or concerns she does feel that she is making slow progress at this time. She does not like the high protein drinks and Jell-O's. Does have a history of incontinence.    Past Medical History   Diagnosis Date     Anemia      Arthritis      Asthma 1/20/2017     Bipolar 1 disorder      Cancer      Cellulitis      Cellulitis 12/12/2016     of left elbow     Depression      Disease of thyroid gland      Frequent falls 2016     History of transfusion      Hydrocephalus      Hypertension      Hypothyroidism              No family history on file.  Social History     Social History     Marital status:      Spouse name: N/A     Number of children: N/A     Years of education: N/A     Social History Main Topics     Smoking status: Former Smoker     Packs/day: 0.25     Years: 30.00     Quit date: 7/16/1997     Smokeless tobacco: Not on file     Alcohol use No     Drug use: No     Sexual activity: Not on file     Other Topics Concern     Not on file     Social History Narrative         Review of Systems  Currently denies any chills and fever URI symptoms flulike problems postnasal drip chest pain dizziness vertigo nausea vomiting diarrhea dysuria unusual aches or pains rashes stiff neck swollen glands or headaches.  History of hypertension hypothyroidism venous stasis.      Current Outpatient Prescriptions:      acetaminophen (TYLENOL) 500 MG tablet, Take 500 mg by mouth every 8 (eight) hours as needed for pain., Disp: , Rfl:      albuterol (PROVENTIL HFA;VENTOLIN HFA) 90 mcg/actuation inhaler, Inhale 2 puffs every 6 (six) hours as needed for wheezing., Disp: , Rfl:      amLODIPine (NORVASC) 10 MG tablet, Take 10 mg by mouth daily., Disp: , Rfl:      ARIPiprazole (ABILIFY) 10 MG tablet, Take 5 mg by mouth daily. , Disp: , Rfl:      aspirin-acetaminophen-caffeine (EXCEDRIN MIGRAINE) 250-250-65 mg per tablet, Take 2 tablets by mouth every 6 (six) hours as needed for pain (Max:  8 tabs in 24 hours)., Disp: , Rfl:      buPROPion (WELLBUTRIN XL) 150 MG 24 hr tablet, Take 150 mg by mouth daily., Disp: , Rfl:      clobetasol (TEMOVATE) 0.05 % cream, Apply topically 2 (two) times a day., Disp: , Rfl:      donepezil (ARICEPT) 10 MG tablet, Take 10 mg by mouth bedtime., Disp: , Rfl:      FLUoxetine (PROZAC) 20 MG capsule, Take 40 mg by mouth daily. Take 2 tablets (=40 mg) once daily, Disp: , Rfl:      fluticasone (FLOVENT HFA) 220 mcg/actuation inhaler, Inhale 1 puff 2 (two) times a day., Disp: , Rfl:      folic acid (FOLVITE) 1 MG tablet, Take 1 mg by mouth daily., Disp: , Rfl:      hydrocortisone 1 % cream, Topical, 2 times daily,, Disp: 30 g, Rfl: 0     Lactobacillus rhamnosus GG (CULTURELLE) 15 billion cell CpSP, Take 1 capsule by mouth 2 (two) times a day. For elbow cellulitis, Disp: , Rfl:      lamoTRIgine (LAMICTAL) 100 MG tablet, Take 150 mg by mouth every morning., Disp: , Rfl:      lamoTRIgine (LAMICTAL) 100 MG tablet, Take 100 mg by mouth bedtime., Disp: , Rfl:      levothyroxine (SYNTHROID, LEVOTHROID) 75 MCG tablet, Take 75 mcg by mouth Daily at 6:00 am., Disp: , Rfl:      multivitamin with minerals (THERA-M) 9 mg iron-400 mcg Tab tablet, Take 1 tablet by mouth daily., Disp: , Rfl:      ranitidine (ZANTAC) 300 MG capsule,  Take 300 mg by mouth 2 (two) times a day., Disp: , Rfl:      tiotropium (SPIRIVA) 18 mcg inhalation capsule, Place 18 mcg into inhaler and inhale bedtime., Disp: , Rfl:     .  Vitals:    01/20/17 1155   BP: 105/52   Pulse: 61   Resp: 18   Temp: 98.1  F (36.7  C)   SpO2: 94%       Physical Exam   Constitutional: She appears well-developed and well-nourished. No distress.   HENT:   Head: Normocephalic.   Eyes: Pupils are equal, round, and reactive to light.   Neck: Neck supple. No thyromegaly present.   Cardiovascular: Normal rate, regular rhythm and normal heart sounds.   Pulmonary/Chest: Breath sounds normal.   Abdominal: Bowel sounds are normal. There is no tenderness. There is no guarding.   Musculoskeletal:   History of venous stasis. Tremors. Generalized weakness and debility . Foot bunion  Lymphadenopathy:   She has no cervical adenopathy.   Neurological: She is alert.   Skin: Skin is warm and dry. No rash noted.   Psychiatric: She has a normal mood and affect. Her behavior is normal.          LABS:   Results for orders placed or performed during the hospital encounter of 07/15/16   Basic Metabolic Panel   Result Value Ref Range    Sodium 138 136 - 145 mmol/L    Potassium 4.2 3.5 - 5.0 mmol/L    Chloride 100 98 - 107 mmol/L    CO2 30 22 - 31 mmol/L    Anion Gap, Calculation 8 5 - 18 mmol/L    Glucose 107 70 - 125 mg/dL    Calcium 9.1 8.5 - 10.5 mg/dL    BUN 11 8 - 28 mg/dL    Creatinine 0.67 0.60 - 1.10 mg/dL    GFR MDRD Af Amer >60 >60 mL/min/1.73m2    GFR MDRD Non Af Amer >60 >60 mL/min/1.73m2       Lab Results   Component Value Date    WBC 6.7 12/19/2016    HGB 12.8 12/19/2016    HCT 42.1 12/19/2016    MCV 88 12/19/2016     12/19/2016     Lab Results   Component Value Date    ALBUMIN 2.8 (L) 07/16/2016         ASSESSMENT:      ICD-10-CM    1. Leg weakness, bilateral M62.81    2. Essential hypertension I10    3. Physical deconditioning R53.81    4. Gait instability R26.81    5. Tremor R25.1    6. PVD  (peripheral vascular disease) I73.9    7. Asthma J45.909    8. Chronic pain G89.29    9. Frequent falls R29.6        PLAN:    Encourager appetite as well as overall therapy. She does not have any other particular questions or concerns at this time. Staff will keep me updated for any other issues.  .    Electronically signed by: Michael Duane Johnson, CNP
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
## 2021-06-08 NOTE — PROGRESS NOTES
[facility]    Facility:   [facility] [[id number]]   Code Status: FULL CODE      CHIEF COMPLAINT/REASON FOR VISIT:  Chief Complaint   Patient presents with     Review Of Multiple Medical Conditions       HISTORY:      HPI: Amparo is a 76 y.o. female Was seen today in follow-up of her multiple medical problems. She saw Dr. turner neurology of the [facility] today. She is being scheduled for a diagnostic test to see if she would be a good candidate for ventricular peritoneal shunt for normal pressure hydrocephalus. This will be done in the next two weeks.    In the meantime she has been stable without force tremors. She is off of amantadine. She is walking better. Standing in the parallel bars for a few seconds.  She's now able to transfer from the bed to the commode and to the wheelchair. Appetite is good no nausea or vomiting.  Blood pressures are stable      Past Medical History   Diagnosis Date     Anemia      Arthritis      Bipolar 1 disorder      Cancer      Cellulitis      Cellulitis 12/12/2016     of left elbow     Depression      Disease of thyroid gland      Frequent falls 2016     History of transfusion      Hydrocephalus      Hypertension      Hypothyroidism              No family history on file.  Social History     Social History     Marital status:      Spouse name: N/A     Number of children: N/A     Years of education: N/A     Social History Main Topics     Smoking status: Former Smoker     Packs/day: 0.25     Years: 30.00     Quit date: 7/16/1997     Smokeless tobacco: Not on file     Alcohol use No     Drug use: No     Sexual activity: Not on file     Other Topics Concern     Not on file     Social History Narrative         Review of Systems  unremarkable  .There were no vitals filed for this visit.    Physical Exam    Vital signs are stable  Patient is alert, awake, oriented to time, place and person, not in acute cardiorespiratory  distress  Skin: Warm, and moist,  no lesions,   Head: atraumatic, normocephalic,   Eyes: EOM's intact and conjugate, pink palpebral conjunctivae, anicteric sclerae, pupils reactive  Lungs : Clear to auscultation , no crackles, wheezes or rhonchi  Heart : Nornal first and second heart sounds, No murmurs, heaves, or thrills  Abdomen: Soft, non tender, non distended, no hepatosplenomegaly, no ascites  Extremities: No edema, pulses are full and equal      LABS:   No results found for this or any previous visit (from the past 72 hour(s)).      ASSESSMENT:      ICD-10-CM    1. Gait instability R26.81    2. Physical deconditioning R53.81    3. Essential hypertension I10        PLAN:    Continue with PT/OT  Wait to hear back from Neuro regarding procedure  Continue lisinopril , amlodpine, for BP control  Continue all other meds.     Total 25 minutes of which 55% was spent counseling and coordination of care of the above plan.    Electronically signed by: Lydia Newberry MD

## 2021-06-08 NOTE — PROGRESS NOTES
[facility]    Facility:   [facility] [[id number]]   Code Status: FULL CODE      CHIEF COMPLAINT/REASON FOR VISIT:  Chief Complaint   Patient presents with     Review Of Multiple Medical Conditions       HISTORY:      HPI: Amparo is a 76 y.o. female seen today in [facility] . She is happy that she is improving. Now trying to walk on parallel bars about 5 ft. Stil lhas extensor tone in the trunk. This impairs her standingStill has incontinence. But partly because she cannot get to the bathroom in time.   BP stable.   appetitie good.    Weight is stable        Past Medical History   Diagnosis Date     Anemia      Arthritis      Bipolar 1 disorder      Cancer      Cellulitis      Cellulitis 12/12/2016     of left elbow     Depression      Disease of thyroid gland      Frequent falls 2016     History of transfusion      Hydrocephalus      Hypertension      Hypothyroidism              No family history on file.  Social History     Social History     Marital status:      Spouse name: N/A     Number of children: N/A     Years of education: N/A     Social History Main Topics     Smoking status: Former Smoker     Packs/day: 0.25     Years: 30.00     Quit date: 7/16/1997     Smokeless tobacco: Not on file     Alcohol use No     Drug use: No     Sexual activity: Not on file     Other Topics Concern     Not on file     Social History Narrative         Review of Systems  unremarkable  .There were no vitals filed for this visit.    Physical Exam    VS noted BP stable  Patient is alert, awake, oriented to time, place and person, not in acute cardiorespiratory distress  Skin: Warm, and moist,  no lesions,   Head: atraumatic, normocephalic,   Eyes: EOM's intact and conjugate, pink palpebral conjunctivae, anicteric sclerae, pupils reactive  Lungs : Clear to auscultation , no crackles, wheezes or rhonchi  Heart : Nornal first and second heart sounds, No murmurs, heaves, or  thrills  Abdomen: Soft, non tender, non distended, no hepatosplenomegaly, no ascites  Extremities: No edema, pulses are full and equal  L elbow with no signs of redness, warmth, or swelling or fluctuance.   Tremors are fine    LABS:   No results found for this or any previous visit (from the past 168 hour(s)).      ASSESSMENT:      ICD-10-CM    1. Gait instability R26.81    2. Physical deconditioning R53.81    3. Essential hypertension I10        PLAN:    Awaiting call from Dr Dozier Neurology for appt ( will have diag test to determine candidacy for [facility])  Continue with current treatments and therapies        Electronically signed by: Lydia Newberry MD

## 2021-06-08 NOTE — PROGRESS NOTES
[facility]    Facility:   [facility] [[id number]]   Code Status: FULL CODE      CHIEF COMPLAINT/REASON FOR VISIT:  Chief Complaint   Patient presents with     Follow Up      shunt, rehab       HISTORY:      HPI: Amparo is a 76 y.o. female Who I was able to visit with once again today after her most recent hospitalization February 3 through February 10, 2017 secondary to normal pressure hydrocephalus status post ventricular peritoneal shunt. She did have a right-sided frontal ventriculostomy placement. She actually feel that she's made pretty good improvement with her gait stability and strength but she also feels that her mind actually is cleared a little bit. We had a chance to talk about therapy as well as the goals of therapy and she feels like gait is improving. She has been normotensive and afebrile. Did have a cough just x-ray was negative for infiltrates. Did again talk about it sent to spirometry coughing and deep breathing. She deferred any nebulization zapped the chest x-ray. She does want the Mucinex twice-daily. Appetite good. Her stools are little bit Coty wants the stool softeners as needed. She also has abdominal hernia that she that she wants to see a general surgeon about that.. History of PVD. No lower extremity edema. Getting lotion for her legs.    Past Medical History:   Diagnosis Date     Abdominal hernia      Alcoholism in remission 2017     Anemia      Arthritis      Asthma 1/20/2017     Bipolar 1 disorder      Cancer      Cellulitis      Cellulitis 12/12/2016    of left elbow     Depression      Disease of thyroid gland      Frequent falls 2016     Frequent falls 2017     GERD (gastroesophageal reflux disease)      History of transfusion      Hydrocephalus      Hypertension      Hypothyroidism      Memory loss 2017     Normal pressure hydrocephalus 02/03/2017             No family history on file.  Social History     Social History     Marital  status:      Spouse name: N/A     Number of children: N/A     Years of education: N/A     Social History Main Topics     Smoking status: Former Smoker     Packs/day: 0.25     Years: 30.00     Quit date: 7/16/1997     Smokeless tobacco: Not on file     Alcohol use No     Drug use: No     Sexual activity: Not on file     Other Topics Concern     Not on file     Social History Narrative         Review of Systems  Currently denies any chills and fever URI symptoms flulike problems postnasal drip chest pain dizziness vertigo nausea vomiting diarrhea dysuria unusual aches or pains rashes stiff neck swollen glands or headaches. History of hypertension hypothyroidism venous stasis      Current Outpatient Prescriptions:      acetaminophen (TYLENOL) 500 MG tablet, Take 500 mg by mouth every 8 (eight) hours as needed for pain., Disp: , Rfl:      albuterol (PROVENTIL) 2.5 mg /3 mL (0.083 %) nebulizer solution, Take 2.5 mg by nebulization every 6 (six) hours. (for 2 days), Disp: , Rfl:      albuterol (PROVENTIL) 2.5 mg /3 mL (0.083 %) nebulizer solution, Take 2.5 mg by nebulization every 4 (four) hours as needed for wheezing., Disp: , Rfl:      amLODIPine (NORVASC) 10 MG tablet, Take 10 mg by mouth daily., Disp: , Rfl:      ARIPiprazole (ABILIFY) 10 MG tablet, Take 5 mg by mouth daily. , Disp: , Rfl:      aspirin-acetaminophen-caffeine (EXCEDRIN MIGRAINE) 250-250-65 mg per tablet, Take 2 tablets by mouth every 6 (six) hours as needed for pain (Max:  8 tabs in 24 hours)., Disp: , Rfl:      buPROPion (WELLBUTRIN XL) 150 MG 24 hr tablet, Take 150 mg by mouth daily., Disp: , Rfl:      clobetasol (TEMOVATE) 0.05 % cream, Apply topically 2 (two) times a day., Disp: , Rfl:      donepezil (ARICEPT) 10 MG tablet, Take 10 mg by mouth bedtime., Disp: , Rfl:      FLUoxetine (PROZAC) 20 MG capsule, Take 40 mg by mouth daily. Take 2 tablets (=40 mg) once daily, Disp: , Rfl:      fluticasone (FLOVENT HFA) 220 mcg/actuation inhaler,  Inhale 1 puff 2 (two) times a day., Disp: , Rfl:      folic acid (FOLVITE) 1 MG tablet, Take 1 mg by mouth daily., Disp: , Rfl:      hydrocortisone 1 % cream, Topical, 2 times daily,, Disp: 30 g, Rfl: 0     Lactobacillus rhamnosus GG (CULTURELLE) 15 billion cell CpSP, Take 1 capsule by mouth 2 (two) times a day. For elbow cellulitis, Disp: , Rfl:      lamoTRIgine (LAMICTAL) 100 MG tablet, Take 150 mg by mouth every morning., Disp: , Rfl:      lamoTRIgine (LAMICTAL) 100 MG tablet, Take 100 mg by mouth bedtime., Disp: , Rfl:      levothyroxine (SYNTHROID, LEVOTHROID) 75 MCG tablet, Take 75 mcg by mouth Daily at 6:00 am., Disp: , Rfl:      multivitamin with minerals (THERA-M) 9 mg iron-400 mcg Tab tablet, Take 1 tablet by mouth daily., Disp: , Rfl:      ondansetron (ZOFRAN-ODT) 4 MG disintegrating tablet, Take 4 mg by mouth every 6 (six) hours as needed for nausea., Disp: , Rfl:      oxyCODONE (OXY-IR) 5 mg capsule, Take 5 mg by mouth every 4 (four) hours as needed., Disp: , Rfl:      ranitidine (ZANTAC) 300 MG capsule, Take 300 mg by mouth 2 (two) times a day., Disp: , Rfl:      senna-docusate (PERICOLACE) 8.6-50 mg tablet, Take 1 tablet by mouth 2 (two) times a day as needed. , Disp: , Rfl:      tiotropium (SPIRIVA) 18 mcg inhalation capsule, Place 18 mcg into inhaler and inhale bedtime., Disp: , Rfl:     .  Vitals:    02/17/17 1050   BP: 111/57   Pulse: 69   Resp: 18   Temp: (!) 96.5  F (35.8  C)   SpO2: 95%       Physical Exam  Constitutional: She appears well-developed and well-nourished. No distress.   HENT:   Head: Normocephalic.   Eyes: Pupils are equal, round, and reactive to light.   Neck: Neck supple. No thyromegaly present.   Cardiovascular: Normal rate, regular rhythm and normal heart sounds.   Pulmonary/Chest: Breath sounds normal.   Abdominal: Bowel sounds are normal. There is no tenderness. There is no guarding. Mid abd hernia.  Musculoskeletal:   History of venous stasis. Tremors. Generalized weakness  and debility . Foot bunion  Lymphadenopathy:   She has no cervical adenopathy.   Neurological: She is alert.   Skin: Skin is warm and dry. No rash noted.   Psychiatric: She has a normal mood and affect. Her behavior is normal      LABS:   Results for orders placed or performed during the hospital encounter of 07/15/16   Basic Metabolic Panel   Result Value Ref Range    Sodium 138 136 - 145 mmol/L    Potassium 4.2 3.5 - 5.0 mmol/L    Chloride 100 98 - 107 mmol/L    CO2 30 22 - 31 mmol/L    Anion Gap, Calculation 8 5 - 18 mmol/L    Glucose 107 70 - 125 mg/dL    Calcium 9.1 8.5 - 10.5 mg/dL    BUN 11 8 - 28 mg/dL    Creatinine 0.67 0.60 - 1.10 mg/dL    GFR MDRD Af Amer >60 >60 mL/min/1.73m2    GFR MDRD Non Af Amer >60 >60 mL/min/1.73m2       Lab Results   Component Value Date    WBC 6.7 12/19/2016    HGB 12.8 12/19/2016    HCT 42.1 12/19/2016    MCV 88 12/19/2016     12/19/2016         ASSESSMENT:      ICD-10-CM    1. NPH (normal pressure hydrocephalus) G91.2    2. Gait instability R26.81    3. PVD (peripheral vascular disease) I73.9    4. Essential hypertension I10        PLAN:    Changeup the stool softeners has PRN. She wants to discontinue the PRN nebs. The chest x-ray was negative for infiltrates or consolidation. Encourage coughing and deep breathing and a sent to spirometry. She recently did have a follow up with the surgeon on February 16.      Electronically signed by: Michael Duane Johnson, CNP

## 2021-06-08 NOTE — PROGRESS NOTES
[facility]    Facility:   [facility] [[id number]]   Code Status: FULL CODE      CHIEF COMPLAINT/REASON FOR VISIT:  Chief Complaint   Patient presents with     Review Of Multiple Medical Conditions       HISTORY:      HPI: Amparo is a 76 y.o. female Seen today in follow-up of her multiple issues. She felt last night going to the bathroom. She push the button to get assistance to get out of bed to go to the bathroom. However she couldn't wait any longer and she attempted to get out of bed on her own. She felt to her left side. She did not hit her head. Did not sustain any injuries. She continues to have urinary incontinence. She does wear a pad. Still has tremors. She is now wearing a hand brace to help control the tremors so she could eat better. Otherwise she is feeling good. No pain. She feels that she is eating more  she completely done with her antibiotics. She has no other symptoms      Past Medical History   Diagnosis Date     Anemia      Arthritis      Bipolar 1 disorder      Cancer      Cellulitis      Cellulitis 12/12/2016     of left elbow     Depression      Disease of thyroid gland      Frequent falls 2016     History of transfusion      Hydrocephalus      Hypertension      Hypothyroidism              No family history on file.  Social History     Social History     Marital status:      Spouse name: N/A     Number of children: N/A     Years of education: N/A     Social History Main Topics     Smoking status: Former Smoker     Packs/day: 0.25     Years: 30.00     Quit date: 7/16/1997     Smokeless tobacco: Not on file     Alcohol use No     Drug use: No     Sexual activity: Not on file     Other Topics Concern     Not on file     Social History Narrative           Review of Systems  unremarkable    .There were no vitals filed for this visit.    Physical Exam    VS noted and stable  Patient is alert, awake, oriented to time, place and person, not in  acute cardiorespiratory distress  Skin: Warm, and moist,  no lesions,   Head: atraumatic, normocephalic,   Eyes: EOM's intact and conjugate, pink palpebral conjunctivae, anicteric sclerae, pupils reactive  Lungs : Clear to auscultation , no crackles, wheezes or rhonchi  Heart : Nornal first and second heart sounds, No murmurs, heaves, or thrills  Abdomen: Soft, non tender, non distended, no hepatosplenomegaly, no ascites  Extremities: No edema, pulses are full and equal      LABS:   No results found for this or any previous visit (from the past 72 hour(s)).      ASSESSMENT:      ICD-10-CM    1. Gait instability R26.81    2. Physical deconditioning R53.81    3. Essential hypertension I10    4. Fall W19.XXXA    5. Urinary incontinence R32        PLAN:    Continue with current treatment and therapies  Await call from Neuro for  shunt eval and placement  Staff to approach her every 2 hours to offer to help to the bathroom      Total 25 minutes of which 55% was spent counseling and coordination of care of the above plan.  This note has been dictated using voice recognition software. Any grammatical, typographical, or context distortions are unintentional.        Electronically signed by: Lydia Newberry MD

## 2021-06-08 NOTE — PROGRESS NOTES
[facility]    Facility:   [facility] [[id number]]   Code Status: FULL CODE      CHIEF COMPLAINT/REASON FOR VISIT:  Chief Complaint   Patient presents with     Follow Up     rehab       HISTORY:      HPI: Amparo is a 76 y.o. female Who is actually doing quite well these days secondary to her history of normal pressure hydrocephalus along with a history of falls and gait instability. She is being seen by the [facility] secondary to possible  shunt and apparently she's going for preop surgery next week and the surgery a be a week from this Friday. She otherwise does have asthma bipolar disorder cognitive impairment. She has been normotensive afebrile and also on room air. Is able self propeller wheelchair. No pain issues. Appetite good.    Past Medical History   Diagnosis Date     Anemia      Arthritis      Asthma 1/20/2017     Bipolar 1 disorder      Cancer      Cellulitis      Cellulitis 12/12/2016     of left elbow     Depression      Disease of thyroid gland      Frequent falls 2016     History of transfusion      Hydrocephalus      Hypertension      Hypothyroidism              No family history on file.  Social History     Social History     Marital status:      Spouse name: N/A     Number of children: N/A     Years of education: N/A     Social History Main Topics     Smoking status: Former Smoker     Packs/day: 0.25     Years: 30.00     Quit date: 7/16/1997     Smokeless tobacco: Not on file     Alcohol use No     Drug use: No     Sexual activity: Not on file     Other Topics Concern     Not on file     Social History Narrative         Review of Systems  Currently denies any chills and fever URI symptoms flulike problems postnasal drip chest pain dizziness vertigo nausea vomiting diarrhea dysuria unusual aches or pains rashes stiff neck swollen glands or headaches. History of hypertension hypothyroidism venous stasis    Current Outpatient  Prescriptions   Medication Sig     acetaminophen (TYLENOL) 500 MG tablet Take 500 mg by mouth every 8 (eight) hours as needed for pain.     albuterol (PROVENTIL HFA;VENTOLIN HFA) 90 mcg/actuation inhaler Inhale 2 puffs every 6 (six) hours as needed for wheezing.     amLODIPine (NORVASC) 10 MG tablet Take 10 mg by mouth daily.     ARIPiprazole (ABILIFY) 10 MG tablet Take 5 mg by mouth daily.      aspirin-acetaminophen-caffeine (EXCEDRIN MIGRAINE) 250-250-65 mg per tablet Take 2 tablets by mouth every 6 (six) hours as needed for pain (Max:  8 tabs in 24 hours).     buPROPion (WELLBUTRIN XL) 150 MG 24 hr tablet Take 150 mg by mouth daily.     clobetasol (TEMOVATE) 0.05 % cream Apply topically 2 (two) times a day.     donepezil (ARICEPT) 10 MG tablet Take 10 mg by mouth bedtime.     FLUoxetine (PROZAC) 20 MG capsule Take 40 mg by mouth daily. Take 2 tablets (=40 mg) once daily     fluticasone (FLOVENT HFA) 220 mcg/actuation inhaler Inhale 1 puff 2 (two) times a day.     folic acid (FOLVITE) 1 MG tablet Take 1 mg by mouth daily.     hydrocortisone 1 % cream Topical, 2 times daily,     Lactobacillus rhamnosus GG (CULTURELLE) 15 billion cell CpSP Take 1 capsule by mouth 2 (two) times a day. For elbow cellulitis     lamoTRIgine (LAMICTAL) 100 MG tablet Take 150 mg by mouth every morning.     lamoTRIgine (LAMICTAL) 100 MG tablet Take 100 mg by mouth bedtime.     levothyroxine (SYNTHROID, LEVOTHROID) 75 MCG tablet Take 75 mcg by mouth Daily at 6:00 am.     multivitamin with minerals (THERA-M) 9 mg iron-400 mcg Tab tablet Take 1 tablet by mouth daily.     ranitidine (ZANTAC) 300 MG capsule Take 300 mg by mouth 2 (two) times a day.     tiotropium (SPIRIVA) 18 mcg inhalation capsule Place 18 mcg into inhaler and inhale bedtime.       .  Vitals:    01/24/17 1317   BP: 132/67   Pulse: 65   Resp: 16   Temp: 98.3  F (36.8  C)   SpO2: 94%       Physical Exam  Constitutional: She appears well-developed and well-nourished. No distress.    HENT:   Head: Normocephalic.   Eyes: Pupils are equal, round, and reactive to light.   Neck: Neck supple. No thyromegaly present.   Cardiovascular: Normal rate, regular rhythm and normal heart sounds.   Pulmonary/Chest: Breath sounds normal.   Abdominal: Bowel sounds are normal. There is no tenderness. There is no guarding.   Musculoskeletal:   History of venous stasis. Tremors. Generalized weakness and debility . Foot bunion  Lymphadenopathy:   She has no cervical adenopathy.   Neurological: She is alert.   Skin: Skin is warm and dry. No rash noted.   Psychiatric: She has a normal mood and affect. Her behavior is normal    LABS:   Results for orders placed or performed during the hospital encounter of 07/15/16   Basic Metabolic Panel   Result Value Ref Range    Sodium 138 136 - 145 mmol/L    Potassium 4.2 3.5 - 5.0 mmol/L    Chloride 100 98 - 107 mmol/L    CO2 30 22 - 31 mmol/L    Anion Gap, Calculation 8 5 - 18 mmol/L    Glucose 107 70 - 125 mg/dL    Calcium 9.1 8.5 - 10.5 mg/dL    BUN 11 8 - 28 mg/dL    Creatinine 0.67 0.60 - 1.10 mg/dL    GFR MDRD Af Amer >60 >60 mL/min/1.73m2    GFR MDRD Non Af Amer >60 >60 mL/min/1.73m2       Lab Results   Component Value Date    WBC 6.7 12/19/2016    HGB 12.8 12/19/2016    HCT 42.1 12/19/2016    MCV 88 12/19/2016     12/19/2016         ASSESSMENT:      ICD-10-CM    1. NPH (normal pressure hydrocephalus) G91.2    2. Gait instability R26.81    3. Asthma J45.909    4. Cognitive decline R41.89        PLAN:    At this point continue to work with therapy. Did encourage overall activities as well as her overall health status. Going in next Monday for preop exam and the surgery date apparently is proposed for the Friday third of February.    .    Electronically signed by: Michael Duane Johnson, CNP

## 2021-06-09 NOTE — PROGRESS NOTES
[facility]    Facility:   [facility] [[id number]]   Code Status: FULL CODE      CHIEF COMPLAINT/REASON FOR VISIT:  Chief Complaint   Patient presents with     Follow Up     NPH       HISTORY:      HPI: Amparo is a 76 y.o. female Who I had the pleasure of visiting with again today secondary to a history of balance gait instability falls with normal pressure hydrocephalus status post ventricular shunt. Currently her balances significantly improving. Make good progress from a therapy standpoint. She has been normotensive. By the blood pressures are occasionally irregular ranging anywhere from 130 - 150 systolic but as low as 100. Having a little nausea as well as some loose stool today she thinks it's something she eight. There's been another person was similar symptoms so keep an eye on that she's aware of keeping a clear liquid diet as well as some soft easily digestible foods. She also does have a callous on her right fifth toe that she's in a get shaved down by podiatry today. No heartburn or reflux. No asthma issues. Moods stable.    Past Medical History:   Diagnosis Date     Abdominal hernia      Alcoholism in remission 2017     Anemia      Arthritis      Asthma 1/20/2017     Bipolar 1 disorder      Cancer      Cellulitis      Cellulitis 12/12/2016    of left elbow     Depression      Disease of thyroid gland      Frequent falls 2016     Frequent falls 2017     GERD (gastroesophageal reflux disease)      History of transfusion      Hydrocephalus      Hypertension      Hypothyroidism      Memory loss 2017     Normal pressure hydrocephalus 02/03/2017             No family history on file.  Social History     Social History     Marital status:      Spouse name: N/A     Number of children: N/A     Years of education: N/A     Social History Main Topics     Smoking status: Former Smoker     Packs/day: 0.25     Years: 30.00     Quit date: 7/16/1997     Smokeless  tobacco: Not on file     Alcohol use No     Drug use: No     Sexual activity: Not on file     Other Topics Concern     Not on file     Social History Narrative         Review of Systems  Currently denies any chills and fever URI symptoms flulike problems postnasal drip chest pain dizziness vertigo nausea vomiting diarrhea dysuria unusual aches or pains rashes stiff neck swollen glands or headaches. History of hypertension hypothyroidism venous stasis         Current Outpatient Prescriptions:      acetaminophen (TYLENOL) 500 MG tablet, Take 500 mg by mouth every 8 (eight) hours as needed for pain., Disp: , Rfl:      albuterol (PROVENTIL) 2.5 mg /3 mL (0.083 %) nebulizer solution, Take 2.5 mg by nebulization every 4 (four) hours as needed for wheezing., Disp: , Rfl:      amLODIPine (NORVASC) 10 MG tablet, Take 10 mg by mouth daily., Disp: , Rfl:      ARIPiprazole (ABILIFY) 10 MG tablet, Take 5 mg by mouth daily. , Disp: , Rfl:      aspirin-acetaminophen-caffeine (EXCEDRIN MIGRAINE) 250-250-65 mg per tablet, Take 2 tablets by mouth every 6 (six) hours as needed for pain (Max:  8 tabs in 24 hours)., Disp: , Rfl:      buPROPion (WELLBUTRIN XL) 150 MG 24 hr tablet, Take 150 mg by mouth daily., Disp: , Rfl:      clobetasol (TEMOVATE) 0.05 % cream, Apply topically 2 (two) times a day., Disp: , Rfl:      donepezil (ARICEPT) 10 MG tablet, Take 10 mg by mouth bedtime., Disp: , Rfl:      FLUoxetine (PROZAC) 20 MG capsule, Take 40 mg by mouth daily. Take 2 tablets (=40 mg) once daily, Disp: , Rfl:      fluticasone (FLOVENT HFA) 220 mcg/actuation inhaler, Inhale 1 puff 2 (two) times a day., Disp: , Rfl:      folic acid (FOLVITE) 1 MG tablet, Take 1 mg by mouth daily., Disp: , Rfl:      hydrocortisone 1 % cream, Topical, 2 times daily,, Disp: 30 g, Rfl: 0     Lactobacillus rhamnosus GG (CULTURELLE) 15 billion cell CpSP, Take 1 capsule by mouth 2 (two) times a day. For elbow cellulitis, Disp: , Rfl:      lamoTRIgine (LAMICTAL) 100  MG tablet, Take 150 mg by mouth every morning., Disp: , Rfl:      lamoTRIgine (LAMICTAL) 100 MG tablet, Take 100 mg by mouth bedtime., Disp: , Rfl:      levothyroxine (SYNTHROID, LEVOTHROID) 75 MCG tablet, Take 75 mcg by mouth Daily at 6:00 am., Disp: , Rfl:      multivitamin with minerals (THERA-M) 9 mg iron-400 mcg Tab tablet, Take 1 tablet by mouth daily., Disp: , Rfl:      ondansetron (ZOFRAN-ODT) 4 MG disintegrating tablet, Take 4 mg by mouth every 6 (six) hours as needed for nausea., Disp: , Rfl:      oxyCODONE (OXY-IR) 5 mg capsule, Take 5 mg by mouth every 4 (four) hours as needed., Disp: , Rfl:      ranitidine (ZANTAC) 300 MG capsule, Take 300 mg by mouth 2 (two) times a day., Disp: , Rfl:      senna-docusate (PERICOLACE) 8.6-50 mg tablet, Take 1 tablet by mouth 2 (two) times a day as needed. , Disp: , Rfl:      tiotropium (SPIRIVA) 18 mcg inhalation capsule, Place 18 mcg into inhaler and inhale bedtime., Disp: , Rfl:     .  Vitals:    02/21/17 1202   BP: 96/56   Pulse: 62   Resp: 16   Temp: 97.9  F (36.6  C)   SpO2: 93%       Physical Exam   Constitutional: She appears well-developed and well-nourished. No distress.   HENT:   Head: Normocephalic.   Eyes: Pupils are equal, round, and reactive to light.   Neck: Neck supple. No thyromegaly present.   Cardiovascular: Normal rate, regular rhythm and normal heart sounds.   Pulmonary/Chest: Breath sounds normal.   Abdominal: Bowel sounds are normal. There is no tenderness. There is no guarding. Mid abd hernia.  Musculoskeletal:   History of venous stasis. Tremors. Generalized weakness and debility . Foot bunion  Lymphadenopathy:   She has no cervical adenopathy.   Neurological: She is alert.   Skin: Skin is warm and dry. No rash noted.   Psychiatric: She has a normal mood and affect. Her behavior is normal     LABS:   Results for orders placed or performed during the hospital encounter of 07/15/16   Basic Metabolic Panel   Result Value Ref Range    Sodium 138 136  - 145 mmol/L    Potassium 4.2 3.5 - 5.0 mmol/L    Chloride 100 98 - 107 mmol/L    CO2 30 22 - 31 mmol/L    Anion Gap, Calculation 8 5 - 18 mmol/L    Glucose 107 70 - 125 mg/dL    Calcium 9.1 8.5 - 10.5 mg/dL    BUN 11 8 - 28 mg/dL    Creatinine 0.67 0.60 - 1.10 mg/dL    GFR MDRD Af Amer >60 >60 mL/min/1.73m2    GFR MDRD Non Af Amer >60 >60 mL/min/1.73m2       Lab Results   Component Value Date    WBC 6.7 12/19/2016    HGB 12.8 12/19/2016    HCT 42.1 12/19/2016    MCV 88 12/19/2016     12/19/2016         ASSESSMENT:      ICD-10-CM    1. Balance disorder R26.89    2. NPH (normal pressure hydrocephalus) G91.2    3. Abdominal hernia K46.9    4. Essential hypertension I10        PLAN:    She would like to inquire which she will do regarding her abdominal hernia. She like to get that fixed. She will get on a soft clear liquid diet for the next couple of days. Continue to work with therapy. Her like strength and balance has improved. Also will be seeing podiatry for her right foot fifth toe callous also does have a bun into the right foot as well.        Electronically signed by: Michael Duane Johnson, CNP

## 2021-06-09 NOTE — PROGRESS NOTES
[facility]    [facility] [[id number]]   Code Status: FULL CODE  PCP: Parvze Francis MD   Phone: [phone]   Fax: [phone]      CHIEF COMPLAINT/REASON FOR VISIT:  Chief Complaint   Patient presents with     Discharge Summary       HISTORY COURSE:  Amparo is a 76 y.o. female who Amparo is a 76 y.o. female who Was admitted to the hospital on December 12, 2016 in transferred to our facility on December 15, 2016. She was treated there for left elbow cellulitis initially with IV antibiotics and transition to oral clindamycin. Several weeks ago she felt a macerated her left elbow and it became infected. Unfortunately she field oral Keflex and oral clindamycin. 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 She was later transition to oral clindamycin which she will finish on December 25, 2016.      Her left elbow infection was secondary to the fall one out of many that she has sustained over the last several years. She has been working with her neurologist Dr. Gruber since 2011 has she started having some gate imbalance in [facility] and false. She went from a a walker level to a wheelchair level within the last two weeks.      She displayed symptoms of gate imbalance and urinary incontinence. About a month and a half ago, she was started on amantadine hundred milligram PO b.i.d. She did not find this to benefit her balance as she continued to fall in fact was falling from onceto twice a week now she has been falling several times a day.      She currently lives in an independent apartment at [place], but has been falling several times a day and has been needing more he cares.  She was admitted to the [facility] with the hopes that Dr. turner, who Dr. Gruber has recommended could see her for possible normal pressure hydrocephalus and subsequently place a shunt. However because of acute infection,  it was felt that her treating physicians that it would be better to completely treat the cellulitis first before going any further with evaluation for NPH.      Her CRP was initially very high at 130 and it started to trend down to 54 with antibiotics. Cultures were negative. Kidney functions unremarkable.  She was seen by neurosurgery, and because of underlying infection, plan right now is to treat the infection in follow-up with Dr. turner on January 3.      She's noticed as well that for the last three weeks, her vision has been much more blurry. She's not sure if she has brought this to Dr. Gruber attention when she last saw him.      She had quit smoking. She does have a history of alcohol use, she currently lives in independent living.   .   She transferred to this transitional care unit for therapies. She was able to get an appointment with you of them Dr. turner and finally on February 3 went in to have a lumbar drink placed for diagnostic purposes. She did have some objective evidence of improvement after lumbar drink was placed although not complete resolution of her symptoms. She then underwent ventricular peritoneal shunt placement on February 5, 2016.     She has been able to show significant improvement of her strength balance and core. At this point has been independence using her walker up and down the hallway without any problems. Her appetite has been good. No bowel changes. No chills or fever. Normotensive afebrile and also on room air. No headaches or other neurological issues. Moods have been stable. No respiratory compromise or issues including asthma exacerbation. She does have bilateral knee osteoarthritis/degenerative joint disease. We will trial voltaren gel tid. She did see podiatry on February 21 they did do a little too much aggressive cutting of her calluses they did put her on Cipro and that's finishing tomorrow. She did have a follow up with the neurosurgeon February 16. Her next  follow-up visit is March 15.    Review of Systems  Currently denies any chills and fever URI symptoms flulike problems postnasal drip chest pain dizziness vertigo nausea vomiting diarrhea dysuria unusual aches or pains rashes stiff neck swollen glands or headaches. History of hypertension hypothyroidism venous stasis  Vitals:    03/03/17 1439   BP: 111/73   Pulse: 82   Resp: 16   Temp: 98.4  F (36.9  C)   SpO2: 95%       Physical Exam  Constitutional: She appears well-developed and well-nourished. No distress.   HENT:   Head: Normocephalic.   Eyes: Pupils are equal, round, and reactive to light.   Neck: Neck supple. No thyromegaly present.   Cardiovascular: Normal rate, regular rhythm and normal heart sounds.   Pulmonary/Chest: Breath sounds normal.   Abdominal: Bowel sounds are normal. There is no tenderness. There is no guarding. Mid abd hernia.  Musculoskeletal:   History of venous stasis. Tremors. indep walker Foot bunion  Lymphadenopathy:   She has no cervical adenopathy.   Neurological: She is alert.   Skin: Skin is warm and dry. No rash noted.   Psychiatric: She has a normal mood and affect. Her behavior is normal      Results for orders placed or performed during the hospital encounter of 07/15/16   Basic Metabolic Panel   Result Value Ref Range    Sodium 138 136 - 145 mmol/L    Potassium 4.2 3.5 - 5.0 mmol/L    Chloride 100 98 - 107 mmol/L    CO2 30 22 - 31 mmol/L    Anion Gap, Calculation 8 5 - 18 mmol/L    Glucose 107 70 - 125 mg/dL    Calcium 9.1 8.5 - 10.5 mg/dL    BUN 11 8 - 28 mg/dL    Creatinine 0.67 0.60 - 1.10 mg/dL    GFR MDRD Af Amer >60 >60 mL/min/1.73m2    GFR MDRD Non Af Amer >60 >60 mL/min/1.73m2       Lab Results   Component Value Date    WBC 6.7 12/19/2016    HGB 12.8 12/19/2016    HCT 42.1 12/19/2016    MCV 88 12/19/2016     12/19/2016     Lab Results   Component Value Date    ALT 22 07/15/2016    AST 17 07/15/2016    ALKPHOS 116 07/15/2016    BILITOT 0.3 07/15/2016       MEDICATION  LIST:  Current Outpatient Prescriptions   Medication Sig     acetaminophen (TYLENOL) 500 MG tablet Take 500 mg by mouth every 8 (eight) hours as needed for pain.     albuterol (PROVENTIL) 2.5 mg /3 mL (0.083 %) nebulizer solution Take 2.5 mg by nebulization every 4 (four) hours as needed for wheezing.     amLODIPine (NORVASC) 10 MG tablet Take 10 mg by mouth daily.     ARIPiprazole (ABILIFY) 10 MG tablet Take 5 mg by mouth daily.      aspirin-acetaminophen-caffeine (EXCEDRIN MIGRAINE) 250-250-65 mg per tablet Take 2 tablets by mouth every 6 (six) hours as needed for pain (Max:  8 tabs in 24 hours).     buPROPion (WELLBUTRIN XL) 150 MG 24 hr tablet Take 150 mg by mouth daily.     clobetasol (TEMOVATE) 0.05 % cream Apply topically 2 (two) times a day.     donepezil (ARICEPT) 10 MG tablet Take 10 mg by mouth bedtime.     FLUoxetine (PROZAC) 20 MG capsule Take 40 mg by mouth daily. Take 2 tablets (=40 mg) once daily     fluticasone (FLOVENT HFA) 220 mcg/actuation inhaler Inhale 1 puff 2 (two) times a day.     folic acid (FOLVITE) 1 MG tablet Take 1 mg by mouth daily.     hydrocortisone 1 % cream Topical, 2 times daily,     Lactobacillus rhamnosus GG (CULTURELLE) 15 billion cell CpSP Take 1 capsule by mouth 2 (two) times a day. For elbow cellulitis     lamoTRIgine (LAMICTAL) 100 MG tablet Take 150 mg by mouth every morning.     lamoTRIgine (LAMICTAL) 100 MG tablet Take 100 mg by mouth bedtime.     levothyroxine (SYNTHROID, LEVOTHROID) 75 MCG tablet Take 75 mcg by mouth Daily at 6:00 am.     multivitamin with minerals (THERA-M) 9 mg iron-400 mcg Tab tablet Take 1 tablet by mouth daily.     ondansetron (ZOFRAN-ODT) 4 MG disintegrating tablet Take 4 mg by mouth every 6 (six) hours as needed for nausea.     oxyCODONE (OXY-IR) 5 mg capsule Take 5 mg by mouth every 4 (four) hours as needed.     ranitidine (ZANTAC) 300 MG capsule Take 300 mg by mouth 2 (two) times a day.     senna-docusate (PERICOLACE) 8.6-50 mg tablet Take 1  tablet by mouth 2 (two) times a day as needed.      tiotropium (SPIRIVA) 18 mcg inhalation capsule Place 18 mcg into inhaler and inhale bedtime.       DISCHARGE DIAGNOSIS:    ICD-10-CM    1. Gait instability R26.81    2. NPH (normal pressure hydrocephalus) G91.2    3. Venous stasis I87.8    4. Leg weakness, bilateral M62.81        MEDICAL EQUIPMENT NEEDS:  walker    DISCHARGE PLAN/FACE TO FACE:  I certify that this patient is under my care and that I, or a nurse practitioner or physician's assistant working with me, had a face-to-face encounter that meets the physician face-to-face encounter requirements with this patient.   Date of Face-to-Face Encounter: March 3, 2017    I certify that, based on my findings, the following services are medically necessary home health services: Discharging the home with current medications and narcotics with [facility] for physical and occupational therapy home health aide and nursing    My clinical findings support the need for the above skilled services because: (Please write a brief narrative summary that describes what the RN, PT, SLP, or other services will be doing in the home. A list of diagnoses in this section does not meet the CMS requirements.) Secondary to multiple chronic medical conditions and generalized debility and medication management    This patient is homebound because: (Please write a brief narrative summary describing the functional limitations as to why this patient is homebound and specifically what makes this patient homebound.) Secondary to multiple chronic medical conditions.    The patient is, or has been, under my care and I have initiated the establishment of the plan of care. This patient will be followed by a physician who will periodically review the plan of care.  She will again follow up with the neurosurgeon March 15 for a recheck. She will be going home and will have home services. She will follow up with her primary care doctor  regarding medication management any future laboratory studies and further care of her chronic medical conditions. She had no further questions and did feel it was necessary that she was at the transitional care unit since her initial admission from her hospitalization February 3, 2017.      Discharge coordination care greater than 30 minutes.    Electronically signed by: Michael Duane Johnson, CNP

## 2021-06-09 NOTE — PROGRESS NOTES
[facility]    Facility:   [facility] [[id number]]   Code Status: FULL CODE      CHIEF COMPLAINT/REASON FOR VISIT:  Chief Complaint   Patient presents with     Follow Up     abd hernia, rehab       HISTORY:      HPI: Amparo is a 76 y.o. female Who I had a chance to revisit with secondary to her multiple chronic medical conditions and most recently history of normal pressure hydrocephalus and requiring a  shunt. She is improving with her strength and balance. She was having issues with loose stool that has improved. She recently had a callous looked at by podiatry and they apparently carved off little bit too much so they put her on Cipro for couple of days also some dressings. She also does have an abdominal wall hernia she can I was see the surgeon on Monday. Normotensive and afebrile. Moods about the facility self propelling her wheelchair. Afebrile.    Past Medical History:   Diagnosis Date     Abdominal hernia      Alcoholism in remission 2017     Anemia      Arthritis      Asthma 1/20/2017     Bipolar 1 disorder      Cancer      Cellulitis      Cellulitis 12/12/2016    of left elbow     Depression      Disease of thyroid gland      Frequent falls 2016     Frequent falls 2017     GERD (gastroesophageal reflux disease)      History of transfusion      Hydrocephalus      Hypertension      Hypothyroidism      Memory loss 2017     Normal pressure hydrocephalus 02/03/2017             No family history on file.  Social History     Social History     Marital status:      Spouse name: N/A     Number of children: N/A     Years of education: N/A     Social History Main Topics     Smoking status: Former Smoker     Packs/day: 0.25     Years: 30.00     Quit date: 7/16/1997     Smokeless tobacco: Not on file     Alcohol use No     Drug use: No     Sexual activity: Not on file     Other Topics Concern     Not on file     Social History Narrative         Review of  Systems    .Currently denies any chills and fever URI symptoms flulike problems postnasal drip chest pain dizziness vertigo nausea vomiting diarrhea dysuria unusual aches or pains rashes stiff neck swollen glands or headaches. History of hypertension hypothyroidism venous stasis      Current Outpatient Prescriptions:      acetaminophen (TYLENOL) 500 MG tablet, Take 500 mg by mouth every 8 (eight) hours as needed for pain., Disp: , Rfl:      albuterol (PROVENTIL) 2.5 mg /3 mL (0.083 %) nebulizer solution, Take 2.5 mg by nebulization every 4 (four) hours as needed for wheezing., Disp: , Rfl:      amLODIPine (NORVASC) 10 MG tablet, Take 10 mg by mouth daily., Disp: , Rfl:      ARIPiprazole (ABILIFY) 10 MG tablet, Take 5 mg by mouth daily. , Disp: , Rfl:      aspirin-acetaminophen-caffeine (EXCEDRIN MIGRAINE) 250-250-65 mg per tablet, Take 2 tablets by mouth every 6 (six) hours as needed for pain (Max:  8 tabs in 24 hours)., Disp: , Rfl:      buPROPion (WELLBUTRIN XL) 150 MG 24 hr tablet, Take 150 mg by mouth daily., Disp: , Rfl:      clobetasol (TEMOVATE) 0.05 % cream, Apply topically 2 (two) times a day., Disp: , Rfl:      donepezil (ARICEPT) 10 MG tablet, Take 10 mg by mouth bedtime., Disp: , Rfl:      FLUoxetine (PROZAC) 20 MG capsule, Take 40 mg by mouth daily. Take 2 tablets (=40 mg) once daily, Disp: , Rfl:      fluticasone (FLOVENT HFA) 220 mcg/actuation inhaler, Inhale 1 puff 2 (two) times a day., Disp: , Rfl:      folic acid (FOLVITE) 1 MG tablet, Take 1 mg by mouth daily., Disp: , Rfl:      hydrocortisone 1 % cream, Topical, 2 times daily,, Disp: 30 g, Rfl: 0     Lactobacillus rhamnosus GG (CULTURELLE) 15 billion cell CpSP, Take 1 capsule by mouth 2 (two) times a day. For elbow cellulitis, Disp: , Rfl:      lamoTRIgine (LAMICTAL) 100 MG tablet, Take 150 mg by mouth every morning., Disp: , Rfl:      lamoTRIgine (LAMICTAL) 100 MG tablet, Take 100 mg by mouth bedtime., Disp: , Rfl:      levothyroxine (SYNTHROID,  LEVOTHROID) 75 MCG tablet, Take 75 mcg by mouth Daily at 6:00 am., Disp: , Rfl:      multivitamin with minerals (THERA-M) 9 mg iron-400 mcg Tab tablet, Take 1 tablet by mouth daily., Disp: , Rfl:      ondansetron (ZOFRAN-ODT) 4 MG disintegrating tablet, Take 4 mg by mouth every 6 (six) hours as needed for nausea., Disp: , Rfl:      oxyCODONE (OXY-IR) 5 mg capsule, Take 5 mg by mouth every 4 (four) hours as needed., Disp: , Rfl:      ranitidine (ZANTAC) 300 MG capsule, Take 300 mg by mouth 2 (two) times a day., Disp: , Rfl:      senna-docusate (PERICOLACE) 8.6-50 mg tablet, Take 1 tablet by mouth 2 (two) times a day as needed. , Disp: , Rfl:      tiotropium (SPIRIVA) 18 mcg inhalation capsule, Place 18 mcg into inhaler and inhale bedtime., Disp: , Rfl:     Vitals:    02/24/17 1235   BP: 121/72   Pulse: 73   Resp: 16   Temp: 98.2  F (36.8  C)   SpO2: 94%       Physical Exam  Constitutional: She appears well-developed and well-nourished. No distress.   HENT:   Head: Normocephalic.   Eyes: Pupils are equal, round, and reactive to light.   Neck: Neck supple. No thyromegaly present.   Cardiovascular: Normal rate, regular rhythm and normal heart sounds.   Pulmonary/Chest: Breath sounds normal.   Abdominal: Bowel sounds are normal. There is no tenderness. There is no guarding. Mid abd hernia.  Musculoskeletal:   History of venous stasis. Tremors. Generalized weakness and debility . Foot bunion  Lymphadenopathy:   She has no cervical adenopathy.   Neurological: She is alert.   Skin: Skin is warm and dry. No rash noted.   Psychiatric: She has a normal mood and affect. Her behavior is normal      LABS:   Results for orders placed or performed during the hospital encounter of 07/15/16   Basic Metabolic Panel   Result Value Ref Range    Sodium 138 136 - 145 mmol/L    Potassium 4.2 3.5 - 5.0 mmol/L    Chloride 100 98 - 107 mmol/L    CO2 30 22 - 31 mmol/L    Anion Gap, Calculation 8 5 - 18 mmol/L    Glucose 107 70 - 125 mg/dL     Calcium 9.1 8.5 - 10.5 mg/dL    BUN 11 8 - 28 mg/dL    Creatinine 0.67 0.60 - 1.10 mg/dL    GFR MDRD Af Amer >60 >60 mL/min/1.73m2    GFR MDRD Non Af Amer >60 >60 mL/min/1.73m2       Lab Results   Component Value Date    WBC 6.7 12/19/2016    HGB 12.8 12/19/2016    HCT 42.1 12/19/2016    MCV 88 12/19/2016     12/19/2016         ASSESSMENT:      ICD-10-CM    1. Abdominal hernia K46.9    2. Gait instability R26.81    3. NPH (normal pressure hydrocephalus) G91.2    4. Physical deconditioning R53.81        PLAN:    She is seeing the surgeon coming up this Monday for her abdominal wall hernia. Continue to manage and follow with therapy in therapy recommendations a medical management of her chronic medical conditions.    Electronically signed by: Michael Duane Johnson, CNP

## 2021-06-09 NOTE — PROGRESS NOTES
[facility]    Facility:   [facility] [[id number]]   Code Status: FULL CODE      CHIEF COMPLAINT/REASON FOR VISIT:  Chief Complaint   Patient presents with     Follow Up     rt foot, confidence.       HISTORY:      HPI: Amparo is a 76 y.o. female Who I had the pleasure of visiting with again today secondary to multiple chronic medical conditions. Initially was admitted for normal pressure hydrocephalus eventually did have a  shunt. Actually doing quite well has been independent does have a green tag on her walker. No recent falls. Her gait has significantly improved. She supposed to go home by the weekend but she is going to appeal that she does not feel yet comfortable going home. She recently did see the surgeon I believe yesterday for her to abdominal hernias. There and have a consultation before make further recommendations. She did see podiatry. She also does have some left foot discomfort more sore along the dorsal side sometimes into the ankle we did talk about various creams and gel and will trial voltaren 2 g three times a day for a week to see if that is helpful or not. Otherwise she has been normotensive and afebrile also on room air. Appetite is fine     Past Medical History:   Diagnosis Date     Abdominal hernia      Alcoholism in remission 2017     Anemia      Arthritis      Asthma 1/20/2017     Bipolar 1 disorder      Cancer      Cellulitis      Cellulitis 12/12/2016    of left elbow     Depression      Disease of thyroid gland      Frequent falls 2016     Frequent falls 2017     GERD (gastroesophageal reflux disease)      History of transfusion      Hydrocephalus      Hypertension      Hypothyroidism      Memory loss 2017     Normal pressure hydrocephalus 02/03/2017             No family history on file.  Social History     Social History     Marital status:      Spouse name: N/A     Number of children: N/A     Years of education: N/A     Social  History Main Topics     Smoking status: Former Smoker     Packs/day: 0.25     Years: 30.00     Quit date: 7/16/1997     Smokeless tobacco: Not on file     Alcohol use No     Drug use: No     Sexual activity: Not on file     Other Topics Concern     Not on file     Social History Narrative         Review of Systems  Currently denies any chills and fever URI symptoms flulike problems postnasal drip chest pain dizziness vertigo nausea vomiting diarrhea dysuria unusual aches or pains rashes stiff neck swollen glands or headaches. History of hypertension hypothyroidism venous stasis    Current Outpatient Prescriptions   Medication Sig     acetaminophen (TYLENOL) 500 MG tablet Take 500 mg by mouth every 8 (eight) hours as needed for pain.     albuterol (PROVENTIL) 2.5 mg /3 mL (0.083 %) nebulizer solution Take 2.5 mg by nebulization every 4 (four) hours as needed for wheezing.     amLODIPine (NORVASC) 10 MG tablet Take 10 mg by mouth daily.     ARIPiprazole (ABILIFY) 10 MG tablet Take 5 mg by mouth daily.      aspirin-acetaminophen-caffeine (EXCEDRIN MIGRAINE) 250-250-65 mg per tablet Take 2 tablets by mouth every 6 (six) hours as needed for pain (Max:  8 tabs in 24 hours).     buPROPion (WELLBUTRIN XL) 150 MG 24 hr tablet Take 150 mg by mouth daily.     clobetasol (TEMOVATE) 0.05 % cream Apply topically 2 (two) times a day.     donepezil (ARICEPT) 10 MG tablet Take 10 mg by mouth bedtime.     FLUoxetine (PROZAC) 20 MG capsule Take 40 mg by mouth daily. Take 2 tablets (=40 mg) once daily     fluticasone (FLOVENT HFA) 220 mcg/actuation inhaler Inhale 1 puff 2 (two) times a day.     folic acid (FOLVITE) 1 MG tablet Take 1 mg by mouth daily.     hydrocortisone 1 % cream Topical, 2 times daily,     Lactobacillus rhamnosus GG (CULTURELLE) 15 billion cell CpSP Take 1 capsule by mouth 2 (two) times a day. For elbow cellulitis     lamoTRIgine (LAMICTAL) 100 MG tablet Take 150 mg by mouth every morning.     lamoTRIgine (LAMICTAL)  100 MG tablet Take 100 mg by mouth bedtime.     levothyroxine (SYNTHROID, LEVOTHROID) 75 MCG tablet Take 75 mcg by mouth Daily at 6:00 am.     multivitamin with minerals (THERA-M) 9 mg iron-400 mcg Tab tablet Take 1 tablet by mouth daily.     ondansetron (ZOFRAN-ODT) 4 MG disintegrating tablet Take 4 mg by mouth every 6 (six) hours as needed for nausea.     oxyCODONE (OXY-IR) 5 mg capsule Take 5 mg by mouth every 4 (four) hours as needed.     ranitidine (ZANTAC) 300 MG capsule Take 300 mg by mouth 2 (two) times a day.     senna-docusate (PERICOLACE) 8.6-50 mg tablet Take 1 tablet by mouth 2 (two) times a day as needed.      tiotropium (SPIRIVA) 18 mcg inhalation capsule Place 18 mcg into inhaler and inhale bedtime.       .  Vitals:    02/28/17 1124   BP: 148/83   Pulse: 73   Resp: 16   Temp: 98.2  F (36.8  C)   SpO2: 94%       Physical Exam  Constitutional: She appears well-developed and well-nourished. No distress.   HENT:   Head: Normocephalic.   Eyes: Pupils are equal, round, and reactive to light.   Neck: Neck supple. No thyromegaly present.   Cardiovascular: Normal rate, regular rhythm and normal heart sounds.   Pulmonary/Chest: Breath sounds normal.   Abdominal: Bowel sounds are normal. There is no tenderness. There is no guarding. Mid abd hernia.  Musculoskeletal:   History of venous stasis. Tremors. indep walker Foot bunion  Lymphadenopathy:   She has no cervical adenopathy.   Neurological: She is alert.   Skin: Skin is warm and dry. No rash noted.   Psychiatric: She has a normal mood and affect. Her behavior is normal      LABS:   Results for orders placed or performed during the hospital encounter of 07/15/16   Basic Metabolic Panel   Result Value Ref Range    Sodium 138 136 - 145 mmol/L    Potassium 4.2 3.5 - 5.0 mmol/L    Chloride 100 98 - 107 mmol/L    CO2 30 22 - 31 mmol/L    Anion Gap, Calculation 8 5 - 18 mmol/L    Glucose 107 70 - 125 mg/dL    Calcium 9.1 8.5 - 10.5 mg/dL    BUN 11 8 - 28 mg/dL     Creatinine 0.67 0.60 - 1.10 mg/dL    GFR MDRD Af Amer >60 >60 mL/min/1.73m2    GFR MDRD Non Af Amer >60 >60 mL/min/1.73m2       Lab Results   Component Value Date    WBC 6.7 12/19/2016    HGB 12.8 12/19/2016    HCT 42.1 12/19/2016    MCV 88 12/19/2016     12/19/2016         ASSESSMENT:      ICD-10-CM    1. Balance disorder R26.89    2. Abdominal hernia K46.9    3. Essential hypertension I10    4. Gait instability R26.81        PLAN:    At this time continue to manage and follow. We'll get her some gel for her left foot secondary to the discomfort probably will tendinitis also seems to be migrating there ankle area. She is going to appeal the discharge and so therefore will just have to watch and wait no keep me updated if she does win.      Electronically signed by: Michael Duane Johnson, CNP

## 2021-06-14 NOTE — PROGRESS NOTES
Pt did not show up for appt 1/25 at 1300 pm. Left msg at 1325 pm to follow-up.  RESPIRATORY CARE NOTE     Patient Name: Amparo Sharma  Today's Date: 1/25/2021     Problem List  Patient Active Problem List   Diagnosis     Dyspnea     Class 2 obesity due to excess calories in adult     Cognitive decline     History of MRSA infection     Physical deconditioning     Essential hypertension     Leg weakness, bilateral     Venous stasis     Anemia     Bipolar 1 disorder (H)     Depression     Acquired hypothyroidism     Bunion of great toe     Gait instability     Tremor     PVD (peripheral vascular disease) (H)     Exacerbation of asthma, unspecified asthma severity, unspecified whether persistent     Chronic pain     NPH (normal pressure hydrocephalus) (H)     Abdominal hernia     Balance disorder     COPD with acute exacerbation (H)     Acute respiratory failure with hypoxia (H)     Status post reverse total arthroplasty of right shoulder     Gastroesophageal reflux disease without esophagitis     COPD exacerbation (H)     Chronically elevated hemidiaphragm     Frailty                           Jennifer Bello LRT

## 2021-06-18 NOTE — LETTER
Letter by Johnson, Michael Duane, CNP at      Author: Johnson, Michael Duane, CNP Service: -- Author Type: --    Filed:  Encounter Date: 1/8/2019 Status: (Other)         Patient: Amparo Sharma   MR Number: [id number]   YOB: 1940   Date of Visit: 1/8/2019     [facility]    Facility:   [facility] [[id number]]   Code Status: DNR  PCP: Parvez Francis MD   Phone: [phone]   Fax: [phone]      CHIEF COMPLAINT/REASON FOR VISIT:  Chief Complaint   Patient presents with   ? Discharge Summary       HISTORY COURSE:  Amparo is a 78 y.o. female who I was asked to see secondary to most recent transitional care admission secondary to her hospitalization December 21 - December 27, 2018 secondary to dyspnea.  She did present secondary to dyspnea is found to have acute hypoxemic respiratory failure from COPD exacerbation.  She initially was started on IV steroids later transitioned to prednisone orally.  The symptoms did improve during her hospitalization.  Also she was given incentive spirometry.  At the time of discharge she did require oxygen.  She does have generalized weakness and deconditioning.  While at home she is on ampyra due to her history of normal pressure hydrocephalus and gait and balance issues.  Apparently during her hospitalization with inability to acquire this medication.  This was given to her by a specialist.  Her laboratory studies on December 21 did show sodium 141 potassium 5.1 BUN 22 creatinine 0.88 white cells 9.9 hemoglobin 11.8.  The oxygen was almost immediately discontinued.  There has been no COPD issues including cough wheezing or other respiratory issues.  She is been working with therapy staff secondary to her NPH and working on her balance and strength.  She does reside in a senior living facility.  She does have a chronic wound on her right tibia that is now resolving nicely using wound gel along with a Mepilex.  Shrunk down  to only a couple millimeters at this point.  She has chronic right rotator cuff impingement and adhesive capsulitis more probable chronic rotator cuff and she does want to continue to work with [facility] to see if she can get her shoulder worked on and treated.  She has been normotensive and afebrile and also on room air.  Appetite is good.  Has been participating with the rehabilitation services and has made good progress and success to the point where she is now past therapy recommendations.  She did have a CT arthrogram of the right shoulder on December 31 which did show a complete full-thickness tear of the supraspinatus and infraspinatus tendons with proximal/medial tendon retraction to the level of the glenoid, marked superior subluxation of the humeral head, and marked atrophy of the supraspinatus and infraspinatus muscles.  There is an irregular high-grade tear of the subscapularis tendon and mild atrophy of the subscapularis muscle.  No teres minor tendon is seen.  So therefore the impression is a complete full-thickness tear of the supraspinatus and infraspinatus tendon.  She does have tendinopathy of the long head of the biceps tendon which is medially dislocated from the bicipital groove into the lesser tuberosity.  Review of Systems  Currently denies any chills and fever URI symptoms flulike problems postnasal drip chest pain dizziness vertigo nausea vomiting diarrhea dysuria unusual aches or pains rashes stiff neck swollen glands or headaches. History of hypertension hypothyroidism venous stasis.  Complete full-thickness tear of the supraspinatus and infraspinatus tendons along with irregular high-grade tear of the subscapularis tendon, moderate to marked tendinopathy of the long head of the biceps tendon.  Vitals:    01/08/19 1140   BP: 137/75   Pulse: 72   Resp: 18   Temp: 98  F (36.7  C)   SpO2: 91%       Physical Exam  Constitutional: She appears well-developed and well-nourished. No  distress.   HENT:   Cardiovascular: Normal rate, regular rhythm and normal heart sounds.   Pulmonary/Chest: Breath sounds normal.   Abdominal:   Mid abd hernia.  Musculoskeletal: History of normal pressure hydrocephalus.  Gait and balance issues.  History of venous stasis. Tremors. Chronic rt rotator cuff tear/impingement.  Lymphadenopathy:   She has no cervical adenopathy.   Neurological: She is alert.   Skin: Left tibia mid tibia area linear wound currently being treated.  Mepilex   psychiatric: She has a normal mood and affect. Her behavior is normal     Lab Results   Component Value Date    WBC 9.0 12/22/2018    HGB 12.4 12/22/2018    HCT 40.8 12/22/2018    MCV 92 12/22/2018     12/27/2018     Results for orders placed or performed during the hospital encounter of 12/21/18   Basic metabolic panel   Result Value Ref Range    Sodium 142 136 - 145 mmol/L    Potassium 4.5 3.5 - 5.0 mmol/L    Chloride 107 98 - 107 mmol/L    CO2 23 22 - 31 mmol/L    Anion Gap, Calculation 12 5 - 18 mmol/L    Glucose 159 (H) 70 - 125 mg/dL    Calcium 10.1 8.5 - 10.5 mg/dL    BUN 22 8 - 28 mg/dL    Creatinine 0.71 0.60 - 1.10 mg/dL    GFR MDRD Af Amer >60 >60 mL/min/1.73m2    GFR MDRD Non Af Amer >60 >60 mL/min/1.73m2           MEDICATION LIST:  Current Outpatient Medications   Medication Sig   ? acetaminophen (TYLENOL) 500 MG tablet Take 1,000 mg by mouth 3 (three) times a day.   ? albuterol (PROAIR HFA;PROVENTIL HFA;VENTOLIN HFA) 90 mcg/actuation inhaler Inhale 2 puffs every 4 (four) hours as needed for wheezing.   ? ARIPiprazole (ABILIFY) 15 MG tablet Take 15 mg by mouth every evening.   ? aspirin-acetaminophen-caffeine (EXCEDRIN MIGRAINE) 250-250-65 mg per tablet Take 2 tablets by mouth every 6 (six) hours as needed for pain (Max:  8 tabs in 24 hours).   ? buPROPion (WELLBUTRIN XL) 150 MG 24 hr tablet Take 150 mg by mouth every morning.          ? carboxymethylcellulose (REFRESH PLUS) 0.5 % Dpet ophthalmic dropperette  Administer 1 drop to both eyes 4 (four) times a day as needed (dry eyes).   ? clobetasol (TEMOVATE) 0.05 % cream Apply 1 application topically 2 (two) times a day as needed.          ? dalfampridine (AMPYRA) 10 mg Tb12 Take 10 mg by mouth 2 (two) times a day.   ? diclofenac (VOLTAREN) 50 MG EC tablet Take 50 mg by mouth 2 (two) times a day.   ? docusate sodium (COLACE) 100 MG capsule Take 100 mg by mouth every morning.          ? FLUoxetine (PROZAC) 20 MG capsule Take 40 mg by mouth every morning.          ? hydrocortisone 2.5 % cream Apply 1 application topically 2 (two) times a day as needed (rash/itching).   ? lamoTRIgine (LAMICTAL) 100 MG tablet Take 100 mg by mouth bedtime.   ? lamoTRIgine (LAMICTAL) 150 MG tablet Take 150 mg by mouth every morning.   ? lanolin-mineral oil (EUCERIN ORIGINAL) Lotn Apply 1 application topically daily.   ? levothyroxine 75 mcg cap Take 75 mcg by mouth every morning.   ? magnesium hydroxide (MILK OF MAG) 400 mg/5 mL Susp suspension Take 30 mL by mouth daily as needed.   ? multivit-min-FA-lycopen-lutein (CENTRUM SILVER) 0.4-300-250 mg-mcg-mcg tablet Take 1 tablet by mouth daily.   ? ranitidine (ZANTAC) 300 MG capsule Take 300 mg by mouth 2 (two) times a day.   ? tiotropium (SPIRIVA) 18 mcg inhalation capsule Place 1 capsule (2 puffs total) into inhaler and inhale daily.   ? urea (HEEL BALM TOP) Apply 1 application topically daily as needed. To right foot       DISCHARGE DIAGNOSIS:    ICD-10-CM    1. History of MRSA infection Z86.14    2. Frequent falls R29.6    3. NPH (normal pressure hydrocephalus) G91.2    4. Physical deconditioning R53.81        MEDICAL EQUIPMENT NEEDS:  None    DISCHARGE PLAN/FACE TO FACE:  I certify that services are/were furnished while this patient was under the care of a physician and that a physician or an allowed non-physician practitioner (NPP), had a face-to-face encounter that meets the physician face-to-face encounter requirements. The encounter was in  whole, or in part, related to the primary reason for home health. The patient is confined to his/her home and needs intermittent skilled nursing, physical therapy, speech-language pathology, or the continued need for occupational therapy. A plan of care has been established by a physician and is periodically reviewed by a physician.  Date of Face-to-Face Encounter: January 8, 2019    I certify that, based on my findings, the following services are medically necessary home health services: She will be discharging January 10, 2019 to the [facility] with current medications along with receiving physical therapy    My clinical findings support the need for the above skilled services because: (Please write a brief narrative summary that describes what the RN, PT, SLP, or other services will be doing in the home. A list of diagnoses in this section does not meet the CMS requirements.)  She will require physical therapy secondary to generalized debility weakness home safety along with continuing with various exercises.    This patient is homebound because: (Please write a brief narrative summary describing the functional limitations as to why this patient is homebound and specifically what makes this patient homebound.)  Secondary to chronic medical conditions including home safety transfers gait instability balance a history of NPH.    The patient is, or has been, under my care and I have initiated the establishment of the plan of care. This patient will be followed by a physician who will periodically review the plan of care.    Schedule follow up visit with primary care provider within 7 days to reestablish care.  She will follow-up with orthopedics as previously arranged particularly with her most recent CT arthrogram.  She will follow-up with her primary care doctor regarding medication management and any future laboratory studies.  Once again she does feel comfortable with her stay in the  transitional care unit has made some good progress.    Discharge coordination care greater than 30 minutes  Electronically signed by: Michael Duane Johnson, CNP

## 2021-06-18 NOTE — LETTER
Letter by Johnson, Michael Duane, CNP at      Author: Johnson, Michael Duane, CNP Service: -- Author Type: --    Filed:  Encounter Date: 1/4/2019 Status: (Other)         Patient: Amparo Sharma   MR Number: [id number]   YOB: 1940   Date of Visit: 1/4/2019     [facility]    Facility:   [facility] [[id number]]   Code Status: DNR      CHIEF COMPLAINT/REASON FOR VISIT:  Chief Complaint   Patient presents with   ? Follow Up     rehab, copd       HISTORY:      HPI: Amparo is a 78 y.o. female who I had the chance and pleasure to revisit with secondary to her most recent hospitalization December 20 1 December 27th 2018 secondary dyspnea along with acute hypoxic respiratory failure from COPD exacerbation.  Currently doing quite well no longer on oxygen.  No increase or use of inhalers.  No shortness of breath with exertion is currently performing with therapy and therapy at this point thinks that she will be able to go home early next week.  Had a chance to discuss that with her.  Her right tibial wound does have some slough now to the area so we will put some wound gel and cover it and do it twice a day.  She is finishing up her prednisone taper on January 6.  Her appetite is good.  Does self propel her wheelchair.  She has been normotensive and afebrile and also on room air.  Her moods have been quite stable.  No bowel or bladder problems.    Past Medical History:   Diagnosis Date   ? Abdominal hernia    ? 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 Anemia    ? Arthritis    ? Asthma 1/20/2017   ? Bipolar 1 disorder (H)    ? Cancer (H)    ? Cellulitis    ? Cellulitis 12/12/2016    of left elbow   ? COPD with acute exacerbation (H) 12/21/2018   ? Depression    ? Disease of thyroid gland    ? Frequent falls 2016   ? Frequent falls 2017   ? GERD (gastroesophageal reflux disease)    ? History of transfusion    ? Hydrocephalus    ? Hypertension    ? Hypothyroidism    ? Memory  loss    ? Normal pressure hydrocephalus 2017             Family History   Problem Relation Age of Onset   ? Arthritis Brother      Social History     Socioeconomic History   ? Marital status:      Spouse name: Not on file   ? Number of children: Not on file   ? Years of education: Not on file   ? Highest education level: Not on file   Social Needs   ? Financial resource strain: Not on file   ? Food insecurity - worry: Not on file   ? Food insecurity - inability: Not on file   ? Transportation needs - medical: Not on file   ? Transportation needs - non-medical: Not on file   Occupational History   ? Not on file   Tobacco Use   ? Smoking status: Former Smoker     Packs/day: 0.25     Years: 30.00     Pack years: 7.50     Last attempt to quit: 1997     Years since quittin.4   Substance and Sexual Activity   ? Alcohol use: No   ? Drug use: No   ? Sexual activity: Not on file   Other Topics Concern   ? Not on file   Social History Narrative   ? Not on file         Review of Systems  Currently denies any chills and fever URI symptoms flulike problems postnasal drip chest pain dizziness vertigo nausea vomiting diarrhea dysuria unusual aches or pains rashes stiff neck swollen glands or headaches. History of hypertension hypothyroidism venous stasis       Current Outpatient Medications   Medication Sig   ? acetaminophen (TYLENOL) 500 MG tablet Take 1,000 mg by mouth 3 (three) times a day.   ? albuterol (PROAIR HFA;PROVENTIL HFA;VENTOLIN HFA) 90 mcg/actuation inhaler Inhale 2 puffs every 4 (four) hours as needed for wheezing.   ? ARIPiprazole (ABILIFY) 15 MG tablet Take 15 mg by mouth every evening.   ? aspirin-acetaminophen-caffeine (EXCEDRIN MIGRAINE) 250-250-65 mg per tablet Take 2 tablets by mouth every 6 (six) hours as needed for pain (Max:  8 tabs in 24 hours).   ? bisacodyl (DULCOLAX) 10 mg suppository Insert 1 suppository (10 mg total) into the rectum daily as needed (Constipation.).   ?  buPROPion (WELLBUTRIN XL) 150 MG 24 hr tablet Take 150 mg by mouth every morning.          ? carboxymethylcellulose (REFRESH PLUS) 0.5 % Dpet ophthalmic dropperette Administer 1 drop to both eyes 4 (four) times a day as needed (dry eyes).   ? clobetasol (TEMOVATE) 0.05 % cream Apply 1 application topically 2 (two) times a day as needed.          ? dalfampridine (AMPYRA) 10 mg Tb12 Take 10 mg by mouth 2 (two) times a day.   ? diclofenac (VOLTAREN) 50 MG EC tablet Take 50 mg by mouth 2 (two) times a day.   ? docusate sodium (COLACE) 100 MG capsule Take 100 mg by mouth every morning.          ? FLUoxetine (PROZAC) 20 MG capsule Take 40 mg by mouth every morning.          ? hydrocortisone 2.5 % cream Apply 1 application topically 2 (two) times a day as needed (rash/itching).   ? lamoTRIgine (LAMICTAL) 100 MG tablet Take 100 mg by mouth bedtime.   ? lamoTRIgine (LAMICTAL) 150 MG tablet Take 150 mg by mouth every morning.   ? lanolin-mineral oil (EUCERIN ORIGINAL) Lotn Apply 1 application topically daily.   ? levothyroxine 75 mcg cap Take 75 mcg by mouth every morning.   ? magnesium hydroxide (MILK OF MAG) 400 mg/5 mL Susp suspension Take 30 mL by mouth daily as needed.   ? multivit-min-FA-lycopen-lutein (CENTRUM SILVER) 0.4-300-250 mg-mcg-mcg tablet Take 1 tablet by mouth daily.   ? predniSONE (DELTASONE) 5 MG tablet Take 5 mg by mouth daily with breakfast for 3 days.   ? ranitidine (ZANTAC) 300 MG capsule Take 300 mg by mouth 2 (two) times a day.   ? tiotropium (SPIRIVA) 18 mcg inhalation capsule Place 1 capsule (2 puffs total) into inhaler and inhale daily.   ? urea (HEEL BALM TOP) Apply 1 application topically daily as needed. To right foot       .There were no vitals filed for this visit.  Blood pressure 143/92 pulse 90 respirations 18 temperature 98.1 saturation room air 96%  Physical Exam   Constitutional: She appears well-developed and well-nourished. No distress.   HENT:   Neck: Neck supple. No thyromegaly  present.   Cardiovascular: Normal rate, regular rhythm and normal heart sounds.   Pulmonary/Chest: Breath sounds normal.   Abdominal:  There is no tenderness. There is no guarding. Mid abd hernia.  Musculoskeletal: History of normal pressure hydrocephalus.  Gait and balance issues.  History of venous stasis. Tremors. Chronic rt rotator cuff tear/impingement.  Lymphadenopathy:   She has no cervical adenopathy.   Neurological: She is alert.   Skin: Left tibia mid tibia area linear wound currently being treated.  Mepilex   psychiatric: She has a normal mood and affect. Her behavior is normal       LABS:   Lab Results   Component Value Date    WBC 9.0 12/22/2018    HGB 12.4 12/22/2018    HCT 40.8 12/22/2018    MCV 92 12/22/2018     12/27/2018     Results for orders placed or performed during the hospital encounter of 12/21/18   Basic metabolic panel   Result Value Ref Range    Sodium 142 136 - 145 mmol/L    Potassium 4.5 3.5 - 5.0 mmol/L    Chloride 107 98 - 107 mmol/L    CO2 23 22 - 31 mmol/L    Anion Gap, Calculation 12 5 - 18 mmol/L    Glucose 159 (H) 70 - 125 mg/dL    Calcium 10.1 8.5 - 10.5 mg/dL    BUN 22 8 - 28 mg/dL    Creatinine 0.71 0.60 - 1.10 mg/dL    GFR MDRD Af Amer >60 >60 mL/min/1.73m2    GFR MDRD Non Af Amer >60 >60 mL/min/1.73m2           ASSESSMENT:      ICD-10-CM    1. Wound of right lower extremity, subsequent encounter S81.801D    2. Frequent falls R29.6    3. Leg weakness, bilateral R29.898    4. COPD with acute exacerbation (H) J44.1        PLAN:    Adding wound gel twice daily along with a Mepilex to her right tibial wound.  Her COPD and her respiratory status has significantly improved finishing up her prednisone taper on January 6.  She is producing well in therapy and looks like there is a potential for discharge early next week.        Electronically signed by: Michael Duane Johnson, TARSHA

## 2021-06-18 NOTE — LETTER
Letter by Carson Walter MD at      Author: Carson Walter MD Service: -- Author Type: --    Filed:  Encounter Date: 12/31/2018 Status: (Other)         Patient: Amparo Sharma   MR Number: [id number]   YOB: 1940   Date of Visit: 12/31/2018     Code Status:  DNR  Visit Type: H & P     Facility:  [facility] [[id number]]      Facility Type: SNF (Skilled Nursing Facility, TCU)    History of Present Illness:   Hospital Admission Date: December 21, 2018 [facility] Discharge Date: September 27, 2018  Facility Admission Date: September 27, 2018 [facility] transitional care unit    Amparo Sharma is a 78 y.o. female who I am seeing for a admission where she presented with a significant dyspnea with underlying known COPD.  She was found on admission to have acute hypoxemic respiratory failure from an exacerbation of her COPD.    Hospital course; she was started on IV steroids and later transitioned to prednisone.  Her symptoms gradually improved during the hospitalization and was started on incentive spirometry and Acapella valve.  They also initiated Spiriva as a maintenance medication.  She had required O2 at the time of discharge.  In addition to this she had a rather profound generalized weakness and deconditioning.  She was on [place] which is a medicine for balance disorder as well.  She was unable to get this medicine in the hospital and to date at our facility as well.  However respiratory wise she is less short of breath and she is been able to now almost completely wean and at times completely wean off of oxygen with O2 sats in the 93% range.    Past Medical History:   Diagnosis Date   ? Abdominal hernia    ? 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 Anemia    ? Arthritis    ? Asthma 1/20/2017   ? Bipolar 1 disorder (H)    ? Cancer (H)    ? Cellulitis    ? Cellulitis 12/12/2016    of left elbow   ? COPD with acute exacerbation (H) 12/21/2018   ?  Depression    ? Disease of thyroid gland    ? Frequent falls    ? Frequent falls    ? GERD (gastroesophageal reflux disease)    ? History of transfusion    ? Hydrocephalus    ? Hypertension    ? Hypothyroidism    ? Memory loss    ? Normal pressure hydrocephalus 2017     Past Surgical History:   Procedure Laterality Date   ? COLON SURGERY     ? FRACTURE SURGERY      right ankle   ? HYSTERECTOMY     ? JOINT REPLACEMENT      both knees, hip -right,    ? LAPAROSCOPIC CHOLECYSTECTOMY     ? LAPAROSCOPIC COLON RESECTION     ? LUMBAR PUNCTURE  2017   ? ROTATOR CUFF REPAIR     ? VENTRICULOPERITONEAL SHUNT  2017     Family History   Problem Relation Age of Onset   ? Arthritis Brother      Social History     Socioeconomic History   ? Marital status:      Spouse name: Not on file   ? Number of children: Not on file   ? Years of education: Not on file   ? Highest education level: Not on file   Social Needs   ? Financial resource strain: Not on file   ? Food insecurity - worry: Not on file   ? Food insecurity - inability: Not on file   ? Transportation needs - medical: Not on file   ? Transportation needs - non-medical: Not on file   Occupational History   ? Not on file   Tobacco Use   ? Smoking status: Former Smoker     Packs/day: 0.25     Years: 30.00     Pack years: 7.50     Last attempt to quit: 1997     Years since quittin.4   Substance and Sexual Activity   ? Alcohol use: No   ? Drug use: No   ? Sexual activity: Not on file   Other Topics Concern   ? Not on file   Social History Narrative   ? Not on file     Additional Geriatric Review patient lives independently and has 2 children.  Current Outpatient Medications   Medication Sig Dispense Refill   ? acetaminophen (TYLENOL) 500 MG tablet Take 1,000 mg by mouth 3 (three) times a day.     ? albuterol (PROAIR HFA;PROVENTIL HFA;VENTOLIN HFA) 90 mcg/actuation inhaler Inhale 2 puffs every 4 (four) hours as needed for wheezing.     ?  ARIPiprazole (ABILIFY) 15 MG tablet Take 15 mg by mouth every evening.     ? aspirin-acetaminophen-caffeine (EXCEDRIN MIGRAINE) 250-250-65 mg per tablet Take 2 tablets by mouth every 6 (six) hours as needed for pain (Max:  8 tabs in 24 hours).     ? bisacodyl (DULCOLAX) 10 mg suppository Insert 1 suppository (10 mg total) into the rectum daily as needed (Constipation.).  0   ? buPROPion (WELLBUTRIN XL) 150 MG 24 hr tablet Take 150 mg by mouth every morning.            ? carboxymethylcellulose (REFRESH PLUS) 0.5 % Dpet ophthalmic dropperette Administer 1 drop to both eyes 4 (four) times a day as needed (dry eyes).     ? clobetasol (TEMOVATE) 0.05 % cream Apply 1 application topically 2 (two) times a day as needed.            ? dalfampridine (AMPYRA) 10 mg Tb12 Take 10 mg by mouth 2 (two) times a day.     ? diclofenac (VOLTAREN) 50 MG EC tablet Take 50 mg by mouth 2 (two) times a day.     ? docusate sodium (COLACE) 100 MG capsule Take 100 mg by mouth every morning.            ? FLUoxetine (PROZAC) 20 MG capsule Take 40 mg by mouth every morning.            ? guaiFENesin (ROBITUSSIN) 100 mg/5 mL syrup Take 5 mL (100 mg total) by mouth every 6 (six) hours as needed for cough or congestion.  0   ? hydrocortisone 2.5 % cream Apply 1 application topically 2 (two) times a day as needed (rash/itching).     ? lamoTRIgine (LAMICTAL) 100 MG tablet Take 100 mg by mouth bedtime.     ? lamoTRIgine (LAMICTAL) 150 MG tablet Take 150 mg by mouth every morning.     ? lanolin-mineral oil (EUCERIN ORIGINAL) Lotn Apply 1 application topically daily.     ? levothyroxine 75 mcg cap Take 75 mcg by mouth every morning.     ? magnesium hydroxide (MILK OF MAG) 400 mg/5 mL Susp suspension Take 30 mL by mouth daily as needed.  0   ? multivit-min-FA-lycopen-lutein (CENTRUM SILVER) 0.4-300-250 mg-mcg-mcg tablet Take 1 tablet by mouth daily.     ? ondansetron (ZOFRAN) 4 MG tablet Take 4 mg by mouth every 8 (eight) hours as needed.     ? predniSONE  (DELTASONE) 10 mg tablet Take 10 mg by mouth daily with breakfast for 3 days.  0   ? predniSONE (DELTASONE) 5 MG tablet Take 15 mg by mouth daily with breakfast for 3 days.  0   ? [START ON 1/3/2019] predniSONE (DELTASONE) 5 MG tablet Take 5 mg by mouth daily with breakfast for 3 days.  0   ? ranitidine (ZANTAC) 300 MG capsule Take 300 mg by mouth 2 (two) times a day.     ? tiotropium (SPIRIVA) 18 mcg inhalation capsule Place 1 capsule (2 puffs total) into inhaler and inhale daily.  0   ? urea (HEEL BALM TOP) Apply 1 application topically daily as needed. To right foot       No current facility-administered medications for this visit.      Allergies   Allergen Reactions   ? Amantadine Other (See Comments)   ? Codeine Itching   ? Diazepam Other (See Comments)     Hallucinations/paranoid   ? Diphenhydramine Other (See Comments)     hyperactivity   ? Meloxicam Other (See Comments)   ? Meperidine Nausea Only   ? Pentazocine Other (See Comments)     drowsiness   ? Statins-Hmg-Coa Reductase Inhibitors Other (See Comments)     Was hospitalized, rhabdomyolitis  Rhabdo?  Statins   ? Talwin [Pentazocine Lactate]    ? Tramadol Nausea Only       There is no immunization history on file for this patient.      Review of Systems   Constitutional: Negative.  Negative for appetite change, chills, diaphoresis, fatigue, fever and unexpected weight change.   HENT: Negative for congestion, dental problem, ear pain, hearing loss, nosebleeds, sinus pressure, sore throat, tinnitus and trouble swallowing.    Eyes: Negative for photophobia, pain, discharge, itching and visual disturbance.   Respiratory: Negative for apnea, cough, chest tightness, shortness of breath and wheezing.    Cardiovascular: Negative for chest pain and palpitations.   Gastrointestinal: Negative for abdominal distention, abdominal pain, constipation and diarrhea.   Endocrine: Negative for cold intolerance, heat intolerance and polydipsia.   Genitourinary: Negative.   Negative for decreased urine volume, difficulty urinating, dysuria, enuresis, frequency, hematuria, menstrual problem, urgency and vaginal discharge.   Musculoskeletal: Negative for arthralgias, back pain and gait problem.   Skin:        Had a recent traumatic injury where she hit her left shin on a car.  This avulsed is some skin and had an open area that then became cellulitic for which she was treated.  On careful exam this shows an area of skin breakdown on the anterior tibial area but there is no erythema.  The depth is a second-degree in nature there is just a very little bit of drainage.   Allergic/Immunologic: Negative.    Neurological: Negative for dizziness, tremors, syncope, facial asymmetry, speech difficulty, weakness, light-headedness, numbness and headaches.   Hematological: Negative.    Psychiatric/Behavioral: Negative for agitation, behavioral problems, confusion, decreased concentration, dysphoric mood and hallucinations. The patient is not hyperactive.         Physical Exam   Constitutional: She is oriented to person, place, and time. She appears well-developed and well-nourished.   I patient is obese   HENT:   Head: Normocephalic and atraumatic.   Right Ear: External ear normal.   Left Ear: External ear normal.   Nose: Nose normal.   Mouth/Throat: Oropharynx is clear and moist.   Eyes: Conjunctivae and EOM are normal. Pupils are equal, round, and reactive to light. Left eye exhibits no discharge. No scleral icterus.   Neck: Neck supple. No JVD present. No tracheal deviation present. No thyromegaly present.   Cardiovascular: Normal rate, regular rhythm and normal heart sounds. Exam reveals no friction rub.   No murmur heard.  Pulmonary/Chest: Effort normal and breath sounds normal. No respiratory distress. She has no wheezes. She has no rales. She exhibits no tenderness.   There is noted decreased air movement but not any wheeze rale or rhonchi   Abdominal: She exhibits no distension and no mass.  There is no tenderness. There is no guarding.   Musculoskeletal: Normal range of motion. She exhibits no edema or tenderness.   Wheelchair-bound and so did not test balance disorder was somewhat weak coming to a standing position still needing assistance   Lymphadenopathy:     She has no cervical adenopathy.   Neurological: She is alert and oriented to person, place, and time. She has normal reflexes. No cranial nerve deficit. She exhibits normal muscle tone. Coordination normal.   Skin: Skin is warm and dry. No rash noted. No erythema.   Psychiatric: She has a normal mood and affect. Her behavior is normal. Thought content normal.   Nursing note and vitals reviewed.        Labs:  All labs reviewed in the nursing home record.  Recent Results (from the past 240 hour(s))   Basic metabolic panel   Result Value Ref Range    Sodium 142 136 - 145 mmol/L    Potassium 4.5 3.5 - 5.0 mmol/L    Chloride 107 98 - 107 mmol/L    CO2 23 22 - 31 mmol/L    Anion Gap, Calculation 12 5 - 18 mmol/L    Glucose 159 (H) 70 - 125 mg/dL    Calcium 10.1 8.5 - 10.5 mg/dL    BUN 22 8 - 28 mg/dL    Creatinine 0.71 0.60 - 1.10 mg/dL    GFR MDRD Af Amer >60 >60 mL/min/1.73m2    GFR MDRD Non Af Amer >60 >60 mL/min/1.73m2   HM1 (CBC with Diff)   Result Value Ref Range    WBC 9.0 4.0 - 11.0 thou/uL    RBC 4.42 3.80 - 5.40 mill/uL    Hemoglobin 12.4 12.0 - 16.0 g/dL    Hematocrit 40.8 35.0 - 47.0 %    MCV 92 80 - 100 fL    MCH 28.1 27.0 - 34.0 pg    MCHC 30.4 (L) 32.0 - 36.0 g/dL    RDW 14.2 11.0 - 14.5 %    Platelets 366 140 - 440 thou/uL    MPV 9.8 8.5 - 12.5 fL    Neutrophils % 85 (H) 50 - 70 %    Lymphocytes % 10 (L) 20 - 40 %    Monocytes % 5 2 - 10 %    Eosinophils % 0 0 - 6 %    Basophils % 0 0 - 2 %    Neutrophils Absolute 7.5 2.0 - 7.7 thou/uL    Lymphocytes Absolute 0.9 0.8 - 4.4 thou/uL    Monocytes Absolute 0.4 0.0 - 0.9 thou/uL    Eosinophils Absolute 0.0 0.0 - 0.4 thou/uL    Basophils Absolute 0.0 0.0 - 0.2 thou/uL   Sputum culture    Result Value Ref Range    Culture Usual cesilia with     Culture 3+ Haemophilus influenzae (!)     Gram Stain Result 4+ Polymorphonuclear leukocytes     Gram Stain Result 1+ Gram positive cocci    MRSA culture   Result Value Ref Range    Culture MRSA Coagulase Positive Staph (!)    POCT Glucose   Result Value Ref Range    Glucose,  mg/dL   Platelet Count - every other day x 3   Result Value Ref Range    Platelets 351 140 - 440 thou/uL   Influenza A/B Rapid Test   Result Value Ref Range    Influenza  A, Rapid Antigen No Influenza A antigen detected No Influenza A antigen detected    Influenza B, Rapid Antigen No Influenza B antigen detected No Influenza B antigen detected   Platelet Count - every other day x 3   Result Value Ref Range    Platelets 341 140 - 440 thou/uL   BNP(B-type Natriuretic Peptide)   Result Value Ref Range    BNP 26 0 - 144 pg/mL   Platelet Count - every other day x 3   Result Value Ref Range    Platelets 339 140 - 440 thou/uL         Assessment:  1. COPD with acute exacerbation (H)     2. Dyspnea, unspecified type     3. Physical deconditioning     4. Leg weakness, bilateral     5. Balance disorder     6. Essential hypertension     7. NPH (normal pressure hydrocephalus)     8. Venous stasis     9. Cognitive decline         Plan: Major focus in her recovery and therapy will be weaning the O2 and increasing the strength.  Working with the spirometry.  We are on a gradual dose reduction wean in her prednisone and anticipate good response.  We will add a Mepilex dressing to this area of traumatic injury    Total 35 minutes of which 65% was spent in counseling and coordination of care of the above plan.    Electronically signed by: Carson Walter MD

## 2021-06-18 NOTE — LETTER
Letter by Johnson, Michael Duane, CNP at      Author: Johnson, Michael Duane, CNP Service: -- Author Type: --    Filed:  Encounter Date: 1/2/2019 Status: (Other)         Patient: Amparo Sharma   MR Number: [id number]   YOB: 1940   Date of Visit: 1/2/2019     [facility]    Facility:   [facility] [[id number]]   Code Status: DNR      CHIEF COMPLAINT/REASON FOR VISIT:  Chief Complaint   Patient presents with   ? Follow Up     rehab, pneum       HISTORY:      HPI: Amparo is a 78 y.o. female who I had the pleasure revisiting with secondary to her most recent hospitalization December 21 - December 27, 2018 secondary to dyspnea along with acute hypoxic respiratory failure from COPD exacerbation.  She currently is doing quite well not requiring oxygen not requiring any extra inhalers or cough syrup or Zofran appetite is improving.  She has been normotensive and afebrile.  Lungs have been clearing nicely she is on a prednisone taper.  Her moods have been quite stable.  Her pain is also well managed pretty much just on Tylenol thousand milligrams 3 times a day.  She is very pleased with her progress overall and actually has been able to use the walker up and down the hallway and does feel her strength and stamina to also be improved.  Her left tibial wound is also improving does have a Mepilex.  Does have a history of NPH along with balance and that has also improved.  She is very pleased with the progress.    Past Medical History:   Diagnosis Date   ? Abdominal hernia    ? 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 Anemia    ? Arthritis    ? Asthma 1/20/2017   ? Bipolar 1 disorder (H)    ? Cancer (H)    ? Cellulitis    ? Cellulitis 12/12/2016    of left elbow   ? COPD with acute exacerbation (H) 12/21/2018   ? Depression    ? Disease of thyroid gland    ? Frequent falls 2016   ? Frequent falls 2017   ? GERD (gastroesophageal reflux disease)    ? History of transfusion     ? Hydrocephalus    ? Hypertension    ? Hypothyroidism    ? Memory loss    ? Normal pressure hydrocephalus 2017             Family History   Problem Relation Age of Onset   ? Arthritis Brother      Social History     Socioeconomic History   ? Marital status:      Spouse name: Not on file   ? Number of children: Not on file   ? Years of education: Not on file   ? Highest education level: Not on file   Social Needs   ? Financial resource strain: Not on file   ? Food insecurity - worry: Not on file   ? Food insecurity - inability: Not on file   ? Transportation needs - medical: Not on file   ? Transportation needs - non-medical: Not on file   Occupational History   ? Not on file   Tobacco Use   ? Smoking status: Former Smoker     Packs/day: 0.25     Years: 30.00     Pack years: 7.50     Last attempt to quit: 1997     Years since quittin.4   Substance and Sexual Activity   ? Alcohol use: No   ? Drug use: No   ? Sexual activity: Not on file   Other Topics Concern   ? Not on file   Social History Narrative   ? Not on file         Review of Systems  Currently denies any chills and fever URI symptoms flulike problems postnasal drip chest pain dizziness vertigo nausea vomiting diarrhea dysuria unusual aches or pains rashes stiff neck swollen glands or headaches. History of hypertension hypothyroidism venous stasis       Current Outpatient Medications   Medication Sig   ? acetaminophen (TYLENOL) 500 MG tablet Take 1,000 mg by mouth 3 (three) times a day.   ? albuterol (PROAIR HFA;PROVENTIL HFA;VENTOLIN HFA) 90 mcg/actuation inhaler Inhale 2 puffs every 4 (four) hours as needed for wheezing.   ? ARIPiprazole (ABILIFY) 15 MG tablet Take 15 mg by mouth every evening.   ? aspirin-acetaminophen-caffeine (EXCEDRIN MIGRAINE) 250-250-65 mg per tablet Take 2 tablets by mouth every 6 (six) hours as needed for pain (Max:  8 tabs in 24 hours).   ? bisacodyl (DULCOLAX) 10 mg suppository Insert 1 suppository  (10 mg total) into the rectum daily as needed (Constipation.).   ? buPROPion (WELLBUTRIN XL) 150 MG 24 hr tablet Take 150 mg by mouth every morning.          ? carboxymethylcellulose (REFRESH PLUS) 0.5 % Dpet ophthalmic dropperette Administer 1 drop to both eyes 4 (four) times a day as needed (dry eyes).   ? clobetasol (TEMOVATE) 0.05 % cream Apply 1 application topically 2 (two) times a day as needed.          ? dalfampridine (AMPYRA) 10 mg Tb12 Take 10 mg by mouth 2 (two) times a day.   ? diclofenac (VOLTAREN) 50 MG EC tablet Take 50 mg by mouth 2 (two) times a day.   ? docusate sodium (COLACE) 100 MG capsule Take 100 mg by mouth every morning.          ? FLUoxetine (PROZAC) 20 MG capsule Take 40 mg by mouth every morning.          ? hydrocortisone 2.5 % cream Apply 1 application topically 2 (two) times a day as needed (rash/itching).   ? lamoTRIgine (LAMICTAL) 100 MG tablet Take 100 mg by mouth bedtime.   ? lamoTRIgine (LAMICTAL) 150 MG tablet Take 150 mg by mouth every morning.   ? lanolin-mineral oil (EUCERIN ORIGINAL) Lotn Apply 1 application topically daily.   ? levothyroxine 75 mcg cap Take 75 mcg by mouth every morning.   ? magnesium hydroxide (MILK OF MAG) 400 mg/5 mL Susp suspension Take 30 mL by mouth daily as needed.   ? multivit-min-FA-lycopen-lutein (CENTRUM SILVER) 0.4-300-250 mg-mcg-mcg tablet Take 1 tablet by mouth daily.   ? predniSONE (DELTASONE) 10 mg tablet Take 10 mg by mouth daily with breakfast for 3 days.   ? [START ON 1/3/2019] predniSONE (DELTASONE) 5 MG tablet Take 5 mg by mouth daily with breakfast for 3 days.   ? ranitidine (ZANTAC) 300 MG capsule Take 300 mg by mouth 2 (two) times a day.   ? tiotropium (SPIRIVA) 18 mcg inhalation capsule Place 1 capsule (2 puffs total) into inhaler and inhale daily.   ? urea (HEEL BALM TOP) Apply 1 application topically daily as needed. To right foot       .There were no vitals filed for this visit.  Blood pressure 132/80 pulse 90 respirations 16  temperature 98.5 saturation room air 94%  Physical Exam   Constitutional: She appears well-developed and well-nourished. No distress.   HENT:   Eyes: Pupils are equal, round, and reactive to light.   Neck: Neck supple. No thyromegaly present.   Cardiovascular: Normal rate, regular rhythm and normal heart sounds.   Pulmonary/Chest: Breath sounds normal.   Abdominal:  There is no tenderness. There is no guarding. Mid abd hernia.  Musculoskeletal: History of normal pressure hydrocephalus.  Gait and balance issues.  History of venous stasis. Tremors. Chronic rt rotator cuff tear/impingement.  Lymphadenopathy:   She has no cervical adenopathy.   Neurological: She is alert.   Skin: Left tibia mid tibia area linear wound currently being treated.  Mepilex   psychiatric: She has a normal mood and affect. Her behavior is normal     LABS:   Lab Results   Component Value Date    WBC 9.0 12/22/2018    HGB 12.4 12/22/2018    HCT 40.8 12/22/2018    MCV 92 12/22/2018     12/27/2018     Results for orders placed or performed during the hospital encounter of 12/21/18   Basic metabolic panel   Result Value Ref Range    Sodium 142 136 - 145 mmol/L    Potassium 4.5 3.5 - 5.0 mmol/L    Chloride 107 98 - 107 mmol/L    CO2 23 22 - 31 mmol/L    Anion Gap, Calculation 12 5 - 18 mmol/L    Glucose 159 (H) 70 - 125 mg/dL    Calcium 10.1 8.5 - 10.5 mg/dL    BUN 22 8 - 28 mg/dL    Creatinine 0.71 0.60 - 1.10 mg/dL    GFR MDRD Af Amer >60 >60 mL/min/1.73m2    GFR MDRD Non Af Amer >60 >60 mL/min/1.73m2           ASSESSMENT:      ICD-10-CM    1. COPD with acute exacerbation (H) J44.1    2. Cough R05    3. Weakness generalized R53.1    4. Gait instability R26.81        PLAN:    Discontinue the cough syrup and Zofran due to nonuse.  Otherwise continue to manage and follow her chronic medical conditions she is making pretty good progress so far and she is able to ambulate up and down the hallway and therapy does feel like she is doing well and  still a concern regarding her frequent falls in her assisted living facility probably here another week.        Electronically signed by: Michael Duane Johnson, CNP

## 2021-06-18 NOTE — LETTER
Letter by Casie Barger NP at      Author: Casie Barger NP Service: -- Author Type: --    Filed:  Encounter Date: 3/11/2019 Status: (Other)         Patient: Amparo Sharma   MR Number: [id number]   YOB: 1940   Date of Visit: 3/11/2019                 [facility]    DATE: 3/11/2019    NAME:  Amparo Sharma             :  1940  MRN: [id number]  CODE STATUS:  FULL CODE    VISIT TYPE: Problem Visit (hospital f/u)     FACILITY:  [facility] [[id number]]       CHIEF COMPLAIN/REASON FOR VISIT:    Chief Complaint   Patient presents with   ? Problem Visit     hospital f/u               HISTORY OF PRESENT ILLNESS: Amparo Sharma is a 78 y.o. female who was admitted 3/5-3/8 for right reverse total shoulder arthroplasty. Chest xray was done for increased cough but was negative for pneumonia. She was treated with ceftin and doxycycline. She was discharged to TCU for further rehab. She has PMH of colon cancer, hypothyroid, normal pressure hydrocephalus s/p  shunt, HTN, COPD, bipolar disorder, urinary frequency. Prior to this she lived at [facility].     Today Ms. Sharma states is still having a terrible cough and bringing up thick, green mucous. This started in the hospital and they did an xray but told her she did not have pneumonia. She was told she didn't need antibiotics but now hears she is on two. She says she had pneumonia back in December and was in the hospital. She recovered and was fine until she had anesthesia for her surgery. She says the robitussin is not helping at all and wondering if she can have something different. She is on a couple inhalers now, the salmeterol is new for her. She was doing nebs in the hospital until she became allergic to it. She says she developed headaches and nausea so she had to quit doing them. She says she felt quite ill. She says she has used mucinex in the past and it did help her. She reports she is short of  breath with exertion but not at rest. She denies any fevers or chills. She says she has no urinary symptoms today but does have chronic incontinence and wears pads at home. She reports having frequent utis and was even treated for one prior to her surgery when she went in for her preop they found one. She was on ceftin for that. She doesn't know why this is on her med list now. She says she doesn't like taking the oxycodone because of how it makes her feel. She usually takes one if she does need it but during the night she had a lot of pain and had to take 2. She spaces it out and takes them every 6-8 hours instead of how it is ordered. It does help when she takes it but doesn't want to be on it for very long. Right now her pain is a 4 and she last had meds during the night. The tylenol does help and wondering if she could have 4 times per day. She has not had any nausea since being off the nebs and has not used the zofran. She says her appetite is good but she does get full earlier than before. She says she does not want the colace and would like miralax every day instead for constipation. She didn't have a bm for 5 days but then finally worked on this yesterday and went. She had prune juice, miralax, mom, suppository, and more prune juice and finally had one last night. She says her mood has been stable for several years now and declines need to see psych while in house. She says she moved from [place] about 3 years ago and has not had any med changes since. Her family doctor has been managing this not a psychiatrist. She denies any other concerns today.     REVIEW OF SYSTEMS:  PROBLEMS AND REVIEW OF SYSTEMS:   Today on ROS:   Currently, no fever, chills, or rigors. Decreased vision and hearing. Denies any chest pain, headaches, palpitations, lightheadedness, dizziness. Appetite is good. Denies any GERD symptoms. Denies any difficulty with swallowing, nausea, or vomiting. Positive for controlled pain, constipation,  weakness, right shoulder incision, right shoulder sling, edema right arm, urinary incontinence, insomnia, shortness of breath with exertion, congested cough and green mucous      Allergies   Allergen Reactions   ? Amantadine Other (See Comments)   ? Codeine Itching   ? Diazepam Other (See Comments)     Hallucinations/paranoid   ? Diphenhydramine Other (See Comments)     hyperactivity   ? Meloxicam Other (See Comments)   ? Meperidine Nausea Only   ? Pentazocine Other (See Comments)     drowsiness   ? Statins-Hmg-Coa Reductase Inhibitors Other (See Comments)     Was hospitalized, rhabdomyolitis  Rhabdo?  Statins   ? Talwin [Pentazocine Lactate]    ? Tramadol Nausea Only     Current Outpatient Medications   Medication Sig   ? polyethylene glycol (MIRALAX) 17 gram packet Take 17 g by mouth daily.   ? acetaminophen (TYLENOL) 500 MG tablet Take 1,000 mg by mouth 4 (four) times a day.          ? albuterol (PROAIR HFA;PROVENTIL HFA;VENTOLIN HFA) 90 mcg/actuation inhaler Inhale 2 puffs every 4 (four) hours as needed for wheezing.   ? amoxicillin (AMOXIL) 500 MG tablet Take 500 mg by mouth see administration instructions. Take 1 hour prior to dental appointment   ? ARIPiprazole (ABILIFY) 15 MG tablet Take 15 mg by mouth every evening.   ? aspirin 325 MG tablet Take 1 tablet (325 mg total) by mouth 2 (two) times a day.   ? buPROPion (WELLBUTRIN XL) 150 MG 24 hr tablet Take 150 mg by mouth every morning.          ? carboxymethylcellulose (REFRESH PLUS) 0.5 % Dpet ophthalmic dropperette Administer 1 drop to both eyes 4 (four) times a day as needed (dry eyes).   ? dalfampridine (AMPYRA) 10 mg Tb12 Take 10 mg by mouth 2 (two) times a day.   ? doxycycline (VIBRA-TABS) 100 MG tablet Take 1 tablet (100 mg total) by mouth 2 (two) times a day for 10 days.   ? famotidine (PEPCID) 20 MG tablet Take 1 tablet (20 mg total) by mouth 2 (two) times a day.   ? FLUoxetine (PROZAC) 20 MG capsule Take 40 mg by mouth every morning.          ?  hydrocortisone 2.5 % cream Apply 1 application topically 2 (two) times a day as needed (rash/itching).   ? lamoTRIgine (LAMICTAL) 100 MG tablet Take 100 mg by mouth bedtime.   ? lamoTRIgine (LAMICTAL) 150 MG tablet Take 150 mg by mouth every morning.   ? lanolin-mineral oil (EUCERIN ORIGINAL) Lotn Apply 1 application topically daily.   ? levothyroxine 75 mcg cap Take 75 mcg by mouth every morning.   ? magnesium hydroxide (MILK OF MAG) 400 mg/5 mL Susp suspension Take 30 mL by mouth daily as needed.   ? multivit-min-FA-lycopen-lutein (CENTRUM SILVER) 0.4-300-250 mg-mcg-mcg tablet Take 1 tablet by mouth daily.   ? oxyCODONE (ROXICODONE) 5 MG immediate release tablet Take 1-2 tablets (5-10 mg total) by mouth every 4 (four) hours as needed.   ? polyvinyl alcohol (LIQUIFILM TEARS) 1.4 % ophthalmic solution Administer 1 drop to both eyes as needed for dry eyes.   ? salmeterol (SEREVENT) 50 mcg/dose diskus inhaler Inhale 1 puff 2 (two) times a day.   ? umeclidinium (INCRUSE ELLIPTA) 62.5 mcg/actuation DsDv inhaler Inhale 1 puff daily.     Past Medical History:    Past Medical History:   Diagnosis Date   ? Abdominal hernia    ? 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 Anemia    ? Arthritis    ? Asthma 1/20/2017   ? Bipolar 1 disorder (H)    ? Cancer (H)    ? Cellulitis    ? Cellulitis 12/12/2016    of left elbow   ? Chronic pain syndrome    ? COPD with acute exacerbation (H) 12/21/2018   ? Depression    ? Disease of thyroid gland    ? Frequent falls 2016   ? Frequent falls 2017   ? GERD (gastroesophageal reflux disease)    ? History of transfusion    ? Hydrocephalus    ? Hyperlipidemia    ? Hypertension    ? Hypothyroidism    ? Infection of skin due to methicillin resistant Staphylococcus aureus (MRSA)    ? Memory loss 2017   ? Normal pressure hydrocephalus 02/03/2017   ? Parkinson disease (H)    ? Renal insufficiency    ? Urinary incontinence            PHYSICAL EXAMINATION  Vitals:    03/10/19 2113   BP: 123/69   Pulse: 91    Resp: 18   Temp: 97.7  F (36.5  C)   SpO2: 91%       Today on physical exam:     GENERAL: Awake, Alert, oriented x3, not in any form of acute distress, answers questions appropriately, follows simple commands, conversant  HEENT: Head is normocephalic with normal hair distribution. No evidence of trauma. Ears: No acute purulent discharge. Eyes: Conjunctivae pink with no scleral jaundice. Nose: Normal mucosa and septum. NECK: Supple with no cervical or supraclavicular lymphadenopathy. Trachea is midline.   CHEST: No tenderness or deformity, no crepitus  LUNG: dim with expiratory wheeze to auscultation with good chest expansion. Congested cough, green sputum  BACK: No kyphosis of the thoracic spine. Symmetric, no curvature, ROM normal, no CVA tenderness, no spinal tenderness   CVS: There is good S1  S2, regular rhythm, there are no murmurs, rubs, gallops, or heaves,  2+ pulses symmetric in all extremities.  ABDOMEN: Rounded and soft, nontender to palpation, non distended, no masses, no organomegaly, good bowel sounds, no rebound or guarding, no peritoneal signs.   EXTREMITIES: Right shoulder incision small amount of sanguinous drainage, covered with dressing, 2+ edema, mild numbness in rue, 2+ edema right arm, sling in place  SKIN: Warm and dry,   NEUROLOGICAL: The patient is oriented to person, place and time. Weakness, unsteady gait            LABS:   Recent Results (from the past 168 hour(s))   HM2(CBC w/o Differential)   Result Value Ref Range    WBC 8.8 4.0 - 11.0 thou/uL    RBC 4.72 3.80 - 5.40 mill/uL    Hemoglobin 13.1 12.0 - 16.0 g/dL    Hematocrit 42.5 35.0 - 47.0 %    MCV 90 80 - 100 fL    MCH 27.8 27.0 - 34.0 pg    MCHC 30.8 (L) 32.0 - 36.0 g/dL    RDW 15.7 (H) 11.0 - 14.5 %    Platelets 332 140 - 440 thou/uL    MPV 9.4 8.5 - 12.5 fL   Potassium   Result Value Ref Range    Potassium 4.5 3.5 - 5.0 mmol/L   Creatinine   Result Value Ref Range    Creatinine 0.72 0.60 - 1.10 mg/dL    GFR MDRD Af Amer >60 >60  mL/min/1.73m2    GFR MDRD Non Af Amer >60 >60 mL/min/1.73m2   INR (if patient on warfarin)   Result Value Ref Range    INR 0.96 0.90 - 1.10   APTT(PTT)  (if patient on heparin)   Result Value Ref Range    PTT 28 24 - 37 seconds   Glucose  (if patient diabetic)   Result Value Ref Range    Glucose 104 70 - 125 mg/dL    Patient Fasting > 8hrs? Unknown    Type and Screen (order for total hip, total shoulder and shoulder hemiarthroplasty)   Result Value Ref Range    ABORh B POS     Antibody Screen Negative Negative   Hemoglobin - Day of Surgery   Result Value Ref Range    Hemoglobin 12.5 12.0 - 16.0 g/dL   Hemoglobin - Daily x 2   Result Value Ref Range    Hemoglobin 9.9 (L) 12.0 - 16.0 g/dL   Hemoglobin - Daily x 2   Result Value Ref Range    Hemoglobin 9.7 (L) 12.0 - 16.0 g/dL   HM1 (CBC with Diff)   Result Value Ref Range    WBC 8.2 4.0 - 11.0 thou/uL    RBC 3.19 (L) 3.80 - 5.40 mill/uL    Hemoglobin 9.1 (L) 12.0 - 16.0 g/dL    Hematocrit 29.7 (L) 35.0 - 47.0 %    MCV 93 80 - 100 fL    MCH 28.5 27.0 - 34.0 pg    MCHC 30.6 (L) 32.0 - 36.0 g/dL    RDW 15.4 (H) 11.0 - 14.5 %    Platelets 232 140 - 440 thou/uL    MPV 10.4 8.5 - 12.5 fL    Neutrophils % 69 50 - 70 %    Lymphocytes % 15 (L) 20 - 40 %    Monocytes % 11 (H) 2 - 10 %    Eosinophils % 4 0 - 6 %    Basophils % 1 0 - 2 %    Neutrophils Absolute 5.6 2.0 - 7.7 thou/uL    Lymphocytes Absolute 1.2 0.8 - 4.4 thou/uL    Monocytes Absolute 0.9 0.0 - 0.9 thou/uL    Eosinophils Absolute 0.3 0.0 - 0.4 thou/uL    Basophils Absolute 0.0 0.0 - 0.2 thou/uL     Results for orders placed or performed during the hospital encounter of 12/21/18   Basic metabolic panel   Result Value Ref Range    Sodium 142 136 - 145 mmol/L    Potassium 4.5 3.5 - 5.0 mmol/L    Chloride 107 98 - 107 mmol/L    CO2 23 22 - 31 mmol/L    Anion Gap, Calculation 12 5 - 18 mmol/L    Glucose 159 (H) 70 - 125 mg/dL    Calcium 10.1 8.5 - 10.5 mg/dL    BUN 22 8 - 28 mg/dL    Creatinine 0.71 0.60 - 1.10 mg/dL     GFR MDRD Af Amer >60 >60 mL/min/1.73m2    GFR MDRD Non Af Amer >60 >60 mL/min/1.73m2         Lab Results   Component Value Date    WBC 8.2 03/08/2019    HGB 9.1 (L) 03/08/2019    HCT 29.7 (L) 03/08/2019    MCV 93 03/08/2019     03/08/2019       Lab Results   Component Value Date    WHPZPEVX95 569 07/16/2016     Lab Results   Component Value Date    HGBA1C 5.7 07/15/2016     Lab Results   Component Value Date    INR 0.96 03/05/2019     No results found for: [id number]  Lab Results   Component Value Date    TSH 3.33 02/25/2018           ASSESSMENT/PLAN:    1. S/p reverse total shoulder arthroplasty: Incision c/d/i. 2+ edema right shoulder, arm, mild numbness present. Sling in place, NWB. F/u with ortho in 7-10 days. Pain controlled on tylenol and oxycodone. Will change tylenol to four times a day per request. Wean off oxycodone as able. On aspirin two times a day for dvt prophylaxis.   2. COPD, Acute bronchitis: On doxycycline. No fevers or chills. cxr negative for pneumonia in hospital. On incruse ellipta, albuterol prn. Salmeterol added this hospital stay. Intolerant to nebs. Add mucinex two times a day x 7 days, dextromethorphan cough syrup prn. IS q1h while awake.   3. Normal pressure hydrocephalus, s/p  shunt: Unsteady gait, on ampyra. Follows with neurology.   4. HTN: SBP 120s. No meds. Monitor.   5. Bipolar disorder: Mood stable recently. On abilify, wellbutrin, fluoxetine, lamictal. Declines psych referral at this time.   6. H/o colon cancer: No recent concerns. Added lactobacillus while on doxy.   7. Constipation: No bm for 5 days then finally yesterday. Dc colace and scheduled miralax daily per request.   8. UTI: Completed cefdinir prior to surgery will dc.   9. GERD: on pepcid.   10. Hypothyroid: On levothyroxine.   11. Acute blood loss anemia: hg 9.1 on 3/8. Recheck on 3/12.     Brendon, hm1 on 3/12    Per therapy eval today    Electronically signed by: Casie Barger NP            Total time spent  with patient 35 min with 25 min spent in counseling and coordinating of care. Counseling was done regarding hospital stay, postop complications, diagnoses, med changes, lab results. Discussed extensively symptoms including bronchitis, persistent cough, potential causes, treatment options. Discussed need for further lab monitoring and repeat imaging if no improvement. Discussed mobility limitations with postop recovery and pain regimen. Discussed need for further specialty follow up and monitoring. Educated on importance of stress reduction, physical activity, dietary management. Coordination of care in collaboration with , therapy, and nursing in regards to mobility limitations, skilled nursing needs, med changes, therapy concerns.

## 2021-06-19 NOTE — LETTER
Letter by Casie Barger NP at      Author: Casie Barger NP Service: -- Author Type: --    Filed:  Encounter Date: 3/25/2019 Status: (Other)         Patient: Amparo Sharma   MR Number: [id number]   YOB: 1940   Date of Visit: 3/25/2019                 [facility]    DATE: 3/25/2019    NAME:  Amparo Sharma             :  1940  MRN: [id number]  CODE STATUS:  FULL CODE    VISIT TYPE: Discharge Summary     FACILITY:  [facility] [[id number]]       CHIEF COMPLAIN/REASON FOR VISIT:    Chief Complaint   Patient presents with   ? Discharge Summary               HISTORY OF PRESENT ILLNESS: Amparo Sharma is a 78 y.o. female who was admitted 3/5-3/8 for right reverse total shoulder arthroplasty. Chest xray was done for increased cough but was negative for pneumonia. She was treated with ceftin and doxycycline. She was discharged to TCU for further rehab. She has PMH of colon cancer, hypothyroid, normal pressure hydrocephalus s/p  shunt, HTN, COPD, bipolar disorder, urinary frequency. Prior to this she lived at [facility].     TCU course:   Ms. Sharma has made limited progress with therapy due to her high level assist at baseline. She was wheelchair bound prior to surgery. She is contact guard assist for toileting, mod for upper body dressing, feeding assist, min for bed mobility, non ambulatory. She is near baseline so therapy is recommending she return to [facility] living with resumption of services as prior to surgery on 3/26. During her stay she presented from hospital with COPD exacerbation and acute bronchitis. She had completed cefdinir and also completed doxycycline. Her cough continued to worsen and she was started on hypertonic saline nebs, atrovent nebs (she is intolerant to albuterol nebs), mucinex, dextromethorphan, tessalon pearls, and later prednisone taper and zithromax. She is improving at this time and has been weaned off  some of these medications. She will complete her mucinex this week, prednisone taper this week, but will continue on atrovent nebs three times a day and dextromethorphan for now due to persistent cough. Her salmeterol inhaler was changed to advair for improved maintenance management. She was recommended to f/u with her PCP by next week if this was not improved. Otherwise her pain has been improving and she is only occasionally using the oxycodone at this time. She will be sent home with small supply and encouraged to wean off. Her incision is healing well. She did have f/u with ortho on 3/22 and her pillow sling was removed and to continue simple sling until 4/2. She is non weight bearing of RUE but is able to do pendulum exercises and light activity ADL. She is to f/u on 4/2 with ortho again. Her hg was stable at 9 and vitals stable throughout stay. She will be returning to [facility] on 3/26 with  PT, OT, HHA, RN. She will f/u with pcp within 7 days.       REVIEW OF SYSTEMS:  PROBLEMS AND REVIEW OF SYSTEMS:   Today on ROS:   Currently, no fever, chills, or rigors. Decreased vision and hearing. Denies any chest pain, headaches, palpitations, lightheadedness, dizziness. Appetite is good. Denies any GERD symptoms. Denies any difficulty with swallowing, nausea, or vomiting. Positive for controlled pain, weakness, right shoulder incision, right shoulder sling, edema right arm, urinary incontinence, shortness of breath with exertion-improved, dry cough improved      Allergies   Allergen Reactions   ? Amantadine Other (See Comments)   ? Codeine Itching   ? Diazepam Other (See Comments)     Hallucinations/paranoid   ? Diphenhydramine Other (See Comments)     hyperactivity   ? Meloxicam Other (See Comments)   ? Meperidine Nausea Only   ? Pentazocine Other (See Comments)     drowsiness   ? Statins-Hmg-Coa Reductase Inhibitors Other (See Comments)     Was hospitalized, rhabdomyolitis  Rhabdo?  Statins   ? Talwin  [Pentazocine Lactate]    ? Tramadol Nausea Only     Current Outpatient Medications   Medication Sig   ? fluticasone-salmeterol (ADVAIR) 250-50 mcg/dose DISKUS Inhale 1 puff 2 (two) times a day.   ? acetaminophen (TYLENOL) 500 MG tablet Take 1,000 mg by mouth 4 (four) times a day.          ? albuterol (PROAIR HFA;PROVENTIL HFA;VENTOLIN HFA) 90 mcg/actuation inhaler Inhale 2 puffs every 4 (four) hours as needed for wheezing.   ? amoxicillin (AMOXIL) 500 MG tablet Take 500 mg by mouth see administration instructions. Take 1 hour prior to dental appointment   ? ARIPiprazole (ABILIFY) 15 MG tablet Take 15 mg by mouth every evening.   ? aspirin 325 MG tablet Take 1 tablet (325 mg total) by mouth 2 (two) times a day.   ? buPROPion (WELLBUTRIN XL) 150 MG 24 hr tablet Take 150 mg by mouth every morning.          ? carboxymethylcellulose (REFRESH PLUS) 0.5 % Dpet ophthalmic dropperette Administer 1 drop to both eyes 4 (four) times a day as needed (dry eyes).   ? dalfampridine (AMPYRA) 10 mg Tb12 Take 10 mg by mouth 2 (two) times a day.   ? dextromethorphan (ROBITUSSIN MAX STRENGTH) 15 mg/5 mL Liqd syrup Take 30 mg by mouth 4 (four) times a day.          ? famotidine (PEPCID) 20 MG tablet Take 1 tablet (20 mg total) by mouth 2 (two) times a day.   ? FLUoxetine (PROZAC) 20 MG capsule Take 40 mg by mouth every morning.          ? hydrocortisone 2.5 % cream Apply 1 application topically 2 (two) times a day as needed (rash/itching).   ? ipratropium (ATROVENT) 0.02 % nebulizer solution Take 500 mcg by nebulization 3 (three) times a day.   ? lamoTRIgine (LAMICTAL) 100 MG tablet Take 100 mg by mouth bedtime.   ? lamoTRIgine (LAMICTAL) 150 MG tablet Take 150 mg by mouth every morning.   ? lanolin-mineral oil (EUCERIN ORIGINAL) Lotn Apply 1 application topically daily.   ? levothyroxine 75 mcg cap Take 75 mcg by mouth every morning.   ? magnesium hydroxide (MILK OF MAG) 400 mg/5 mL Susp suspension Take 30 mL by mouth daily as needed.    ? multivit-min-FA-lycopen-lutein (CENTRUM SILVER) 0.4-300-250 mg-mcg-mcg tablet Take 1 tablet by mouth daily.   ? oxyCODONE (ROXICODONE) 5 MG immediate release tablet Take 1-2 tablets (5-10 mg total) by mouth every 4 (four) hours as needed. (Patient taking differently: Take 5 mg by mouth every 6 (six) hours as needed.       )   ? polyethylene glycol (MIRALAX) 17 gram packet Take 17 g by mouth daily.   ? polyvinyl alcohol (LIQUIFILM TEARS) 1.4 % ophthalmic solution Administer 1 drop to both eyes as needed for dry eyes.   ? predniSONE (DELTASONE) 10 mg tablet Take 10 mg by mouth daily 40mg x 2, 30mg x 2, 20mg x2, 10mg x2 .   ? umeclidinium (INCRUSE ELLIPTA) 62.5 mcg/actuation DsDv inhaler Inhale 1 puff daily.     Past Medical History:    Past Medical History:   Diagnosis Date   ? Abdominal hernia    ? 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 Anemia    ? Arthritis    ? Asthma 1/20/2017   ? Bipolar 1 disorder (H)    ? Cancer (H)    ? Cellulitis    ? Cellulitis 12/12/2016    of left elbow   ? Chronic pain syndrome    ? COPD with acute exacerbation (H) 12/21/2018   ? Depression    ? Disease of thyroid gland    ? Frequent falls 2016   ? Frequent falls 2017   ? GERD (gastroesophageal reflux disease)    ? History of transfusion    ? Hydrocephalus    ? Hyperlipidemia    ? Hypertension    ? Hypothyroidism    ? Infection of skin due to methicillin resistant Staphylococcus aureus (MRSA)    ? Memory loss 2017   ? Normal pressure hydrocephalus 02/03/2017   ? Parkinson disease (H)    ? Renal insufficiency    ? Urinary incontinence            PHYSICAL EXAMINATION  Vitals:    03/25/19 0700   BP: 146/83   Pulse: 85   Resp: 18   Temp: 97.9  F (36.6  C)   SpO2: 94%       Today on physical exam:     GENERAL: Awake, Alert, oriented x3, not in any form of acute distress, answers questions appropriately, follows simple commands, conversant  HEENT: Head is normocephalic with normal hair distribution. No evidence of trauma. Ears: No acute  purulent discharge. Eyes: Conjunctivae pink with no scleral jaundice. Nose: Normal mucosa and septum. NECK: Supple with no cervical or supraclavicular lymphadenopathy. Trachea is midline.   CHEST: No tenderness or deformity, no crepitus  LUNG: dim with few scattered rhonchi to auscultation with good chest expansion. Dry cough improving, No wheezes auscultated today  BACK: No kyphosis of the thoracic spine. Symmetric, no curvature, ROM normal, no CVA tenderness, no spinal tenderness   CVS: There is good S1  S2, regular rhythm, there are no murmurs, rubs, gallops, or heaves,  2+ pulses symmetric in all extremities.  ABDOMEN: Rounded and soft, nontender to palpation, non distended, no masses, no organomegaly, good bowel sounds, no rebound or guarding, no peritoneal signs.   EXTREMITIES: Right shoulder incision small amount of sanguinous drainage, covered with dressing, 1+ edema, mild numbness in rue, 1+ edema right arm, sling in place  SKIN: Warm and dry,   NEUROLOGICAL: The patient is oriented to person, place and time. Wheelchair bound            LABS:   Recent Results (from the past 168 hour(s))   Hemoglobin   Result Value Ref Range    Hemoglobin 10.2 (L) 12.0 - 16.0 g/dL     Results for orders placed or performed in visit on 03/12/19   Basic Metabolic Panel   Result Value Ref Range    Sodium 141 136 - 145 mmol/L    Potassium 4.3 3.5 - 5.0 mmol/L    Chloride 106 98 - 107 mmol/L    CO2 27 22 - 31 mmol/L    Anion Gap, Calculation 8 5 - 18 mmol/L    Glucose 100 70 - 125 mg/dL    Calcium 9.7 8.5 - 10.5 mg/dL    BUN 16 8 - 28 mg/dL    Creatinine 0.64 0.60 - 1.10 mg/dL    GFR MDRD Af Amer >60 >60 mL/min/1.73m2    GFR MDRD Non Af Amer >60 >60 mL/min/1.73m2         Lab Results   Component Value Date    WBC 7.3 03/12/2019    HGB 10.2 (L) 03/19/2019    HCT 30.9 (L) 03/12/2019    MCV 96 03/12/2019     03/12/2019       Lab Results   Component Value Date    FMURHCRH79 569 07/16/2016     Lab Results   Component Value Date     HGBA1C 5.7 07/15/2016     Lab Results   Component Value Date    INR 0.96 03/05/2019     No results found for: [id number]  Lab Results   Component Value Date    TSH 3.33 02/25/2018           ASSESSMENT/PLAN:    1. S/p reverse total shoulder arthroplasty: Incision c/d/i. 1+ edema right shoulder, arm, mild numbness present. Sling in place, NWB. F/u with ortho 3/22, next appt on 4/2. Pain controlled on tylenol and oxycodone 5mg q6h prn. On aspirin two times a day for dvt prophylaxis.   2. COPD, Acute bronchitis: completed cefdinir, doxycycline, zithromax. No fevers or chills. cxr negative for pneumonia in hospital. On incruse ellipta, albuterol prn, now advair. Albuterol intolerance, reluctant to try other nebulizers but was able to convince her to try atrovent and hypertonic saline with improvement. IS q1h while awake. spiriva not covered by insurance. dextromethorphan cough syrup four times a day scheduled, atrovent nebs three times a day and prn, saline nebs weaned off, prednisone taper. Overall much improved, discussed plan for after discharge to continue cough syrup and nebs as well as completing prednisone taper and switching salmeterol to advair.   3. Normal pressure hydrocephalus, s/p  shunt: Unsteady gait, on ampyra. Follows with neurology.   4. HTN: SBP 140s. No meds. Monitor.   5. Bipolar disorder: Mood stable recently. On abilify, wellbutrin, fluoxetine, lamictal.   6. H/o colon cancer: No recent concerns.   7. Constipation: on scheduled miralax daily. Improved.   8.  Acute blood loss anemia: hg 9.1 on 3/8. Recheck on 3/12-9.  9. GERD: on pepcid.   10. Hypothyroid: On levothyroxine.     Electronically signed by: Casie Barger NP    Please evaluate Amparo Sharma for admission to Home Health.    Total floor/unit time spent 35 min with 25 min spent on counseling and coordination of care. Counseling regarding copd and management, need for inhaler and nebulizer compliance. Discussed plan of care for  "medications and reviewed all med changes from tcu stay. Discussed what symptoms to notify primary provider of and when to see primary provider v going to ED. Discussed weaning off narcotics and pain management treatment options. Coordinated care with  Nursing, therapy,  for discharge planning and ordering medications and services for discharge.     Face to Face Attestation and Initial Plan of Care    The face-to-face encounter occurred on date: 3/25/19  Face to Face encounter was with: Casie Barger    Please provide brief clinical summary of reason for visit and need for home care. Deconditioning after hospital stay for total shoulder arthroplasty, COPD exacerbation    Please identify which of the following home health disciplines the patient will need AND describe the skilled services that you would like the home health agency to perform: SKILLED NURSING (RN): Other copd monitoring, cardiopulmonary assessments, education on copd, nebulizer and inhaler compliance, PHYSICAL THERAPY: strength training and gait training, OCCUPATIONAL THERAPY: ADLs and home safety and HOME HEALTH AIDE    Homebound Status (describe the functional limitations that support this patient is confined to his/her home. Medicaid recipients are not required to be homebound.):assistive device needed:  Wheelchair    Name of physician who will be responsible for the ongoing home health plan of care (CMS requires the referring physician to provide the specific name of the community physician instead of a title, such as \"PCP\"): Parvez Francis MD    Requested Start of Care Date: Within 48 hours    Other information to assist the home health agency in developing the initial Plan of Care:    I certify that services are/were furnished while this patient was under the care of a physician and that a physician or an allowed non-physician practitioner (NPP), had a face-to-face encounter that meets the physician face-to-face encounter " requirements. The encounter was in whole, or in part, related to the primary reason for home health. The patient is confined to his/her home and needs intermittent skilled nursing, physical therapy, speech-language pathology, or the continued need for occupational therapy. A plan of care has been established by a physician and is periodically reviewed by a physician.

## 2021-06-19 NOTE — LETTER
Letter by Casie Barger NP at      Author: Casie Barger NP Service: -- Author Type: --    Filed:  Encounter Date: 3/15/2019 Status: (Other)         Patient: Amparo Sharma   MR Number: [id number]   YOB: 1940   Date of Visit: 3/15/2019                 [facility]    DATE: 3/15/2019    NAME:  Amparo Sharma             :  1940  MRN: [id number]  CODE STATUS:  FULL CODE    VISIT TYPE: Problem Visit     FACILITY:  [facility] [[id number]]       CHIEF COMPLAIN/REASON FOR VISIT:    Chief Complaint   Patient presents with   ? Problem Visit               HISTORY OF PRESENT ILLNESS: Amparo Sharma is a 78 y.o. female who was admitted 3/5-3/8 for right reverse total shoulder arthroplasty. Chest xray was done for increased cough but was negative for pneumonia. She was treated with ceftin and doxycycline. She was discharged to TCU for further rehab. She has PMH of colon cancer, hypothyroid, normal pressure hydrocephalus s/p  shunt, HTN, COPD, bipolar disorder, urinary frequency. Prior to this she lived at [facility].     Today Ms. Sharma is seen for follow up on cough. She reports she did not have any nebulizers yesterday and she is not sure why. She says otherwise the cough is the same today. She denies fevers and feels the shortness of breath is the same. She is working with therapy and says she often has to stop because she goes into coughing fits. She deneis issues with bowels, bladder, or other concerns over night. She is willing to try the atroven nebulizer if needed over the weekend. Discussed with nursing and saline nebs are in med cart, unsure when arrived but documented not given yesterday. Nursing will administer first dose this am. Discussed with Amparo will order atrovent prn only and have her try the hypertonic saline over the weekend scheduled. Will follow up with her next week.     REVIEW OF SYSTEMS:  PROBLEMS AND REVIEW OF SYSTEMS:    Today on ROS:   Currently, no fever, chills, or rigors. Decreased vision and hearing. Denies any chest pain, headaches, palpitations, lightheadedness, dizziness. Appetite is good. Denies any GERD symptoms. Denies any difficulty with swallowing, nausea, or vomiting. Positive for controlled pain, constipation, weakness, right shoulder incision, right shoulder sling, edema right arm, urinary incontinence, shortness of breath with exertion, congested cough and yellow/green mucous      Allergies   Allergen Reactions   ? Amantadine Other (See Comments)   ? Codeine Itching   ? Diazepam Other (See Comments)     Hallucinations/paranoid   ? Diphenhydramine Other (See Comments)     hyperactivity   ? Meloxicam Other (See Comments)   ? Meperidine Nausea Only   ? Pentazocine Other (See Comments)     drowsiness   ? Statins-Hmg-Coa Reductase Inhibitors Other (See Comments)     Was hospitalized, rhabdomyolitis  Rhabdo?  Statins   ? Talwin [Pentazocine Lactate]    ? Tramadol Nausea Only     Current Outpatient Medications   Medication Sig   ? acetaminophen (TYLENOL) 500 MG tablet Take 1,000 mg by mouth 4 (four) times a day.          ? albuterol (PROAIR HFA;PROVENTIL HFA;VENTOLIN HFA) 90 mcg/actuation inhaler Inhale 2 puffs every 4 (four) hours as needed for wheezing.   ? amoxicillin (AMOXIL) 500 MG tablet Take 500 mg by mouth see administration instructions. Take 1 hour prior to dental appointment   ? ARIPiprazole (ABILIFY) 15 MG tablet Take 15 mg by mouth every evening.   ? aspirin 325 MG tablet Take 1 tablet (325 mg total) by mouth 2 (two) times a day.   ? buPROPion (WELLBUTRIN XL) 150 MG 24 hr tablet Take 150 mg by mouth every morning.          ? carboxymethylcellulose (REFRESH PLUS) 0.5 % Dpet ophthalmic dropperette Administer 1 drop to both eyes 4 (four) times a day as needed (dry eyes).   ? dalfampridine (AMPYRA) 10 mg Tb12 Take 10 mg by mouth 2 (two) times a day.   ? dextromethorphan (ROBITUSSIN MAX STRENGTH) 15 mg/5 mL Liqd  syrup Take 30 mg by mouth 2 (two) times a day.   ? doxycycline (VIBRA-TABS) 100 MG tablet Take 1 tablet (100 mg total) by mouth 2 (two) times a day for 10 days.   ? famotidine (PEPCID) 20 MG tablet Take 1 tablet (20 mg total) by mouth 2 (two) times a day.   ? FLUoxetine (PROZAC) 20 MG capsule Take 40 mg by mouth every morning.          ? hydrocortisone 2.5 % cream Apply 1 application topically 2 (two) times a day as needed (rash/itching).   ? lamoTRIgine (LAMICTAL) 100 MG tablet Take 100 mg by mouth bedtime.   ? lamoTRIgine (LAMICTAL) 150 MG tablet Take 150 mg by mouth every morning.   ? lanolin-mineral oil (EUCERIN ORIGINAL) Lotn Apply 1 application topically daily.   ? levothyroxine 75 mcg cap Take 75 mcg by mouth every morning.   ? magnesium hydroxide (MILK OF MAG) 400 mg/5 mL Susp suspension Take 30 mL by mouth daily as needed.   ? multivit-min-FA-lycopen-lutein (CENTRUM SILVER) 0.4-300-250 mg-mcg-mcg tablet Take 1 tablet by mouth daily.   ? oxyCODONE (ROXICODONE) 5 MG immediate release tablet Take 1-2 tablets (5-10 mg total) by mouth every 4 (four) hours as needed.   ? polyethylene glycol (MIRALAX) 17 gram packet Take 17 g by mouth daily.   ? polyvinyl alcohol (LIQUIFILM TEARS) 1.4 % ophthalmic solution Administer 1 drop to both eyes as needed for dry eyes.   ? salmeterol (SEREVENT) 50 mcg/dose diskus inhaler Inhale 1 puff 2 (two) times a day.   ? sodium chloride 3 % nebulizer solution Take 3 mL by nebulization 2 (two) times a day. And q4h prn   ? umeclidinium (INCRUSE ELLIPTA) 62.5 mcg/actuation DsDv inhaler Inhale 1 puff daily.     Past Medical History:    Past Medical History:   Diagnosis Date   ? Abdominal hernia    ? 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 Anemia    ? Arthritis    ? Asthma 1/20/2017   ? Bipolar 1 disorder (H)    ? Cancer (H)    ? Cellulitis    ? Cellulitis 12/12/2016    of left elbow   ? Chronic pain syndrome    ? COPD with acute exacerbation (H) 12/21/2018   ? Depression    ? Disease of  thyroid gland    ? Frequent falls 2016   ? Frequent falls 2017   ? GERD (gastroesophageal reflux disease)    ? History of transfusion    ? Hydrocephalus    ? Hyperlipidemia    ? Hypertension    ? Hypothyroidism    ? Infection of skin due to methicillin resistant Staphylococcus aureus (MRSA)    ? Memory loss 2017   ? Normal pressure hydrocephalus 02/03/2017   ? Parkinson disease (H)    ? Renal insufficiency    ? Urinary incontinence            PHYSICAL EXAMINATION  Vitals:    03/14/19 2100   BP: 132/69   Pulse: 78   Resp: 18   Temp: 97  F (36.1  C)   SpO2: (!) 89%   Weight: 175 lb (79.4 kg)       Today on physical exam:     GENERAL: Awake, Alert, oriented x3, not in any form of acute distress, answers questions appropriately, follows simple commands, conversant  HEENT: Head is normocephalic with normal hair distribution. No evidence of trauma. Ears: No acute purulent discharge. Eyes: Conjunctivae pink with no scleral jaundice. Nose: Normal mucosa and septum. NECK: Supple with no cervical or supraclavicular lymphadenopathy. Trachea is midline.   CHEST: No tenderness or deformity, no crepitus  LUNG: dim with expiratory wheeze to auscultation with good chest expansion. Congested cough, yellow sputum  BACK: No kyphosis of the thoracic spine. Symmetric, no curvature, ROM normal, no CVA tenderness, no spinal tenderness   CVS: There is good S1  S2, regular rhythm, there are no murmurs, rubs, gallops, or heaves,  2+ pulses symmetric in all extremities.  ABDOMEN: Rounded and soft, nontender to palpation, non distended, no masses, no organomegaly, good bowel sounds, no rebound or guarding, no peritoneal signs.   EXTREMITIES: Right shoulder incision small amount of sanguinous drainage, covered with dressing, 2+ edema, mild numbness in rue, 2+ edema right arm, sling in place  SKIN: Warm and dry,   NEUROLOGICAL: The patient is oriented to person, place and time. Weakness, unsteady gait            LABS:   Recent Results (from the  past 168 hour(s))   HM1 (CBC with Diff)   Result Value Ref Range    WBC 8.2 4.0 - 11.0 thou/uL    RBC 3.19 (L) 3.80 - 5.40 mill/uL    Hemoglobin 9.1 (L) 12.0 - 16.0 g/dL    Hematocrit 29.7 (L) 35.0 - 47.0 %    MCV 93 80 - 100 fL    MCH 28.5 27.0 - 34.0 pg    MCHC 30.6 (L) 32.0 - 36.0 g/dL    RDW 15.4 (H) 11.0 - 14.5 %    Platelets 232 140 - 440 thou/uL    MPV 10.4 8.5 - 12.5 fL    Neutrophils % 69 50 - 70 %    Lymphocytes % 15 (L) 20 - 40 %    Monocytes % 11 (H) 2 - 10 %    Eosinophils % 4 0 - 6 %    Basophils % 1 0 - 2 %    Neutrophils Absolute 5.6 2.0 - 7.7 thou/uL    Lymphocytes Absolute 1.2 0.8 - 4.4 thou/uL    Monocytes Absolute 0.9 0.0 - 0.9 thou/uL    Eosinophils Absolute 0.3 0.0 - 0.4 thou/uL    Basophils Absolute 0.0 0.0 - 0.2 thou/uL   Basic Metabolic Panel   Result Value Ref Range    Sodium 141 136 - 145 mmol/L    Potassium 4.3 3.5 - 5.0 mmol/L    Chloride 106 98 - 107 mmol/L    CO2 27 22 - 31 mmol/L    Anion Gap, Calculation 8 5 - 18 mmol/L    Glucose 100 70 - 125 mg/dL    Calcium 9.7 8.5 - 10.5 mg/dL    BUN 16 8 - 28 mg/dL    Creatinine 0.64 0.60 - 1.10 mg/dL    GFR MDRD Af Amer >60 >60 mL/min/1.73m2    GFR MDRD Non Af Amer >60 >60 mL/min/1.73m2   1 (CBC with Diff)   Result Value Ref Range    WBC 7.3 4.0 - 11.0 thou/uL    RBC 3.23 (L) 3.80 - 5.40 mill/uL    Hemoglobin 9.0 (L) 12.0 - 16.0 g/dL    Hematocrit 30.9 (L) 35.0 - 47.0 %    MCV 96 80 - 100 fL    MCH 27.9 27.0 - 34.0 pg    MCHC 29.1 (L) 32.0 - 36.0 g/dL    RDW 16.0 (H) 11.0 - 14.5 %    Platelets 360 140 - 440 thou/uL    MPV 10.8 8.5 - 12.5 fL    Neutrophils % 59 50 - 70 %    Lymphocytes % 21 20 - 40 %    Monocytes % 11 (H) 2 - 10 %    Eosinophils % 8 (H) 0 - 6 %    Basophils % 1 0 - 2 %    Neutrophils Absolute 4.2 2.0 - 7.7 thou/uL    Lymphocytes Absolute 1.5 0.8 - 4.4 thou/uL    Monocytes Absolute 0.8 0.0 - 0.9 thou/uL    Eosinophils Absolute 0.6 (H) 0.0 - 0.4 thou/uL    Basophils Absolute 0.1 0.0 - 0.2 thou/uL     Results for orders placed or  performed in visit on 03/12/19   Basic Metabolic Panel   Result Value Ref Range    Sodium 141 136 - 145 mmol/L    Potassium 4.3 3.5 - 5.0 mmol/L    Chloride 106 98 - 107 mmol/L    CO2 27 22 - 31 mmol/L    Anion Gap, Calculation 8 5 - 18 mmol/L    Glucose 100 70 - 125 mg/dL    Calcium 9.7 8.5 - 10.5 mg/dL    BUN 16 8 - 28 mg/dL    Creatinine 0.64 0.60 - 1.10 mg/dL    GFR MDRD Af Amer >60 >60 mL/min/1.73m2    GFR MDRD Non Af Amer >60 >60 mL/min/1.73m2         Lab Results   Component Value Date    WBC 7.3 03/12/2019    HGB 9.0 (L) 03/12/2019    HCT 30.9 (L) 03/12/2019    MCV 96 03/12/2019     03/12/2019       Lab Results   Component Value Date    PZOZWBSY13 569 07/16/2016     Lab Results   Component Value Date    HGBA1C 5.7 07/15/2016     Lab Results   Component Value Date    INR 0.96 03/05/2019     No results found for: [id number]  Lab Results   Component Value Date    TSH 3.33 02/25/2018           ASSESSMENT/PLAN:    1. S/p reverse total shoulder arthroplasty: Incision c/d/i. 2+ edema right shoulder, arm, mild numbness present. Sling in place, NWB. F/u with ortho in 7-10 days. Pain controlled on tylenol and oxycodone. Wean off oxycodone as able. On aspirin two times a day for dvt prophylaxis. Doing well today.  2. COPD, Acute bronchitis: On doxycycline. No fevers or chills. cxr negative for pneumonia in hospital. On incruse ellipta, albuterol prn. Salmeterol added this hospital stay. Intolerant to nebs. mucinex two times a day x 7 days. IS q1h while awake. spiriva not covered by insurance. dextromethorphan cough syrup two times a day and q6h prn, add saline nebs three times a day, however not started until today. Added atrovent nebs q4h prn.  3. Normal pressure hydrocephalus, s/p  shunt: Unsteady gait, on ampyra. Follows with neurology.   4. HTN: SBP 130s. No meds. Monitor.   5. Bipolar disorder: Mood stable recently. On abilify, wellbutrin, fluoxetine, lamictal. Declines psych referral at this time.   6.  H/o colon cancer: No recent concerns. Added lactobacillus while on doxy.   7. Constipation: on scheduled miralax daily  8.  Acute blood loss anemia: hg 9.1 on 3/8. Recheck on 3/12-9.  9. GERD: on pepcid.   10. Hypothyroid: On levothyroxine.       Per therapy soft LCD 3/25 assist of 1 for toileting, mod for upper body dressing, assist of for transfers, min for bed mobility, non ambulatory. From [facility] assisted living. Wheelchair bound at baseline.     Electronically signed by: Casie Barger NP

## 2021-06-19 NOTE — LETTER
Letter by Casie Barger NP at      Author: Casie Barger NP Service: -- Author Type: --    Filed:  Encounter Date: 3/19/2019 Status: (Other)         Patient: Amparo Sharma   MR Number: [id number]   YOB: 1940   Date of Visit: 3/19/2019                 [facility]    DATE: 3/19/2019    NAME:  Amparo Sharma             :  1940  MRN: [id number]  CODE STATUS:  FULL CODE    VISIT TYPE: Problem Visit (cough)     FACILITY:  [facility] [[id number]]       CHIEF COMPLAIN/REASON FOR VISIT:    Chief Complaint   Patient presents with   ? Problem Visit     cough               HISTORY OF PRESENT ILLNESS: Amparo Sharma is a 78 y.o. female who was admitted 3/5-3/8 for right reverse total shoulder arthroplasty. Chest xray was done for increased cough but was negative for pneumonia. She was treated with ceftin and doxycycline. She was discharged to TCU for further rehab. She has PMH of colon cancer, hypothyroid, normal pressure hydrocephalus s/p  shunt, HTN, COPD, bipolar disorder, urinary frequency. Prior to this she lived at [facility].     Today Ms. Sharma is seen for follow up on cough. Yesterday staff called that her cough was uncontrolled and she was now hypoxic requiring o2. Today on exam Amparo reports the saline nebs did help some. She says she started feeling more short of breath yesterday and they had to put oxygen on her. She says her cough is not as congested or productive as before. She reports that she has not had any fevers or chills. She says the cough syrup does help some but she doesn't think she gets it often enough. She is not sure if the charis medicine helped last night or not. She is willing to try whatever she needs to in order to get better. She reports the cough is so much worse than the shoulder and surgery. Her pain is controlled and she is just taking the pain meds on occasion. Overall she feels other than the cough and breathing  problems that she is doing ok.     REVIEW OF SYSTEMS:  PROBLEMS AND REVIEW OF SYSTEMS:   Today on ROS:   Currently, no fever, chills, or rigors. Decreased vision and hearing. Denies any chest pain, headaches, palpitations, lightheadedness, dizziness. Appetite is good. Denies any GERD symptoms. Denies any difficulty with swallowing, nausea, or vomiting. Positive for controlled pain, weakness, right shoulder incision, right shoulder sling, edema right arm, urinary incontinence, shortness of breath at rest and with exertion, dry cough      Allergies   Allergen Reactions   ? Amantadine Other (See Comments)   ? Codeine Itching   ? Diazepam Other (See Comments)     Hallucinations/paranoid   ? Diphenhydramine Other (See Comments)     hyperactivity   ? Meloxicam Other (See Comments)   ? Meperidine Nausea Only   ? Pentazocine Other (See Comments)     drowsiness   ? Statins-Hmg-Coa Reductase Inhibitors Other (See Comments)     Was hospitalized, rhabdomyolitis  Rhabdo?  Statins   ? Talwin [Pentazocine Lactate]    ? Tramadol Nausea Only     Current Outpatient Medications   Medication Sig   ? benzonatate (TESSALON) 100 MG capsule Take 100 mg by mouth 2 (two) times a day.   ? ipratropium (ATROVENT) 0.02 % nebulizer solution Take 500 mcg by nebulization 3 (three) times a day.   ? predniSONE (DELTASONE) 10 mg tablet Take 10 mg by mouth daily 40mg x 2, 30mg x 2, 20mg x2, 10mg x2 .   ? acetaminophen (TYLENOL) 500 MG tablet Take 1,000 mg by mouth 4 (four) times a day.          ? albuterol (PROAIR HFA;PROVENTIL HFA;VENTOLIN HFA) 90 mcg/actuation inhaler Inhale 2 puffs every 4 (four) hours as needed for wheezing.   ? amoxicillin (AMOXIL) 500 MG tablet Take 500 mg by mouth see administration instructions. Take 1 hour prior to dental appointment   ? ARIPiprazole (ABILIFY) 15 MG tablet Take 15 mg by mouth every evening.   ? aspirin 325 MG tablet Take 1 tablet (325 mg total) by mouth 2 (two) times a day.   ? buPROPion (WELLBUTRIN XL) 150 MG  24 hr tablet Take 150 mg by mouth every morning.          ? carboxymethylcellulose (REFRESH PLUS) 0.5 % Dpet ophthalmic dropperette Administer 1 drop to both eyes 4 (four) times a day as needed (dry eyes).   ? dalfampridine (AMPYRA) 10 mg Tb12 Take 10 mg by mouth 2 (two) times a day.   ? dextromethorphan (ROBITUSSIN MAX STRENGTH) 15 mg/5 mL Liqd syrup Take 30 mg by mouth 4 (four) times a day.          ? famotidine (PEPCID) 20 MG tablet Take 1 tablet (20 mg total) by mouth 2 (two) times a day.   ? FLUoxetine (PROZAC) 20 MG capsule Take 40 mg by mouth every morning.          ? hydrocortisone 2.5 % cream Apply 1 application topically 2 (two) times a day as needed (rash/itching).   ? lamoTRIgine (LAMICTAL) 100 MG tablet Take 100 mg by mouth bedtime.   ? lamoTRIgine (LAMICTAL) 150 MG tablet Take 150 mg by mouth every morning.   ? lanolin-mineral oil (EUCERIN ORIGINAL) Lotn Apply 1 application topically daily.   ? levothyroxine 75 mcg cap Take 75 mcg by mouth every morning.   ? magnesium hydroxide (MILK OF MAG) 400 mg/5 mL Susp suspension Take 30 mL by mouth daily as needed.   ? multivit-min-FA-lycopen-lutein (CENTRUM SILVER) 0.4-300-250 mg-mcg-mcg tablet Take 1 tablet by mouth daily.   ? oxyCODONE (ROXICODONE) 5 MG immediate release tablet Take 1-2 tablets (5-10 mg total) by mouth every 4 (four) hours as needed.   ? polyethylene glycol (MIRALAX) 17 gram packet Take 17 g by mouth daily.   ? polyvinyl alcohol (LIQUIFILM TEARS) 1.4 % ophthalmic solution Administer 1 drop to both eyes as needed for dry eyes.   ? salmeterol (SEREVENT) 50 mcg/dose diskus inhaler Inhale 1 puff 2 (two) times a day.   ? umeclidinium (INCRUSE ELLIPTA) 62.5 mcg/actuation DsDv inhaler Inhale 1 puff daily.     Past Medical History:    Past Medical History:   Diagnosis Date   ? Abdominal hernia    ? 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 Anemia    ? Arthritis    ? Asthma 1/20/2017   ? Bipolar 1 disorder (H)    ? Cancer (H)    ? Cellulitis    ?  Cellulitis 12/12/2016    of left elbow   ? Chronic pain syndrome    ? COPD with acute exacerbation (H) 12/21/2018   ? Depression    ? Disease of thyroid gland    ? Frequent falls 2016   ? Frequent falls 2017   ? GERD (gastroesophageal reflux disease)    ? History of transfusion    ? Hydrocephalus    ? Hyperlipidemia    ? Hypertension    ? Hypothyroidism    ? Infection of skin due to methicillin resistant Staphylococcus aureus (MRSA)    ? Memory loss 2017   ? Normal pressure hydrocephalus 02/03/2017   ? Parkinson disease (H)    ? Renal insufficiency    ? Urinary incontinence            PHYSICAL EXAMINATION  Vitals:    03/18/19 1640   BP: 137/76   Pulse: 73   Resp: 24   Temp: 97.7  F (36.5  C)   SpO2: 92%   Weight: 177 lb (80.3 kg)       Today on physical exam:     GENERAL: Awake, Alert, oriented x3, not in any form of acute distress, answers questions appropriately, follows simple commands, conversant  HEENT: Head is normocephalic with normal hair distribution. No evidence of trauma. Ears: No acute purulent discharge. Eyes: Conjunctivae pink with no scleral jaundice. Nose: Normal mucosa and septum. NECK: Supple with no cervical or supraclavicular lymphadenopathy. Trachea is midline.   CHEST: No tenderness or deformity, no crepitus  LUNG: dim to auscultation with good chest expansion. Dry cough  BACK: No kyphosis of the thoracic spine. Symmetric, no curvature, ROM normal, no CVA tenderness, no spinal tenderness   CVS: There is good S1  S2, regular rhythm, there are no murmurs, rubs, gallops, or heaves,  2+ pulses symmetric in all extremities.  ABDOMEN: Rounded and soft, nontender to palpation, non distended, no masses, no organomegaly, good bowel sounds, no rebound or guarding, no peritoneal signs.   EXTREMITIES: Right shoulder incision small amount of sanguinous drainage, covered with dressing, 1+ edema, mild numbness in rue, 1+ edema right arm, sling in place  SKIN: Warm and dry,   NEUROLOGICAL: The patient is  oriented to person, place and time. Weakness, unsteady gait            LABS:   No results found for this or any previous visit (from the past 168 hour(s)).  Results for orders placed or performed in visit on 03/12/19   Basic Metabolic Panel   Result Value Ref Range    Sodium 141 136 - 145 mmol/L    Potassium 4.3 3.5 - 5.0 mmol/L    Chloride 106 98 - 107 mmol/L    CO2 27 22 - 31 mmol/L    Anion Gap, Calculation 8 5 - 18 mmol/L    Glucose 100 70 - 125 mg/dL    Calcium 9.7 8.5 - 10.5 mg/dL    BUN 16 8 - 28 mg/dL    Creatinine 0.64 0.60 - 1.10 mg/dL    GFR MDRD Af Amer >60 >60 mL/min/1.73m2    GFR MDRD Non Af Amer >60 >60 mL/min/1.73m2         Lab Results   Component Value Date    WBC 7.3 03/12/2019    HGB 9.0 (L) 03/12/2019    HCT 30.9 (L) 03/12/2019    MCV 96 03/12/2019     03/12/2019       Lab Results   Component Value Date    [id number] 569 07/16/2016     Lab Results   Component Value Date    HGBA1C 5.7 07/15/2016     Lab Results   Component Value Date    INR 0.96 03/05/2019     No results found for: [id number]  Lab Results   Component Value Date    TSH 3.33 02/25/2018           ASSESSMENT/PLAN:    1. S/p reverse total shoulder arthroplasty: Incision c/d/i. 1+ edema right shoulder, arm, mild numbness present. Sling in place, NWB. F/u with ortho in 7-10 days. Pain controlled on tylenol and oxycodone prn. On aspirin two times a day for dvt prophylaxis. Healing well.   2. COPD, Acute bronchitis: completed cefdinir, doxycycline. No fevers or chills. cxr negative for pneumonia in hospital. On incruse ellipta, albuterol prn. Salmeterol added this hospital stay. Albuterol intolerance. Completed mucinex but will order another 7 day course. IS q1h while awake. spiriva not covered by insurance. dextromethorphan cough syrup will change to four times a day scheduled. Added tessalon pearls, change atrovent nebs to three times a day and prn, saline nebs prn only now as congestion improved. Will add prednisone taper due  to shortness of breath and hypoxia now. On 2LNC today, titrate to off for sat >=88%. Add zithromycin x 5 days as well.   3. Normal pressure hydrocephalus, s/p  shunt: Unsteady gait, on ampyra. Follows with neurology.   4. HTN: SBP 130s. No meds. Monitor.   5. Bipolar disorder: Mood stable recently. On abilify, wellbutrin, fluoxetine, lamictal.   6. H/o colon cancer: No recent concerns.   7. Constipation: on scheduled miralax daily. Improved.   8.  Acute blood loss anemia: hg 9.1 on 3/8. Recheck on 3/12-9.  9. GERD: on pepcid.   10. Hypothyroid: On levothyroxine.       Per therapy firm LCD 3/25 assist of 1 for toileting, mod for upper body dressing, assist of 1 for transfers, min for bed mobility, non ambulatory. From [facility] assisted living. Wheelchair bound at baseline. Recommending  pt, ot, hha.     Electronically signed by: Casie Barger NP      Total floor/unit time 35 min with 25 min spent on counseling and coordination of care. Counseling regarding lab results, current symptoms, worsening cough and oxygen requirements. Discussed causes and potential treatments. Discussed med changes in detail as well as need for ongoing nebulizers and starting antibiotic and steroids. Discussed need for ongoing monitoring and med changes if not improving. Discussed adaptations to therapy with hypoxia. Educated on importance of med adherence and wearing oxygen. Coordinated care with therapy, nursing regarding bronchitis, worsening hypoxia, cough, need for frequent cardiopulmonary assessments.

## 2021-06-19 NOTE — LETTER
Letter by Casie Barger NP at      Author: Casie Barger NP Service: -- Author Type: --    Filed:  Encounter Date: 3/21/2019 Status: (Other)         Patient: Amparo Sharma   MR Number: [id number]   YOB: 1940   Date of Visit: 3/21/2019                 [facility]    DATE: 3/21/2019    NAME:  Amparo Sharma             :  1940  MRN: [id number]  CODE STATUS:  FULL CODE    VISIT TYPE: Review Of Multiple Medical Conditions     FACILITY:  [facility] [[id number]]       CHIEF COMPLAIN/REASON FOR VISIT:    Chief Complaint   Patient presents with   ? Review Of Multiple Medical Conditions               HISTORY OF PRESENT ILLNESS: Amparo Sharma is a 78 y.o. female who was admitted 3/5-3/8 for right reverse total shoulder arthroplasty. Chest xray was done for increased cough but was negative for pneumonia. She was treated with ceftin and doxycycline. She was discharged to TCU for further rehab. She has PMH of colon cancer, hypothyroid, normal pressure hydrocephalus s/p  shunt, HTN, COPD, bipolar disorder, urinary frequency. Prior to this she lived at [facility].     Today Ms. Sharma is seen for follow up on cough. She reports that her cough is finally improving. She coughed up a lot of mucous yesterday and now feels so much better today. She has not had to wear oxygen for the last two days. She feels her shortness of breath is better as well. Since starting the z pack the color of her mucous has changed from green to white. She says she feels her pain is doing much better also and not taking the oxycodone as much. She did take one this morning but otherwise has been doing well. She has no other concerns and is planning to discharge early next week.     REVIEW OF SYSTEMS:  PROBLEMS AND REVIEW OF SYSTEMS:   Today on ROS:   Currently, no fever, chills, or rigors. Decreased vision and hearing. Denies any chest pain, headaches, palpitations,  lightheadedness, dizziness. Appetite is good. Denies any GERD symptoms. Denies any difficulty with swallowing, nausea, or vomiting. Positive for controlled pain, weakness, right shoulder incision, right shoulder sling, edema right arm, urinary incontinence, shortness of breath with exertion, dry cough improved      Allergies   Allergen Reactions   ? Amantadine Other (See Comments)   ? Codeine Itching   ? Diazepam Other (See Comments)     Hallucinations/paranoid   ? Diphenhydramine Other (See Comments)     hyperactivity   ? Meloxicam Other (See Comments)   ? Meperidine Nausea Only   ? Pentazocine Other (See Comments)     drowsiness   ? Statins-Hmg-Coa Reductase Inhibitors Other (See Comments)     Was hospitalized, rhabdomyolitis  Rhabdo?  Statins   ? Talwin [Pentazocine Lactate]    ? Tramadol Nausea Only     Current Outpatient Medications   Medication Sig   ? acetaminophen (TYLENOL) 500 MG tablet Take 1,000 mg by mouth 4 (four) times a day.          ? albuterol (PROAIR HFA;PROVENTIL HFA;VENTOLIN HFA) 90 mcg/actuation inhaler Inhale 2 puffs every 4 (four) hours as needed for wheezing.   ? amoxicillin (AMOXIL) 500 MG tablet Take 500 mg by mouth see administration instructions. Take 1 hour prior to dental appointment   ? ARIPiprazole (ABILIFY) 15 MG tablet Take 15 mg by mouth every evening.   ? aspirin 325 MG tablet Take 1 tablet (325 mg total) by mouth 2 (two) times a day.   ? buPROPion (WELLBUTRIN XL) 150 MG 24 hr tablet Take 150 mg by mouth every morning.          ? carboxymethylcellulose (REFRESH PLUS) 0.5 % Dpet ophthalmic dropperette Administer 1 drop to both eyes 4 (four) times a day as needed (dry eyes).   ? dalfampridine (AMPYRA) 10 mg Tb12 Take 10 mg by mouth 2 (two) times a day.   ? dextromethorphan (ROBITUSSIN MAX STRENGTH) 15 mg/5 mL Liqd syrup Take 30 mg by mouth 4 (four) times a day.          ? famotidine (PEPCID) 20 MG tablet Take 1 tablet (20 mg total) by mouth 2 (two) times a day.   ? FLUoxetine  (PROZAC) 20 MG capsule Take 40 mg by mouth every morning.          ? hydrocortisone 2.5 % cream Apply 1 application topically 2 (two) times a day as needed (rash/itching).   ? ipratropium (ATROVENT) 0.02 % nebulizer solution Take 500 mcg by nebulization 3 (three) times a day.   ? lamoTRIgine (LAMICTAL) 100 MG tablet Take 100 mg by mouth bedtime.   ? lamoTRIgine (LAMICTAL) 150 MG tablet Take 150 mg by mouth every morning.   ? lanolin-mineral oil (EUCERIN ORIGINAL) Lotn Apply 1 application topically daily.   ? levothyroxine 75 mcg cap Take 75 mcg by mouth every morning.   ? magnesium hydroxide (MILK OF MAG) 400 mg/5 mL Susp suspension Take 30 mL by mouth daily as needed.   ? multivit-min-FA-lycopen-lutein (CENTRUM SILVER) 0.4-300-250 mg-mcg-mcg tablet Take 1 tablet by mouth daily.   ? oxyCODONE (ROXICODONE) 5 MG immediate release tablet Take 1-2 tablets (5-10 mg total) by mouth every 4 (four) hours as needed. (Patient taking differently: Take 5 mg by mouth every 6 (six) hours as needed.       )   ? polyethylene glycol (MIRALAX) 17 gram packet Take 17 g by mouth daily.   ? polyvinyl alcohol (LIQUIFILM TEARS) 1.4 % ophthalmic solution Administer 1 drop to both eyes as needed for dry eyes.   ? predniSONE (DELTASONE) 10 mg tablet Take 10 mg by mouth daily 40mg x 2, 30mg x 2, 20mg x2, 10mg x2 .   ? salmeterol (SEREVENT) 50 mcg/dose diskus inhaler Inhale 1 puff 2 (two) times a day.   ? umeclidinium (INCRUSE ELLIPTA) 62.5 mcg/actuation DsDv inhaler Inhale 1 puff daily.     Past Medical History:    Past Medical History:   Diagnosis Date   ? Abdominal hernia    ? 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 Anemia    ? Arthritis    ? Asthma 1/20/2017   ? Bipolar 1 disorder (H)    ? Cancer (H)    ? Cellulitis    ? Cellulitis 12/12/2016    of left elbow   ? Chronic pain syndrome    ? COPD with acute exacerbation (H) 12/21/2018   ? Depression    ? Disease of thyroid gland    ? Frequent falls 2016   ? Frequent falls 2017   ? GERD  (gastroesophageal reflux disease)    ? History of transfusion    ? Hydrocephalus    ? Hyperlipidemia    ? Hypertension    ? Hypothyroidism    ? Infection of skin due to methicillin resistant Staphylococcus aureus (MRSA)    ? Memory loss 2017   ? Normal pressure hydrocephalus 02/03/2017   ? Parkinson disease (H)    ? Renal insufficiency    ? Urinary incontinence            PHYSICAL EXAMINATION  Vitals:    03/20/19 2028   BP: 148/77   Pulse: 82   Resp: 18   Temp: 99.1  F (37.3  C)   SpO2: 91%   Weight: 166 lb (75.3 kg)       Today on physical exam:     GENERAL: Awake, Alert, oriented x3, not in any form of acute distress, answers questions appropriately, follows simple commands, conversant  HEENT: Head is normocephalic with normal hair distribution. No evidence of trauma. Ears: No acute purulent discharge. Eyes: Conjunctivae pink with no scleral jaundice. Nose: Normal mucosa and septum. NECK: Supple with no cervical or supraclavicular lymphadenopathy. Trachea is midline.   CHEST: No tenderness or deformity, no crepitus  LUNG: dim to auscultation with good chest expansion. Dry cough  BACK: No kyphosis of the thoracic spine. Symmetric, no curvature, ROM normal, no CVA tenderness, no spinal tenderness   CVS: There is good S1  S2, regular rhythm, there are no murmurs, rubs, gallops, or heaves,  2+ pulses symmetric in all extremities.  ABDOMEN: Rounded and soft, nontender to palpation, non distended, no masses, no organomegaly, good bowel sounds, no rebound or guarding, no peritoneal signs.   EXTREMITIES: Right shoulder incision small amount of sanguinous drainage, covered with dressing, 1+ edema, mild numbness in rue, 1+ edema right arm, sling in place  SKIN: Warm and dry,   NEUROLOGICAL: The patient is oriented to person, place and time. Wheelchair bound            LABS:   Recent Results (from the past 168 hour(s))   Hemoglobin   Result Value Ref Range    Hemoglobin 10.2 (L) 12.0 - 16.0 g/dL     Results for orders placed  or performed in visit on 03/12/19   Basic Metabolic Panel   Result Value Ref Range    Sodium 141 136 - 145 mmol/L    Potassium 4.3 3.5 - 5.0 mmol/L    Chloride 106 98 - 107 mmol/L    CO2 27 22 - 31 mmol/L    Anion Gap, Calculation 8 5 - 18 mmol/L    Glucose 100 70 - 125 mg/dL    Calcium 9.7 8.5 - 10.5 mg/dL    BUN 16 8 - 28 mg/dL    Creatinine 0.64 0.60 - 1.10 mg/dL    GFR MDRD Af Amer >60 >60 mL/min/1.73m2    GFR MDRD Non Af Amer >60 >60 mL/min/1.73m2         Lab Results   Component Value Date    WBC 7.3 03/12/2019    HGB 10.2 (L) 03/19/2019    HCT 30.9 (L) 03/12/2019    MCV 96 03/12/2019     03/12/2019       Lab Results   Component Value Date    RTKENDKL92 569 07/16/2016     Lab Results   Component Value Date    HGBA1C 5.7 07/15/2016     Lab Results   Component Value Date    INR 0.96 03/05/2019     No results found for: [id number]  Lab Results   Component Value Date    TSH 3.33 02/25/2018           ASSESSMENT/PLAN:    1. S/p reverse total shoulder arthroplasty: Incision c/d/i. 1+ edema right shoulder, arm, mild numbness present. Sling in place, NWB. F/u with ortho in 7-10 days. Pain controlled on tylenol and oxycodone prn. On aspirin two times a day for dvt prophylaxis. Using oxycodone about 1-2 times per day will change order to 5mg q6h prn pain.   2. COPD, Acute bronchitis: completed cefdinir, doxycycline. No fevers or chills. cxr negative for pneumonia in hospital. On incruse ellipta, albuterol prn. Salmeterol added this hospital stay. Albuterol intolerance. Completed mucinex. IS q1h while awake. spiriva not covered by insurance. dextromethorphan cough syrup will change to four times a day scheduled, tessalon pearls, change atrovent nebs to three times a day and prn, saline nebs prn only now as congestion improved, prednisone taper, zithromax. Now much improved an off o2. DC tessalon pearls, dc saline nebs. Declines other changes but will plan to stop cough syrup and change nebs to prn by discharge.    3. Normal pressure hydrocephalus, s/p  shunt: Unsteady gait, on ampyra. Follows with neurology.   4. HTN: SBP 140s. No meds. Monitor.   5. Bipolar disorder: Mood stable recently. On abilify, wellbutrin, fluoxetine, lamictal.   6. H/o colon cancer: No recent concerns.   7. Constipation: on scheduled miralax daily. Improved.   8.  Acute blood loss anemia: hg 9.1 on 3/8. Recheck on 3/12-9.  9. GERD: on pepcid.   10. Hypothyroid: On levothyroxine.       Per therapy firm LCD 3/25 cga for toileting, mod for upper body dressing, cga for transfers, min for bed mobility, non ambulatory. From [facility] assisted living. Wheelchair bound at baseline. Recommending hh pt, ot, hha.     Electronically signed by: Casie Barger NP

## 2021-06-19 NOTE — LETTER
Letter by Casie Barger NP at      Author: Casie Barger NP Service: -- Author Type: --    Filed:  Encounter Date: 3/22/2019 Status: (Other)         Patient: Amparo Sharma   MR Number: [id number]   YOB: 1940   Date of Visit: 3/22/2019                 [facility]    DATE: 3/22/2019    NAME:  Amparo Sharma             :  1940  MRN: [id number]  CODE STATUS:  FULL CODE    VISIT TYPE: Problem Visit (questions about care)     FACILITY:  [facility] [[id number]]       CHIEF COMPLAIN/REASON FOR VISIT:    Chief Complaint   Patient presents with   ? Problem Visit     questions about care               HISTORY OF PRESENT ILLNESS: Amparo Sharma is a 78 y.o. female who was admitted 3/5-3/8 for right reverse total shoulder arthroplasty. Chest xray was done for increased cough but was negative for pneumonia. She was treated with ceftin and doxycycline. She was discharged to TCU for further rehab. She has PMH of colon cancer, hypothyroid, normal pressure hydrocephalus s/p  shunt, HTN, COPD, bipolar disorder, urinary frequency. Prior to this she lived at [facility].     Today Ms. Sharma is seen per staff relay of concerns regarding medical care during TCU stay. Her daughter was upset in care conference yesterday stating her mother's cough got worse before it got better and wanting to switch to [facility] provider in house because of this. Staff report Amparo did not express concerns with her care but her daughter did and was wanting her to switch providers. However, staff report she is planning to discharge on Tuesday. On exam Amparo is seen in her wheelchair in her room. She reports that she did feel her cough was severe and did not get better right away. She says it took a while before it got better and she felt it only started getting better after she requested the Z pack. She is questioning why Dr. Chavez's name was on her chart and ID band but never saw  "her while in facility. Explained level of care NP provides and laws regarding NP v MD care. Discussed her symptoms and reviewed orders from frequent visits for cough and the many med changes that occurred. Discussed she was previously on 2 antibiotics and was not improving which was why was started on cough syrup, Z pack, Atrovent nebs, Hypertonic saline nebs, tessalon pearls, prednisone taper. Discussed the many med changes that occurred during her stay in managing her COPD exacerbation. After reviewing NP role and med changes she reported feeling comfortable with her care and that she was misunderstanding believing NP was a \"Nurse\" and not really able to give orders. She says her biggest concern really was that she fell and staff was not responsive to helping her as quickly as she feels they should have. She agrees that it would be poor continuity of care to change providers at this time with discharge only being a few days away. She reports her daughter is upset that she is leaving on Tuesday and feels she should be staying longer. She is not sure if her daughter will want to file an appeal. Updated  after conversation and reviewed that Amparo is able to make decisions and she has chosen to remain with [facility] providers. She admits she is feeling much better today than she was a week ago.     REVIEW OF SYSTEMS:  PROBLEMS AND REVIEW OF SYSTEMS:   Today on ROS:   Currently, no fever, chills, or rigors. Decreased vision and hearing. Denies any chest pain, headaches, palpitations, lightheadedness, dizziness. Appetite is good. Denies any GERD symptoms. Denies any difficulty with swallowing, nausea, or vomiting. Positive for controlled pain, weakness, right shoulder incision, right shoulder sling, edema right arm, urinary incontinence, shortness of breath with exertion, dry cough improved      Allergies   Allergen Reactions   ? Amantadine Other (See Comments)   ? Codeine " Itching   ? Diazepam Other (See Comments)     Hallucinations/paranoid   ? Diphenhydramine Other (See Comments)     hyperactivity   ? Meloxicam Other (See Comments)   ? Meperidine Nausea Only   ? Pentazocine Other (See Comments)     drowsiness   ? Statins-Hmg-Coa Reductase Inhibitors Other (See Comments)     Was hospitalized, rhabdomyolitis  Rhabdo?  Statins   ? Talwin [Pentazocine Lactate]    ? Tramadol Nausea Only     Current Outpatient Medications   Medication Sig   ? acetaminophen (TYLENOL) 500 MG tablet Take 1,000 mg by mouth 4 (four) times a day.          ? albuterol (PROAIR HFA;PROVENTIL HFA;VENTOLIN HFA) 90 mcg/actuation inhaler Inhale 2 puffs every 4 (four) hours as needed for wheezing.   ? amoxicillin (AMOXIL) 500 MG tablet Take 500 mg by mouth see administration instructions. Take 1 hour prior to dental appointment   ? ARIPiprazole (ABILIFY) 15 MG tablet Take 15 mg by mouth every evening.   ? aspirin 325 MG tablet Take 1 tablet (325 mg total) by mouth 2 (two) times a day.   ? buPROPion (WELLBUTRIN XL) 150 MG 24 hr tablet Take 150 mg by mouth every morning.          ? carboxymethylcellulose (REFRESH PLUS) 0.5 % Dpet ophthalmic dropperette Administer 1 drop to both eyes 4 (four) times a day as needed (dry eyes).   ? dalfampridine (AMPYRA) 10 mg Tb12 Take 10 mg by mouth 2 (two) times a day.   ? dextromethorphan (ROBITUSSIN MAX STRENGTH) 15 mg/5 mL Liqd syrup Take 30 mg by mouth 4 (four) times a day.          ? famotidine (PEPCID) 20 MG tablet Take 1 tablet (20 mg total) by mouth 2 (two) times a day.   ? FLUoxetine (PROZAC) 20 MG capsule Take 40 mg by mouth every morning.          ? hydrocortisone 2.5 % cream Apply 1 application topically 2 (two) times a day as needed (rash/itching).   ? ipratropium (ATROVENT) 0.02 % nebulizer solution Take 500 mcg by nebulization 3 (three) times a day.   ? lamoTRIgine (LAMICTAL) 100 MG tablet Take 100 mg by mouth bedtime.   ? lamoTRIgine (LAMICTAL) 150 MG tablet Take 150 mg  by mouth every morning.   ? lanolin-mineral oil (EUCERIN ORIGINAL) Lotn Apply 1 application topically daily.   ? levothyroxine 75 mcg cap Take 75 mcg by mouth every morning.   ? magnesium hydroxide (MILK OF MAG) 400 mg/5 mL Susp suspension Take 30 mL by mouth daily as needed.   ? multivit-min-FA-lycopen-lutein (CENTRUM SILVER) 0.4-300-250 mg-mcg-mcg tablet Take 1 tablet by mouth daily.   ? oxyCODONE (ROXICODONE) 5 MG immediate release tablet Take 1-2 tablets (5-10 mg total) by mouth every 4 (four) hours as needed. (Patient taking differently: Take 5 mg by mouth every 6 (six) hours as needed.       )   ? polyethylene glycol (MIRALAX) 17 gram packet Take 17 g by mouth daily.   ? polyvinyl alcohol (LIQUIFILM TEARS) 1.4 % ophthalmic solution Administer 1 drop to both eyes as needed for dry eyes.   ? predniSONE (DELTASONE) 10 mg tablet Take 10 mg by mouth daily 40mg x 2, 30mg x 2, 20mg x2, 10mg x2 .   ? salmeterol (SEREVENT) 50 mcg/dose diskus inhaler Inhale 1 puff 2 (two) times a day.   ? umeclidinium (INCRUSE ELLIPTA) 62.5 mcg/actuation DsDv inhaler Inhale 1 puff daily.     Past Medical History:    Past Medical History:   Diagnosis Date   ? Abdominal hernia    ? 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 Anemia    ? Arthritis    ? Asthma 1/20/2017   ? Bipolar 1 disorder (H)    ? Cancer (H)    ? Cellulitis    ? Cellulitis 12/12/2016    of left elbow   ? Chronic pain syndrome    ? COPD with acute exacerbation (H) 12/21/2018   ? Depression    ? Disease of thyroid gland    ? Frequent falls 2016   ? Frequent falls 2017   ? GERD (gastroesophageal reflux disease)    ? History of transfusion    ? Hydrocephalus    ? Hyperlipidemia    ? Hypertension    ? Hypothyroidism    ? Infection of skin due to methicillin resistant Staphylococcus aureus (MRSA)    ? Memory loss 2017   ? Normal pressure hydrocephalus 02/03/2017   ? Parkinson disease (H)    ? Renal insufficiency    ? Urinary incontinence            PHYSICAL EXAMINATION  Vitals:     03/22/19 0700   BP: 157/78   Pulse: 87   Resp: 18   Temp: 97  F (36.1  C)   SpO2: 96%   Weight: 172 lb (78 kg)       Today on physical exam:     GENERAL: Awake, Alert, oriented x3, not in any form of acute distress, answers questions appropriately, follows simple commands, conversant  HEENT: Head is normocephalic with normal hair distribution. No evidence of trauma. Ears: No acute purulent discharge. Eyes: Conjunctivae pink with no scleral jaundice. Nose: Normal mucosa and septum. NECK: Supple with no cervical or supraclavicular lymphadenopathy. Trachea is midline.   CHEST: No tenderness or deformity, no crepitus  LUNG: dim to auscultation with good chest expansion. Dry cough improving  BACK: No kyphosis of the thoracic spine. Symmetric, no curvature, ROM normal, no CVA tenderness, no spinal tenderness   CVS: There is good S1  S2, regular rhythm, there are no murmurs, rubs, gallops, or heaves,  2+ pulses symmetric in all extremities.  ABDOMEN: Rounded and soft, nontender to palpation, non distended, no masses, no organomegaly, good bowel sounds, no rebound or guarding, no peritoneal signs.   EXTREMITIES: Right shoulder incision small amount of sanguinous drainage, covered with dressing, 1+ edema, mild numbness in rue, 1+ edema right arm, sling in place  SKIN: Warm and dry,   NEUROLOGICAL: The patient is oriented to person, place and time. Wheelchair bound            LABS:   Recent Results (from the past 168 hour(s))   Hemoglobin   Result Value Ref Range    Hemoglobin 10.2 (L) 12.0 - 16.0 g/dL     Results for orders placed or performed in visit on 03/12/19   Basic Metabolic Panel   Result Value Ref Range    Sodium 141 136 - 145 mmol/L    Potassium 4.3 3.5 - 5.0 mmol/L    Chloride 106 98 - 107 mmol/L    CO2 27 22 - 31 mmol/L    Anion Gap, Calculation 8 5 - 18 mmol/L    Glucose 100 70 - 125 mg/dL    Calcium 9.7 8.5 - 10.5 mg/dL    BUN 16 8 - 28 mg/dL    Creatinine 0.64 0.60 - 1.10 mg/dL    GFR MDRD Af Amer >60 >60  mL/min/1.73m2    GFR MDRD Non Af Amer >60 >60 mL/min/1.73m2         Lab Results   Component Value Date    WBC 7.3 03/12/2019    HGB 10.2 (L) 03/19/2019    HCT 30.9 (L) 03/12/2019    MCV 96 03/12/2019     03/12/2019       Lab Results   Component Value Date    KJCZKZPL64 569 07/16/2016     Lab Results   Component Value Date    HGBA1C 5.7 07/15/2016     Lab Results   Component Value Date    INR 0.96 03/05/2019     No results found for: [id number]  Lab Results   Component Value Date    TSH 3.33 02/25/2018           ASSESSMENT/PLAN:    1. S/p reverse total shoulder arthroplasty: Incision c/d/i. 1+ edema right shoulder, arm, mild numbness present. Sling in place, NWB. F/u with ortho in 7-10 days. Pain controlled on tylenol and oxycodone 5mg q6h prn. On aspirin two times a day for dvt prophylaxis.   2. COPD, Acute bronchitis: completed cefdinir, doxycycline. No fevers or chills. cxr negative for pneumonia in hospital. On incruse ellipta, albuterol prn. Salmeterol added this hospital stay. Albuterol intolerance, reluctant to try other nebulizers. Completed mucinex. IS q1h while awake. spiriva not covered by insurance. dextromethorphan cough syrup four times a day scheduled, atrovent nebs three times a day and prn, saline nebs prn only now as congestion improved, prednisone taper, zithromax. Now much improved and off o2. DC tessalon pearls, dc saline nebs, completed mucinex. Overall much improved, continue other orders for now. Will review again prior to discharge and f/u with pcp if not improving at home.   3. Normal pressure hydrocephalus, s/p  shunt: Unsteady gait, on ampyra. Follows with neurology.   4. HTN: SBP 150s. No meds. Monitor.   5. Bipolar disorder: Mood stable recently. On abilify, wellbutrin, fluoxetine, lamictal.   6. H/o colon cancer: No recent concerns.   7. Constipation: on scheduled miralax daily. Improved.   8.  Acute blood loss anemia: hg 9.1 on 3/8. Recheck on 3/12-9.  9. GERD: on pepcid.    10. Hypothyroid: On levothyroxine.       Per therapy firm LCD 3/25 cga for toileting, mod for upper body dressing, cga for transfers, min for bed mobility, non ambulatory. From [facility] assisted living. Wheelchair bound at baseline. Recommending hh pt, ot, hha.     Electronically signed by: Casie Barger NP

## 2021-06-19 NOTE — LETTER
Letter by Casie Barger NP at      Author: Casie Barger NP Service: -- Author Type: --    Filed:  Encounter Date: 3/14/2019 Status: (Other)         Patient: Amparo Sharma   MR Number: [id number]   YOB: 1940   Date of Visit: 3/14/2019                 [facility]    DATE: 3/14/2019    NAME:  Amparo Sharma             :  1940  MRN: [id number]  CODE STATUS:  FULL CODE    VISIT TYPE: Review Of Multiple Medical Conditions     FACILITY:  [facility] [[id number]]       CHIEF COMPLAIN/REASON FOR VISIT:    Chief Complaint   Patient presents with   ? Review Of Multiple Medical Conditions               HISTORY OF PRESENT ILLNESS: Amparo Sharma is a 78 y.o. female who was admitted 3/5-3/8 for right reverse total shoulder arthroplasty. Chest xray was done for increased cough but was negative for pneumonia. She was treated with ceftin and doxycycline. She was discharged to TCU for further rehab. She has PMH of colon cancer, hypothyroid, normal pressure hydrocephalus s/p  shunt, HTN, COPD, bipolar disorder, urinary frequency. Prior to this she lived at [facility].     Today Ms. Sharma is seen for follow up on cough and pain. She reports that her cough seems to be getting worse. It seems to be deeper than before and still bringing up lots of yellow mucus. She says the mucinex is loosening things up but still having trouble getting it up. She says she has not had any fevers or chills. She reports that she is having worse shortness of breath especially with walking. She reports that she is having dizziness and having headaches at times. She denies runny nose and bowels are moving fine. She says they had o2 in her room the other day but then they took it out. She does not recall them checking her o2 sats with walking or anything. She says she has used the cough syrup but doesn't think it really helps. She is not sure what else to do for her cough since she  could not tolerate the nebs in the hospital. She thinks it was albuterol nebs and they were causing shakiness, tremors, heart palpitations and sweating. She would be willing to try a different nebulizer. She says she knows that spiriva the inhaler is not covered by her insurance. She says she does not feel the antibiotics are helping her cough much. She does says he would prefer the cough syrup be scheduled twice a day instead of just as needed. She would like to try the saline nebs scheduled today and then re-evaluate tomorrow.     REVIEW OF SYSTEMS:  PROBLEMS AND REVIEW OF SYSTEMS:   Today on ROS:   Currently, no fever, chills, or rigors. Decreased vision and hearing. Denies any chest pain, headaches, palpitations, lightheadedness, dizziness. Appetite is good. Denies any GERD symptoms. Denies any difficulty with swallowing, nausea, or vomiting. Positive for controlled pain, constipation, weakness, right shoulder incision, right shoulder sling, edema right arm, urinary incontinence, shortness of breath with exertion, congested cough and yellow/green mucous      Allergies   Allergen Reactions   ? Amantadine Other (See Comments)   ? Codeine Itching   ? Diazepam Other (See Comments)     Hallucinations/paranoid   ? Diphenhydramine Other (See Comments)     hyperactivity   ? Meloxicam Other (See Comments)   ? Meperidine Nausea Only   ? Pentazocine Other (See Comments)     drowsiness   ? Statins-Hmg-Coa Reductase Inhibitors Other (See Comments)     Was hospitalized, rhabdomyolitis  Rhabdo?  Statins   ? Talwin [Pentazocine Lactate]    ? Tramadol Nausea Only     Current Outpatient Medications   Medication Sig   ? dextromethorphan (ROBITUSSIN MAX STRENGTH) 15 mg/5 mL Liqd syrup Take 30 mg by mouth 2 (two) times a day.   ? sodium chloride 3 % nebulizer solution Take 3 mL by nebulization 2 (two) times a day. And q4h prn   ? acetaminophen (TYLENOL) 500 MG tablet Take 1,000 mg by mouth 4 (four) times a day.          ? albuterol  (PROAIR HFA;PROVENTIL HFA;VENTOLIN HFA) 90 mcg/actuation inhaler Inhale 2 puffs every 4 (four) hours as needed for wheezing.   ? amoxicillin (AMOXIL) 500 MG tablet Take 500 mg by mouth see administration instructions. Take 1 hour prior to dental appointment   ? ARIPiprazole (ABILIFY) 15 MG tablet Take 15 mg by mouth every evening.   ? aspirin 325 MG tablet Take 1 tablet (325 mg total) by mouth 2 (two) times a day.   ? buPROPion (WELLBUTRIN XL) 150 MG 24 hr tablet Take 150 mg by mouth every morning.          ? carboxymethylcellulose (REFRESH PLUS) 0.5 % Dpet ophthalmic dropperette Administer 1 drop to both eyes 4 (four) times a day as needed (dry eyes).   ? dalfampridine (AMPYRA) 10 mg Tb12 Take 10 mg by mouth 2 (two) times a day.   ? doxycycline (VIBRA-TABS) 100 MG tablet Take 1 tablet (100 mg total) by mouth 2 (two) times a day for 10 days.   ? famotidine (PEPCID) 20 MG tablet Take 1 tablet (20 mg total) by mouth 2 (two) times a day.   ? FLUoxetine (PROZAC) 20 MG capsule Take 40 mg by mouth every morning.          ? hydrocortisone 2.5 % cream Apply 1 application topically 2 (two) times a day as needed (rash/itching).   ? lamoTRIgine (LAMICTAL) 100 MG tablet Take 100 mg by mouth bedtime.   ? lamoTRIgine (LAMICTAL) 150 MG tablet Take 150 mg by mouth every morning.   ? lanolin-mineral oil (EUCERIN ORIGINAL) Lotn Apply 1 application topically daily.   ? levothyroxine 75 mcg cap Take 75 mcg by mouth every morning.   ? magnesium hydroxide (MILK OF MAG) 400 mg/5 mL Susp suspension Take 30 mL by mouth daily as needed.   ? multivit-min-FA-lycopen-lutein (CENTRUM SILVER) 0.4-300-250 mg-mcg-mcg tablet Take 1 tablet by mouth daily.   ? oxyCODONE (ROXICODONE) 5 MG immediate release tablet Take 1-2 tablets (5-10 mg total) by mouth every 4 (four) hours as needed.   ? polyethylene glycol (MIRALAX) 17 gram packet Take 17 g by mouth daily.   ? polyvinyl alcohol (LIQUIFILM TEARS) 1.4 % ophthalmic solution Administer 1 drop to both  eyes as needed for dry eyes.   ? salmeterol (SEREVENT) 50 mcg/dose diskus inhaler Inhale 1 puff 2 (two) times a day.   ? umeclidinium (INCRUSE ELLIPTA) 62.5 mcg/actuation DsDv inhaler Inhale 1 puff daily.     Past Medical History:    Past Medical History:   Diagnosis Date   ? Abdominal hernia    ? 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 Anemia    ? Arthritis    ? Asthma 1/20/2017   ? Bipolar 1 disorder (H)    ? Cancer (H)    ? Cellulitis    ? Cellulitis 12/12/2016    of left elbow   ? Chronic pain syndrome    ? COPD with acute exacerbation (H) 12/21/2018   ? Depression    ? Disease of thyroid gland    ? Frequent falls 2016   ? Frequent falls 2017   ? GERD (gastroesophageal reflux disease)    ? History of transfusion    ? Hydrocephalus    ? Hyperlipidemia    ? Hypertension    ? Hypothyroidism    ? Infection of skin due to methicillin resistant Staphylococcus aureus (MRSA)    ? Memory loss 2017   ? Normal pressure hydrocephalus 02/03/2017   ? Parkinson disease (H)    ? Renal insufficiency    ? Urinary incontinence            PHYSICAL EXAMINATION  Vitals:    03/13/19 2212   BP: 123/63   Pulse: 80   Resp: 18   Temp: 97.7  F (36.5  C)   SpO2: 91%   Weight: 175 lb (79.4 kg)       Today on physical exam:     GENERAL: Awake, Alert, oriented x3, not in any form of acute distress, answers questions appropriately, follows simple commands, conversant  HEENT: Head is normocephalic with normal hair distribution. No evidence of trauma. Ears: No acute purulent discharge. Eyes: Conjunctivae pink with no scleral jaundice. Nose: Normal mucosa and septum. NECK: Supple with no cervical or supraclavicular lymphadenopathy. Trachea is midline.   CHEST: No tenderness or deformity, no crepitus  LUNG: dim with expiratory wheeze to auscultation with good chest expansion. Congested cough, yellow sputum  BACK: No kyphosis of the thoracic spine. Symmetric, no curvature, ROM normal, no CVA tenderness, no spinal tenderness   CVS: There is good S1  S2,  regular rhythm, there are no murmurs, rubs, gallops, or heaves,  2+ pulses symmetric in all extremities.  ABDOMEN: Rounded and soft, nontender to palpation, non distended, no masses, no organomegaly, good bowel sounds, no rebound or guarding, no peritoneal signs.   EXTREMITIES: Right shoulder incision small amount of sanguinous drainage, covered with dressing, 2+ edema, mild numbness in rue, 2+ edema right arm, sling in place  SKIN: Warm and dry,   NEUROLOGICAL: The patient is oriented to person, place and time. Weakness, unsteady gait            LABS:   Recent Results (from the past 168 hour(s))   HM1 (CBC with Diff)   Result Value Ref Range    WBC 8.2 4.0 - 11.0 thou/uL    RBC 3.19 (L) 3.80 - 5.40 mill/uL    Hemoglobin 9.1 (L) 12.0 - 16.0 g/dL    Hematocrit 29.7 (L) 35.0 - 47.0 %    MCV 93 80 - 100 fL    MCH 28.5 27.0 - 34.0 pg    MCHC 30.6 (L) 32.0 - 36.0 g/dL    RDW 15.4 (H) 11.0 - 14.5 %    Platelets 232 140 - 440 thou/uL    MPV 10.4 8.5 - 12.5 fL    Neutrophils % 69 50 - 70 %    Lymphocytes % 15 (L) 20 - 40 %    Monocytes % 11 (H) 2 - 10 %    Eosinophils % 4 0 - 6 %    Basophils % 1 0 - 2 %    Neutrophils Absolute 5.6 2.0 - 7.7 thou/uL    Lymphocytes Absolute 1.2 0.8 - 4.4 thou/uL    Monocytes Absolute 0.9 0.0 - 0.9 thou/uL    Eosinophils Absolute 0.3 0.0 - 0.4 thou/uL    Basophils Absolute 0.0 0.0 - 0.2 thou/uL   Basic Metabolic Panel   Result Value Ref Range    Sodium 141 136 - 145 mmol/L    Potassium 4.3 3.5 - 5.0 mmol/L    Chloride 106 98 - 107 mmol/L    CO2 27 22 - 31 mmol/L    Anion Gap, Calculation 8 5 - 18 mmol/L    Glucose 100 70 - 125 mg/dL    Calcium 9.7 8.5 - 10.5 mg/dL    BUN 16 8 - 28 mg/dL    Creatinine 0.64 0.60 - 1.10 mg/dL    GFR MDRD Af Amer >60 >60 mL/min/1.73m2    GFR MDRD Non Af Amer >60 >60 mL/min/1.73m2   HM1 (CBC with Diff)   Result Value Ref Range    WBC 7.3 4.0 - 11.0 thou/uL    RBC 3.23 (L) 3.80 - 5.40 mill/uL    Hemoglobin 9.0 (L) 12.0 - 16.0 g/dL    Hematocrit 30.9 (L) 35.0 - 47.0  %    MCV 96 80 - 100 fL    MCH 27.9 27.0 - 34.0 pg    MCHC 29.1 (L) 32.0 - 36.0 g/dL    RDW 16.0 (H) 11.0 - 14.5 %    Platelets 360 140 - 440 thou/uL    MPV 10.8 8.5 - 12.5 fL    Neutrophils % 59 50 - 70 %    Lymphocytes % 21 20 - 40 %    Monocytes % 11 (H) 2 - 10 %    Eosinophils % 8 (H) 0 - 6 %    Basophils % 1 0 - 2 %    Neutrophils Absolute 4.2 2.0 - 7.7 thou/uL    Lymphocytes Absolute 1.5 0.8 - 4.4 thou/uL    Monocytes Absolute 0.8 0.0 - 0.9 thou/uL    Eosinophils Absolute 0.6 (H) 0.0 - 0.4 thou/uL    Basophils Absolute 0.1 0.0 - 0.2 thou/uL     Results for orders placed or performed in visit on 03/12/19   Basic Metabolic Panel   Result Value Ref Range    Sodium 141 136 - 145 mmol/L    Potassium 4.3 3.5 - 5.0 mmol/L    Chloride 106 98 - 107 mmol/L    CO2 27 22 - 31 mmol/L    Anion Gap, Calculation 8 5 - 18 mmol/L    Glucose 100 70 - 125 mg/dL    Calcium 9.7 8.5 - 10.5 mg/dL    BUN 16 8 - 28 mg/dL    Creatinine 0.64 0.60 - 1.10 mg/dL    GFR MDRD Af Amer >60 >60 mL/min/1.73m2    GFR MDRD Non Af Amer >60 >60 mL/min/1.73m2         Lab Results   Component Value Date    WBC 7.3 03/12/2019    HGB 9.0 (L) 03/12/2019    HCT 30.9 (L) 03/12/2019    MCV 96 03/12/2019     03/12/2019       Lab Results   Component Value Date    [id number] 569 07/16/2016     Lab Results   Component Value Date    HGBA1C 5.7 07/15/2016     Lab Results   Component Value Date    INR 0.96 03/05/2019     No results found for: [id number]  Lab Results   Component Value Date    TSH 3.33 02/25/2018           ASSESSMENT/PLAN:    1. S/p reverse total shoulder arthroplasty: Incision c/d/i. 2+ edema right shoulder, arm, mild numbness present. Sling in place, NWB. F/u with ortho in 7-10 days. Pain controlled on tylenol and oxycodone. Wean off oxycodone as able. On aspirin two times a day for dvt prophylaxis. Doing well today.  2. COPD, Acute bronchitis: On doxycycline. No fevers or chills. cxr negative for pneumonia in hospital. On incruse ellipta,  albuterol prn. Salmeterol added this hospital stay. Intolerant to nebs. Added mucinex two times a day x 7 days, dextromethorphan cough syrup prn. IS q1h while awake. spiriva not covered by insurance. Will change dextromethorphan cough syrup to two times a day and q6h prn, add saline nebs three times a day and will re evaluate tomorrow. Discussed interoerant was related to albuterol, may need to try atrovent. Check o2 sats with therapy today, complaining of worse shortness of breath with exertion.   3. Normal pressure hydrocephalus, s/p  shunt: Unsteady gait, on ampyra. Follows with neurology.   4. HTN: SBP 120s. No meds. Monitor.   5. Bipolar disorder: Mood stable recently. On abilify, wellbutrin, fluoxetine, lamictal. Declines psych referral at this time.   6. H/o colon cancer: No recent concerns. Added lactobacillus while on doxy.   7. Constipation: No bm for 5 days then finally yesterday. on scheduled miralax daily per request.   8.  Acute blood loss anemia: hg 9.1 on 3/8. Recheck on 3/12-9.  9. GERD: on pepcid.   10. Hypothyroid: On levothyroxine.       Per therapy soft LCD 3/25 assist of 1 for toileting, mod for upper body dressing, assist of for transfers, min for bed mobility, non ambulatory. From [facility] assisted living. Wheelchair bound at baseline.     Electronically signed by: Casie Barger NP

## 2021-06-22 NOTE — PROGRESS NOTES
[facility]    Facility:   [facility] [[id number]]   Code Status: DNR      CHIEF COMPLAINT/REASON FOR VISIT:  Chief Complaint   Patient presents with     Follow Up     rehab, copd       HISTORY:      HPI: Amparo is a 78 y.o. female who I had the chance and pleasure to revisit with secondary to her most recent hospitalization December 20 1 December 27th 2018 secondary dyspnea along with acute hypoxic respiratory failure from COPD exacerbation.  Currently doing quite well no longer on oxygen.  No increase or use of inhalers.  No shortness of breath with exertion is currently performing with therapy and therapy at this point thinks that she will be able to go home early next week.  Had a chance to discuss that with her.  Her right tibial wound does have some slough now to the area so we will put some wound gel and cover it and do it twice a day.  She is finishing up her prednisone taper on January 6.  Her appetite is good.  Does self propel her wheelchair.  She has been normotensive and afebrile and also on room air.  Her moods have been quite stable.  No bowel or bladder problems.    Past Medical History:   Diagnosis Date     Abdominal hernia      Alcoholism in remission (H) 2017     Anemia      Arthritis      Asthma 1/20/2017     Bipolar 1 disorder (H)      Cancer (H)      Cellulitis      Cellulitis 12/12/2016    of left elbow     COPD with acute exacerbation (H) 12/21/2018     Depression      Disease of thyroid gland      Frequent falls 2016     Frequent falls 2017     GERD (gastroesophageal reflux disease)      History of transfusion      Hydrocephalus      Hypertension      Hypothyroidism      Memory loss 2017     Normal pressure hydrocephalus 02/03/2017             Family History   Problem Relation Age of Onset     Arthritis Brother      Social History     Socioeconomic History     Marital status:      Spouse name: Not on file     Number of children: Not on  file     Years of education: Not on file     Highest education level: Not on file   Social Needs     Financial resource strain: Not on file     Food insecurity - worry: Not on file     Food insecurity - inability: Not on file     Transportation needs - medical: Not on file     Transportation needs - non-medical: Not on file   Occupational History     Not on file   Tobacco Use     Smoking status: Former Smoker     Packs/day: 0.25     Years: 30.00     Pack years: 7.50     Last attempt to quit: 1997     Years since quittin.4   Substance and Sexual Activity     Alcohol use: No     Drug use: No     Sexual activity: Not on file   Other Topics Concern     Not on file   Social History Narrative     Not on file         Review of Systems  Currently denies any chills and fever URI symptoms flulike problems postnasal drip chest pain dizziness vertigo nausea vomiting diarrhea dysuria unusual aches or pains rashes stiff neck swollen glands or headaches. History of hypertension hypothyroidism venous stasis       Current Outpatient Medications   Medication Sig     acetaminophen (TYLENOL) 500 MG tablet Take 1,000 mg by mouth 3 (three) times a day.     albuterol (PROAIR HFA;PROVENTIL HFA;VENTOLIN HFA) 90 mcg/actuation inhaler Inhale 2 puffs every 4 (four) hours as needed for wheezing.     ARIPiprazole (ABILIFY) 15 MG tablet Take 15 mg by mouth every evening.     aspirin-acetaminophen-caffeine (EXCEDRIN MIGRAINE) 250-250-65 mg per tablet Take 2 tablets by mouth every 6 (six) hours as needed for pain (Max:  8 tabs in 24 hours).     bisacodyl (DULCOLAX) 10 mg suppository Insert 1 suppository (10 mg total) into the rectum daily as needed (Constipation.).     buPROPion (WELLBUTRIN XL) 150 MG 24 hr tablet Take 150 mg by mouth every morning.            carboxymethylcellulose (REFRESH PLUS) 0.5 % Dpet ophthalmic dropperette Administer 1 drop to both eyes 4 (four) times a day as needed (dry eyes).     clobetasol (TEMOVATE) 0.05 %  cream Apply 1 application topically 2 (two) times a day as needed.            dalfampridine (AMPYRA) 10 mg Tb12 Take 10 mg by mouth 2 (two) times a day.     diclofenac (VOLTAREN) 50 MG EC tablet Take 50 mg by mouth 2 (two) times a day.     docusate sodium (COLACE) 100 MG capsule Take 100 mg by mouth every morning.            FLUoxetine (PROZAC) 20 MG capsule Take 40 mg by mouth every morning.            hydrocortisone 2.5 % cream Apply 1 application topically 2 (two) times a day as needed (rash/itching).     lamoTRIgine (LAMICTAL) 100 MG tablet Take 100 mg by mouth bedtime.     lamoTRIgine (LAMICTAL) 150 MG tablet Take 150 mg by mouth every morning.     lanolin-mineral oil (EUCERIN ORIGINAL) Lotn Apply 1 application topically daily.     levothyroxine 75 mcg cap Take 75 mcg by mouth every morning.     magnesium hydroxide (MILK OF MAG) 400 mg/5 mL Susp suspension Take 30 mL by mouth daily as needed.     multivit-min-FA-lycopen-lutein (CENTRUM SILVER) 0.4-300-250 mg-mcg-mcg tablet Take 1 tablet by mouth daily.     predniSONE (DELTASONE) 5 MG tablet Take 5 mg by mouth daily with breakfast for 3 days.     ranitidine (ZANTAC) 300 MG capsule Take 300 mg by mouth 2 (two) times a day.     tiotropium (SPIRIVA) 18 mcg inhalation capsule Place 1 capsule (2 puffs total) into inhaler and inhale daily.     urea (HEEL BALM TOP) Apply 1 application topically daily as needed. To right foot       .There were no vitals filed for this visit.  Blood pressure 143/92 pulse 90 respirations 18 temperature 98.1 saturation room air 96%  Physical Exam   Constitutional: She appears well-developed and well-nourished. No distress.   HENT:   Neck: Neck supple. No thyromegaly present.   Cardiovascular: Normal rate, regular rhythm and normal heart sounds.   Pulmonary/Chest: Breath sounds normal.   Abdominal:  There is no tenderness. There is no guarding. Mid abd hernia.  Musculoskeletal: History of normal pressure hydrocephalus.  Gait and balance  issues.  History of venous stasis. Tremors. Chronic rt rotator cuff tear/impingement.  Lymphadenopathy:   She has no cervical adenopathy.   Neurological: She is alert.   Skin: Left tibia mid tibia area linear wound currently being treated.  Mepilex   psychiatric: She has a normal mood and affect. Her behavior is normal       LABS:   Lab Results   Component Value Date    WBC 9.0 12/22/2018    HGB 12.4 12/22/2018    HCT 40.8 12/22/2018    MCV 92 12/22/2018     12/27/2018     Results for orders placed or performed during the hospital encounter of 12/21/18   Basic metabolic panel   Result Value Ref Range    Sodium 142 136 - 145 mmol/L    Potassium 4.5 3.5 - 5.0 mmol/L    Chloride 107 98 - 107 mmol/L    CO2 23 22 - 31 mmol/L    Anion Gap, Calculation 12 5 - 18 mmol/L    Glucose 159 (H) 70 - 125 mg/dL    Calcium 10.1 8.5 - 10.5 mg/dL    BUN 22 8 - 28 mg/dL    Creatinine 0.71 0.60 - 1.10 mg/dL    GFR MDRD Af Amer >60 >60 mL/min/1.73m2    GFR MDRD Non Af Amer >60 >60 mL/min/1.73m2           ASSESSMENT:      ICD-10-CM    1. Wound of right lower extremity, subsequent encounter S81.801D    2. Frequent falls R29.6    3. Leg weakness, bilateral R29.898    4. COPD with acute exacerbation (H) J44.1        PLAN:    Adding wound gel twice daily along with a Mepilex to her right tibial wound.  Her COPD and her respiratory status has significantly improved finishing up her prednisone taper on January 6.  She is producing well in therapy and looks like there is a potential for discharge early next week.        Electronically signed by: Michael Duane Johnson, CNP

## 2021-06-22 NOTE — PROGRESS NOTES
[facility]    [facility] [[id number][facility]   Code Status: DNR  PCP: Parvez Francis MD   Phone: [phone]   Fax: [phone]      CHIEF COMPLAINT/REASON FOR VISIT:  Chief Complaint   Patient presents with     Discharge Summary       HISTORY COURSE:  Amparo is a 78 y.o. female who I was asked to see secondary to most recent transitional care admission secondary to her hospitalization December 21 - December 27, 2018 secondary to dyspnea.  She did present secondary to dyspnea is found to have acute hypoxemic respiratory failure from COPD exacerbation.  She initially was started on IV steroids later transitioned to prednisone orally.  The symptoms did improve during her hospitalization.  Also she was given incentive spirometry.  At the time of discharge she did require oxygen.  She does have generalized weakness and deconditioning.  While at home she is on ampyra due to her history of normal pressure hydrocephalus and gait and balance issues.  Apparently during her hospitalization with inability to acquire this medication.  This was given to her by a specialist.  Her laboratory studies on December 21 did show sodium 141 potassium 5.1 BUN 22 creatinine 0.88 white cells 9.9 hemoglobin 11.8.  The oxygen was almost immediately discontinued.  There has been no COPD issues including cough wheezing or other respiratory issues.  She is been working with therapy staff secondary to her NPH and working on her balance and strength.  She does reside in a senior living facility.  She does have a chronic wound on her right tibia that is now resolving nicely using wound gel along with a Mepilex.  Shrunk down to only a couple millimeters at this point.  She has chronic right rotator cuff impingement and adhesive capsulitis more probable chronic rotator cuff and she does want to continue to work with [facility] to see if she can get her shoulder worked on and treated.  She  has been normotensive and afebrile and also on room air.  Appetite is good.  Has been participating with the rehabilitation services and has made good progress and success to the point where she is now past therapy recommendations.  She did have a CT arthrogram of the right shoulder on December 31 which did show a complete full-thickness tear of the supraspinatus and infraspinatus tendons with proximal/medial tendon retraction to the level of the glenoid, marked superior subluxation of the humeral head, and marked atrophy of the supraspinatus and infraspinatus muscles.  There is an irregular high-grade tear of the subscapularis tendon and mild atrophy of the subscapularis muscle.  No teres minor tendon is seen.  So therefore the impression is a complete full-thickness tear of the supraspinatus and infraspinatus tendon.  She does have tendinopathy of the long head of the biceps tendon which is medially dislocated from the bicipital groove into the lesser tuberosity.  Review of Systems  Currently denies any chills and fever URI symptoms flulike problems postnasal drip chest pain dizziness vertigo nausea vomiting diarrhea dysuria unusual aches or pains rashes stiff neck swollen glands or headaches. History of hypertension hypothyroidism venous stasis.  Complete full-thickness tear of the supraspinatus and infraspinatus tendons along with irregular high-grade tear of the subscapularis tendon, moderate to marked tendinopathy of the long head of the biceps tendon.  Vitals:    01/08/19 1140   BP: 137/75   Pulse: 72   Resp: 18   Temp: 98  F (36.7  C)   SpO2: 91%       Physical Exam  Constitutional: She appears well-developed and well-nourished. No distress.   HENT:   Cardiovascular: Normal rate, regular rhythm and normal heart sounds.   Pulmonary/Chest: Breath sounds normal.   Abdominal:   Mid abd hernia.  Musculoskeletal: History of normal pressure hydrocephalus.  Gait and balance issues.  History of venous stasis.  Tremors. Chronic rt rotator cuff tear/impingement.  Lymphadenopathy:   She has no cervical adenopathy.   Neurological: She is alert.   Skin: Left tibia mid tibia area linear wound currently being treated.  Mepilex   psychiatric: She has a normal mood and affect. Her behavior is normal     Lab Results   Component Value Date    WBC 9.0 12/22/2018    HGB 12.4 12/22/2018    HCT 40.8 12/22/2018    MCV 92 12/22/2018     12/27/2018     Results for orders placed or performed during the hospital encounter of 12/21/18   Basic metabolic panel   Result Value Ref Range    Sodium 142 136 - 145 mmol/L    Potassium 4.5 3.5 - 5.0 mmol/L    Chloride 107 98 - 107 mmol/L    CO2 23 22 - 31 mmol/L    Anion Gap, Calculation 12 5 - 18 mmol/L    Glucose 159 (H) 70 - 125 mg/dL    Calcium 10.1 8.5 - 10.5 mg/dL    BUN 22 8 - 28 mg/dL    Creatinine 0.71 0.60 - 1.10 mg/dL    GFR MDRD Af Amer >60 >60 mL/min/1.73m2    GFR MDRD Non Af Amer >60 >60 mL/min/1.73m2           MEDICATION LIST:  Current Outpatient Medications   Medication Sig     acetaminophen (TYLENOL) 500 MG tablet Take 1,000 mg by mouth 3 (three) times a day.     albuterol (PROAIR HFA;PROVENTIL HFA;VENTOLIN HFA) 90 mcg/actuation inhaler Inhale 2 puffs every 4 (four) hours as needed for wheezing.     ARIPiprazole (ABILIFY) 15 MG tablet Take 15 mg by mouth every evening.     aspirin-acetaminophen-caffeine (EXCEDRIN MIGRAINE) 250-250-65 mg per tablet Take 2 tablets by mouth every 6 (six) hours as needed for pain (Max:  8 tabs in 24 hours).     buPROPion (WELLBUTRIN XL) 150 MG 24 hr tablet Take 150 mg by mouth every morning.            carboxymethylcellulose (REFRESH PLUS) 0.5 % Dpet ophthalmic dropperette Administer 1 drop to both eyes 4 (four) times a day as needed (dry eyes).     clobetasol (TEMOVATE) 0.05 % cream Apply 1 application topically 2 (two) times a day as needed.            dalfampridine (AMPYRA) 10 mg Tb12 Take 10 mg by mouth 2 (two) times a day.     diclofenac  (VOLTAREN) 50 MG EC tablet Take 50 mg by mouth 2 (two) times a day.     docusate sodium (COLACE) 100 MG capsule Take 100 mg by mouth every morning.            FLUoxetine (PROZAC) 20 MG capsule Take 40 mg by mouth every morning.            hydrocortisone 2.5 % cream Apply 1 application topically 2 (two) times a day as needed (rash/itching).     lamoTRIgine (LAMICTAL) 100 MG tablet Take 100 mg by mouth bedtime.     lamoTRIgine (LAMICTAL) 150 MG tablet Take 150 mg by mouth every morning.     lanolin-mineral oil (EUCERIN ORIGINAL) Lotn Apply 1 application topically daily.     levothyroxine 75 mcg cap Take 75 mcg by mouth every morning.     magnesium hydroxide (MILK OF MAG) 400 mg/5 mL Susp suspension Take 30 mL by mouth daily as needed.     multivit-min-FA-lycopen-lutein (CENTRUM SILVER) 0.4-300-250 mg-mcg-mcg tablet Take 1 tablet by mouth daily.     ranitidine (ZANTAC) 300 MG capsule Take 300 mg by mouth 2 (two) times a day.     tiotropium (SPIRIVA) 18 mcg inhalation capsule Place 1 capsule (2 puffs total) into inhaler and inhale daily.     urea (HEEL BALM TOP) Apply 1 application topically daily as needed. To right foot       DISCHARGE DIAGNOSIS:    ICD-10-CM    1. History of MRSA infection Z86.14    2. Frequent falls R29.6    3. NPH (normal pressure hydrocephalus) G91.2    4. Physical deconditioning R53.81        MEDICAL EQUIPMENT NEEDS:  None    DISCHARGE PLAN/FACE TO FACE:  I certify that services are/were furnished while this patient was under the care of a physician and that a physician or an allowed non-physician practitioner (NPP), had a face-to-face encounter that meets the physician face-to-face encounter requirements. The encounter was in whole, or in part, related to the primary reason for home health. The patient is confined to his/her home and needs intermittent skilled nursing, physical therapy, speech-language pathology, or the continued need for occupational therapy. A plan of care has been established  by a physician and is periodically reviewed by a physician.  Date of Face-to-Face Encounter: January 8, 2019    I certify that, based on my findings, the following services are medically necessary home health services: She will be discharging January 10, 2019 to the [facility] with current medications along with receiving physical therapy    My clinical findings support the need for the above skilled services because: (Please write a brief narrative summary that describes what the RN, PT, SLP, or other services will be doing in the home. A list of diagnoses in this section does not meet the CMS requirements.)  She will require physical therapy secondary to generalized debility weakness home safety along with continuing with various exercises.    This patient is homebound because: (Please write a brief narrative summary describing the functional limitations as to why this patient is homebound and specifically what makes this patient homebound.)  Secondary to chronic medical conditions including home safety transfers gait instability balance a history of NPH.    The patient is, or has been, under my care and I have initiated the establishment of the plan of care. This patient will be followed by a physician who will periodically review the plan of care.    Schedule follow up visit with primary care provider within 7 days to reestablish care.  She will follow-up with orthopedics as previously arranged particularly with her most recent CT arthrogram.  She will follow-up with her primary care doctor regarding medication management and any future laboratory studies.  Once again she does feel comfortable with her stay in the transitional care unit has made some good progress.    Discharge coordination care greater than 30 minutes  Electronically signed by: Michael Duane Johnson, CNP

## 2021-06-22 NOTE — PROGRESS NOTES
Code Status:  DNR  Visit Type: H & P     Facility:  [facility] [[id number]]      Facility Type: SNF (Skilled Nursing Facility, TCU)    History of Present Illness:   Hospital Admission Date: December 21, 2018 [facility] Discharge Date: September 27, 2018  Facility Admission Date: September 27, 2018 [facility] transitional care unit    Amparo Sharma is a 78 y.o. female who I am seeing for a admission where she presented with a significant dyspnea with underlying known COPD.  She was found on admission to have acute hypoxemic respiratory failure from an exacerbation of her COPD.    Hospital course; she was started on IV steroids and later transitioned to prednisone.  Her symptoms gradually improved during the hospitalization and was started on incentive spirometry and Acapella valve.  They also initiated Spiriva as a maintenance medication.  She had required O2 at the time of discharge.  In addition to this she had a rather profound generalized weakness and deconditioning.  She was on Steptoe which is a medicine for balance disorder as well.  She was unable to get this medicine in the hospital and to date at our facility as well.  However respiratory wise she is less short of breath and she is been able to now almost completely wean and at times completely wean off of oxygen with O2 sats in the 93% range.    Past Medical History:   Diagnosis Date     Abdominal hernia      Alcoholism in remission (H) 2017     Anemia      Arthritis      Asthma 1/20/2017     Bipolar 1 disorder (H)      Cancer (H)      Cellulitis      Cellulitis 12/12/2016    of left elbow     COPD with acute exacerbation (H) 12/21/2018     Depression      Disease of thyroid gland      Frequent falls 2016     Frequent falls 2017     GERD (gastroesophageal reflux disease)      History of transfusion      Hydrocephalus      Hypertension      Hypothyroidism      Memory loss 2017     Normal pressure  hydrocephalus 2017     Past Surgical History:   Procedure Laterality Date     COLON SURGERY       FRACTURE SURGERY      right ankle     HYSTERECTOMY       JOINT REPLACEMENT      both knees, hip -right,      LAPAROSCOPIC CHOLECYSTECTOMY       LAPAROSCOPIC COLON RESECTION       LUMBAR PUNCTURE  2017     ROTATOR CUFF REPAIR       VENTRICULOPERITONEAL SHUNT  2017     Family History   Problem Relation Age of Onset     Arthritis Brother      Social History     Socioeconomic History     Marital status:      Spouse name: Not on file     Number of children: Not on file     Years of education: Not on file     Highest education level: Not on file   Social Needs     Financial resource strain: Not on file     Food insecurity - worry: Not on file     Food insecurity - inability: Not on file     Transportation needs - medical: Not on file     Transportation needs - non-medical: Not on file   Occupational History     Not on file   Tobacco Use     Smoking status: Former Smoker     Packs/day: 0.25     Years: 30.00     Pack years: 7.50     Last attempt to quit: 1997     Years since quittin.4   Substance and Sexual Activity     Alcohol use: No     Drug use: No     Sexual activity: Not on file   Other Topics Concern     Not on file   Social History Narrative     Not on file     Additional Geriatric Review patient lives independently and has 2 children.  Current Outpatient Medications   Medication Sig Dispense Refill     acetaminophen (TYLENOL) 500 MG tablet Take 1,000 mg by mouth 3 (three) times a day.       albuterol (PROAIR HFA;PROVENTIL HFA;VENTOLIN HFA) 90 mcg/actuation inhaler Inhale 2 puffs every 4 (four) hours as needed for wheezing.       ARIPiprazole (ABILIFY) 15 MG tablet Take 15 mg by mouth every evening.       aspirin-acetaminophen-caffeine (EXCEDRIN MIGRAINE) 250-250-65 mg per tablet Take 2 tablets by mouth every 6 (six) hours as needed for pain (Max:  8 tabs in 24 hours).       bisacodyl  (DULCOLAX) 10 mg suppository Insert 1 suppository (10 mg total) into the rectum daily as needed (Constipation.).  0     buPROPion (WELLBUTRIN XL) 150 MG 24 hr tablet Take 150 mg by mouth every morning.              carboxymethylcellulose (REFRESH PLUS) 0.5 % Dpet ophthalmic dropperette Administer 1 drop to both eyes 4 (four) times a day as needed (dry eyes).       clobetasol (TEMOVATE) 0.05 % cream Apply 1 application topically 2 (two) times a day as needed.              dalfampridine (AMPYRA) 10 mg Tb12 Take 10 mg by mouth 2 (two) times a day.       diclofenac (VOLTAREN) 50 MG EC tablet Take 50 mg by mouth 2 (two) times a day.       docusate sodium (COLACE) 100 MG capsule Take 100 mg by mouth every morning.              FLUoxetine (PROZAC) 20 MG capsule Take 40 mg by mouth every morning.              guaiFENesin (ROBITUSSIN) 100 mg/5 mL syrup Take 5 mL (100 mg total) by mouth every 6 (six) hours as needed for cough or congestion.  0     hydrocortisone 2.5 % cream Apply 1 application topically 2 (two) times a day as needed (rash/itching).       lamoTRIgine (LAMICTAL) 100 MG tablet Take 100 mg by mouth bedtime.       lamoTRIgine (LAMICTAL) 150 MG tablet Take 150 mg by mouth every morning.       lanolin-mineral oil (EUCERIN ORIGINAL) Lotn Apply 1 application topically daily.       levothyroxine 75 mcg cap Take 75 mcg by mouth every morning.       magnesium hydroxide (MILK OF MAG) 400 mg/5 mL Susp suspension Take 30 mL by mouth daily as needed.  0     multivit-min-FA-lycopen-lutein (CENTRUM SILVER) 0.4-300-250 mg-mcg-mcg tablet Take 1 tablet by mouth daily.       ondansetron (ZOFRAN) 4 MG tablet Take 4 mg by mouth every 8 (eight) hours as needed.       predniSONE (DELTASONE) 10 mg tablet Take 10 mg by mouth daily with breakfast for 3 days.  0     predniSONE (DELTASONE) 5 MG tablet Take 15 mg by mouth daily with breakfast for 3 days.  0     [START ON 1/3/2019] predniSONE (DELTASONE) 5 MG tablet Take 5 mg by mouth daily  with breakfast for 3 days.  0     ranitidine (ZANTAC) 300 MG capsule Take 300 mg by mouth 2 (two) times a day.       tiotropium (SPIRIVA) 18 mcg inhalation capsule Place 1 capsule (2 puffs total) into inhaler and inhale daily.  0     urea (HEEL BALM TOP) Apply 1 application topically daily as needed. To right foot       No current facility-administered medications for this visit.      Allergies   Allergen Reactions     Amantadine Other (See Comments)     Codeine Itching     Diazepam Other (See Comments)     Hallucinations/paranoid     Diphenhydramine Other (See Comments)     hyperactivity     Meloxicam Other (See Comments)     Meperidine Nausea Only     Pentazocine Other (See Comments)     drowsiness     Statins-Hmg-Coa Reductase Inhibitors Other (See Comments)     Was hospitalized, rhabdomyolitis  Rhabdo?  Statins     Talwin [Pentazocine Lactate]      Tramadol Nausea Only       There is no immunization history on file for this patient.      Review of Systems   Constitutional: Negative.  Negative for appetite change, chills, diaphoresis, fatigue, fever and unexpected weight change.   HENT: Negative for congestion, dental problem, ear pain, hearing loss, nosebleeds, sinus pressure, sore throat, tinnitus and trouble swallowing.    Eyes: Negative for photophobia, pain, discharge, itching and visual disturbance.   Respiratory: Negative for apnea, cough, chest tightness, shortness of breath and wheezing.    Cardiovascular: Negative for chest pain and palpitations.   Gastrointestinal: Negative for abdominal distention, abdominal pain, constipation and diarrhea.   Endocrine: Negative for cold intolerance, heat intolerance and polydipsia.   Genitourinary: Negative.  Negative for decreased urine volume, difficulty urinating, dysuria, enuresis, frequency, hematuria, menstrual problem, urgency and vaginal discharge.   Musculoskeletal: Negative for arthralgias, back pain and gait problem.   Skin:        Had a recent traumatic  injury where she hit her left shin on a car.  This avulsed is some skin and had an open area that then became cellulitic for which she was treated.  On careful exam this shows an area of skin breakdown on the anterior tibial area but there is no erythema.  The depth is a second-degree in nature there is just a very little bit of drainage.   Allergic/Immunologic: Negative.    Neurological: Negative for dizziness, tremors, syncope, facial asymmetry, speech difficulty, weakness, light-headedness, numbness and headaches.   Hematological: Negative.    Psychiatric/Behavioral: Negative for agitation, behavioral problems, confusion, decreased concentration, dysphoric mood and hallucinations. The patient is not hyperactive.         Physical Exam   Constitutional: She is oriented to person, place, and time. She appears well-developed and well-nourished.   I patient is obese   HENT:   Head: Normocephalic and atraumatic.   Right Ear: External ear normal.   Left Ear: External ear normal.   Nose: Nose normal.   Mouth/Throat: Oropharynx is clear and moist.   Eyes: Conjunctivae and EOM are normal. Pupils are equal, round, and reactive to light. Left eye exhibits no discharge. No scleral icterus.   Neck: Neck supple. No JVD present. No tracheal deviation present. No thyromegaly present.   Cardiovascular: Normal rate, regular rhythm and normal heart sounds. Exam reveals no friction rub.   No murmur heard.  Pulmonary/Chest: Effort normal and breath sounds normal. No respiratory distress. She has no wheezes. She has no rales. She exhibits no tenderness.   There is noted decreased air movement but not any wheeze rale or rhonchi   Abdominal: She exhibits no distension and no mass. There is no tenderness. There is no guarding.   Musculoskeletal: Normal range of motion. She exhibits no edema or tenderness.   Wheelchair-bound and so did not test balance disorder was somewhat weak coming to a standing position still needing assistance    Lymphadenopathy:     She has no cervical adenopathy.   Neurological: She is alert and oriented to person, place, and time. She has normal reflexes. No cranial nerve deficit. She exhibits normal muscle tone. Coordination normal.   Skin: Skin is warm and dry. No rash noted. No erythema.   Psychiatric: She has a normal mood and affect. Her behavior is normal. Thought content normal.   Nursing note and vitals reviewed.        Labs:  All labs reviewed in the nursing home record.  Recent Results (from the past 240 hour(s))   Basic metabolic panel   Result Value Ref Range    Sodium 142 136 - 145 mmol/L    Potassium 4.5 3.5 - 5.0 mmol/L    Chloride 107 98 - 107 mmol/L    CO2 23 22 - 31 mmol/L    Anion Gap, Calculation 12 5 - 18 mmol/L    Glucose 159 (H) 70 - 125 mg/dL    Calcium 10.1 8.5 - 10.5 mg/dL    BUN 22 8 - 28 mg/dL    Creatinine 0.71 0.60 - 1.10 mg/dL    GFR MDRD Af Amer >60 >60 mL/min/1.73m2    GFR MDRD Non Af Amer >60 >60 mL/min/1.73m2   HM1 (CBC with Diff)   Result Value Ref Range    WBC 9.0 4.0 - 11.0 thou/uL    RBC 4.42 3.80 - 5.40 mill/uL    Hemoglobin 12.4 12.0 - 16.0 g/dL    Hematocrit 40.8 35.0 - 47.0 %    MCV 92 80 - 100 fL    MCH 28.1 27.0 - 34.0 pg    MCHC 30.4 (L) 32.0 - 36.0 g/dL    RDW 14.2 11.0 - 14.5 %    Platelets 366 140 - 440 thou/uL    MPV 9.8 8.5 - 12.5 fL    Neutrophils % 85 (H) 50 - 70 %    Lymphocytes % 10 (L) 20 - 40 %    Monocytes % 5 2 - 10 %    Eosinophils % 0 0 - 6 %    Basophils % 0 0 - 2 %    Neutrophils Absolute 7.5 2.0 - 7.7 thou/uL    Lymphocytes Absolute 0.9 0.8 - 4.4 thou/uL    Monocytes Absolute 0.4 0.0 - 0.9 thou/uL    Eosinophils Absolute 0.0 0.0 - 0.4 thou/uL    Basophils Absolute 0.0 0.0 - 0.2 thou/uL   Sputum culture   Result Value Ref Range    Culture Usual cesilia with     Culture 3+ Haemophilus influenzae (!)     Gram Stain Result 4+ Polymorphonuclear leukocytes     Gram Stain Result 1+ Gram positive cocci    MRSA culture   Result Value Ref Range    Culture MRSA  Coagulase Positive Staph (!)    POCT Glucose   Result Value Ref Range    Glucose,  mg/dL   Platelet Count - every other day x 3   Result Value Ref Range    Platelets 351 140 - 440 thou/uL   Influenza A/B Rapid Test   Result Value Ref Range    Influenza  A, Rapid Antigen No Influenza A antigen detected No Influenza A antigen detected    Influenza B, Rapid Antigen No Influenza B antigen detected No Influenza B antigen detected   Platelet Count - every other day x 3   Result Value Ref Range    Platelets 341 140 - 440 thou/uL   BNP(B-type Natriuretic Peptide)   Result Value Ref Range    BNP 26 0 - 144 pg/mL   Platelet Count - every other day x 3   Result Value Ref Range    Platelets 339 140 - 440 thou/uL         Assessment:  1. COPD with acute exacerbation (H)     2. Dyspnea, unspecified type     3. Physical deconditioning     4. Leg weakness, bilateral     5. Balance disorder     6. Essential hypertension     7. NPH (normal pressure hydrocephalus)     8. Venous stasis     9. Cognitive decline         Plan: Major focus in her recovery and therapy will be weaning the O2 and increasing the strength.  Working with the spirometry.  We are on a gradual dose reduction wean in her prednisone and anticipate good response.  We will add a Mepilex dressing to this area of traumatic injury    Total 35 minutes of which 65% was spent in counseling and coordination of care of the above plan.    Electronically signed by: Carson Walter MD

## 2021-06-22 NOTE — PROGRESS NOTES
[facility]    Facility:   [facility] [[id number]]   Code Status: DNR      CHIEF COMPLAINT/REASON FOR VISIT:  Chief Complaint   Patient presents with     Follow Up     rehab, pneum       HISTORY:      HPI: Amparo is a 78 y.o. female who I had the pleasure revisiting with secondary to her most recent hospitalization December 21 - December 27, 2018 secondary to dyspnea along with acute hypoxic respiratory failure from COPD exacerbation.  She currently is doing quite well not requiring oxygen not requiring any extra inhalers or cough syrup or Zofran appetite is improving.  She has been normotensive and afebrile.  Lungs have been clearing nicely she is on a prednisone taper.  Her moods have been quite stable.  Her pain is also well managed pretty much just on Tylenol thousand milligrams 3 times a day.  She is very pleased with her progress overall and actually has been able to use the walker up and down the hallway and does feel her strength and stamina to also be improved.  Her left tibial wound is also improving does have a Mepilex.  Does have a history of NPH along with balance and that has also improved.  She is very pleased with the progress.    Past Medical History:   Diagnosis Date     Abdominal hernia      Alcoholism in remission (H) 2017     Anemia      Arthritis      Asthma 1/20/2017     Bipolar 1 disorder (H)      Cancer (H)      Cellulitis      Cellulitis 12/12/2016    of left elbow     COPD with acute exacerbation (H) 12/21/2018     Depression      Disease of thyroid gland      Frequent falls 2016     Frequent falls 2017     GERD (gastroesophageal reflux disease)      History of transfusion      Hydrocephalus      Hypertension      Hypothyroidism      Memory loss 2017     Normal pressure hydrocephalus 02/03/2017             Family History   Problem Relation Age of Onset     Arthritis Brother      Social History     Socioeconomic History     Marital status:       Spouse name: Not on file     Number of children: Not on file     Years of education: Not on file     Highest education level: Not on file   Social Needs     Financial resource strain: Not on file     Food insecurity - worry: Not on file     Food insecurity - inability: Not on file     Transportation needs - medical: Not on file     Transportation needs - non-medical: Not on file   Occupational History     Not on file   Tobacco Use     Smoking status: Former Smoker     Packs/day: 0.25     Years: 30.00     Pack years: 7.50     Last attempt to quit: 1997     Years since quittin.4   Substance and Sexual Activity     Alcohol use: No     Drug use: No     Sexual activity: Not on file   Other Topics Concern     Not on file   Social History Narrative     Not on file         Review of Systems  Currently denies any chills and fever URI symptoms flulike problems postnasal drip chest pain dizziness vertigo nausea vomiting diarrhea dysuria unusual aches or pains rashes stiff neck swollen glands or headaches. History of hypertension hypothyroidism venous stasis       Current Outpatient Medications   Medication Sig     acetaminophen (TYLENOL) 500 MG tablet Take 1,000 mg by mouth 3 (three) times a day.     albuterol (PROAIR HFA;PROVENTIL HFA;VENTOLIN HFA) 90 mcg/actuation inhaler Inhale 2 puffs every 4 (four) hours as needed for wheezing.     ARIPiprazole (ABILIFY) 15 MG tablet Take 15 mg by mouth every evening.     aspirin-acetaminophen-caffeine (EXCEDRIN MIGRAINE) 250-250-65 mg per tablet Take 2 tablets by mouth every 6 (six) hours as needed for pain (Max:  8 tabs in 24 hours).     bisacodyl (DULCOLAX) 10 mg suppository Insert 1 suppository (10 mg total) into the rectum daily as needed (Constipation.).     buPROPion (WELLBUTRIN XL) 150 MG 24 hr tablet Take 150 mg by mouth every morning.            carboxymethylcellulose (REFRESH PLUS) 0.5 % Dpet ophthalmic dropperette Administer 1 drop to both eyes 4 (four)  times a day as needed (dry eyes).     clobetasol (TEMOVATE) 0.05 % cream Apply 1 application topically 2 (two) times a day as needed.            dalfampridine (AMPYRA) 10 mg Tb12 Take 10 mg by mouth 2 (two) times a day.     diclofenac (VOLTAREN) 50 MG EC tablet Take 50 mg by mouth 2 (two) times a day.     docusate sodium (COLACE) 100 MG capsule Take 100 mg by mouth every morning.            FLUoxetine (PROZAC) 20 MG capsule Take 40 mg by mouth every morning.            hydrocortisone 2.5 % cream Apply 1 application topically 2 (two) times a day as needed (rash/itching).     lamoTRIgine (LAMICTAL) 100 MG tablet Take 100 mg by mouth bedtime.     lamoTRIgine (LAMICTAL) 150 MG tablet Take 150 mg by mouth every morning.     lanolin-mineral oil (EUCERIN ORIGINAL) Lotn Apply 1 application topically daily.     levothyroxine 75 mcg cap Take 75 mcg by mouth every morning.     magnesium hydroxide (MILK OF MAG) 400 mg/5 mL Susp suspension Take 30 mL by mouth daily as needed.     multivit-min-FA-lycopen-lutein (CENTRUM SILVER) 0.4-300-250 mg-mcg-mcg tablet Take 1 tablet by mouth daily.     predniSONE (DELTASONE) 10 mg tablet Take 10 mg by mouth daily with breakfast for 3 days.     [START ON 1/3/2019] predniSONE (DELTASONE) 5 MG tablet Take 5 mg by mouth daily with breakfast for 3 days.     ranitidine (ZANTAC) 300 MG capsule Take 300 mg by mouth 2 (two) times a day.     tiotropium (SPIRIVA) 18 mcg inhalation capsule Place 1 capsule (2 puffs total) into inhaler and inhale daily.     urea (HEEL BALM TOP) Apply 1 application topically daily as needed. To right foot       .There were no vitals filed for this visit.  Blood pressure 132/80 pulse 90 respirations 16 temperature 98.5 saturation room air 94%  Physical Exam   Constitutional: She appears well-developed and well-nourished. No distress.   HENT:   Eyes: Pupils are equal, round, and reactive to light.   Neck: Neck supple. No thyromegaly present.   Cardiovascular: Normal rate,  regular rhythm and normal heart sounds.   Pulmonary/Chest: Breath sounds normal.   Abdominal:  There is no tenderness. There is no guarding. Mid abd hernia.  Musculoskeletal: History of normal pressure hydrocephalus.  Gait and balance issues.  History of venous stasis. Tremors. Chronic rt rotator cuff tear/impingement.  Lymphadenopathy:   She has no cervical adenopathy.   Neurological: She is alert.   Skin: Left tibia mid tibia area linear wound currently being treated.  Mepilex   psychiatric: She has a normal mood and affect. Her behavior is normal     LABS:   Lab Results   Component Value Date    WBC 9.0 12/22/2018    HGB 12.4 12/22/2018    HCT 40.8 12/22/2018    MCV 92 12/22/2018     12/27/2018     Results for orders placed or performed during the hospital encounter of 12/21/18   Basic metabolic panel   Result Value Ref Range    Sodium 142 136 - 145 mmol/L    Potassium 4.5 3.5 - 5.0 mmol/L    Chloride 107 98 - 107 mmol/L    CO2 23 22 - 31 mmol/L    Anion Gap, Calculation 12 5 - 18 mmol/L    Glucose 159 (H) 70 - 125 mg/dL    Calcium 10.1 8.5 - 10.5 mg/dL    BUN 22 8 - 28 mg/dL    Creatinine 0.71 0.60 - 1.10 mg/dL    GFR MDRD Af Amer >60 >60 mL/min/1.73m2    GFR MDRD Non Af Amer >60 >60 mL/min/1.73m2           ASSESSMENT:      ICD-10-CM    1. COPD with acute exacerbation (H) J44.1    2. Cough R05    3. Weakness generalized R53.1    4. Gait instability R26.81        PLAN:    Discontinue the cough syrup and Zofran due to nonuse.  Otherwise continue to manage and follow her chronic medical conditions she is making pretty good progress so far and she is able to ambulate up and down the hallway and therapy does feel like she is doing well and still a concern regarding her frequent falls in her assisted living facility probably here another week.        Electronically signed by: Michael Duane Johnson, CNP

## 2021-06-22 NOTE — PROGRESS NOTES
[facility]    Facility:   [facility] [[id number]]   Code Status: DNR      CHIEF COMPLAINT/REASON FOR VISIT:  Chief Complaint   Patient presents with     Problem Visit     asked to see       HISTORY:      HPI: Amparo is a 78 y.o. female who I was asked to see secondary to most recent transitional care admission secondary to her hospitalization December 21 - December 27, 2018 secondary to dyspnea.  She did present secondary to dyspnea is found to have acute hypoxemic respiratory failure from COPD exacerbation.  She initially was started on IV steroids later transitioned to prednisone orally.  The symptoms did improve during her hospitalization.  Also she was given incentive spirometry.  At the time of discharge she did require oxygen.  She does have generalized weakness and deconditioning.  While at home she is on ampyra due to her history of normal pressure hydrocephalus and gait and balance issues.  Apparently during her hospitalization with inability to acquire this medication.  This was given to her by a specialist.  Her laboratory studies on December 21 did show sodium 141 potassium 5.1 BUN 22 creatinine 0.88 white cells 9.9 hemoglobin 11.8.  She currently is on the transitional care unit I have seen her in the past on this particular transitional care unit we had a chance to talk about all times as well as now the current times.  We talked about her lungs and she is feeling much better she does admit to generalized weakness in particular leg weakness balance and gait issues.  She feels her appetite to be improving she has not required any Zofran and has not had any nausea he has not required any inhalers or extra nebs.  Not requiring oxygen so we will discontinue the oxygen.  For her chronic pain she is on Tylenol thousand milligrams 3 times daily.  She is also on diclofenac 50 mg twice daily along with Lamictal and Abilify for her bipolar disorder and also still is  on 1 Wellbutrin 150 mg daily.  Also on Prozac 20 mg daily.  She also was placed on a prednisone taper finishing up her doxycycline on December 28.  She currently has been normotensive and afebrile.  She does seem to be in pretty good spirits overall.  Also on her left tibia she does have a about 2 cm linear wound apparently it did come back MRSA they are just doing some dressing changes that twice daily.  Apparently per her story it is improving.  She also does have scheduled a CAT scan of her right shoulder secondary to a chronic rotator cuff injury and impingement.  She was hoping eventually in the near future to be able to get that shoulder re-work done she has had a history of her right shoulder replacement about 15 years ago.    Past Medical History:   Diagnosis Date     Abdominal hernia      Alcoholism in remission (H) 2017     Anemia      Arthritis      Asthma 1/20/2017     Bipolar 1 disorder (H)      Cancer (H)      Cellulitis      Cellulitis 12/12/2016    of left elbow     COPD with acute exacerbation (H) 12/21/2018     Depression      Disease of thyroid gland      Frequent falls 2016     Frequent falls 2017     GERD (gastroesophageal reflux disease)      History of transfusion      Hydrocephalus      Hypertension      Hypothyroidism      Memory loss 2017     Normal pressure hydrocephalus 02/03/2017             Family History   Problem Relation Age of Onset     Arthritis Brother      Social History     Socioeconomic History     Marital status:      Spouse name: Not on file     Number of children: Not on file     Years of education: Not on file     Highest education level: Not on file   Social Needs     Financial resource strain: Not on file     Food insecurity - worry: Not on file     Food insecurity - inability: Not on file     Transportation needs - medical: Not on file     Transportation needs - non-medical: Not on file   Occupational History     Not on file   Tobacco Use     Smoking status:  Former Smoker     Packs/day: 0.25     Years: 30.00     Pack years: 7.50     Last attempt to quit: 1997     Years since quittin.4   Substance and Sexual Activity     Alcohol use: No     Drug use: No     Sexual activity: Not on file   Other Topics Concern     Not on file   Social History Narrative     Not on file         Review of Systems  Currently denies any chills and fever URI symptoms flulike problems postnasal drip chest pain dizziness vertigo nausea vomiting diarrhea dysuria unusual aches or pains rashes stiff neck swollen glands or headaches. History of hypertension hypothyroidism venous stasis    Current Outpatient Medications   Medication Sig     acetaminophen (TYLENOL) 500 MG tablet Take 1,000 mg by mouth 3 (three) times a day.     albuterol (PROAIR HFA;PROVENTIL HFA;VENTOLIN HFA) 90 mcg/actuation inhaler Inhale 2 puffs every 4 (four) hours as needed for wheezing.     ARIPiprazole (ABILIFY) 15 MG tablet Take 15 mg by mouth every evening.     aspirin-acetaminophen-caffeine (EXCEDRIN MIGRAINE) 250-250-65 mg per tablet Take 2 tablets by mouth every 6 (six) hours as needed for pain (Max:  8 tabs in 24 hours).     bisacodyl (DULCOLAX) 10 mg suppository Insert 1 suppository (10 mg total) into the rectum daily as needed (Constipation.).     buPROPion (WELLBUTRIN XL) 150 MG 24 hr tablet Take 150 mg by mouth every morning.            carboxymethylcellulose (REFRESH PLUS) 0.5 % Dpet ophthalmic dropperette Administer 1 drop to both eyes 4 (four) times a day as needed (dry eyes).     clobetasol (TEMOVATE) 0.05 % cream Apply 1 application topically 2 (two) times a day as needed.            dalfampridine (AMPYRA) 10 mg Tb12 Take 10 mg by mouth 2 (two) times a day.     diclofenac (VOLTAREN) 50 MG EC tablet Take 50 mg by mouth 2 (two) times a day.     docusate sodium (COLACE) 100 MG capsule Take 100 mg by mouth every morning.            doxycycline (MONODOX) 100 MG capsule Take 1 capsule (100 mg total) by mouth 2  (two) times a day for 1 day.     FLUoxetine (PROZAC) 20 MG capsule Take 40 mg by mouth every morning.            guaiFENesin (ROBITUSSIN) 100 mg/5 mL syrup Take 5 mL (100 mg total) by mouth every 6 (six) hours as needed for cough or congestion.     hydrocortisone 2.5 % cream Apply 1 application topically 2 (two) times a day as needed (rash/itching).     lamoTRIgine (LAMICTAL) 100 MG tablet Take 100 mg by mouth bedtime.     lamoTRIgine (LAMICTAL) 150 MG tablet Take 150 mg by mouth every morning.     lanolin-mineral oil (EUCERIN ORIGINAL) Lotn Apply 1 application topically daily.     levothyroxine 75 mcg cap Take 75 mcg by mouth every morning.     magnesium hydroxide (MILK OF MAG) 400 mg/5 mL Susp suspension Take 30 mL by mouth daily as needed.     multivit-min-FA-lycopen-lutein (CENTRUM SILVER) 0.4-300-250 mg-mcg-mcg tablet Take 1 tablet by mouth daily.     ondansetron (ZOFRAN) 4 MG tablet Take 4 mg by mouth every 8 (eight) hours as needed.     [START ON 12/31/2018] predniSONE (DELTASONE) 10 mg tablet Take 10 mg by mouth daily with breakfast for 3 days.     predniSONE (DELTASONE) 5 MG tablet Take 15 mg by mouth daily with breakfast for 3 days.     [START ON 1/3/2019] predniSONE (DELTASONE) 5 MG tablet Take 5 mg by mouth daily with breakfast for 3 days.     ranitidine (ZANTAC) 300 MG capsule Take 300 mg by mouth 2 (two) times a day.     tiotropium (SPIRIVA) 18 mcg inhalation capsule Place 1 capsule (2 puffs total) into inhaler and inhale daily.     urea (HEEL BALM TOP) Apply 1 application topically daily as needed. To right foot       .There were no vitals filed for this visit.  Blood pressure 142/83 pulse 90 respirations 18 temperature 97.3 weight 168 pounds  Physical Exam   Constitutional: She appears well-developed and well-nourished. No distress.   HENT:   Head: Normocephalic.   Eyes: Pupils are equal, round, and reactive to light.   Neck: Neck supple. No thyromegaly present.   Cardiovascular: Normal rate, regular  rhythm and normal heart sounds.   Pulmonary/Chest: Breath sounds normal.   Abdominal: Bowel sounds are normal. There is no tenderness. There is no guarding. Mid abd hernia.  Musculoskeletal: History of normal pressure hydrocephalus.  Gait and balance issues.  History of venous stasis. Tremors. Chronic rt rotator cuff tear/impingement.  Lymphadenopathy:   She has no cervical adenopathy.   Neurological: She is alert.   Skin: Left tibia mid tibia area linear wound currently being treated.  Psychiatric: She has a normal mood and affect. Her behavior is normal        LABS:   Lab Results   Component Value Date    WBC 9.0 12/22/2018    HGB 12.4 12/22/2018    HCT 40.8 12/22/2018    MCV 92 12/22/2018     12/27/2018     Results for orders placed or performed during the hospital encounter of 12/21/18   Basic metabolic panel   Result Value Ref Range    Sodium 142 136 - 145 mmol/L    Potassium 4.5 3.5 - 5.0 mmol/L    Chloride 107 98 - 107 mmol/L    CO2 23 22 - 31 mmol/L    Anion Gap, Calculation 12 5 - 18 mmol/L    Glucose 159 (H) 70 - 125 mg/dL    Calcium 10.1 8.5 - 10.5 mg/dL    BUN 22 8 - 28 mg/dL    Creatinine 0.71 0.60 - 1.10 mg/dL    GFR MDRD Af Amer >60 >60 mL/min/1.73m2    GFR MDRD Non Af Amer >60 >60 mL/min/1.73m2       Lab Results   Component Value Date    ALT 22 07/15/2016    AST 17 07/15/2016    ALKPHOS 116 07/15/2016    BILITOT 0.3 07/15/2016     Lab Results   Component Value Date    ALBUMIN 2.8 (L) 07/16/2016         ASSESSMENT:      ICD-10-CM    1. Dyspnea, unspecified type R06.00    2. Physical deconditioning R53.81    3. COPD with acute exacerbation (H) J44.1    4. Essential hypertension I10        PLAN:    Discontinue the oxygen.  Did encourage the incentive spirometry is also can have her inhalers.  She does not like nebs because to concentrate it does make her jittery.  Continue with the wound treatment to the left tibia we may have to add some wound gel to that in the near future but it does not look  too bad at this time.  Regarding her right shoulder she does have a CAT scan ordered for today secondary to chronic rotator cuff tear and impingement.  Continue with the current medications and treatment modalities including the rehabilitation process.    For documentation versus total visit 35 minutes which over 50% was spent with the patient going over her hospitalization care medications and coordination of care.    .    Electronically signed by: Michael Duane Johnson, CNP

## 2021-06-24 NOTE — ANESTHESIA PROCEDURE NOTES
Peripheral Block    Patient location during procedure: pre-op  Start time: 3/5/2019 7:15 AM  End time: 3/5/2019 7:19 AM  post-op analgesia per surgeon order as noted in medical record  Staffing:  Performing  Anesthesiologist: Kendell Remy MD  Preanesthetic Checklist  Completed: patient identified, site marked, risks, benefits, and alternatives discussed, timeout performed, consent obtained, at patient's request, airway assessed, oxygen available, suction available, emergency drugs available and hand hygiene performed  Peripheral Block  Block type: brachial plexus, supraclavicular  Prep: ChloraPrep  Patient position: supine  Patient monitoring: cardiac monitor, continuous pulse oximetry, heart rate and blood pressure  Laterality: right  Injection technique: ultrasound guided    Ultrasound used to visualize needle placement in proximity to nerve being blocked: yes   Permanent ultrasound image captured for medical record  Sterile gel and probe cover used for ultrasound.    Needle  Needle type: Stimuplex   Needle gauge: 20G  Needle length: 4 in  no peripheral nerve catheter placed  Assessment  Injection assessment: no difficulty with injection, negative aspiration for heme, no paresthesia on injection and incremental injection  Additional Notes   shunt tubing noted to be lying over interscalene groove, by palpation and ultrasound visualization. Supraclavicular block performed to avoid needle entry near tubing. Needle trajectory visualized throughout procedure -- avoided placement in the vicinity of  shunt tubing.

## 2021-06-24 NOTE — ANESTHESIA PREPROCEDURE EVALUATION
Anesthesia Evaluation      Patient summary reviewed   No history of anesthetic complications     Airway   Mallampati: III  Neck ROM: full   Pulmonary - normal exam    breath sounds clear to auscultation  (+) COPD mild,                          Cardiovascular   (+) hypertension, , hypercholesterolemia,     ECG reviewed  Rhythm: regular  Rate: normal,         Neuro/Psych    (+) Parkinson's disease, chronic pain    Comments: Biploar  NPH s/p  shunt  Hx alcoholism, in remission    Endo/Other    (+) hypothyroidism,      GI/Hepatic/Renal    (+) GERD,             Dental - normal exam                        Anesthesia Plan  Planned anesthetic: general endotracheal  Interscalene block for postop pain per surgeon request  ASA 3   Induction: intravenous   Anesthetic plan and risks discussed with: patient  Anesthesia plan special considerations: antiemetics,   Post-op plan: routine recovery

## 2021-06-24 NOTE — TELEPHONE ENCOUNTER
[facility] Nurse Triage Telephone Note      Provider: BIRGIT Houser  Facility: [facility]    Facility Type: TCU    Caller: Nancy  Call Back Number:  [phone]    Allergies: Amantadine; Codeine; Diazepam; Diphenhydramine; Meloxicam; Meperidine; Pentazocine; Statins-hmg-coa reductase inhibitors; Talwin [pentazocine lactate]; and Tramadol    Reason for call: BMP all WNL, and CBC stable at this time, no complaints at this time, not on any iron      Verbal Order/Direction given by Provider: Hgb re-check 1 week     Provider giving order: BIRGIT Houser    Verbal order given to: Casimiro Felix RN

## 2021-06-24 NOTE — PROGRESS NOTES
[facility]    DATE: 3/11/2019    NAME:  Amparo Sharma             :  1940  MRN: [id number]  CODE STATUS:  FULL CODE    VISIT TYPE: Problem Visit (hospital f/u)     [id number]       CHIEF COMPLAIN/REASON FOR VISIT:    Chief Complaint   Patient presents with     Problem Visit     hospital f/u               HISTORY OF PRESENT ILLNESS: Amparo Sharma is a 78 y.o. female who was admitted 3/5-3/8 for right reverse total shoulder arthroplasty. Chest xray was done for increased cough but was negative for pneumonia. She was treated with ceftin and doxycycline. She was discharged to TCU for further rehab. She has PMH of colon cancer, hypothyroid, normal pressure hydrocephalus s/p  shunt, HTN, COPD, bipolar disorder, urinary frequency. Prior to this she lived at [facility].     Today Ms. Sharma states is still having a terrible cough and bringing up thick, green mucous. This started in the hospital and they did an xray but told her she did not have pneumonia. She was told she didn't need antibiotics but now hears she is on two. She says she had pneumonia back in December and was in the hospital. She recovered and was fine until she had anesthesia for her surgery. She says the robitussin is not helping at all and wondering if she can have something different. She is on a couple inhalers now, the salmeterol is new for her. She was doing nebs in the hospital until she became allergic to it. She says she developed headaches and nausea so she had to quit doing them. She says she felt quite ill. She says she has used mucinex in the past and it did help her. She reports she is short of breath with exertion but not at rest. She denies any fevers or chills. She says she has no urinary symptoms today but does have chronic incontinence and wears pads at home. She reports having frequent utis and was even treated for one prior to her surgery  when she went in for her preop they found one. She was on ceftin for that. She doesn't know why this is on her med list now. She says she doesn't like taking the oxycodone because of how it makes her feel. She usually takes one if she does need it but during the night she had a lot of pain and had to take 2. She spaces it out and takes them every 6-8 hours instead of how it is ordered. It does help when she takes it but doesn't want to be on it for very long. Right now her pain is a 4 and she last had meds during the night. The tylenol does help and wondering if she could have 4 times per day. She has not had any nausea since being off the nebs and has not used the zofran. She says her appetite is good but she does get full earlier than before. She says she does not want the colace and would like miralax every day instead for constipation. She didn't have a bm for 5 days but then finally worked on this yesterday and went. She had prune juice, miralax, mom, suppository, and more prune juice and finally had one last night. She says her mood has been stable for several years now and declines need to see psych while in house. She says she moved from [place] about 3 years ago and has not had any med changes since. Her family doctor has been managing this not a psychiatrist. She denies any other concerns today.     REVIEW OF SYSTEMS:  PROBLEMS AND REVIEW OF SYSTEMS:   Today on ROS:   Currently, no fever, chills, or rigors. Decreased vision and hearing. Denies any chest pain, headaches, palpitations, lightheadedness, dizziness. Appetite is good. Denies any GERD symptoms. Denies any difficulty with swallowing, nausea, or vomiting. Positive for controlled pain, constipation, weakness, right shoulder incision, right shoulder sling, edema right arm, urinary incontinence, insomnia, shortness of breath with exertion, congested cough and green mucous      Allergies   Allergen Reactions     Amantadine Other (See Comments)      Codeine Itching     Diazepam Other (See Comments)     Hallucinations/paranoid     Diphenhydramine Other (See Comments)     hyperactivity     Meloxicam Other (See Comments)     Meperidine Nausea Only     Pentazocine Other (See Comments)     drowsiness     Statins-Hmg-Coa Reductase Inhibitors Other (See Comments)     Was hospitalized, rhabdomyolitis  Rhabdo?  Statins     Talwin [Pentazocine Lactate]      Tramadol Nausea Only     Current Outpatient Medications   Medication Sig     polyethylene glycol (MIRALAX) 17 gram packet Take 17 g by mouth daily.     acetaminophen (TYLENOL) 500 MG tablet Take 1,000 mg by mouth 4 (four) times a day.            albuterol (PROAIR HFA;PROVENTIL HFA;VENTOLIN HFA) 90 mcg/actuation inhaler Inhale 2 puffs every 4 (four) hours as needed for wheezing.     amoxicillin (AMOXIL) 500 MG tablet Take 500 mg by mouth see administration instructions. Take 1 hour prior to dental appointment     ARIPiprazole (ABILIFY) 15 MG tablet Take 15 mg by mouth every evening.     aspirin 325 MG tablet Take 1 tablet (325 mg total) by mouth 2 (two) times a day.     buPROPion (WELLBUTRIN XL) 150 MG 24 hr tablet Take 150 mg by mouth every morning.            carboxymethylcellulose (REFRESH PLUS) 0.5 % Dpet ophthalmic dropperette Administer 1 drop to both eyes 4 (four) times a day as needed (dry eyes).     dalfampridine (AMPYRA) 10 mg Tb12 Take 10 mg by mouth 2 (two) times a day.     doxycycline (VIBRA-TABS) 100 MG tablet Take 1 tablet (100 mg total) by mouth 2 (two) times a day for 10 days.     famotidine (PEPCID) 20 MG tablet Take 1 tablet (20 mg total) by mouth 2 (two) times a day.     FLUoxetine (PROZAC) 20 MG capsule Take 40 mg by mouth every morning.            hydrocortisone 2.5 % cream Apply 1 application topically 2 (two) times a day as needed (rash/itching).     lamoTRIgine (LAMICTAL) 100 MG tablet Take 100 mg by mouth bedtime.     lamoTRIgine (LAMICTAL) 150 MG tablet Take 150 mg by mouth every morning.      lanolin-mineral oil (EUCERIN ORIGINAL) Lotn Apply 1 application topically daily.     levothyroxine 75 mcg cap Take 75 mcg by mouth every morning.     magnesium hydroxide (MILK OF MAG) 400 mg/5 mL Susp suspension Take 30 mL by mouth daily as needed.     multivit-min-FA-lycopen-lutein (CENTRUM SILVER) 0.4-300-250 mg-mcg-mcg tablet Take 1 tablet by mouth daily.     oxyCODONE (ROXICODONE) 5 MG immediate release tablet Take 1-2 tablets (5-10 mg total) by mouth every 4 (four) hours as needed.     polyvinyl alcohol (LIQUIFILM TEARS) 1.4 % ophthalmic solution Administer 1 drop to both eyes as needed for dry eyes.     salmeterol (SEREVENT) 50 mcg/dose diskus inhaler Inhale 1 puff 2 (two) times a day.     umeclidinium (INCRUSE ELLIPTA) 62.5 mcg/actuation DsDv inhaler Inhale 1 puff daily.     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) 2017     Anemia      Arthritis      Asthma 1/20/2017     Bipolar 1 disorder (H)      Cancer (H)      Cellulitis      Cellulitis 12/12/2016    of left elbow     Chronic pain syndrome      COPD with acute exacerbation (H) 12/21/2018     Depression      Disease of thyroid gland      Frequent falls 2016     Frequent falls 2017     GERD (gastroesophageal reflux disease)      History of transfusion      Hydrocephalus      Hyperlipidemia      Hypertension      Hypothyroidism      Infection of skin due to methicillin resistant Staphylococcus aureus (MRSA)      Memory loss 2017     Normal pressure hydrocephalus 02/03/2017     Parkinson disease (H)      Renal insufficiency      Urinary incontinence            PHYSICAL EXAMINATION  Vitals:    03/10/19 2113   BP: 123/69   Pulse: 91   Resp: 18   Temp: 97.7  F (36.5  C)   SpO2: 91%       Today on physical exam:     GENERAL: Awake, Alert, oriented x3, not in any form of acute distress, answers questions appropriately, follows simple commands, conversant  HEENT: Head is normocephalic with  normal hair distribution. No evidence of trauma. Ears: No acute purulent discharge. Eyes: Conjunctivae pink with no scleral jaundice. Nose: Normal mucosa and septum. NECK: Supple with no cervical or supraclavicular lymphadenopathy. Trachea is midline.   CHEST: No tenderness or deformity, no crepitus  LUNG: dim with expiratory wheeze to auscultation with good chest expansion. Congested cough, green sputum  BACK: No kyphosis of the thoracic spine. Symmetric, no curvature, ROM normal, no CVA tenderness, no spinal tenderness   CVS: There is good S1  S2, regular rhythm, there are no murmurs, rubs, gallops, or heaves,  2+ pulses symmetric in all extremities.  ABDOMEN: Rounded and soft, nontender to palpation, non distended, no masses, no organomegaly, good bowel sounds, no rebound or guarding, no peritoneal signs.   EXTREMITIES: Right shoulder incision small amount of sanguinous drainage, covered with dressing, 2+ edema, mild numbness in rue, 2+ edema right arm, sling in place  SKIN: Warm and dry,   NEUROLOGICAL: The patient is oriented to person, place and time. Weakness, unsteady gait            LABS:   Recent Results (from the past 168 hour(s))   HM2(CBC w/o Differential)   Result Value Ref Range    WBC 8.8 4.0 - 11.0 thou/uL    RBC 4.72 3.80 - 5.40 mill/uL    Hemoglobin 13.1 12.0 - 16.0 g/dL    Hematocrit 42.5 35.0 - 47.0 %    MCV 90 80 - 100 fL    MCH 27.8 27.0 - 34.0 pg    MCHC 30.8 (L) 32.0 - 36.0 g/dL    RDW 15.7 (H) 11.0 - 14.5 %    Platelets 332 140 - 440 thou/uL    MPV 9.4 8.5 - 12.5 fL   Potassium   Result Value Ref Range    Potassium 4.5 3.5 - 5.0 mmol/L   Creatinine   Result Value Ref Range    Creatinine 0.72 0.60 - 1.10 mg/dL    GFR MDRD Af Amer >60 >60 mL/min/1.73m2    GFR MDRD Non Af Amer >60 >60 mL/min/1.73m2   INR (if patient on warfarin)   Result Value Ref Range    INR 0.96 0.90 - 1.10   APTT(PTT)  (if patient on heparin)   Result Value Ref Range    PTT 28 24 - 37 seconds   Glucose  (if patient  diabetic)   Result Value Ref Range    Glucose 104 70 - 125 mg/dL    Patient Fasting > 8hrs? Unknown    Type and Screen (order for total hip, total shoulder and shoulder hemiarthroplasty)   Result Value Ref Range    ABORh B POS     Antibody Screen Negative Negative   Hemoglobin - Day of Surgery   Result Value Ref Range    Hemoglobin 12.5 12.0 - 16.0 g/dL   Hemoglobin - Daily x 2   Result Value Ref Range    Hemoglobin 9.9 (L) 12.0 - 16.0 g/dL   Hemoglobin - Daily x 2   Result Value Ref Range    Hemoglobin 9.7 (L) 12.0 - 16.0 g/dL   HM1 (CBC with Diff)   Result Value Ref Range    WBC 8.2 4.0 - 11.0 thou/uL    RBC 3.19 (L) 3.80 - 5.40 mill/uL    Hemoglobin 9.1 (L) 12.0 - 16.0 g/dL    Hematocrit 29.7 (L) 35.0 - 47.0 %    MCV 93 80 - 100 fL    MCH 28.5 27.0 - 34.0 pg    MCHC 30.6 (L) 32.0 - 36.0 g/dL    RDW 15.4 (H) 11.0 - 14.5 %    Platelets 232 140 - 440 thou/uL    MPV 10.4 8.5 - 12.5 fL    Neutrophils % 69 50 - 70 %    Lymphocytes % 15 (L) 20 - 40 %    Monocytes % 11 (H) 2 - 10 %    Eosinophils % 4 0 - 6 %    Basophils % 1 0 - 2 %    Neutrophils Absolute 5.6 2.0 - 7.7 thou/uL    Lymphocytes Absolute 1.2 0.8 - 4.4 thou/uL    Monocytes Absolute 0.9 0.0 - 0.9 thou/uL    Eosinophils Absolute 0.3 0.0 - 0.4 thou/uL    Basophils Absolute 0.0 0.0 - 0.2 thou/uL     Results for orders placed or performed during the hospital encounter of 12/21/18   Basic metabolic panel   Result Value Ref Range    Sodium 142 136 - 145 mmol/L    Potassium 4.5 3.5 - 5.0 mmol/L    Chloride 107 98 - 107 mmol/L    CO2 23 22 - 31 mmol/L    Anion Gap, Calculation 12 5 - 18 mmol/L    Glucose 159 (H) 70 - 125 mg/dL    Calcium 10.1 8.5 - 10.5 mg/dL    BUN 22 8 - 28 mg/dL    Creatinine 0.71 0.60 - 1.10 mg/dL    GFR MDRD Af Amer >60 >60 mL/min/1.73m2    GFR MDRD Non Af Amer >60 >60 mL/min/1.73m2         Lab Results   Component Value Date    WBC 8.2 03/08/2019    HGB 9.1 (L) 03/08/2019    HCT 29.7 (L) 03/08/2019    MCV 93 03/08/2019     03/08/2019        Lab Results   Component Value Date    OQKBDHAG79 569 07/16/2016     Lab Results   Component Value Date    HGBA1C 5.7 07/15/2016     Lab Results   Component Value Date    INR 0.96 03/05/2019     No results found for: [id number]  Lab Results   Component Value Date    TSH 3.33 02/25/2018           ASSESSMENT/PLAN:    1. S/p reverse total shoulder arthroplasty: Incision c/d/i. 2+ edema right shoulder, arm, mild numbness present. Sling in place, NWB. F/u with ortho in 7-10 days. Pain controlled on tylenol and oxycodone. Will change tylenol to four times a day per request. Wean off oxycodone as able. On aspirin two times a day for dvt prophylaxis.   2. COPD, Acute bronchitis: On doxycycline. No fevers or chills. cxr negative for pneumonia in hospital. On incruse ellipta, albuterol prn. Salmeterol added this hospital stay. Intolerant to nebs. Add mucinex two times a day x 7 days, dextromethorphan cough syrup prn. IS q1h while awake.   3. Normal pressure hydrocephalus, s/p  shunt: Unsteady gait, on ampyra. Follows with neurology.   4. HTN: SBP 120s. No meds. Monitor.   5. Bipolar disorder: Mood stable recently. On abilify, wellbutrin, fluoxetine, lamictal. Declines psych referral at this time.   6. H/o colon cancer: No recent concerns. Added lactobacillus while on doxy.   7. Constipation: No bm for 5 days then finally yesterday. Dc colace and scheduled miralax daily per request.   8. UTI: Completed cefdinir prior to surgery will dc.   9. GERD: on pepcid.   10. Hypothyroid: On levothyroxine.   11. Acute blood loss anemia: hg 9.1 on 3/8. Recheck on 3/12.     Bmp, hm1 on 3/12    Per therapy eval today    Electronically signed by: Casie Barger NP            Total time spent with patient 35 min with 25 min spent in counseling and coordinating of care. Counseling was done regarding hospital stay, postop complications, diagnoses, med changes, lab results. Discussed extensively symptoms including bronchitis, persistent  cough, potential causes, treatment options. Discussed need for further lab monitoring and repeat imaging if no improvement. Discussed mobility limitations with postop recovery and pain regimen. Discussed need for further specialty follow up and monitoring. Educated on importance of stress reduction, physical activity, dietary management. Coordination of care in collaboration with , therapy, and nursing in regards to mobility limitations, skilled nursing needs, med changes, therapy concerns.

## 2021-06-24 NOTE — ANESTHESIA POSTPROCEDURE EVALUATION
Patient: Amparo Sharma  #1  RIGHT REVERSE TOTAL SHOULDER ARTHROPLASTY  Anesthesia type: general    Patient location: PACU  Last vitals:   Vitals:    03/05/19 1423   BP: 131/73   Pulse: 97   Resp: 24   Temp: 36.7  C (98  F)   SpO2: 94%     Post vital signs: stable  Level of consciousness: awake and responds to simple questions  Post-anesthesia pain: pain controlled  Post-anesthesia nausea and vomiting: no  Pulmonary: unassisted, return to baseline  Cardiovascular: stable and blood pressure at baseline  Hydration: adequate  Anesthetic events: no    QCDR Measures:  ASA# 11 - Melanie-op Cardiac Arrest: ASA11B - Patient did NOT experience unanticipated cardiac arrest  ASA# 12 - Melanie-op Mortality Rate: ASA12B - Patient did NOT die  ASA# 13 - PACU Re-Intubation Rate: ASA13B - Patient did NOT require a new airway mgmt  ASA# 10 - Composite Anes Safety: ASA10A - No serious adverse event    Additional Notes:

## 2021-06-24 NOTE — PROGRESS NOTES
[facility]    DATE: 3/14/2019    NAME:  Amparo Sharma             :  1940  MRN: [id number]  CODE STATUS:  FULL CODE    VISIT TYPE: Review Of Multiple Medical Conditions     FACILITY:  [facility] [[id number]]       CHIEF COMPLAIN/REASON FOR VISIT:    Chief Complaint   Patient presents with     Review Of Multiple Medical Conditions               HISTORY OF PRESENT ILLNESS: Amparo Sharma is a 78 y.o. female who was admitted 3/5-3/8 for right reverse total shoulder arthroplasty. Chest xray was done for increased cough but was negative for pneumonia. She was treated with ceftin and doxycycline. She was discharged to TCU for further rehab. She has PMH of colon cancer, hypothyroid, normal pressure hydrocephalus s/p  shunt, HTN, COPD, bipolar disorder, urinary frequency. Prior to this she lived at [facility].     Today Ms. Sharma is seen for follow up on cough and pain. She reports that her cough seems to be getting worse. It seems to be deeper than before and still bringing up lots of yellow mucus. She says the mucinex is loosening things up but still having trouble getting it up. She says she has not had any fevers or chills. She reports that she is having worse shortness of breath especially with walking. She reports that she is having dizziness and having headaches at times. She denies runny nose and bowels are moving fine. She says they had o2 in her room the other day but then they took it out. She does not recall them checking her o2 sats with walking or anything. She says she has used the cough syrup but doesn't think it really helps. She is not sure what else to do for her cough since she could not tolerate the nebs in the hospital. She thinks it was albuterol nebs and they were causing shakiness, tremors, heart palpitations and sweating. She would be willing to try a different nebulizer. She says she knows that spiriva the inhaler is not  covered by her insurance. She says she does not feel the antibiotics are helping her cough much. She does says he would prefer the cough syrup be scheduled twice a day instead of just as needed. She would like to try the saline nebs scheduled today and then re-evaluate tomorrow.     REVIEW OF SYSTEMS:  PROBLEMS AND REVIEW OF SYSTEMS:   Today on ROS:   Currently, no fever, chills, or rigors. Decreased vision and hearing. Denies any chest pain, headaches, palpitations, lightheadedness, dizziness. Appetite is good. Denies any GERD symptoms. Denies any difficulty with swallowing, nausea, or vomiting. Positive for controlled pain, constipation, weakness, right shoulder incision, right shoulder sling, edema right arm, urinary incontinence, shortness of breath with exertion, congested cough and yellow/green mucous      Allergies   Allergen Reactions     Amantadine Other (See Comments)     Codeine Itching     Diazepam Other (See Comments)     Hallucinations/paranoid     Diphenhydramine Other (See Comments)     hyperactivity     Meloxicam Other (See Comments)     Meperidine Nausea Only     Pentazocine Other (See Comments)     drowsiness     Statins-Hmg-Coa Reductase Inhibitors Other (See Comments)     Was hospitalized, rhabdomyolitis  Rhabdo?  Statins     Talwin [Pentazocine Lactate]      Tramadol Nausea Only     Current Outpatient Medications   Medication Sig     dextromethorphan (ROBITUSSIN MAX STRENGTH) 15 mg/5 mL Liqd syrup Take 30 mg by mouth 2 (two) times a day.     sodium chloride 3 % nebulizer solution Take 3 mL by nebulization 2 (two) times a day. And q4h prn     acetaminophen (TYLENOL) 500 MG tablet Take 1,000 mg by mouth 4 (four) times a day.            albuterol (PROAIR HFA;PROVENTIL HFA;VENTOLIN HFA) 90 mcg/actuation inhaler Inhale 2 puffs every 4 (four) hours as needed for wheezing.     amoxicillin (AMOXIL) 500 MG tablet Take 500 mg by mouth see administration instructions. Take 1 hour prior to dental  appointment     ARIPiprazole (ABILIFY) 15 MG tablet Take 15 mg by mouth every evening.     aspirin 325 MG tablet Take 1 tablet (325 mg total) by mouth 2 (two) times a day.     buPROPion (WELLBUTRIN XL) 150 MG 24 hr tablet Take 150 mg by mouth every morning.            carboxymethylcellulose (REFRESH PLUS) 0.5 % Dpet ophthalmic dropperette Administer 1 drop to both eyes 4 (four) times a day as needed (dry eyes).     dalfampridine (AMPYRA) 10 mg Tb12 Take 10 mg by mouth 2 (two) times a day.     doxycycline (VIBRA-TABS) 100 MG tablet Take 1 tablet (100 mg total) by mouth 2 (two) times a day for 10 days.     famotidine (PEPCID) 20 MG tablet Take 1 tablet (20 mg total) by mouth 2 (two) times a day.     FLUoxetine (PROZAC) 20 MG capsule Take 40 mg by mouth every morning.            hydrocortisone 2.5 % cream Apply 1 application topically 2 (two) times a day as needed (rash/itching).     lamoTRIgine (LAMICTAL) 100 MG tablet Take 100 mg by mouth bedtime.     lamoTRIgine (LAMICTAL) 150 MG tablet Take 150 mg by mouth every morning.     lanolin-mineral oil (EUCERIN ORIGINAL) Lotn Apply 1 application topically daily.     levothyroxine 75 mcg cap Take 75 mcg by mouth every morning.     magnesium hydroxide (MILK OF MAG) 400 mg/5 mL Susp suspension Take 30 mL by mouth daily as needed.     multivit-min-FA-lycopen-lutein (CENTRUM SILVER) 0.4-300-250 mg-mcg-mcg tablet Take 1 tablet by mouth daily.     oxyCODONE (ROXICODONE) 5 MG immediate release tablet Take 1-2 tablets (5-10 mg total) by mouth every 4 (four) hours as needed.     polyethylene glycol (MIRALAX) 17 gram packet Take 17 g by mouth daily.     polyvinyl alcohol (LIQUIFILM TEARS) 1.4 % ophthalmic solution Administer 1 drop to both eyes as needed for dry eyes.     salmeterol (SEREVENT) 50 mcg/dose diskus inhaler Inhale 1 puff 2 (two) times a day.     umeclidinium (INCRUSE ELLIPTA) 62.5 mcg/actuation DsDv inhaler Inhale 1 puff daily.     Past Medical History:    Past Medical  History:   Diagnosis Date     Abdominal hernia      Alcoholism in remission (H) 2017     Anemia      Arthritis      Asthma 1/20/2017     Bipolar 1 disorder (H)      Cancer (H)      Cellulitis      Cellulitis 12/12/2016    of left elbow     Chronic pain syndrome      COPD with acute exacerbation (H) 12/21/2018     Depression      Disease of thyroid gland      Frequent falls 2016     Frequent falls 2017     GERD (gastroesophageal reflux disease)      History of transfusion      Hydrocephalus      Hyperlipidemia      Hypertension      Hypothyroidism      Infection of skin due to methicillin resistant Staphylococcus aureus (MRSA)      Memory loss 2017     Normal pressure hydrocephalus 02/03/2017     Parkinson disease (H)      Renal insufficiency      Urinary incontinence            PHYSICAL EXAMINATION  Vitals:    03/13/19 2212   BP: 123/63   Pulse: 80   Resp: 18   Temp: 97.7  F (36.5  C)   SpO2: 91%   Weight: 175 lb (79.4 kg)       Today on physical exam:     GENERAL: Awake, Alert, oriented x3, not in any form of acute distress, answers questions appropriately, follows simple commands, conversant  HEENT: Head is normocephalic with normal hair distribution. No evidence of trauma. Ears: No acute purulent discharge. Eyes: Conjunctivae pink with no scleral jaundice. Nose: Normal mucosa and septum. NECK: Supple with no cervical or supraclavicular lymphadenopathy. Trachea is midline.   CHEST: No tenderness or deformity, no crepitus  LUNG: dim with expiratory wheeze to auscultation with good chest expansion. Congested cough, yellow sputum  BACK: No kyphosis of the thoracic spine. Symmetric, no curvature, ROM normal, no CVA tenderness, no spinal tenderness   CVS: There is good S1  S2, regular rhythm, there are no murmurs, rubs, gallops, or heaves,  2+ pulses symmetric in all extremities.  ABDOMEN: Rounded and soft, nontender to palpation, non distended, no masses, no organomegaly, good bowel sounds, no rebound or guarding, no  peritoneal signs.   EXTREMITIES: Right shoulder incision small amount of sanguinous drainage, covered with dressing, 2+ edema, mild numbness in rue, 2+ edema right arm, sling in place  SKIN: Warm and dry,   NEUROLOGICAL: The patient is oriented to person, place and time. Weakness, unsteady gait            LABS:   Recent Results (from the past 168 hour(s))   HM1 (CBC with Diff)   Result Value Ref Range    WBC 8.2 4.0 - 11.0 thou/uL    RBC 3.19 (L) 3.80 - 5.40 mill/uL    Hemoglobin 9.1 (L) 12.0 - 16.0 g/dL    Hematocrit 29.7 (L) 35.0 - 47.0 %    MCV 93 80 - 100 fL    MCH 28.5 27.0 - 34.0 pg    MCHC 30.6 (L) 32.0 - 36.0 g/dL    RDW 15.4 (H) 11.0 - 14.5 %    Platelets 232 140 - 440 thou/uL    MPV 10.4 8.5 - 12.5 fL    Neutrophils % 69 50 - 70 %    Lymphocytes % 15 (L) 20 - 40 %    Monocytes % 11 (H) 2 - 10 %    Eosinophils % 4 0 - 6 %    Basophils % 1 0 - 2 %    Neutrophils Absolute 5.6 2.0 - 7.7 thou/uL    Lymphocytes Absolute 1.2 0.8 - 4.4 thou/uL    Monocytes Absolute 0.9 0.0 - 0.9 thou/uL    Eosinophils Absolute 0.3 0.0 - 0.4 thou/uL    Basophils Absolute 0.0 0.0 - 0.2 thou/uL   Basic Metabolic Panel   Result Value Ref Range    Sodium 141 136 - 145 mmol/L    Potassium 4.3 3.5 - 5.0 mmol/L    Chloride 106 98 - 107 mmol/L    CO2 27 22 - 31 mmol/L    Anion Gap, Calculation 8 5 - 18 mmol/L    Glucose 100 70 - 125 mg/dL    Calcium 9.7 8.5 - 10.5 mg/dL    BUN 16 8 - 28 mg/dL    Creatinine 0.64 0.60 - 1.10 mg/dL    GFR MDRD Af Amer >60 >60 mL/min/1.73m2    GFR MDRD Non Af Amer >60 >60 mL/min/1.73m2   HM1 (CBC with Diff)   Result Value Ref Range    WBC 7.3 4.0 - 11.0 thou/uL    RBC 3.23 (L) 3.80 - 5.40 mill/uL    Hemoglobin 9.0 (L) 12.0 - 16.0 g/dL    Hematocrit 30.9 (L) 35.0 - 47.0 %    MCV 96 80 - 100 fL    MCH 27.9 27.0 - 34.0 pg    MCHC 29.1 (L) 32.0 - 36.0 g/dL    RDW 16.0 (H) 11.0 - 14.5 %    Platelets 360 140 - 440 thou/uL    MPV 10.8 8.5 - 12.5 fL    Neutrophils % 59 50 - 70 %    Lymphocytes % 21 20 - 40 %     Monocytes % 11 (H) 2 - 10 %    Eosinophils % 8 (H) 0 - 6 %    Basophils % 1 0 - 2 %    Neutrophils Absolute 4.2 2.0 - 7.7 thou/uL    Lymphocytes Absolute 1.5 0.8 - 4.4 thou/uL    Monocytes Absolute 0.8 0.0 - 0.9 thou/uL    Eosinophils Absolute 0.6 (H) 0.0 - 0.4 thou/uL    Basophils Absolute 0.1 0.0 - 0.2 thou/uL     Results for orders placed or performed in visit on 03/12/19   Basic Metabolic Panel   Result Value Ref Range    Sodium 141 136 - 145 mmol/L    Potassium 4.3 3.5 - 5.0 mmol/L    Chloride 106 98 - 107 mmol/L    CO2 27 22 - 31 mmol/L    Anion Gap, Calculation 8 5 - 18 mmol/L    Glucose 100 70 - 125 mg/dL    Calcium 9.7 8.5 - 10.5 mg/dL    BUN 16 8 - 28 mg/dL    Creatinine 0.64 0.60 - 1.10 mg/dL    GFR MDRD Af Amer >60 >60 mL/min/1.73m2    GFR MDRD Non Af Amer >60 >60 mL/min/1.73m2         Lab Results   Component Value Date    WBC 7.3 03/12/2019    HGB 9.0 (L) 03/12/2019    HCT 30.9 (L) 03/12/2019    MCV 96 03/12/2019     03/12/2019       Lab Results   Component Value Date    MLRYPRIN07 569 07/16/2016     Lab Results   Component Value Date    HGBA1C 5.7 07/15/2016     Lab Results   Component Value Date    INR 0.96 03/05/2019     No results found for: [id number]  Lab Results   Component Value Date    TSH 3.33 02/25/2018           ASSESSMENT/PLAN:    1. S/p reverse total shoulder arthroplasty: Incision c/d/i. 2+ edema right shoulder, arm, mild numbness present. Sling in place, NWB. F/u with ortho in 7-10 days. Pain controlled on tylenol and oxycodone. Wean off oxycodone as able. On aspirin two times a day for dvt prophylaxis. Doing well today.  2. COPD, Acute bronchitis: On doxycycline. No fevers or chills. cxr negative for pneumonia in hospital. On incruse ellipta, albuterol prn. Salmeterol added this hospital stay. Intolerant to nebs. Added mucinex two times a day x 7 days, dextromethorphan cough syrup prn. IS q1h while awake. spiriva not covered by insurance. Will change dextromethorphan cough  syrup to two times a day and q6h prn, add saline nebs three times a day and will re evaluate tomorrow. Discussed interoerant was related to albuterol, may need to try atrovent. Check o2 sats with therapy today, complaining of worse shortness of breath with exertion.   3. Normal pressure hydrocephalus, s/p  shunt: Unsteady gait, on ampyra. Follows with neurology.   4. HTN: SBP 120s. No meds. Monitor.   5. Bipolar disorder: Mood stable recently. On abilify, wellbutrin, fluoxetine, lamictal. Declines psych referral at this time.   6. H/o colon cancer: No recent concerns. Added lactobacillus while on doxy.   7. Constipation: No bm for 5 days then finally yesterday. on scheduled miralax daily per request.   8.  Acute blood loss anemia: hg 9.1 on 3/8. Recheck on 3/12-9.  9. GERD: on pepcid.   10. Hypothyroid: On levothyroxine.       Per therapy soft LCD 3/25 assist of 1 for toileting, mod for upper body dressing, assist of for transfers, min for bed mobility, non ambulatory. From [facility] assisted living. Wheelchair bound at baseline.     Electronically signed by: Casie Barger NP

## 2021-06-24 NOTE — ANESTHESIA CARE TRANSFER NOTE
Last vitals:   Vitals:    03/05/19 1050   BP: 135/77   Pulse: 87   Resp: 16   Temp: 36.6  C (97.9  F)   SpO2: 98%     Patient's level of consciousness is awake  Spontaneous respirations: yes  Maintains airway independently: yes  Dentition unchanged: yes  Oropharynx: oropharynx clear of all foreign objects    QCDR Measures:  ASA# 20 - Surgical Safety Checklist: WHO surgical safety checklist completed prior to induction    PQRS# 430 - Adult PONV Prevention: 4558F - Pt received => 2 anti-emetic agents (different classes) preop & intraop  ASA# 8 - Peds PONV Prevention: NA - Not pediatric patient, not GA or 2 or more risk factors NOT present  PQRS# 424 - Melanie-op Temp Management: 4559F - At least one body temp DOCUMENTED => 35.5C or 95.9F within required timeframe  PQRS# 426 - PACU Transfer Protocol: - Transfer of care checklist used  ASA# 14 - Acute Post-op Pain: ASA14B - Patient did NOT experience pain >= 7 out of 10

## 2021-06-25 NOTE — PROGRESS NOTES
[facility]    DATE: 3/15/2019    NAME:  Amparo Sharma             :  1940  MRN: [id number]  CODE STATUS:  FULL CODE    VISIT TYPE: Problem Visit     FACILITY:  [facility] [[id number]]       CHIEF COMPLAIN/REASON FOR VISIT:    Chief Complaint   Patient presents with     Problem Visit               HISTORY OF PRESENT ILLNESS: Amparo Sharma is a 78 y.o. female who was admitted 3/5-3/8 for right reverse total shoulder arthroplasty. Chest xray was done for increased cough but was negative for pneumonia. She was treated with ceftin and doxycycline. She was discharged to TCU for further rehab. She has PMH of colon cancer, hypothyroid, normal pressure hydrocephalus s/p  shunt, HTN, COPD, bipolar disorder, urinary frequency. Prior to this she lived at [facility].     Today Ms. Sharma is seen for follow up on cough. She reports she did not have any nebulizers yesterday and she is not sure why. She says otherwise the cough is the same today. She denies fevers and feels the shortness of breath is the same. She is working with therapy and says she often has to stop because she goes into coughing fits. She deneis issues with bowels, bladder, or other concerns over night. She is willing to try the atroven nebulizer if needed over the weekend. Discussed with nursing and saline nebs are in med cart, unsure when arrived but documented not given yesterday. Nursing will administer first dose this am. Discussed with Amparo will order atrovent prn only and have her try the hypertonic saline over the weekend scheduled. Will follow up with her next week.     REVIEW OF SYSTEMS:  PROBLEMS AND REVIEW OF SYSTEMS:   Today on ROS:   Currently, no fever, chills, or rigors. Decreased vision and hearing. Denies any chest pain, headaches, palpitations, lightheadedness, dizziness. Appetite is good. Denies any GERD symptoms. Denies any difficulty with swallowing, nausea, or  vomiting. Positive for controlled pain, constipation, weakness, right shoulder incision, right shoulder sling, edema right arm, urinary incontinence, shortness of breath with exertion, congested cough and yellow/green mucous      Allergies   Allergen Reactions     Amantadine Other (See Comments)     Codeine Itching     Diazepam Other (See Comments)     Hallucinations/paranoid     Diphenhydramine Other (See Comments)     hyperactivity     Meloxicam Other (See Comments)     Meperidine Nausea Only     Pentazocine Other (See Comments)     drowsiness     Statins-Hmg-Coa Reductase Inhibitors Other (See Comments)     Was hospitalized, rhabdomyolitis  Rhabdo?  Statins     Talwin [Pentazocine Lactate]      Tramadol Nausea Only     Current Outpatient Medications   Medication Sig     acetaminophen (TYLENOL) 500 MG tablet Take 1,000 mg by mouth 4 (four) times a day.            albuterol (PROAIR HFA;PROVENTIL HFA;VENTOLIN HFA) 90 mcg/actuation inhaler Inhale 2 puffs every 4 (four) hours as needed for wheezing.     amoxicillin (AMOXIL) 500 MG tablet Take 500 mg by mouth see administration instructions. Take 1 hour prior to dental appointment     ARIPiprazole (ABILIFY) 15 MG tablet Take 15 mg by mouth every evening.     aspirin 325 MG tablet Take 1 tablet (325 mg total) by mouth 2 (two) times a day.     buPROPion (WELLBUTRIN XL) 150 MG 24 hr tablet Take 150 mg by mouth every morning.            carboxymethylcellulose (REFRESH PLUS) 0.5 % Dpet ophthalmic dropperette Administer 1 drop to both eyes 4 (four) times a day as needed (dry eyes).     dalfampridine (AMPYRA) 10 mg Tb12 Take 10 mg by mouth 2 (two) times a day.     dextromethorphan (ROBITUSSIN MAX STRENGTH) 15 mg/5 mL Liqd syrup Take 30 mg by mouth 2 (two) times a day.     doxycycline (VIBRA-TABS) 100 MG tablet Take 1 tablet (100 mg total) by mouth 2 (two) times a day for 10 days.     famotidine (PEPCID) 20 MG tablet Take 1 tablet (20 mg total) by mouth 2 (two) times a day.      FLUoxetine (PROZAC) 20 MG capsule Take 40 mg by mouth every morning.            hydrocortisone 2.5 % cream Apply 1 application topically 2 (two) times a day as needed (rash/itching).     lamoTRIgine (LAMICTAL) 100 MG tablet Take 100 mg by mouth bedtime.     lamoTRIgine (LAMICTAL) 150 MG tablet Take 150 mg by mouth every morning.     lanolin-mineral oil (EUCERIN ORIGINAL) Lotn Apply 1 application topically daily.     levothyroxine 75 mcg cap Take 75 mcg by mouth every morning.     magnesium hydroxide (MILK OF MAG) 400 mg/5 mL Susp suspension Take 30 mL by mouth daily as needed.     multivit-min-FA-lycopen-lutein (CENTRUM SILVER) 0.4-300-250 mg-mcg-mcg tablet Take 1 tablet by mouth daily.     oxyCODONE (ROXICODONE) 5 MG immediate release tablet Take 1-2 tablets (5-10 mg total) by mouth every 4 (four) hours as needed.     polyethylene glycol (MIRALAX) 17 gram packet Take 17 g by mouth daily.     polyvinyl alcohol (LIQUIFILM TEARS) 1.4 % ophthalmic solution Administer 1 drop to both eyes as needed for dry eyes.     salmeterol (SEREVENT) 50 mcg/dose diskus inhaler Inhale 1 puff 2 (two) times a day.     sodium chloride 3 % nebulizer solution Take 3 mL by nebulization 2 (two) times a day. And q4h prn     umeclidinium (INCRUSE ELLIPTA) 62.5 mcg/actuation DsDv inhaler Inhale 1 puff daily.     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) 2017     Anemia      Arthritis      Asthma 1/20/2017     Bipolar 1 disorder (H)      Cancer (H)      Cellulitis      Cellulitis 12/12/2016    of left elbow     Chronic pain syndrome      COPD with acute exacerbation (H) 12/21/2018     Depression      Disease of thyroid gland      Frequent falls 2016     Frequent falls 2017     GERD (gastroesophageal reflux disease)      History of transfusion      Hydrocephalus      Hyperlipidemia      Hypertension      Hypothyroidism      Infection of skin due to methicillin  resistant Staphylococcus aureus (MRSA)      Memory loss 2017     Normal pressure hydrocephalus 02/03/2017     Parkinson disease (H)      Renal insufficiency      Urinary incontinence            PHYSICAL EXAMINATION  Vitals:    03/14/19 2100   BP: 132/69   Pulse: 78   Resp: 18   Temp: 97  F (36.1  C)   SpO2: (!) 89%   Weight: 175 lb (79.4 kg)       Today on physical exam:     GENERAL: Awake, Alert, oriented x3, not in any form of acute distress, answers questions appropriately, follows simple commands, conversant  HEENT: Head is normocephalic with normal hair distribution. No evidence of trauma. Ears: No acute purulent discharge. Eyes: Conjunctivae pink with no scleral jaundice. Nose: Normal mucosa and septum. NECK: Supple with no cervical or supraclavicular lymphadenopathy. Trachea is midline.   CHEST: No tenderness or deformity, no crepitus  LUNG: dim with expiratory wheeze to auscultation with good chest expansion. Congested cough, yellow sputum  BACK: No kyphosis of the thoracic spine. Symmetric, no curvature, ROM normal, no CVA tenderness, no spinal tenderness   CVS: There is good S1  S2, regular rhythm, there are no murmurs, rubs, gallops, or heaves,  2+ pulses symmetric in all extremities.  ABDOMEN: Rounded and soft, nontender to palpation, non distended, no masses, no organomegaly, good bowel sounds, no rebound or guarding, no peritoneal signs.   EXTREMITIES: Right shoulder incision small amount of sanguinous drainage, covered with dressing, 2+ edema, mild numbness in rue, 2+ edema right arm, sling in place  SKIN: Warm and dry,   NEUROLOGICAL: The patient is oriented to person, place and time. Weakness, unsteady gait            LABS:   Recent Results (from the past 168 hour(s))   HM1 (CBC with Diff)   Result Value Ref Range    WBC 8.2 4.0 - 11.0 thou/uL    RBC 3.19 (L) 3.80 - 5.40 mill/uL    Hemoglobin 9.1 (L) 12.0 - 16.0 g/dL    Hematocrit 29.7 (L) 35.0 - 47.0 %    MCV 93 80 - 100 fL    MCH 28.5 27.0 - 34.0  pg    MCHC 30.6 (L) 32.0 - 36.0 g/dL    RDW 15.4 (H) 11.0 - 14.5 %    Platelets 232 140 - 440 thou/uL    MPV 10.4 8.5 - 12.5 fL    Neutrophils % 69 50 - 70 %    Lymphocytes % 15 (L) 20 - 40 %    Monocytes % 11 (H) 2 - 10 %    Eosinophils % 4 0 - 6 %    Basophils % 1 0 - 2 %    Neutrophils Absolute 5.6 2.0 - 7.7 thou/uL    Lymphocytes Absolute 1.2 0.8 - 4.4 thou/uL    Monocytes Absolute 0.9 0.0 - 0.9 thou/uL    Eosinophils Absolute 0.3 0.0 - 0.4 thou/uL    Basophils Absolute 0.0 0.0 - 0.2 thou/uL   Basic Metabolic Panel   Result Value Ref Range    Sodium 141 136 - 145 mmol/L    Potassium 4.3 3.5 - 5.0 mmol/L    Chloride 106 98 - 107 mmol/L    CO2 27 22 - 31 mmol/L    Anion Gap, Calculation 8 5 - 18 mmol/L    Glucose 100 70 - 125 mg/dL    Calcium 9.7 8.5 - 10.5 mg/dL    BUN 16 8 - 28 mg/dL    Creatinine 0.64 0.60 - 1.10 mg/dL    GFR MDRD Af Amer >60 >60 mL/min/1.73m2    GFR MDRD Non Af Amer >60 >60 mL/min/1.73m2   HM1 (CBC with Diff)   Result Value Ref Range    WBC 7.3 4.0 - 11.0 thou/uL    RBC 3.23 (L) 3.80 - 5.40 mill/uL    Hemoglobin 9.0 (L) 12.0 - 16.0 g/dL    Hematocrit 30.9 (L) 35.0 - 47.0 %    MCV 96 80 - 100 fL    MCH 27.9 27.0 - 34.0 pg    MCHC 29.1 (L) 32.0 - 36.0 g/dL    RDW 16.0 (H) 11.0 - 14.5 %    Platelets 360 140 - 440 thou/uL    MPV 10.8 8.5 - 12.5 fL    Neutrophils % 59 50 - 70 %    Lymphocytes % 21 20 - 40 %    Monocytes % 11 (H) 2 - 10 %    Eosinophils % 8 (H) 0 - 6 %    Basophils % 1 0 - 2 %    Neutrophils Absolute 4.2 2.0 - 7.7 thou/uL    Lymphocytes Absolute 1.5 0.8 - 4.4 thou/uL    Monocytes Absolute 0.8 0.0 - 0.9 thou/uL    Eosinophils Absolute 0.6 (H) 0.0 - 0.4 thou/uL    Basophils Absolute 0.1 0.0 - 0.2 thou/uL     Results for orders placed or performed in visit on 03/12/19   Basic Metabolic Panel   Result Value Ref Range    Sodium 141 136 - 145 mmol/L    Potassium 4.3 3.5 - 5.0 mmol/L    Chloride 106 98 - 107 mmol/L    CO2 27 22 - 31 mmol/L    Anion Gap, Calculation 8 5 - 18 mmol/L     Glucose 100 70 - 125 mg/dL    Calcium 9.7 8.5 - 10.5 mg/dL    BUN 16 8 - 28 mg/dL    Creatinine 0.64 0.60 - 1.10 mg/dL    GFR MDRD Af Amer >60 >60 mL/min/1.73m2    GFR MDRD Non Af Amer >60 >60 mL/min/1.73m2         Lab Results   Component Value Date    WBC 7.3 03/12/2019    HGB 9.0 (L) 03/12/2019    HCT 30.9 (L) 03/12/2019    MCV 96 03/12/2019     03/12/2019       Lab Results   Component Value Date    [id number] 569 07/16/2016     Lab Results   Component Value Date    HGBA1C 5.7 07/15/2016     Lab Results   Component Value Date    INR 0.96 03/05/2019     No results found for: [id number]  Lab Results   Component Value Date    TSH 3.33 02/25/2018           ASSESSMENT/PLAN:    1. S/p reverse total shoulder arthroplasty: Incision c/d/i. 2+ edema right shoulder, arm, mild numbness present. Sling in place, NWB. F/u with ortho in 7-10 days. Pain controlled on tylenol and oxycodone. Wean off oxycodone as able. On aspirin two times a day for dvt prophylaxis. Doing well today.  2. COPD, Acute bronchitis: On doxycycline. No fevers or chills. cxr negative for pneumonia in hospital. On incruse ellipta, albuterol prn. Salmeterol added this hospital stay. Intolerant to nebs. mucinex two times a day x 7 days. IS q1h while awake. spiriva not covered by insurance. dextromethorphan cough syrup two times a day and q6h prn, add saline nebs three times a day, however not started until today. Added atrovent nebs q4h prn.  3. Normal pressure hydrocephalus, s/p  shunt: Unsteady gait, on ampyra. Follows with neurology.   4. HTN: SBP 130s. No meds. Monitor.   5. Bipolar disorder: Mood stable recently. On abilify, wellbutrin, fluoxetine, lamictal. Declines psych referral at this time.   6. H/o colon cancer: No recent concerns. Added lactobacillus while on doxy.   7. Constipation: on scheduled miralax daily  8.  Acute blood loss anemia: hg 9.1 on 3/8. Recheck on 3/12-9.  9. GERD: on pepcid.   10. Hypothyroid: On levothyroxine.        Per therapy soft LCD 3/25 assist of 1 for toileting, mod for upper body dressing, assist of for transfers, min for bed mobility, non ambulatory. From [facility] assisted living. Wheelchair bound at baseline.     Electronically signed by: Casie Barger NP

## 2021-06-25 NOTE — PROGRESS NOTES
[facility]    DATE: 3/21/2019    NAME:  Amparo Sharma             :  1940  MRN: [id number]  CODE STATUS:  FULL CODE    VISIT TYPE: Review Of Multiple Medical Conditions     FACILITY:  [facility] [[id number]]       CHIEF COMPLAIN/REASON FOR VISIT:    Chief Complaint   Patient presents with     Review Of Multiple Medical Conditions               HISTORY OF PRESENT ILLNESS: Amparo Sharma is a 78 y.o. female who was admitted 3/5-3/8 for right reverse total shoulder arthroplasty. Chest xray was done for increased cough but was negative for pneumonia. She was treated with ceftin and doxycycline. She was discharged to TCU for further rehab. She has PMH of colon cancer, hypothyroid, normal pressure hydrocephalus s/p  shunt, HTN, COPD, bipolar disorder, urinary frequency. Prior to this she lived at [facility].     Today Ms. Sharma is seen for follow up on cough. She reports that her cough is finally improving. She coughed up a lot of mucous yesterday and now feels so much better today. She has not had to wear oxygen for the last two days. She feels her shortness of breath is better as well. Since starting the z pack the color of her mucous has changed from green to white. She says she feels her pain is doing much better also and not taking the oxycodone as much. She did take one this morning but otherwise has been doing well. She has no other concerns and is planning to discharge early next week.     REVIEW OF SYSTEMS:  PROBLEMS AND REVIEW OF SYSTEMS:   Today on ROS:   Currently, no fever, chills, or rigors. Decreased vision and hearing. Denies any chest pain, headaches, palpitations, lightheadedness, dizziness. Appetite is good. Denies any GERD symptoms. Denies any difficulty with swallowing, nausea, or vomiting. Positive for controlled pain, weakness, right shoulder incision, right shoulder sling, edema right arm, urinary incontinence, shortness of  breath with exertion, dry cough improved      Allergies   Allergen Reactions     Amantadine Other (See Comments)     Codeine Itching     Diazepam Other (See Comments)     Hallucinations/paranoid     Diphenhydramine Other (See Comments)     hyperactivity     Meloxicam Other (See Comments)     Meperidine Nausea Only     Pentazocine Other (See Comments)     drowsiness     Statins-Hmg-Coa Reductase Inhibitors Other (See Comments)     Was hospitalized, rhabdomyolitis  Rhabdo?  Statins     Talwin [Pentazocine Lactate]      Tramadol Nausea Only     Current Outpatient Medications   Medication Sig     acetaminophen (TYLENOL) 500 MG tablet Take 1,000 mg by mouth 4 (four) times a day.            albuterol (PROAIR HFA;PROVENTIL HFA;VENTOLIN HFA) 90 mcg/actuation inhaler Inhale 2 puffs every 4 (four) hours as needed for wheezing.     amoxicillin (AMOXIL) 500 MG tablet Take 500 mg by mouth see administration instructions. Take 1 hour prior to dental appointment     ARIPiprazole (ABILIFY) 15 MG tablet Take 15 mg by mouth every evening.     aspirin 325 MG tablet Take 1 tablet (325 mg total) by mouth 2 (two) times a day.     buPROPion (WELLBUTRIN XL) 150 MG 24 hr tablet Take 150 mg by mouth every morning.            carboxymethylcellulose (REFRESH PLUS) 0.5 % Dpet ophthalmic dropperette Administer 1 drop to both eyes 4 (four) times a day as needed (dry eyes).     dalfampridine (AMPYRA) 10 mg Tb12 Take 10 mg by mouth 2 (two) times a day.     dextromethorphan (ROBITUSSIN MAX STRENGTH) 15 mg/5 mL Liqd syrup Take 30 mg by mouth 4 (four) times a day.            famotidine (PEPCID) 20 MG tablet Take 1 tablet (20 mg total) by mouth 2 (two) times a day.     FLUoxetine (PROZAC) 20 MG capsule Take 40 mg by mouth every morning.            hydrocortisone 2.5 % cream Apply 1 application topically 2 (two) times a day as needed (rash/itching).     ipratropium (ATROVENT) 0.02 % nebulizer solution Take 500 mcg by nebulization 3 (three) times a day.      lamoTRIgine (LAMICTAL) 100 MG tablet Take 100 mg by mouth bedtime.     lamoTRIgine (LAMICTAL) 150 MG tablet Take 150 mg by mouth every morning.     lanolin-mineral oil (EUCERIN ORIGINAL) Lotn Apply 1 application topically daily.     levothyroxine 75 mcg cap Take 75 mcg by mouth every morning.     magnesium hydroxide (MILK OF MAG) 400 mg/5 mL Susp suspension Take 30 mL by mouth daily as needed.     multivit-min-FA-lycopen-lutein (CENTRUM SILVER) 0.4-300-250 mg-mcg-mcg tablet Take 1 tablet by mouth daily.     oxyCODONE (ROXICODONE) 5 MG immediate release tablet Take 1-2 tablets (5-10 mg total) by mouth every 4 (four) hours as needed. (Patient taking differently: Take 5 mg by mouth every 6 (six) hours as needed.       )     polyethylene glycol (MIRALAX) 17 gram packet Take 17 g by mouth daily.     polyvinyl alcohol (LIQUIFILM TEARS) 1.4 % ophthalmic solution Administer 1 drop to both eyes as needed for dry eyes.     predniSONE (DELTASONE) 10 mg tablet Take 10 mg by mouth daily 40mg x 2, 30mg x 2, 20mg x2, 10mg x2 .     salmeterol (SEREVENT) 50 mcg/dose diskus inhaler Inhale 1 puff 2 (two) times a day.     umeclidinium (INCRUSE ELLIPTA) 62.5 mcg/actuation DsDv inhaler Inhale 1 puff daily.     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) 2017     Anemia      Arthritis      Asthma 1/20/2017     Bipolar 1 disorder (H)      Cancer (H)      Cellulitis      Cellulitis 12/12/2016    of left elbow     Chronic pain syndrome      COPD with acute exacerbation (H) 12/21/2018     Depression      Disease of thyroid gland      Frequent falls 2016     Frequent falls 2017     GERD (gastroesophageal reflux disease)      History of transfusion      Hydrocephalus      Hyperlipidemia      Hypertension      Hypothyroidism      Infection of skin due to methicillin resistant Staphylococcus aureus (MRSA)      Memory loss 2017     Normal pressure hydrocephalus  02/03/2017     Parkinson disease (H)      Renal insufficiency      Urinary incontinence            PHYSICAL EXAMINATION  Vitals:    03/20/19 2028   BP: 148/77   Pulse: 82   Resp: 18   Temp: 99.1  F (37.3  C)   SpO2: 91%   Weight: 166 lb (75.3 kg)       Today on physical exam:     GENERAL: Awake, Alert, oriented x3, not in any form of acute distress, answers questions appropriately, follows simple commands, conversant  HEENT: Head is normocephalic with normal hair distribution. No evidence of trauma. Ears: No acute purulent discharge. Eyes: Conjunctivae pink with no scleral jaundice. Nose: Normal mucosa and septum. NECK: Supple with no cervical or supraclavicular lymphadenopathy. Trachea is midline.   CHEST: No tenderness or deformity, no crepitus  LUNG: dim to auscultation with good chest expansion. Dry cough  BACK: No kyphosis of the thoracic spine. Symmetric, no curvature, ROM normal, no CVA tenderness, no spinal tenderness   CVS: There is good S1  S2, regular rhythm, there are no murmurs, rubs, gallops, or heaves,  2+ pulses symmetric in all extremities.  ABDOMEN: Rounded and soft, nontender to palpation, non distended, no masses, no organomegaly, good bowel sounds, no rebound or guarding, no peritoneal signs.   EXTREMITIES: Right shoulder incision small amount of sanguinous drainage, covered with dressing, 1+ edema, mild numbness in rue, 1+ edema right arm, sling in place  SKIN: Warm and dry,   NEUROLOGICAL: The patient is oriented to person, place and time. Wheelchair bound            LABS:   Recent Results (from the past 168 hour(s))   Hemoglobin   Result Value Ref Range    Hemoglobin 10.2 (L) 12.0 - 16.0 g/dL     Results for orders placed or performed in visit on 03/12/19   Basic Metabolic Panel   Result Value Ref Range    Sodium 141 136 - 145 mmol/L    Potassium 4.3 3.5 - 5.0 mmol/L    Chloride 106 98 - 107 mmol/L    CO2 27 22 - 31 mmol/L    Anion Gap, Calculation 8 5 - 18 mmol/L    Glucose 100 70 - 125  mg/dL    Calcium 9.7 8.5 - 10.5 mg/dL    BUN 16 8 - 28 mg/dL    Creatinine 0.64 0.60 - 1.10 mg/dL    GFR MDRD Af Amer >60 >60 mL/min/1.73m2    GFR MDRD Non Af Amer >60 >60 mL/min/1.73m2         Lab Results   Component Value Date    WBC 7.3 03/12/2019    HGB 10.2 (L) 03/19/2019    HCT 30.9 (L) 03/12/2019    MCV 96 03/12/2019     03/12/2019       Lab Results   Component Value Date    [id number] 569 07/16/2016     Lab Results   Component Value Date    HGBA1C 5.7 07/15/2016     Lab Results   Component Value Date    INR 0.96 03/05/2019     No results found for: [id number]  Lab Results   Component Value Date    TSH 3.33 02/25/2018           ASSESSMENT/PLAN:    1. S/p reverse total shoulder arthroplasty: Incision c/d/i. 1+ edema right shoulder, arm, mild numbness present. Sling in place, NWB. F/u with ortho in 7-10 days. Pain controlled on tylenol and oxycodone prn. On aspirin two times a day for dvt prophylaxis. Using oxycodone about 1-2 times per day will change order to 5mg q6h prn pain.   2. COPD, Acute bronchitis: completed cefdinir, doxycycline. No fevers or chills. cxr negative for pneumonia in hospital. On incruse ellipta, albuterol prn. Salmeterol added this hospital stay. Albuterol intolerance. Completed mucinex. IS q1h while awake. spiriva not covered by insurance. dextromethorphan cough syrup will change to four times a day scheduled, tessalon pearls, change atrovent nebs to three times a day and prn, saline nebs prn only now as congestion improved, prednisone taper, zithromax. Now much improved an off o2. DC tessalon pearls, dc saline nebs. Declines other changes but will plan to stop cough syrup and change nebs to prn by discharge.   3. Normal pressure hydrocephalus, s/p  shunt: Unsteady gait, on ampyra. Follows with neurology.   4. HTN: SBP 140s. No meds. Monitor.   5. Bipolar disorder: Mood stable recently. On abilify, wellbutrin, fluoxetine, lamictal.   6. H/o colon cancer: No recent concerns.    7. Constipation: on scheduled miralax daily. Improved.   8.  Acute blood loss anemia: hg 9.1 on 3/8. Recheck on 3/12-9.  9. GERD: on pepcid.   10. Hypothyroid: On levothyroxine.       Per therapy firm LCD 3/25 cga for toileting, mod for upper body dressing, cga for transfers, min for bed mobility, non ambulatory. From [facility] assisted living. Wheelchair bound at baseline. Recommending  pt, ot, hha.     Electronically signed by: Casie Barger NP

## 2021-06-25 NOTE — TELEPHONE ENCOUNTER
[facility] Nurse Triage Telephone Note      Provider: BIRGIT Houser  Facility: [facility]    Facility Type: TCU    Caller: Autumn  Call Back Number:  [phone]    Allergies: Amantadine; Codeine; Diazepam; Diphenhydramine; Meloxicam; Meperidine; Pentazocine; Statins-hmg-coa reductase inhibitors; Talwin [pentazocine lactate]; and Tramadol    Reason for call: Coughing non-stop, no better, R:29-30, RA Sat 91-92, diminished lung sounds. Getting DuoNebs, NACL 3%Neb two times a day. Not on antibiotic or prednisone.    Verbal Order/Direction given by Provider: Tessalon Pearles 100mg two times a day X 7days, give atrovent nebs. I will see pt in am.    Provider giving order: BIRGIT Houser    Verbal order given to: Autumn Matute RN

## 2021-06-25 NOTE — PROGRESS NOTES
[facility]    DATE: 3/19/2019    NAME:  Amparo Sharma             :  1940  MRN: [id number]  CODE STATUS:  FULL CODE    VISIT TYPE: Problem Visit (cough)     FACILITY:  [facility] [[id number]]       CHIEF COMPLAIN/REASON FOR VISIT:    Chief Complaint   Patient presents with     Problem Visit     cough               HISTORY OF PRESENT ILLNESS: Amparo Sharma is a 78 y.o. female who was admitted 3/5-3/8 for right reverse total shoulder arthroplasty. Chest xray was done for increased cough but was negative for pneumonia. She was treated with ceftin and doxycycline. She was discharged to TCU for further rehab. She has PMH of colon cancer, hypothyroid, normal pressure hydrocephalus s/p  shunt, HTN, COPD, bipolar disorder, urinary frequency. Prior to this she lived at [facility].     Today Ms. Sharma is seen for follow up on cough. Yesterday staff called that her cough was uncontrolled and she was now hypoxic requiring o2. Today on exam Amparo reports the saline nebs did help some. She says she started feeling more short of breath yesterday and they had to put oxygen on her. She says her cough is not as congested or productive as before. She reports that she has not had any fevers or chills. She says the cough syrup does help some but she doesn't think she gets it often enough. She is not sure if the charis medicine helped last night or not. She is willing to try whatever she needs to in order to get better. She reports the cough is so much worse than the shoulder and surgery. Her pain is controlled and she is just taking the pain meds on occasion. Overall she feels other than the cough and breathing problems that she is doing ok.     REVIEW OF SYSTEMS:  PROBLEMS AND REVIEW OF SYSTEMS:   Today on ROS:   Currently, no fever, chills, or rigors. Decreased vision and hearing. Denies any chest pain, headaches, palpitations, lightheadedness, dizziness.  Appetite is good. Denies any GERD symptoms. Denies any difficulty with swallowing, nausea, or vomiting. Positive for controlled pain, weakness, right shoulder incision, right shoulder sling, edema right arm, urinary incontinence, shortness of breath at rest and with exertion, dry cough      Allergies   Allergen Reactions     Amantadine Other (See Comments)     Codeine Itching     Diazepam Other (See Comments)     Hallucinations/paranoid     Diphenhydramine Other (See Comments)     hyperactivity     Meloxicam Other (See Comments)     Meperidine Nausea Only     Pentazocine Other (See Comments)     drowsiness     Statins-Hmg-Coa Reductase Inhibitors Other (See Comments)     Was hospitalized, rhabdomyolitis  Rhabdo?  Statins     Talwin [Pentazocine Lactate]      Tramadol Nausea Only     Current Outpatient Medications   Medication Sig     benzonatate (TESSALON) 100 MG capsule Take 100 mg by mouth 2 (two) times a day.     ipratropium (ATROVENT) 0.02 % nebulizer solution Take 500 mcg by nebulization 3 (three) times a day.     predniSONE (DELTASONE) 10 mg tablet Take 10 mg by mouth daily 40mg x 2, 30mg x 2, 20mg x2, 10mg x2 .     acetaminophen (TYLENOL) 500 MG tablet Take 1,000 mg by mouth 4 (four) times a day.            albuterol (PROAIR HFA;PROVENTIL HFA;VENTOLIN HFA) 90 mcg/actuation inhaler Inhale 2 puffs every 4 (four) hours as needed for wheezing.     amoxicillin (AMOXIL) 500 MG tablet Take 500 mg by mouth see administration instructions. Take 1 hour prior to dental appointment     ARIPiprazole (ABILIFY) 15 MG tablet Take 15 mg by mouth every evening.     aspirin 325 MG tablet Take 1 tablet (325 mg total) by mouth 2 (two) times a day.     buPROPion (WELLBUTRIN XL) 150 MG 24 hr tablet Take 150 mg by mouth every morning.            carboxymethylcellulose (REFRESH PLUS) 0.5 % Dpet ophthalmic dropperette Administer 1 drop to both eyes 4 (four) times a day as needed (dry eyes).     dalfampridine (AMPYRA) 10 mg Tb12 Take  10 mg by mouth 2 (two) times a day.     dextromethorphan (ROBITUSSIN MAX STRENGTH) 15 mg/5 mL Liqd syrup Take 30 mg by mouth 4 (four) times a day.            famotidine (PEPCID) 20 MG tablet Take 1 tablet (20 mg total) by mouth 2 (two) times a day.     FLUoxetine (PROZAC) 20 MG capsule Take 40 mg by mouth every morning.            hydrocortisone 2.5 % cream Apply 1 application topically 2 (two) times a day as needed (rash/itching).     lamoTRIgine (LAMICTAL) 100 MG tablet Take 100 mg by mouth bedtime.     lamoTRIgine (LAMICTAL) 150 MG tablet Take 150 mg by mouth every morning.     lanolin-mineral oil (EUCERIN ORIGINAL) Lotn Apply 1 application topically daily.     levothyroxine 75 mcg cap Take 75 mcg by mouth every morning.     magnesium hydroxide (MILK OF MAG) 400 mg/5 mL Susp suspension Take 30 mL by mouth daily as needed.     multivit-min-FA-lycopen-lutein (CENTRUM SILVER) 0.4-300-250 mg-mcg-mcg tablet Take 1 tablet by mouth daily.     oxyCODONE (ROXICODONE) 5 MG immediate release tablet Take 1-2 tablets (5-10 mg total) by mouth every 4 (four) hours as needed.     polyethylene glycol (MIRALAX) 17 gram packet Take 17 g by mouth daily.     polyvinyl alcohol (LIQUIFILM TEARS) 1.4 % ophthalmic solution Administer 1 drop to both eyes as needed for dry eyes.     salmeterol (SEREVENT) 50 mcg/dose diskus inhaler Inhale 1 puff 2 (two) times a day.     umeclidinium (INCRUSE ELLIPTA) 62.5 mcg/actuation DsDv inhaler Inhale 1 puff daily.     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) 2017     Anemia      Arthritis      Asthma 1/20/2017     Bipolar 1 disorder (H)      Cancer (H)      Cellulitis      Cellulitis 12/12/2016    of left elbow     Chronic pain syndrome      COPD with acute exacerbation (H) 12/21/2018     Depression      Disease of thyroid gland      Frequent falls 2016     Frequent falls 2017     GERD (gastroesophageal reflux disease)       History of transfusion      Hydrocephalus      Hyperlipidemia      Hypertension      Hypothyroidism      Infection of skin due to methicillin resistant Staphylococcus aureus (MRSA)      Memory loss 2017     Normal pressure hydrocephalus 02/03/2017     Parkinson disease (H)      Renal insufficiency      Urinary incontinence            PHYSICAL EXAMINATION  Vitals:    03/18/19 1640   BP: 137/76   Pulse: 73   Resp: 24   Temp: 97.7  F (36.5  C)   SpO2: 92%   Weight: 177 lb (80.3 kg)       Today on physical exam:     GENERAL: Awake, Alert, oriented x3, not in any form of acute distress, answers questions appropriately, follows simple commands, conversant  HEENT: Head is normocephalic with normal hair distribution. No evidence of trauma. Ears: No acute purulent discharge. Eyes: Conjunctivae pink with no scleral jaundice. Nose: Normal mucosa and septum. NECK: Supple with no cervical or supraclavicular lymphadenopathy. Trachea is midline.   CHEST: No tenderness or deformity, no crepitus  LUNG: dim to auscultation with good chest expansion. Dry cough  BACK: No kyphosis of the thoracic spine. Symmetric, no curvature, ROM normal, no CVA tenderness, no spinal tenderness   CVS: There is good S1  S2, regular rhythm, there are no murmurs, rubs, gallops, or heaves,  2+ pulses symmetric in all extremities.  ABDOMEN: Rounded and soft, nontender to palpation, non distended, no masses, no organomegaly, good bowel sounds, no rebound or guarding, no peritoneal signs.   EXTREMITIES: Right shoulder incision small amount of sanguinous drainage, covered with dressing, 1+ edema, mild numbness in rue, 1+ edema right arm, sling in place  SKIN: Warm and dry,   NEUROLOGICAL: The patient is oriented to person, place and time. Weakness, unsteady gait            LABS:   No results found for this or any previous visit (from the past 168 hour(s)).  Results for orders placed or performed in visit on 03/12/19   Basic Metabolic Panel   Result Value Ref  Range    Sodium 141 136 - 145 mmol/L    Potassium 4.3 3.5 - 5.0 mmol/L    Chloride 106 98 - 107 mmol/L    CO2 27 22 - 31 mmol/L    Anion Gap, Calculation 8 5 - 18 mmol/L    Glucose 100 70 - 125 mg/dL    Calcium 9.7 8.5 - 10.5 mg/dL    BUN 16 8 - 28 mg/dL    Creatinine 0.64 0.60 - 1.10 mg/dL    GFR MDRD Af Amer >60 >60 mL/min/1.73m2    GFR MDRD Non Af Amer >60 >60 mL/min/1.73m2         Lab Results   Component Value Date    WBC 7.3 03/12/2019    HGB 9.0 (L) 03/12/2019    HCT 30.9 (L) 03/12/2019    MCV 96 03/12/2019     03/12/2019       Lab Results   Component Value Date    CYMNVZTH50 569 07/16/2016     Lab Results   Component Value Date    HGBA1C 5.7 07/15/2016     Lab Results   Component Value Date    INR 0.96 03/05/2019     No results found for: [id number]  Lab Results   Component Value Date    TSH 3.33 02/25/2018           ASSESSMENT/PLAN:    1. S/p reverse total shoulder arthroplasty: Incision c/d/i. 1+ edema right shoulder, arm, mild numbness present. Sling in place, NWB. F/u with ortho in 7-10 days. Pain controlled on tylenol and oxycodone prn. On aspirin two times a day for dvt prophylaxis. Healing well.   2. COPD, Acute bronchitis: completed cefdinir, doxycycline. No fevers or chills. cxr negative for pneumonia in hospital. On incruse ellipta, albuterol prn. Salmeterol added this hospital stay. Albuterol intolerance. Completed mucinex but will order another 7 day course. IS q1h while awake. spiriva not covered by insurance. dextromethorphan cough syrup will change to four times a day scheduled. Added tessalon pearls, change atrovent nebs to three times a day and prn, saline nebs prn only now as congestion improved. Will add prednisone taper due to shortness of breath and hypoxia now. On 2LNC today, titrate to off for sat >=88%. Add zithromycin x 5 days as well.   3. Normal pressure hydrocephalus, s/p  shunt: Unsteady gait, on ampyra. Follows with neurology.   4. HTN: SBP 130s. No meds. Monitor.   5.  Bipolar disorder: Mood stable recently. On abilify, wellbutrin, fluoxetine, lamictal.   6. H/o colon cancer: No recent concerns.   7. Constipation: on scheduled miralax daily. Improved.   8.  Acute blood loss anemia: hg 9.1 on 3/8. Recheck on 3/12-9.  9. GERD: on pepcid.   10. Hypothyroid: On levothyroxine.       Per therapy firm LCD 3/25 assist of 1 for toileting, mod for upper body dressing, assist of 1 for transfers, min for bed mobility, non ambulatory. From [facility] assisted living. Wheelchair bound at baseline. Recommending hh pt, ot, hha.     Electronically signed by: Casie Barger NP      Total floor/unit time 35 min with 25 min spent on counseling and coordination of care. Counseling regarding lab results, current symptoms, worsening cough and oxygen requirements. Discussed causes and potential treatments. Discussed med changes in detail as well as need for ongoing nebulizers and starting antibiotic and steroids. Discussed need for ongoing monitoring and med changes if not improving. Discussed adaptations to therapy with hypoxia. Educated on importance of med adherence and wearing oxygen. Coordinated care with therapy, nursing regarding bronchitis, worsening hypoxia, cough, need for frequent cardiopulmonary assessments.

## 2021-06-28 NOTE — PROGRESS NOTES
Progress Notes by Conrad Guevara MD at 2/3/2020  1:23 PM     Author: Conrad Guevara MD Service: Gastroenterology Author Type: Physician    Filed: 2/3/2020  1:24 PM Date of Service: 2/3/2020  1:23 PM Status: Signed    : Conrad Guevara MD (Physician)       [facility] Pre-procedure H&P    Reason for procedure: History of colon cancer    History and Physical Reviewed:   Pt here for colonoscopy due to colon cancer. FHx, PMHx, SHx, Meds, Allergies, and SKYE were reviewed with the patient and are recorded in the nursing admission charting.        Pre-sedation assessment:    General: alert, appears stated age and cooperative  Airway: normal  Heart: regular rate and rhythm, S1, S2 normal, no murmur, click, rub or gallop  Lungs: clear to auscultation bilaterally    Previous reaction to sedation:     Sedation Plan based on assessment: Moderate    ASA Classification: ASA 3 - Patient with moderate systemic disease with functional limitations      MALLAMPATI SCORE     Class IV       Impression: Patient deemed adequate candidate for conscious sedation    Plan: colonoscopy        Conrad Guevara  2/3/2020/1:23 PM          Conrad Guevara MD  Thank you for the opportunity to participate in the care of this patient.   Please feel free to call me with any questions or concerns.  Phone number [phone].

## 2021-07-01 NOTE — PROCEDURES
Procedures by Conrad Guevara MD at 2/3/2020  1:50 PM     Author: Conrad Guevara MD Service: Gastroenterology Author Type: Physician    Filed: 2/3/2020  1:56 PM Date of Service: 2/3/2020  1:50 PM Status: Addendum    : Conrad Guevara MD (Physician)    Related Notes: Original Note by Conrad Guevara MD (Physician) filed at 2/3/2020  1:56 PM     Post-procedure Diagnoses    1. Colorectal polyp detected on colonoscopy [K63.5]    2. Diverticulosis of large intestine without hemorrhage [K57.30]    3. History of partial surgical removal of colon [Z90.49]    4. H/O colon cancer, stage II [Z85.038]           Procedures    1. BEDSIDE COLONOSCOPY HE [YLS247 (Custom)]             [facility]  Colonoscopy Note    Patient Name: Amparo Sharma  Date of Procedure: February 3, 2020    Endoscopists: Conrad Guevara  Assistants: None    Pre-Procedure Diagnosis:Screening for colon cancer [Z12.11]   Post-Procedure Diagnosis: Colorectal polyp, diverticulosis and colocolonic anastomosis  Procedure: Colonoscopy with snare polypectomy and Sia ink tattoo    Indications: History of colon cancer requiring surgery    Medications:   Fentanyl 50 Mcg IV  Versed 1 mg IV    The procedure was performed with administration of intravenous conscious sedation with appropriate preoperative, intraoperative, and postoperative evaluations.  0 Hr 19 Min 44 Sec of supervised face to face conscious sedation time was provided by a Registered Nurse under my direct supervision.    Procedure Details    The patient was informed of the risks, benefits and alternatives and gave informed consent. The patient had stable cardiovascular status and was judged to adequate for sedation. A timeout was performed.    The patient was placed in the left lateral decubitus position. Premedications were given. A digital rectal examination was performed. The endoscope was introduced through the anus and advanced to cecum. The cecum was identified by the presence of the  ileocecal valve and appendiceal orifice.  Careful inspection was made upon withdrawal.  Copiously irrigated and suctioned due to a fair prep with opaque fluid and dense sediment.  Cold snare used for piecemeal resection of sessile 2.5-3.0 mm sessile polyp in descending colon.  Site of polyp tattooed with Sia ink.  Retroflexion was performed. The quality of the preparation was fair    Estimated Blood Loss: Minimal    Specimens:   Descending colon polyp    Complications:  None    Findings:  1.  2.5 to 3.0 mm sessile polyp in descending colon (site tattooed)  2.  Diffuse diverticulosis  3.  Colocolonic anastomosis at about 25 cm  4.  Only a fair prep and required extra irrigating and suctioning to obtain a good view    Impression:   Large sessile polyp resected and site tattooed.  History of prior colon cancer requiring resection.  Only a fair prep and will need a double prep in the future    Recommendation:   1.  Repeat colonoscopy in a year  2.  Double prep      Conrad Guevara  2/3/2020/1:52 PM          Conrad Guevara MD  Thank you for the opportunity to participate in the care of this patient.   Please feel free to call me with any questions or concerns.  Phone number [phone].

## 2021-07-02 NOTE — H&P
H&P by Conrad Guevara MD at 2/3/2020  2:20 PM     Author: Conrad Guevara MD Service: Gastroenterology Author Type: Physician    Filed: 2/20/2020  7:56 PM Date of Service: 2/3/2020  2:20 PM Status: Signed    : Conrad Guevara MD (Physician)       [facility] Pre-procedure H&P     Reason for procedure: History of colon cancer     History and Physical Reviewed:   Pt here for colonoscopy due to colon cancer. FHx, PMHx, SHx, Meds, Allergies, and SKYE were reviewed with the patient and are recorded in the nursing admission charting.           Pre-sedation assessment:     General: alert, appears stated age and cooperative  Airway: normal  Heart: regular rate and rhythm, S1, S2 normal, no murmur, click, rub or gallop  Lungs: clear to auscultation bilaterally     Previous reaction to sedation:      Sedation Plan based on assessment: Moderate     ASA Classification: ASA 3 - Patient with moderate systemic disease with functional limitations        MALLAMPATI SCORE     Class IV         Impression: Patient deemed adequate candidate for conscious sedation     Plan: colonoscopy           Conrad Guevara  2/3/2020/1:23 PM                                                Conrad Guevara MD  Thank you for the opportunity to participate in the care of this patient.   Please feel free to call me with any questions or concerns.  Phone number [phone].

## 2021-07-09 DIAGNOSIS — R06.02 SOB (SHORTNESS OF BREATH): Primary | ICD-10-CM

## 2021-07-23 ENCOUNTER — LAB REQUISITION (OUTPATIENT)
Dept: LAB | Facility: CLINIC | Age: 81
End: 2021-07-23
Payer: MEDICARE

## 2021-07-23 DIAGNOSIS — Z79.899 OTHER LONG TERM (CURRENT) DRUG THERAPY: ICD-10-CM

## 2021-07-26 LAB
ANION GAP SERPL CALCULATED.3IONS-SCNC: 11 MMOL/L (ref 5–18)
BUN SERPL-MCNC: 17 MG/DL (ref 8–28)
CALCIUM SERPL-MCNC: 9.9 MG/DL (ref 8.5–10.5)
CHLORIDE BLD-SCNC: 101 MMOL/L (ref 98–107)
CHOLEST SERPL-MCNC: 263 MG/DL
CO2 SERPL-SCNC: 26 MMOL/L (ref 22–31)
CREAT SERPL-MCNC: 0.74 MG/DL (ref 0.6–1.1)
ERYTHROCYTE [DISTWIDTH] IN BLOOD BY AUTOMATED COUNT: 16.5 % (ref 10–15)
FASTING STATUS PATIENT QL REPORTED: ABNORMAL
GFR SERPL CREATININE-BSD FRML MDRD: 77 ML/MIN/1.73M2
GLUCOSE BLD-MCNC: 86 MG/DL (ref 70–125)
HBA1C MFR BLD: 5.7 %
HCT VFR BLD AUTO: 37.6 % (ref 35–47)
HDLC SERPL-MCNC: 59 MG/DL
HGB BLD-MCNC: 10.8 G/DL (ref 11.7–15.7)
LDLC SERPL CALC-MCNC: 172 MG/DL
MCH RBC QN AUTO: 22.8 PG (ref 26.5–33)
MCHC RBC AUTO-ENTMCNC: 28.7 G/DL (ref 31.5–36.5)
MCV RBC AUTO: 80 FL (ref 78–100)
PLATELET # BLD AUTO: 388 10E3/UL (ref 150–450)
POTASSIUM BLD-SCNC: 4.2 MMOL/L (ref 3.5–5)
RBC # BLD AUTO: 4.73 10E6/UL (ref 3.8–5.2)
SODIUM SERPL-SCNC: 138 MMOL/L (ref 136–145)
TRIGL SERPL-MCNC: 158 MG/DL
WBC # BLD AUTO: 7.4 10E3/UL (ref 4–11)

## 2021-07-26 PROCEDURE — P9604 ONE-WAY ALLOW PRORATED TRIP: HCPCS | Mod: ORL | Performed by: NURSE PRACTITIONER

## 2021-07-26 PROCEDURE — 85027 COMPLETE CBC AUTOMATED: CPT | Mod: ORL | Performed by: NURSE PRACTITIONER

## 2021-07-26 PROCEDURE — 80061 LIPID PANEL: CPT | Mod: ORL | Performed by: NURSE PRACTITIONER

## 2021-07-26 PROCEDURE — 36415 COLL VENOUS BLD VENIPUNCTURE: CPT | Mod: ORL | Performed by: NURSE PRACTITIONER

## 2021-07-26 PROCEDURE — 83036 HEMOGLOBIN GLYCOSYLATED A1C: CPT | Mod: ORL | Performed by: NURSE PRACTITIONER

## 2021-07-26 PROCEDURE — 80048 BASIC METABOLIC PNL TOTAL CA: CPT | Mod: ORL | Performed by: NURSE PRACTITIONER

## 2021-08-17 RX ORDER — ALBUTEROL SULFATE 0.83 MG/ML
2.5 SOLUTION RESPIRATORY (INHALATION) EVERY 4 HOURS PRN
COMMUNITY
End: 2022-11-09

## 2021-08-17 RX ORDER — ARIPIPRAZOLE 15 MG/1
15 TABLET ORAL AT BEDTIME
COMMUNITY
Start: 2021-06-17

## 2021-08-17 RX ORDER — CARBOXYMETHYLCELLULOSE SODIUM 5 MG/ML
1 SOLUTION/ DROPS OPHTHALMIC DAILY
COMMUNITY
End: 2023-01-01

## 2021-08-17 RX ORDER — ACETAMINOPHEN 325 MG/1
650 TABLET ORAL EVERY 8 HOURS PRN
Status: ON HOLD | COMMUNITY
End: 2022-04-11

## 2021-08-17 RX ORDER — PANTOPRAZOLE SODIUM 20 MG/1
40 TABLET, DELAYED RELEASE ORAL DAILY
COMMUNITY
Start: 2021-07-08

## 2021-08-17 RX ORDER — AMLODIPINE BESYLATE 5 MG/1
5 TABLET ORAL DAILY
COMMUNITY
Start: 2020-11-05 | End: 2022-10-18

## 2021-08-17 RX ORDER — ALBUTEROL SULFATE 90 UG/1
AEROSOL, METERED RESPIRATORY (INHALATION)
COMMUNITY
Start: 2020-11-04 | End: 2021-08-19

## 2021-08-17 RX ORDER — LAMOTRIGINE 150 MG/1
150 TABLET ORAL 2 TIMES DAILY
COMMUNITY
Start: 2021-06-24

## 2021-08-17 RX ORDER — MIRABEGRON 25 MG/1
25 TABLET, FILM COATED, EXTENDED RELEASE ORAL DAILY
COMMUNITY
Start: 2020-10-01

## 2021-08-17 RX ORDER — AMOXICILLIN 500 MG/1
2000 CAPSULE ORAL
COMMUNITY
Start: 2020-11-04 | End: 2023-01-24

## 2021-08-18 NOTE — PROGRESS NOTES
Pulmonary Clinic Outpatient Follow-Up  8/19/2021     Assessment and Plan:   #. Asthma, difficult to assess severity d/t lack of symptoms -- appears to be adequately controlled w/ her current triple inhaler regimen.  #. R cinthia-diaphragm dysfunction w/ spirometry highly suggestive of moderately severe restriction (w/ consistent flow/volume loops).      Continue w/ Breo ellipta & Incruse ellipta    Continue w/ albuterol PRN    Ms. Sharma has a statistically increased risk of respiratory complications if/when she is hospitalized for her planned surgical procedure(s) due to her underlying asthma & diaphragm-related restrictive pulmonary physiology. Her asthma is well-controlled and should not preclude her from undergoing a surgical procedure. She would need close attention in the jovani-operative period -- she is at risk of prolonged need for mechanical ventilation post-op & at risk of post-operative asthma exacerbation. She should continue using her inhalers as prescribed before and after surgery.    RTC: on an as-needed basis; continue asthma management via PCP    Mae Burgess MD  Pulmonary and Critical Care   ______________________________________________________________________________    CC: follow up    HPI:   Amparo Sharma is a 81 year old former smoker with history of asthma, GERD, chronic right hemidiaphragm dysfunction, presenting today for follow up of for hospitalization with an asthma exacerbation in the fall 2020. Pertinent medications include Breo ellipta, Incruse ellipta, & albuterol.     Reports having asthma & bronchitis as a child w/ frequent hospitalizations for exacerbations. Overall her asthma has improved as she got older. She smoked in college but quit in 1997. Her R diaphragm has been elevated at least since 2016, possibly related to her abddominal surgery related to colon (?) cancer. Not currently having any respiratory issues, but sometimes she starts to hyperventilate when she is anxious. She  "uses her albuterol when she gets anxious (w/ hyperventilation) & feels that it is helpful. When she gets anxious & hyperventilates she does not have any issues w/ her wheezing or coughing. Lately her anxiety is triggered by having to be assisted into bed or into a car -- this has been an issue for the last 2-3 years. She used to fall frequently -- apparently the work-up for this is what led to her cancer diagnosis. She was also diagnosed with hydrocephalus & has a shunt for this (was placed in ~ 2007) -- after having the shunt placed her falls have stopped. She is still wheelchair-dependent; this is due to a various orthopedic issues (needs a shoulder replacement, needs to have one of her knee replacements evaluated).     She had some wheezing during the time when we had issues w/ poor air quality, some issues w/ bronchial congestion.     Currently resides in the [facility] assisted living facility.     REVIEW OF SYSTEMS: 10-point ROS was negative with exceptions as detailed in the HPI.    Physical Exam:  /62   Pulse 78   Ht 1.499 m (4' 11\")   Wt 84.4 kg (186 lb)   SpO2 91%   Breastfeeding No   BMI 37.57 kg/m    Gen: alert, oriented, no distress  HEENT: masked, PERLL; no stridor  CV: RRR, no M/G/R  Resp: equal bilateral air entry, breath sounds clear throughout, no focal crackles or wheezes. Talking in full sentences w/ no respiratory distress  Abd: soft, nontender, no palpable organomegaly  Skin: no apparent rashes  Ext: no cyanosis, clubbing or edema  Neuro: alert, nonfocal    Labs: personally reviewed & interpreted in EMR.   Imaging & Procedures: personally reviewed & interpreted, including formal Radiology reports in the EMR.  CTA chest, 10/26/2020:  No obvious pulmonary embolus identified. Of note, image quality is degraded due to patient motion. No change in elevated right hemidiaphragm. Small amount of vague groundglass nodular infiltrate involving the lateral aspect of the right upper " and right middle lobes. These findings may represent sequelae of an infectious process. Partially visualized ventral hernia within the subxiphoid region.    TTE, 11/2/2020:  When compared to the previous study dated 7/16/2016, no significant change. Technically difficult study. Valves were not well visualized. Left ventricle ejection fraction is normal. The calculated left   ventricular ejection fraction is 75%. Normal left ventricular size and systolic function. Right ventricle not well visualized. TAPSE is abnormal, which is consistent with abnormal right ventricular systolic function. Left atrial volume is mildly increased. The aortic valve is sclerotic without reduced excursion.    PFT, 8/19/2021: results highly suggestive of moderately severe restriction, consistent w/ R hemidiaphragm dysfunction & obesity.       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), Alcoholism in remission (H) (2017), Anemia, Arthritis, Arthritis, Asthma, Asthma (1/20/2017), Bipolar 1 disorder (H), Bipolar disorder (H), Bladder incontinence (2013), Cancer (H), Cellulitis, Cellulitis (12/12/2016), Chronic pain syndrome, COPD (chronic obstructive pulmonary disease) (H), COPD with acute exacerbation (H) (12/21/2018), Dementia (H), Depression, Depression, Disease of thyroid gland, Falls frequently, Frequent falls (2016), Frequent falls (2017), GERD (gastroesophageal reflux disease), GERD (gastroesophageal reflux disease), History of blood transfusion, History of colon cancer, History of transfusion, HTN (hypertension), Hydrocephalus (H), Hydrocephalus (H), Hyperlipidemia, Hypertension, Hypothyroidism, Hypothyroidism, Infection of skin due to methicillin resistant Staphylococcus aureus (MRSA), Loss of balance, Memory loss, Memory loss (2017), Normal pressure hydrocephalus (H) (02/03/2017), NPH (normal pressure hydrocephalus) (H), Parkinson disease (H), Renal insufficiency,  Squamous cell cancer of multiple sites of skin of upper arm, left (2016), Thyroid disease, Urinary incontinence, and Urinary incontinence.  SURGICAL HISTORY:  has a past surgical history that includes Hysterectomy; multiple knee surgeries; rotator cuff surgeries; Cholecystectomy; colon cancer resection (1/2015); GI surgery; Abdomen surgery; orthopedic surgery; Insert drain lumbar (N/A, 2/5/2017); Optical tracking system implant shunt ventriculoperitoneal (Right, 2/8/2017); GYN surgery; Esophagoscopy, gastroscopy, duodenoscopy (EGD), combined (N/A, 9/7/2017); Colon surgery; Laparoscopic assisted colectomy; Hysterectomy; joint replacement; Arthroscopy shoulder rotator cuff repair; fracture surgery; Laparoscopic cholecystectomy; Implant shunt ventriculoperitoneal (02/03/2017); Lumbar Puncture (02/03/2017); RECONSTR TOTAL SHOULDER IMPLANT (Right, 3/5/2019); and Colonoscopy (N/A, 2/3/2020).  SOCIAL HISTORY:  reports that she quit smoking about 24 years ago. Her smoking use included cigarettes. She started smoking about 41 years ago. She smoked 0.25 packs per day. She has quit using smokeless tobacco. She reports that she does not drink alcohol and does not use drugs.  FAMILY HISTORY: family history includes Arthritis in her brother; Breast Cancer (age of onset: 60) in her mother; Depression (age of onset: 55) in her father; Peptic Ulcer Disease in her father; Prostate Cancer in her brother.    MEDICATIONS: personally reviewed, including EMR/Care Everywhere. Pertinent information noted & updated.   ALLERGIES:   Allergies   Allergen Reactions     Antihistamine Allergy Relief [Diphenhydramine] Other (See Comments)     hyperactivity     Codeine Itching     Demerol [Meperidine] Nausea     Hmg-Coa-R Inhibitors Other (See Comments)     Was hospitalized for possible rhabdo     Meloxicam Other (See Comments)     Talwin [Pentazocine] Other (See Comments)     drowsiness     Tramadol Nausea     Valium [Diazepam] Other (See Comments)      Hallucinations/paranoid

## 2021-08-19 ENCOUNTER — OFFICE VISIT (OUTPATIENT)
Dept: PULMONOLOGY | Facility: OTHER | Age: 81
End: 2021-08-19
Payer: MEDICARE

## 2021-08-19 ENCOUNTER — ALLIED HEALTH/NURSE VISIT (OUTPATIENT)
Dept: PULMONOLOGY | Facility: OTHER | Age: 81
End: 2021-08-19
Payer: MEDICARE

## 2021-08-19 ENCOUNTER — HOSPITAL ENCOUNTER (OUTPATIENT)
Facility: CLINIC | Age: 81
End: 2021-08-19
Attending: ORTHOPAEDIC SURGERY | Admitting: ORTHOPAEDIC SURGERY
Payer: MEDICARE

## 2021-08-19 VITALS
HEIGHT: 59 IN | HEART RATE: 78 BPM | DIASTOLIC BLOOD PRESSURE: 62 MMHG | BODY MASS INDEX: 37.5 KG/M2 | SYSTOLIC BLOOD PRESSURE: 110 MMHG | WEIGHT: 186 LBS | OXYGEN SATURATION: 91 %

## 2021-08-19 DIAGNOSIS — J45.909 UNCOMPLICATED ASTHMA, UNSPECIFIED ASTHMA SEVERITY, UNSPECIFIED WHETHER PERSISTENT: Primary | ICD-10-CM

## 2021-08-19 DIAGNOSIS — J98.4 CHRONIC RESTRICTIVE LUNG DISEASE: ICD-10-CM

## 2021-08-19 DIAGNOSIS — R06.02 SOB (SHORTNESS OF BREATH): ICD-10-CM

## 2021-08-19 PROCEDURE — 94375 RESPIRATORY FLOW VOLUME LOOP: CPT | Performed by: INTERNAL MEDICINE

## 2021-08-19 PROCEDURE — 99214 OFFICE O/P EST MOD 30 MIN: CPT | Mod: 25 | Performed by: INTERNAL MEDICINE

## 2021-08-19 PROCEDURE — 99207 PR CDG-CODE CATEGORY CHANGED: CPT | Performed by: INTERNAL MEDICINE

## 2021-08-19 RX ORDER — FAMOTIDINE 20 MG
1000 TABLET ORAL DAILY
COMMUNITY

## 2021-08-19 ASSESSMENT — MIFFLIN-ST. JEOR: SCORE: 1219.32

## 2021-08-19 NOTE — LETTER
8/19/2021        RE: Amparo Sharma  [address]  [place] [address]        Pulmonary Clinic Outpatient Follow-Up  8/19/2021     Assessment and Plan:   #. Asthma, difficult to assess severity d/t lack of symptoms -- appears to be adequately controlled w/ her current triple inhaler regimen.  #. R cinthia-diaphragm dysfunction w/ spirometry highly suggestive of moderately severe restriction (w/ consistent flow/volume loops).      Continue w/ Breo ellipta & Incruse ellipta    Continue w/ albuterol PRN    Ms. Sharma has a statistically increased risk of respiratory complications if/when she is hospitalized for her planned surgical procedure(s) due to her underlying asthma & diaphragm-related restrictive pulmonary physiology. Her asthma is well-controlled and should not preclude her from undergoing a surgical procedure. She would need close attention in the jovani-operative period -- she is at risk of prolonged need for mechanical ventilation post-op & at risk of post-operative asthma exacerbation. She should continue using her inhalers as prescribed before and after surgery.    RTC: on an as-needed basis; continue asthma management via PCP    Mae Burgess MD  Pulmonary and Critical Care   ______________________________________________________________________________    CC: follow up    HPI:   Amparo Sharma is a 81 year old former smoker with history of asthma, GERD, chronic right hemidiaphragm dysfunction, presenting today for follow up of for hospitalization with an asthma exacerbation in the fall 2020. Pertinent medications include Breo ellipta, Incruse ellipta, & albuterol.     Reports having asthma & bronchitis as a child w/ frequent hospitalizations for exacerbations. Overall her asthma has improved as she got older. She smoked in college but quit in 1997. Her R diaphragm has been elevated at least since 2016, possibly related to her abddominal surgery related to colon (?) cancer. Not currently  "having any respiratory issues, but sometimes she starts to hyperventilate when she is anxious. She uses her albuterol when she gets anxious (w/ hyperventilation) & feels that it is helpful. When she gets anxious & hyperventilates she does not have any issues w/ her wheezing or coughing. Lately her anxiety is triggered by having to be assisted into bed or into a car -- this has been an issue for the last 2-3 years. She used to fall frequently -- apparently the work-up for this is what led to her cancer diagnosis. She was also diagnosed with hydrocephalus & has a shunt for this (was placed in ~ 2007) -- after having the shunt placed her falls have stopped. She is still wheelchair-dependent; this is due to a various orthopedic issues (needs a shoulder replacement, needs to have one of her knee replacements evaluated).     She had some wheezing during the time when we had issues w/ poor air quality, some issues w/ bronchial congestion.     Currently resides in the [facility] assisted living facility.     REVIEW OF SYSTEMS: 10-point ROS was negative with exceptions as detailed in the HPI.    Physical Exam:  /62   Pulse 78   Ht 1.499 m (4' 11\")   Wt 84.4 kg (186 lb)   SpO2 91%   Breastfeeding No   BMI 37.57 kg/m    Gen: alert, oriented, no distress  HEENT: masked, PERLL; no stridor  CV: RRR, no M/G/R  Resp: equal bilateral air entry, breath sounds clear throughout, no focal crackles or wheezes. Talking in full sentences w/ no respiratory distress  Abd: soft, nontender, no palpable organomegaly  Skin: no apparent rashes  Ext: no cyanosis, clubbing or edema  Neuro: alert, nonfocal    Labs: personally reviewed & interpreted in EMR.   Imaging & Procedures: personally reviewed & interpreted, including formal Radiology reports in the EMR.  CTA chest, 10/26/2020:  No obvious pulmonary embolus identified. Of note, image quality is degraded due to patient motion. No change in elevated right hemidiaphragm. " Small amount of vague groundglass nodular infiltrate involving the lateral aspect of the right upper and right middle lobes. These findings may represent sequelae of an infectious process. Partially visualized ventral hernia within the subxiphoid region.    TTE, 11/2/2020:  When compared to the previous study dated 7/16/2016, no significant change. Technically difficult study. Valves were not well visualized. Left ventricle ejection fraction is normal. The calculated left   ventricular ejection fraction is 75%. Normal left ventricular size and systolic function. Right ventricle not well visualized. TAPSE is abnormal, which is consistent with abnormal right ventricular systolic function. Left atrial volume is mildly increased. The aortic valve is sclerotic without reduced excursion.    PFT, 8/19/2021: results highly suggestive of moderately severe restriction, consistent w/ R hemidiaphragm dysfunction & obesity.       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), Alcoholism in remission (H) (2017), Anemia, Arthritis, Arthritis, Asthma, Asthma (1/20/2017), Bipolar 1 disorder (H), Bipolar disorder (H), Bladder incontinence (2013), Cancer (H), Cellulitis, Cellulitis (12/12/2016), Chronic pain syndrome, COPD (chronic obstructive pulmonary disease) (H), COPD with acute exacerbation (H) (12/21/2018), Dementia (H), Depression, Depression, Disease of thyroid gland, Falls frequently, Frequent falls (2016), Frequent falls (2017), GERD (gastroesophageal reflux disease), GERD (gastroesophageal reflux disease), History of blood transfusion, History of colon cancer, History of transfusion, HTN (hypertension), Hydrocephalus (H), Hydrocephalus (H), Hyperlipidemia, Hypertension, Hypothyroidism, Hypothyroidism, Infection of skin due to methicillin resistant Staphylococcus aureus (MRSA), Loss of balance, Memory loss, Memory loss (2017), Normal pressure hydrocephalus (H)  (02/03/2017), NPH (normal pressure hydrocephalus) (H), Parkinson disease (H), Renal insufficiency, Squamous cell cancer of multiple sites of skin of upper arm, left (2016), Thyroid disease, Urinary incontinence, and Urinary incontinence.  SURGICAL HISTORY:  has a past surgical history that includes Hysterectomy; multiple knee surgeries; rotator cuff surgeries; Cholecystectomy; colon cancer resection (1/2015); GI surgery; Abdomen surgery; orthopedic surgery; Insert drain lumbar (N/A, 2/5/2017); Optical tracking system implant shunt ventriculoperitoneal (Right, 2/8/2017); GYN surgery; Esophagoscopy, gastroscopy, duodenoscopy (EGD), combined (N/A, 9/7/2017); Colon surgery; Laparoscopic assisted colectomy; Hysterectomy; joint replacement; Arthroscopy shoulder rotator cuff repair; fracture surgery; Laparoscopic cholecystectomy; Implant shunt ventriculoperitoneal (02/03/2017); Lumbar Puncture (02/03/2017); RECONSTR TOTAL SHOULDER IMPLANT (Right, 3/5/2019); and Colonoscopy (N/A, 2/3/2020).  SOCIAL HISTORY:  reports that she quit smoking about 24 years ago. Her smoking use included cigarettes. She started smoking about 41 years ago. She smoked 0.25 packs per day. She has quit using smokeless tobacco. She reports that she does not drink alcohol and does not use drugs.  FAMILY HISTORY: family history includes Arthritis in her brother; Breast Cancer (age of onset: 60) in her mother; Depression (age of onset: 55) in her father; Peptic Ulcer Disease in her father; Prostate Cancer in her brother.    MEDICATIONS: personally reviewed, including EMR/Care Everywhere. Pertinent information noted & updated.   ALLERGIES:   Allergies   Allergen Reactions     Antihistamine Allergy Relief [Diphenhydramine] Other (See Comments)     hyperactivity     Codeine Itching     Demerol [Meperidine] Nausea     Hmg-Coa-R Inhibitors Other (See Comments)     Was hospitalized for possible rhabdo     Meloxicam Other (See Comments)     Talwin [Pentazocine]  Other (See Comments)     drowsiness     Tramadol Nausea     Valium [Diazepam] Other (See Comments)     Hallucinations/paranoid             Sincerely,        Mae Burgess MD

## 2021-08-23 LAB
ERV-%PRED-PRE: NORMAL %
ERV-PRE: 0.33 L
ERV-PRED: 0 L
EXPTIME-PRE: 3.44 SEC
FEF2575-%PRED-PRE: 83 %
FEF2575-PRE: 1.15 L/SEC
FEF2575-PRED: 1.39 L/SEC
FEFMAX-%PRED-PRE: 79 %
FEFMAX-PRE: 3.22 L/SEC
FEFMAX-PRED: 4.05 L/SEC
FEV1-%PRED-PRE: 53 %
FEV1-PRE: 0.86 L
FEV1FEV6-PRE: 87 %
FEV1FEV6-PRED: 78 %
FEV1FVC-PRE: 87 %
FEV1FVC-PRED: 78 %
FEV1SVC-PRE: 61 %
FEV1SVC-PRED: 73 %
FIFMAX-PRE: 2.4 L/SEC
FVC-%PRED-PRE: 46 %
FVC-PRE: 0.99 L
FVC-PRED: 2.12 L
IC-%PRED-PRE: 49 %
IC-PRE: 1.1 L
IC-PRED: 2.22 L
VC-%PRED-PRE: 64 %
VC-PRE: 1.42 L
VC-PRED: 2.22 L

## 2021-08-25 DIAGNOSIS — Z11.59 ENCOUNTER FOR SCREENING FOR OTHER VIRAL DISEASES: ICD-10-CM

## 2021-09-14 ENCOUNTER — LAB REQUISITION (OUTPATIENT)
Dept: LAB | Facility: CLINIC | Age: 81
End: 2021-09-14
Payer: MEDICARE

## 2021-09-14 DIAGNOSIS — I10 ESSENTIAL (PRIMARY) HYPERTENSION: ICD-10-CM

## 2021-09-14 DIAGNOSIS — E03.9 HYPOTHYROIDISM, UNSPECIFIED: ICD-10-CM

## 2021-09-15 LAB
ANION GAP SERPL CALCULATED.3IONS-SCNC: 9 MMOL/L (ref 5–18)
BUN SERPL-MCNC: 15 MG/DL (ref 8–28)
CALCIUM SERPL-MCNC: 9.9 MG/DL (ref 8.5–10.5)
CHLORIDE BLD-SCNC: 102 MMOL/L (ref 98–107)
CO2 SERPL-SCNC: 27 MMOL/L (ref 22–31)
CREAT SERPL-MCNC: 0.78 MG/DL (ref 0.6–1.1)
ERYTHROCYTE [DISTWIDTH] IN BLOOD BY AUTOMATED COUNT: 19.1 % (ref 10–15)
GFR SERPL CREATININE-BSD FRML MDRD: 72 ML/MIN/1.73M2
GLUCOSE BLD-MCNC: 109 MG/DL (ref 70–125)
HCT VFR BLD AUTO: 37.1 % (ref 35–47)
HGB BLD-MCNC: 10.5 G/DL (ref 11.7–15.7)
MCH RBC QN AUTO: 22.3 PG (ref 26.5–33)
MCHC RBC AUTO-ENTMCNC: 28.3 G/DL (ref 31.5–36.5)
MCV RBC AUTO: 79 FL (ref 78–100)
PLATELET # BLD AUTO: 331 10E3/UL (ref 150–450)
POTASSIUM BLD-SCNC: 4.3 MMOL/L (ref 3.5–5)
RBC # BLD AUTO: 4.71 10E6/UL (ref 3.8–5.2)
SODIUM SERPL-SCNC: 138 MMOL/L (ref 136–145)
TSH SERPL DL<=0.005 MIU/L-ACNC: 1.03 UIU/ML (ref 0.3–5)
WBC # BLD AUTO: 6.5 10E3/UL (ref 4–11)

## 2021-09-15 PROCEDURE — 84443 ASSAY THYROID STIM HORMONE: CPT | Mod: ORL | Performed by: NURSE PRACTITIONER

## 2021-09-15 PROCEDURE — P9604 ONE-WAY ALLOW PRORATED TRIP: HCPCS | Mod: ORL | Performed by: NURSE PRACTITIONER

## 2021-09-15 PROCEDURE — 80048 BASIC METABOLIC PNL TOTAL CA: CPT | Mod: ORL | Performed by: NURSE PRACTITIONER

## 2021-09-15 PROCEDURE — 36415 COLL VENOUS BLD VENIPUNCTURE: CPT | Mod: ORL | Performed by: NURSE PRACTITIONER

## 2021-09-15 PROCEDURE — 85027 COMPLETE CBC AUTOMATED: CPT | Mod: ORL | Performed by: NURSE PRACTITIONER

## 2021-10-05 ENCOUNTER — LAB REQUISITION (OUTPATIENT)
Dept: LAB | Facility: CLINIC | Age: 81
End: 2021-10-05
Payer: MEDICARE

## 2021-10-05 DIAGNOSIS — D64.9 ANEMIA, UNSPECIFIED: ICD-10-CM

## 2021-10-06 LAB
FERRITIN SERPL-MCNC: 25 NG/ML (ref 10–130)
HGB BLD-MCNC: 10.6 G/DL (ref 11.7–15.7)
IRON SATN MFR SERPL: 6 % (ref 20–50)
IRON SERPL-MCNC: 21 UG/DL (ref 42–175)
TIBC SERPL-MCNC: 379 UG/DL (ref 313–563)
TRANSFERRIN SERPL-MCNC: 303 MG/DL (ref 212–360)

## 2021-10-06 PROCEDURE — 83540 ASSAY OF IRON: CPT | Mod: ORL | Performed by: NURSE PRACTITIONER

## 2021-10-06 PROCEDURE — P9603 ONE-WAY ALLOW PRORATED MILES: HCPCS | Mod: ORL | Performed by: NURSE PRACTITIONER

## 2021-10-06 PROCEDURE — 82728 ASSAY OF FERRITIN: CPT | Mod: ORL | Performed by: NURSE PRACTITIONER

## 2021-10-06 PROCEDURE — 85018 HEMOGLOBIN: CPT | Mod: ORL | Performed by: NURSE PRACTITIONER

## 2021-10-06 PROCEDURE — 36415 COLL VENOUS BLD VENIPUNCTURE: CPT | Mod: ORL | Performed by: NURSE PRACTITIONER

## 2022-03-08 ENCOUNTER — LAB REQUISITION (OUTPATIENT)
Dept: LAB | Facility: CLINIC | Age: 82
End: 2022-03-08
Payer: MEDICARE

## 2022-03-08 DIAGNOSIS — E03.9 HYPOTHYROIDISM, UNSPECIFIED: ICD-10-CM

## 2022-03-08 DIAGNOSIS — I10 ESSENTIAL (PRIMARY) HYPERTENSION: ICD-10-CM

## 2022-03-09 LAB
ANION GAP SERPL CALCULATED.3IONS-SCNC: 10 MMOL/L (ref 5–18)
BUN SERPL-MCNC: 16 MG/DL (ref 8–28)
CALCIUM SERPL-MCNC: 10.2 MG/DL (ref 8.5–10.5)
CHLORIDE BLD-SCNC: 104 MMOL/L (ref 98–107)
CO2 SERPL-SCNC: 26 MMOL/L (ref 22–31)
CREAT SERPL-MCNC: 0.69 MG/DL (ref 0.6–1.1)
ERYTHROCYTE [DISTWIDTH] IN BLOOD BY AUTOMATED COUNT: 15.7 % (ref 10–15)
GFR SERPL CREATININE-BSD FRML MDRD: 87 ML/MIN/1.73M2
GLUCOSE BLD-MCNC: 85 MG/DL (ref 70–125)
HCT VFR BLD AUTO: 42.9 % (ref 35–47)
HGB BLD-MCNC: 13.5 G/DL (ref 11.7–15.7)
MCH RBC QN AUTO: 28.2 PG (ref 26.5–33)
MCHC RBC AUTO-ENTMCNC: 31.5 G/DL (ref 31.5–36.5)
MCV RBC AUTO: 90 FL (ref 78–100)
PLATELET # BLD AUTO: 286 10E3/UL (ref 150–450)
POTASSIUM BLD-SCNC: 4.3 MMOL/L (ref 3.5–5)
RBC # BLD AUTO: 4.79 10E6/UL (ref 3.8–5.2)
SODIUM SERPL-SCNC: 140 MMOL/L (ref 136–145)
TSH SERPL DL<=0.005 MIU/L-ACNC: 1.43 UIU/ML (ref 0.3–5)
WBC # BLD AUTO: 7.5 10E3/UL (ref 4–11)

## 2022-03-09 PROCEDURE — 85027 COMPLETE CBC AUTOMATED: CPT | Mod: ORL | Performed by: NURSE PRACTITIONER

## 2022-03-09 PROCEDURE — P9604 ONE-WAY ALLOW PRORATED TRIP: HCPCS | Mod: ORL | Performed by: NURSE PRACTITIONER

## 2022-03-09 PROCEDURE — 36415 COLL VENOUS BLD VENIPUNCTURE: CPT | Mod: ORL | Performed by: NURSE PRACTITIONER

## 2022-03-09 PROCEDURE — 84443 ASSAY THYROID STIM HORMONE: CPT | Mod: ORL | Performed by: NURSE PRACTITIONER

## 2022-03-09 PROCEDURE — 80048 BASIC METABOLIC PNL TOTAL CA: CPT | Mod: ORL | Performed by: NURSE PRACTITIONER

## 2022-03-14 NOTE — PROGRESS NOTES
Nutrition Services: Update   Day 18 of admit.  Magali Leal is a 57 y.o. female with admitting DX of Hypercalcemia [E83.52]    Pt is currently on a low fiber (GI soft) diet. PO diet started on 3/12. Magic Cups added today BID. PO intake <50% x5 meals per ADL's. Pt states she is not eating much and has nausea. We discussed trying Chobani yogurt smoothies to sip on for added calories and protein and pt agreed.     Last weight taken on 2/23. Communicated to RN and CNA for updated weight.     Recommendations/Plan:  1. Continue PO diet as tolerated  2. Magic Cups BID  3. Chobani Smoothies BID  4. Encourage intake of meals  5. Document intake of all meals as % taken in ADL's to provide interdisciplinary communication across all shifts.   6. Monitor weight.  7. Nutrition rep will continue to see patient for ongoing meal and snack preferences.    RD following     Service Date: 10/05/2018      REASON FOR VISIT:  Ms. Amparo Sharma, a right-handed lady, returns for a followup visit having last been seen on 08/17/2018.  Prior to that, she saw me for the first time on 07/06/2018.  Amparo is unaccompanied by any relative at this time.  Nevertheless, her history is reliable and consistent with previous records.      HISTORY OF PRESENT ILLNESS:  The following is a brief review of her presenting history as noted on 07/06/2018:     1.  Ms. Sharma started to lose her balance in 2010 when she was living in [place].   2.  On 1 occasion in 02/2013, she was found in her residence on the floor and because of prolonged inactivity she suffered rhabdomyolysis.  She was down at least 8 hours.   3.  The patient hospitalized in [place], [place] and subsequently went through rehabilitation for 3 months.   4.  The patient returned to alcohol dependency whereas she had been dry for a number of years, having gone through [facility] in mid 1970s.     5.  In 2015, she returned to [place] and was followed by Dr. Parvez Francis and subsequently referred to the Department of Neurology at [facility].     6.  Amparo was seen by Dr. Alex Gruber and at that time did mention some difficulty with memory.  He noted that she had overdosed on drugs and had tried to commit suicide a number of times between 2005 and 2013.  She had severe dyspraxia of gait.   7.  The patient was treated with Sinemet, but I am not sure when this was started and the patient states that it did not help her at all.     8.  She was placed on amantadine later and this gave her some relief but after a while she developed confusion when given inappropriate dose.     9.  Dr. Gruber saw the patient again in 10/2016 at which time he ordered an MRI of the head and this showed moderate ventricular dilatation out of proportion to the degree of supratentorial cortical volume loss.  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  "  10.  The patient was seen by Dr. Rc Segura of the Neurosurgery Department on 01/20/2017.  He opined that her clinical picture was consistent with normal pressure hydrocephalus.   11.  The patient was admitted for lumbar drain placement and evaluated by Physical Therapy and Neuropsychology.  Her gait and strength improved after the lumbar drainage but her cognition did not seem to improve.     12.  On 02/05/2017 she underwent placement of ventriculoperitoneal shunt and 10 days later was doing well and was clinically improved.     13.  CT of the head without contrast on 05/03/2017, the impression was, \"Right frontal approach ventriculostomy catheter is unchanged in position and ventricles are unchanged in size.\"     14.  Progress notes in 07/2017 indicated that she was having falls and had to end up in a skilled nursing facility.  She had also fallen twice there.     15.  On 08/24/2017, she was seen again by Neurosurgery and complained of lightheadedness when she bent down.   16.  The patient was seen by Dr. Segura on 01/03/2018 and he noted that she was doing better.  She continued to use a walker and had not been falling.     17.  Her primary physician, Dr. Parvez Francis, saw Ms. Sharma on 01/31/2018 because of a fall the night before.  Just after she had arisen from her bed she became lightheaded and fell to the floor.  Dr. Wilkerson opined that the patient had orthostatic hypotension, but all in all, she was improving.   18.  The patient was referred back to Dr. Gruber who saw her on 03/16/2018 at which time she complained of increasing gait disturbance and memory loss.  Unfortunately, she had suffered a number of falls but sustained no new head trauma.  She was checked for orthostatic hypotension and this was not found.     19.  On 05/04/2018, Dr. Gruber saw the patient again where she complained of worsening balance and more memory loss.  She was totally walker-dependent.  The people at her residence " "indicated that she was not safe to walk alone and so she always had to have an attendant when walking.  Dr. Gruber referred her to the [facility] where she saw this examiner.   20.  On 06/04/2018, the patient seen again in the Neurosurgery Clinic and Dr. Dozier noted that she was having worsened gait.  A nuclear medicine study done to evaluate the ventriculoperitoneal shunt was unremarkable and the shunt was working well.   21.  Dr. Gruber again saw her prior to her ataxic 06/06/2018 and noted that her gait was worsening.  She had severe gait instability and a tendency to fall backward.  She needed assistance to get onto the examination table.   22.  When seen on 07/06/2018 by me in the [facility], she was walking fairly well with a walker.  She had stopped taking the amantadine and she indicates that her improvement resulted when she stopped the drug.      At the time of her visit with this examiner on 07/06/2018, the impression was multifactorial gait disturbance, imbalance and history of normal pressure hydrocephalus along with cognitive decline.  The patient was continued on physical therapy and started on dalfampridine (Ampyra) 10 mg 8:00 a.m. and 10 mg 8:00 p.m.      When seen on 08/17/2018 in the [facility], she reported significant improvement in her gait after starting the Ampyra.  She was more stable and felt stronger on her feet.  Unfortunately, her right knee was bothering her to a major extent.  The assessment was that her gait was indeed improved.  Also, there was discussion about the possibility that there were other conditions contributing to her mental status dysfunction.  It was recommended that she be continued with physical therapy 2 days a week and a note was written that the patient should have an orthotic brace for her right knee.      At the time of this visit, the patient states, \"My gait is much better.\"  Further, she indicates that she has had no falls and feels much more " "stable with her walker.  The physical therapist has been doing muscle building but I had a telephone conversation with her therapist who states that her right knee pain would be helped if she had a brace.  Apparently, my previous order for this was not received by the [employer] [facility].      Amparo further states, \"My balance is a big problem right now.\"  Further, she states that merely doing muscle building will not help her balance and she would like to return to the [facility], which she attended previously, although I have no record of that.      I did a brief review of systems.  The patient states that she has had some weight gain, but states that she is really quite inactive and does not burn off calories.  She still has some GI complaints with some heartburn.  With regard to musculoskeletal condition, she has joint pain, especially of the right knee and has some weakness of her legs.  On genitourinary history, she still has some urinary leakage and frequency and has to get up at night to urinate.  Psychologically, she has been doing well, although feels anxious.  Bottom line, she states, \"I want to get off having to be accompanied by an escort in the building where I live and I want to be more independent\".        CURRENT MEDICATIONS:  The same as at the time of her last visit with the exception of Ampyra 10 mg 8 a.m. and 8:00 p.m.  Otherwise, she continues on:   1.  Acetaminophen p.r.n.   2.  Albuterol p.r.n.   3.  Amlodipine.     4.  Amoxicillin p.r.n.   5.  Abilify.   6.  Excedrin Migraine p.r.n.   7.  Bupropion (Wellbutrin).     8.  Cetirizine.     9.  Diclofenac sodium.     10.  Colace.     11.  Donepezil (Aricept).     12.  Fluoxetine.     13.  Lamotrigine.     14.  Levothyroxine.   15.  Multivitamins with minerals.     16.  Ondansetron p.r.n.   17.  Ranitidine.     18.  Spiriva inhaler p.r.n.      PHYSICAL EXAMINATION:  Amparo is doing very well at the time of this visit with " respect to her cognition.  I did not do a formal mental status examination, but she was accurate with respect to person, time and place and also was showing evidence of good attention and concentration.   VITAL SIGNS:  Blood pressure 118/73.  Pulse 74.  Weight 174 pounds.  Height 5 feet 1 inch.  BMI 32.95.      A PHQ-9 (Patient Health Questionnaire) was administered.  She had some problem with falling asleep and was feeling a lack of energy.  Otherwise, she had no significant problems.  Prior to doing the neurologic examination I tried to evaluate her right knee which is somewhat swollen and quite tender over the medial and lateral aspects.  It is even painful when the percussion hammer strikes the infrapatellar tendon.      Neurologic examination otherwise reveals normal cranial nerves with the exception of diminished convergence.  Reflexes are 2+ in the upper extremities except for an absent right brachioradialis.  Knee jerks are 1+ above while ankle jerks are absent.  There is no upgoing toe to plantar stimulation.  She does have weakness of the left anterior deltoid.  All in all, her coordination is good in the upper extremities and somewhat difficult to evaluate in the lower extremities, especially on the right side because of her knee pain.  She walks with a walker, doing quite well on her own and transfers from chair to walker without difficulty.  She traverses 25 feet in 9 seconds, which is 2 seconds slower than the last time, but she really appears quite stable with the use of the walker.      IMPRESSION:   1.  Gait disturbance associated with normal pressure hydrocephalus by history, status post shunt surgery.   2.  Gait improvement with Ampyra.     3.  Imbalance, which she considers to be her major problem.   4.  Knee joint problems, especially on the right.   5.  There certainly appears to be some other central nervous system problem besides the normal pressure hydrocephalus.  This could be vascular with  small vessel disease.      PLAN:   1.  Once again, a right knee brace has been ordered and this examiner will call weekly [facility] directly at [phone].   2.  The patient is once again referred for balance training to [facility], phone number [phone], fax number [phone].   3.  Decrease frequency of physical therapy to 1 time a week.   4.  I will direct physical therapy to determine when Amparo can walk in her residence without escort.   5.  Followup visit on 2018.      Once again, I appreciate the referral of this patient by Dr. Gruber and also the initial referral to Neurology by Dr. Parvze Francis.        Scar Russ MD   EVALUATION AND MANAGEMENT (E/M) CODE:  [id number]   cc:   Parvez Francis MD   [facility]    [address] [place] [address]         SCAR RUSS MD             D: 10/10/2018   T: 10/11/2018   MT: SHONA      Name:     AMPARO COPPOLA   MRN:      [id number]        Account:      [id number]   :      1940           Service Date: 10/05/2018      Document: [id number]

## 2022-03-15 ENCOUNTER — MEDICAL CORRESPONDENCE (OUTPATIENT)
Dept: HEALTH INFORMATION MANAGEMENT | Facility: CLINIC | Age: 82
End: 2022-03-15
Payer: MEDICARE

## 2022-03-21 ENCOUNTER — LAB REQUISITION (OUTPATIENT)
Dept: LAB | Facility: CLINIC | Age: 82
End: 2022-03-21
Payer: MEDICARE

## 2022-03-21 DIAGNOSIS — Z20.828 CONTACT WITH AND (SUSPECTED) EXPOSURE TO OTHER VIRAL COMMUNICABLE DISEASES: ICD-10-CM

## 2022-03-21 LAB — SARS-COV-2 RNA RESP QL NAA+PROBE: NEGATIVE

## 2022-03-21 PROCEDURE — U0005 INFEC AGEN DETEC AMPLI PROBE: HCPCS | Mod: ORL | Performed by: NURSE PRACTITIONER

## 2022-03-21 NOTE — OR NURSING
Patient was given pre op info. She stated she was going to a TCU post op, which would be arranged by Dr. Peterson's office/team. That team was contacted to f/u and obtain a plan for discharge since she lives in an Assisted living.     Daughter Nohemi was contacted as writer was unsure if patient obtained all pre op info correctly. She was having difficulty correctly repeating back the info given.     Covid test was done 3/21 at her Assisted Living center. They will fax the results at [facility].

## 2022-03-22 ENCOUNTER — ANESTHESIA EVENT (OUTPATIENT)
Dept: SURGERY | Facility: CLINIC | Age: 82
DRG: 483 | End: 2022-03-22
Payer: MEDICARE

## 2022-03-23 ENCOUNTER — ANESTHESIA (OUTPATIENT)
Dept: SURGERY | Facility: CLINIC | Age: 82
DRG: 483 | End: 2022-03-23
Payer: MEDICARE

## 2022-04-05 NOTE — OR NURSING
Left message for patient regarding pre-op information.  Called daughter Nohemi to update that writer was trying to reach patient for information.  Dtr Nohemi states that patient lives in [facility] and can ambulate with gait belt, assistance, and walker.  Plan is for [facility] to set up transportation.  Per Nohemi patient will need TCU upon discharge.

## 2022-04-05 NOTE — OR NURSING
Spoke with Ching DE PAZ at [facility], patient had H&P done at [place] 3/15/2022.  Plan for COVID PCR 4/8/2022 at [facility].  Patient in non ambulatory currently, with 2 person assist transfer or use of tamiko lift.    [facility] set up transportation for Monday 4/11/2022 @ 9:15am with plan to be at [facility] by 10:30am.    Reviewed with Ching, no solid food after 11pm the night before, patient needs bath the night before, clear liquids up until 8:30am the morning of surgery.

## 2022-04-08 ENCOUNTER — LAB REQUISITION (OUTPATIENT)
Dept: LAB | Facility: CLINIC | Age: 82
End: 2022-04-08
Payer: MEDICARE

## 2022-04-08 DIAGNOSIS — Z20.828 CONTACT WITH AND (SUSPECTED) EXPOSURE TO OTHER VIRAL COMMUNICABLE DISEASES: ICD-10-CM

## 2022-04-08 LAB — SARS-COV-2 RNA RESP QL NAA+PROBE: NEGATIVE

## 2022-04-08 PROCEDURE — U0005 INFEC AGEN DETEC AMPLI PROBE: HCPCS | Mod: ORL | Performed by: NURSE PRACTITIONER

## 2022-04-08 RX ORDER — FERROUS SULFATE 325(65) MG
325 TABLET ORAL DAILY
COMMUNITY
End: 2022-05-03

## 2022-04-08 RX ORDER — LAMOTRIGINE 100 MG/1
100 TABLET ORAL AT BEDTIME
COMMUNITY
Start: 2021-08-19 | End: 2023-03-22

## 2022-04-08 RX ORDER — OXYCODONE HYDROCHLORIDE 5 MG/1
5 TABLET ORAL EVERY 4 HOURS PRN
Status: ON HOLD | COMMUNITY
End: 2022-04-13

## 2022-04-08 RX ORDER — LEVOTHYROXINE SODIUM 75 UG/1
75 TABLET ORAL DAILY
COMMUNITY
Start: 2021-08-20 | End: 2023-01-10

## 2022-04-08 RX ORDER — TROLAMINE SALICYLATE 10 G/100G
CREAM TOPICAL DAILY PRN
Status: ON HOLD | COMMUNITY
End: 2022-04-11

## 2022-04-08 RX ORDER — ALBUTEROL SULFATE 90 UG/1
2 AEROSOL, METERED RESPIRATORY (INHALATION) EVERY 4 HOURS PRN
COMMUNITY
End: 2023-01-24

## 2022-04-11 ENCOUNTER — HOSPITAL ENCOUNTER (INPATIENT)
Facility: CLINIC | Age: 82
LOS: 2 days | Discharge: NURSING, REHAB OR LONG-TERM CARE | DRG: 483 | End: 2022-04-13
Attending: ORTHOPAEDIC SURGERY | Admitting: ORTHOPAEDIC SURGERY
Payer: MEDICARE

## 2022-04-11 ENCOUNTER — MEDICAL CORRESPONDENCE (OUTPATIENT)
Dept: HEALTH INFORMATION MANAGEMENT | Facility: CLINIC | Age: 82
End: 2022-04-11

## 2022-04-11 ENCOUNTER — APPOINTMENT (OUTPATIENT)
Dept: RADIOLOGY | Facility: CLINIC | Age: 82
DRG: 483 | End: 2022-04-11
Attending: PHYSICIAN ASSISTANT
Payer: MEDICARE

## 2022-04-11 DIAGNOSIS — Z96.612 S/P REVERSE TOTAL SHOULDER ARTHROPLASTY, LEFT: Primary | ICD-10-CM

## 2022-04-11 DIAGNOSIS — Z96.611 STATUS POST REVERSE TOTAL ARTHROPLASTY OF RIGHT SHOULDER: ICD-10-CM

## 2022-04-11 LAB
ABO/RH(D): NORMAL
ANTIBODY SCREEN: NEGATIVE
APTT PPP: 31 SECONDS (ref 22–38)
CREAT SERPL-MCNC: 0.65 MG/DL (ref 0.6–1.1)
ERYTHROCYTE [DISTWIDTH] IN BLOOD BY AUTOMATED COUNT: 15 % (ref 10–15)
GFR SERPL CREATININE-BSD FRML MDRD: 89 ML/MIN/1.73M2
HCT VFR BLD AUTO: 39.6 % (ref 35–47)
HGB BLD-MCNC: 12.3 G/DL (ref 11.7–15.7)
INR PPP: 1.06 (ref 0.85–1.15)
MCH RBC QN AUTO: 28.7 PG (ref 26.5–33)
MCHC RBC AUTO-ENTMCNC: 31.1 G/DL (ref 31.5–36.5)
MCV RBC AUTO: 93 FL (ref 78–100)
PLATELET # BLD AUTO: 394 10E3/UL (ref 150–450)
POTASSIUM BLD-SCNC: 4.4 MMOL/L (ref 3.5–5)
RBC # BLD AUTO: 4.28 10E6/UL (ref 3.8–5.2)
SPECIMEN EXPIRATION DATE: NORMAL
WBC # BLD AUTO: 9.1 10E3/UL (ref 4–11)

## 2022-04-11 PROCEDURE — 36415 COLL VENOUS BLD VENIPUNCTURE: CPT | Performed by: PHYSICIAN ASSISTANT

## 2022-04-11 PROCEDURE — 82565 ASSAY OF CREATININE: CPT | Performed by: PHYSICIAN ASSISTANT

## 2022-04-11 PROCEDURE — 250N000011 HC RX IP 250 OP 636: Performed by: NURSE ANESTHETIST, CERTIFIED REGISTERED

## 2022-04-11 PROCEDURE — C9290 INJ, BUPIVACAINE LIPOSOME: HCPCS | Performed by: ORTHOPAEDIC SURGERY

## 2022-04-11 PROCEDURE — 250N000009 HC RX 250: Performed by: ANESTHESIOLOGY

## 2022-04-11 PROCEDURE — 250N000009 HC RX 250: Performed by: NURSE ANESTHETIST, CERTIFIED REGISTERED

## 2022-04-11 PROCEDURE — 360N000077 HC SURGERY LEVEL 4, PER MIN: Performed by: ORTHOPAEDIC SURGERY

## 2022-04-11 PROCEDURE — 85610 PROTHROMBIN TIME: CPT | Performed by: PHYSICIAN ASSISTANT

## 2022-04-11 PROCEDURE — 250N000011 HC RX IP 250 OP 636

## 2022-04-11 PROCEDURE — 250N000011 HC RX IP 250 OP 636: Performed by: ANESTHESIOLOGY

## 2022-04-11 PROCEDURE — 250N000011 HC RX IP 250 OP 636: Performed by: ORTHOPAEDIC SURGERY

## 2022-04-11 PROCEDURE — 258N000003 HC RX IP 258 OP 636: Performed by: NURSE ANESTHETIST, CERTIFIED REGISTERED

## 2022-04-11 PROCEDURE — 120N000001 HC R&B MED SURG/OB

## 2022-04-11 PROCEDURE — 999N000065 XR SHOULDER LEFT PORT G/E 2 VIEWS: Mod: LT

## 2022-04-11 PROCEDURE — 85027 COMPLETE CBC AUTOMATED: CPT | Performed by: PHYSICIAN ASSISTANT

## 2022-04-11 PROCEDURE — 250N000013 HC RX MED GY IP 250 OP 250 PS 637: Performed by: FAMILY MEDICINE

## 2022-04-11 PROCEDURE — 250N000011 HC RX IP 250 OP 636: Performed by: PHYSICIAN ASSISTANT

## 2022-04-11 PROCEDURE — C1713 ANCHOR/SCREW BN/BN,TIS/BN: HCPCS | Performed by: ORTHOPAEDIC SURGERY

## 2022-04-11 PROCEDURE — 272N000001 HC OR GENERAL SUPPLY STERILE: Performed by: ORTHOPAEDIC SURGERY

## 2022-04-11 PROCEDURE — 250N000013 HC RX MED GY IP 250 OP 250 PS 637: Performed by: INTERNAL MEDICINE

## 2022-04-11 PROCEDURE — 250N000025 HC SEVOFLURANE, PER MIN: Performed by: ORTHOPAEDIC SURGERY

## 2022-04-11 PROCEDURE — 258N000003 HC RX IP 258 OP 636: Performed by: PHYSICIAN ASSISTANT

## 2022-04-11 PROCEDURE — 86901 BLOOD TYPING SEROLOGIC RH(D): CPT | Performed by: PHYSICIAN ASSISTANT

## 2022-04-11 PROCEDURE — 0LS40ZZ REPOSITION LEFT UPPER ARM TENDON, OPEN APPROACH: ICD-10-PCS | Performed by: ORTHOPAEDIC SURGERY

## 2022-04-11 PROCEDURE — 0RRK00Z REPLACEMENT OF LEFT SHOULDER JOINT WITH REVERSE BALL AND SOCKET SYNTHETIC SUBSTITUTE, OPEN APPROACH: ICD-10-PCS | Performed by: ORTHOPAEDIC SURGERY

## 2022-04-11 PROCEDURE — 258N000003 HC RX IP 258 OP 636: Performed by: ANESTHESIOLOGY

## 2022-04-11 PROCEDURE — 370N000017 HC ANESTHESIA TECHNICAL FEE, PER MIN: Performed by: ORTHOPAEDIC SURGERY

## 2022-04-11 PROCEDURE — 85730 THROMBOPLASTIN TIME PARTIAL: CPT | Performed by: PHYSICIAN ASSISTANT

## 2022-04-11 PROCEDURE — 710N000010 HC RECOVERY PHASE 1, LEVEL 2, PER MIN: Performed by: ORTHOPAEDIC SURGERY

## 2022-04-11 PROCEDURE — 999N000141 HC STATISTIC PRE-PROCEDURE NURSING ASSESSMENT: Performed by: ORTHOPAEDIC SURGERY

## 2022-04-11 PROCEDURE — 250N000013 HC RX MED GY IP 250 OP 250 PS 637: Performed by: PHYSICIAN ASSISTANT

## 2022-04-11 PROCEDURE — 99223 1ST HOSP IP/OBS HIGH 75: CPT | Performed by: FAMILY MEDICINE

## 2022-04-11 PROCEDURE — C1776 JOINT DEVICE (IMPLANTABLE): HCPCS | Performed by: ORTHOPAEDIC SURGERY

## 2022-04-11 PROCEDURE — 84132 ASSAY OF SERUM POTASSIUM: CPT | Performed by: PHYSICIAN ASSISTANT

## 2022-04-11 DEVICE — GLEOSPHERE LATERALIZED STANDARD 36MM: Type: IMPLANTABLE DEVICE | Site: SHOULDER | Status: FUNCTIONAL

## 2022-04-11 DEVICE — IMPLANTABLE DEVICE
Type: IMPLANTABLE DEVICE | Site: SHOULDER | Status: FUNCTIONAL
Brand: AEQUALIS™ ASCEND™ FLEX

## 2022-04-11 DEVICE — IMPLANTABLE DEVICE
Type: IMPLANTABLE DEVICE | Site: SHOULDER | Status: FUNCTIONAL
Brand: FLEX SHOULDER SYSTEM

## 2022-04-11 DEVICE — SCREW PERIPHERAL 5.0X30MM DWJ330: Type: IMPLANTABLE DEVICE | Site: SHOULDER | Status: FUNCTIONAL

## 2022-04-11 DEVICE — BASEPLATE STD 25MM: Type: IMPLANTABLE DEVICE | Site: SHOULDER | Status: FUNCTIONAL

## 2022-04-11 DEVICE — SCREW PERIPHERAL 5.0X34MM DWJ334: Type: IMPLANTABLE DEVICE | Site: SHOULDER | Status: FUNCTIONAL

## 2022-04-11 DEVICE — SCREW PERIPHERAL 14MM DWJ314: Type: IMPLANTABLE DEVICE | Site: SHOULDER | Status: FUNCTIONAL

## 2022-04-11 DEVICE — SCREW PERIPHERAL 18MM DWJ318: Type: IMPLANTABLE DEVICE | Site: SHOULDER | Status: FUNCTIONAL

## 2022-04-11 DEVICE — SCREW CENTRAL 6.5X30MM DWJ130: Type: IMPLANTABLE DEVICE | Site: SHOULDER | Status: FUNCTIONAL

## 2022-04-11 RX ORDER — AMLODIPINE BESYLATE 5 MG/1
5 TABLET ORAL DAILY
Status: DISCONTINUED | OUTPATIENT
Start: 2022-04-12 | End: 2022-04-13 | Stop reason: HOSPADM

## 2022-04-11 RX ORDER — CALCIUM CARBONATE 500 MG/1
500 TABLET, CHEWABLE ORAL 4 TIMES DAILY PRN
Status: DISCONTINUED | OUTPATIENT
Start: 2022-04-11 | End: 2022-04-13 | Stop reason: HOSPADM

## 2022-04-11 RX ORDER — FENTANYL CITRATE 50 UG/ML
50 INJECTION, SOLUTION INTRAMUSCULAR; INTRAVENOUS
Status: COMPLETED | OUTPATIENT
Start: 2022-04-11 | End: 2022-04-11

## 2022-04-11 RX ORDER — LIDOCAINE 40 MG/G
CREAM TOPICAL
Status: DISCONTINUED | OUTPATIENT
Start: 2022-04-11 | End: 2022-04-11 | Stop reason: HOSPADM

## 2022-04-11 RX ORDER — ONDANSETRON 2 MG/ML
4 INJECTION INTRAMUSCULAR; INTRAVENOUS EVERY 6 HOURS PRN
Status: DISCONTINUED | OUTPATIENT
Start: 2022-04-11 | End: 2022-04-13 | Stop reason: HOSPADM

## 2022-04-11 RX ORDER — LEVOTHYROXINE SODIUM 75 UG/1
75 TABLET ORAL DAILY
Status: DISCONTINUED | OUTPATIENT
Start: 2022-04-12 | End: 2022-04-13 | Stop reason: HOSPADM

## 2022-04-11 RX ORDER — CEFAZOLIN SODIUM 1 G/3ML
1 INJECTION, POWDER, FOR SOLUTION INTRAMUSCULAR; INTRAVENOUS EVERY 8 HOURS
Status: COMPLETED | OUTPATIENT
Start: 2022-04-11 | End: 2022-04-12

## 2022-04-11 RX ORDER — ALBUTEROL SULFATE 0.83 MG/ML
2.5 SOLUTION RESPIRATORY (INHALATION) EVERY 4 HOURS PRN
Status: DISCONTINUED | OUTPATIENT
Start: 2022-04-12 | End: 2022-04-13 | Stop reason: HOSPADM

## 2022-04-11 RX ORDER — FENTANYL CITRATE 50 UG/ML
50 INJECTION, SOLUTION INTRAMUSCULAR; INTRAVENOUS
Status: DISCONTINUED | OUTPATIENT
Start: 2022-04-11 | End: 2022-04-11 | Stop reason: HOSPADM

## 2022-04-11 RX ORDER — ONDANSETRON 4 MG/1
4 TABLET, ORALLY DISINTEGRATING ORAL EVERY 30 MIN PRN
Status: DISCONTINUED | OUTPATIENT
Start: 2022-04-11 | End: 2022-04-11 | Stop reason: HOSPADM

## 2022-04-11 RX ORDER — DEXAMETHASONE SODIUM PHOSPHATE 4 MG/ML
INJECTION, SOLUTION INTRA-ARTICULAR; INTRALESIONAL; INTRAMUSCULAR; INTRAVENOUS; SOFT TISSUE PRN
Status: DISCONTINUED | OUTPATIENT
Start: 2022-04-11 | End: 2022-04-11

## 2022-04-11 RX ORDER — PANTOPRAZOLE SODIUM 20 MG/1
20 TABLET, DELAYED RELEASE ORAL DAILY
Status: DISCONTINUED | OUTPATIENT
Start: 2022-04-12 | End: 2022-04-13 | Stop reason: HOSPADM

## 2022-04-11 RX ORDER — SODIUM CHLORIDE, SODIUM LACTATE, POTASSIUM CHLORIDE, CALCIUM CHLORIDE 600; 310; 30; 20 MG/100ML; MG/100ML; MG/100ML; MG/100ML
INJECTION, SOLUTION INTRAVENOUS CONTINUOUS
Status: DISCONTINUED | OUTPATIENT
Start: 2022-04-11 | End: 2022-04-11 | Stop reason: HOSPADM

## 2022-04-11 RX ORDER — HYDROMORPHONE HYDROCHLORIDE 4 MG/ML
INJECTION, SOLUTION INTRAMUSCULAR; INTRAVENOUS; SUBCUTANEOUS PRN
Status: DISCONTINUED | OUTPATIENT
Start: 2022-04-11 | End: 2022-04-11

## 2022-04-11 RX ORDER — BISACODYL 10 MG
10 SUPPOSITORY, RECTAL RECTAL DAILY PRN
Status: DISCONTINUED | OUTPATIENT
Start: 2022-04-11 | End: 2022-04-13 | Stop reason: HOSPADM

## 2022-04-11 RX ORDER — VANCOMYCIN HYDROCHLORIDE 1 G/20ML
1 INJECTION, POWDER, LYOPHILIZED, FOR SOLUTION INTRAVENOUS ONCE
Status: DISCONTINUED | OUTPATIENT
Start: 2022-04-11 | End: 2022-04-11 | Stop reason: HOSPADM

## 2022-04-11 RX ORDER — CARBOXYMETHYLCELLULOSE SODIUM 5 MG/ML
1 SOLUTION/ DROPS OPHTHALMIC DAILY
Status: DISCONTINUED | OUTPATIENT
Start: 2022-04-11 | End: 2022-04-11

## 2022-04-11 RX ORDER — VANCOMYCIN HYDROCHLORIDE 1 G/20ML
INJECTION, POWDER, LYOPHILIZED, FOR SOLUTION INTRAVENOUS PRN
Status: DISCONTINUED | OUTPATIENT
Start: 2022-04-11 | End: 2022-04-11 | Stop reason: HOSPADM

## 2022-04-11 RX ORDER — ONDANSETRON 2 MG/ML
INJECTION INTRAMUSCULAR; INTRAVENOUS PRN
Status: DISCONTINUED | OUTPATIENT
Start: 2022-04-11 | End: 2022-04-11

## 2022-04-11 RX ORDER — HYDROMORPHONE HCL IN WATER/PF 6 MG/30 ML
0.4 PATIENT CONTROLLED ANALGESIA SYRINGE INTRAVENOUS
Status: DISCONTINUED | OUTPATIENT
Start: 2022-04-11 | End: 2022-04-13 | Stop reason: HOSPADM

## 2022-04-11 RX ORDER — NALOXONE HYDROCHLORIDE 0.4 MG/ML
0.4 INJECTION, SOLUTION INTRAMUSCULAR; INTRAVENOUS; SUBCUTANEOUS
Status: DISCONTINUED | OUTPATIENT
Start: 2022-04-11 | End: 2022-04-13 | Stop reason: HOSPADM

## 2022-04-11 RX ORDER — LAMOTRIGINE 100 MG/1
100 TABLET ORAL AT BEDTIME
Status: DISCONTINUED | OUTPATIENT
Start: 2022-04-11 | End: 2022-04-13 | Stop reason: HOSPADM

## 2022-04-11 RX ORDER — OXYCODONE HYDROCHLORIDE 5 MG/1
5 TABLET ORAL EVERY 4 HOURS PRN
Status: DISCONTINUED | OUTPATIENT
Start: 2022-04-11 | End: 2022-04-13 | Stop reason: HOSPADM

## 2022-04-11 RX ORDER — TRANEXAMIC ACID 650 MG/1
1950 TABLET ORAL ONCE
Status: COMPLETED | OUTPATIENT
Start: 2022-04-11 | End: 2022-04-11

## 2022-04-11 RX ORDER — FAMOTIDINE 20 MG
1000 TABLET ORAL DAILY
Status: DISCONTINUED | OUTPATIENT
Start: 2022-04-11 | End: 2022-04-11

## 2022-04-11 RX ORDER — AMOXICILLIN 250 MG
2 CAPSULE ORAL DAILY
Status: DISCONTINUED | OUTPATIENT
Start: 2022-04-12 | End: 2022-04-13 | Stop reason: HOSPADM

## 2022-04-11 RX ORDER — PROPOFOL 10 MG/ML
INJECTION, EMULSION INTRAVENOUS PRN
Status: DISCONTINUED | OUTPATIENT
Start: 2022-04-11 | End: 2022-04-11

## 2022-04-11 RX ORDER — LIDOCAINE 40 MG/G
CREAM TOPICAL
Status: DISCONTINUED | OUTPATIENT
Start: 2022-04-11 | End: 2022-04-13 | Stop reason: HOSPADM

## 2022-04-11 RX ORDER — ONDANSETRON 4 MG/1
4 TABLET, ORALLY DISINTEGRATING ORAL EVERY 6 HOURS PRN
Status: DISCONTINUED | OUTPATIENT
Start: 2022-04-11 | End: 2022-04-13 | Stop reason: HOSPADM

## 2022-04-11 RX ORDER — CEFAZOLIN SODIUM/WATER 2 G/20 ML
2 SYRINGE (ML) INTRAVENOUS SEE ADMIN INSTRUCTIONS
Status: DISCONTINUED | OUTPATIENT
Start: 2022-04-11 | End: 2022-04-11 | Stop reason: HOSPADM

## 2022-04-11 RX ORDER — KETAMINE HYDROCHLORIDE 50 MG/ML
INJECTION, SOLUTION INTRAMUSCULAR; INTRAVENOUS PRN
Status: DISCONTINUED | OUTPATIENT
Start: 2022-04-11 | End: 2022-04-11

## 2022-04-11 RX ORDER — FENTANYL CITRATE 50 UG/ML
25 INJECTION, SOLUTION INTRAMUSCULAR; INTRAVENOUS EVERY 5 MIN PRN
Status: DISCONTINUED | OUTPATIENT
Start: 2022-04-11 | End: 2022-04-11 | Stop reason: HOSPADM

## 2022-04-11 RX ORDER — PROCHLORPERAZINE MALEATE 5 MG
5 TABLET ORAL EVERY 6 HOURS PRN
Status: DISCONTINUED | OUTPATIENT
Start: 2022-04-11 | End: 2022-04-13 | Stop reason: HOSPADM

## 2022-04-11 RX ORDER — ACETAMINOPHEN 325 MG/1
975 TABLET ORAL EVERY 8 HOURS
Status: DISCONTINUED | OUTPATIENT
Start: 2022-04-11 | End: 2022-04-13 | Stop reason: HOSPADM

## 2022-04-11 RX ORDER — ASPIRIN 81 MG/1
81 TABLET ORAL 2 TIMES DAILY
Status: DISCONTINUED | OUTPATIENT
Start: 2022-04-11 | End: 2022-04-13 | Stop reason: HOSPADM

## 2022-04-11 RX ORDER — VITAMIN B COMPLEX
25 TABLET ORAL DAILY
Status: DISCONTINUED | OUTPATIENT
Start: 2022-04-12 | End: 2022-04-13 | Stop reason: HOSPADM

## 2022-04-11 RX ORDER — ACETAMINOPHEN 325 MG/1
975 TABLET ORAL ONCE
Status: DISCONTINUED | OUTPATIENT
Start: 2022-04-11 | End: 2022-04-11 | Stop reason: HOSPADM

## 2022-04-11 RX ORDER — HYDROMORPHONE HCL IN WATER/PF 6 MG/30 ML
0.2 PATIENT CONTROLLED ANALGESIA SYRINGE INTRAVENOUS EVERY 5 MIN PRN
Status: DISCONTINUED | OUTPATIENT
Start: 2022-04-11 | End: 2022-04-11 | Stop reason: HOSPADM

## 2022-04-11 RX ORDER — NALOXONE HYDROCHLORIDE 0.4 MG/ML
0.2 INJECTION, SOLUTION INTRAMUSCULAR; INTRAVENOUS; SUBCUTANEOUS
Status: DISCONTINUED | OUTPATIENT
Start: 2022-04-11 | End: 2022-04-13 | Stop reason: HOSPADM

## 2022-04-11 RX ORDER — SACCHAROMYCES BOULARDII 250 MG
250 CAPSULE ORAL DAILY
COMMUNITY
End: 2022-08-22

## 2022-04-11 RX ORDER — CEFAZOLIN SODIUM/WATER 2 G/20 ML
2 SYRINGE (ML) INTRAVENOUS
Status: COMPLETED | OUTPATIENT
Start: 2022-04-11 | End: 2022-04-11

## 2022-04-11 RX ORDER — VANCOMYCIN HYDROCHLORIDE 1 G/20ML
INJECTION, POWDER, LYOPHILIZED, FOR SOLUTION INTRAVENOUS
Status: DISCONTINUED
Start: 2022-04-11 | End: 2022-04-11 | Stop reason: HOSPADM

## 2022-04-11 RX ORDER — BUPIVACAINE HYDROCHLORIDE AND EPINEPHRINE 2.5; 5 MG/ML; UG/ML
INJECTION, SOLUTION EPIDURAL; INFILTRATION; INTRACAUDAL; PERINEURAL
Status: DISCONTINUED
Start: 2022-04-11 | End: 2022-04-11 | Stop reason: HOSPADM

## 2022-04-11 RX ORDER — ONDANSETRON 2 MG/ML
4 INJECTION INTRAMUSCULAR; INTRAVENOUS EVERY 30 MIN PRN
Status: DISCONTINUED | OUTPATIENT
Start: 2022-04-11 | End: 2022-04-11 | Stop reason: HOSPADM

## 2022-04-11 RX ORDER — CARBOXYMETHYLCELLULOSE SODIUM 5 MG/ML
1 SOLUTION/ DROPS OPHTHALMIC 3 TIMES DAILY PRN
COMMUNITY

## 2022-04-11 RX ORDER — LAMOTRIGINE 150 MG/1
150 TABLET ORAL DAILY
Status: DISCONTINUED | OUTPATIENT
Start: 2022-04-12 | End: 2022-04-13 | Stop reason: HOSPADM

## 2022-04-11 RX ORDER — SODIUM CHLORIDE, SODIUM LACTATE, POTASSIUM CHLORIDE, CALCIUM CHLORIDE 600; 310; 30; 20 MG/100ML; MG/100ML; MG/100ML; MG/100ML
INJECTION, SOLUTION INTRAVENOUS CONTINUOUS
Status: DISCONTINUED | OUTPATIENT
Start: 2022-04-11 | End: 2022-04-13 | Stop reason: HOSPADM

## 2022-04-11 RX ORDER — ALBUTEROL SULFATE 90 UG/1
2 AEROSOL, METERED RESPIRATORY (INHALATION) EVERY 4 HOURS PRN
Status: DISCONTINUED | OUTPATIENT
Start: 2022-04-11 | End: 2022-04-13 | Stop reason: HOSPADM

## 2022-04-11 RX ORDER — ACETAMINOPHEN 325 MG/1
650 TABLET ORAL EVERY 4 HOURS PRN
Status: DISCONTINUED | OUTPATIENT
Start: 2022-04-14 | End: 2022-04-13 | Stop reason: HOSPADM

## 2022-04-11 RX ORDER — OXYCODONE HYDROCHLORIDE 5 MG/1
10 TABLET ORAL EVERY 4 HOURS PRN
Status: DISCONTINUED | OUTPATIENT
Start: 2022-04-11 | End: 2022-04-13 | Stop reason: HOSPADM

## 2022-04-11 RX ORDER — ACETAMINOPHEN 500 MG
1000 TABLET ORAL 3 TIMES DAILY
Status: DISCONTINUED | OUTPATIENT
Start: 2022-04-11 | End: 2022-04-11

## 2022-04-11 RX ORDER — CETIRIZINE HYDROCHLORIDE 10 MG/1
10 TABLET ORAL DAILY
Status: DISCONTINUED | OUTPATIENT
Start: 2022-04-12 | End: 2022-04-13 | Stop reason: HOSPADM

## 2022-04-11 RX ORDER — ACETAMINOPHEN 500 MG
1000 TABLET ORAL 3 TIMES DAILY
Status: ON HOLD | COMMUNITY
End: 2022-04-19

## 2022-04-11 RX ORDER — BUPIVACAINE HYDROCHLORIDE 2.5 MG/ML
INJECTION, SOLUTION INFILTRATION; PERINEURAL PRN
Status: DISCONTINUED | OUTPATIENT
Start: 2022-04-11 | End: 2022-04-11 | Stop reason: HOSPADM

## 2022-04-11 RX ORDER — OXYCODONE HYDROCHLORIDE 5 MG/1
5 TABLET ORAL EVERY 4 HOURS PRN
Status: DISCONTINUED | OUTPATIENT
Start: 2022-04-11 | End: 2022-04-11 | Stop reason: HOSPADM

## 2022-04-11 RX ORDER — MIRABEGRON 25 MG/1
25 TABLET, FILM COATED, EXTENDED RELEASE ORAL DAILY
Status: DISCONTINUED | OUTPATIENT
Start: 2022-04-12 | End: 2022-04-13 | Stop reason: HOSPADM

## 2022-04-11 RX ORDER — ARIPIPRAZOLE 15 MG/1
15 TABLET ORAL AT BEDTIME
Status: DISCONTINUED | OUTPATIENT
Start: 2022-04-11 | End: 2022-04-13 | Stop reason: HOSPADM

## 2022-04-11 RX ORDER — POLYETHYLENE GLYCOL 3350 17 G/17G
17 POWDER, FOR SOLUTION ORAL DAILY
Status: DISCONTINUED | OUTPATIENT
Start: 2022-04-12 | End: 2022-04-13 | Stop reason: HOSPADM

## 2022-04-11 RX ORDER — HYDROMORPHONE HCL IN WATER/PF 6 MG/30 ML
0.2 PATIENT CONTROLLED ANALGESIA SYRINGE INTRAVENOUS
Status: DISCONTINUED | OUTPATIENT
Start: 2022-04-11 | End: 2022-04-13 | Stop reason: HOSPADM

## 2022-04-11 RX ORDER — CARBOXYMETHYLCELLULOSE SODIUM 10 MG/ML
1 GEL OPHTHALMIC DAILY
Status: DISCONTINUED | OUTPATIENT
Start: 2022-04-12 | End: 2022-04-13 | Stop reason: HOSPADM

## 2022-04-11 RX ADMIN — ROCURONIUM BROMIDE 10 MG: 10 INJECTION, SOLUTION INTRAVENOUS at 14:04

## 2022-04-11 RX ADMIN — SUGAMMADEX 200 MG: 100 INJECTION, SOLUTION INTRAVENOUS at 15:32

## 2022-04-11 RX ADMIN — HYDROMORPHONE HYDROCHLORIDE 0.25 MG: 4 INJECTION, SOLUTION INTRAMUSCULAR; INTRAVENOUS; SUBCUTANEOUS at 13:19

## 2022-04-11 RX ADMIN — Medication 2 G: at 12:33

## 2022-04-11 RX ADMIN — TRANEXAMIC ACID 1950 MG: 650 TABLET ORAL at 10:55

## 2022-04-11 RX ADMIN — SODIUM CHLORIDE, POTASSIUM CHLORIDE, SODIUM LACTATE AND CALCIUM CHLORIDE: 600; 310; 30; 20 INJECTION, SOLUTION INTRAVENOUS at 11:44

## 2022-04-11 RX ADMIN — ASPIRIN 81 MG: 81 TABLET, COATED ORAL at 20:52

## 2022-04-11 RX ADMIN — PROPOFOL 100 MG: 10 INJECTION, EMULSION INTRAVENOUS at 12:46

## 2022-04-11 RX ADMIN — PHENYLEPHRINE HYDROCHLORIDE 100 MCG: 10 INJECTION INTRAVENOUS at 13:31

## 2022-04-11 RX ADMIN — FENTANYL CITRATE 50 MCG: 50 INJECTION, SOLUTION INTRAMUSCULAR; INTRAVENOUS at 12:45

## 2022-04-11 RX ADMIN — LAMOTRIGINE 100 MG: 100 TABLET ORAL at 20:52

## 2022-04-11 RX ADMIN — SODIUM CHLORIDE, POTASSIUM CHLORIDE, SODIUM LACTATE AND CALCIUM CHLORIDE: 600; 310; 30; 20 INJECTION, SOLUTION INTRAVENOUS at 13:57

## 2022-04-11 RX ADMIN — PHENYLEPHRINE HYDROCHLORIDE 50 MCG: 10 INJECTION INTRAVENOUS at 14:19

## 2022-04-11 RX ADMIN — LIDOCAINE HYDROCHLORIDE 20 MG: 10 INJECTION, SOLUTION INFILTRATION; PERINEURAL at 12:46

## 2022-04-11 RX ADMIN — GUAIFENESIN 10 ML: 100 SOLUTION ORAL at 18:24

## 2022-04-11 RX ADMIN — ARIPIPRAZOLE 15 MG: 15 TABLET ORAL at 20:52

## 2022-04-11 RX ADMIN — SODIUM CHLORIDE, POTASSIUM CHLORIDE, SODIUM LACTATE AND CALCIUM CHLORIDE: 600; 310; 30; 20 INJECTION, SOLUTION INTRAVENOUS at 17:24

## 2022-04-11 RX ADMIN — ROCURONIUM BROMIDE 50 MG: 10 INJECTION, SOLUTION INTRAVENOUS at 12:45

## 2022-04-11 RX ADMIN — DEXAMETHASONE SODIUM PHOSPHATE 10 MG: 4 INJECTION, SOLUTION INTRA-ARTICULAR; INTRALESIONAL; INTRAMUSCULAR; INTRAVENOUS; SOFT TISSUE at 12:46

## 2022-04-11 RX ADMIN — ROCURONIUM BROMIDE 10 MG: 10 INJECTION, SOLUTION INTRAVENOUS at 14:25

## 2022-04-11 RX ADMIN — ROCURONIUM BROMIDE 20 MG: 10 INJECTION, SOLUTION INTRAVENOUS at 15:05

## 2022-04-11 RX ADMIN — FENTANYL CITRATE 50 MCG: 50 INJECTION, SOLUTION INTRAMUSCULAR; INTRAVENOUS at 15:08

## 2022-04-11 RX ADMIN — ACETAMINOPHEN 975 MG: 325 TABLET ORAL at 17:19

## 2022-04-11 RX ADMIN — HYDROMORPHONE HYDROCHLORIDE 0.25 MG: 4 INJECTION, SOLUTION INTRAMUSCULAR; INTRAVENOUS; SUBCUTANEOUS at 13:24

## 2022-04-11 RX ADMIN — DICLOFENAC 4 G: 10 GEL TOPICAL at 20:53

## 2022-04-11 RX ADMIN — PHENYLEPHRINE HYDROCHLORIDE 0.5 MCG/KG/MIN: 10 INJECTION INTRAVENOUS at 12:50

## 2022-04-11 RX ADMIN — OXYCODONE HYDROCHLORIDE 5 MG: 5 TABLET ORAL at 17:19

## 2022-04-11 RX ADMIN — HYDROMORPHONE HYDROCHLORIDE 0.5 MG: 4 INJECTION, SOLUTION INTRAMUSCULAR; INTRAVENOUS; SUBCUTANEOUS at 13:15

## 2022-04-11 RX ADMIN — HYDROMORPHONE HYDROCHLORIDE 0.4 MG: 0.2 INJECTION, SOLUTION INTRAMUSCULAR; INTRAVENOUS; SUBCUTANEOUS at 20:10

## 2022-04-11 RX ADMIN — FENTANYL CITRATE 50 MCG: 50 INJECTION, SOLUTION INTRAMUSCULAR; INTRAVENOUS at 13:02

## 2022-04-11 RX ADMIN — PHENYLEPHRINE HYDROCHLORIDE 100 MCG: 10 INJECTION INTRAVENOUS at 14:34

## 2022-04-11 RX ADMIN — KETAMINE HYDROCHLORIDE 25 MG: 50 INJECTION, SOLUTION INTRAMUSCULAR; INTRAVENOUS at 13:47

## 2022-04-11 RX ADMIN — ONDANSETRON 4 MG: 2 INJECTION INTRAMUSCULAR; INTRAVENOUS at 13:57

## 2022-04-11 RX ADMIN — FLUTICASONE FUROATE AND VILANTEROL TRIFENATATE 1 PUFF: 100; 25 POWDER RESPIRATORY (INHALATION) at 20:52

## 2022-04-11 RX ADMIN — CEFAZOLIN 1 G: 1 INJECTION, POWDER, FOR SOLUTION INTRAMUSCULAR; INTRAVENOUS at 21:01

## 2022-04-11 RX ADMIN — DICLOFENAC 4 G: 10 GEL TOPICAL at 17:56

## 2022-04-11 ASSESSMENT — ACTIVITIES OF DAILY LIVING (ADL)
ADLS_ACUITY_SCORE: 22
ADLS_ACUITY_SCORE: 18
TRANSFERRING: 1-->ASSISTANCE (EQUIPMENT/PERSON) NEEDED (NOT DEVELOPMENTALLY APPROPRIATE)
ADLS_ACUITY_SCORE: 22
ADLS_ACUITY_SCORE: 18
DRESS: 1-->ASSISTANCE (EQUIPMENT/PERSON) NEEDED
TOILETING_ISSUES: YES
DRESSING/BATHING_DIFFICULTY: YES
ADLS_ACUITY_SCORE: 18
ADLS_ACUITY_SCORE: 18
WALKING_OR_CLIMBING_STAIRS: AMBULATION DIFFICULTY, ASSISTANCE 1 PERSON
TOILETING: 1-->ASSISTANCE (EQUIPMENT/PERSON) NEEDED
ADLS_ACUITY_SCORE: 18
TOILETING: 1-->ASSISTANCE (EQUIPMENT/PERSON) NEEDED (NOT DEVELOPMENTALLY APPROPRIATE)
DIFFICULTY_EATING/SWALLOWING: NO
ADLS_ACUITY_SCORE: 22
DRESS: 1-->ASSISTANCE (EQUIPMENT/PERSON) NEEDED (NOT DEVELOPMENTALLY APPROPRIATE)
DRESSING/BATHING: BATHING DIFFICULTY, ASSISTANCE 1 PERSON
CONCENTRATING,_REMEMBERING_OR_MAKING_DECISIONS_DIFFICULTY: YES
ADLS_ACUITY_SCORE: 22
ADLS_ACUITY_SCORE: 18
EQUIPMENT_CURRENTLY_USED_AT_HOME: WALKER, STANDARD;WHEELCHAIR, MANUAL
WALKING_OR_CLIMBING_STAIRS_DIFFICULTY: YES
ADLS_ACUITY_SCORE: 18
DOING_ERRANDS_INDEPENDENTLY_DIFFICULTY: YES
FALL_HISTORY_WITHIN_LAST_SIX_MONTHS: YES
TRANSFERRING: 2-->COMPLETELY DEPENDENT
BATHING: 1-->ASSISTANCE NEEDED
WEAR_GLASSES_OR_BLIND: NO
TOILETING_ASSISTANCE: TOILETING DIFFICULTY, ASSISTANCE 1 PERSON
ADLS_ACUITY_SCORE: 14
ADLS_ACUITY_SCORE: 22

## 2022-04-11 ASSESSMENT — COPD QUESTIONNAIRES: COPD: 1

## 2022-04-11 NOTE — ANESTHESIA PREPROCEDURE EVALUATION
Anesthesia Pre-Procedure Evaluation    Patient: Amparo Sharma   MRN: [id number] : 1940        Procedure : Procedure(s):  LEFT REVERSE TOTAL SHOULDER ARTHROPLASTY          Past Medical History:   Diagnosis Date     Abdominal hernia      Abdominal hernia      Alcoholism in remission (H)      Alcoholism in remission (H) 2017     Anemia      Arthritis      Arthritis      Asthma      Asthma 2017     Bipolar 1 disorder (H)      Bipolar disorder (H)      Bladder incontinence      Cancer (H)      Cellulitis      Cellulitis 2016    of left elbow     Chronic pain syndrome      COPD (chronic obstructive pulmonary disease) (H)      COPD with acute exacerbation (H) 2018     Dementia (H)      Depression      Depression      Disease of thyroid gland      Falls frequently      Frequent falls      Frequent falls      GERD (gastroesophageal reflux disease)      GERD (gastroesophageal reflux disease)      History of blood transfusion     20 years ago     History of colon cancer     s/p resection 2015, treated with 5-6 cycles of Folfox     History of transfusion      HTN (hypertension)      Hydrocephalus (H)      Hydrocephalus (H)      Hyperlipidemia      Hypertension      Hypothyroidism      Hypothyroidism      Infection of skin due to methicillin resistant Staphylococcus aureus (MRSA)      Loss of balance      Memory loss      Memory loss      Normal pressure hydrocephalus (H) 2017     NPH (normal pressure hydrocephalus) (H)      Parkinson disease (H)      Renal insufficiency      Squamous cell cancer of multiple sites of skin of upper arm, left     Dr. Andrea      Thyroid disease      Urinary incontinence      Urinary incontinence       Past Surgical History:   Procedure Laterality Date     ARTHROSCOPY SHOULDER ROTATOR CUFF REPAIR       CHOLECYSTECTOMY       colon cancer resection  2015     COLON SURGERY       COLONOSCOPY N/A 2/3/2020    Procedure: COLONOSCOPY with polypectomy and  endoscopic tattoo;  Surgeon: Conrad Guevara MD;  Location: [facility];  Service: Gastroenterology     ESOPHAGOSCOPY, GASTROSCOPY, DUODENOSCOPY (EGD), COMBINED N/A 9/7/2017    Procedure: COMBINED ESOPHAGOSCOPY, GASTROSCOPY, DUODENOSCOPY (EGD), BIOPSY SINGLE OR MULTIPLE;  EGD;  Surgeon: Brook Marsh MD;  Location: [facility]     FRACTURE SURGERY      right ankle     GENITOURINARY SURGERY       GI SURGERY       GYN SURGERY      endometriosis, hysterectomy     HYSTERECTOMY      total, related to endometriosis     HYSTERECTOMY       IMPLANT SHUNT VENTRICULOPERITONEAL  02/03/2017     INSERT DRAIN LUMBAR N/A 2/5/2017    Procedure: INSERT DRAIN LUMBAR;  Surgeon: Raji Newton MD;  Location: [facility]     JOINT REPLACEMENT      both knees, hip -right,      LAPAROSCOPIC ASSISTED COLECTOMY       LAPAROSCOPIC CHOLECYSTECTOMY       LUMBAR PUNCTURE  02/03/2017     multiple knee surgeries      total knees 4 L, 3 right, last one 2013     OPTICAL TRACKING SYSTEM IMPLANT SHUNT VENTRICULOPERITONEAL Right 2/8/2017    Procedure: OPTICAL TRACKING SYSTEM IMPLANT SHUNT VENTRICULOPERITONEAL;  Surgeon: Rc Dozier MD;  Location: [facility]     ORTHOPEDIC SURGERY      right hip replacement, both knees     rotator cuff surgeries       ZZC RECONSTR TOTAL SHOULDER IMPLANT Right 3/5/2019    Procedure: RIGHT REVERSE TOTAL SHOULDER ARTHROPLASTY;  Surgeon: Jacobo Peterson MD;  Location: [facility];  Service: Orthopedics      Allergies   Allergen Reactions     Amantadine      Antihistamine Allergy Relief [Diphenhydramine] Other (See Comments)     hyperactivity     Bactrim [Sulfamethoxazole W/Trimethoprim]      Codeine Itching     Demerol [Meperidine] Nausea     Hmg-Coa-R Inhibitors Other (See Comments)     Was hospitalized for possible rhabdo     Meloxicam Other (See Comments)     Talwin [Pentazocine] Other (See Comments)     drowsiness     Tramadol Nausea     Valium [Diazepam] Other (See Comments)      Hallucinations/paranoid      Social History     Tobacco Use     Smoking status: Former Smoker     Packs/day: 0.25     Types: Cigarettes     Start date: 1980     Quit date: 1997     Years since quittin.2     Smokeless tobacco: Former User   Substance Use Topics     Alcohol use: No     Alcohol/week: 0.0 standard drinks     Comment: alcoholism in remission      Wt Readings from Last 1 Encounters:   22 68.2 kg (150 lb 6.4 oz)        Anesthesia Evaluation   Pt has had prior anesthetic.     No history of anesthetic complications       ROS/MED HX  ENT/Pulmonary:     (+) asthma COPD,     Neurologic:  - neg neurologic ROS     Cardiovascular:     (+) hypertension-----    METS/Exercise Tolerance:     Hematologic:  - neg hematologic  ROS     Musculoskeletal:   (+) arthritis,     GI/Hepatic:     (+) GERD,     Renal/Genitourinary:     (+) renal disease,     Endo:     (+) thyroid problem, Obesity,     Psychiatric/Substance Use:       Infectious Disease:       Malignancy:       Other:            Physical Exam    Airway  airway exam normal           Respiratory Devices and Support         Dental         B=Bridge, C=Chipped, L=Loose, M=Missing    Cardiovascular   cardiovascular exam normal          Pulmonary           (+) decreased breath sounds           OUTSIDE LABS:  CBC:   Lab Results   Component Value Date    WBC 9.1 2022    WBC 7.5 2022    HGB 12.3 2022    HGB 13.5 2022    HCT 39.6 2022    HCT 42.9 2022     2022     2022     BMP:   Lab Results   Component Value Date     2022     09/15/2021    POTASSIUM 4.4 2022    POTASSIUM 4.3 2022    CHLORIDE 104 2022    CHLORIDE 102 09/15/2021    CO2 26 2022    CO2 27 09/15/2021    BUN 16 2022    BUN 15 09/15/2021    CR 0.65 2022    CR 0.69 2022    GLC 85 2022     09/15/2021     COAGS:   Lab Results   Component Value Date    PTT 31  04/11/2022    INR 1.06 04/11/2022     POC: No results found for: BGM, HCG, HCGS  HEPATIC:   Lab Results   Component Value Date    ALBUMIN 3.2 (L) 05/07/2020    PROTTOTAL 6.4 05/07/2020    ALT 25 05/07/2020    AST 20 05/07/2020    ALKPHOS 102 05/07/2020    BILITOTAL 0.4 05/07/2020     OTHER:   Lab Results   Component Value Date    PH 7.37 11/01/2020    LACT 1.4 11/02/2020    A1C 5.7 (H) 07/26/2021    MARTHA 10.2 03/09/2022    PHOS 3.3 02/09/2017    MAG 2.0 10/16/2018    TSH 1.43 03/09/2022    CRP 5.3 (H) 11/01/2020    SED 13 08/30/2018       Anesthesia Plan    ASA Status:  3   NPO Status:  NPO Appropriate    Anesthesia Type: General.     - Airway: ETT   Induction: Intravenous.           Consents    Anesthesia Plan(s) and associated risks, benefits, and realistic alternatives discussed. Questions answered and patient/representative(s) expressed understanding.    - Discussed:     - Discussed with:  Patient      - Extended Intubation/Ventilatory Support Discussed: Yes.      - Patient is DNR/DNI Status: Yes             Suspend during perioperative period? No.         Postoperative Care    Pain management: Multi-modal analgesia.   PONV prophylaxis: Ondansetron (or other 5HT-3), Dexamethasone or Solumedrol     Comments:    Other Comments: Patient is not a good candidate for interscalene block due to asthma/COPD and chronic right hemidiaphragm dysfunction.            LISBETH PEDRAZA MD

## 2022-04-11 NOTE — OP NOTE
Operative Note    Name:  Amparo Sharma  :  1940  MRN:  [id number]  Procedure Date:  2022    Preoperative Diagnosis:  Left Shoulder DJD and chronic rotator cuff tear    Postoperative Diagnosis:  Same anterior superior static subluxation, severe DJD and rotator cuff tear with adhesions    Procedures:  1.Left Reverse Total Shoulder Arthroplasty  2.Open biceps tenodesis    Surgeon(s):   Jacobo Peterson MD    Asst.   Marcela Kilgore PA-C PA-C assistance was required due to the complexity of the procedure, for patient positioning, instrumentation assistance, soft tissue retraction and patient safety.      Circulator: Deisy Guerrero RN  Relief Circulator: Elaine Huynh RN  Relief Scrub: YNES VAUGHAN  Scrub Person: Mere Olsen      Anesthesia:   General and Interscalene block with Exparel     Estimated Blood Loss: 250cc    Condition on discharge from OR:  stable    Drains: none     Specimens: None    Tissue Removed, Not Sent: Humeral head    Complications: none     Disposition:  Stable and awake to postanesthesia recovery..    INDICATION FOR OPERATION:  Amparo Sharma is a 81 year old female with a history of shoulder pain and stiffness and she has failed nonsurgical treatment. XR showed evidence of severe osteoarthritis of the shoulder. Recommended she undergo shoulder arthroplasty. Risks and benefits of the planned procedure as well as details of surgery were discussed with the patient. Consent was obtained.     REPORT OF OPERATION:   The patient was brought to operating room and placed supine on the operating table. An interscalene block was not placed by Anesthesia preoperatively due to preoperative pulmonary function.  She was given appropriate preoperative antibiotic. After induction of general anesthesia, she was placed in the beach-chair position on the operating room table. All bony prominences were well padded. The shoulder was prepped and draped in normal sterile fashion.    A standard  anterior deltopectoral incision was utilized. Deep retractors were placed below the clavipectoral fascia and the deltoid. The humeral head was inspected and severe subacromial subdeltoid adhesions between the chronic rotator cuff tear and chronic thickened bursa was released.  He is noted to have a chronic tear of the entire supraspinatus and infraspinatus.   Biceps was tenodesed to the superior aspect of the pectoralis tendon. Subscapularis was released off the lesser tuberosity and tagged.    The humeral head was exposed and noted to have a severe arthritis with large anterior and inferior humeral osteophytes, which were resected. Humeral head cut was then performed using appropriate instrumentation. The humerus was then reamed and broached, and a trial stem with a head protection plate was placed.   The glenoid was then exposed. The labrum was excised circumferentially.  Inferior capsule release was performed, taking care to identify and protect the axillary nerve.    Next, the glenoid was drilled and reamed and prepared for glenoid implant. The glenoid baseplate was then impacted and the peripheral screws were placed.  36 mm standard glenosphere was then placed.            The humerus was again exposed.  Significant adhesions and capsular tightness were noted anteriorly and posteriorly.  These are bluntly released and a 3B humeral stem was then impacted in place with excellent press-fit fixation.   Next a offset humeral stem with a offset directed posteriorly and a standard humeral polyethelene cup was impacted into place.    A final reduction was performed, and the shoulder was copiously irrigated with saline irrigation. All instruments were removed. Subscapularis was repaired back to the lesser tuberosity with #2 FiberWire sutures..The incison was again copiously irrigated with saline irrigation and 1 gram of vancomycin powder was placed in the shoulder joint and subdeltoid space.  The incision was closed in  layers with #1 Ethibond closure of the deltopectoral split, 0 Vicryl and 2-0 Vicryl subcutaneous closure, and skin staples. A sterile dressing was placed on the shoulder.     Postoperatively she was placed in a shoulder SlingShot brace and  transferred in stable, awake condition to Postanesthesia Recovery.  Following surgery she will be maintained in the sling for 6 weeks postoperatively, may discontinue abduction pillow at 1-2 week postoperative and begin PT at approximately 4-6 weeks postoperatively.        Jacobo Peterson MD   Date: 4/11/2022  Time: 3:36 PM    Implants:  Implant Name Type Inv. Item Serial No.  Lot No. LRB No. Used Action   BASEPLATE STD 25MM - [id number] Total Joint Component/Insert BASEPLATE STD 25MM [id number] [employer]  Left 1 Implanted   GLEOSPHERE LATERALIZED STANDARD 36MM - [id number] Total Joint Component/Insert GLEOSPHERE LATERALIZED STANDARD 36MM [id number] [employer]  Left 1 Implanted   SCREW CENTRAL 6.5X30MM [id number] - [id number] Metallic Hardware/[place] SCREW CENTRAL 6.5X30MM [id number]  [employer]  Left 1 Implanted   SCREW PERIPHERAL 18MM [id number] - [id number] Metallic Hardware/[place] SCREW PERIPHERAL 18MM [id number]  [employer]  Left 1 Implanted   SCREW PERIPHERAL 5.0X30MM [id number] - [id number] Metallic Hardware/[place] SCREW PERIPHERAL 5.0X30MM [id number]  [employer]  Left 1 Implanted   SCREW PERIPHERAL 5.0X34MM [id number] - [id number] Metallic Hardware/[place] SCREW PERIPHERAL 5.0X34MM [id number]  [employer]  Left 1 Implanted   SCREW PERIPHERAL 14MM [id number] - [id number] Metallic Hardware/[place] SCREW PERIPHERAL 14MM [id number]  [employer]  Left 1 Implanted   STEM HUMERAL ANATOMIC STD PTC 3B - [id number] Total Joint Component/Insert STEM HUMERAL ANATOMIC STD PTC 3B [id number] [employer] 0 Left 1 Implanted   INSERT REVISION REVERSE +6/12 36MM - [id number] Total Joint Component/Insert INSERT REVISION REVERSE +6/12 36MM [id number] [employer] 0 Left 1 Implanted   TRAY REVERSE HIGH OFFSET  +0 [id number] - [id number] Total Joint Component/Insert TRAY REVERSE HIGH OFFSET +0 [id number] [employer] 0 Left 1 Implanted

## 2022-04-11 NOTE — INTERVAL H&P NOTE
"I have reviewed the surgical (or preoperative) H&P that is linked to this encounter, and examined the patient. There are no significant changes    Clinical Conditions Present on Arrival:  Clinically Significant Risk Factors Present on Admission                   # Obesity: Estimated body mass index is 30.38 kg/m  as calculated from the following:    Height as of this encounter: 1.499 m (4' 11\").    Weight as of this encounter: 68.2 kg (150 lb 6.4 oz).       "

## 2022-04-11 NOTE — CONSULTS
[facility] MEDICINE CONSULT NOTE   Physician requesting consult: No att. providers found    Reason for consult: Postoperative medical management of medical co-morbidities as below    Assessment and Plan    Amparo Sharma is a 81 year old old female with a history of hypertension, COPD, hypothyroidism, NPH with  Shunt, depression, bipolar disorder, stage III colon Cancer status post resection, history of bladder cancer, underwent left shoulder arthroplasty.  Left shoulder pain is stable.    Essential hypertension  Norvasc 5 mg daily    COPD  Stable and asymptomatic  Albuterol inhaler/nebulizer every 4 hours as needed  Breo inhaler daily  Ellipta inhaler daily    Hypothyroidism  Levothyroxine 75 mcg daily    GERD  Resume PPI    Anxiety, depression  Bipolar disorder  Lamictal 150 mg daily  Prozac 60 mg daily  Abilify 15 mg daily    History of colon cancer, status post resection    History of NPH, status post shunt    Status post left shoulder arthroplasty  Resume routine postoperative care  Physical and occupational therapy  Left arm sling in place.  Nonweightbearing of left upper extremity  Use incentive spirometry frequently  DVT prophylaxis per orthopedics, aspirin 81 mg twice a day  Pain control with Tylenol 975 mg every 8 hours, 650 mg every 4 hours as needed, oxycodone 5 to 10 mg every 4 hours as needed, IV Dilaudid 0.2 to 0.4 mg every 2 hours as needed    -Reviewed the patient's preoperative H and P and updated missing elements.  -Home medication reconciliation has been reviewed.  Medications have been ordered as noted from the home list and changes are documented above     HISTORY     Amparo Sharma is a 81 year old old female history of hypertension, COPD, hypothyroidism, NPH WITH shunt depression, bipolar disorder, stage III: Cancer status post resection, history of bladder cancer, underwent left shoulder arthroplasty.  Left shoulder pain is stable.  Norvasc 5 mg daily for  hypertension.  Blood pressure has been stable. Has history of COPD which is stable and asymptomatic now.  On albuterol inhaler/nebulizer every 4 hours as needed, Breo inhaler daily,'s Ellipta inhaler daily.  On levothyroxine 75 mcg daily for hypothyroidism.  Has history of anxiety, depression, bipolar disorder.  Has stable mood and affect.  On Lamictal 150 mg daily, Prozac 60 mg daily, Abilify 15 mg daily.  Denies headache, chest pain, breathing difficulty, palpitation, nausea, vomiting, abdominal pain or urinary symptoms.  Does not have history of heart disease, diabetes, bleeding or clotting disorder or sleep apnea.  Preop physical is reviewed.  History is provided by the patient.      Past Medical History     Patient Active Problem List    Diagnosis Date Noted     S/P reverse total shoulder arthroplasty, left 04/11/2022     Priority: Medium     Status post reverse total arthroplasty of right shoulder 03/05/2019     Priority: Medium     Complete tear of right rotator cuff 01/21/2019     Priority: Medium     Other viral warts 01/21/2019     Priority: Medium     Foot ulcer, left, limited to breakdown of skin (H) 12/14/2018     Priority: Medium     Dizziness 10/17/2018     Priority: Medium     Asthma 10/16/2018     Priority: Medium     COPD (chronic obstructive pulmonary disease) (H) 10/16/2018     Priority: Medium     Dementia (H) 10/16/2018     Priority: Medium     Essential hypertension 10/16/2018     Priority: Medium     GERD (gastroesophageal reflux disease) 10/16/2018     Priority: Medium     Hypothyroid 10/16/2018     Priority: Medium     Major depression 10/16/2018     Priority: Medium     Osteoarthritis of knee 10/16/2018     Priority: Medium     Urinary incontinence 10/16/2018     Priority: Medium     Ventral hernia 10/16/2018     Priority: Medium     Normal pressure hydrocephalus (H) 10/16/2018     Priority: Medium     Overview:   Shunt placed at [facility]       S/P ventriculoperitoneal shunt 02/08/2017      Priority: Medium     NPH (normal pressure hydrocephalus) (H) 02/03/2017     Priority: Medium     PVD (peripheral vascular disease) (H) 01/20/2017     Priority: Medium     Wound infection 12/12/2016     Priority: Medium     Abnormal magnetic resonance imaging of head 11/16/2016     Priority: Medium     Acute midline low back pain with right-sided sciatica 10/12/2016     Priority: Medium     Bipolar 1 disorder (H) 07/26/2016     Priority: Medium     Protein malnutrition (H) 07/26/2016     Priority: Medium     Class 2 obesity due to excess calories in adult 07/16/2016     Priority: Medium     Knee pain, unspecified laterality 12/09/2015     Priority: Medium     Gait disturbance 11/10/2015     Priority: Medium     Malignant neoplasm of sigmoid colon (H) 11/10/2015     Priority: Medium     Memory loss 11/10/2015     Priority: Medium        Surgical History   She  has a past surgical history that includes Hysterectomy; multiple knee surgeries; rotator cuff surgeries; Cholecystectomy; colon cancer resection (1/2015); GI surgery; Abdomen surgery; orthopedic surgery; Insert drain lumbar (N/A, 2/5/2017); Optical tracking system implant shunt ventriculoperitoneal (Right, 2/8/2017); GYN surgery; Esophagoscopy, gastroscopy, duodenoscopy (EGD), combined (N/A, 9/7/2017); Colon surgery; Laparoscopic assisted colectomy; Hysterectomy; joint replacement; Arthroscopy shoulder rotator cuff repair; fracture surgery; Laparoscopic cholecystectomy; Implant shunt ventriculoperitoneal (02/03/2017); Lumbar Puncture (02/03/2017); RECONSTR TOTAL SHOULDER IMPLANT (Right, 3/5/2019); and Colonoscopy (N/A, 2/3/2020).     Past Surgical History:   Procedure Laterality Date     ARTHROSCOPY SHOULDER ROTATOR CUFF REPAIR       CHOLECYSTECTOMY       colon cancer resection  1/2015     COLON SURGERY       COLONOSCOPY N/A 2/3/2020    Procedure: COLONOSCOPY with polypectomy and endoscopic tattoo;  Surgeon: Conrad Guevara MD;  Location: [facility];   Service: Gastroenterology     ESOPHAGOSCOPY, GASTROSCOPY, DUODENOSCOPY (EGD), COMBINED N/A 9/7/2017    Procedure: COMBINED ESOPHAGOSCOPY, GASTROSCOPY, DUODENOSCOPY (EGD), BIOPSY SINGLE OR MULTIPLE;  EGD;  Surgeon: Brook Marsh MD;  Location:  GI     FRACTURE SURGERY      right ankle     GENITOURINARY SURGERY       GI SURGERY       GYN SURGERY      endometriosis, hysterectomy     HYSTERECTOMY      total, related to endometriosis     HYSTERECTOMY       IMPLANT SHUNT VENTRICULOPERITONEAL  02/03/2017     INSERT DRAIN LUMBAR N/A 2/5/2017    Procedure: INSERT DRAIN LUMBAR;  Surgeon: Raji Newton MD;  Location:  OR     JOINT REPLACEMENT      both knees, hip -right,      LAPAROSCOPIC ASSISTED COLECTOMY       LAPAROSCOPIC CHOLECYSTECTOMY       LUMBAR PUNCTURE  02/03/2017     multiple knee surgeries      total knees 4 L, 3 right, last one 2013     OPTICAL TRACKING SYSTEM IMPLANT SHUNT VENTRICULOPERITONEAL Right 2/8/2017    Procedure: OPTICAL TRACKING SYSTEM IMPLANT SHUNT VENTRICULOPERITONEAL;  Surgeon: Rc Dozier MD;  Location:  OR     ORTHOPEDIC SURGERY      right hip replacement, both knees     rotator cuff surgeries       ZZC RECONSTR TOTAL SHOULDER IMPLANT Right 3/5/2019    Procedure: RIGHT REVERSE TOTAL SHOULDER ARTHROPLASTY;  Surgeon: Jacobo Peterson MD;  Location: [facility];  Service: Orthopedics       Family History    Reviewed, and family history includes Arthritis in her brother; Breast Cancer (age of onset: 60) in her mother; Depression (age of onset: 55) in her father; Peptic Ulcer Disease in her father; Prostate Cancer in her brother.    Social History    Reviewed, and she  reports that she quit smoking about 25 years ago. Her smoking use included cigarettes. She started smoking about 42 years ago. She smoked 0.25 packs per day. She has quit using smokeless tobacco. She reports that she does not drink alcohol and does not use drugs.  Social History      Tobacco Use     Smoking status: Former Smoker     Packs/day: 0.25     Types: Cigarettes     Start date: 1980     Quit date: 1997     Years since quittin.2     Smokeless tobacco: Former User   Substance Use Topics     Alcohol use: No     Alcohol/week: 0.0 standard drinks     Comment: alcoholism in remission        Allergies     Allergies   Allergen Reactions     Amantadine      Antihistamine Allergy Relief [Diphenhydramine] Other (See Comments)     hyperactivity     Bactrim [Sulfamethoxazole W/Trimethoprim]      Codeine Itching     Demerol [Meperidine] Nausea     Hmg-Coa-R Inhibitors Other (See Comments)     Was hospitalized for possible rhabdo     Meloxicam Other (See Comments)     Talwin [Pentazocine] Other (See Comments)     drowsiness     Tramadol Nausea     Valium [Diazepam] Other (See Comments)     Hallucinations/paranoid       Prior to Admission Medications      Medications Prior to Admission   Medication Sig Dispense Refill Last Dose     acetaminophen (TYLENOL) 500 MG tablet Take 1,000 mg by mouth in the morning and 1,000 mg at noon and 1,000 mg in the evening. 6AM, 1PM, 7PM   2022 at 0600     albuterol (PROAIR HFA/PROVENTIL HFA/VENTOLIN HFA) 108 (90 Base) MCG/ACT inhaler Inhale 2 puffs into the lungs every 4 hours as needed for shortness of breath / dyspnea   Unknown at Unknown time     albuterol (PROVENTIL) (2.5 MG/3ML) 0.083% neb solution Inhale 2.5 mg into the lungs every 4 hours as needed for shortness of breath / dyspnea   Unknown at Unknown time     amLODIPine (NORVASC) 5 MG tablet Take 5 mg by mouth daily   2022 at 0600     amoxicillin (AMOXIL) 500 MG capsule Take 2,000 mg by mouth once as needed (Prior to dental appt)    Unknown at Unknown time     ARIPiprazole (ABILIFY) 15 MG tablet Take 15 mg by mouth At Bedtime   4/10/2022 at 1900     BREO ELLIPTA 100-25 MCG/INH inhaler Inhale 1 puff into the lungs daily 1PM   4/10/2022 at 1300 didn't bring     carboxymethylcellulose  (REFRESH PLUS) 0.5 % SOLN ophthalmic solution Place 1 drop into both eyes as needed in the morning and 1 drop as needed at noon and 1 drop as needed in the evening and 1 drop as needed before bedtime for dry eyes.   Unknown at Unknown time     carboxymethylcellulose (REFRESH PLUS) 0.5 % SOLN ophthalmic solution Place 1 drop into both eyes in the morning.   4/11/2022 at 0600     cetirizine (ZYRTEC) 10 MG tablet Take 10 mg by mouth daily    4/11/2022 at 0600     diclofenac (VOLTAREN) 1 % topical gel Apply 4 g topically 4 times daily Apply to affected knees.   4/11/2022 at 0600     ferrous sulfate (FEROSUL) 325 (65 Fe) MG tablet Take 325 mg by mouth daily   4/11/2022 at 0600     FLUoxetine (PROZAC) 20 MG capsule Take 60 mg by mouth daily   4/11/2022 at 0600     INCRUSE ELLIPTA 62.5 MCG/INH Inhaler Inhale 1 puff into the lungs daily    4/11/2022 at 0600, didn't bring     lamoTRIgine (LAMICTAL) 100 MG tablet Take 100 mg by mouth At Bedtime   4/10/2022 at 1900     lamoTRIgine (LAMICTAL) 150 MG tablet Take 150 mg by mouth daily    4/11/2022 at 0600     levothyroxine (SYNTHROID/LEVOTHROID) 75 MCG tablet Take 75 mcg by mouth daily   4/11/2022 at 0600     menthol-zinc oxide (CALMOSEPTINE) 0.44-20.6 % OINT ointment Apply topically 2 times daily as needed for irritation   Unknown at Unknown time     mirabegron (MYRBETRIQ) 25 MG 24 hr tablet Take 25 mg by mouth daily    4/11/2022 at 0600     Multiple Vitamins-Minerals (MULTIVITAMIN PO) Take 1 tablet by mouth daily    Past Week at 0600     oxyCODONE (ROXICODONE) 5 MG tablet Take 5 mg by mouth every 4 hours as needed for pain   Unknown at Unknown time     pantoprazole (PROTONIX) 20 MG EC tablet Take 20 mg by mouth daily    4/11/2022 at 0600     saccharomyces boulardii (FLORASTOR) 250 MG capsule Take 250 mg by mouth in the morning.   4/11/2022 at 0600     Salicylic Acid 40 % PADS Externally apply 1 each topically every 72 hours Apply to right 4th toe every 3 days.   4/10/2022 at  1900     SENOKOT S 8.6-50 MG tablet Take 2 tablets by mouth daily    4/11/2022 at 0600     Vitamin D, Cholecalciferol, 25 MCG (1000 UT) CAPS Take 1,000 Units by mouth daily    4/11/2022 at 0600       Review of Systems   A 12 point comprehensive review of systems was negative except as noted above.    OBJECTIVE         Physical Exam   Temp:  [97.3  F (36.3  C)-98.4  F (36.9  C)] 98.4  F (36.9  C)  Pulse:  [] 101  Resp:  [16-33] 20  BP: (123-167)/(58-92) 135/72  SpO2:  [92 %-96 %] 94 %  Body mass index is 30.38 kg/m .    GENERAL:  Alert, appears comfortable, in no acute distress, appears stated age   HEAD:  Normocephalic, without obvious abnormality, atraumatic   EYES:  PERRL, conjunctiva  Clear, EOM's intact   NOSE: Nares normal,  mucosa normal, no drainage   THROAT: Lips, mucosa,  gums normal, mouth moist   NECK: Supple, symmetrical, trachea midline   BACK:   Symmetric, no curvature, ROM normal   LUNGS:   Clear to auscultation bilaterally, no rales, rhonchi, or wheezing, symmetric chest rise on inhalation, respirations unlabored   CHEST WALL:  No tenderness or deformity   HEART:  Regular rate and rhythm, S1 and S2 normal, no murmur    ABDOMEN:   Soft, non-tender, bowel sounds active , no masses, no organomegaly    EXTREMITIES: Status post left shoulder arthroplasty, left arm sling in place   SKIN: No rashes   NEURO: Alert, oriented x 3   PSYCH: Cooperative, behavior is appropriate      Imaging Reviewed Personally By Myself    Radiology Results:   LEFT SHOULDER XRAY  Postop changes of a recent left reverse total shoulder arthroplasty. No fracture or dislocation.     Labs Reviewed Personally By Myself     Most Recent 3 CBC's:Recent Labs   Lab Test 04/11/22  1127 03/09/22  1115 10/06/21  1435 09/15/21  1108   WBC 9.1 7.5  --  6.5   HGB 12.3 13.5 10.6* 10.5*   MCV 93 90  --  79    286  --  331     Most Recent 3 BMP's:Recent Labs   Lab Test 04/11/22  1127 03/09/22  1115 09/15/21  1108 07/26/21  1157   NA  --   140 138 138   POTASSIUM 4.4 4.3 4.3 4.2   CHLORIDE  --  104 102 101   CO2  --  26 27 26   BUN  --  16 15 17   CR 0.65 0.69 0.78 0.74   ANIONGAP  --  10 9 11   MARTHA  --  10.2 9.9 9.9   GLC  --  85 109 86       Preoperative Labs Reviewed Personally By Myself     Thank you for this consultation.  Appreciate the opportunity to participate in the care of Amparo Sharma, please feel free to contact us for any questions or concerns.    Sarai Ocampo MD  [facility]  Phone: #[phone]

## 2022-04-11 NOTE — ANESTHESIA PROCEDURE NOTES
Airway       Patient location during procedure: OR       Procedure Start/Stop Times: 4/11/2022 12:48 PM and 4/11/2022 12:48 PM  Staff -        Anesthesiologist:  Perez Quarles MD       CRNA: Zenaida Escobar APRN CRNA       Performed By: CRNAIndications and Patient Condition       Indications for airway management: jovani-procedural       Induction type:intravenous       Mask difficulty assessment: 1 - vent by mask    Final Airway Details       Final airway type: endotracheal airway       Successful airway: ETT - single  Endotracheal Airway Details        ETT size (mm): 7.0       Cuffed: yes       Successful intubation technique: video laryngoscopy       VL Blade Size: Glidescope 3       Grade View of Cords: 1       Position: Right       Measured from: lips       Secured at (cm): 21       Bite block used: Soft    Post intubation assessment        Placement verified by: capnometry        Number of attempts at approach: 1       Number of other approaches attempted: 0       Secured with: silk tape       Ease of procedure: easy       Dentition: Intact and Unchanged    Medication(s) Administered   Medication Administration Time: 4/11/2022 12:48 PM    Additional Comments       Used glidescope due to airway note from 2017 suggesting the use of the glidescope for next intubation

## 2022-04-11 NOTE — PHARMACY-ADMISSION MEDICATION HISTORY
Pharmacy Note - Admission Medication History    Pertinent Provider Information: N/A   ______________________________________________________________________    Prior To Admission (PTA) med list completed and updated in EMR.       PTA Med List   Medication Sig Last Dose     acetaminophen (TYLENOL) 500 MG tablet Take 1,000 mg by mouth in the morning and 1,000 mg at noon and 1,000 mg in the evening. 6AM, 1PM, 7PM 4/11/2022 at 0600     albuterol (PROAIR HFA/PROVENTIL HFA/VENTOLIN HFA) 108 (90 Base) MCG/ACT inhaler Inhale 2 puffs into the lungs every 4 hours as needed for shortness of breath / dyspnea Unknown at Unknown time     albuterol (PROVENTIL) (2.5 MG/3ML) 0.083% neb solution Inhale 2.5 mg into the lungs every 4 hours as needed for shortness of breath / dyspnea Unknown at Unknown time     amLODIPine (NORVASC) 5 MG tablet Take 5 mg by mouth daily 4/11/2022 at 0600     amoxicillin (AMOXIL) 500 MG capsule Take 2,000 mg by mouth once as needed (Prior to dental appt)  Unknown at Unknown time     ARIPiprazole (ABILIFY) 15 MG tablet Take 15 mg by mouth At Bedtime 4/10/2022 at 1900     BREO ELLIPTA 100-25 MCG/INH inhaler Inhale 1 puff into the lungs daily 1PM 4/10/2022 at 1300 didn't bring     carboxymethylcellulose (REFRESH PLUS) 0.5 % SOLN ophthalmic solution Place 1 drop into both eyes as needed in the morning and 1 drop as needed at noon and 1 drop as needed in the evening and 1 drop as needed before bedtime for dry eyes. Unknown at Unknown time     carboxymethylcellulose (REFRESH PLUS) 0.5 % SOLN ophthalmic solution Place 1 drop into both eyes in the morning. 4/11/2022 at 0600     cetirizine (ZYRTEC) 10 MG tablet Take 10 mg by mouth daily  4/11/2022 at 0600     diclofenac (VOLTAREN) 1 % topical gel Apply 4 g topically 4 times daily Apply to affected knees. 4/11/2022 at 0600     ferrous sulfate (FEROSUL) 325 (65 Fe) MG tablet Take 325 mg by mouth daily 4/11/2022 at 0600     FLUoxetine (PROZAC) 20 MG capsule Take 60 mg  by mouth daily 4/11/2022 at 0600     INCRUSE ELLIPTA 62.5 MCG/INH Inhaler Inhale 1 puff into the lungs daily  4/11/2022 at 0600, didn't bring     lamoTRIgine (LAMICTAL) 100 MG tablet Take 100 mg by mouth At Bedtime 4/10/2022 at 1900     lamoTRIgine (LAMICTAL) 150 MG tablet Take 150 mg by mouth daily  4/11/2022 at 0600     levothyroxine (SYNTHROID/LEVOTHROID) 75 MCG tablet Take 75 mcg by mouth daily 4/11/2022 at 0600     menthol-zinc oxide (CALMOSEPTINE) 0.44-20.6 % OINT ointment Apply topically 2 times daily as needed for irritation Unknown at Unknown time     mirabegron (MYRBETRIQ) 25 MG 24 hr tablet Take 25 mg by mouth daily  4/11/2022 at 0600     Multiple Vitamins-Minerals (MULTIVITAMIN PO) Take 1 tablet by mouth daily  Past Week at 0600     oxyCODONE (ROXICODONE) 5 MG tablet Take 5 mg by mouth every 4 hours as needed for pain Unknown at Unknown time     pantoprazole (PROTONIX) 20 MG EC tablet Take 20 mg by mouth daily  4/11/2022 at 0600     saccharomyces boulardii (FLORASTOR) 250 MG capsule Take 250 mg by mouth in the morning. 4/11/2022 at 0600     Salicylic Acid 40 % PADS Externally apply 1 each topically every 72 hours Apply to right 4th toe every 3 days. 4/10/2022 at 1900     SENOKOT S 8.6-50 MG tablet Take 2 tablets by mouth daily  4/11/2022 at 0600     Vitamin D, Cholecalciferol, 25 MCG (1000 UT) CAPS Take 1,000 Units by mouth daily  4/11/2022 at 0600       Information source(s): Facility (U/NH/) medication list/MAR    Method of interview communication: in-person    Patient was asked about OTC/herbal products specifically.  PTA med list reflects this.    Based on the pharmacist's assessment, the PTA med list information appears reliable    Allergies were reviewed, assessed, and updated with the patient.      Patient did not bring any medications to the hospital and can't retrieve from home. No multi-dose medications are available for use during hospital stay.      Thank you for the opportunity to  participate in the care of this patient.      Brandon Jameson, [facility]     4/11/2022     11:01 AM

## 2022-04-11 NOTE — ANESTHESIA CARE TRANSFER NOTE
Patient: Amparo Sharma    Procedure: Procedure(s):  LEFT REVERSE TOTAL SHOULDER ARTHROPLASTY       Diagnosis: Left shoulder pain [M25.512]  Diagnosis Additional Information: No value filed.    Anesthesia Type:   General     Note:    Oropharynx: oropharynx clear of all foreign objects  Level of Consciousness: drowsy    Level of Supplemental Oxygen (L/min / FiO2): 8  Independent Airway: airway patency satisfactory and stable  Dentition: dentition unchanged  Vital Signs Stable: post-procedure vital signs reviewed and stable  Report to RN Given: handoff report given  Patient transferred to: PACU    Handoff Report: Identifed the Patient, Identified the Reponsible Provider, Reviewed the pertinent medical history, Discussed the surgical course, Reviewed Intra-OP anesthesia mangement and issues during anesthesia, Set expectations for post-procedure period and Allowed opportunity for questions and acknowledgement of understanding      Vitals:  Vitals Value Taken Time   /72 04/11/22 1540   Temp 36.3  C (97.3  F) 04/11/22 1540   Pulse 95 04/11/22 1540   Resp 16 04/11/22 1540   SpO2 95 % 04/11/22 1540       Electronically Signed By: KASANDRA Pham CRNA  April 11, 2022  3:45 PM

## 2022-04-12 ENCOUNTER — APPOINTMENT (OUTPATIENT)
Dept: PHYSICAL THERAPY | Facility: CLINIC | Age: 82
DRG: 483 | End: 2022-04-12
Attending: FAMILY MEDICINE
Payer: MEDICARE

## 2022-04-12 ENCOUNTER — APPOINTMENT (OUTPATIENT)
Dept: OCCUPATIONAL THERAPY | Facility: CLINIC | Age: 82
DRG: 483 | End: 2022-04-12
Attending: ORTHOPAEDIC SURGERY
Payer: MEDICARE

## 2022-04-12 LAB
ANION GAP SERPL CALCULATED.3IONS-SCNC: 11 MMOL/L (ref 5–18)
BUN SERPL-MCNC: 17 MG/DL (ref 8–28)
CALCIUM SERPL-MCNC: 8.8 MG/DL (ref 8.5–10.5)
CHLORIDE BLD-SCNC: 103 MMOL/L (ref 98–107)
CO2 SERPL-SCNC: 25 MMOL/L (ref 22–31)
CREAT SERPL-MCNC: 0.64 MG/DL (ref 0.6–1.1)
ERYTHROCYTE [DISTWIDTH] IN BLOOD BY AUTOMATED COUNT: 14.9 % (ref 10–15)
GFR SERPL CREATININE-BSD FRML MDRD: 89 ML/MIN/1.73M2
GLUCOSE BLD-MCNC: 112 MG/DL (ref 70–125)
GLUCOSE BLD-MCNC: 112 MG/DL (ref 70–125)
HCT VFR BLD AUTO: 31.3 % (ref 35–47)
HGB BLD-MCNC: 9.8 G/DL (ref 11.7–15.7)
MCH RBC QN AUTO: 27.8 PG (ref 26.5–33)
MCHC RBC AUTO-ENTMCNC: 31.3 G/DL (ref 31.5–36.5)
MCV RBC AUTO: 89 FL (ref 78–100)
PLATELET # BLD AUTO: 381 10E3/UL (ref 150–450)
POTASSIUM BLD-SCNC: 4.6 MMOL/L (ref 3.5–5)
RBC # BLD AUTO: 3.52 10E6/UL (ref 3.8–5.2)
SODIUM SERPL-SCNC: 139 MMOL/L (ref 136–145)
WBC # BLD AUTO: 11.7 10E3/UL (ref 4–11)

## 2022-04-12 PROCEDURE — 99232 SBSQ HOSP IP/OBS MODERATE 35: CPT | Performed by: FAMILY MEDICINE

## 2022-04-12 PROCEDURE — 85027 COMPLETE CBC AUTOMATED: CPT | Performed by: FAMILY MEDICINE

## 2022-04-12 PROCEDURE — 97166 OT EVAL MOD COMPLEX 45 MIN: CPT | Mod: GO

## 2022-04-12 PROCEDURE — 97110 THERAPEUTIC EXERCISES: CPT | Mod: GP

## 2022-04-12 PROCEDURE — 80048 BASIC METABOLIC PNL TOTAL CA: CPT | Performed by: FAMILY MEDICINE

## 2022-04-12 PROCEDURE — 97535 SELF CARE MNGMENT TRAINING: CPT | Mod: GO

## 2022-04-12 PROCEDURE — 250N000013 HC RX MED GY IP 250 OP 250 PS 637: Performed by: PHYSICIAN ASSISTANT

## 2022-04-12 PROCEDURE — 250N000013 HC RX MED GY IP 250 OP 250 PS 637: Performed by: INTERNAL MEDICINE

## 2022-04-12 PROCEDURE — 250N000013 HC RX MED GY IP 250 OP 250 PS 637: Performed by: FAMILY MEDICINE

## 2022-04-12 PROCEDURE — 36415 COLL VENOUS BLD VENIPUNCTURE: CPT | Performed by: FAMILY MEDICINE

## 2022-04-12 PROCEDURE — 97162 PT EVAL MOD COMPLEX 30 MIN: CPT | Mod: GP

## 2022-04-12 PROCEDURE — 97110 THERAPEUTIC EXERCISES: CPT | Mod: GO

## 2022-04-12 PROCEDURE — 250N000011 HC RX IP 250 OP 636: Performed by: PHYSICIAN ASSISTANT

## 2022-04-12 PROCEDURE — 120N000001 HC R&B MED SURG/OB

## 2022-04-12 PROCEDURE — 97530 THERAPEUTIC ACTIVITIES: CPT | Mod: GP

## 2022-04-12 RX ADMIN — PANTOPRAZOLE SODIUM 20 MG: 20 TABLET, DELAYED RELEASE ORAL at 08:26

## 2022-04-12 RX ADMIN — DICLOFENAC 4 G: 10 GEL TOPICAL at 08:29

## 2022-04-12 RX ADMIN — GUAIFENESIN 10 ML: 100 SOLUTION ORAL at 01:01

## 2022-04-12 RX ADMIN — ASPIRIN 81 MG: 81 TABLET, COATED ORAL at 08:27

## 2022-04-12 RX ADMIN — Medication 25 MCG: at 08:27

## 2022-04-12 RX ADMIN — DICLOFENAC 4 G: 10 GEL TOPICAL at 13:59

## 2022-04-12 RX ADMIN — LEVOTHYROXINE SODIUM 75 MCG: 75 TABLET ORAL at 08:26

## 2022-04-12 RX ADMIN — DICLOFENAC 4 G: 10 GEL TOPICAL at 18:08

## 2022-04-12 RX ADMIN — OXYCODONE HYDROCHLORIDE 5 MG: 5 TABLET ORAL at 02:00

## 2022-04-12 RX ADMIN — CARBOXYMETHYLCELLULOSE SODIUM 1 DROP: 10 GEL OPHTHALMIC at 08:26

## 2022-04-12 RX ADMIN — DICLOFENAC 4 G: 10 GEL TOPICAL at 21:00

## 2022-04-12 RX ADMIN — AMLODIPINE BESYLATE 5 MG: 5 TABLET ORAL at 08:25

## 2022-04-12 RX ADMIN — ARIPIPRAZOLE 15 MG: 15 TABLET ORAL at 20:51

## 2022-04-12 RX ADMIN — UMECLIDINIUM 1 PUFF: 62.5 AEROSOL, POWDER ORAL at 08:29

## 2022-04-12 RX ADMIN — OXYCODONE HYDROCHLORIDE 5 MG: 5 TABLET ORAL at 20:58

## 2022-04-12 RX ADMIN — CETIRIZINE HYDROCHLORIDE 10 MG: 10 TABLET, FILM COATED ORAL at 08:27

## 2022-04-12 RX ADMIN — ASPIRIN 81 MG: 81 TABLET, COATED ORAL at 20:51

## 2022-04-12 RX ADMIN — FLUTICASONE FUROATE AND VILANTEROL TRIFENATATE 1 PUFF: 100; 25 POWDER RESPIRATORY (INHALATION) at 08:29

## 2022-04-12 RX ADMIN — SENNOSIDES AND DOCUSATE SODIUM 2 TABLET: 50; 8.6 TABLET ORAL at 08:26

## 2022-04-12 RX ADMIN — OXYCODONE HYDROCHLORIDE 10 MG: 5 TABLET ORAL at 13:59

## 2022-04-12 RX ADMIN — OXYCODONE HYDROCHLORIDE 10 MG: 5 TABLET ORAL at 08:25

## 2022-04-12 RX ADMIN — CEFAZOLIN 1 G: 1 INJECTION, POWDER, FOR SOLUTION INTRAMUSCULAR; INTRAVENOUS at 06:15

## 2022-04-12 RX ADMIN — ACETAMINOPHEN 975 MG: 325 TABLET ORAL at 11:41

## 2022-04-12 RX ADMIN — FLUOXETINE HYDROCHLORIDE 60 MG: 20 CAPSULE ORAL at 08:25

## 2022-04-12 RX ADMIN — LAMOTRIGINE 150 MG: 150 TABLET ORAL at 08:27

## 2022-04-12 RX ADMIN — ACETAMINOPHEN 975 MG: 325 TABLET ORAL at 01:01

## 2022-04-12 RX ADMIN — MIRABEGRON 25 MG: 25 TABLET, FILM COATED, EXTENDED RELEASE ORAL at 08:25

## 2022-04-12 RX ADMIN — LAMOTRIGINE 100 MG: 100 TABLET ORAL at 20:51

## 2022-04-12 ASSESSMENT — ACTIVITIES OF DAILY LIVING (ADL)
ADLS_ACUITY_SCORE: 28
ADLS_ACUITY_SCORE: 24
ADLS_ACUITY_SCORE: 26
ADLS_ACUITY_SCORE: 28
ADLS_ACUITY_SCORE: 22
ADLS_ACUITY_SCORE: 28
ADLS_ACUITY_SCORE: 26
ADLS_ACUITY_SCORE: 22
ADLS_ACUITY_SCORE: 26
ADLS_ACUITY_SCORE: 28
ADLS_ACUITY_SCORE: 22
ADLS_ACUITY_SCORE: 28
ADLS_ACUITY_SCORE: 26
ADLS_ACUITY_SCORE: 28
ADLS_ACUITY_SCORE: 26
ADLS_ACUITY_SCORE: 28
ADLS_ACUITY_SCORE: 24
ADLS_ACUITY_SCORE: 22
ADLS_ACUITY_SCORE: 26
ADLS_ACUITY_SCORE: 24
ADLS_ACUITY_SCORE: 26
ADLS_ACUITY_SCORE: 30
ADLS_ACUITY_SCORE: 28
ADLS_ACUITY_SCORE: 26

## 2022-04-12 NOTE — PLAN OF CARE
Patient vital signs are at baseline: No,  Reason: requiring 3L O2 via NC overnight   Patient able to ambulate as they were prior to admission or with assist devices provided by therapies during their stay:  No,  Reason:   LUE is in an immobilizer and NWB. BLE are contracted. Pivot from bed to chair with assist of two, tamiko is required for future transfers.   Patient MUST void prior to discharge:  Yes, Incontinent at baseline and has purewick.   Patient able to tolerate oral intake:  Yes, assisted in feeding and administering medications due to limited mobility LUE  Patient has adequate pain control using Oral analgesics:  Yes, ice, rest, scheduled Tylenol and PRN 5 mg Oxycodone x1    Does patient have an identified :  Yes  Has goal D/C date and time been discussed with patient:  Yes    Alert and oriented x 3, disoriented to place, forgetful, repositioned Q2 hours, CMS is intact, denies numbness and tingling, dsg is CDI, immobilizer in place, stage 2 pressure sore noted on right heel, Mepilex applied and floated feet, on contact precautions for MRSA, PRN robitussin given for cough

## 2022-04-12 NOTE — PLAN OF CARE
Patient vital signs are at baseline: No,  Reason:  Pt has had decreased O2 sats. She is still requiring 3L O2 via NC.  Patient able to ambulate as they were prior to admission or with assist devices provided by therapies during their stay:  No,  Reason:  Pt has limited mobility in her RUE and BLE at baseline. Her LUE is in an immobilizer and NWB. At home she is in a W/C at baseline and occasionally can use a walker. BLE are contracted. Unable to ambulate patient d/t limited mobilities and contractures.   Patient MUST void prior to discharge:  Yes, Incontinent at baseline. Using a purwick.   Patient able to tolerate oral intake:  Yes, Needs some assistance d/t limited mobility in BUE.   Patient has adequate pain control using Oral analgesics:  Yes, Pt has been complaining of left shoulder pain rated 9/10. She is taking scheduled Tylenol, PRN Oxycodone and IV Dilaudid for comfort. Patient repositioned frequently d/t the inability for her to do it herself. Also using ice for comfort.  Does patient have an identified :  Yes  Has goal D/C date and time been discussed with patient:  Yes    CMS is intact. Patient reports still have numbness and tingling in BUE. Dressing is CDI. Immobilizer is patent and intact. Alarms in place for safety. Remains on contact precautions for h/o MRSA.

## 2022-04-12 NOTE — PROGRESS NOTES
04/12/22 0900   Quick Adds   Type of Visit Initial Occupational Therapy Evaluation   Living Environment   People in Home other (see comments)  (Care Facility - In Memory Care due to physical assist needed.  Pt wants to leave the memory care unit.)   Current Living Arrangements other (see comments)  (Memory Care Unit - Pt states she is there for the physical assist she needs, not for memory issues.)   Living Environment Comments Sponge bathes, Std toilet with GB on L side.   Self-Care   Equipment Currently Used at Home wheelchair, manual;walker, rolling   General Information   Onset of Illness/Injury or Date of Surgery 04/11/22   Referring Physician Dr. Sarai Ocampo   Patient/Family Therapy Goal Statement (OT) To walk again.   Existing Precautions/Restrictions (S)  shoulder   Limitations/Impairments other (see comments)  (Limited mobility - will ask PT to see Pt.)   Left Upper Extremity (Weight-bearing Status) non weight-bearing (NWB)   Cognitive Status Examination   Orientation Status person;place;time   Visual Perception   Visual Impairment/Limitations corrective lenses for reading   Range of Motion Comprehensive   General Range of Motion upper extremity range of motion deficits identified   Comment, General Range of Motion R shoulder 0-45 degrees flex.  Limited ROM throughout her R U/E.  L U/E in the immobilizer.   Coordination   Functional Limitations Fine motor ADL performance impaired;Impaired ability to perform bilateral tasks;Object transport impaired;Reach to targets impaired   Transfers   Transfers sit-stand transfer   Sit-Stand Transfer   Sit-Stand [place] (Transfers) maximum assist (25% patient effort);2 person assist   Assistive Device (Sit-Stand Transfers) other (see comments)  (Held handle of w/c with the R hand when standing.)   Balance   Balance Assessment standing balance: static   Balance Comments Poor   Activities of Daily Living   BADL Assessment/Intervention upper body  dressing;feeding;toileting   Upper Body Dressing Assessment/Training   Position (Upper Body Dressing) supported sitting   Comment, (Upper Body Dressing) Donned tank top and immobilizer   [place] Level (Upper Body Dressing) dependent (less than 25% patient effort)   Eating/Self Feeding   [place] Level (Feeding) other (see comments)  (Pt reports she can do finger food but otherwise needs someone to feed her at baseline.)   Toileting   [place] Level (Toileting)   (Currently using the purewick.)   Clinical Impression   Criteria for Skilled Therapeutic Interventions Met (OT) Yes, treatment indicated   OT Diagnosis decreased indep with ADLs due to TSA - L side.  Limited mobility on the R U/E.  Max assist of 2 people to stand.   OT Problem List-Impairments impacting ADL balance;mobility;strength;range of motion (ROM);post-surgical precautions;postural control   Assessment of Occupational Performance 5 or more Performance Deficits   Identified Performance Deficits Trsfs, toileting, bathing, eating, dressing, G/H.   Planned Therapy Interventions (OT) ADL retraining;ROM   Intervention Comments Will need PT for mobility.  Pt reports she was walking with her walker last week.   Clinical Decision Making Complexity (OT) moderate complexity   Risk & Benefits of therapy have been explained patient   OT Discharge Planning   OT Discharge Recommendation (DC Rec) (S)  Transitional Care Facility   OT Rationale for DC Rec Pt does not appear to be at her baseline for self-cares, trsfs.  Will need further therapy for her L Shoulder.   Total Evaluation Time (Minutes)   Total Evaluation Time (Minutes) 15   OT Goals   Therapy Frequency (OT) Daily   OT Predicated Duration/Target Date for Goal Attainment 04/15/22   OT Goals Upper Body Dressing;Transfers;Hygiene/Grooming;OT Goal 1   OT: Hygiene/Grooming maximum assist   OT: Upper Body Dressing Maximum assist   OT: Transfer Maximum assist   OT: Goal 1 Pt will be mod assist with her  L U/E home exercises Program for movement distal to shoulder.

## 2022-04-12 NOTE — PROGRESS NOTES
[facility] MEDICINE PROGRESS NOTE      Identification/Summary: Amparo Sharma is a 81 year old female with a past medical history of hypertension, COPD, hypothyroidism, NPH with shunt, depression, bipolar disorder, stage III colon cancer, status post resection, history of bladder cancer, underwent left shoulder arthroplasty.  POD 1.  Left shoulder pain is stable.  Will be discharging to TCU for more rehabilitation    Assessment and Plan:  Essential hypertension  Norvasc 5 mg daily  Blood pressure is stable    COPD  Stable and asymptomatic  Albuterol inhaler/nebulizer every 4 hours as needed  Breo inhaler daily  Ellipta inhaler daily    Hypothyroidism  Levothyroxine 75 mcg daily    GERD  Resume PPI     Anxiety, depression  Bipolar disorder  Lamictal 150 mg daily  Prozac 60 mg daily  Abilify 15 mg daily     History of colon cancer, status post resection     History of NPH, status post shunt    Acute blood loss anemia  Hemoglobin 9.8 from 13.5     Status post left shoulder arthroplasty  Resume routine postoperative care  Physical and occupational therapy  Left arm sling in place.  Nonweightbearing of left upper extremity  Use incentive spirometry frequently  DVT prophylaxis per orthopedics, aspirin 81 mg twice a day  Pain control with Tylenol 975 mg every 8 hours, 650 mg every 4 hours as needed, oxycodone 5 to 10 mg every 4 hours as needed    Sarai Ocampo MD  [facility]  [facility]  Phone: #[phone]    Interval History/Subjective:  Resting comfortably.  Left shoulder pain is stable.  No new concern.    Physical Exam/Objective:  Temp:  [97.3  F (36.3  C)-99.1  F (37.3  C)] 97.8  F (36.6  C)  Pulse:  [] 83  Resp:  [16-33] 18  BP: (113-167)/(58-76) 135/68  SpO2:  [92 %-100 %] 94 %  Body mass index is 30.38 kg/m .    GENERAL:  Alert, appears comfortable, in no acute distress, appears stated age   HEAD:  Normocephalic, without obvious  "abnormality, atraumatic   EYES:  PERRL, conjunctiva  Clear,  EOM's intact   NOSE: Nares normal,  mucosa normal, no drainage   THROAT: Lips, mucosa,  gums normal, mouth moist   NECK: Supple, symmetrical, trachea midline   BACK:   Symmetric, no curvature, ROM normal   LUNGS:   Clear to auscultation bilaterally, no rales, rhonchi, or wheezing, symmetric chest rise on inhalation, respirations unlabored   CHEST WALL:  No tenderness or deformity   HEART:  Regular rate and rhythm, S1 and S2 normal, no murmur     ABDOMEN:   Soft, non-tender, bowel sounds active , no masses, no organomegaly    EXTREMITIES: Status post left shoulder arthroplasty   SKIN: No rashes   NEURO: Alert, oriented x3    PSYCH: Cooperative, behavior is appropriate      Labs:  Most Recent 3 CBC's:Recent Labs   Lab Test 04/12/22  0651 04/11/22  1127 03/09/22  1115   WBC 11.7* 9.1 7.5   HGB 9.8* 12.3 13.5   MCV 89 93 90    394 286     Most Recent 3 BMP's:Recent Labs   Lab Test 04/12/22  0651 04/11/22  1127 03/09/22  1115 09/15/21  1108     --  140 138   POTASSIUM 4.6 4.4 4.3 4.3   CHLORIDE 103  --  104 102   CO2 25  --  26 27   BUN 17  --  16 15   CR 0.64 0.65 0.69 0.78   ANIONGAP 11  --  10 9   MARTHA 8.8  --  10.2 9.9     112  --  85 109       Medications:   Personally Reviewed.  Medications     bupivacaine liposome (EXPAREL) LA inj was given in the infiltration site to produce post-op analgesia. Duration of action is up to 72 hours. Other \"iker\" meds should not be given for 96 hours except for lidocaine 4% patch. This is for INFORMATION ONLY.       bupivacaine liposome (EXPAREL) LA inj was given in the infiltration site to produce post-op analgesia. Duration of action is up to 72 hours. Other \"iker\" meds should not be given for 96 hours except for lidocaine 4% patch. This is for INFORMATION ONLY.       lactated ringers 50 mL/hr at 04/11/22 1724       acetaminophen  975 mg Oral Q8H     amLODIPine  5 mg Oral Daily     ARIPiprazole  15 " mg Oral At Bedtime     aspirin  81 mg Oral BID     carboxymethylcellulose PF  1 drop Both Eyes Daily     cetirizine  10 mg Oral Daily     diclofenac  4 g Topical 4x Daily     FLUoxetine  60 mg Oral Daily     fluticasone-vilanterol  1 puff Inhalation Daily     lamoTRIgine  100 mg Oral At Bedtime     lamoTRIgine  150 mg Oral Daily     levothyroxine  75 mcg Oral Daily     mirabegron  25 mg Oral Daily     pantoprazole  20 mg Oral Daily     polyethylene glycol  17 g Oral Daily     senna-docusate  2 tablet Oral Daily     sodium chloride (PF)  3 mL Intracatheter Q8H     umeclidinium  1 puff Inhalation Daily     cholecalciferol  25 mcg Oral Daily

## 2022-04-12 NOTE — PROGRESS NOTES
"   04/12/22 1000   Quick Adds   Type of Visit Initial PT Evaluation   Living Environment   Current Living Arrangements extended care facility  (Memory Care)   Self-Care   Equipment Currently Used at Home wheelchair, manual   Fall history within last six months yes   Activity/Exercise/Self-Care Comment Pt reports assist of 2 with pivot transfer at home   General Information   Onset of Illness/Injury or Date of Surgery 04/11/22   Referring Physician Dr. Sarai Ocampo   Patient/Family Therapy Goals Statement (PT) \"be able to walk again\"   Pertinent History of Current Problem (include personal factors and/or comorbidities that impact the POC) S/P L Reverse TSA   Existing Precautions/Restrictions shoulder   Weight-Bearing Status - LUE nonweight-bearing   Weight-Bearing Status - LLE full weight-bearing   Weight-Bearing Status - RLE full weight-bearing   Transfers   Transfers sit-stand transfer   Maintains Weight-bearing Status (Transfers) able to maintain   Sit-Stand Transfer   Sit-Stand [place] (Transfers) maximum assist (25% patient effort);verbal cues;2 person assist   Assistive Device (Sit-Stand Transfers) walker, cinthia   Clinical Impression   Criteria for Skilled Therapeutic Intervention Yes, treatment indicated   PT Diagnosis (PT) Impaired functional mobility   Influenced by the following impairments weakness, pain, impaired balance   Functional limitations due to impairments Transfers, bed mobility   Clinical Presentation (PT Evaluation Complexity) Evolving/Changing   Clinical Presentation Rationale Pt presents as medically diagnosed   Clinical Decision Making (Complexity) moderate complexity   Planned Therapy Interventions (PT) bed mobility training;balance training;transfer training;strengthening;patient/family education;home exercise program;gait training   Anticipated Equipment Needs at Discharge (PT) walker, cinthia   Risk & Benefits of therapy have been explained evaluation/treatment results reviewed;care " plan/treatment goals reviewed;patient   PT Discharge Planning   PT Discharge Recommendation (DC Rec) (S)  Transitional Care Facility   PT Rationale for DC Rec Assist of 2 for sit to stand transfers, high fall risk   Total Evaluation Time   Total Evaluation Time (Minutes) 10   Physical Therapy Goals   PT Frequency Daily   PT Predicated Duration/Target Date for Goal Attainment 04/19/22   PT Goals Bed Mobility;Transfers   PT: Bed Mobility Maximum assist;Supine to/from sit   PT: Transfers Sit to/from stand;Assistive device;Moderate assist

## 2022-04-12 NOTE — ANESTHESIA POSTPROCEDURE EVALUATION
Patient: Amparo Sharma    Procedure: Procedure(s):  LEFT REVERSE TOTAL SHOULDER ARTHROPLASTY       Anesthesia Type:  General    Note:     Postop Pain Control: Uneventful            Sign Out: Well controlled pain   PONV: No   Neuro/Psych: Uneventful            Sign Out: Acceptable/Baseline neuro status   Airway/Respiratory: Uneventful            Sign Out: Acceptable/Baseline resp. status   CV/Hemodynamics: Uneventful            Sign Out: Acceptable CV status   Other NRE: NONE   DID A NON-ROUTINE EVENT OCCUR? No           Last vitals:  Vitals Value Taken Time   /62 04/11/22 1620   Temp 36.6  C (97.9  F) 04/11/22 1620   Pulse 101 04/11/22 1630   Resp 22 04/11/22 1630   SpO2 92 % 04/11/22 1630       Electronically Signed By: Luis Amaya MD  April 11, 2022  8:03 PM

## 2022-04-12 NOTE — PROGRESS NOTES
"Orthopedic Progress Note      Assessment: 1 Day Post-Op  S/P Procedure(s):  LEFT REVERSE TOTAL SHOULDER ARTHROPLASTY     Plan:   -Following surgery she will be maintained in the sling for 6 weeks postoperatively, may discontinue abduction pillow at 1-2 week postoperative and begin PT at approximately 4-6 weeks postoperatively.   -Continue pain management as needed  - Continue PT/OT  - Weightbearing status: NWGERMAN BIRD  - Anticoagulation: ASA 81 PO BID in addition to SCDs, shellie stockings and early ambulation.  - Discharge planning: plan is for TCU, pending medical clearance and TCU placement.       Subjective:  Pain: mild  Nausea, Vomiting:  No  Lightheadedness, Dizziness:  No    Patient reports she is doing okay but tired.  States left upper extremity still has residual numbness from the block but does note pain in the left upper extremity.  Pain seems to be controlled on current medication plan.     Objective:  /68 (BP Location: Right arm)   Pulse 83   Temp 97.8  F (36.6  C) (Oral)   Resp 18   Ht 1.499 m (4' 11\")   Wt 68.2 kg (150 lb 6.4 oz)   SpO2 94%   BMI 30.38 kg/m    The patient is A&O.  Somewhat nonspecifically confused in conversation. Appears comfortable sitting in chair.   Sensation is subjectively slightly dulled left hand.  Wrist ROM and  strength is intact.  Radial pulse intact.  The incision is covered. Dressing C/D/I. Sling in place.       Pertinent Labs   Lab Results: personally reviewed.   Lab Results   Component Value Date    INR 1.06 04/11/2022    INR 0.96 03/05/2019    INR 1.05 10/16/2018     Lab Results   Component Value Date    WBC 11.7 (H) 04/12/2022    HGB 9.8 (L) 04/12/2022    HCT 31.3 (L) 04/12/2022    MCV 89 04/12/2022     04/12/2022     Lab Results   Component Value Date     04/12/2022    CO2 25 04/12/2022         Report completed by:  Gisele Remy PA-C, KAYLI  Date: 4/12/2022  Time: 3:09 PM    "

## 2022-04-12 NOTE — CONSULTS
Care Management Initial Consult    General Information  Assessment completed with: Patient,    Type of CM/SW Visit: Initial Assessment    Primary Care Provider verified and updated as needed: Yes   Readmission within the last 30 days: no previous admission in last 30 days         Advance Care Planning:            Communication Assessment  Patient's communication style: spoken language (English or Bilingual)    Hearing Difficulty or Deaf: no   Wear Glasses or Blind: no    Cognitive  Cognitive/Neuro/Behavioral: WDL  Level of Consciousness: alert  Arousal Level: opens eyes spontaneously  Orientation: disoriented to, place (forgetful)     Best Language: 0 - No aphasia  Speech: hesitant    Living Environment:   People in home: alone     Current living Arrangements: assisted living  Name of Facility: [facility]   Able to return to prior arrangements: no       Family/Social Support:  Care provided by:  (staff)  Provides care for: no one, unable/limited ability to care for self     Children          Description of Support System: Supportive, Involved         Current Resources:   Patient receiving home care services: No     Community Resources: None  Equipment currently used at home: wheelchair, manual  Supplies currently used at home: None    Employment/Financial:  Employment Status:          Financial Concerns:     Referral to Financial Worker: No       Lifestyle & Psychosocial Needs:  Social Determinants of Health     Tobacco Use: Medium Risk     Smoking Tobacco Use: Former Smoker     Smokeless Tobacco Use: Former User   Alcohol Use: Not on file   Financial Resource Strain: Not on file   Food Insecurity: Not on file   Transportation Needs: Not on file   Physical Activity: Not on file   Stress: Not on file   Social Connections: Not on file   Intimate Partner Violence: Not on file   Depression: Not on file   Housing Stability: Not on file       Functional Status:  Prior to admission patient needed assistance:   Dependent  ADLs:: Ambulation-walker, Dressing, Bathing  Dependent IADLs:: Cleaning, Shopping, Laundry, Cooking, Meal Preparation, Medication Management, Transportation, Money Management       Mental Health Status:  Mental Health Status: No Current Concerns       Chemical Dependency Status:  Chemical Dependency Status: No Current Concerns               Additional Information:  COREY introduced self and CM services to Pt.  Pt lives in at [facility] in an apartment.  Pt receives assistance with ADLs and IADLs.  Pt uses a walker at baseline. Pt's daughter is involved in care.  Pt's goal is to discharge to [facility] for TCU.  [facility] faxed full referral and spoke with admissions coordinator, Miguel who is screening referral.  COREY spoke with Pt's daughter, Nohemi, per Pt's request, who confirms information and states Pt will need transportation.    3:47 PM  Pt accepted at [facility] for TCU tomorrow 4/13.  COREY scheduled [facility] wheel chair transport for 3:30 PM (facility requested 2 or 2:30, transport out until 3:30).  PAS completed ([id number]).   COREY updated Pt's daughter, Nohemi.    CINDY Modi

## 2022-04-13 ENCOUNTER — APPOINTMENT (OUTPATIENT)
Dept: PHYSICAL THERAPY | Facility: CLINIC | Age: 82
DRG: 483 | End: 2022-04-13
Attending: ORTHOPAEDIC SURGERY
Payer: MEDICARE

## 2022-04-13 ENCOUNTER — APPOINTMENT (OUTPATIENT)
Dept: OCCUPATIONAL THERAPY | Facility: CLINIC | Age: 82
DRG: 483 | End: 2022-04-13
Attending: ORTHOPAEDIC SURGERY
Payer: MEDICARE

## 2022-04-13 ENCOUNTER — LAB REQUISITION (OUTPATIENT)
Dept: LAB | Facility: CLINIC | Age: 82
End: 2022-04-13
Payer: MEDICARE

## 2022-04-13 VITALS
HEART RATE: 87 BPM | OXYGEN SATURATION: 95 % | RESPIRATION RATE: 18 BRPM | DIASTOLIC BLOOD PRESSURE: 63 MMHG | TEMPERATURE: 97.7 F | SYSTOLIC BLOOD PRESSURE: 130 MMHG | HEIGHT: 59 IN | BODY MASS INDEX: 30.32 KG/M2 | WEIGHT: 150.4 LBS

## 2022-04-13 DIAGNOSIS — R76.12 NONSPECIFIC REACTION TO CELL MEDIATED IMMUNITY MEASUREMENT OF GAMMA INTERFERON ANTIGEN RESPONSE WITHOUT ACTIVE TUBERCULOSIS: ICD-10-CM

## 2022-04-13 LAB
GLUCOSE BLD-MCNC: 108 MG/DL (ref 70–125)
GLUCOSE BLDC GLUCOMTR-MCNC: 124 MG/DL (ref 70–99)
HGB BLD-MCNC: 8.9 G/DL (ref 11.7–15.7)
HOLD SPECIMEN: NORMAL

## 2022-04-13 PROCEDURE — 97535 SELF CARE MNGMENT TRAINING: CPT | Mod: GO

## 2022-04-13 PROCEDURE — 250N000013 HC RX MED GY IP 250 OP 250 PS 637: Performed by: INTERNAL MEDICINE

## 2022-04-13 PROCEDURE — 99232 SBSQ HOSP IP/OBS MODERATE 35: CPT | Performed by: FAMILY MEDICINE

## 2022-04-13 PROCEDURE — 250N000013 HC RX MED GY IP 250 OP 250 PS 637: Performed by: PHYSICIAN ASSISTANT

## 2022-04-13 PROCEDURE — 250N000013 HC RX MED GY IP 250 OP 250 PS 637: Performed by: FAMILY MEDICINE

## 2022-04-13 PROCEDURE — 36415 COLL VENOUS BLD VENIPUNCTURE: CPT | Performed by: PHYSICIAN ASSISTANT

## 2022-04-13 PROCEDURE — 85018 HEMOGLOBIN: CPT | Performed by: PHYSICIAN ASSISTANT

## 2022-04-13 PROCEDURE — 82947 ASSAY GLUCOSE BLOOD QUANT: CPT | Performed by: FAMILY MEDICINE

## 2022-04-13 PROCEDURE — 36415 COLL VENOUS BLD VENIPUNCTURE: CPT | Performed by: FAMILY MEDICINE

## 2022-04-13 PROCEDURE — 97530 THERAPEUTIC ACTIVITIES: CPT | Mod: GP

## 2022-04-13 PROCEDURE — 84466 ASSAY OF TRANSFERRIN: CPT | Performed by: FAMILY MEDICINE

## 2022-04-13 PROCEDURE — 97110 THERAPEUTIC EXERCISES: CPT | Mod: GP

## 2022-04-13 RX ORDER — OXYCODONE HYDROCHLORIDE 5 MG/1
5 TABLET ORAL EVERY 4 HOURS PRN
Qty: 30 TABLET | Refills: 0 | Status: SHIPPED | OUTPATIENT
Start: 2022-04-13 | End: 2022-05-24

## 2022-04-13 RX ADMIN — LEVOTHYROXINE SODIUM 75 MCG: 75 TABLET ORAL at 08:30

## 2022-04-13 RX ADMIN — OXYCODONE HYDROCHLORIDE 10 MG: 5 TABLET ORAL at 08:51

## 2022-04-13 RX ADMIN — DICLOFENAC 4 G: 10 GEL TOPICAL at 12:28

## 2022-04-13 RX ADMIN — ACETAMINOPHEN 975 MG: 325 TABLET ORAL at 10:31

## 2022-04-13 RX ADMIN — MIRABEGRON 25 MG: 25 TABLET, FILM COATED, EXTENDED RELEASE ORAL at 08:29

## 2022-04-13 RX ADMIN — CARBOXYMETHYLCELLULOSE SODIUM 1 DROP: 10 GEL OPHTHALMIC at 08:29

## 2022-04-13 RX ADMIN — AMLODIPINE BESYLATE 5 MG: 5 TABLET ORAL at 08:30

## 2022-04-13 RX ADMIN — DICLOFENAC 4 G: 10 GEL TOPICAL at 08:39

## 2022-04-13 RX ADMIN — FLUOXETINE HYDROCHLORIDE 60 MG: 20 CAPSULE ORAL at 08:29

## 2022-04-13 RX ADMIN — LAMOTRIGINE 150 MG: 150 TABLET ORAL at 08:29

## 2022-04-13 RX ADMIN — Medication 25 MCG: at 08:30

## 2022-04-13 RX ADMIN — ASPIRIN 81 MG: 81 TABLET, COATED ORAL at 08:30

## 2022-04-13 RX ADMIN — GUAIFENESIN 10 ML: 100 SOLUTION ORAL at 10:35

## 2022-04-13 RX ADMIN — CETIRIZINE HYDROCHLORIDE 10 MG: 10 TABLET, FILM COATED ORAL at 08:27

## 2022-04-13 RX ADMIN — PANTOPRAZOLE SODIUM 20 MG: 20 TABLET, DELAYED RELEASE ORAL at 08:29

## 2022-04-13 RX ADMIN — SENNOSIDES AND DOCUSATE SODIUM 2 TABLET: 50; 8.6 TABLET ORAL at 08:29

## 2022-04-13 ASSESSMENT — ACTIVITIES OF DAILY LIVING (ADL)
ADLS_ACUITY_SCORE: 28

## 2022-04-13 NOTE — DISCHARGE SUMMARY
ORTHOPEDIC DISCHARGE SUMMARY       Amparo Sharma,  1940, MRN [id number]    Admission Date: 2022  Admission Diagnoses: Left shoulder pain [M25.512]  S/P reverse total shoulder arthroplasty, left [Z96.612]     Discharge Date: 2022     Post-operative Day:  2 Days Post-Op    Reason for Admission: The patient was admitted for the following:  Procedure(s):  LEFT REVERSE TOTAL SHOULDER ARTHROPLASTY      BRIEF HOSPITAL COURSE   This 81 year old female underwent the aforementioned procedure with Dr. Peterson on 22. There were no intraoperative complications and the patient was transferred to the recovery room and later the orthopedic unit in stable condition. Once the patient reached the orthopedic floor our orthopedic pain protocol was implemented along with the following:    Anticoagulation Medications: ASA  Therapy: PT and OT  Activity: NWB LUE  Bracing: Slingshot Brace    Consultations during Admission: Hospitalist service for medical management     COMPLICATIONS/SIGNIFICANT FINDINGS    none    DISCHARGE INFORMATION   Condition at discharge: Good  Discharge destination: Skilled nursing facility  Patient was seen by myself on the date of discharge.    FOLLOW UP CARE   Follow up with orthopedics in 2 weeks or sooner should the need arise. Ortho will continue to manage pain control, post op anticoagulation and incision care.     Follow up with your PCP for management of chronic medical problems and to evaluate for post op medical complications including constipation, nausea/vomiting, DVT/PE, anemia, changes in blood pressure, fevers/chills, urinary retention and atelectasis/pneumonia.     Subjective   Patient is doing fairly well today.  She reports pain in the arm but seems to be well controlled on oxycodone and current pain regimen.  Denies any numbness or tingling in the upper extremity and is able to move the hand well.  Patient has poor overall mobility at baseline.  She has no concerns and patient  "is ready for discharge to TCU.      Physical Exam   The patient is A&Ox3, slow to report date, somewhat confused intermittently in conversation. Appears comfortable.   Sensation is intact left hand.   Wrist ROM and  strength is intact.  Radial pulse intact.  The incision is covered. Dressing C/D/I. Sling in place.       /63 (BP Location: Right arm)   Pulse 87   Temp 97.7  F (36.5  C) (Oral)   Resp 18   Ht 1.499 m (4' 11\")   Wt 68.2 kg (150 lb 6.4 oz)   SpO2 95%   BMI 30.38 kg/m      Pertinent Results at Discharge     Hemoglobin   Date/Time Value Ref Range Status   04/13/2022 06:44 AM 8.9 (L) 11.7 - 15.7 g/dL Final   04/12/2022 06:51 AM 9.8 (L) 11.7 - 15.7 g/dL Final   04/11/2022 11:27 AM 12.3 11.7 - 15.7 g/dL Final   10/16/2018 08:04 PM 13.2 11.7 - 15.7 g/dL Final   02/09/2017 11:05 AM 11.6 (L) 11.7 - 15.7 g/dL Final   02/08/2017 07:05 AM 11.7 11.7 - 15.7 g/dL Final     INR   Date/Time Value Ref Range Status   04/11/2022 11:27 AM 1.06 0.85 - 1.15 Final   03/05/2019 07:37 AM 0.96 0.90 - 1.10 Final   10/16/2018 08:04 PM 1.05 0.86 - 1.14 Final   02/09/2017 11:05 AM 1.12 0.86 - 1.14 Final   02/08/2017 07:05 AM 1.03 0.86 - 1.14 Final     Platelet Count   Date/Time Value Ref Range Status   04/12/2022 06:51  150 - 450 10e3/uL Final   04/11/2022 11:27  150 - 450 10e3/uL Final   03/09/2022 11:15  150 - 450 10e3/uL Final   10/16/2018 08:04  150 - 450 10e9/L Final   02/09/2017 11:05  150 - 450 10e9/L Final   02/08/2017 07:05  150 - 450 10e9/L Final       Problem List   Active Problems:    S/P reverse total shoulder arthroplasty, left        Gisele Remy PA-C  Date: 4/13/2022  Time: 3:06 PM    "

## 2022-04-13 NOTE — PROGRESS NOTES
Care Management Discharge Note    Discharge Date: 04/13/2022       Discharge Disposition: Transitional Care    Discharge Services:  (TCU)    Discharge DME:  (per treatment team)    Discharge Transportation: health plan transportation    Private pay costs discussed: transportation costs    PAS Confirmation Code:  [id number])  Patient/family educated on Medicare website which has current facility and service quality ratings: yes    Education Provided on the Discharge Plan:  yes  Persons Notified of Discharge Plans: patient and daughter  Patient/Family in Agreement with the Plan: yes    Handoff Referral Completed: sending orders when available    Additional Information:  Plan is for Pt to discharge to [facility] for TCU.  Pt's family notified of discharge plan.  [facility] wheel chair transport scheduled for 2:30PM.  PAS completed and on chart. COREY left voice mail for Miguel in admissions to provide ride time.    11:56 AM  [facility]Y faxed signed orders to TCU.     CINDY Modi

## 2022-04-13 NOTE — PROGRESS NOTES
[facility] MEDICINE PROGRESS NOTE      Identification/Summary: Amparo Sharma is a 81 year old female with a past medical history of hypertension, COPD, hypothyroidism, NPH with shunt, depression, bipolar disorder, stage III colon cancer, status post resection, history of bladder cancer, underwent left shoulder arthroplasty.  POD 2.  Left shoulder pain is stable.  Will be discharging to TCU for more rehabilitation    Assessment and Plan:  Essential  hypertension  Norvasc 5 mg daily  Blood pressure is stable    COPD  Stable and asymptomatic  Albuterol inhaler/nebulizer every 4 hours as needed  Breo inhaler daily  Ellipta inhaler daily    Hypothyroidism  Levothyroxine 75 mcg daily    GERD  Resume PPI    Anxiety, depression  Bipolar disorder  Lamictal 150 mg daily  Prozac 60 mg daily  Abilify 15 mg daily     History of colon cancer, status post resection     History of NPH, status post shunt     Acute blood loss anemia  Hemoglobin 8.9 from 13.5  Resume ferrous sulfate daily     Status post left shoulder arthroplasty  Resume routine postoperative care  Physical and occupational therapy  Left arm sling in place.  Nonweightbearing of left upper extremity  Use incentive spirometry frequently  DVT prophylaxis per orthopedics, aspirin 81 mg twice a day  Pain control with Tylenol 975 mg every 8 hours, 650 mg every 4 hours as needed, oxycodone 5 to 10 mg every 4 hours as needed       Sarai Ocampo MD  [facility] [facility]  [facility]  [facility]  Phone: #[phone]    Interval History/Subjective:  Resting comfortably.  Left shoulder pain is stable.  No new concern.  Discharging to TCU.  Rest of the review of system are negative except HPI      Physical Exam/Objective:  Temp:  [97.7  F (36.5  C)-99.9  F (37.7  C)] 97.7  F (36.5  C)  Pulse:  [87-98] 87  Resp:  [14-18] 18  BP: (123-130)/(58-63) 130/63  SpO2:  [93 %-95 %] 95 %  Body mass index is 30.38 kg/m .    GENERAL:   Alert, appears comfortable, in no acute distress, appears stated age   HEAD:  Normocephalic, without obvious abnormality, atraumatic   EYES:  PERRL, conjunctiva  clear,  EOM's intact   NOSE: Nares normal,  mucosa normal, no drainage   THROAT: Lips, mucosa,  gums normal, mouth moist   NECK: Supple, symmetrical, trachea midline   BACK:   Symmetric, no curvature, ROM normal   LUNGS:   Clear to auscultation bilaterally, no rales, rhonchi, or wheezing, symmetric chest rise on inhalation, respirations unlabored   CHEST WALL:  No tenderness or deformity   HEART:  Regular rate and rhythm, S1 and S2 normal, no murmur     ABDOMEN:   Soft, non-tender, bowel sounds active , no masses, no organomegaly    EXTREMITIES: Status post left shoulder arthroplasty, left arm sling in place   SKIN: No rashes   NEURO: Alert, oriented x3    PSYCH: Cooperative, behavior is appropriate      Labs:  Most Recent 3 CBC's:  Recent Labs   Lab Test 04/13/22  0644 04/12/22  0651 04/11/22  1127 03/09/22  1115   WBC  --  11.7* 9.1 7.5   HGB 8.9* 9.8* 12.3 13.5   MCV  --  89 93 90   PLT  --  381 394 286     Most Recent 3 BMP's:  Recent Labs   Lab Test 04/13/22  0728 04/12/22  0651 04/11/22  1127 03/09/22  1115 09/15/21  1108   NA  --  139  --  140 138   POTASSIUM  --  4.6 4.4 4.3 4.3   CHLORIDE  --  103  --  104 102   CO2  --  25  --  26 27   BUN  --  17  --  16 15   CR  --  0.64 0.65 0.69 0.78   ANIONGAP  --  11  --  10 9   MARTHA  --  8.8  --  10.2 9.9    112  112  --  85 109       Medications:   Personally Reviewed.  Medications       acetaminophen  975 mg Oral Q8H     amLODIPine  5 mg Oral Daily     ARIPiprazole  15 mg Oral At Bedtime     aspirin  81 mg Oral BID     carboxymethylcellulose PF  1 drop Both Eyes Daily     cetirizine  10 mg Oral Daily     diclofenac  4 g Topical 4x Daily     FLUoxetine  60 mg Oral Daily     fluticasone-vilanterol  1 puff Inhalation Daily     lamoTRIgine  100 mg Oral At Bedtime     lamoTRIgine  150 mg Oral Daily      levothyroxine  75 mcg Oral Daily     mirabegron  25 mg Oral Daily     pantoprazole  20 mg Oral Daily     polyethylene glycol  17 g Oral Daily     senna-docusate  2 tablet Oral Daily     sodium chloride (PF)  3 mL Intracatheter Q8H     umeclidinium  1 puff Inhalation Daily     cholecalciferol  25 mcg Oral Daily

## 2022-04-13 NOTE — PLAN OF CARE
Problem: Pain (Shoulder Arthroplasty)  Goal: Acceptable Pain Control  Outcome: Ongoing, Progressing     Problem: Plan of Care - These are the overarching goals to be used throughout the patient stay.    Goal: Absence of Hospital-Acquired Illness or Injury  Intervention: Identify and Manage Fall Risk  Recent Flowsheet Documentation  Taken 4/13/2022 0041 by John Mead, RN  Safety Promotion/Fall Prevention:   activity supervised   assistive device/personal items within reach   chair alarm on   clutter free environment maintained  Taken 4/12/2022 2041 by John Mead, RN  Safety Promotion/Fall Prevention:   activity supervised   assistive device/personal items within reach   chair alarm on   clutter free environment maintained   Goal Outcome Evaluation:      POD 2 L reverse shoulder. Pt states pain has been low. Incontinent using purewick. Forgetful, alarms on. WC bound at baseline, able to pivot with assist of 2 and walker with gait belt. NWB to LUE. Plan is TCU at discharge.

## 2022-04-13 NOTE — PLAN OF CARE
Physical Therapy Discharge Summary    Reason for therapy discharge:    Discharged to transitional care facility.    Progress towards therapy goal(s). See [facility] on Care [facility] for goal details.  Goals not met.  Barriers to achieving goals:   discharge from facility and weakness, impaired balance.    Therapy recommendation(s):    Continued therapy is recommended.  Rationale/Recommendations:  TCU.

## 2022-04-14 ENCOUNTER — HOSPITAL ENCOUNTER (INPATIENT)
Facility: CLINIC | Age: 82
LOS: 5 days | Discharge: NURSING, REHAB OR LONG-TERM CARE | DRG: 189 | End: 2022-04-19
Attending: STUDENT IN AN ORGANIZED HEALTH CARE EDUCATION/TRAINING PROGRAM | Admitting: STUDENT IN AN ORGANIZED HEALTH CARE EDUCATION/TRAINING PROGRAM
Payer: MEDICARE

## 2022-04-14 ENCOUNTER — APPOINTMENT (OUTPATIENT)
Dept: CT IMAGING | Facility: CLINIC | Age: 82
DRG: 189 | End: 2022-04-14
Payer: MEDICARE

## 2022-04-14 DIAGNOSIS — K59.03 DRUG-INDUCED CONSTIPATION: ICD-10-CM

## 2022-04-14 DIAGNOSIS — G89.18 ACUTE POST-OPERATIVE PAIN: ICD-10-CM

## 2022-04-14 DIAGNOSIS — J98.11 ATELECTASIS: ICD-10-CM

## 2022-04-14 DIAGNOSIS — R00.0 TACHYCARDIA: ICD-10-CM

## 2022-04-14 DIAGNOSIS — J96.01 ACUTE RESPIRATORY FAILURE WITH HYPOXIA (H): ICD-10-CM

## 2022-04-14 DIAGNOSIS — R09.02 HYPOXIA: Primary | ICD-10-CM

## 2022-04-14 DIAGNOSIS — R06.02 SHORTNESS OF BREATH: ICD-10-CM

## 2022-04-14 LAB
ALBUMIN SERPL-MCNC: 2.4 G/DL (ref 3.5–5)
ALP SERPL-CCNC: 119 U/L (ref 45–120)
ALT SERPL W P-5'-P-CCNC: 38 U/L (ref 0–45)
ANION GAP SERPL CALCULATED.3IONS-SCNC: 15 MMOL/L (ref 5–18)
AST SERPL W P-5'-P-CCNC: 93 U/L (ref 0–40)
ATRIAL RATE - MUSE: 107 BPM
BILIRUB DIRECT SERPL-MCNC: 0.2 MG/DL
BILIRUB SERPL-MCNC: 0.6 MG/DL (ref 0–1)
BNP SERPL-MCNC: 40 PG/ML (ref 0–155)
BUN SERPL-MCNC: 14 MG/DL (ref 8–28)
CALCIUM SERPL-MCNC: 9.3 MG/DL (ref 8.5–10.5)
CHLORIDE BLD-SCNC: 102 MMOL/L (ref 98–107)
CO2 SERPL-SCNC: 23 MMOL/L (ref 22–31)
CREAT SERPL-MCNC: 0.65 MG/DL (ref 0.6–1.1)
DIASTOLIC BLOOD PRESSURE - MUSE: 75 MMHG
ERYTHROCYTE [DISTWIDTH] IN BLOOD BY AUTOMATED COUNT: 14.6 % (ref 10–15)
FLUAV RNA SPEC QL NAA+PROBE: NEGATIVE
FLUBV RNA RESP QL NAA+PROBE: NEGATIVE
GFR SERPL CREATININE-BSD FRML MDRD: 89 ML/MIN/1.73M2
GLUCOSE BLD-MCNC: 109 MG/DL (ref 70–125)
HCT VFR BLD AUTO: 28.9 % (ref 35–47)
HGB BLD-MCNC: 9 G/DL (ref 11.7–15.7)
HOLD SPECIMEN: NORMAL
INTERPRETATION ECG - MUSE: NORMAL
IRON SATN MFR SERPL: 6 % (ref 20–50)
IRON SERPL-MCNC: 10 UG/DL (ref 42–175)
LACTATE SERPL-SCNC: 1.1 MMOL/L (ref 0.7–2)
LIPASE SERPL-CCNC: 20 U/L (ref 0–52)
MCH RBC QN AUTO: 28 PG (ref 26.5–33)
MCHC RBC AUTO-ENTMCNC: 31.1 G/DL (ref 31.5–36.5)
MCV RBC AUTO: 90 FL (ref 78–100)
P AXIS - MUSE: 39 DEGREES
PLATELET # BLD AUTO: 359 10E3/UL (ref 150–450)
POTASSIUM BLD-SCNC: 4.1 MMOL/L (ref 3.5–5)
PR INTERVAL - MUSE: 178 MS
PROT SERPL-MCNC: 6.5 G/DL (ref 6–8)
QRS DURATION - MUSE: 72 MS
QT - MUSE: 334 MS
QTC - MUSE: 445 MS
R AXIS - MUSE: -19 DEGREES
RBC # BLD AUTO: 3.22 10E6/UL (ref 3.8–5.2)
SARS-COV-2 RNA RESP QL NAA+PROBE: NEGATIVE
SODIUM SERPL-SCNC: 140 MMOL/L (ref 136–145)
SYSTOLIC BLOOD PRESSURE - MUSE: 135 MMHG
T AXIS - MUSE: 25 DEGREES
TIBC SERPL-MCNC: 171 UG/DL (ref 313–563)
TRANSFERRIN SERPL-MCNC: 137 MG/DL (ref 212–360)
TROPONIN I SERPL-MCNC: 0.02 NG/ML (ref 0–0.29)
VENTRICULAR RATE- MUSE: 107 BPM
WBC # BLD AUTO: 11 10E3/UL (ref 4–11)

## 2022-04-14 PROCEDURE — 87040 BLOOD CULTURE FOR BACTERIA: CPT | Performed by: PHYSICIAN ASSISTANT

## 2022-04-14 PROCEDURE — 999N000204 HC STATISTICAL VASC ACCESS NURSE TIME, 31-45 MINUTES

## 2022-04-14 PROCEDURE — 83605 ASSAY OF LACTIC ACID: CPT | Performed by: PHYSICIAN ASSISTANT

## 2022-04-14 PROCEDURE — 999N000285 HC STATISTIC VASC ACCESS LAB DRAW WITH PIV START

## 2022-04-14 PROCEDURE — C9803 HOPD COVID-19 SPEC COLLECT: HCPCS

## 2022-04-14 PROCEDURE — 93005 ELECTROCARDIOGRAM TRACING: CPT | Performed by: STUDENT IN AN ORGANIZED HEALTH CARE EDUCATION/TRAINING PROGRAM

## 2022-04-14 PROCEDURE — 250N000013 HC RX MED GY IP 250 OP 250 PS 637: Performed by: PHYSICIAN ASSISTANT

## 2022-04-14 PROCEDURE — 258N000003 HC RX IP 258 OP 636: Performed by: STUDENT IN AN ORGANIZED HEALTH CARE EDUCATION/TRAINING PROGRAM

## 2022-04-14 PROCEDURE — 71275 CT ANGIOGRAPHY CHEST: CPT

## 2022-04-14 PROCEDURE — 120N000001 HC R&B MED SURG/OB

## 2022-04-14 PROCEDURE — 36415 COLL VENOUS BLD VENIPUNCTURE: CPT | Performed by: NURSE PRACTITIONER

## 2022-04-14 PROCEDURE — 99285 EMERGENCY DEPT VISIT HI MDM: CPT | Mod: 25

## 2022-04-14 PROCEDURE — 83690 ASSAY OF LIPASE: CPT | Performed by: PHYSICIAN ASSISTANT

## 2022-04-14 PROCEDURE — 250N000013 HC RX MED GY IP 250 OP 250 PS 637: Performed by: STUDENT IN AN ORGANIZED HEALTH CARE EDUCATION/TRAINING PROGRAM

## 2022-04-14 PROCEDURE — 250N000011 HC RX IP 250 OP 636: Performed by: PHYSICIAN ASSISTANT

## 2022-04-14 PROCEDURE — 83880 ASSAY OF NATRIURETIC PEPTIDE: CPT | Performed by: PHYSICIAN ASSISTANT

## 2022-04-14 PROCEDURE — 999N000127 HC STATISTIC PERIPHERAL IV START W US GUIDANCE

## 2022-04-14 PROCEDURE — 36415 COLL VENOUS BLD VENIPUNCTURE: CPT

## 2022-04-14 PROCEDURE — 258N000003 HC RX IP 258 OP 636: Performed by: PHYSICIAN ASSISTANT

## 2022-04-14 PROCEDURE — P9604 ONE-WAY ALLOW PRORATED TRIP: HCPCS | Performed by: NURSE PRACTITIONER

## 2022-04-14 PROCEDURE — 84484 ASSAY OF TROPONIN QUANT: CPT | Performed by: PHYSICIAN ASSISTANT

## 2022-04-14 PROCEDURE — 36415 COLL VENOUS BLD VENIPUNCTURE: CPT | Performed by: PHYSICIAN ASSISTANT

## 2022-04-14 PROCEDURE — 82248 BILIRUBIN DIRECT: CPT | Performed by: PHYSICIAN ASSISTANT

## 2022-04-14 PROCEDURE — 96360 HYDRATION IV INFUSION INIT: CPT

## 2022-04-14 PROCEDURE — 87636 SARSCOV2 & INF A&B AMP PRB: CPT | Performed by: PHYSICIAN ASSISTANT

## 2022-04-14 PROCEDURE — 86481 TB AG RESPONSE T-CELL SUSP: CPT | Performed by: NURSE PRACTITIONER

## 2022-04-14 PROCEDURE — 93005 ELECTROCARDIOGRAM TRACING: CPT | Performed by: EMERGENCY MEDICINE

## 2022-04-14 PROCEDURE — 85014 HEMATOCRIT: CPT | Performed by: PHYSICIAN ASSISTANT

## 2022-04-14 PROCEDURE — 36415 COLL VENOUS BLD VENIPUNCTURE: CPT | Performed by: EMERGENCY MEDICINE

## 2022-04-14 RX ORDER — MIRABEGRON 25 MG/1
25 TABLET, FILM COATED, EXTENDED RELEASE ORAL DAILY
Status: DISCONTINUED | OUTPATIENT
Start: 2022-04-15 | End: 2022-04-19 | Stop reason: HOSPADM

## 2022-04-14 RX ORDER — VITAMIN B COMPLEX
1000 TABLET ORAL DAILY
Status: DISCONTINUED | OUTPATIENT
Start: 2022-04-15 | End: 2022-04-19 | Stop reason: HOSPADM

## 2022-04-14 RX ORDER — HALOPERIDOL 0.5 MG/1
0.5 TABLET ORAL EVERY 6 HOURS PRN
Status: DISCONTINUED | OUTPATIENT
Start: 2022-04-14 | End: 2022-04-19 | Stop reason: HOSPADM

## 2022-04-14 RX ORDER — SACCHAROMYCES BOULARDII 250 MG
250 CAPSULE ORAL DAILY
Status: DISCONTINUED | OUTPATIENT
Start: 2022-04-15 | End: 2022-04-19 | Stop reason: HOSPADM

## 2022-04-14 RX ORDER — AMLODIPINE BESYLATE 5 MG/1
5 TABLET ORAL DAILY
Status: DISCONTINUED | OUTPATIENT
Start: 2022-04-15 | End: 2022-04-19 | Stop reason: HOSPADM

## 2022-04-14 RX ORDER — ASPIRIN 81 MG/1
81 TABLET ORAL 2 TIMES DAILY WITH MEALS
Status: DISCONTINUED | OUTPATIENT
Start: 2022-04-14 | End: 2022-04-19 | Stop reason: HOSPADM

## 2022-04-14 RX ORDER — PANTOPRAZOLE SODIUM 20 MG/1
20 TABLET, DELAYED RELEASE ORAL DAILY
Status: DISCONTINUED | OUTPATIENT
Start: 2022-04-15 | End: 2022-04-19 | Stop reason: HOSPADM

## 2022-04-14 RX ORDER — OXYCODONE HYDROCHLORIDE 5 MG/1
5 TABLET ORAL ONCE
Status: COMPLETED | OUTPATIENT
Start: 2022-04-14 | End: 2022-04-14

## 2022-04-14 RX ORDER — POLYETHYLENE GLYCOL 3350 17 G/17G
17 POWDER, FOR SOLUTION ORAL DAILY
Status: DISCONTINUED | OUTPATIENT
Start: 2022-04-15 | End: 2022-04-19

## 2022-04-14 RX ORDER — IOPAMIDOL 755 MG/ML
100 INJECTION, SOLUTION INTRAVASCULAR ONCE
Status: COMPLETED | OUTPATIENT
Start: 2022-04-14 | End: 2022-04-14

## 2022-04-14 RX ORDER — ONDANSETRON 2 MG/ML
4 INJECTION INTRAMUSCULAR; INTRAVENOUS EVERY 6 HOURS PRN
Status: DISCONTINUED | OUTPATIENT
Start: 2022-04-14 | End: 2022-04-19 | Stop reason: HOSPADM

## 2022-04-14 RX ORDER — FERROUS SULFATE 325(65) MG
325 TABLET ORAL DAILY
Status: DISCONTINUED | OUTPATIENT
Start: 2022-04-15 | End: 2022-04-19 | Stop reason: HOSPADM

## 2022-04-14 RX ORDER — ARIPIPRAZOLE 15 MG/1
15 TABLET ORAL AT BEDTIME
Status: DISCONTINUED | OUTPATIENT
Start: 2022-04-14 | End: 2022-04-19 | Stop reason: HOSPADM

## 2022-04-14 RX ORDER — ONDANSETRON 4 MG/1
4 TABLET, ORALLY DISINTEGRATING ORAL EVERY 6 HOURS PRN
Status: DISCONTINUED | OUTPATIENT
Start: 2022-04-14 | End: 2022-04-19 | Stop reason: HOSPADM

## 2022-04-14 RX ORDER — AMOXICILLIN 250 MG
2 CAPSULE ORAL 2 TIMES DAILY PRN
Status: DISCONTINUED | OUTPATIENT
Start: 2022-04-14 | End: 2022-04-19 | Stop reason: HOSPADM

## 2022-04-14 RX ORDER — ACETAMINOPHEN 325 MG/1
975 TABLET ORAL EVERY 8 HOURS
Status: DISCONTINUED | OUTPATIENT
Start: 2022-04-14 | End: 2022-04-19 | Stop reason: HOSPADM

## 2022-04-14 RX ORDER — LAMOTRIGINE 150 MG/1
150 TABLET ORAL DAILY
Status: DISCONTINUED | OUTPATIENT
Start: 2022-04-15 | End: 2022-04-19 | Stop reason: HOSPADM

## 2022-04-14 RX ORDER — AMOXICILLIN 250 MG
1 CAPSULE ORAL 2 TIMES DAILY PRN
Status: DISCONTINUED | OUTPATIENT
Start: 2022-04-14 | End: 2022-04-19 | Stop reason: HOSPADM

## 2022-04-14 RX ORDER — SODIUM CHLORIDE 9 MG/ML
INJECTION, SOLUTION INTRAVENOUS CONTINUOUS
Status: DISCONTINUED | OUTPATIENT
Start: 2022-04-14 | End: 2022-04-15

## 2022-04-14 RX ORDER — ALBUTEROL SULFATE 0.83 MG/ML
2.5 SOLUTION RESPIRATORY (INHALATION) EVERY 4 HOURS PRN
Status: DISCONTINUED | OUTPATIENT
Start: 2022-04-14 | End: 2022-04-19 | Stop reason: HOSPADM

## 2022-04-14 RX ORDER — ALBUTEROL SULFATE 90 UG/1
2 AEROSOL, METERED RESPIRATORY (INHALATION) EVERY 4 HOURS PRN
Status: DISCONTINUED | OUTPATIENT
Start: 2022-04-14 | End: 2022-04-19 | Stop reason: HOSPADM

## 2022-04-14 RX ORDER — LAMOTRIGINE 100 MG/1
100 TABLET ORAL AT BEDTIME
Status: DISCONTINUED | OUTPATIENT
Start: 2022-04-14 | End: 2022-04-19 | Stop reason: HOSPADM

## 2022-04-14 RX ORDER — LEVOTHYROXINE SODIUM 75 UG/1
75 TABLET ORAL DAILY
Status: DISCONTINUED | OUTPATIENT
Start: 2022-04-15 | End: 2022-04-19 | Stop reason: HOSPADM

## 2022-04-14 RX ADMIN — SODIUM CHLORIDE 1000 ML: 9 INJECTION, SOLUTION INTRAVENOUS at 15:11

## 2022-04-14 RX ADMIN — SODIUM CHLORIDE: 9 INJECTION, SOLUTION INTRAVENOUS at 21:28

## 2022-04-14 RX ADMIN — OXYCODONE HYDROCHLORIDE 5 MG: 5 TABLET ORAL at 15:56

## 2022-04-14 RX ADMIN — IOPAMIDOL 61 ML: 755 INJECTION, SOLUTION INTRAVENOUS at 16:13

## 2022-04-14 RX ADMIN — OXYCODONE HYDROCHLORIDE 2.5 MG: 5 TABLET ORAL at 21:28

## 2022-04-14 RX ADMIN — LAMOTRIGINE 100 MG: 100 TABLET ORAL at 23:59

## 2022-04-14 RX ADMIN — ACETAMINOPHEN 975 MG: 325 TABLET ORAL at 21:27

## 2022-04-14 ASSESSMENT — ENCOUNTER SYMPTOMS
PALPITATIONS: 0
TREMORS: 0
WEAKNESS: 0
WOUND: 1
VOMITING: 0
NAUSEA: 0
HEADACHES: 0
SORE THROAT: 0
HEMATURIA: 0
DIFFICULTY URINATING: 0
FACIAL SWELLING: 0
MYALGIAS: 0
COLOR CHANGE: 0
DIZZINESS: 0
AGITATION: 0
CHEST TIGHTNESS: 0
LIGHT-HEADEDNESS: 0
SPEECH DIFFICULTY: 0
FATIGUE: 0
ADENOPATHY: 0
COUGH: 0
ACTIVITY CHANGE: 0
FEVER: 0
WHEEZING: 0
ABDOMINAL PAIN: 0
CONFUSION: 0
SHORTNESS OF BREATH: 0
NERVOUS/ANXIOUS: 0
ARTHRALGIAS: 1

## 2022-04-14 ASSESSMENT — ACTIVITIES OF DAILY LIVING (ADL)
ADLS_ACUITY_SCORE: 12

## 2022-04-14 NOTE — ED TRIAGE NOTES
"Arrived by EMS from [facility]  Pt was admitted to [facility] yesterday for rehab post lt shoulder arthroplasty   Today reported hypoxia from facility recquiring O2, also reported tachycardia and \"bp dropping\"  Pt currently denies any sob, sats 90% on r/a    "

## 2022-04-14 NOTE — ED PROVIDER NOTES
EMERGENCY DEPARTMENT ENCOUNTER      NAME: Amparo Sharma  AGE: 81 year old female  YOB: 1940  MRN: [id number]  EVALUATION DATE & TIME: 4/14/2022  2:13 PM    PCP: Wilmer Voss    ED PROVIDER: Sussy Remy PA-C      Chief Complaint   Patient presents with     Shortness of Breath         FINAL IMPRESSION:  1. Tachycardia    2. Shortness of breath        MEDICAL DECISION MAKING:    Pertinent Labs & Imaging studies reviewed. (See chart for details)  81 year old female with a h/o COPD, dementia, bipolar 1, PVD, recent reverse total arthroplasty of the left shoulder presents to the Emergency Department from TCU for evaluation of hypoxia, shortness of breath and perceived downtrending blood pressures.  On exam patient is alert, nontoxic-appearing in no acute distress.  Left shoulder is in an immobilizer, wounds are covered with bandage, no evidence of surrounding infection such as erythema or warmth.  Distal CMS is intact.  Lung sound clear without crackle or wheeze.  No heart murmur appreciated.  Vitals notable for tachycardia into the 110s, otherwise normal.    Differential diagnosis includes postoperative infection, PE, ACS, medication interaction.  CBC shows hemoglobin of 9.0, this is stable from prior and slightly improved.  BMP is unremarkable.  Lactate WNL.  Blood cultures were obtained and are pending.  LFTs and lipase are WNL.  CT PE study shows right lung base atelectasis without acute pneumonia or other acute pathology. She is now requiring 4L O2 to maintain sats at 95%.     I did briefly discuss CODE STATUS with patient and daughter in the room, daughter states that she has documents that patient is DNR/DNI, however patient is surprised that she is DNR.  Patient and daughter decide that should the time, daughter as power of  will make the call, therefore she is full code at this time. Daughter, Nohemi, to be contacted at [phone].     Admission to [facility] tele discussed with  residency service who is in agreement. Please see inpatient notes for further management.     CRITICAL CARE: None    ED COURSE  2:15 PM Met and evaluated patient. Discussed ED plan.   4:00 PM called pt's daughter who is en route and has TCU information ([facility])  6:00 PM Staffed the patient with Dr. Damion Castañeda  6:45 PM discussed with hospitalist who agrees with admission, they will be admitted to [facility]     MEDICATIONS GIVEN IN THE EMERGENCY:  Medications   0.9% sodium chloride BOLUS (1,000 mLs Intravenous New Bag 4/14/22 1511)   oxyCODONE (ROXICODONE) tablet 5 mg (5 mg Oral Given 4/14/22 1556)   iopamidol (ISOVUE-370) solution 100 mL (61 mLs Intravenous Given 4/14/22 1613)       NEW PRESCRIPTIONS STARTED AT TODAY'S ER VISIT  New Prescriptions    No medications on file          =================================================================    HPI    Patient information was obtained from: patient, TCU staff    Use of Intrepreter: N/A       Amparo Sharma is a 81 year old female who presents from TCU for concern of shortness of breath, hypoxia.  She has been staying at [facility] postoperative after her left shoulder reverse arthroplasty, facility noticed new hypoxia into the high 90s and tachycardia.  Patient feels normal aside from her left shoulder pain.  She is not having any difficulty breathing she denies any chest pain.  Also denies any nausea, vomiting, diarrhea, abdominal pain or fever.  Not on anticoagulation and no history of blood clots.    Per TCU staff, patient did well overnight but this morning during cares was noted to be hypoxic into the mid 80s, required 4 L to sustain above 90%.  At one point she became diaphoretic, clammy and pale prompting her evaluation in the emergency department.    Her left reverse arthroplasty was performed on 4/11/2022 by Dr. Peterson      REVIEW OF SYSTEMS   Review of Systems   Constitutional: Negative for activity change, fatigue and fever.   HENT: Negative for  facial swelling and sore throat.    Eyes: Negative for visual disturbance.   Respiratory: Negative for cough, chest tightness, shortness of breath and wheezing.    Cardiovascular: Negative for chest pain and palpitations.   Gastrointestinal: Negative for abdominal pain, nausea and vomiting.   Genitourinary: Negative for difficulty urinating and hematuria.   Musculoskeletal: Positive for arthralgias (left shoulder). Negative for gait problem and myalgias.   Skin: Positive for wound (surgical). Negative for color change, pallor and rash.   Neurological: Negative for dizziness, tremors, speech difficulty, weakness, light-headedness and headaches.   Hematological: Negative for adenopathy.   Psychiatric/Behavioral: Negative for agitation, behavioral problems and confusion. The patient is not nervous/anxious.    All other systems reviewed and are negative.        PAST MEDICAL HISTORY:  Past Medical History:   Diagnosis Date     Abdominal hernia      Abdominal hernia      Alcoholism in remission (H)      Alcoholism in remission (H) 2017     Anemia      Arthritis      Arthritis      Asthma      Asthma 1/20/2017     Bipolar 1 disorder (H)      Bipolar disorder (H)      Bladder incontinence 2013     Cancer (H)      Cellulitis      Cellulitis 12/12/2016    of left elbow     Chronic pain syndrome      COPD (chronic obstructive pulmonary disease) (H)      COPD with acute exacerbation (H) 12/21/2018     Dementia (H)      Depression      Depression      Disease of thyroid gland      Falls frequently      Frequent falls 2016     Frequent falls 2017     GERD (gastroesophageal reflux disease)      GERD (gastroesophageal reflux disease)      History of blood transfusion     20 years ago     History of colon cancer     s/p resection 1/2015, treated with 5-6 cycles of Folfox     History of transfusion      HTN (hypertension)      Hydrocephalus (H)      Hydrocephalus (H)      Hyperlipidemia      Hypertension      Hypothyroidism       Hypothyroidism      Infection of skin due to methicillin resistant Staphylococcus aureus (MRSA)      Loss of balance      Memory loss      Memory loss 2017     Normal pressure hydrocephalus (H) 02/03/2017     NPH (normal pressure hydrocephalus) (H)      Parkinson disease (H)      Renal insufficiency      Squamous cell cancer of multiple sites of skin of upper arm, left 2016    Dr. Andrea      Thyroid disease      Urinary incontinence      Urinary incontinence        PAST SURGICAL HISTORY:  Past Surgical History:   Procedure Laterality Date     ARTHROSCOPY SHOULDER ROTATOR CUFF REPAIR       CHOLECYSTECTOMY       colon cancer resection  1/2015     COLON SURGERY       COLONOSCOPY N/A 2/3/2020    Procedure: COLONOSCOPY with polypectomy and endoscopic tattoo;  Surgeon: Conrad Guevara MD;  Location: [facility];  Service: Gastroenterology     ESOPHAGOSCOPY, GASTROSCOPY, DUODENOSCOPY (EGD), COMBINED N/A 9/7/2017    Procedure: COMBINED ESOPHAGOSCOPY, GASTROSCOPY, DUODENOSCOPY (EGD), BIOPSY SINGLE OR MULTIPLE;  EGD;  Surgeon: Brook Marsh MD;  Location:  GI     FRACTURE SURGERY      right ankle     GENITOURINARY SURGERY       GI SURGERY       GYN SURGERY      endometriosis, hysterectomy     HYSTERECTOMY      total, related to endometriosis     HYSTERECTOMY       IMPLANT SHUNT VENTRICULOPERITONEAL  02/03/2017     INSERT DRAIN LUMBAR N/A 2/5/2017    Procedure: INSERT DRAIN LUMBAR;  Surgeon: Raji Newton MD;  Location: [facility] OR     JOINT REPLACEMENT      both knees, hip -right,      LAPAROSCOPIC ASSISTED COLECTOMY       LAPAROSCOPIC CHOLECYSTECTOMY       LUMBAR PUNCTURE  02/03/2017     multiple knee surgeries      total knees 4 L, 3 right, last one 2013     OPTICAL TRACKING SYSTEM IMPLANT SHUNT VENTRICULOPERITONEAL Right 2/8/2017    Procedure: OPTICAL TRACKING SYSTEM IMPLANT SHUNT VENTRICULOPERITONEAL;  Surgeon: Rc Dozier MD;  Location: [facility] OR     ORTHOPEDIC SURGERY      right hip  replacement, both knees     REVERSE ARTHROPLASTY SHOULDER Left 4/11/2022    Procedure: LEFT REVERSE TOTAL SHOULDER ARTHROPLASTY;  Surgeon: Jacobo Peterson MD;  Location: [facility]     rotator cuff surgeries       [facility] RECONSTR TOTAL SHOULDER IMPLANT Right 3/5/2019    Procedure: RIGHT REVERSE TOTAL SHOULDER ARTHROPLASTY;  Surgeon: Jacobo Peterson MD;  Location: [facility];  Service: Orthopedics           CURRENT MEDICATIONS:    acetaminophen (TYLENOL) 500 MG tablet  albuterol (PROAIR HFA/PROVENTIL HFA/VENTOLIN HFA) 108 (90 Base) MCG/ACT inhaler  albuterol (PROVENTIL) (2.5 MG/3ML) 0.083% neb solution  amLODIPine (NORVASC) 5 MG tablet  amoxicillin (AMOXIL) 500 MG capsule  ARIPiprazole (ABILIFY) 15 MG tablet  aspirin (ASA) 81 MG EC tablet  BREO ELLIPTA 100-25 MCG/INH inhaler  carboxymethylcellulose (REFRESH PLUS) 0.5 % SOLN ophthalmic solution  carboxymethylcellulose (REFRESH PLUS) 0.5 % SOLN ophthalmic solution  cetirizine (ZYRTEC) 10 MG tablet  diclofenac (VOLTAREN) 1 % topical gel  ferrous sulfate (FEROSUL) 325 (65 Fe) MG tablet  FLUoxetine (PROZAC) 20 MG capsule  INCRUSE ELLIPTA 62.5 MCG/INH Inhaler  lamoTRIgine (LAMICTAL) 100 MG tablet  lamoTRIgine (LAMICTAL) 150 MG tablet  levothyroxine (SYNTHROID/LEVOTHROID) 75 MCG tablet  menthol-zinc oxide (CALMOSEPTINE) 0.44-20.6 % OINT ointment  mirabegron (MYRBETRIQ) 25 MG 24 hr tablet  Multiple Vitamins-Minerals (MULTIVITAMIN PO)  oxyCODONE (ROXICODONE) 5 MG tablet  pantoprazole (PROTONIX) 20 MG EC tablet  saccharomyces boulardii (FLORASTOR) 250 MG capsule  Salicylic Acid 40 % PADS  SENOKOT S 8.6-50 MG tablet  Vitamin D, Cholecalciferol, 25 MCG (1000 UT) CAPS        ALLERGIES:  Allergies   Allergen Reactions     Amantadine      Antihistamine Allergy Relief [Diphenhydramine] Other (See Comments)     hyperactivity     Bactrim [Sulfamethoxazole W/Trimethoprim]      Codeine Itching     Demerol [Meperidine] Nausea     Hmg-Coa-R Inhibitors Other (See Comments)      Was hospitalized for possible rhabdo     Meloxicam Other (See Comments)     Talwin [Pentazocine] Other (See Comments)     drowsiness     Tramadol Nausea     Valium [Diazepam] Other (See Comments)     Hallucinations/paranoid       FAMILY HISTORY:  Family History   Problem Relation Age of Onset     Breast Cancer Mother 60         of met. br ca 62     Depression Father 55         of suicide     Peptic Ulcer Disease Father      Prostate Cancer Brother      Colon Cancer No family hx of      Arthritis Brother        SOCIAL HISTORY:   Social History     Socioeconomic History     Marital status:      Spouse name: Not on file     Number of children: 1     Years of education: Not on file     Highest education level: Not on file   Occupational History     Occupation: nurse   Tobacco Use     Smoking status: Former Smoker     Packs/day: 0.25     Types: Cigarettes     Start date: 1980     Quit date: 1997     Years since quittin.2     Smokeless tobacco: Former User   Substance and Sexual Activity     Alcohol use: No     Alcohol/week: 0.0 standard drinks     Comment: alcoholism in remission     Drug use: No     Sexual activity: Not on file   Other Topics Concern     Parent/sibling w/ CABG, MI or angioplasty before 65F 55M? Not Asked   Social History Narrative     Not on file     Social Determinants of Health     Financial Resource Strain: Not on file   Food Insecurity: Not on file   Transportation Needs: Not on file   Physical Activity: Not on file   Stress: Not on file   Social Connections: Not on file   Intimate Partner Violence: Not on file   Housing Stability: Not on file         VITALS:  Patient Vitals for the past 24 hrs:   BP Temp Temp src Pulse Resp SpO2 Height Weight   22 1830 -- -- -- 110 18 95 % -- --   22 1730 -- -- -- 113 20 96 % -- --   22 1645 (!) 153/77 -- -- 109 27 94 % -- --   22 1630 -- -- -- 109 20 96 % -- --   22 1600 -- -- -- 85 20 94 % -- --    04/14/22 1545 -- -- -- 106 20 94 % -- --   04/14/22 1425 -- 98.2  F (36.8  C) Oral -- -- -- 1.524 m (5') 72.6 kg (160 lb)   04/14/22 1421 135/75 -- -- 109 20 96 % -- --       PHYSICAL EXAM    Physical Exam  Vitals reviewed.   Constitutional:       General: She is not in acute distress.     Appearance: Normal appearance. She is not ill-appearing, toxic-appearing or diaphoretic.   HENT:      Head: Normocephalic and atraumatic.      Nose: No congestion.      Mouth/Throat:      Mouth: Mucous membranes are moist.      Pharynx: Oropharynx is clear.   Eyes:      General: No scleral icterus.        Right eye: No discharge.         Left eye: No discharge.   Cardiovascular:      Rate and Rhythm: Normal rate and regular rhythm.      Heart sounds: No murmur heard.  Pulmonary:      Effort: Pulmonary effort is normal. No respiratory distress.      Breath sounds: Normal breath sounds. No stridor. No decreased breath sounds, wheezing or rales.   Abdominal:      General: Abdomen is flat. There is no distension.      Palpations: Abdomen is soft.      Tenderness: There is no abdominal tenderness. There is no right CVA tenderness, left CVA tenderness or guarding.   Musculoskeletal:         General: No swelling or deformity. Normal range of motion.      Cervical back: Normal range of motion and neck supple.      Right lower leg: No edema.      Left lower leg: No edema.      Comments: Left arm in shoulder immobilizer. Distal CMS intact. Surgical incisions covered, no evidence of erythema, purulence or warmth.   Skin:     General: Skin is warm.      Capillary Refill: Capillary refill takes less than 2 seconds.      Coloration: Skin is not cyanotic or jaundiced.      Findings: No bruising, erythema, lesion or rash.   Neurological:      General: No focal deficit present.      Mental Status: She is alert.   Psychiatric:         Mood and Affect: Mood normal.         Behavior: Behavior normal.          LAB:  All pertinent labs reviewed and  interpreted.    Labs Ordered and Resulted from Time of ED Arrival to Time of ED Departure   CBC WITH PLATELETS - Abnormal       Result Value    WBC Count 11.0      RBC Count 3.22 (*)     Hemoglobin 9.0 (*)     Hematocrit 28.9 (*)     MCV 90      MCH 28.0      MCHC 31.1 (*)     RDW 14.6      Platelet Count 359     HEPATIC FUNCTION PANEL - Abnormal    Bilirubin Total 0.6      Bilirubin Direct 0.2      Protein Total 6.5      Albumin 2.4 (*)     Alkaline Phosphatase 119      AST 93 (*)     ALT 38     BASIC METABOLIC PANEL - Normal    Sodium 140      Potassium 4.1      Chloride 102      Carbon Dioxide (CO2) 23      Anion Gap 15      Urea Nitrogen 14      Creatinine 0.65      Calcium 9.3      Glucose 109      GFR Estimate 89     LIPASE - Normal    Lipase 20     LACTIC ACID WHOLE BLOOD - Normal    Lactic Acid 1.1     TROPONIN I - Normal    Troponin I 0.02     B-TYPE NATRIURETIC PEPTIDE ([facility] ONLY) - Normal    BNP 40     INFLUENZA A/B & SARS-COV2 PCR MULTIPLEX   BLOOD CULTURE   BLOOD CULTURE       RADIOLOGY:  Reviewed all pertinent imaging. Please see official radiology report    CT Chest Pulmonary Embolism w Contrast   Final Result   IMPRESSION:   1.  Negative for pulmonary embolism.      2.  Moderate elevation of the right hemidiaphragm with adjacent atelectasis. No evidence of pneumonia.      3.  Evidence of recent left shoulder arthroplasty with ill-defined areas of fluid and gas which are presumably resolving postoperative findings, though detailed assessment in this region is compromised by beam hardening artifact from the patient's    orthopedic hardware.      4.  Coronary artery disease.          EKG:    Performed at: 1419  Impression: sinus tachycardia  Vent rate: 107bpm  QT/Qtc: 334/445  Dr. Reza and I have independently reviewed and interpreted the EKG(s) documented above.      Sussy Remy PA-C  Emergency Medicine  [facility] EMERGENCY  ROOM  [address] [place] [id number] [phone]  Dept: [phone]    This note has in part been created with speech recognition technology and may create an occasional, unintended word/grammar substitution. Errors are generally corrected in real time. Please message me via [employer] In Basket if you note any errors requiring clarification.       Sussy Remy PA-C  04/14/22 4819

## 2022-04-14 NOTE — PHARMACY-ADMISSION MEDICATION HISTORY
Pharmacy Note - Admission Medication History    Pertinent Provider Information: None. ______________________________________________________________________    Prior To Admission (PTA) med list completed and updated in EMR.       PTA Med List   Medication Sig Last Dose     acetaminophen (TYLENOL) 500 MG tablet Take 1,000 mg by mouth in the morning and 1,000 mg at noon and 1,000 mg in the evening. 6AM, 1PM, 7PM 4/14/2022 at x 1     albuterol (PROAIR HFA/PROVENTIL HFA/VENTOLIN HFA) 108 (90 Base) MCG/ACT inhaler Inhale 2 puffs into the lungs every 4 hours as needed for shortness of breath / dyspnea  at unable to bring     albuterol (PROVENTIL) (2.5 MG/3ML) 0.083% neb solution Inhale 2.5 mg into the lungs every 4 hours as needed for shortness of breath / dyspnea      amLODIPine (NORVASC) 5 MG tablet Take 5 mg by mouth daily 4/14/2022 at Unknown time     amoxicillin (AMOXIL) 500 MG capsule Take 2,000 mg by mouth once as needed (Prior to dental appt)       ARIPiprazole (ABILIFY) 15 MG tablet Take 15 mg by mouth At Bedtime 4/13/2022 at Unknown time     aspirin (ASA) 81 MG EC tablet Take 1 tablet (81 mg) by mouth in the morning and 1 tablet (81 mg) in the evening. Take with meals. 4/14/2022 at x 1     BREO ELLIPTA 100-25 MCG/INH inhaler Inhale 1 puff into the lungs daily 1PM 4/13/2022 at unable to bring     carboxymethylcellulose (REFRESH PLUS) 0.5 % SOLN ophthalmic solution Place 1 drop into both eyes as needed in the morning and 1 drop as needed at noon and 1 drop as needed in the evening and 1 drop as needed before bedtime for dry eyes.      carboxymethylcellulose (REFRESH PLUS) 0.5 % SOLN ophthalmic solution Place 1 drop into both eyes in the morning. 4/14/2022 at AM (unable to bring)     cetirizine (ZYRTEC) 10 MG tablet Take 10 mg by mouth daily  4/14/2022 at AM     diclofenac (VOLTAREN) 1 % topical gel Apply 4 g topically 4 times daily Apply to affected knees. 4/14/2022 at x 1 (unable to bring)     ferrous sulfate  (FEROSUL) 325 (65 Fe) MG tablet Take 325 mg by mouth daily 4/14/2022 at AM     FLUoxetine (PROZAC) 20 MG capsule Take 60 mg by mouth daily 4/14/2022 at AM     INCRUSE ELLIPTA 62.5 MCG/INH Inhaler Inhale 1 puff into the lungs daily  4/14/2022 at AM (unable to bring)     lamoTRIgine (LAMICTAL) 100 MG tablet Take 100 mg by mouth At Bedtime 4/13/2022 at Unknown time     lamoTRIgine (LAMICTAL) 150 MG tablet Take 150 mg by mouth daily  4/14/2022 at AM     levothyroxine (SYNTHROID/LEVOTHROID) 75 MCG tablet Take 75 mcg by mouth daily 4/14/2022 at AM     menthol-zinc oxide (CALMOSEPTINE) 0.44-20.6 % OINT ointment Apply topically 2 times daily as needed for irritation      mirabegron (MYRBETRIQ) 25 MG 24 hr tablet Take 25 mg by mouth daily  4/14/2022 at AM     Multiple Vitamins-Minerals (MULTIVITAMIN PO) Take 1 tablet by mouth daily  4/14/2022 at AM     oxyCODONE (ROXICODONE) 5 MG tablet Take 1 tablet (5 mg) by mouth every 4 hours as needed for moderate to severe pain 4/13/2022 at Unknown time     pantoprazole (PROTONIX) 20 MG EC tablet Take 20 mg by mouth daily  4/14/2022 at AM     saccharomyces boulardii (FLORASTOR) 250 MG capsule Take 250 mg by mouth in the morning. 4/14/2022 at AM     SENOKOT S 8.6-50 MG tablet Take 2 tablets by mouth daily  4/14/2022 at AM     Vitamin D, Cholecalciferol, 25 MCG (1000 UT) CAPS Take 1,000 Units by mouth daily  4/14/2022 at AM     Information source(s): Hospital records and Facility (TCU/NH/) medication list/MAR (MAR from [facility] [place])  Method of interview communication: N/A    Summary of Changes to PTA Med List  New: None  Discontinued: None  Changed: None    Patient was asked about OTC/herbal products specifically.  PTA med list reflects this.    In the past week, patient estimated taking medication this percent of the time:  greater than 90%.    Patient did not bring any medications to the hospital and can't retrieve from home. No multi-dose medications are  available for use during hospital stay.     The information provided in this note is only as accurate as the sources available at the time of the update(s).    Thank you for the opportunity to participate in the care of this patient.    Sugey Priest, PharmD, BCPS  4/14/2022 6:51 PM

## 2022-04-14 NOTE — H&P
[facility]    History and Physical - Hospitalist Service       Date of Admission:  4/14/2022    Assessment & Plan      Amparo Sharma is a 81 year old female admitted on 4/14/2022. She has a history of hypertension, COPD, hypothyroidism, NPH with shunt, depression, bipolar disorder, stage III colon cancer, status post resection, history of bladder cancer, recent left shoulder arthroplasty and is admitted for hypoxia.    Acute hypoxic respiratory failure  COPD  Presented after becoming hypoxic at TCU. Patient POD 3 from left reverse total shoulder arthroplasty 4/11/22. Hypoxic to 70s in ED, now stable on 3 LPM NC. Tachycardic to 100s. Etiology most likely atelectasis as CT showed atelectasis adjacent to moderate elevation of right hemidiaphram. Likely respiratory depression from oxycodone could be contributing. Does have COPD, but no symptoms of wheezing or subjective dyspnea. No evidence of pneumonia on CT, WBC normal at 11.0, aftebrile, normal lactic acid of 1.1. ACS unlikely as troponin negative and EKG without signs of acute ischemia, but CT does have evidence of CAD. Treated with 1 L NS bolus and oxycodone 5 mg at 1556 in ED. Tachycardia improved now.   - admit to observation  - oxygen supplementation as needed  - monitor vitals  - hold off on antibiotics as no clear infection at this time  - decrease oxycodone dose  - follow BC 4/14/22  - continue Albuterol inhaler/nebulizer every 4 hours as needed  - continue Breo inhaler daily  - continue Ellipta inhaler daily    POD 3 from left reverse total shoulder arthroplasty 4/11/22  No clear signs of wound infection. No leaking through bandages, and no increased tenderness of shoulder. CT showed evidence of recent left shoulder arthroplasty with ill-defined areas of fluid and gas which are presumably resolving postoperative findings though compromised for artifact from hardware.  - DVT prophylaxis with ASA 81 mg BID per ortho  - pain  control with scheduled tylenol  - decrease oxycodone to 2.5 mg q4 hr    Dementia  Metabolic and situational encephalopathy  Confused in ED, likely due to change in location and underlying dementia.   - frequent redirection  - PRN Haldol 0.5-1 mg PO for agitation    Anemia  Likely acute blood loss anemia after surgery.   Hgb 9.0 on admission, down from 12.3 pre-operatively on 4/11, but stable post operatively.  - continue PTA iron   - AM CBC    Elevated AST  AST 93 on arrival. No other LFT abnormalities.   - recheck CMP in AM    Chronic medical conditions:    Essential  hypertension  Blood pressure elevated slightly to 153/77 on admission.  - continue Norvasc 5 mg daily     Hypothyroidism  - continue Levothyroxine 75 mcg daily     GERD  - continue pantoprazole 20 mg daily     Anxiety, depression  Bipolar disorder  - continue Lamictal 150 mg daily, 100 mg at bedtime  - continue Prozac 60 mg daily  - continue Abilify 15 mg daily    Overactive bladder  - continue PTA mirabegron     History of colon cancer, status post resection     History of NPH, status post shunt       Diet: Regular Diet Adult    DVT Prophylaxis: ASA 81 mg BID  Wetzel Catheter: Not present  Fluids: Asa BID  Central Lines: None  Cardiac Monitoring: None  Code Status:   Full code, decision maker daughter Nohemi    Clinically Significant Risk Factors Present on Admission            # Anion Gap Metabolic Acidosis: AG = 15 mmol/L (Ref range: 5 - 18 mmol/L) on admission, will monitor and treat as appropriate  # Hypoalbuminemia: Albumin = 2.4 g/dL (Ref range: 3.5 - 5.0 g/dL) on admission, will monitor as appropriate    # Platelet Defect: home medication list includes an antiplatelet medication   # Obesity: Estimated body mass index is 31.25 kg/m  as calculated from the following:    Height as of this encounter: 1.524 m (5').    Weight as of this encounter: 72.6 kg (160 lb).      Disposition Plan   Expected Discharge:  4/15/22 pending medical stabilization  "  Anticipated discharge location:  Awaiting care coordination huddle  Delays:         The patient's care was discussed with the Attending Physician, Dr. Jalen Mccord, who agrees with the assessment and plan above. Patient to be seen by Dr. Camacho in AM. .    Zenaida Beverly MD, PGY-2  Hospitalist Service  [facility]  Securely message with the Vocera Web Console (learn more here)  Text page via [facility] Paging/Directory       ______________________________________________________________________    Chief Complaint   Hypoxia     History is obtained from the patient and chart review.    History of Present Illness   Amparo Sharma is a 81 year old female who has a history of COPD, dementia, bipolar 1, PVD, recent reverse total arthroplasty of the left shoulder and is admitted for hypoxia, shortness of breath, and perceived down-trending blood pressures.     Per ED provider, She has been staying at [facility] postoperative after her left shoulder reverse arthroplasty, facility noticed new hypoxia into the high 70s and tachycardia.  Patient feels normal aside from her left shoulder pain.  She is not having any difficulty breathing she denies any chest pain.  Also denies any nausea, vomiting, diarrhea, abdominal pain or fever.  Not on anticoagulation and no history of blood clots.    At bedside, patient is very confused.  Calling for Geraldine, who patient states is a nurse.  Also asking what is going on because she is hearing noises from the \"boys upstairs.\"  Calms with redirection, but repeats some of the same questions.  Thought she was at [facility], which is where she was living before the shoulder surgery.    When asked about her breathing, she said she has \"anxious breathing\" from the commotion upstairs.  She denies any difficulty taking a deep breath.  When asked again, she says she feels normal and that her breathing is normal.  She denies fever.  She denies any chest pain.  She " "denies any belly pain.  Her left shoulder is hurting, but she declines pain medication.    Called her daughter Nohemi over the phone, who is her medical POA.  Nohemi states that she has early dementia, and has struggled with sundowning in the past.  It has been a long day for her today.  The oxycodone pain medication is also making her more confused, but Nohemi does not want her to be in excessive pain either.  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  She has a history of being \"edgier\" when she takes pain medications, but has not been aggressive.  She has been confused today.  She gets confused about the 3 location she has been at recently: lives at [facility] care unit (not for memory, for 2 person transfers), [facility] where she was for the last 24 hours, and [facility], where she was hospitalized for her shoulder surgery.    Nohemi states that she was called by the TCU RN this morning, who was concerned about heart racing and blood pressure dropping.  The RN also told her that her oxygen saturations dropping sporadically, but got better with oxygen.  Nohemi says that Amparo did not have any breathing complaints when she was here with her at the ER.    Nohemi also shares that Amparo has had some hallucinations, including today when she thought she had a mug in her hand and a pad of paper on her lap which were not present.  She also has had a bad reaction to a calming medication, which appears to be Valium on chart review, for which she got very strong hallucinations and paranoia.    Nohemi confirms that Amparo is full code for now.  Amparo does have a living well, which had causes for both CPR and intubation.  Nohemi and her brother Enoch are going to review the living will tomorrow.    Review of Systems    Review of systems not obtained due to patient factors - confusion    Past Medical History    I have reviewed this patient's medical history and " updated it with pertinent information if needed.   Past Medical History:   Diagnosis Date     Abdominal hernia      Abdominal hernia      Alcoholism in remission (H)      Alcoholism in remission (H) 2017     Anemia      Arthritis      Arthritis      Asthma      Asthma 1/20/2017     Bipolar 1 disorder (H)      Bipolar disorder (H)      Bladder incontinence 2013     Cancer (H)      Cellulitis      Cellulitis 12/12/2016    of left elbow     Chronic pain syndrome      COPD (chronic obstructive pulmonary disease) (H)      COPD with acute exacerbation (H) 12/21/2018     Dementia (H)      Depression      Depression      Disease of thyroid gland      Falls frequently      Frequent falls 2016     Frequent falls 2017     GERD (gastroesophageal reflux disease)      GERD (gastroesophageal reflux disease)      History of blood transfusion     20 years ago     History of colon cancer     s/p resection 1/2015, treated with 5-6 cycles of Folfox     History of transfusion      HTN (hypertension)      Hydrocephalus (H)      Hydrocephalus (H)      Hyperlipidemia      Hypertension      Hypothyroidism      Hypothyroidism      Infection of skin due to methicillin resistant Staphylococcus aureus (MRSA)      Loss of balance      Memory loss      Memory loss 2017     Normal pressure hydrocephalus (H) 02/03/2017     NPH (normal pressure hydrocephalus) (H)      Parkinson disease (H)      Renal insufficiency      Squamous cell cancer of multiple sites of skin of upper arm, left 2016    Dr. Andrea      Thyroid disease      Urinary incontinence      Urinary incontinence       Past Surgical History   I have reviewed this patient's surgical history and updated it with pertinent information if needed.  Past Surgical History:   Procedure Laterality Date     ARTHROSCOPY SHOULDER ROTATOR CUFF REPAIR       CHOLECYSTECTOMY       colon cancer resection  1/2015     COLON SURGERY       COLONOSCOPY N/A 2/3/2020    Procedure: COLONOSCOPY with polypectomy and  endoscopic tattoo;  Surgeon: Conrad Guevara MD;  Location: [facility] GI;  Service: Gastroenterology     ESOPHAGOSCOPY, GASTROSCOPY, DUODENOSCOPY (EGD), COMBINED N/A 9/7/2017    Procedure: COMBINED ESOPHAGOSCOPY, GASTROSCOPY, DUODENOSCOPY (EGD), BIOPSY SINGLE OR MULTIPLE;  EGD;  Surgeon: Brook Marsh MD;  Location:  GI     FRACTURE SURGERY      right ankle     GENITOURINARY SURGERY       GI SURGERY       GYN SURGERY      endometriosis, hysterectomy     HYSTERECTOMY      total, related to endometriosis     HYSTERECTOMY       IMPLANT SHUNT VENTRICULOPERITONEAL  02/03/2017     INSERT DRAIN LUMBAR N/A 2/5/2017    Procedure: INSERT DRAIN LUMBAR;  Surgeon: Raji Newton MD;  Location:  OR     JOINT REPLACEMENT      both knees, hip -right,      LAPAROSCOPIC ASSISTED COLECTOMY       LAPAROSCOPIC CHOLECYSTECTOMY       LUMBAR PUNCTURE  02/03/2017     multiple knee surgeries      total knees 4 L, 3 right, last one 2013     OPTICAL TRACKING SYSTEM IMPLANT SHUNT VENTRICULOPERITONEAL Right 2/8/2017    Procedure: OPTICAL TRACKING SYSTEM IMPLANT SHUNT VENTRICULOPERITONEAL;  Surgeon: Rc Dozier MD;  Location: [facility]     ORTHOPEDIC SURGERY      right hip replacement, both knees     REVERSE ARTHROPLASTY SHOULDER Left 4/11/2022    Procedure: LEFT REVERSE TOTAL SHOULDER ARTHROPLASTY;  Surgeon: Jacobo Peterson MD;  Location: [facility]     rotator cuff surgeries       [facility] RECONSTR TOTAL SHOULDER IMPLANT Right 3/5/2019    Procedure: RIGHT REVERSE TOTAL SHOULDER ARTHROPLASTY;  Surgeon: Jacobo Peterson MD;  Location: [facility] OR;  Service: Orthopedics      Social History   I have reviewed this patient's social history and updated it with pertinent information if needed. Amparo Sharma  reports that she quit smoking about 25 years ago. Her smoking use included cigarettes. She started smoking about 42 years ago. She smoked 0.25 packs per day. She has quit using smokeless  tobacco. She reports that she does not drink alcohol and does not use drugs.    Family History   I have reviewed this patient's family history and updated it with pertinent information if needed.  Family History   Problem Relation Age of Onset     Breast Cancer Mother 60         of met. br ca 62     Depression Father 55         of suicide     Peptic Ulcer Disease Father      Prostate Cancer Brother      Colon Cancer No family hx of      Arthritis Brother        Prior to Admission Medications   Prior to Admission Medications   Prescriptions Last Dose Informant Patient Reported? Taking?   ARIPiprazole (ABILIFY) 15 MG tablet   Yes No   Sig: Take 15 mg by mouth At Bedtime   BREO ELLIPTA 100-25 MCG/INH inhaler   Yes No   Sig: Inhale 1 puff into the lungs daily 1PM   FLUoxetine (PROZAC) 20 MG capsule   Yes No   Sig: Take 60 mg by mouth daily   INCRUSE ELLIPTA 62.5 MCG/INH Inhaler   Yes No   Sig: Inhale 1 puff into the lungs daily    Multiple Vitamins-Minerals (MULTIVITAMIN PO)   Yes No   Sig: Take 1 tablet by mouth daily    SENOKOT S 8.6-50 MG tablet   Yes No   Sig: Take 2 tablets by mouth daily    Salicylic Acid 40 % PADS   Yes No   Sig: Externally apply 1 each topically every 72 hours Apply to right 4th toe every 3 days.   Vitamin D, Cholecalciferol, 25 MCG (1000 UT) CAPS   Yes No   Sig: Take 1,000 Units by mouth daily    acetaminophen (TYLENOL) 500 MG tablet   Yes No   Sig: Take 1,000 mg by mouth in the morning and 1,000 mg at noon and 1,000 mg in the evening. 6AM, 1PM, 7PM   albuterol (PROAIR HFA/PROVENTIL HFA/VENTOLIN HFA) 108 (90 Base) MCG/ACT inhaler   Yes No   Sig: Inhale 2 puffs into the lungs every 4 hours as needed for shortness of breath / dyspnea   albuterol (PROVENTIL) (2.5 MG/3ML) 0.083% neb solution   Yes No   Sig: Inhale 2.5 mg into the lungs every 4 hours as needed for shortness of breath / dyspnea   amLODIPine (NORVASC) 5 MG tablet   Yes No   Sig: Take 5 mg by mouth daily   amoxicillin (AMOXIL)  500 MG capsule   Yes No   Sig: Take 2,000 mg by mouth once as needed (Prior to dental appt)    aspirin (ASA) 81 MG EC tablet   No No   Sig: Take 1 tablet (81 mg) by mouth in the morning and 1 tablet (81 mg) in the evening. Take with meals.   carboxymethylcellulose (REFRESH PLUS) 0.5 % SOLN ophthalmic solution   Yes No   Sig: Place 1 drop into both eyes in the morning.   carboxymethylcellulose (REFRESH PLUS) 0.5 % SOLN ophthalmic solution   Yes No   Sig: Place 1 drop into both eyes as needed in the morning and 1 drop as needed at noon and 1 drop as needed in the evening and 1 drop as needed before bedtime for dry eyes.   cetirizine (ZYRTEC) 10 MG tablet   Yes No   Sig: Take 10 mg by mouth daily    diclofenac (VOLTAREN) 1 % topical gel   Yes No   Sig: Apply 4 g topically 4 times daily Apply to affected knees.   ferrous sulfate (FEROSUL) 325 (65 Fe) MG tablet   Yes No   Sig: Take 325 mg by mouth daily   lamoTRIgine (LAMICTAL) 100 MG tablet   Yes No   Sig: Take 100 mg by mouth At Bedtime   lamoTRIgine (LAMICTAL) 150 MG tablet   Yes No   Sig: Take 150 mg by mouth daily    levothyroxine (SYNTHROID/LEVOTHROID) 75 MCG tablet   Yes No   Sig: Take 75 mcg by mouth daily   menthol-zinc oxide (CALMOSEPTINE) 0.44-20.6 % OINT ointment   Yes No   Sig: Apply topically 2 times daily as needed for irritation   mirabegron (MYRBETRIQ) 25 MG 24 hr tablet   Yes No   Sig: Take 25 mg by mouth daily    oxyCODONE (ROXICODONE) 5 MG tablet   No No   Sig: Take 1 tablet (5 mg) by mouth every 4 hours as needed for moderate to severe pain   pantoprazole (PROTONIX) 20 MG EC tablet   Yes No   Sig: Take 20 mg by mouth daily    saccharomyces boulardii (FLORASTOR) 250 MG capsule   Yes No   Sig: Take 250 mg by mouth in the morning.      Facility-Administered Medications: None     Allergies   Allergies   Allergen Reactions     Amantadine      Antihistamine Allergy Relief [Diphenhydramine] Other (See Comments)     hyperactivity     Bactrim  [Sulfamethoxazole W/Trimethoprim]      Codeine Itching     Demerol [Meperidine] Nausea     Hmg-Coa-R Inhibitors Other (See Comments)     Was hospitalized for possible rhabdo     Meloxicam Other (See Comments)     Talwin [Pentazocine] Other (See Comments)     drowsiness     Tramadol Nausea     Valium [Diazepam] Other (See Comments)     Hallucinations/paranoid       Physical Exam   Vital Signs: Temp: 98.2  F (36.8  C) Temp src: Oral BP: (!) 153/77 Pulse: 110   Resp: 18 SpO2: 95 % O2 Device: Nasal cannula Oxygen Delivery: 2 LPM  Weight: 160 lbs 0 oz    General: Elderly appearing woman, alert, appears slightly agitated, no acute distress  HEENT: atraumatic, conjunctiva clear without erythema, EOM's intact, no nasal discharge, MMM  Neck: supple  Cardiac: Tachycardic but regular rhythm with no murmurs or extra sounds  Resp: lungs clear to auscultation bilaterally with no crackles or wheezes, no increased work of breathing  Abdomen: soft, normal bowel sounds, non-tender to palpation, no masses  Extremities: Left shoulder in immobilizer.  Bandage over left shoulder incision without significant drainage.  No peripheral edema  Skin: no rashes or suspicious legions on exposed skin.  Skin around the left shoulder bandage is warm and dry without erythema  Neuro: CN's grossly intact  Psych: affect congruent with mood      Data   Data reviewed today: I reviewed all medications, new labs and imaging results over the last 24 hours. I personally reviewed the EKG tracing showing Sinus tachycardia with no evidence of acute ischemia.    Recent Labs   Lab 04/14/22  1436 04/13/22  1326 04/13/22  0728 04/13/22  0644 04/12/22  0651 04/11/22  1127 04/11/22  1127   WBC 11.0  --   --   --  11.7*  --  9.1   HGB 9.0*  --   --  8.9* 9.8*  --  12.3   MCV 90  --   --   --  89  --  93     --   --   --  381  --  394   INR  --   --   --   --   --   --  1.06     --   --   --  139  --   --    POTASSIUM 4.1  --   --   --  4.6  --  4.4    CHLORIDE 102  --   --   --  103  --   --    CO2 23  --   --   --  25  --   --    BUN 14  --   --   --  17  --   --    CR 0.65  --   --   --  0.64  --  0.65   ANIONGAP 15  --   --   --  11  --   --    MARTHA 9.3  --   --   --  8.8  --   --     124* 108  --  112  112   < >  --    ALBUMIN 2.4*  --   --   --   --   --   --    PROTTOTAL 6.5  --   --   --   --   --   --    BILITOTAL 0.6  --   --   --   --   --   --    ALKPHOS 119  --   --   --   --   --   --    ALT 38  --   --   --   --   --   --    AST 93*  --   --   --   --   --   --    LIPASE 20  --   --   --   --   --   --     < > = values in this interval not displayed.     Recent Results (from the past 24 hour(s))   CT Chest Pulmonary Embolism w Contrast    Narrative    EXAM: CT CHEST PULMONARY EMBOLISM W CONTRAST  LOCATION: [facility]  DATE/TIME: 4/14/2022 4:10 PM    INDICATION: Tachycardia and hypoxia.  COMPARISON: CT chest from 11/01/2020.  TECHNIQUE: CT chest pulmonary angiogram during arterial phase injection of IV contrast. Multiplanar reformats and MIP reconstructions were performed. Dose reduction techniques were used.   CONTRAST: ISOVUE 370 61mL    FINDINGS:  ANGIOGRAM CHEST: Pulmonary arteries are normal caliber and negative for pulmonary emboli. Thoracic aorta is negative for dissection. No CT evidence of right heart strain.    LUNGS AND PLEURA: There is moderate elevation of the right hemidiaphragm with adjacent compressive atelectasis. There are a few scattered bands of atelectasis in the left mid and lower lung zones. No evidence of a focal airspace consolidation. The   central airways are clear. No pneumothorax or pleural effusion.    MEDIASTINUM/AXILLAE: Heart size is normal. Small hiatal hernia. Multivessel coronary artery calcifications. No adenopathy. Incidental note is made of retropharyngeal common carotid arteries as seen on axial image 1.    CORONARY ARTERY CALCIFICATION: Moderate.    UPPER ABDOMEN: Renal  cysts. Cholecystectomy. No acute findings.    MUSCULOSKELETAL: Osteopenia with accentuation of the thoracic kyphosis. Bilateral shoulder arthroplasties, incompletely imaged. In the region of the right shoulder there are areas of ill-defined fluid and gas density without definite loculated   collection, though beam hardening artifact obscures detail in this region.      Impression    IMPRESSION:  1.  Negative for pulmonary embolism.    2.  Moderate elevation of the right hemidiaphragm with adjacent atelectasis. No evidence of pneumonia.    3.  Evidence of recent left shoulder arthroplasty with ill-defined areas of fluid and gas which are presumably resolving postoperative findings, though detailed assessment in this region is compromised by beam hardening artifact from the patient's   orthopedic hardware.    4.  Coronary artery disease.

## 2022-04-15 ENCOUNTER — APPOINTMENT (OUTPATIENT)
Dept: OCCUPATIONAL THERAPY | Facility: CLINIC | Age: 82
DRG: 189 | End: 2022-04-15
Payer: MEDICARE

## 2022-04-15 PROBLEM — J98.11 ATELECTASIS: Status: ACTIVE | Noted: 2022-04-15

## 2022-04-15 PROBLEM — G89.18 ACUTE POST-OPERATIVE PAIN: Status: ACTIVE | Noted: 2022-04-15

## 2022-04-15 PROBLEM — J96.01 ACUTE RESPIRATORY FAILURE WITH HYPOXIA (H): Status: ACTIVE | Noted: 2022-04-15

## 2022-04-15 LAB
ALBUMIN SERPL-MCNC: 2 G/DL (ref 3.5–5)
ALP SERPL-CCNC: 102 U/L (ref 45–120)
ALT SERPL W P-5'-P-CCNC: 37 U/L (ref 0–45)
ANION GAP SERPL CALCULATED.3IONS-SCNC: 10 MMOL/L (ref 5–18)
AST SERPL W P-5'-P-CCNC: 82 U/L (ref 0–40)
BILIRUB SERPL-MCNC: 0.7 MG/DL (ref 0–1)
BUN SERPL-MCNC: 7 MG/DL (ref 8–28)
CALCIUM SERPL-MCNC: 8.8 MG/DL (ref 8.5–10.5)
CHLORIDE BLD-SCNC: 104 MMOL/L (ref 98–107)
CO2 SERPL-SCNC: 25 MMOL/L (ref 22–31)
CREAT SERPL-MCNC: 0.46 MG/DL (ref 0.6–1.1)
ERYTHROCYTE [DISTWIDTH] IN BLOOD BY AUTOMATED COUNT: 14.6 % (ref 10–15)
GFR SERPL CREATININE-BSD FRML MDRD: >90 ML/MIN/1.73M2
GLUCOSE BLD-MCNC: 91 MG/DL (ref 70–125)
HCT VFR BLD AUTO: 27.3 % (ref 35–47)
HGB BLD-MCNC: 8.5 G/DL (ref 11.7–15.7)
MCH RBC QN AUTO: 28.1 PG (ref 26.5–33)
MCHC RBC AUTO-ENTMCNC: 31.1 G/DL (ref 31.5–36.5)
MCV RBC AUTO: 90 FL (ref 78–100)
PLATELET # BLD AUTO: 332 10E3/UL (ref 150–450)
POTASSIUM BLD-SCNC: 3.7 MMOL/L (ref 3.5–5)
PROT SERPL-MCNC: 5.8 G/DL (ref 6–8)
QUANTIFERON MITOGEN: 0.78 IU/ML
QUANTIFERON NIL TUBE: 0.02 IU/ML
QUANTIFERON TB1 TUBE: 0.02 IU/ML
QUANTIFERON TB2 TUBE: 0.01
RBC # BLD AUTO: 3.03 10E6/UL (ref 3.8–5.2)
SODIUM SERPL-SCNC: 139 MMOL/L (ref 136–145)
WBC # BLD AUTO: 9.1 10E3/UL (ref 4–11)

## 2022-04-15 PROCEDURE — G0378 HOSPITAL OBSERVATION PER HR: HCPCS

## 2022-04-15 PROCEDURE — 99223 1ST HOSP IP/OBS HIGH 75: CPT | Mod: GC | Performed by: STUDENT IN AN ORGANIZED HEALTH CARE EDUCATION/TRAINING PROGRAM

## 2022-04-15 PROCEDURE — 250N000013 HC RX MED GY IP 250 OP 250 PS 637: Performed by: STUDENT IN AN ORGANIZED HEALTH CARE EDUCATION/TRAINING PROGRAM

## 2022-04-15 PROCEDURE — 85027 COMPLETE CBC AUTOMATED: CPT | Performed by: STUDENT IN AN ORGANIZED HEALTH CARE EDUCATION/TRAINING PROGRAM

## 2022-04-15 PROCEDURE — 258N000003 HC RX IP 258 OP 636: Performed by: STUDENT IN AN ORGANIZED HEALTH CARE EDUCATION/TRAINING PROGRAM

## 2022-04-15 PROCEDURE — 36415 COLL VENOUS BLD VENIPUNCTURE: CPT | Performed by: STUDENT IN AN ORGANIZED HEALTH CARE EDUCATION/TRAINING PROGRAM

## 2022-04-15 PROCEDURE — 97166 OT EVAL MOD COMPLEX 45 MIN: CPT | Mod: GO

## 2022-04-15 PROCEDURE — 97535 SELF CARE MNGMENT TRAINING: CPT | Mod: GO

## 2022-04-15 PROCEDURE — 120N000001 HC R&B MED SURG/OB

## 2022-04-15 PROCEDURE — 80053 COMPREHEN METABOLIC PANEL: CPT | Performed by: STUDENT IN AN ORGANIZED HEALTH CARE EDUCATION/TRAINING PROGRAM

## 2022-04-15 RX ORDER — NALOXONE HYDROCHLORIDE 0.4 MG/ML
0.2 INJECTION, SOLUTION INTRAMUSCULAR; INTRAVENOUS; SUBCUTANEOUS
Status: DISCONTINUED | OUTPATIENT
Start: 2022-04-15 | End: 2022-04-19 | Stop reason: HOSPADM

## 2022-04-15 RX ORDER — GABAPENTIN 100 MG/1
200 CAPSULE ORAL 3 TIMES DAILY
Status: DISCONTINUED | OUTPATIENT
Start: 2022-04-15 | End: 2022-04-19 | Stop reason: HOSPADM

## 2022-04-15 RX ORDER — OXYCODONE HYDROCHLORIDE 5 MG/1
5 TABLET ORAL EVERY 4 HOURS PRN
Status: DISCONTINUED | OUTPATIENT
Start: 2022-04-15 | End: 2022-04-19 | Stop reason: HOSPADM

## 2022-04-15 RX ORDER — LANOLIN ALCOHOL/MO/W.PET/CERES
3 CREAM (GRAM) TOPICAL AT BEDTIME
Status: DISCONTINUED | OUTPATIENT
Start: 2022-04-15 | End: 2022-04-19 | Stop reason: HOSPADM

## 2022-04-15 RX ORDER — NALOXONE HYDROCHLORIDE 0.4 MG/ML
0.4 INJECTION, SOLUTION INTRAMUSCULAR; INTRAVENOUS; SUBCUTANEOUS
Status: DISCONTINUED | OUTPATIENT
Start: 2022-04-15 | End: 2022-04-19 | Stop reason: HOSPADM

## 2022-04-15 RX ADMIN — Medication 250 MG: at 08:43

## 2022-04-15 RX ADMIN — OXYCODONE HYDROCHLORIDE 5 MG: 5 TABLET ORAL at 12:42

## 2022-04-15 RX ADMIN — GABAPENTIN 200 MG: 100 CAPSULE ORAL at 14:03

## 2022-04-15 RX ADMIN — DICLOFENAC 4 G: 10 GEL TOPICAL at 12:33

## 2022-04-15 RX ADMIN — AMLODIPINE BESYLATE 5 MG: 5 TABLET ORAL at 08:39

## 2022-04-15 RX ADMIN — ACETAMINOPHEN 975 MG: 325 TABLET ORAL at 14:03

## 2022-04-15 RX ADMIN — FLUOXETINE HYDROCHLORIDE 60 MG: 20 CAPSULE ORAL at 08:41

## 2022-04-15 RX ADMIN — GABAPENTIN 200 MG: 100 CAPSULE ORAL at 21:20

## 2022-04-15 RX ADMIN — ARIPIPRAZOLE 15 MG: 15 TABLET ORAL at 21:20

## 2022-04-15 RX ADMIN — Medication 1000 UNITS: at 08:43

## 2022-04-15 RX ADMIN — UMECLIDINIUM 1 PUFF: 62.5 AEROSOL, POWDER ORAL at 08:50

## 2022-04-15 RX ADMIN — OXYCODONE HYDROCHLORIDE 2.5 MG: 5 TABLET ORAL at 09:19

## 2022-04-15 RX ADMIN — ASPIRIN 81 MG: 81 TABLET, DELAYED RELEASE ORAL at 18:13

## 2022-04-15 RX ADMIN — LAMOTRIGINE 100 MG: 100 TABLET ORAL at 21:22

## 2022-04-15 RX ADMIN — DICLOFENAC 4 G: 10 GEL TOPICAL at 21:20

## 2022-04-15 RX ADMIN — OXYCODONE HYDROCHLORIDE 5 MG: 5 TABLET ORAL at 21:20

## 2022-04-15 RX ADMIN — SODIUM CHLORIDE: 9 INJECTION, SOLUTION INTRAVENOUS at 07:29

## 2022-04-15 RX ADMIN — ACETAMINOPHEN 975 MG: 325 TABLET ORAL at 21:19

## 2022-04-15 RX ADMIN — LEVOTHYROXINE SODIUM 75 MCG: 75 TABLET ORAL at 06:19

## 2022-04-15 RX ADMIN — FERROUS SULFATE TAB 325 MG (65 MG ELEMENTAL FE) 325 MG: 325 (65 FE) TAB at 08:40

## 2022-04-15 RX ADMIN — ACETAMINOPHEN 975 MG: 325 TABLET ORAL at 06:19

## 2022-04-15 RX ADMIN — LAMOTRIGINE 150 MG: 150 TABLET ORAL at 09:17

## 2022-04-15 RX ADMIN — ARIPIPRAZOLE 15 MG: 15 TABLET ORAL at 00:00

## 2022-04-15 RX ADMIN — DICLOFENAC 4 G: 10 GEL TOPICAL at 08:58

## 2022-04-15 RX ADMIN — Medication 3 MG: at 21:19

## 2022-04-15 RX ADMIN — MIRABEGRON 25 MG: 25 TABLET, FILM COATED, EXTENDED RELEASE ORAL at 08:42

## 2022-04-15 RX ADMIN — POLYETHYLENE GLYCOL 3350 17 G: 17 POWDER, FOR SOLUTION ORAL at 08:43

## 2022-04-15 RX ADMIN — ASPIRIN 81 MG: 81 TABLET, DELAYED RELEASE ORAL at 00:01

## 2022-04-15 RX ADMIN — ASPIRIN 81 MG: 81 TABLET, DELAYED RELEASE ORAL at 08:40

## 2022-04-15 RX ADMIN — DICLOFENAC 4 G: 10 GEL TOPICAL at 16:19

## 2022-04-15 RX ADMIN — PANTOPRAZOLE SODIUM 20 MG: 20 TABLET, DELAYED RELEASE ORAL at 08:43

## 2022-04-15 RX ADMIN — FLUTICASONE FUROATE AND VILANTEROL TRIFENATATE 1 PUFF: 100; 25 POWDER RESPIRATORY (INHALATION) at 08:45

## 2022-04-15 ASSESSMENT — ACTIVITIES OF DAILY LIVING (ADL)
ADLS_ACUITY_SCORE: 12
DEPENDENT_IADLS:: CLEANING;COOKING;LAUNDRY;SHOPPING;MEAL PREPARATION;MEDICATION MANAGEMENT;TRANSPORTATION
ADLS_ACUITY_SCORE: 12

## 2022-04-15 NOTE — ED PROVIDER NOTES
I, Damion Castañeda DO, have reviewed the documentation, personally taken the patient's history, performed an exam and agree with the physical finds, diagnosis and management plan.    I personally saw the patient and performed a substantive portion of the visit including all aspects of the medical decision making.      HPI:  Amparo Sharma is a 81 year old female who presents to this ED for the evaluation of shortness of breath, hypoxia. Patient has been at [facility] postoperatively for a reverse left shoulder arthroplasty. Patient's facility noticed the patient had hypoxia in the 90s with tachycardia. Patient currently endorses left shoulder pain, but denies any other symptoms at this time.      ROS:   Review of Systems   Constitutional: Negative for fever, chills and fatigue.   HENT: Negative for neck pain.    Eyes: Negative for visual disturbance.   Respiratory: Negative for chest tightness and shortness of breath.    Cardiovascular: Negative for chest pain.   Gastrointestinal: Negative for nausea, vomiting, abdominal pain and diarrhea.   Genitourinary: Negative for dysuria.   Musculoskeletal: Positive for shoulder pain (left). Negative for back pain.   Skin: Negative for color change.   Neurological: Negative for dizziness, weakness and numbness.   All other systems reviewed and are negative.    Physical Exam:     BP (!) 153/77   Pulse 110   Temp 98.2  F (36.8  C) (Oral)   Resp 18   Ht 1.524 m (5')   Wt 72.6 kg (160 lb)   SpO2 95%   BMI 31.25 kg/m       Constitutional:  Well developed, well nourished, no acute distress   Integument: Warm, Dry, No erythema, No rash.   EYES: Conjunctivae clear, EOMI  HENT:  Atraumatic, external ears normal, nose normal, oropharynx moist. Neck- supple  Respiratory:  No respiratory distress, normal breath sounds, no rales, no wheezing   Cardiovascular:  Normal rate, normal rhythm, no murmurs, capillary refill normal.  No chest tenderness, no extremity edema  GI:  Soft,  nondistended, nontender, no palpable masses, no rebound, no guarding   : No CVA tenderness  Musculoskeletal:  No edema.  Range of motion major extremities intact. No tenderness to palpation or major deformities noted.    Neurologic:  Alert & oriented, no focal deficits noted  Psych: Affect normal, Mood normal.            MDM:  ***           6:00 PM patient was staffed with me by Sussy Remy PA-C        Final Diagnosis:         I, Khadar Boone am serving as a scribe to document services personally performed by Damion Castañeda DO, based on my observations and the provider's statements to me.  I, Damion Castañeda DO, attest that Khadar Boone is acting in a scribe capacity, has observed my performance of the services and has documented them in accordance with my direction.     Damion Castañeda DO  Emergency Medicine  [facility] EMERGENCY ROOM

## 2022-04-15 NOTE — ED NOTES
Resting quietly, no new complaints, vss, dinner tray ordered.  Report given to oncoming Sharon DE PAZ

## 2022-04-15 NOTE — PROGRESS NOTES
"PRIMARY DIAGNOSIS: \"GENERIC\" NURSING  OUTPATIENT/OBSERVATION GOALS TO BE MET BEFORE DISCHARGE:  1. ADLs back to baseline: No    2. Activity and level of assistance: Up with maximum assistance. Consider SW and/or PT evaluation.     3. Pain status: Improved-controlled with oral pain medications.    4. Return to near baseline physical activity: No     Discharge Planner Nurse   Safe discharge environment identified: Yes  Barriers to discharge: Yes       Entered by: Geovanni Tom 04/15/2022 5:43 PM     Please review provider order for any additional goals.   Nurse to notify provider when observation goals have been met and patient is ready for discharge.    "

## 2022-04-15 NOTE — ED NOTES
Pt reports pain 7/10.  CMS intact.  Pt's lower extremity contracted and repositioned pt with multiple pillows.  Right heel ulcer noted that pt states has been there about one month or so with a dressing noted.  Breakfast ordered.

## 2022-04-15 NOTE — PROGRESS NOTES
[facility]      OUTPATIENT OCCUPATIONAL THERAPY  EVALUATION  PLAN OF TREATMENT FOR OUTPATIENT REHABILITATION  (COMPLETE FOR INITIAL CLAIMS ONLY)  Patient's Last Name, First Name, M.I.  YOB: 1940  Amparo Sharma                          Provider's Name  [facility] Medical Record No.  [id number]                               Onset Date:  04/14/22   Start of Care Date:  04/15/22     Type:     ___PT   _X_OT   ___SLP Medical Diagnosis:  Readmitted for hypoxia and has recently had LTSA                        OT Diagnosis:  decreased ADLs due to TSA, limited mobility on RUE, decreased strength   Visits from SOC:  1   _________________________________________________________________________________  Plan of Treatment/Functional Goals    Planned Interventions: ADL retraining, ROM, strengthening, home program guidelines, progressive activity/exercise   Goals: See Occupational Therapy Goals on Care Plan in [employer] electronic health record.    Therapy Frequency: Daily  Predicted Duration of Therapy Intervention: 04/20/22  _________________________________________________________________________________    I CERTIFY THE NEED FOR THESE SERVICES FURNISHED UNDER        THIS PLAN OF TREATMENT AND WHILE UNDER MY CARE     (Physician co-signature of this document indicates review and certification of the therapy plan).              Certification date from: 04/15/22, Certification date to: 04/22/22    Referring Physician: Charmaine Camacho MD            Initial Assessment        See Occupational Therapy evaluation dated 04/15/22 in Epic electronic health record.

## 2022-04-15 NOTE — CONSULTS
Care Management Initial Consult    General Information  Assessment completed with: Patient, pt  Type of CM/SW Visit: CM Role Introduction    Primary Care Provider verified and updated as needed: Yes   Readmission within the last 30 days: current reason for admission unrelated to previous admission   Return Category: Exacerbation of disease  Reason for Consult: discharge planning  Advance Care Planning: Advance Care Planning Reviewed: no concerns identified, present on chart     General Information Comments: lives alone in assisted living but came from [facility]    Communication Assessment  Patient's communication style: spoken language (English or Bilingual)    Hearing Difficulty or Deaf: no   Wear Glasses or Blind: yes    Cognitive  Cognitive/Neuro/Behavioral: .WDL except, orientation        Orientation: disoriented to, place, time, situation             Living Environment:   People in home: alone     Current living Arrangements: assisted living  Name of Facility: [facility]   Able to return to prior arrangements: yes ([facility] bed hold placed)       Family/Social Support:  Care provided by: self, other (see comments) (assisted lvg staff)  Provides care for: no one, unable/limited ability to care for self, no one  Marital Status: Single  Children          Description of Support System: Supportive, Involved    Support Assessment: Adequate family and caregiver support, Adequate social supports    Current Resources:   Patient receiving home care services: No     Community Resources: DME, Transitional Care  Equipment currently used at home: walker, rolling, wheelchair, manual  Supplies currently used at home: None    Employment/Financial:  Employment Status: retired        Financial Concerns: No concerns identified   Referral to Financial Worker: No       Lifestyle & Psychosocial Needs:  Social Determinants of Health     Tobacco Use: Medium Risk     Smoking Tobacco Use: Former Smoker      Smokeless Tobacco Use: Former User   Alcohol Use: Not on file   Financial Resource Strain: Not on file   Food Insecurity: Not on file   Transportation Needs: Not on file   Physical Activity: Not on file   Stress: Not on file   Social Connections: Not on file   Intimate Partner Violence: Not on file   Depression: Not on file   Housing Stability: Not on file       Functional Status:  Prior to admission patient needed assistance:   Dependent ADLs:: Ambulation-walker, Bathing, Dressing, Grooming, Wheelchair-with assist  Dependent IADLs:: Cleaning, Cooking, Laundry, Shopping, Meal Preparation, Medication Management, Transportation  Assesssment of Functional Status: Not at  functional baseline    Mental Health Status:  Mental Health Status: No Current Concerns       Chemical Dependency Status:                Values/Beliefs:  Spiritual, Cultural Beliefs, Anabaptism Practices, Values that affect care:                 Additional Information:  JANETT assessed, [facility] and [facility] bed hold in place, was here 4/11-4/13 and discharged to TCU 4/13  And daughter Nohemi can transport at discharge       Alfredo Ruiz RN

## 2022-04-15 NOTE — PROGRESS NOTES
04/15/22 1400   Quick Adds   Type of Visit Initial Occupational Therapy Evaluation   Living Environment   People in Home   (care facility- in memory care due to physical assist needed.)   Current Living Arrangements   ([facility], therapist come in to work with her)   Transportation Anticipated health plan transportation   Living Environment Comments per pt lives on third floor w/ elevator. currently coming from U   Self-Care   Equipment Currently Used at Home commode chair;grab bar, toilet;grab bar, tub/shower;walker, standard   Fall history within last six months yes   Number of times patient has fallen within last six months 3   Activity/Exercise/Self-Care Comment A for all ADLs (showering, dressing, etc.)   Instrumental Activities of Daily Living (IADL)   IADL Comments per pt -at baseline dependent for IADLs. meals at facility   General Information   Onset of Illness/Injury or Date of Surgery 04/14/22   Referring Physician Charmaine Camacho MD   Patient/Family Therapy Goal Statement (OT) to walk again   Existing Precautions/Restrictions (S)  shoulder   Left Upper Extremity (Weight-bearing Status) non weight-bearing (NWB)   Cognitive Status Examination   Orientation Status person;place   Affect/Mental Status (Cognitive)   (increased time to respond)   Sensory   Sensory Comments pt declines   Pain Assessment   Patient Currently in Pain Yes, see Vital Sign flowsheet  (6/10)   Range of Motion Comprehensive   General Range of Motion upper extremity range of motion deficits identified   Bed Mobility   Bed Mobility supine-sit;sit-supine   Supine-Sit [place] (Bed Mobility) maximum assist (25% patient effort);dependent (less than 25% patient effort)   Sit-Supine [place] (Bed Mobility) maximum assist (25% patient effort)   Assistive Device (Bed Mobility) bed rails   Clinical Impression   Criteria for Skilled Therapeutic Interventions Met (OT) Yes, treatment indicated   OT Diagnosis decreased  ADLs due to TSA, limited mobility on RUE, decreased strength   Influenced by the following impairments L TSA and hypoxia   OT Problem List-Impairments impacting ADL problems related to;activity tolerance impaired;range of motion (ROM);strength;post-surgical precautions   Assessment of Occupational Performance 3-5 Performance Deficits   Planned Therapy Interventions (OT) ADL retraining;ROM;strengthening;home program guidelines;progressive activity/exercise   Clinical Decision Making Complexity (OT) moderate complexity   Risk & Benefits of therapy have been explained evaluation/treatment results reviewed;care plan/treatment goals reviewed;participants included;patient   OT Discharge Planning   OT Discharge Recommendation (DC Rec) (S)  Transitional Care Facility   OT Rationale for DC Rec pt would benefit from TCU for strengthing, ADLs, and following HEP for L shoulder.   Therapy Certification   Start of Care Date 04/15/22   Certification date from 04/15/22   Certification date to 04/22/22   Medical Diagnosis Readmitted for hypoxia and has recently had LTSA   Total Evaluation Time (Minutes)   Total Evaluation Time (Minutes) 10   OT Goals   Therapy Frequency (OT) Daily   OT Predicted Duration/Target Date for Goal Attainment 04/20/22   OT Goals Upper Body Bathing;Bed Mobility   OT: Upper Body Dressing Maximum assist   OT: Bed Mobility Moderate assist

## 2022-04-15 NOTE — UTILIZATION REVIEW
"Admission Status; Secondary Review Determination     Under the authority of the Utilization Management Committee, the utilization review process indicated a secondary review on Amparo Sharma.  The review outcome is based on review of the medical records, discussions with staff, and applying clinical experience noted on the date of the review.     (x) Observation Status Appropriate - This patient does not meet hospital inpatient criteria and is placed in observation status. If this patient's primary payer is Medicare and was admitted as an inpatient, Condition Code 44 should be used and patient status changed to \"observation\".     RATIONALE FOR DETERMINATION   81 year old female with a history of hypertension, COPD, hypothyroidism, NPH with shunt, depression, bipolar disorder, stage III colon cancer, status post resection, history of bladder cancer, S/P recent left shoulder arthroplasty and is admitted for hypoxia from TCU, negative CT chest for PE, improving , pending ortho consult     The severity of illness, intensity of service provided, expected LOS and risk for adverse outcome make the care appropriate for further observation; however, doesn't meet criteria for hospital inpatient admission. Dr Sandra, is notified of this determination and agrees with downgrade of status.      The information on this document is developed by the utilization review team in order for the business office to ensure compliance.  This only denotes the appropriateness of proper admission status and does not reflect the quality of care rendered.         The definitions of Inpatient Status and Observation Status used in making the determination above are those provided in the CMS Coverage Manual, Chapter 1 and Chapter 6, section 70.4.      Sincerely,  Layo Wiggins MD  Utilization Review  Physician Advisor  [facility]  "

## 2022-04-15 NOTE — PROGRESS NOTES
"PRIMARY DIAGNOSIS: \"GENERIC\" NURSING  OUTPATIENT/OBSERVATION GOALS TO BE MET BEFORE DISCHARGE:  1. ADLs back to baseline: No    2. Activity and level of assistance: Up with maximum assistance. Consider SW and/or PT evaluation.     3. Pain status: Improved-controlled with oral pain medications.    4. Return to near baseline physical activity: No     Discharge Planner Nurse   Safe discharge environment identified: Yes  Barriers to discharge: Yes       Entered by: Geovanni Tom 04/15/2022 2:59 PM     Please review provider order for any additional goals.   Nurse to notify provider when observation goals have been met and patient is ready for discharge.    "

## 2022-04-15 NOTE — PLAN OF CARE
Problem: Risk for Delirium  Goal: Improved Attention and Thought Clarity  Outcome: Ongoing, Progressing     Problem: Gas Exchange Impaired  Goal: Optimal Gas Exchange  Outcome: Ongoing, Progressing     Problem: Mobility Impairment  Goal: Optimal Mobility  Outcome: Ongoing, Progressing     Problem: Functional Ability Impaired (Shoulder Arthroplasty)  Goal: Optimal Functional Ability  Outcome: Ongoing, Progressing     Problem: Infection (Shoulder Arthroplasty)  Goal: Absence of Infection Signs and Symptoms  Outcome: Ongoing, Progressing     Problem: Pain (Shoulder Arthroplasty)  Goal: Acceptable Pain Control  Outcome: Ongoing, Progressing  Intervention: Prevent or Manage Pain  Recent Flowsheet Documentation  Taken 4/15/2022 1618 by Geovanni Tom, RN  Pain Management Interventions: declines  Taken 4/15/2022 1218 by Geovanni Tom, RN  Pain Management Interventions: medication (see MAR)   Goal Outcome Evaluation:  VSS. Pain 5 and 7/10 shoulder and knee. Oxy 5mg last given 1242 with good relief. Declined pain medication this early evening as pain was at 2/10. Pt is forgetful. L shoulder in sling, CMS intact. Continues on 1L with sats 90-2%. R heel ulcer, mepilex in place. L shoulder mepilex, CDI. Continue to monitor.

## 2022-04-15 NOTE — PROGRESS NOTES
[facility]    Medicine Progress Note - Hospitalist Service    Date of Admission:  4/14/2022    Assessment & Plan          Amparo Sharma is a 81 year old female with PMH significant for HTN, COPD, hypothyroidism, NPH s/p shunt, depression, bipolar disorder, stage III colon cancer status post resection, history of bladder cancer, who underwent a left reverse total shoulder arthroplasty on admitted on 4/11/2022 with Dr. Peterson.  She was readmitted on 4/14/2022 from TCU with hypoxia, shortness of breath, and received downtrending blood pressures.  She was admitted for observation for persistent hypoxia requiring supplemental oxygen, which is not her baseline.  Clinical suspicion for opioid overuse secondary to pain.  Consulting orthopedics for additional recommendations.      Acute hypoxic respiratory failure  Likely from atelectasis related to recent surgery.  Right lung base atelectasis on CT chest this admission.  Oxycodone that she has been taking for pain is also likely contributing.  No cardinal symptoms to suggest COPD exacerbation.  No leukocytosis to suggest infection.  CT chest without PE.  At high risk for pneumonia.  -Supplemental O2.  Maintain SPO2 >92%  -Pulse oximetry  -Incentive spirometry  -RT  -Judicious use of sedating and pain medication      COPD  -Treat hypoxia as above  -Pulse oximetry, supplemental O2 as above  -Antibiotics and steroids not indicated  -Continue albuterol inhaler/nebulizer every 4 hours as needed, Breo inhaler, Ellipta inhaler  -RT      POD 3 from left reverse total shoulder arthroplasty 4/11/22  CT with ill-defined areas of fluid and gas which are presumably resolving postoperative findings.  Pain seems out of proportion to normal postop course.  Patient has been taking 5 to 10 mg oxycodone every 4 hours prior to admission.  Concern for decreased respiratory drive.  -Appreciate orthopedics assistance with postoperative pain control  -Continue DVT  prophylaxis per orthopedics, ASA 81 mg twice daily  -Start gabapentin 200 mg 3 times daily  -Pain control with oxycodone 5 mg every 4 hours as needed  -Bowel regimen with miralax, senna  -PT   -OT      Dementia  Metabolic and situational encephalopathy  -Bundle cares  -Melatonin at bedtime  -Delirium precautions  -PRN Haldol 0.5-1 mg PO for agitation      Acute on chronic anemia  Hgb 13.5 3/9/2022,  12.3 preop, 9.0 postoperatively, 8.5 this admission.  No concern for active bleeding at this time  -Continue ferrous sulfate.  Recommend every other day dosing at discharge      Elevated AST  AST 93 on arrival. No other LFT abnormalities.  Follow-up outpatient.      Chronic medical conditions:  EHTN:  continue Norvasc 5 mg as at home  Hypothyroidism: Continue levothyroxine 75 mcg daily  GERD: continue pantoprazole 20 mg daily  Overactive bladder: Continue home mirabegron   History of colon cancer, status post resection  History of NPH, status post shunt   Osteoarthritis: Continue vitamin D  Anxiety, depression, Bipolar disorder  - continue Lamictal 150 mg daily, 100 mg at bedtime  - continue Prozac 60 mg and Abilify 15 mg             Diet: Regular Diet Adult    DVT Prophylaxis: aspirin 81 mg bid per ortho  Wetzel Catheter: Not present  Central Lines: None  Cardiac Monitoring: None  Code Status: Full Code      Disposition Plan   Expected Discharge: 04/16/2022     Anticipated discharge location: TCU    The patient's care was discussed with the Attending Physician, Dr. Camacho.      Ana Gaming MD  PGY-2  Family Medicine Resident  [facility]  Securely message with the Vocera Web Console (learn more here)  Text page via [employer]         Clinically Significant Risk Factors Present on Admission             # Hypoalbuminemia: Albumin = 2.0 g/dL (Ref range: 3.5 - 5.0 g/dL) on admission, will monitor as appropriate    # Platelet Defect: home medication list includes an antiplatelet  medication   # Obesity: Estimated body mass index is 31.38 kg/m  as calculated from the following:    Height as of this encounter: 1.524 m (5').    Weight as of this encounter: 72.9 kg (160 lb 11.2 oz).      ______________________________________________________________________    Interval History   Patient reports that she is not aware of hypoxia as she did not have shortness of breath.  Her main concern is pain in her shoulder since the operation.  She understands is likely normal but has been using oxycodone around-the-clock.  Limited mobility at baseline.  May not be appropriate for PT/OT.  Denies headache, chest pain, abdominal pain, urinary symptoms.  Reviewed notes from recent admission.      Data reviewed today: I reviewed all medications, new labs and imaging results over the last 24 hours.     Physical Exam   Vital Signs: Temp: 97.6  F (36.4  C) Temp src: Oral BP: 134/74 Pulse: 108   Resp: 16 SpO2: 93 % O2 Device: Nasal cannula Oxygen Delivery: 1 LPM  Weight: 160 lbs 11.2 oz  General: Wearing nasal cannula, no apparent distress.  Appears somewhat confused intermittently in conversation.  Cardiovascular: Normal rate and regular rhythm, normal heart sounds  Lungs: Crackles in right lower lobe, no wheezing  Left upper extremity: Sensation intact in left hand.  Sling in place.  Exam limited by pain.  Extremities: Contracted right upper extremity.  Contracted lower extremities.  Pulses:  2+ radial, dorsalis pedis  Skin: Warm and dry. No concerning rashes or lesions.  Neurologic: No obvious focal deficit, alert and oriented x3  Psychiatric: normal mood and affect, cooperative      Data   Recent Labs   Lab 04/15/22  0619 04/14/22  1436 04/13/22  1326 04/13/22  0728 04/13/22  0644 04/12/22  0651 04/11/22  1127   WBC 9.1 11.0  --   --   --  11.7* 9.1   HGB 8.5* 9.0*  --   --  8.9* 9.8* 12.3   MCV 90 90  --   --   --  89 93    359  --   --   --  381 394   INR  --   --   --   --   --   --  1.06    140   --   --   --  139  --    POTASSIUM 3.7 4.1  --   --   --  4.6 4.4   CHLORIDE 104 102  --   --   --  103  --    CO2 25 23  --   --   --  25  --    BUN 7* 14  --   --   --  17  --    CR 0.46* 0.65  --   --   --  0.64 0.65   ANIONGAP 10 15  --   --   --  11  --    MARTHA 8.8 9.3  --   --   --  8.8  --    GLC 91 109 124*   < >  --  112  112  --    ALBUMIN 2.0* 2.4*  --   --   --   --   --    PROTTOTAL 5.8* 6.5  --   --   --   --   --    BILITOTAL 0.7 0.6  --   --   --   --   --    ALKPHOS 102 119  --   --   --   --   --    ALT 37 38  --   --   --   --   --    AST 82* 93*  --   --   --   --   --    LIPASE  --  20  --   --   --   --   --     < > = values in this interval not displayed.

## 2022-04-15 NOTE — CONSULTS
ORTHOPEDIC CONSULTATION    Consultation  Amparo Sharma,  1940, MRN [id number]    Shortness of breath [R06.02]  Tachycardia [R00.0]  Hypoxia [R09.02]  Acute respiratory failure with hypoxia (H) [J96.01]    PCP: Wilmer Voss, [phone]   Code status:  Full Code       Extended Emergency Contact Information  Primary Emergency Contact: Nohemi Elliott  Address: [address] [place] [address] [place]  Mobile Phone: [phone]  Relation: Daughter  Secondary Emergency Contact: Kendell Maurer   [facility]  Home Phone: [phone]  Mobile Phone: [phone]  Relation: Son         IMPRESSION:  POD 4 left reverse TSA, no change or concern from ortho post op      PLAN:  This patient was discussed with Dr. Peterson, on-call surgeon for [facility] and they are in agreement with the following plan.  Discussion with patient, pain is well controlled at this time with pain medication on board.  Patient tells me her pain was increased while at TCU but tells me she had no injury, abnormal motion shoulder, nothing to bring on worsening pain.  I would attribute her increased pain due to a new environment or possibly lack of strict scheduling of her pain medications.  There is nothing to change at this point for her postop recovery from our standpoint there is no concern for any complications.  -Continue wearing sling shot brace at all times except with hygiene and changing clothes.  -No motion of left shoulder but may perform elbow wrist and hand exercises.  -Continue pain management as needed.  -Ortho will loosely sign off at this time but please feel free to contact us if any further concerns about the shoulder present.      Thank you for including [facility] in the care of Amparo Sharma. It has been a pleasure participating in her care.        CHIEF COMPLAINT: POD 4 left reverse TSA, pain     HISTORY OF PRESENT ILLNESS:  The patient is seen in orthopedic consultation at the  request of Dr. Gaming.  The patient is a 81 year old female, is 4 days postop from left wrist total shoulder arthroplasty with Dr. Santizo.  Patient had rather uncomplicated course and was discharged on 415/22.  Patient returned yesterday for for evaluation of hypoxia, shortness of breath and perceived downtrending blood pressures.  Apparently there was also an increase of pain at TCU and upon presentation.  Patient explains that while TCU the shoulder pain was increased and is unsure of if she received her scheduled pain medications on time.  Patient denies any injury, fall, abnormal movement of the shoulder that would have caused the pain to increase.  Patient denies any numbness tingling arm.  At this time, patient reports pain is doing pretty well with pain meds on board, as she did receive 2.5 to 5 mg of oxycodone since presentation to the hospital.      ALLERGIES:   Review of patient's allergies indicates   Allergies   Allergen Reactions     Amantadine      Antihistamine Allergy Relief [Diphenhydramine] Other (See Comments)     hyperactivity     Bactrim [Sulfamethoxazole W/Trimethoprim]      Codeine Itching     Demerol [Meperidine] Nausea     Hmg-Coa-R Inhibitors Other (See Comments)     Was hospitalized for possible rhabdo     Meloxicam Other (See Comments)     Talwin [Pentazocine] Other (See Comments)     drowsiness     Tramadol Nausea     Valium [Diazepam] Other (See Comments)     Hallucinations/paranoid         MEDICATIONS UPON ADMISSION:  Medications were reviewed.  They include:   Medications Prior to Admission   Medication Sig Dispense Refill Last Dose     acetaminophen (TYLENOL) 500 MG tablet Take 1,000 mg by mouth in the morning and 1,000 mg at noon and 1,000 mg in the evening. 6AM, 1PM, 7PM   4/14/2022 at x 1     albuterol (PROAIR HFA/PROVENTIL HFA/VENTOLIN HFA) 108 (90 Base) MCG/ACT inhaler Inhale 2 puffs into the lungs every 4 hours as needed for shortness of breath / dyspnea    at unable to  bring     albuterol (PROVENTIL) (2.5 MG/3ML) 0.083% neb solution Inhale 2.5 mg into the lungs every 4 hours as needed for shortness of breath / dyspnea        amLODIPine (NORVASC) 5 MG tablet Take 5 mg by mouth daily   4/14/2022 at Unknown time     amoxicillin (AMOXIL) 500 MG capsule Take 2,000 mg by mouth once as needed (Prior to dental appt)         ARIPiprazole (ABILIFY) 15 MG tablet Take 15 mg by mouth At Bedtime   4/13/2022 at Unknown time     aspirin (ASA) 81 MG EC tablet Take 1 tablet (81 mg) by mouth in the morning and 1 tablet (81 mg) in the evening. Take with meals. 60 tablet 0 4/14/2022 at x 1     BREO ELLIPTA 100-25 MCG/INH inhaler Inhale 1 puff into the lungs daily 1PM   4/13/2022 at unable to bring     carboxymethylcellulose (REFRESH PLUS) 0.5 % SOLN ophthalmic solution Place 1 drop into both eyes as needed in the morning and 1 drop as needed at noon and 1 drop as needed in the evening and 1 drop as needed before bedtime for dry eyes.        carboxymethylcellulose (REFRESH PLUS) 0.5 % SOLN ophthalmic solution Place 1 drop into both eyes in the morning.   4/14/2022 at AM (unable to bring)     cetirizine (ZYRTEC) 10 MG tablet Take 10 mg by mouth daily    4/14/2022 at AM     diclofenac (VOLTAREN) 1 % topical gel Apply 4 g topically 4 times daily Apply to affected knees.   4/14/2022 at x 1 (unable to bring)     ferrous sulfate (FEROSUL) 325 (65 Fe) MG tablet Take 325 mg by mouth daily   4/14/2022 at AM     FLUoxetine (PROZAC) 20 MG capsule Take 60 mg by mouth daily   4/14/2022 at AM     INCRUSE ELLIPTA 62.5 MCG/INH Inhaler Inhale 1 puff into the lungs daily    4/14/2022 at AM (unable to bring)     lamoTRIgine (LAMICTAL) 100 MG tablet Take 100 mg by mouth At Bedtime   4/13/2022 at Unknown time     lamoTRIgine (LAMICTAL) 150 MG tablet Take 150 mg by mouth daily    4/14/2022 at AM     levothyroxine (SYNTHROID/LEVOTHROID) 75 MCG tablet Take 75 mcg by mouth daily   4/14/2022 at AM     menthol-zinc oxide  (CALMOSEPTINE) 0.44-20.6 % OINT ointment Apply topically 2 times daily as needed for irritation        mirabegron (MYRBETRIQ) 25 MG 24 hr tablet Take 25 mg by mouth daily    4/14/2022 at AM     Multiple Vitamins-Minerals (MULTIVITAMIN PO) Take 1 tablet by mouth daily    4/14/2022 at AM     oxyCODONE (ROXICODONE) 5 MG tablet Take 1 tablet (5 mg) by mouth every 4 hours as needed for moderate to severe pain 30 tablet 0 4/13/2022 at Unknown time     pantoprazole (PROTONIX) 20 MG EC tablet Take 20 mg by mouth daily    4/14/2022 at AM     saccharomyces boulardii (FLORASTOR) 250 MG capsule Take 250 mg by mouth in the morning.   4/14/2022 at AM     SENOKOT S 8.6-50 MG tablet Take 2 tablets by mouth daily    4/14/2022 at AM     Vitamin D, Cholecalciferol, 25 MCG (1000 UT) CAPS Take 1,000 Units by mouth daily    4/14/2022 at AM         SOCIAL HISTORY:   she  reports that she quit smoking about 25 years ago. Her smoking use included cigarettes. She started smoking about 42 years ago. She smoked 0.25 packs per day. She has quit using smokeless tobacco. She reports that she does not drink alcohol and does not use drugs.      FAMILY HISTORY:  family history includes Arthritis in her brother; Breast Cancer (age of onset: 60) in her mother; Depression (age of onset: 55) in her father; Peptic Ulcer Disease in her father; Prostate Cancer in her brother.      REVIEW OF SYSTEMS:   Reviewed with patient. See HPI, otherwise negative       PHYSICAL EXAMINATION:  Vitals: Temp:  [97.6  F (36.4  C)-98.2  F (36.8  C)] 97.7  F (36.5  C)  Pulse:  [] 108  Resp:  [14-27] 18  BP: (115-153)/() 116/55  SpO2:  [92 %-99 %] 92 %  General: On examination, the patient is workign with OT, sitting on side of bed, NAD, awake and alert  SKIN:  Mild, appropriate ecchymosis around bandage, no erythema, no warmth, no edema   Pulses:  radial pulse is intact and equal bilaterally  Sensation: intact and equal bilaterally to the distal upper  extremities.  Tenderness: mild over anterior shoulder/bandage  ROM: Able to make fist placed in digits and move wrist appropriately.  Arm is in sling shot brace appropriately.      RADIOGRAPHIC EVALUATION:  EXAM: XR SHOULDER LEFT PORT G/E 2 VIEWS  LOCATION: [facility]  DATE/TIME: 4/11/2022 4:05 PM     INDICATION: Status post surgery  COMPARISON: None.                                                                      IMPRESSION: Postop changes of a recent left reverse total shoulder arthroplasty. No fracture or dislocation.       EXAM: CT CHEST PULMONARY EMBOLISM W CONTRAST  LOCATION: [facility]  DATE/TIME: 4/14/2022 4:10 PM     INDICATION: Tachycardia and hypoxia.  COMPARISON: CT chest from 11/01/2020.  TECHNIQUE: CT chest pulmonary angiogram during arterial phase injection of IV contrast. Multiplanar reformats and MIP reconstructions were performed. Dose reduction techniques were used.   CONTRAST: ISOVUE 370 61mL     FINDINGS:  ANGIOGRAM CHEST: Pulmonary arteries are normal caliber and negative for pulmonary emboli. Thoracic aorta is negative for dissection. No CT evidence of right heart strain.     LUNGS AND PLEURA: There is moderate elevation of the right hemidiaphragm with adjacent compressive atelectasis. There are a few scattered bands of atelectasis in the left mid and lower lung zones. No evidence of a focal airspace consolidation. The   central airways are clear. No pneumothorax or pleural effusion.     MEDIASTINUM/AXILLAE: Heart size is normal. Small hiatal hernia. Multivessel coronary artery calcifications. No adenopathy. Incidental note is made of retropharyngeal common carotid arteries as seen on axial image 1.     CORONARY ARTERY CALCIFICATION: Moderate.     UPPER ABDOMEN: Renal cysts. Cholecystectomy. No acute findings.     MUSCULOSKELETAL: Osteopenia with accentuation of the thoracic kyphosis. Bilateral shoulder arthroplasties, incompletely  imaged. In the region of the right shoulder there are areas of ill-defined fluid and gas density without definite loculated   collection, though beam hardening artifact obscures detail in this region.                                                                      IMPRESSION:  1.  Negative for pulmonary embolism.     2.  Moderate elevation of the right hemidiaphragm with adjacent atelectasis. No evidence of pneumonia.     3.  Evidence of recent left shoulder arthroplasty with ill-defined areas of fluid and gas which are presumably resolving postoperative findings, though detailed assessment in this region is compromised by beam hardening artifact from the patient's   orthopedic hardware.     4.  Coronary artery disease.      Gisele Remy PA-C, KAYLI  Date: 4/15/2022  Time: 4:33 PM    CC1:   Charmaine Camacho MD    CC2:   Wilmer Voss

## 2022-04-16 ENCOUNTER — APPOINTMENT (OUTPATIENT)
Dept: PHYSICAL THERAPY | Facility: CLINIC | Age: 82
DRG: 189 | End: 2022-04-16
Payer: MEDICARE

## 2022-04-16 ENCOUNTER — APPOINTMENT (OUTPATIENT)
Dept: OCCUPATIONAL THERAPY | Facility: CLINIC | Age: 82
DRG: 189 | End: 2022-04-16
Payer: MEDICARE

## 2022-04-16 LAB
GAMMA INTERFERON BACKGROUND BLD IA-ACNC: 0.02 IU/ML
M TB IFN-G BLD-IMP: NEGATIVE
M TB IFN-G CD4+ BCKGRND COR BLD-ACNC: 0.76 IU/ML
MITOGEN IGNF BCKGRD COR BLD-ACNC: -0.01 IU/ML
MITOGEN IGNF BCKGRD COR BLD-ACNC: 0 IU/ML

## 2022-04-16 PROCEDURE — 99232 SBSQ HOSP IP/OBS MODERATE 35: CPT | Mod: GC | Performed by: STUDENT IN AN ORGANIZED HEALTH CARE EDUCATION/TRAINING PROGRAM

## 2022-04-16 PROCEDURE — 250N000013 HC RX MED GY IP 250 OP 250 PS 637: Performed by: STUDENT IN AN ORGANIZED HEALTH CARE EDUCATION/TRAINING PROGRAM

## 2022-04-16 PROCEDURE — 120N000001 HC R&B MED SURG/OB

## 2022-04-16 PROCEDURE — 97162 PT EVAL MOD COMPLEX 30 MIN: CPT | Mod: GP

## 2022-04-16 PROCEDURE — 97535 SELF CARE MNGMENT TRAINING: CPT | Mod: GO

## 2022-04-16 PROCEDURE — 97110 THERAPEUTIC EXERCISES: CPT | Mod: GO

## 2022-04-16 PROCEDURE — G0378 HOSPITAL OBSERVATION PER HR: HCPCS

## 2022-04-16 PROCEDURE — 97110 THERAPEUTIC EXERCISES: CPT | Mod: GP

## 2022-04-16 PROCEDURE — 97530 THERAPEUTIC ACTIVITIES: CPT | Mod: GP

## 2022-04-16 RX ADMIN — DICLOFENAC 4 G: 10 GEL TOPICAL at 09:24

## 2022-04-16 RX ADMIN — FLUTICASONE FUROATE AND VILANTEROL TRIFENATATE 1 PUFF: 100; 25 POWDER RESPIRATORY (INHALATION) at 09:24

## 2022-04-16 RX ADMIN — UMECLIDINIUM 1 PUFF: 62.5 AEROSOL, POWDER ORAL at 09:24

## 2022-04-16 RX ADMIN — Medication 3 MG: at 21:00

## 2022-04-16 RX ADMIN — DICLOFENAC 4 G: 10 GEL TOPICAL at 12:39

## 2022-04-16 RX ADMIN — DICLOFENAC 4 G: 10 GEL TOPICAL at 21:00

## 2022-04-16 RX ADMIN — Medication 1000 UNITS: at 09:20

## 2022-04-16 RX ADMIN — MIRABEGRON 25 MG: 25 TABLET, FILM COATED, EXTENDED RELEASE ORAL at 09:20

## 2022-04-16 RX ADMIN — POLYETHYLENE GLYCOL 3350 17 G: 17 POWDER, FOR SOLUTION ORAL at 09:24

## 2022-04-16 RX ADMIN — ACETAMINOPHEN 975 MG: 325 TABLET ORAL at 21:00

## 2022-04-16 RX ADMIN — LAMOTRIGINE 150 MG: 150 TABLET ORAL at 09:21

## 2022-04-16 RX ADMIN — OXYCODONE HYDROCHLORIDE 5 MG: 5 TABLET ORAL at 20:59

## 2022-04-16 RX ADMIN — LEVOTHYROXINE SODIUM 75 MCG: 75 TABLET ORAL at 05:30

## 2022-04-16 RX ADMIN — FLUOXETINE HYDROCHLORIDE 60 MG: 20 CAPSULE ORAL at 09:22

## 2022-04-16 RX ADMIN — GABAPENTIN 200 MG: 100 CAPSULE ORAL at 09:24

## 2022-04-16 RX ADMIN — ARIPIPRAZOLE 15 MG: 15 TABLET ORAL at 21:00

## 2022-04-16 RX ADMIN — FERROUS SULFATE TAB 325 MG (65 MG ELEMENTAL FE) 325 MG: 325 (65 FE) TAB at 09:23

## 2022-04-16 RX ADMIN — ACETAMINOPHEN 975 MG: 325 TABLET ORAL at 13:26

## 2022-04-16 RX ADMIN — Medication 250 MG: at 09:22

## 2022-04-16 RX ADMIN — GABAPENTIN 200 MG: 100 CAPSULE ORAL at 21:00

## 2022-04-16 RX ADMIN — LAMOTRIGINE 100 MG: 100 TABLET ORAL at 21:00

## 2022-04-16 RX ADMIN — ASPIRIN 81 MG: 81 TABLET, DELAYED RELEASE ORAL at 09:23

## 2022-04-16 RX ADMIN — PANTOPRAZOLE SODIUM 20 MG: 20 TABLET, DELAYED RELEASE ORAL at 09:23

## 2022-04-16 RX ADMIN — AMLODIPINE BESYLATE 5 MG: 5 TABLET ORAL at 09:23

## 2022-04-16 RX ADMIN — ACETAMINOPHEN 975 MG: 325 TABLET ORAL at 05:30

## 2022-04-16 RX ADMIN — GABAPENTIN 200 MG: 100 CAPSULE ORAL at 13:26

## 2022-04-16 NOTE — PROGRESS NOTES
Care Management Follow Up    Length of Stay (days): 1        Patient plan of care discussed at interdisciplinary rounds: Yes plan to return to TCU    Expected Discharge Date:   4/16/2022 (4/18)       Concerns to be Addressed / Barriers to Discharge: medical management, Tcu bed confirmation     Anticipated Discharge Disposition: TCU @ [facility]       Education Provided on the Discharge Plan:     Patient/Family in Agreement with the Plan:       Referrals Placed by CM/SW:   Private pay costs discussed:    Additional Information:  4/16/2022 Reviewed chart and noted per JANETT CM note 4/15 that patient was readmitted from TCU @ [facility]  And has a bed hold. Per ortho progress note, signed off 4/15. Per [facility] progress note 4/15/2022, anticipate return to TCU today.     8:26 AM Called and spoke with Sara in admissions @ [facility]CU- per Sara she does not have a patient by that name. She suggested perhaps [facility].    This writer reviewed old admission from 4/11 - 4/13/22 and noted patient discharged to [facility] TCU , not [facility] on 4/13.     8:31 AM Called and left message for admissions @ [facility] re: possible return today to their TCU.  Requested return call to [phone] if questions. Faxed facesheet with note also re: potential return today.    12:00 PM No return call from admissions. Called main line [phone] and selected option for TCU. Spoke with [facility] TCU nurse and she suggested calling the nursing supervisor re: TCU readmission: [facility] [phone].    Called and left message for [facility] supervisor admissions @ [facility] re: patient returning to TCU.  Requested return call to [phone].     1:24 PM While with another patient, received return message from [facility]- unable to accept this weekend due to no staff to admit patient. Can accept Mon 4/18 am.     Updated charge and HUC.   Called  and spoke with patient's daughter, Nohemi re: TCU unable to accept this weekend. Due to need for tamiko lift, discussed transport. Daughter expressed surprise as she was planning on medical transport due to patient's left shoulder surgery and inability to get her mother in / out of her [employer] car. No further questions at this time.

## 2022-04-16 NOTE — PROGRESS NOTES
Pt vitally stable. Pain rating between 5-6. Oxy administered. Orientation fluctuates. L shoulder immobilizer in place. R heel pressure ulcer. Q2 turns.

## 2022-04-16 NOTE — PLAN OF CARE
Problem: Functional Ability Impaired (Shoulder Arthroplasty)  Goal: Optimal Functional Ability  Intervention: Promote Optimal Functional Status  Recent Flowsheet Documentation  Taken 4/16/2022 1200 by Mike Hernandez, RN  Assistive Device Utilized: (tamiko lift) other (see comments)  Activity Management: up in chair       Problem: Mobility Impairment  Goal: Optimal Mobility  Intervention: Optimize Mobility  Recent Flowsheet Documentation  Taken 4/16/2022 1200 by Mike Hernandez, RN  Assistive Device Utilized: (tamiko lift) other (see comments)  Activity Management: up in chair     VSS. Alert and Oriented x 2, forgetful. L shoulder in sling immobilizer, CMS is intact. Weaned off O2 this morning, with sats 90-94%,  encouraged to use IS every hours, patient verbalized understanding. R heel pressure ulcer, mepilex is in place, using pillows to keep heels off bed, when in bed. L shoulder mepilex, CDI. Worked with OT/PT, up to chair with tamiko, tolerated well.

## 2022-04-16 NOTE — PROGRESS NOTES
04/16/22 1100   Quick Adds   Quick Adds Certification   Type of Visit Initial PT Evaluation   Living Environment   Current Living Arrangements extended care facility   Self-Care   Equipment Currently Used at Home wheelchair, manual   Fall history within last six months yes   Activity/Exercise/Self-Care Comment Pt reports assist of 2 with pivot transfer at home   General Information   Onset of Illness/Injury or Date of Surgery 04/14/22   Referring Physician Charmaine Camacho MD   Patient/Family Therapy Goals Statement (PT) Return to Home   Pertinent History of Current Problem (include personal factors and/or comorbidities that impact the POC) Acute Respiratory Failure with hypoxia s/p r TSA   Existing Precautions/Restrictions shoulder   Weight-Bearing Status - LUE nonweight-bearing   Weight-Bearing Status - RUE full weight-bearing   Weight-Bearing Status - LLE full weight-bearing   Weight-Bearing Status - RLE full weight-bearing   Bed Mobility   Bed Mobility rolling right;supine-sit   Rolling Right [place] (Bed Mobility) maximum assist (25% patient effort);verbal cues;nonverbal cues (demo/gesture);2 person assist   Supine-Sit [place] (Bed Mobility) dependent (less than 25% patient effort);verbal cues;2 person assist;nonverbal cues (demo/gesture)   Bed Mobility Limitations decreased ability to use arms for pushing/pulling;decreased ability to use legs for bridging/pushing   Impairments Contributing to Impaired Bed Mobility decreased ROM;decreased strength   Assistive Device (Bed Mobility) draw sheet   Transfers   Transfers sit-stand transfer   Maintains Weight-bearing Status (Transfers) verbal cues to maintain   Transfer Safety Concerns Noted decreased weight-shifting ability   Impairments Contributing to Impaired Transfers impaired balance;decreased strength   Sit-Stand Transfer   Sit-Stand [place] (Transfers) dependent (less than 25% patient effort);verbal cues;2 person assist   Assistive Device  (Sit-Stand Transfers) walker, cinthia   Clinical Impression   Criteria for Skilled Therapeutic Intervention Yes, treatment indicated   PT Diagnosis (PT) impaired functional mobility   Influenced by the following impairments weakness, impaired balance   Functional limitations due to impairments bed mobility, transfers, gait   Clinical Presentation (PT Evaluation Complexity) Evolving/Changing   Clinical Presentation Rationale Pt presents as medically diagnosed   Clinical Decision Making (Complexity) moderate complexity   Planned Therapy Interventions (PT) bed mobility training;home exercise program;strengthening;transfer training   Anticipated Equipment Needs at Discharge (PT) walker, cinthia   Risk & Benefits of therapy have been explained evaluation/treatment results reviewed   PT Discharge Planning   PT Discharge Recommendation (DC Rec) (S)  Transitional Care Facility   PT Rationale for DC Rec decreased activity tolerance, increased risk for falls, max assist x2 for transfers   Plan of Care Review   Plan of Care Reviewed With patient   Therapy Certification   Start of care date 04/16/22   Certification date from 04/16/22   Certification date to 05/13/22   Medical Diagnosis S/P L Reverse TSA, hypoxia   Total Evaluation Time   Total Evaluation Time (Minutes) 10   Physical Therapy Goals   PT Frequency Daily   PT Predicted Duration/Target Date for Goal Attainment 04/19/22   PT Goals Bed Mobility;Transfers   PT: Bed Mobility Moderate assist;Supine to/from sit;Within precautions   PT: Transfers Maximum assist;Sit to/from stand;Assistive device;Within precautions

## 2022-04-16 NOTE — PROGRESS NOTES
[facility]      OUTPATIENT PHYSICAL THERAPY EVALUATION  PLAN OF TREATMENT FOR OUTPATIENT REHABILITATION  (COMPLETE FOR INITIAL CLAIMS ONLY)  Patient's Last Name, First Name, M.I.  YOB: 1940  Amparo Sharma                        Provider's Name  [facility] Medical Record No.  [id number]                               Onset Date:  04/14/22   Start of Care Date:         Type:     _X_PT   ___OT   ___SLP Medical Diagnosis:                           PT Diagnosis:  impaired functional mobility   Visits from SOC:  1   _________________________________________________________________________________  Plan of Treatment/Functional Goals    Planned Interventions: bed mobility training, home exercise program, strengthening, transfer training     Goals: See Physical Therapy Goals on Care Plan in [facility] electronic health record.    Therapy Frequency: Daily  Predicted Duration of Therapy Intervention: 04/19/22  _________________________________________________________________________________    I CERTIFY THE NEED FOR THESE SERVICES FURNISHED UNDER        THIS PLAN OF TREATMENT AND WHILE UNDER MY CARE     (Physician co-signature of this document indicates review and certification of the therapy plan).              Certification date from: (P) 04/16/22,      Referring Physician: Charmaine Camacho MD            Initial Assessment        See Physical Therapy evaluation dated   in Epic electronic health record.

## 2022-04-16 NOTE — UTILIZATION REVIEW
Concurrent stay review; Secondary Review Determination     Under the authority of the Utilization Management Committee, the utilization review process indicated a secondary review on Amparo Sharma.  The review outcome is based on review of the medical records, discussions with staff, and applying clinical experience noted on the date of the review.        (x) Observation Status Appropriate - Concurrent stay review    RATIONALE FOR DETERMINATION   81 year old female with a history of hypertension, COPD, hypothyroidism, NPH with shunt, depression, bipolar disorder, stage III colon cancer, status post resection, history of bladder cancer, S/P recent left shoulder arthroplasty and is admitted for hypoxia from TCU, negative CT chest for PE, improving and stable for discharge but no TCU availability.  I cannot find in review of any of our RN notes or vitals that the patient was ever hypoxic here in our facility and notes at time of presentation 90% on RA.  Not clear why oxygen started and unable to view TCU notes.    Patient is clinically improving and there is no clear indication to change patient's status to inpatient. The severity of illness, intensity of service provided, expected LOS and risk for adverse outcome make the care appropriate for observation.    The information on this document is developed by the utilization review team in order for the business office to ensure compliance.  This only denotes the appropriateness of proper admission status and does not reflect the quality of care rendered.         The definitions of Inpatient Status and Observation Status used in making the determination above are those provided in the CMS Coverage Manual, Chapter 1 and Chapter 6, section 70.4.      Sincerely,   Sarah Suggs MD  Utilization Review  Physician Advisor  Health system

## 2022-04-17 PROCEDURE — 258N000003 HC RX IP 258 OP 636: Performed by: STUDENT IN AN ORGANIZED HEALTH CARE EDUCATION/TRAINING PROGRAM

## 2022-04-17 PROCEDURE — 250N000013 HC RX MED GY IP 250 OP 250 PS 637: Performed by: STUDENT IN AN ORGANIZED HEALTH CARE EDUCATION/TRAINING PROGRAM

## 2022-04-17 PROCEDURE — G0378 HOSPITAL OBSERVATION PER HR: HCPCS

## 2022-04-17 PROCEDURE — 99232 SBSQ HOSP IP/OBS MODERATE 35: CPT | Mod: GC | Performed by: STUDENT IN AN ORGANIZED HEALTH CARE EDUCATION/TRAINING PROGRAM

## 2022-04-17 PROCEDURE — 250N000011 HC RX IP 250 OP 636: Performed by: STUDENT IN AN ORGANIZED HEALTH CARE EDUCATION/TRAINING PROGRAM

## 2022-04-17 PROCEDURE — 120N000001 HC R&B MED SURG/OB

## 2022-04-17 RX ADMIN — ASPIRIN 81 MG: 81 TABLET, DELAYED RELEASE ORAL at 17:15

## 2022-04-17 RX ADMIN — DICLOFENAC 4 G: 10 GEL TOPICAL at 21:20

## 2022-04-17 RX ADMIN — AMLODIPINE BESYLATE 5 MG: 5 TABLET ORAL at 08:48

## 2022-04-17 RX ADMIN — LAMOTRIGINE 100 MG: 100 TABLET ORAL at 21:19

## 2022-04-17 RX ADMIN — Medication 3 MG: at 21:19

## 2022-04-17 RX ADMIN — IRON SUCROSE 200 MG: 20 INJECTION, SOLUTION INTRAVENOUS at 14:18

## 2022-04-17 RX ADMIN — ACETAMINOPHEN 975 MG: 325 TABLET ORAL at 14:18

## 2022-04-17 RX ADMIN — GABAPENTIN 200 MG: 100 CAPSULE ORAL at 08:48

## 2022-04-17 RX ADMIN — LEVOTHYROXINE SODIUM 75 MCG: 75 TABLET ORAL at 05:08

## 2022-04-17 RX ADMIN — Medication 1000 UNITS: at 08:48

## 2022-04-17 RX ADMIN — DICLOFENAC 4 G: 10 GEL TOPICAL at 17:16

## 2022-04-17 RX ADMIN — OXYCODONE HYDROCHLORIDE 5 MG: 5 TABLET ORAL at 08:48

## 2022-04-17 RX ADMIN — DICLOFENAC 4 G: 10 GEL TOPICAL at 08:48

## 2022-04-17 RX ADMIN — ACETAMINOPHEN 975 MG: 325 TABLET ORAL at 21:19

## 2022-04-17 RX ADMIN — GABAPENTIN 200 MG: 100 CAPSULE ORAL at 21:18

## 2022-04-17 RX ADMIN — MIRABEGRON 25 MG: 25 TABLET, FILM COATED, EXTENDED RELEASE ORAL at 08:57

## 2022-04-17 RX ADMIN — UMECLIDINIUM 1 PUFF: 62.5 AEROSOL, POWDER ORAL at 08:48

## 2022-04-17 RX ADMIN — ARIPIPRAZOLE 15 MG: 15 TABLET ORAL at 21:19

## 2022-04-17 RX ADMIN — FERROUS SULFATE TAB 325 MG (65 MG ELEMENTAL FE) 325 MG: 325 (65 FE) TAB at 08:48

## 2022-04-17 RX ADMIN — FLUOXETINE HYDROCHLORIDE 60 MG: 20 CAPSULE ORAL at 08:48

## 2022-04-17 RX ADMIN — LAMOTRIGINE 150 MG: 150 TABLET ORAL at 08:48

## 2022-04-17 RX ADMIN — Medication 250 MG: at 08:49

## 2022-04-17 RX ADMIN — DICLOFENAC 4 G: 10 GEL TOPICAL at 12:20

## 2022-04-17 RX ADMIN — ASPIRIN 81 MG: 81 TABLET, DELAYED RELEASE ORAL at 08:48

## 2022-04-17 RX ADMIN — SENNOSIDES AND DOCUSATE SODIUM 2 TABLET: 50; 8.6 TABLET ORAL at 21:19

## 2022-04-17 RX ADMIN — OXYCODONE HYDROCHLORIDE 5 MG: 5 TABLET ORAL at 21:19

## 2022-04-17 RX ADMIN — GABAPENTIN 200 MG: 100 CAPSULE ORAL at 14:18

## 2022-04-17 RX ADMIN — ACETAMINOPHEN 975 MG: 325 TABLET ORAL at 05:08

## 2022-04-17 RX ADMIN — PANTOPRAZOLE SODIUM 20 MG: 20 TABLET, DELAYED RELEASE ORAL at 08:48

## 2022-04-17 RX ADMIN — POLYETHYLENE GLYCOL 3350 17 G: 17 POWDER, FOR SOLUTION ORAL at 08:47

## 2022-04-17 RX ADMIN — FLUTICASONE FUROATE AND VILANTEROL TRIFENATATE 1 PUFF: 100; 25 POWDER RESPIRATORY (INHALATION) at 08:48

## 2022-04-17 NOTE — PROGRESS NOTES
[facility]    Medicine Progress Note - Residency Service     Date of Admission:  4/14/2022    Assessment & Plan           81-year-old female admitted from TCU due to concerns for hypoxia requiring oxygen supplementation 3 days after left total shoulder arthroplasty was done.  Now on room air with incentive spirometry use and decreasing narcotic pain medications.  Will likely have protracted postoperative course due to poor baseline function.  Medically stable for discharge back to TCU when bed available.        Acute hypoxic respiratory failure, resolved  COPD, stable  Multifactorial in the setting of interstitial lung disease, shallow breathing, history of COPD, and over narcosis.  Prior to admission was using 10 mg oxycodone every 4 hours at TCU and admitted using 2 L O2.  Now on room air.    Continue judicious use of narcotic medications (see below)    Continue to encourage incentive spirometer every hour    Continue albuterol inhaler/nebulizer every 4 hours as needed, Breo inhaler, Ellipta inhaler     S/p left reverse total shoulder arthroplasty 4/11/22  Left bicep hematoma  Patient was admitted on POD 3 due to hypoxia.  Ortho consulted and no concern for postop complications so have signed off.  New area of swelling under left bicep noted 4/17-consistent with benign hematoma which will likely self resolve within a few weeks.    DVT prophylaxis per Ortho aspirin 81 mg twice daily    Pain control: Oxycodone 5 mg q4h prn, gabapentin 200 mg TID    Bowel regimen: MiraLAX, senna    Physical therapy and Occupational Therapy as tolerated    Acute blood loss anemia  Hemoglobin was 12.3 preop, 9.0 postoperatively.  Patient is asymptomatic, though difficult to assess given she is mostly bedbound.  Iron supplementation might assist in wound healing.    Oral ferrous sulfate--every other day dosing on discharge    IV iron 1 dose today    High risk for delirium  -Bundle cares  -Frequent reminders and  redirection  -Melatonin at bedtime  -Delirium precautions  -Clearly schedule medications for TCU to avoid oversedation    Upper and lower extremity stiffness/contractures  At baseline significantly limited mobility.  Patient is wheelchair dependent and needs Bonnie lift for transfers.  All her extremities are very rigid and she is somewhat tremulous.  Patient reports this has been the case for over 30 years.    Appreciate physical therapy recommendations    Medical transport at discharge    Recommend outpatient follow-up with neurology --cannot rule out Parkinson's or other neurological disorder    Elevated AST    Outpatient follow-up         Chronic medical conditions:  EHTN:  continue Norvasc 5 mg  Hypothyroidism: Continue levothyroxine 75 mcg daily  GERD: continue pantoprazole 20 mg daily  Overactive bladder: Continue mirabegron   History of colon cancer, s/p resection  History of normal pressure hydrocephalus, status post shunt   Osteoarthritis: Continue vitamin D  Anxiety, depression, Bipolar disorder: continue Lamictal 150 mg daily, 100 mg at bedtime; continue Prozac 60 mg and Abilify 15 mg      Diet: Regular Diet Adult    DVT Prophylaxis: Aspirin 81 mg twice daily per Ortho  Wetzel Catheter: Not present  Central Lines: None  Cardiac Monitoring: None  Code Status: Full Code      Disposition Plan   Expected Discharge: 04/18/2022     Anticipated discharge location:  Awaiting care coordination huddle  Delays:     Placement - TCU            The patient's care was discussed with the Attending Physician, Dr. Charmaine Camacho, Bedside Nurse and Patient.    Chary Sandra MD  Residency Service  [facility]  Securely message with the Vocera Web Console (learn more here)  Text page via [employer] Paging/Directory         Clinically Significant Risk Factors Present on Admission                # Platelet Defect: home medication list includes an antiplatelet medication   # Obesity: Estimated body mass  index is 31.38 kg/m  as calculated from the following:    Height as of this encounter: 1.524 m (5').    Weight as of this encounter: 72.9 kg (160 lb 11.2 oz).      ______________________________________________________________________    Interval History   There were no acute events overnight.  She reports that pain is well controlled with pain medications though she does develop pain again right around when she is due for her next dose of oxycodone.  She is unaware if dose change since admission has made any difference in pain control--dose was decreased on admission.    Data reviewed today: I reviewed all medications, new labs and imaging results over the last 24 hours.    Physical Exam   Vital Signs: Temp: 98  F (36.7  C) Temp src: Oral BP: 116/63 Pulse: 107   Resp: 18 SpO2: 92 % O2 Device: None (Room air)  Weight: 160 lbs 11.2 oz     General: Chronically ill-appearing, wearing nasal cannula, no apparent distress  Cardiovascular: Normal rate and regular rhythm, normal heart sounds  Lungs: Pulmonary effort is normal, normal breath sounds  Extremities: Severe contractures of upper and lower extremities; left upper extremity in sling with bandage at left shoulder at incision site; underneath left bicep is an area 10 cm wide with some bruising and poorly circumscribed palpable swelling consistent with hematoma and nontender  Pulses:  2+ radial, dorsalis pedis  Skin: Warm and dry. No concerning rashes or lesions.  Neurologic: No focal deficit, alert and oriented x3  Psychiatric: normal mood and affect, cooperative      Data   Recent Labs   Lab 04/15/22  0619 04/14/22  1436 04/13/22  1326 04/13/22  0728 04/13/22  0644 04/12/22  0651 04/11/22  1127   WBC 9.1 11.0  --   --   --  11.7* 9.1   HGB 8.5* 9.0*  --   --  8.9* 9.8* 12.3   MCV 90 90  --   --   --  89 93    359  --   --   --  381 394   INR  --   --   --   --   --   --  1.06    140  --   --   --  139  --    POTASSIUM 3.7 4.1  --   --   --  4.6 4.4    CHLORIDE 104 102  --   --   --  103  --    CO2 25 23  --   --   --  25  --    BUN 7* 14  --   --   --  17  --    CR 0.46* 0.65  --   --   --  0.64 0.65   ANIONGAP 10 15  --   --   --  11  --    MARTHA 8.8 9.3  --   --   --  8.8  --    GLC 91 109 124*   < >  --  112  112  --    ALBUMIN 2.0* 2.4*  --   --   --   --   --    PROTTOTAL 5.8* 6.5  --   --   --   --   --    BILITOTAL 0.7 0.6  --   --   --   --   --    ALKPHOS 102 119  --   --   --   --   --    ALT 37 38  --   --   --   --   --    AST 82* 93*  --   --   --   --   --    LIPASE  --  20  --   --   --   --   --     < > = values in this interval not displayed.

## 2022-04-17 NOTE — PROGRESS NOTES
No return call to . CM will have to follow up on Monday.  3:47 PM     [facility] [place] [facility] called to confirm bed hold and confirm could accept patient on Monday. No answer. CM left  to return call. 8:38 AM

## 2022-04-17 NOTE — PLAN OF CARE
Problem: Mobility Impairment  Goal: Optimal Mobility     Problem: Pain (Shoulder Arthroplasty)  Goal: Acceptable Pain Control  Outcome: Ongoing, Progressing    Patient AO, forgetful at times, fluctuates. C/o pain to her left shoulder and bilateral knees controlled with PRN pain mediation and voltaren gel. Patient vitally stable on room air. Bonnie lift with minimal to no strength in her legs to bear her own weight. Tolerating reg diet. Q2 turns. Swollen hematoma found on the inside of patient's left bicep. Resident alerted, aware, and came to assess the patient promptly.

## 2022-04-18 ENCOUNTER — APPOINTMENT (OUTPATIENT)
Dept: OCCUPATIONAL THERAPY | Facility: CLINIC | Age: 82
DRG: 189 | End: 2022-04-18
Payer: MEDICARE

## 2022-04-18 PROBLEM — R09.02 HYPOXIA: Status: ACTIVE | Noted: 2022-04-18

## 2022-04-18 LAB
C REACTIVE PROTEIN LHE: 12.4 MG/DL (ref 0–0.8)
ERYTHROCYTE [DISTWIDTH] IN BLOOD BY AUTOMATED COUNT: 14.9 % (ref 10–15)
HCT VFR BLD AUTO: 24.1 % (ref 35–47)
HGB BLD-MCNC: 7.3 G/DL (ref 11.7–15.7)
HGB BLD-MCNC: 8.4 G/DL (ref 11.7–15.7)
MCH RBC QN AUTO: 27.5 PG (ref 26.5–33)
MCHC RBC AUTO-ENTMCNC: 30.3 G/DL (ref 31.5–36.5)
MCV RBC AUTO: 91 FL (ref 78–100)
PLATELET # BLD AUTO: 393 10E3/UL (ref 150–450)
RBC # BLD AUTO: 2.65 10E6/UL (ref 3.8–5.2)
WBC # BLD AUTO: 9.9 10E3/UL (ref 4–11)

## 2022-04-18 PROCEDURE — 97110 THERAPEUTIC EXERCISES: CPT | Mod: GO

## 2022-04-18 PROCEDURE — 85027 COMPLETE CBC AUTOMATED: CPT

## 2022-04-18 PROCEDURE — 250N000013 HC RX MED GY IP 250 OP 250 PS 637: Performed by: STUDENT IN AN ORGANIZED HEALTH CARE EDUCATION/TRAINING PROGRAM

## 2022-04-18 PROCEDURE — 120N000001 HC R&B MED SURG/OB

## 2022-04-18 PROCEDURE — 86140 C-REACTIVE PROTEIN: CPT

## 2022-04-18 PROCEDURE — 97535 SELF CARE MNGMENT TRAINING: CPT | Mod: GO

## 2022-04-18 PROCEDURE — 85018 HEMOGLOBIN: CPT

## 2022-04-18 PROCEDURE — 99232 SBSQ HOSP IP/OBS MODERATE 35: CPT | Mod: GC

## 2022-04-18 PROCEDURE — 36415 COLL VENOUS BLD VENIPUNCTURE: CPT

## 2022-04-18 RX ORDER — POLYETHYLENE GLYCOL 3350 17 G/17G
17 POWDER, FOR SOLUTION ORAL DAILY
Status: DISCONTINUED | OUTPATIENT
Start: 2022-04-18 | End: 2022-04-19 | Stop reason: HOSPADM

## 2022-04-18 RX ADMIN — ARIPIPRAZOLE 15 MG: 15 TABLET ORAL at 21:10

## 2022-04-18 RX ADMIN — Medication 3 MG: at 21:10

## 2022-04-18 RX ADMIN — ACETAMINOPHEN 975 MG: 325 TABLET ORAL at 14:43

## 2022-04-18 RX ADMIN — GABAPENTIN 200 MG: 100 CAPSULE ORAL at 08:00

## 2022-04-18 RX ADMIN — MIRABEGRON 25 MG: 25 TABLET, FILM COATED, EXTENDED RELEASE ORAL at 08:06

## 2022-04-18 RX ADMIN — LAMOTRIGINE 100 MG: 100 TABLET ORAL at 21:07

## 2022-04-18 RX ADMIN — AMLODIPINE BESYLATE 5 MG: 5 TABLET ORAL at 07:56

## 2022-04-18 RX ADMIN — OXYCODONE HYDROCHLORIDE 5 MG: 5 TABLET ORAL at 08:15

## 2022-04-18 RX ADMIN — ASPIRIN 81 MG: 81 TABLET, DELAYED RELEASE ORAL at 07:56

## 2022-04-18 RX ADMIN — FLUTICASONE FUROATE AND VILANTEROL TRIFENATATE 1 PUFF: 100; 25 POWDER RESPIRATORY (INHALATION) at 07:58

## 2022-04-18 RX ADMIN — ACETAMINOPHEN 975 MG: 325 TABLET ORAL at 21:08

## 2022-04-18 RX ADMIN — SENNOSIDES AND DOCUSATE SODIUM 1 TABLET: 50; 8.6 TABLET ORAL at 08:15

## 2022-04-18 RX ADMIN — DICLOFENAC 4 G: 10 GEL TOPICAL at 21:08

## 2022-04-18 RX ADMIN — Medication 1000 UNITS: at 07:57

## 2022-04-18 RX ADMIN — Medication 250 MG: at 08:06

## 2022-04-18 RX ADMIN — FERROUS SULFATE TAB 325 MG (65 MG ELEMENTAL FE) 325 MG: 325 (65 FE) TAB at 07:56

## 2022-04-18 RX ADMIN — OXYCODONE HYDROCHLORIDE 5 MG: 5 TABLET ORAL at 00:56

## 2022-04-18 RX ADMIN — LEVOTHYROXINE SODIUM 75 MCG: 75 TABLET ORAL at 06:44

## 2022-04-18 RX ADMIN — ASPIRIN 81 MG: 81 TABLET, DELAYED RELEASE ORAL at 17:06

## 2022-04-18 RX ADMIN — ACETAMINOPHEN 975 MG: 325 TABLET ORAL at 06:44

## 2022-04-18 RX ADMIN — GABAPENTIN 200 MG: 100 CAPSULE ORAL at 14:43

## 2022-04-18 RX ADMIN — GABAPENTIN 200 MG: 100 CAPSULE ORAL at 21:08

## 2022-04-18 RX ADMIN — OXYCODONE HYDROCHLORIDE 5 MG: 5 TABLET ORAL at 21:14

## 2022-04-18 RX ADMIN — UMECLIDINIUM 1 PUFF: 62.5 AEROSOL, POWDER ORAL at 07:58

## 2022-04-18 RX ADMIN — PANTOPRAZOLE SODIUM 20 MG: 20 TABLET, DELAYED RELEASE ORAL at 07:57

## 2022-04-18 RX ADMIN — DICLOFENAC 4 G: 10 GEL TOPICAL at 07:59

## 2022-04-18 RX ADMIN — POLYETHYLENE GLYCOL 3350 17 G: 17 POWDER, FOR SOLUTION ORAL at 07:55

## 2022-04-18 RX ADMIN — FLUOXETINE HYDROCHLORIDE 60 MG: 20 CAPSULE ORAL at 07:56

## 2022-04-18 RX ADMIN — LAMOTRIGINE 150 MG: 150 TABLET ORAL at 07:59

## 2022-04-18 ASSESSMENT — ACTIVITIES OF DAILY LIVING (ADL)
ADLS_ACUITY_SCORE: 32

## 2022-04-18 NOTE — PROGRESS NOTES
[facility]    Medicine Progress Note - Residency Service     Date of Admission:  4/14/2022    Assessment & Plan           81-year-old female admitted from TCU due to concerns for hypoxia requiring oxygen supplementation 3 days after left total shoulder arthroplasty was done.  Now on room air with incentive spirometry use and decreasing narcotic pain medications.  Will likely have protracted postoperative course due to poor baseline function.  Hgb drifting down, still requiring 2 L oxygen.     Acute blood loss anemia  Hemoglobin was 12.3 preop, 9.0 postoperatively.  Patient is asymptomatic, though difficult to assess given she is mostly bedbound.  Iron supplementation might assist in wound healing. Hgb 7.3 today. Baseline looks to be closer to 10-13 range. Possibly related to left bicep hematoma.     Hemoglobin at 1400    Transfuse if <7    Acute hypoxic respiratory failure, improving  COPD, stable  Multifactorial in the setting of interstitial lung disease, shallow breathing, history of COPD, and over narcosis.  Prior to admission was using 10 mg oxycodone every 4 hours at TCU and admitted using 2 L O2.  Continuing to use 2 L this morning, possible desaturation at night.     Continue judicious use of narcotic medications (see below)    Home O2 evaluation    Continue to encourage incentive spirometer every hour    Continue albuterol inhaler/nebulizer every 4 hours as needed, Breo inhaler, Ellipta inhaler     S/p left reverse total shoulder arthroplasty 4/11/22  Left bicep hematoma  Patient was admitted on POD 3 due to hypoxia.  Ortho consulted and no concern for postop complications so have signed off.  New area of swelling under left bicep noted 4/17-consistent with benign hematoma which will likely self resolve within a few weeks.    DVT prophylaxis per Ortho aspirin 81 mg twice daily    Pain control: Oxycodone 5 mg q4h prn, gabapentin 200 mg TID    Bowel regimen: MiraLAX,  senna    Physical therapy and Occupational Therapy as tolerated    High risk for delirium  -Bundle cares  -Frequent reminders and redirection  -Melatonin at bedtime  -Delirium precautions  -Clearly schedule medications for TCU to avoid oversedation    Upper and lower extremity stiffness/contractures  At baseline significantly limited mobility.  Patient is wheelchair dependent and needs Bonnie lift for transfers.  All her extremities are very rigid and she is somewhat tremulous.  Patient reports this has been the case for over 30 years.    Appreciate physical therapy recommendations    Medical transport at discharge    Recommend outpatient follow-up with neurology --cannot rule out Parkinson's or other neurological disorder    Elevated AST    Outpatient follow-up      Chronic medical conditions:  EHTN:  continue Norvasc 5 mg  Hypothyroidism: Continue levothyroxine 75 mcg daily  GERD: continue pantoprazole 20 mg daily  Overactive bladder: Continue mirabegron   History of colon cancer, s/p resection  History of normal pressure hydrocephalus, status post shunt   Osteoarthritis: Continue vitamin D  Anxiety, depression, Bipolar disorder: continue Lamictal 150 mg daily, 100 mg at bedtime; continue Prozac 60 mg and Abilify 15 mg      Diet: Regular Diet Adult    DVT Prophylaxis: Aspirin 81 mg twice daily per Ortho  Wetzel Catheter: Not present  Central Lines: None  Cardiac Monitoring: None  Code Status: Full Code      Disposition Plan   Expected Discharge: 04/18/2022     Anticipated discharge location:  Awaiting care coordination huddle  Delays:     Placement - TCU            The patient's care was discussed with the Attending Physician, Dr Joe Reid.     Kenzie Covarrubias MD  Residency Service  [facility]  ______________________________________________________________________    Interval History   There were no acute events overnight.  She continues to use 2 L at this time. She has pain, but feels  it is controlled with current pain regime. She is complaining of constipation.     Data reviewed today: I reviewed all medications, new labs and imaging results over the last 24 hours.    Physical Exam   Vital Signs: Temp: 98  F (36.7  C) Temp src: Oral BP: 99/64 Pulse: 79   Resp: 18 SpO2: 92 % O2 Device: None (Room air)  Weight: 161 lbs 1.6 oz     General: Chronically ill-appearing, wearing nasal cannula, no apparent distress  Cardiovascular: Normal rate and regular rhythm, normal heart sounds  Lungs: Pulmonary effort is normal, normal breath sounds  Abdominal: Large ventral hernia  Extremities: Severe contractures of upper and lower extremities; left upper extremity in sling with bandage at left shoulder at incision site  Pulses:  2+ radial, dorsalis pedis  Skin: Warm and dry. No concerning rashes or lesions.  Neurologic: No focal deficit, alert and oriented x3  Psychiatric: normal mood and affect, cooperative      Data   Recent Labs   Lab 04/18/22  0623 04/15/22  0619 04/14/22  1436 04/13/22  1326 04/13/22  0644 04/12/22  0651 04/11/22  1127   WBC 9.9 9.1 11.0  --   --  11.7* 9.1   HGB 7.3* 8.5* 9.0*  --    < > 9.8* 12.3   MCV 91 90 90  --   --  89 93    332 359  --   --  381 394   INR  --   --   --   --   --   --  1.06   NA  --  139 140  --   --  139  --    POTASSIUM  --  3.7 4.1  --   --  4.6 4.4   CHLORIDE  --  104 102  --   --  103  --    CO2  --  25 23  --   --  25  --    BUN  --  7* 14  --   --  17  --    CR  --  0.46* 0.65  --   --  0.64 0.65   ANIONGAP  --  10 15  --   --  11  --    MARTHA  --  8.8 9.3  --   --  8.8  --    GLC  --  91 109 124*   < > 112  112  --    ALBUMIN  --  2.0* 2.4*  --   --   --   --    PROTTOTAL  --  5.8* 6.5  --   --   --   --    BILITOTAL  --  0.7 0.6  --   --   --   --    ALKPHOS  --  102 119  --   --   --   --    ALT  --  37 38  --   --   --   --    AST  --  82* 93*  --   --   --   --    LIPASE  --   --  20  --   --   --   --     < > = values in this interval not  displayed.

## 2022-04-18 NOTE — PROGRESS NOTES
Care Management Follow Up    Length of Stay (days): 1    Expected Discharge Date: 04/19/2022     Concerns to be Addressed:     Medical progression, discharge disposition  Patient plan of care discussed at interdisciplinary rounds: Yes    Anticipated Discharge Disposition:  back to [facility]     Anticipated Discharge Services:  TCU  Anticipated Discharge DME: per treatment team      Patient/family educated on Medicare website which has current facility and service quality ratings:  yes  Education Provided on the Discharge Plan:  yes  Patient/Family in Agreement with the Plan:  yes    Referrals Placed by CM/COREY:  None needed  Private pay costs discussed: not applicable     Additional Information:  SW confirmed Pt has TCU bed hold at [facility].  CM to follow up with Miguel in admissions in the AM of 4/19 to discuss if Pt is ready for discharge.       CINDY Modi

## 2022-04-18 NOTE — UTILIZATION REVIEW
Inpatient appropriate    Admission Status; Secondary Review Determination       Under the authority of the Utilization Management Committee, the utilization review process indicated a secondary review on the above patient. The review outcome is based on review of the medical records, discussions with staff, and applying clinical experience noted on the date of the review.     (x) Inpatient Status Appropriate - This patient's medical care is consistent with medical management for inpatient care and reasonable inpatient medical practice.     RATIONALE FOR DETERMINATION   81 years old female with past medical history of hypertension, COPD, hypothyroidism, NPH with shunt, depression, bipolar disorder, colon cancer status postresection, history of bladder cancer, underwent left shoulder arthroplasty on 4/11.  Hospital course has been complicated with acute hypoxic respiratory failure and acute blood loss anemia.  Her hemoglobin continue to trend down.  Today it is 7.3.  She continues to require supplemental oxygen via nasal cannula.  There is concern of possible left bicep hematoma.  Given downward trending hemoglobin patient will need recheck hemoglobin.  If hemoglobin less than 7 then she will need transfusion.    At the time of admission with the information available to the attending physician more than 2 nights Hospital complex care was anticipated, based on patient risk of adverse outcome if treated as outpatient and complex care required. Inpatient admission is appropriate based on the Medicare guidelines.     The information on this document is developed by the utilization review team in order for the business office to ensure compliance. This only denotes the appropriateness of proper admission status and does not reflect the quality of care rendered.   The definitions of Inpatient Status and Observation Status used in making the determination above are those provided in the CMS Coverage Manual, Chapter 1 and  Chapter 6, section 70.4.   Sincerely,   Los Hudson MD  Utilization Review  Physician Advisor  [facility].

## 2022-04-18 NOTE — PROGRESS NOTES
Attempted to obtain a home O2 assessment for this pt, she does not ambulate at this time, requires a tamiko for transfers, unable to assess with activity for need of O2.  Pt resting comfortably at 90% for approximately 15 minutes for assessment.

## 2022-04-18 NOTE — PLAN OF CARE
Problem: Plan of Care - These are the overarching goals to be used throughout the patient stay.    Goal: Absence of Hospital-Acquired Illness or Injury  Intervention: Identify and Manage Fall Risk  Recent Flowsheet Documentation  Taken 4/18/2022 1458 by Eduarda Rodney RN  Safety Promotion/Fall Prevention:   activity supervised   bed alarm on   fall prevention program maintained   room door open   room near nurse's station   safety round/check completed  Taken 4/18/2022 0815 by Eduarda Rodney RN  Safety Promotion/Fall Prevention:   activity supervised   bed alarm on   fall prevention program maintained   room door open   room near nurse's station   safety round/check completed  Intervention: Prevent Skin Injury  Recent Flowsheet Documentation  Taken 4/18/2022 1458 by Eduarda Rodney RN  Body Position:   heels elevated   weight shifting  Intervention: Prevent and Manage VTE (Venous Thromboembolism) Risk  Recent Flowsheet Documentation  Taken 4/18/2022 1458 by Eduarda Rodney RN  Activity Management:   activity encouraged   activity adjusted per tolerance   up ad alli   up in chair  Intervention: Prevent Infection  Recent Flowsheet Documentation  Taken 4/18/2022 1458 by Eduarda Rodney RN  Infection Prevention:   personal protective equipment utilized   hand hygiene promoted   single patient room provided  Taken 4/18/2022 0815 by Eduarda Rodney RN  Infection Prevention:   personal protective equipment utilized   hand hygiene promoted   single patient room provided  Goal: Optimal Comfort and Wellbeing  Outcome: Ongoing, Progressing     Problem: Gas Exchange Impaired  Goal: Optimal Gas Exchange  Intervention: Optimize Oxygenation and Ventilation  Recent Flowsheet Documentation  Taken 4/18/2022 1458 by Eduarda Rodney RN  Head of Bed (HOB) Positioning: HOB at 15 degrees     Problem: Mobility Impairment  Goal: Optimal Mobility  Intervention: Optimize Mobility  Recent Flowsheet  Documentation  Taken 4/18/2022 5154 by Eduarda Rodney, RN  Assistive Device Utilized: lift device  Activity Management:   activity encouraged   activity adjusted per tolerance   up ad alli   up in chair  Positioning/Transfer Devices: pillows   Goal Outcome Evaluation:  Pt resting comfortably, encourage pt to relax limbs and stretch them out.  Worked with therapy and she is quite contracted.  Pain well managed.

## 2022-04-18 NOTE — PROGRESS NOTES
"PRIMARY DIAGNOSIS: \"GENERIC\" NURSING  OUTPATIENT/OBSERVATION GOALS TO BE MET BEFORE DISCHARGE:  1. ADLs back to baseline: Yes    2. Activity and level of assistance: Bonnie, wheelchair bound     3. Pain status: Improved-controlled with oral pain medications.    4. Return to near baseline physical activity: Yes     Discharge Planner Nurse   Safe discharge environment identified: Yes  Barriers to discharge: No       Entered by: Eduarda Rodney 04/18/2022 8:36 AM     Please review provider order for any additional goals.   Nurse to notify provider when observation goals have been met and patient is ready for discharge.    Pt provided PRN Oxycodone at 0815, 6/10 pain,states baseline is 5/10.  No other complaints at this time.  Writer rounded with MD in room.     Breakfast has been ordered, pt awaiting it's delivery.   "

## 2022-04-18 NOTE — PROGRESS NOTES
"PRIMARY DIAGNOSIS: \"GENERIC\" NURSING  OUTPATIENT/OBSERVATION GOALS TO BE MET BEFORE DISCHARGE:  1. ADLs back to baseline: Yes    2. Activity and level of assistance: Bonnie, very contracted, working with therapy for ROM at this time.     3. Pain status: Improved-controlled with oral pain medications.    4. Return to near baseline physical activity: Yes     Discharge Planner Nurse   Safe discharge environment identified: Yes  Barriers to discharge: No       Entered by: Eduarda Rodney 04/18/2022 3:00 PM     Please review provider order for any additional goals.   Nurse to notify provider when observation goals have been met and patient is ready for discharge.    Pt up to chair for a few hours during shift, denies pain outside her baseline pain.  Pt appears to be very contracted with positioning, difficult to reposition and offload heels and impacted area. Pt O2 sats 90-92% throughout shift on room air.  "

## 2022-04-19 ENCOUNTER — APPOINTMENT (OUTPATIENT)
Dept: OCCUPATIONAL THERAPY | Facility: CLINIC | Age: 82
DRG: 189 | End: 2022-04-19
Payer: MEDICARE

## 2022-04-19 VITALS
DIASTOLIC BLOOD PRESSURE: 56 MMHG | TEMPERATURE: 97.3 F | WEIGHT: 156.8 LBS | BODY MASS INDEX: 30.78 KG/M2 | OXYGEN SATURATION: 85 % | RESPIRATION RATE: 16 BRPM | SYSTOLIC BLOOD PRESSURE: 115 MMHG | HEART RATE: 74 BPM | HEIGHT: 60 IN

## 2022-04-19 LAB
ANION GAP SERPL CALCULATED.3IONS-SCNC: 10 MMOL/L (ref 5–18)
BACTERIA BLD CULT: NO GROWTH
BACTERIA BLD CULT: NO GROWTH
BUN SERPL-MCNC: 13 MG/DL (ref 8–28)
CALCIUM SERPL-MCNC: 9.4 MG/DL (ref 8.5–10.5)
CHLORIDE BLD-SCNC: 105 MMOL/L (ref 98–107)
CO2 SERPL-SCNC: 27 MMOL/L (ref 22–31)
CREAT SERPL-MCNC: 0.57 MG/DL (ref 0.6–1.1)
ERYTHROCYTE [DISTWIDTH] IN BLOOD BY AUTOMATED COUNT: 15.2 % (ref 10–15)
GFR SERPL CREATININE-BSD FRML MDRD: >90 ML/MIN/1.73M2
GLUCOSE BLD-MCNC: 104 MG/DL (ref 70–125)
HCT VFR BLD AUTO: 27.3 % (ref 35–47)
HGB BLD-MCNC: 8.2 G/DL (ref 11.7–15.7)
MCH RBC QN AUTO: 27.5 PG (ref 26.5–33)
MCHC RBC AUTO-ENTMCNC: 30 G/DL (ref 31.5–36.5)
MCV RBC AUTO: 92 FL (ref 78–100)
MRSA DNA SPEC QL NAA+PROBE: NEGATIVE
PLATELET # BLD AUTO: 494 10E3/UL (ref 150–450)
POTASSIUM BLD-SCNC: 3.6 MMOL/L (ref 3.5–5)
RBC # BLD AUTO: 2.98 10E6/UL (ref 3.8–5.2)
SA TARGET DNA: NEGATIVE
SODIUM SERPL-SCNC: 142 MMOL/L (ref 136–145)
WBC # BLD AUTO: 10.7 10E3/UL (ref 4–11)

## 2022-04-19 PROCEDURE — 36415 COLL VENOUS BLD VENIPUNCTURE: CPT

## 2022-04-19 PROCEDURE — 250N000013 HC RX MED GY IP 250 OP 250 PS 637

## 2022-04-19 PROCEDURE — 85018 HEMOGLOBIN: CPT

## 2022-04-19 PROCEDURE — 97110 THERAPEUTIC EXERCISES: CPT | Mod: GO

## 2022-04-19 PROCEDURE — 80048 BASIC METABOLIC PNL TOTAL CA: CPT

## 2022-04-19 PROCEDURE — 99238 HOSP IP/OBS DSCHRG MGMT 30/<: CPT | Mod: GC

## 2022-04-19 PROCEDURE — 250N000013 HC RX MED GY IP 250 OP 250 PS 637: Performed by: STUDENT IN AN ORGANIZED HEALTH CARE EDUCATION/TRAINING PROGRAM

## 2022-04-19 PROCEDURE — 87641 MR-STAPH DNA AMP PROBE: CPT | Performed by: FAMILY MEDICINE

## 2022-04-19 RX ORDER — BISACODYL 10 MG
10 SUPPOSITORY, RECTAL RECTAL ONCE
Status: DISCONTINUED | OUTPATIENT
Start: 2022-04-19 | End: 2022-04-19 | Stop reason: HOSPADM

## 2022-04-19 RX ORDER — GABAPENTIN 100 MG/1
200 CAPSULE ORAL 3 TIMES DAILY
Qty: 180 CAPSULE | Refills: 0 | Status: SHIPPED | OUTPATIENT
Start: 2022-04-19 | End: 2022-07-26

## 2022-04-19 RX ORDER — POLYETHYLENE GLYCOL 3350 17 G/17G
17 POWDER, FOR SOLUTION ORAL DAILY
Qty: 510 G | Refills: 0 | Status: SHIPPED | OUTPATIENT
Start: 2022-04-19

## 2022-04-19 RX ORDER — ACETAMINOPHEN 325 MG/1
975 TABLET ORAL EVERY 8 HOURS
Qty: 90 TABLET | Refills: 0 | Status: SHIPPED | OUTPATIENT
Start: 2022-04-19 | End: 2022-10-18

## 2022-04-19 RX ADMIN — POLYETHYLENE GLYCOL 3350 17 G: 17 POWDER, FOR SOLUTION ORAL at 09:00

## 2022-04-19 RX ADMIN — ASPIRIN 81 MG: 81 TABLET, DELAYED RELEASE ORAL at 08:57

## 2022-04-19 RX ADMIN — FERROUS SULFATE TAB 325 MG (65 MG ELEMENTAL FE) 325 MG: 325 (65 FE) TAB at 08:58

## 2022-04-19 RX ADMIN — LEVOTHYROXINE SODIUM 75 MCG: 75 TABLET ORAL at 06:25

## 2022-04-19 RX ADMIN — ACETAMINOPHEN 975 MG: 325 TABLET ORAL at 14:36

## 2022-04-19 RX ADMIN — Medication 1000 UNITS: at 09:00

## 2022-04-19 RX ADMIN — Medication 250 MG: at 09:00

## 2022-04-19 RX ADMIN — MIRABEGRON 25 MG: 25 TABLET, FILM COATED, EXTENDED RELEASE ORAL at 08:59

## 2022-04-19 RX ADMIN — ACETAMINOPHEN 975 MG: 325 TABLET ORAL at 06:25

## 2022-04-19 RX ADMIN — DICLOFENAC 4 G: 10 GEL TOPICAL at 12:53

## 2022-04-19 RX ADMIN — LAMOTRIGINE 150 MG: 150 TABLET ORAL at 08:58

## 2022-04-19 RX ADMIN — GABAPENTIN 200 MG: 100 CAPSULE ORAL at 08:58

## 2022-04-19 RX ADMIN — FLUTICASONE FUROATE AND VILANTEROL TRIFENATATE 1 PUFF: 100; 25 POWDER RESPIRATORY (INHALATION) at 08:58

## 2022-04-19 RX ADMIN — DICLOFENAC 4 G: 10 GEL TOPICAL at 08:58

## 2022-04-19 RX ADMIN — MAGNESIUM HYDROXIDE 30 ML: 400 SUSPENSION ORAL at 09:20

## 2022-04-19 RX ADMIN — OXYCODONE HYDROCHLORIDE 5 MG: 5 TABLET ORAL at 06:25

## 2022-04-19 RX ADMIN — AMLODIPINE BESYLATE 5 MG: 5 TABLET ORAL at 08:57

## 2022-04-19 RX ADMIN — PANTOPRAZOLE SODIUM 20 MG: 20 TABLET, DELAYED RELEASE ORAL at 08:59

## 2022-04-19 RX ADMIN — GABAPENTIN 200 MG: 100 CAPSULE ORAL at 14:36

## 2022-04-19 RX ADMIN — UMECLIDINIUM 1 PUFF: 62.5 AEROSOL, POWDER ORAL at 09:00

## 2022-04-19 RX ADMIN — FLUOXETINE HYDROCHLORIDE 60 MG: 20 CAPSULE ORAL at 08:58

## 2022-04-19 ASSESSMENT — ACTIVITIES OF DAILY LIVING (ADL)
ADLS_ACUITY_SCORE: 32
ADLS_ACUITY_SCORE: 35
ADLS_ACUITY_SCORE: 32
ADLS_ACUITY_SCORE: 34
ADLS_ACUITY_SCORE: 35
ADLS_ACUITY_SCORE: 34
ADLS_ACUITY_SCORE: 32
ADLS_ACUITY_SCORE: 35
ADLS_ACUITY_SCORE: 34
ADLS_ACUITY_SCORE: 35
ADLS_ACUITY_SCORE: 35
ADLS_ACUITY_SCORE: 32
ADLS_ACUITY_SCORE: 32
ADLS_ACUITY_SCORE: 35
ADLS_ACUITY_SCORE: 32
ADLS_ACUITY_SCORE: 32

## 2022-04-19 NOTE — DISCHARGE SUMMARY
[facility]  Discharge Summary - Medicine & Pediatrics       Date of Admission:  4/14/2022  Date of Discharge:  4/19/2022  Discharging Provider: Kenzie Mckenzie MD/Kenzie Covarrubias MD PGY-1  Discharge Service: Hospitalist Service    Discharge Diagnoses   Acute hypoxic respiratory failure   Post operative pain    Follow-ups Needed After Discharge   Follow up with NH physician per protocol. Recommend CBC, BMP in 1 week.     Unresulted Labs Ordered in the Past 30 Days of this Admission     Date and Time Order Name Status Description    4/19/2022  8:52 AM MRSA MSSA PCR, Nasal Swab In process     4/14/2022  2:56 PM Blood Culture Peripheral Blood Preliminary     4/14/2022  2:56 PM Blood Culture Peripheral Blood Preliminary       These results will be followed up by [facility] and forwarded to PCP.     Discharge Disposition   Discharged to short-term care facility  Condition at discharge: Stable  Patient ready to discharge to a skilled nursing facility as soon as possible in order to create capacity for patients related to the COVID-19 pandemic.    Hospital Course   Amparo Sharma is a 81 year old female admitted on 4/14/2022. She has a history of hypertension, COPD, hypothyroidism, NPH with shunt, depression, bipolar disorder, stage III colon cancer, status post resection, history of bladder cancer, recent left shoulder arthroplasty and is admitted for hypoxia on 4/14/22-POD3.  Patient had been discharged on 4/13/22 following left shoulder arthroplasty. She was admitted back to her care facility for 1 day when she was noted to have hypoxia to the 70's and sent the ED for evaluation. In the ED, CT showed atelectasis adjacent to moderate elevation of right hemidiaphragm, additional work up negative for pneumonia. Hypoxia was thought to be secondary to respiratory depression from increased oxycodone use at discharge. The patient also has known underline lung disease. She was weaned off of oxygen at rest.  Her shoulder pain was well controlled at a decreased dose of Oxycodone 5 mg Q4h as needed, and gabapentin was added. She was discharged back to her care facility in stable condition with 1-2 L oxygen as needed.     Consultations This Hospital Stay   CARE MANAGEMENT / SOCIAL WORK IP CONSULT  PHYSICAL THERAPY ADULT IP CONSULT  OCCUPATIONAL THERAPY ADULT IP CONSULT  ORTHOPEDIC SURGERY IP CONSULT  PHYSICAL THERAPY ADULT IP CONSULT  OCCUPATIONAL THERAPY ADULT IP CONSULT    Code Status   Full Code       The patient was discussed with Dr. Kenzie Mckenzie.     Kenzie Covarrubias MD  [facility]  [address] [address]  Phone: [phone]  Fax: [phone]  ______________________________________________________________________    Physical Exam   Vital Signs: Temp: 97.3  F (36.3  C) Temp src: Oral BP: 115/56 Pulse: 74   Resp: 16 SpO2: (!) 85 % O2 Device: Nasal cannula Oxygen Delivery: 1.5 LPM  Weight: 156 lbs 12.8 oz  General: Chronically ill-appearing, wearing nasal cannula, no apparent distress  Cardiovascular: Normal rate and regular rhythm, normal heart sounds  Lungs: Pulmonary effort is normal, normal breath sounds  Abdominal: Large ventral hernia  Extremities: Severe contractures of upper and lower extremities; left upper extremity in sling with bandage at left shoulder at incision site  Pulses:  2+ radial, dorsalis pedis  Skin: Warm and dry. No concerning rashes or lesions.  Neurologic: No focal deficit, alert and oriented x3  Psychiatric: normal mood and affect, cooperative      Primary Care Physician   Wilmer Voss    Discharge Orders      General info for SNF    Length of Stay Estimate: Short Term Care: Estimated # of Days <30  Condition at Discharge: Stable  Level of care:skilled   Rehabilitation Potential: Fair  Admission H&P remains valid and up-to-date: Yes  Recent Chemotherapy: N/A  Use Nursing Home Standing Orders: Yes      Mantoux instructions    Give two-step Mantoux (PPD) Per Facility Policy Yes     Follow Up and recommended labs and tests    Follow up with care home physician.  The following labs/tests are recommended: CBC, BMP.     Reason for your hospital stay    Acute hypoxic respiratory failure  Post operative pain control     Activity - Up with nursing assistance     Full Code     Physical Therapy Adult Consult    Evaluate and treat as clinically indicated.    Reason:  Shoulder surgery     Occupational Therapy Adult Consult    Evaluate and treat as clinically indicated.    Reason:  Shoulder surgery     Fall precautions     Oxygen Adult/Peds    Oxygen Documentation:   I certify that this patient, Amparo Sharma has been under my care (or a nurse practitioner or physican's assistant working with me). This is the face-to-face encounter for oxygen medical necessity.      Amparo Sharma is now in a chronic stable state and continues to require supplemental oxygen. Patient has continued oxygen desaturation due to COPD J44.9  ILD J84.9.    Alternative treatment(s) tried or considered and deemed clinically infective for treatment of COPD J44.9  ILD J84.9 include inhalers and pulmonary toileting.  If portability is ordered, is the patient mobile within the home? no    **Patients who qualify for home O2 coverage under the CMS guidelines require ABG tests or O2 sat readings obtained closest to, but no earlier than 2 days prior to the discharge, as evidence of the need for home oxygen therapy. Testing must be performed while patient is in the chronic stable state. See notes for O2 sats.**     Diet    Follow this diet upon discharge: Orders Placed This Encounter      Regular Diet Adult       Significant Results and Procedures   Most Recent 3 CBC's:Recent Labs   Lab Test 04/19/22  0851 04/18/22  1351 04/18/22  0623 04/15/22  0619   WBC 10.7  --  9.9 9.1   HGB 8.2* 8.4* 7.3* 8.5*   MCV 92  --  91 90   *  --  393 332     Most  Recent 3 BMP's:Recent Labs   Lab Test 04/19/22  0851 04/15/22  0619 04/14/22  1436    139 140   POTASSIUM 3.6 3.7 4.1   CHLORIDE 105 104 102   CO2 27 25 23   BUN 13 7* 14   CR 0.57* 0.46* 0.65   ANIONGAP 10 10 15   MARTHA 9.4 8.8 9.3    91 109   ,   Results for orders placed or performed during the hospital encounter of 04/14/22   CT Chest Pulmonary Embolism w Contrast    Narrative    EXAM: CT CHEST PULMONARY EMBOLISM W CONTRAST  LOCATION: [facility]  DATE/TIME: 4/14/2022 4:10 PM    INDICATION: Tachycardia and hypoxia.  COMPARISON: CT chest from 11/01/2020.  TECHNIQUE: CT chest pulmonary angiogram during arterial phase injection of IV contrast. Multiplanar reformats and MIP reconstructions were performed. Dose reduction techniques were used.   CONTRAST: ISOVUE 370 61mL    FINDINGS:  ANGIOGRAM CHEST: Pulmonary arteries are normal caliber and negative for pulmonary emboli. Thoracic aorta is negative for dissection. No CT evidence of right heart strain.    LUNGS AND PLEURA: There is moderate elevation of the right hemidiaphragm with adjacent compressive atelectasis. There are a few scattered bands of atelectasis in the left mid and lower lung zones. No evidence of a focal airspace consolidation. The   central airways are clear. No pneumothorax or pleural effusion.    MEDIASTINUM/AXILLAE: Heart size is normal. Small hiatal hernia. Multivessel coronary artery calcifications. No adenopathy. Incidental note is made of retropharyngeal common carotid arteries as seen on axial image 1.    CORONARY ARTERY CALCIFICATION: Moderate.    UPPER ABDOMEN: Renal cysts. Cholecystectomy. No acute findings.    MUSCULOSKELETAL: Osteopenia with accentuation of the thoracic kyphosis. Bilateral shoulder arthroplasties, incompletely imaged. In the region of the right shoulder there are areas of ill-defined fluid and gas density without definite loculated   collection, though beam hardening artifact obscures  detail in this region.      Impression    IMPRESSION:  1.  Negative for pulmonary embolism.    2.  Moderate elevation of the right hemidiaphragm with adjacent atelectasis. No evidence of pneumonia.    3.  Evidence of recent left shoulder arthroplasty with ill-defined areas of fluid and gas which are presumably resolving postoperative findings, though detailed assessment in this region is compromised by beam hardening artifact from the patient's   orthopedic hardware.    4.  Coronary artery disease.       Discharge Medications   Discharge Medication List as of 4/19/2022  3:24 PM      START taking these medications    Details   gabapentin (NEURONTIN) 100 MG capsule Take 2 capsules (200 mg) by mouth 3 times daily, Disp-180 capsule, R-0, E-Prescribe      polyethylene glycol (MIRALAX) 17 GM/Dose powder Take 17 g by mouth daily, Disp-510 g, R-0, E-Prescribe         CONTINUE these medications which have CHANGED    Details   acetaminophen (TYLENOL) 325 MG tablet Take 3 tablets (975 mg) by mouth every 8 hours, Disp-90 tablet, R-0, E-Prescribe         CONTINUE these medications which have NOT CHANGED    Details   albuterol (PROAIR HFA/PROVENTIL HFA/VENTOLIN HFA) 108 (90 Base) MCG/ACT inhaler Inhale 2 puffs into the lungs every 4 hours as needed for shortness of breath / dyspnea, Historical      albuterol (PROVENTIL) (2.5 MG/3ML) 0.083% neb solution Inhale 2.5 mg into the lungs every 4 hours as needed for shortness of breath / dyspnea, Historical      amLODIPine (NORVASC) 5 MG tablet Take 5 mg by mouth daily, Historical      amoxicillin (AMOXIL) 500 MG capsule Take 2,000 mg by mouth once as needed (Prior to dental appt) , Historical      ARIPiprazole (ABILIFY) 15 MG tablet Take 15 mg by mouth At Bedtime, Historical      aspirin (ASA) 81 MG EC tablet Take 1 tablet (81 mg) by mouth in the morning and 1 tablet (81 mg) in the evening. Take with meals., Disp-60 tablet, R-0, Local Print      BREO ELLIPTA 100-25 MCG/INH inhaler  Inhale 1 puff into the lungs daily 1PM, EVELIA, Historical      !! carboxymethylcellulose (REFRESH PLUS) 0.5 % SOLN ophthalmic solution Place 1 drop into both eyes as needed in the morning and 1 drop as needed at noon and 1 drop as needed in the evening and 1 drop as needed before bedtime for dry eyes., Historical      !! carboxymethylcellulose (REFRESH PLUS) 0.5 % SOLN ophthalmic solution Place 1 drop into both eyes in the morning., Historical      cetirizine (ZYRTEC) 10 MG tablet Take 10 mg by mouth daily , Historical      diclofenac (VOLTAREN) 1 % topical gel Apply 4 g topically 4 times daily Apply to affected knees., Historical      ferrous sulfate (FEROSUL) 325 (65 Fe) MG tablet Take 325 mg by mouth daily, Historical      FLUoxetine (PROZAC) 20 MG capsule Take 60 mg by mouth daily, Historical      INCRUSE ELLIPTA 62.5 MCG/INH Inhaler Inhale 1 puff into the lungs daily , EVELIA, Historical      !! lamoTRIgine (LAMICTAL) 100 MG tablet Take 100 mg by mouth At Bedtime, Historical      !! lamoTRIgine (LAMICTAL) 150 MG tablet Take 150 mg by mouth daily , Historical      levothyroxine (SYNTHROID/LEVOTHROID) 75 MCG tablet Take 75 mcg by mouth daily, Historical      menthol-zinc oxide (CALMOSEPTINE) 0.44-20.6 % OINT ointment Apply topically 2 times daily as needed for irritationHistorical      mirabegron (MYRBETRIQ) 25 MG 24 hr tablet Take 25 mg by mouth daily , Historical      Multiple Vitamins-Minerals (MULTIVITAMIN PO) Take 1 tablet by mouth daily , Historical      oxyCODONE (ROXICODONE) 5 MG tablet Take 1 tablet (5 mg) by mouth every 4 hours as needed for moderate to severe pain, Disp-30 tablet, R-0, Local Print      pantoprazole (PROTONIX) 20 MG EC tablet Take 20 mg by mouth daily , Historical      saccharomyces boulardii (FLORASTOR) 250 MG capsule Take 250 mg by mouth in the morning., Historical      SENOKOT S 8.6-50 MG tablet Take 2 tablets by mouth daily , EVELIA, Historical      Vitamin D, Cholecalciferol, 25 MCG (1000  UT) CAPS Take 1,000 Units by mouth daily , Historical       !! - Potential duplicate medications found. Please discuss with provider.        Allergies   Allergies   Allergen Reactions     Amantadine      Antihistamine Allergy Relief [Diphenhydramine] Other (See Comments)     hyperactivity     Bactrim [Sulfamethoxazole W/Trimethoprim]      Codeine Itching     Demerol [Meperidine] Nausea     Hmg-Coa-R Inhibitors Other (See Comments)     Was hospitalized for possible rhabdo     Meloxicam Other (See Comments)     Talwin [Pentazocine] Other (See Comments)     drowsiness     Tramadol Nausea     Valium [Diazepam] Other (See Comments)     Hallucinations/paranoid

## 2022-04-19 NOTE — PLAN OF CARE
Physical Therapy Discharge Summary    Reason for therapy discharge:    Discharged to transitional care facility.    Progress towards therapy goal(s). See goals on Care Plan in [facility] electronic health record for goal details.  Goals not met.  Barriers to achieving goals:   discharge from facility and weakness, impaired balance.    Therapy recommendation(s):    Continued therapy is recommended.  Rationale/Recommendations:  TCU.

## 2022-04-19 NOTE — PROGRESS NOTES
Care Management Discharge Note    Discharge Date: 04/19/2022       Discharge Disposition:      Discharge Services:      Discharge DME:      Discharge Transportation: health plan transportation    Private pay costs discussed: Not applicable    PAS Confirmation Code:    Patient/family educated on Medicare website which has current facility and service quality ratings:      Education Provided on the Discharge Plan:    Persons Notified of Discharge Plans: pt, TCU, daughter  Patient/Family in Agreement with the Plan:      Handoff Referral Completed: Yes    Additional Information:  2:17 PM  COREY informed pt is medically ready to discharge.  COREY confirmed with [facility] that they can accept back today.  COREY scheduled stretcher ride at 2:45pm.  PCS done.  COREY updated bedside RN and daughter around discharge.  No further needs.        CARY Gimenez

## 2022-04-19 NOTE — PLAN OF CARE
Goal Outcome Evaluation:      Repositioned q2 hrs. Reports pain at the end of the shift. PRN oxy given with positive effect. Pt had small/smear BM this AM. If HBG is stable this AM, then possible discharge back to TCU.

## 2022-04-19 NOTE — PLAN OF CARE
Problem: Bowel Motility Impaired (Shoulder Arthroplasty)  Goal: Effective Bowel Elimination  Outcome: Ongoing, Not Progressing     Problem: Plan of Care - These are the overarching goals to be used throughout the patient stay.    Goal: Plan of Care Review/Shift Note  Description: The Plan of Care Review/Shift note should be completed every shift.  The Outcome Evaluation is a brief statement about your assessment that the patient is improving, declining, or no change.  This information will be displayed automatically on your shift note.  Outcome: Ongoing, Progressing   Goal Outcome Evaluation:    Patient has not had a BM except incontinent smears in brief. Milk of Mag added to medications and suppository refused by patient. Patient up to chair for about 20 minutes in AM, incontinent of urine twice during shift. Pain (averaging throughout shift as 4/10) treated with acetaminophen. Effective per patient. Daily cares provided and patient belongings packed for transfer.

## 2022-04-19 NOTE — PROGRESS NOTES
ZANDRA Diaz requested IP chart review to see if patient fits criteria to discontinue MRSA infection flag/contact isolation.     12/22/2018 - MRSA + swab in [facility] lab. MRSA infection flag was updated with 2018 date for accuracy.     .Infection Prevention Progress Note  4/19/2022      Patient Name: Amparo Sharma [id number]  Admit Date: 4/14/2022    Infection Status as of 4/19/2022 8:43 AM: MRSA  Isolation Status as of 4/19/2022 8:43 AM: Contact     MDRO Discontinuation  Infection Prevention has reviewed this patient's chart per the MDRO D/C Policy and have taken the following action:   status.  The patient does not qualify for discontinuation as patient does not have the required negative results. When non-qualifying reason(s) no longer apply, please contact Infection Prevention for re-evaluation.      Patient requires two consecutive negative cultures from nares prior to continuing discontinuation evaluation. Surveillance culture orders placed, date: 4/19/2022    Contact Precautions ordered for the following MDRO(s): MRSA    If you have any questions, please contact Infection Prevention.    Arabella Oconnor, Infection Prevention, [phone].

## 2022-04-19 NOTE — PROGRESS NOTES
Patient had swallow study completed. Recommended minced and moist and sitting at a minimum of on 45 degrease when having oral intake.

## 2022-04-19 NOTE — PLAN OF CARE
Problem: Plan of Care - These are the overarching goals to be used throughout the patient stay.    Goal: Absence of Hospital-Acquired Illness or Injury  Intervention: Identify and Manage Fall Risk  Recent Flowsheet Documentation  Taken 4/18/2022 1700 by Diana Byrd RN  Safety Promotion/Fall Prevention:   room door open   patient and family education   nonskid shoes/slippers when out of bed   lighting adjusted   clutter free environment maintained   bed alarm on     Problem: Plan of Care - These are the overarching goals to be used throughout the patient stay.    Goal: Absence of Hospital-Acquired Illness or Injury  Intervention: Prevent Skin Injury  Recent Flowsheet Documentation  Taken 4/18/2022 2100 by Diana Byrd RN  Body Position:   turned   right   lower extremity elevated  Taken 4/18/2022 1700 by Diana Byrd RN  Body Position:   turned   right   lower extremity elevated     Problem: Plan of Care - These are the overarching goals to be used throughout the patient stay.    Goal: Optimal Comfort and Wellbeing  Outcome: Ongoing, Progressing     Problem: Mobility Impairment  Goal: Optimal Mobility  Outcome: Ongoing, Progressing     Problem: Functional Ability Impaired (Shoulder Arthroplasty)  Goal: Optimal Functional Ability  Outcome: Ongoing, Progressing    Pt remains on bedrest. Alert but disoriented to time and place. C/o pain to right shoulder. Administered with Tylenol and Oxycodone 5mg as prescribed. Sling in situ to left shoulder. Denies pain to left shoulder. Lower extremities stiff and slight contraction noted. Turned for pressure relief. Heels offloaded with pillows.      Goal Outcome Evaluation:

## 2022-04-19 NOTE — PROGRESS NOTES
ZANDRA Diaz requested IP chart review for MRSA from 2008 so a chart review was performed by IP. In Care Everywhere: Patient found to have 12/22/2018 MRSA+ Swab tested at [facility].     .Infection Prevention Progress Note  4/19/2022      Patient Name: Amparo Sharma [id number]  Admit Date: 4/14/2022    Infection Status as of 4/19/2022 8:24 AM: MRSA  Isolation Status as of 4/19/2022 8:24 AM: Contact     MDRO Discontinuation  Infection Prevention has reviewed this patient's chart per the MDRO D/C Policy and have taken the following action:      The patient does not qualify for discontinuation as patient does not have the required negative results. Since patient does not have current antibiotics listed in the medications tab, patient meets criteria to screen for MRSA nares colonization this admit.     Patient requires two consecutive negative cultures from nares prior to continuing discontinuation evaluation. Surveillance culture orders placed, date: 4/19/2022, order placed by IP as provider co-sign.     Contact Precautions ordered for the following MDRO(s): MRSA    If you have any questions, please contact Infection Prevention.    Arabella Oconnor, Infection Prevention [phone].

## 2022-04-19 NOTE — PROGRESS NOTES
Patient has been on room air all day; tolerating it. Transport arrived to take patient to TCU; upon their arrival patient oxygen saturation was 85% on room air; placed patient on 1.5L of oxygen and increased her saturation immediately to 93%. Patient AVS updated and [facility]. Message left for admissions.

## 2022-04-20 ENCOUNTER — PATIENT OUTREACH (OUTPATIENT)
Dept: CARE COORDINATION | Facility: CLINIC | Age: 82
End: 2022-04-20
Payer: MEDICARE

## 2022-04-20 DIAGNOSIS — Z71.89 OTHER SPECIFIED COUNSELING: ICD-10-CM

## 2022-04-20 NOTE — PROGRESS NOTES
Clinic Care Coordination Contact    Background: Care Coordination referral placed from S discharge report for reason of patient meeting criteria for a TCM outreach call by [facility] team.    Assessment: Upon chart review, [facility] Team member will cancel/close the referral for TCM outreach due to reason below:    Patient is not established within [facility] Primary Care. Upon chart review, [facility] Team member noted patient discharged to TCU    Plan: Care Coordination referral for TCM outreach canceled.    Nataliia Reina  Community Health Worker  [facility] [place], [facility]  Ph: [phone]

## 2022-04-26 ENCOUNTER — TRANSITIONAL CARE UNIT VISIT (OUTPATIENT)
Dept: GERIATRICS | Facility: CLINIC | Age: 82
End: 2022-04-26
Payer: MEDICARE

## 2022-04-26 DIAGNOSIS — I10 ESSENTIAL HYPERTENSION: ICD-10-CM

## 2022-04-26 DIAGNOSIS — J98.11 ATELECTASIS: ICD-10-CM

## 2022-04-26 DIAGNOSIS — F03.90 DEMENTIA WITHOUT BEHAVIORAL DISTURBANCE, UNSPECIFIED DEMENTIA TYPE: ICD-10-CM

## 2022-04-26 DIAGNOSIS — R00.0 TACHYCARDIA: ICD-10-CM

## 2022-04-26 DIAGNOSIS — R06.02 SHORTNESS OF BREATH: ICD-10-CM

## 2022-04-26 DIAGNOSIS — R09.02 HYPOXIA: ICD-10-CM

## 2022-04-26 DIAGNOSIS — R53.81 PHYSICAL DECONDITIONING: ICD-10-CM

## 2022-04-26 DIAGNOSIS — J44.9 CHRONIC OBSTRUCTIVE PULMONARY DISEASE, UNSPECIFIED COPD TYPE (H): ICD-10-CM

## 2022-04-26 DIAGNOSIS — J96.01 ACUTE RESPIRATORY FAILURE WITH HYPOXIA (H): Primary | ICD-10-CM

## 2022-04-26 DIAGNOSIS — Z96.612 S/P REVERSE TOTAL SHOULDER ARTHROPLASTY, LEFT: ICD-10-CM

## 2022-04-26 DIAGNOSIS — G89.18 ACUTE POST-OPERATIVE PAIN: ICD-10-CM

## 2022-04-26 DIAGNOSIS — D64.9 ANEMIA, UNSPECIFIED TYPE: ICD-10-CM

## 2022-04-26 DIAGNOSIS — F31.9 BIPOLAR 1 DISORDER (H): ICD-10-CM

## 2022-04-26 PROCEDURE — 99309 SBSQ NF CARE MODERATE MDM 30: CPT | Performed by: NURSE PRACTITIONER

## 2022-04-26 NOTE — PROGRESS NOTES
[facility] GERIATRICS    PRIMARY CARE PROVIDER AND CLINIC:  Wilmer Voss MD, [facility] PHYSICIAN SERVICES [address]  Chief Complaint   Patient presents with     Hospital F/U      [place] Medical Record Number:  [id number]  Place of Service where encounter took place:  [facility] ([facility]) [[id number]]    Amparo Sharma  is a 81 year old  (1940), admitted to the above facility from  [facility]. Hospital stay 4/14 through 4/19..   HPI:    Brief Summary of Hospital Course:   Patient is admitted for hypoxia.  Patient had been discharged on 4/13/22 following left shoulder arthroplasty. She was admitted back to her care facility for 1 day when she was noted to have hypoxia to the 70's and sent the ED for evaluation. CT showed atelectasis adjacent to moderate elevation of right hemidiaphragm, additional work up negative for pneumonia. Hypoxia was thought to be secondary to respiratory depression from increased oxycodone use at discharge. She was weaned off of oxygen at rest. Her shoulder pain was well controlled at a decreased dose of Oxycodone 5 mg Q4h as needed, and gabapentin was added. She was discharged back to her care facility in stable condition with 1-2 L oxygen as needed.     Updates on Status Since Skilled nursing Admission:   Patient is seen today in her room for admission. She states that she is feeling good. She denies shortness of breath or dyspnea. She has some baseline nonproductive coughing due to COPD. She quit smoking at the end of 2021 and her respiratory status has been better since then. Her pain level is 4/10 on her left shoulder from surgery. PRN oxycodone is helpful for reducing her pain. She does not understand why she was put on gabapentin at the hospital and she states that it does not work for her. She would like to discontinue it. She has a good appetite. She sleeps well. She denies chest pain, palpitations,  dizziness, nausea/vomiting, and b/b concerns.    CODE STATUS/ADVANCE DIRECTIVES DISCUSSION:  Full Code  CPR/Full code   ALLERGIES:   Allergies   Allergen Reactions     Amantadine      Antihistamine Allergy Relief [Diphenhydramine] Other (See Comments)     hyperactivity     Bactrim [Sulfamethoxazole W/Trimethoprim]      Codeine Itching     Demerol [Meperidine] Nausea     Hmg-Coa-R Inhibitors Other (See Comments)     Was hospitalized for possible rhabdo     Meloxicam Other (See Comments)     Talwin [Pentazocine] Other (See Comments)     drowsiness     Tramadol Nausea     Valium [Diazepam] Other (See Comments)     Hallucinations/paranoid      PAST MEDICAL HISTORY:   Past Medical History:   Diagnosis Date     Abdominal hernia      Abdominal hernia      Alcoholism in remission (H)      Alcoholism in remission (H) 2017     Anemia      Arthritis      Arthritis      Asthma      Asthma 1/20/2017     Bipolar 1 disorder (H)      Bipolar disorder (H)      Bladder incontinence 2013     Cancer (H)      Cellulitis      Cellulitis 12/12/2016    of left elbow     Chronic pain syndrome      COPD (chronic obstructive pulmonary disease) (H)      COPD with acute exacerbation (H) 12/21/2018     Dementia (H)      Depression      Depression      Disease of thyroid gland      Falls frequently      Frequent falls 2016     Frequent falls 2017     GERD (gastroesophageal reflux disease)      GERD (gastroesophageal reflux disease)      History of blood transfusion     20 years ago     History of colon cancer     s/p resection 1/2015, treated with 5-6 cycles of Folfox     History of transfusion      HTN (hypertension)      Hydrocephalus (H)      Hydrocephalus (H)      Hyperlipidemia      Hypertension      Hypothyroidism      Hypothyroidism      Infection of skin due to methicillin resistant Staphylococcus aureus (MRSA)      Loss of balance      Memory loss      Memory loss 2017     Normal pressure hydrocephalus (H) 02/03/2017     NPH (normal  pressure hydrocephalus) (H)      Parkinson disease (H)      Renal insufficiency      Squamous cell cancer of multiple sites of skin of upper arm, left 2016    Dr. Andrea      Thyroid disease      Urinary incontinence      Urinary incontinence       PAST SURGICAL HISTORY:   has a past surgical history that includes Hysterectomy; multiple knee surgeries; rotator cuff surgeries; Cholecystectomy; colon cancer resection (1/2015); GI surgery; Abdomen surgery; orthopedic surgery; Insert drain lumbar (N/A, 2/5/2017); Optical tracking system implant shunt ventriculoperitoneal (Right, 2/8/2017); GYN surgery; Esophagoscopy, gastroscopy, duodenoscopy (EGD), combined (N/A, 9/7/2017); Colon surgery; Laparoscopic assisted colectomy; Hysterectomy; joint replacement; Arthroscopy shoulder rotator cuff repair; fracture surgery; Laparoscopic cholecystectomy; Implant shunt ventriculoperitoneal (02/03/2017); Lumbar Puncture (02/03/2017); RECONSTR TOTAL SHOULDER IMPLANT (Right, 3/5/2019); Colonoscopy (N/A, 2/3/2020); and Reverse arthroplasty shoulder (Left, 4/11/2022).  FAMILY HISTORY: family history includes Arthritis in her brother; Breast Cancer (age of onset: 60) in her mother; Depression (age of onset: 55) in her father; Peptic Ulcer Disease in her father; Prostate Cancer in her brother.  SOCIAL HISTORY:   reports that she quit smoking about 25 years ago. Her smoking use included cigarettes. She started smoking about 42 years ago. She smoked 0.25 packs per day. She has quit using smokeless tobacco. She reports that she does not drink alcohol and does not use drugs.  Patient's living condition: lives with family, daughter     Post Discharge Medication Reconciliation Status: discharge medications reconciled, continue medications without change  Current Outpatient Medications   Medication Sig     acetaminophen (TYLENOL) 325 MG tablet Take 3 tablets (975 mg) by mouth every 8 hours     albuterol (PROAIR HFA/PROVENTIL HFA/VENTOLIN HFA) 108  (90 Base) MCG/ACT inhaler Inhale 2 puffs into the lungs every 4 hours as needed for shortness of breath / dyspnea     albuterol (PROVENTIL) (2.5 MG/3ML) 0.083% neb solution Inhale 2.5 mg into the lungs every 4 hours as needed for shortness of breath / dyspnea     amLODIPine (NORVASC) 5 MG tablet Take 5 mg by mouth daily     amoxicillin (AMOXIL) 500 MG capsule Take 2,000 mg by mouth once as needed (Prior to dental appt)      ARIPiprazole (ABILIFY) 15 MG tablet Take 15 mg by mouth At Bedtime     aspirin (ASA) 81 MG EC tablet Take 1 tablet (81 mg) by mouth in the morning and 1 tablet (81 mg) in the evening. Take with meals.     BREO ELLIPTA 100-25 MCG/INH inhaler Inhale 1 puff into the lungs daily 1PM     carboxymethylcellulose (REFRESH PLUS) 0.5 % SOLN ophthalmic solution Place 1 drop into both eyes as needed in the morning and 1 drop as needed at noon and 1 drop as needed in the evening and 1 drop as needed before bedtime for dry eyes.     carboxymethylcellulose (REFRESH PLUS) 0.5 % SOLN ophthalmic solution Place 1 drop into both eyes in the morning.     cetirizine (ZYRTEC) 10 MG tablet Take 10 mg by mouth daily      diclofenac (VOLTAREN) 1 % topical gel Apply 4 g topically 4 times daily Apply to affected knees.     ferrous sulfate (FEROSUL) 325 (65 Fe) MG tablet Take 1 tablet (325 mg) by mouth Every Mon, Wed, Fri Morning     FLUoxetine (PROZAC) 20 MG capsule Take 60 mg by mouth daily     gabapentin (NEURONTIN) 100 MG capsule Take 2 capsules (200 mg) by mouth 3 times daily     INCRUSE ELLIPTA 62.5 MCG/INH Inhaler Inhale 1 puff into the lungs daily      lamoTRIgine (LAMICTAL) 100 MG tablet Take 100 mg by mouth At Bedtime     lamoTRIgine (LAMICTAL) 150 MG tablet Take 150 mg by mouth daily      levothyroxine (SYNTHROID/LEVOTHROID) 75 MCG tablet Take 75 mcg by mouth daily     menthol-zinc oxide (CALMOSEPTINE) 0.44-20.6 % OINT ointment Apply topically 2 times daily as needed for irritation     mirabegron (MYRBETRIQ) 25 MG  24 hr tablet Take 25 mg by mouth daily      Multiple Vitamins-Minerals (MULTIVITAMIN PO) Take 1 tablet by mouth daily      oxyCODONE (ROXICODONE) 5 MG tablet Take 1 tablet (5 mg) by mouth every 4 hours as needed for moderate to severe pain     pantoprazole (PROTONIX) 20 MG EC tablet Take 20 mg by mouth daily      polyethylene glycol (MIRALAX) 17 GM/Dose powder Take 17 g by mouth daily     saccharomyces boulardii (FLORASTOR) 250 MG capsule Take 250 mg by mouth in the morning.     SENOKOT S 8.6-50 MG tablet Take 2 tablets by mouth daily      Vitamin D, Cholecalciferol, 25 MCG (1000 UT) CAPS Take 1,000 Units by mouth daily      No current facility-administered medications for this visit.       ROS:  10 point ROS of systems including Constitutional, Eyes, Respiratory, Cardiovascular, Gastroenterology, Genitourinary, Integumentary, Musculoskeletal, Psychiatric were all negative except for pertinent positives noted in my HPI.    Vitals:  BP (!) 153/83   Pulse 76   Temp 98.5  F (36.9  C)   Resp 16   SpO2 94%   Exam:  GENERAL APPEARANCE:  Alert, in no distress, appears healthy, oriented, cooperative, elderly woman resting in bed  EYES:  EOM, conjunctivae, lids, pupils and irises normal  RESP:  Respiratory effort and palpation of chest normal, lungs clear to auscultation, no respiratory distress, diminished breath sounds BLL, cough - none, O2 present via NC  CV:  Palpation and auscultation of heart done , regular rate and rhythm, no murmur, rub, or gallop, no edema, +2 pedal pulses  ABDOMEN: Normal bowel sounds, soft, nontender, no hepatosplenomegaly or other masses  M/S:   Gait and station abnormal - ADRIAN 2/2 patient being in bed; Digits and nails abnormal - arthritic changes present; Examination of left upper extremity  Inspection, ROM, stability and muscle strength limited 2/2 noted procedure, pain and immobilizer  SKIN:  Inspection of skin and subcutaneous tissue baseline, Palpation of skin and subcutaneous tissue  baseline, skin thin, fragile and dry  NEURO:   Cranial nerves 2-12 are normal tested and grossly at patient's baseline  PSYCH:  oriented X 3, normal insight, judgement and memory, affect and mood normal    Lab/Diagnostic data:  Recent labs in [facility] reviewed by me today.     ASSESSMENT/PLAN:    ICD-10-CM    1. Acute respiratory failure with hypoxia (H)  J96.01 Acute on chronic - unstable, improving. Pt   2. Chronic obstructive pulmonary disease, unspecified COPD type (H)  J44.9 Denies acute respiratory concerns today. Continues on regimen of Albuterol HFA/neb   3. Shortness of breath  R06.02 Breo Ellipta, Incruse Ellipta, and Zyrtec along   4. Atelectasis  J98.11 With continuous O2 via NC at baseline. Will    5. Hypoxia  R09.02 Continue medications as ordered with ongoing monitoring.   6. Tachycardia  R00.0 Acute - variable. HRs since admission as noted below; denies presence of associated sx. No active tx regimen. Ongoing VS monitoring per facility protocol.     7. Acute post-operative pain  G89.18 Acute on chronic - variable. She notes pain   8. S/P reverse total shoulder arthroplasty, left  Z96.612 Associated with therapies as noted above. Continues on regimen of Tylenol, Gabapentin, Voltaren gel and Oxycodone. She doesn't feel she needs Gabapentin at this time; we will discontinue this today - continue other medications as ordered.   9. Essential hypertension  I10 Chronic - stable. Continues on regimen of Amlodipine. Continue medications as ordered with ongoing monitoring of VS as noted. CMP.     10. Dementia without behavioral disturbance, unspecified dementia type (H)  F03.90 Chronic - variable. No acute associated concerns or behaviors noted since admission.    11. Bipolar 1 disorder (H)  F31.9 Continues on regimen of Lamictal and Abilify. Continue medications as ordered. Will recheck Mag and Phos due to noted encephalopathy inpatient.   12. Anemia D64.9 Chronic - unstable. Last Hgb as noted below. Continues on  daily regimen of iron. Will continue iron as ordered at this time with recheck of CBC, iron saturation, TIBC, ferritin and folic acid levels on 4/28/22.   12. Physical deconditioning  R53.81 Acute on chronic - unstable. 2/2 above noted diagnoses, recent procedures and hospitalizations. PT/OT eval/tx - adv per their recommendations. SW assistance ongoing for discharge planning.     This patient was seen along with a nurse practitioner student, Clarisse Gaytan DNP student.  The histories and review of systems are obtained by Clarisse Gaytan DNP student and confirmed by myself all objective, assessments and plans were completed by myself.    Electronically signed by:  Dr. Chantelle Hoover, KASANDRA, DNP, AGNP-BC, PMHNP-BC  [facility]  Office Hours: Tues-Fri [id number]  Office: [address] [place] [address]  Fax - [phone]  Triage Phone- [phone]  Business Office- [phone]      Email: [email or web address]

## 2022-04-26 NOTE — NURSING NOTE
[facility] GERIATRICS    Primary Care Provider & Clinic: Wilmer Voss MD, [facility] PHYSICIAN SERVICES [address]  [place] Medical Record Number: [id number]  Place of Location at the time of visit: No question data found.  Chief Complaint   Patient presents with     Hospital F/U     Amparo Sharma is a 81 year old (1940), admitted to the above facility from  [facility]. Hospital stay 4/14/22 through 4/19/22.    HPI:   HPI information obtained from: facility chart records.   Brief Summary of Hospital Course:   Patient is admitted for hypoxia.  Patient had been discharged on 4/13/22 following left shoulder arthroplasty. She was admitted back to her care facility for 1 day when she was noted to have hypoxia to the 70's and sent the ED for evaluation. CT showed atelectasis adjacent to moderate elevation of right hemidiaphragm, additional work up negative for pneumonia. Hypoxia was thought to be secondary to respiratory depression from increased oxycodone use at discharge. She was weaned off of oxygen at rest. Her shoulder pain was well controlled at a decreased dose of Oxycodone 5 mg Q4h as needed, and gabapentin was added. She was discharged back to her care facility in stable condition with 1-2 L oxygen as needed.    Updates on Status Since Skilled nursing Admission:   Patient is seen today in her room for admission. She states that she is feeling good. She denies shortness of breath or dyspnea. She has some baseline nonproductive coughing due to COPD. She quitted smoking at the end of 2021 and her respiratory status has been better since then. Her pain level is 4/10 on her left shoulder from surgery. PRN oxycodone is helpful for reducing her pain. She does not understand why she was put on gabapentin at the hospital and she states that it does not work for her. She would like to discontinue it. She has a good appetite. She sleeps well. She denies chest  pain, palpitations, dizziness, nausea/vomiting, and b/b concerns.    CODE STATUS/ADVANCE DIRECTIVES DISCUSSION: Full Code - CPR/Full code     Patient's living condition: lives in a residential care facility  ALLERGIES:   Allergies   Allergen Reactions     Amantadine      Antihistamine Allergy Relief [Diphenhydramine] Other (See Comments)     hyperactivity     Bactrim [Sulfamethoxazole W/Trimethoprim]      Codeine Itching     Demerol [Meperidine] Nausea     Hmg-Coa-R Inhibitors Other (See Comments)     Was hospitalized for possible rhabdo     Meloxicam Other (See Comments)     Talwin [Pentazocine] Other (See Comments)     drowsiness     Tramadol Nausea     Valium [Diazepam] Other (See Comments)     Hallucinations/paranoid      PAST MEDICAL HISTORY:   Past Medical History:   Diagnosis Date     Abdominal hernia      Abdominal hernia      Alcoholism in remission (H)      Alcoholism in remission (H) 2017     Anemia      Arthritis      Arthritis      Asthma      Asthma 1/20/2017     Bipolar 1 disorder (H)      Bipolar disorder (H)      Bladder incontinence 2013     Cancer (H)      Cellulitis      Cellulitis 12/12/2016    of left elbow     Chronic pain syndrome      COPD (chronic obstructive pulmonary disease) (H)      COPD with acute exacerbation (H) 12/21/2018     Dementia (H)      Depression      Depression      Disease of thyroid gland      Falls frequently      Frequent falls 2016     Frequent falls 2017     GERD (gastroesophageal reflux disease)      GERD (gastroesophageal reflux disease)      History of blood transfusion     20 years ago     History of colon cancer     s/p resection 1/2015, treated with 5-6 cycles of Folfox     History of transfusion      HTN (hypertension)      Hydrocephalus (H)      Hydrocephalus (H)      Hyperlipidemia      Hypertension      Hypothyroidism      Hypothyroidism      Infection of skin due to methicillin resistant Staphylococcus aureus (MRSA)      Loss of balance      Memory loss       Memory loss 2017     Normal pressure hydrocephalus (H) 02/03/2017     NPH (normal pressure hydrocephalus) (H)      Parkinson disease (H)      Renal insufficiency      Squamous cell cancer of multiple sites of skin of upper arm, left 2016    Dr. Andrea      Thyroid disease      Urinary incontinence      Urinary incontinence       PAST SURGICAL HISTORY:  has a past surgical history that includes Hysterectomy; multiple knee surgeries; rotator cuff surgeries; Cholecystectomy; colon cancer resection (1/2015); GI surgery; Abdomen surgery; orthopedic surgery; Insert drain lumbar (N/A, 2/5/2017); Optical tracking system implant shunt ventriculoperitoneal (Right, 2/8/2017); GYN surgery; Esophagoscopy, gastroscopy, duodenoscopy (EGD), combined (N/A, 9/7/2017); Colon surgery; Laparoscopic assisted colectomy; Hysterectomy; joint replacement; Arthroscopy shoulder rotator cuff repair; fracture surgery; Laparoscopic cholecystectomy; Implant shunt ventriculoperitoneal (02/03/2017); Lumbar Puncture (02/03/2017); RECONSTR TOTAL SHOULDER IMPLANT (Right, 3/5/2019); Colonoscopy (N/A, 2/3/2020); and Reverse arthroplasty shoulder (Left, 4/11/2022).  FAMILY HISTORY: family history includes Arthritis in her brother; Breast Cancer (age of onset: 60) in her mother; Depression (age of onset: 55) in her father; Peptic Ulcer Disease in her father; Prostate Cancer in her brother.  SOCIAL HISTORY:  reports that she quit smoking about 25 years ago. Her smoking use included cigarettes. She started smoking about 42 years ago. She smoked 0.25 packs per day. She has quit using smokeless tobacco. She reports that she does not drink alcohol and does not use drugs.    MEDICATIONS:    Current Outpatient Medications   Medication Sig Dispense Refill     acetaminophen (TYLENOL) 325 MG tablet Take 3 tablets (975 mg) by mouth every 8 hours 90 tablet 0     albuterol (PROAIR HFA/PROVENTIL HFA/VENTOLIN HFA) 108 (90 Base) MCG/ACT inhaler Inhale 2 puffs into the  lungs every 4 hours as needed for shortness of breath / dyspnea       albuterol (PROVENTIL) (2.5 MG/3ML) 0.083% neb solution Inhale 2.5 mg into the lungs every 4 hours as needed for shortness of breath / dyspnea       amLODIPine (NORVASC) 5 MG tablet Take 5 mg by mouth daily       amoxicillin (AMOXIL) 500 MG capsule Take 2,000 mg by mouth once as needed (Prior to dental appt)        ARIPiprazole (ABILIFY) 15 MG tablet Take 15 mg by mouth At Bedtime       aspirin (ASA) 81 MG EC tablet Take 1 tablet (81 mg) by mouth in the morning and 1 tablet (81 mg) in the evening. Take with meals. 60 tablet 0     BREO ELLIPTA 100-25 MCG/INH inhaler Inhale 1 puff into the lungs daily 1PM       carboxymethylcellulose (REFRESH PLUS) 0.5 % SOLN ophthalmic solution Place 1 drop into both eyes as needed in the morning and 1 drop as needed at noon and 1 drop as needed in the evening and 1 drop as needed before bedtime for dry eyes.       carboxymethylcellulose (REFRESH PLUS) 0.5 % SOLN ophthalmic solution Place 1 drop into both eyes in the morning.       cetirizine (ZYRTEC) 10 MG tablet Take 10 mg by mouth daily        diclofenac (VOLTAREN) 1 % topical gel Apply 4 g topically 4 times daily Apply to affected knees.       ferrous sulfate (FEROSUL) 325 (65 Fe) MG tablet Take 325 mg by mouth daily       FLUoxetine (PROZAC) 20 MG capsule Take 60 mg by mouth daily       gabapentin (NEURONTIN) 100 MG capsule Take 2 capsules (200 mg) by mouth 3 times daily 180 capsule 0     INCRUSE ELLIPTA 62.5 MCG/INH Inhaler Inhale 1 puff into the lungs daily        lamoTRIgine (LAMICTAL) 100 MG tablet Take 100 mg by mouth At Bedtime       lamoTRIgine (LAMICTAL) 150 MG tablet Take 150 mg by mouth daily        levothyroxine (SYNTHROID/LEVOTHROID) 75 MCG tablet Take 75 mcg by mouth daily       menthol-zinc oxide (CALMOSEPTINE) 0.44-20.6 % OINT ointment Apply topically 2 times daily as needed for irritation       mirabegron (MYRBETRIQ) 25 MG 24 hr tablet Take 25  mg by mouth daily        Multiple Vitamins-Minerals (MULTIVITAMIN PO) Take 1 tablet by mouth daily        oxyCODONE (ROXICODONE) 5 MG tablet Take 1 tablet (5 mg) by mouth every 4 hours as needed for moderate to severe pain 30 tablet 0     pantoprazole (PROTONIX) 20 MG EC tablet Take 20 mg by mouth daily        polyethylene glycol (MIRALAX) 17 GM/Dose powder Take 17 g by mouth daily 510 g 0     saccharomyces boulardii (FLORASTOR) 250 MG capsule Take 250 mg by mouth in the morning.       SENOKOT S 8.6-50 MG tablet Take 2 tablets by mouth daily        Vitamin D, Cholecalciferol, 25 MCG (1000 UT) CAPS Take 1,000 Units by mouth daily        discharge medications reconciled, continue medications without change    REVIEW OF SYSTEMS: 10 point ROS of systems including Constitutional, Eyes, Respiratory, Cardiovascular, Gastroenterology, Genitourinary, Integumentary, Musculoskeletal, Psychiatric were all negative except for pertinent positives noted in my HPI.    Objective: There were no vitals taken for this visit.  GENERAL APPEARANCE:  Alert, appears healthy, oriented, cooperative  EYES:  EOM, conjunctivae, lids, pupils and irises normal  RESP:  respiratory effort and palpation of chest normal, lungs clear to auscultation , no respiratory distress  CV:  Palpation and auscultation of heart done , regular rate and rhythm, no murmur, rub, or gallop  ABDOMEN:  normal bowel sounds, soft, nontender, no hepatosplenomegaly or other masses  M/S:   Gait and station abnormal -ADRIAN 2/2 staying in bed  Digits and nails abnormal -arthritic change  SKIN:  Palpation of skin and subcutaneous tissue baseline, Inspection of skin and subcutaneous tissue baseline, dry skin on lower extremities bilaterally  NEURO:   Cranial nerves 2-12 are normal tested and grossly at patient's baseline  PSYCH:  oriented X 3, normal insight, judgement and memory, affect and mood normal    Labs:   Recent labs in [facility] reviewed by me today.     ASSESSMENT/PLAN:     ICD-10-CM    1. Acute respiratory failure with hypoxia (H)  J96.01 Acute, unstable. Continue with current regimen of albuterol and breo ellipta inhaler and nebulizer   2. Tachycardia  R00.0 Continue with Oxygen 1-2L PRN.   3. Shortness of breath  R06.02 Check CBC and BMP on 4/28/22   4. Atelectasis  J98.11 Monitor VS per facility protocol.   5. Hypoxia  R09.02    6. Chronic obstructive pulmonary disease, unspecified COPD type (H)  J44.9    7. Acute post-operative pain  G89.18 Acute, unstable. Continue with oxycodone 5mg q4h PRN and tylenol PRN. Discontinue gabapentin.       Orders:  Discontinue gabapentin  Apply lotion to lower legs bilaterally once daily at bedtime  Labs: CBC, BMP      Electronically signed by:  Clarisse Gaytan DNP student

## 2022-04-26 NOTE — LETTER
4/26/2022        RE: Amparo Sharma  [address]  [place] [address]        [facility]    PRIMARY CARE PROVIDER AND CLINIC:  Wilmer Voss MD, [facility] PHYSICIAN SERVICES [address]  Chief Complaint   Patient presents with     Hospital F/U      [place] Medical Record Number:  [id number]  Place of Service where encounter took place:  [facility] ([facility]) [[id number]]    Amparo Sharma  is a 81 year old  (1940), admitted to the above facility from  [facility]. Hospital stay 4/14 through 4/19..   HPI:    Brief Summary of Hospital Course:   Patient is admitted for hypoxia.  Patient had been discharged on 4/13/22 following left shoulder arthroplasty. She was admitted back to her care facility for 1 day when she was noted to have hypoxia to the 70's and sent the ED for evaluation. CT showed atelectasis adjacent to moderate elevation of right hemidiaphragm, additional work up negative for pneumonia. Hypoxia was thought to be secondary to respiratory depression from increased oxycodone use at discharge. She was weaned off of oxygen at rest. Her shoulder pain was well controlled at a decreased dose of Oxycodone 5 mg Q4h as needed, and gabapentin was added. She was discharged back to her care facility in stable condition with 1-2 L oxygen as needed.     Updates on Status Since Skilled nursing Admission:   Patient is seen today in her room for admission. She states that she is feeling good. She denies shortness of breath or dyspnea. She has some baseline nonproductive coughing due to COPD. She quit smoking at the end of 2021 and her respiratory status has been better since then. Her pain level is 4/10 on her left shoulder from surgery. PRN oxycodone is helpful for reducing her pain. She does not understand why she was put on gabapentin at the hospital and she states that it does not work for her. She would like to  discontinue it. She has a good appetite. She sleeps well. She denies chest pain, palpitations, dizziness, nausea/vomiting, and b/b concerns.    CODE STATUS/ADVANCE DIRECTIVES DISCUSSION:  Full Code  CPR/Full code   ALLERGIES:   Allergies   Allergen Reactions     Amantadine      Antihistamine Allergy Relief [Diphenhydramine] Other (See Comments)     hyperactivity     Bactrim [Sulfamethoxazole W/Trimethoprim]      Codeine Itching     Demerol [Meperidine] Nausea     Hmg-Coa-R Inhibitors Other (See Comments)     Was hospitalized for possible rhabdo     Meloxicam Other (See Comments)     Talwin [Pentazocine] Other (See Comments)     drowsiness     Tramadol Nausea     Valium [Diazepam] Other (See Comments)     Hallucinations/paranoid      PAST MEDICAL HISTORY:   Past Medical History:   Diagnosis Date     Abdominal hernia      Abdominal hernia      Alcoholism in remission (H)      Alcoholism in remission (H) 2017     Anemia      Arthritis      Arthritis      Asthma      Asthma 1/20/2017     Bipolar 1 disorder (H)      Bipolar disorder (H)      Bladder incontinence 2013     Cancer (H)      Cellulitis      Cellulitis 12/12/2016    of left elbow     Chronic pain syndrome      COPD (chronic obstructive pulmonary disease) (H)      COPD with acute exacerbation (H) 12/21/2018     Dementia (H)      Depression      Depression      Disease of thyroid gland      Falls frequently      Frequent falls 2016     Frequent falls 2017     GERD (gastroesophageal reflux disease)      GERD (gastroesophageal reflux disease)      History of blood transfusion     20 years ago     History of colon cancer     s/p resection 1/2015, treated with 5-6 cycles of Folfox     History of transfusion      HTN (hypertension)      Hydrocephalus (H)      Hydrocephalus (H)      Hyperlipidemia      Hypertension      Hypothyroidism      Hypothyroidism      Infection of skin due to methicillin resistant Staphylococcus aureus (MRSA)      Loss of balance      Memory  loss      Memory loss 2017     Normal pressure hydrocephalus (H) 02/03/2017     NPH (normal pressure hydrocephalus) (H)      Parkinson disease (H)      Renal insufficiency      Squamous cell cancer of multiple sites of skin of upper arm, left 2016    Dr. Andrea      Thyroid disease      Urinary incontinence      Urinary incontinence       PAST SURGICAL HISTORY:   has a past surgical history that includes Hysterectomy; multiple knee surgeries; rotator cuff surgeries; Cholecystectomy; colon cancer resection (1/2015); GI surgery; Abdomen surgery; orthopedic surgery; Insert drain lumbar (N/A, 2/5/2017); Optical tracking system implant shunt ventriculoperitoneal (Right, 2/8/2017); GYN surgery; Esophagoscopy, gastroscopy, duodenoscopy (EGD), combined (N/A, 9/7/2017); Colon surgery; Laparoscopic assisted colectomy; Hysterectomy; joint replacement; Arthroscopy shoulder rotator cuff repair; fracture surgery; Laparoscopic cholecystectomy; Implant shunt ventriculoperitoneal (02/03/2017); Lumbar Puncture (02/03/2017); RECONSTR TOTAL SHOULDER IMPLANT (Right, 3/5/2019); Colonoscopy (N/A, 2/3/2020); and Reverse arthroplasty shoulder (Left, 4/11/2022).  FAMILY HISTORY: family history includes Arthritis in her brother; Breast Cancer (age of onset: 60) in her mother; Depression (age of onset: 55) in her father; Peptic Ulcer Disease in her father; Prostate Cancer in her brother.  SOCIAL HISTORY:   reports that she quit smoking about 25 years ago. Her smoking use included cigarettes. She started smoking about 42 years ago. She smoked 0.25 packs per day. She has quit using smokeless tobacco. She reports that she does not drink alcohol and does not use drugs.  Patient's living condition: lives with family, daughter     Post Discharge Medication Reconciliation Status: discharge medications reconciled, continue medications without change  Current Outpatient Medications   Medication Sig     acetaminophen (TYLENOL) 325 MG tablet Take 3 tablets  (975 mg) by mouth every 8 hours     albuterol (PROAIR HFA/PROVENTIL HFA/VENTOLIN HFA) 108 (90 Base) MCG/ACT inhaler Inhale 2 puffs into the lungs every 4 hours as needed for shortness of breath / dyspnea     albuterol (PROVENTIL) (2.5 MG/3ML) 0.083% neb solution Inhale 2.5 mg into the lungs every 4 hours as needed for shortness of breath / dyspnea     amLODIPine (NORVASC) 5 MG tablet Take 5 mg by mouth daily     amoxicillin (AMOXIL) 500 MG capsule Take 2,000 mg by mouth once as needed (Prior to dental appt)      ARIPiprazole (ABILIFY) 15 MG tablet Take 15 mg by mouth At Bedtime     aspirin (ASA) 81 MG EC tablet Take 1 tablet (81 mg) by mouth in the morning and 1 tablet (81 mg) in the evening. Take with meals.     BREO ELLIPTA 100-25 MCG/INH inhaler Inhale 1 puff into the lungs daily 1PM     carboxymethylcellulose (REFRESH PLUS) 0.5 % SOLN ophthalmic solution Place 1 drop into both eyes as needed in the morning and 1 drop as needed at noon and 1 drop as needed in the evening and 1 drop as needed before bedtime for dry eyes.     carboxymethylcellulose (REFRESH PLUS) 0.5 % SOLN ophthalmic solution Place 1 drop into both eyes in the morning.     cetirizine (ZYRTEC) 10 MG tablet Take 10 mg by mouth daily      diclofenac (VOLTAREN) 1 % topical gel Apply 4 g topically 4 times daily Apply to affected knees.     ferrous sulfate (FEROSUL) 325 (65 Fe) MG tablet Take 1 tablet (325 mg) by mouth Every Mon, Wed, Fri Morning     FLUoxetine (PROZAC) 20 MG capsule Take 60 mg by mouth daily     gabapentin (NEURONTIN) 100 MG capsule Take 2 capsules (200 mg) by mouth 3 times daily     INCRUSE ELLIPTA 62.5 MCG/INH Inhaler Inhale 1 puff into the lungs daily      lamoTRIgine (LAMICTAL) 100 MG tablet Take 100 mg by mouth At Bedtime     lamoTRIgine (LAMICTAL) 150 MG tablet Take 150 mg by mouth daily      levothyroxine (SYNTHROID/LEVOTHROID) 75 MCG tablet Take 75 mcg by mouth daily     menthol-zinc oxide (CALMOSEPTINE) 0.44-20.6 % OINT  ointment Apply topically 2 times daily as needed for irritation     mirabegron (MYRBETRIQ) 25 MG 24 hr tablet Take 25 mg by mouth daily      Multiple Vitamins-Minerals (MULTIVITAMIN PO) Take 1 tablet by mouth daily      oxyCODONE (ROXICODONE) 5 MG tablet Take 1 tablet (5 mg) by mouth every 4 hours as needed for moderate to severe pain     pantoprazole (PROTONIX) 20 MG EC tablet Take 20 mg by mouth daily      polyethylene glycol (MIRALAX) 17 GM/Dose powder Take 17 g by mouth daily     saccharomyces boulardii (FLORASTOR) 250 MG capsule Take 250 mg by mouth in the morning.     SENOKOT S 8.6-50 MG tablet Take 2 tablets by mouth daily      Vitamin D, Cholecalciferol, 25 MCG (1000 UT) CAPS Take 1,000 Units by mouth daily      No current facility-administered medications for this visit.       ROS:  10 point ROS of systems including Constitutional, Eyes, Respiratory, Cardiovascular, Gastroenterology, Genitourinary, Integumentary, Musculoskeletal, Psychiatric were all negative except for pertinent positives noted in my HPI.    Vitals:  BP (!) 153/83   Pulse 76   Temp 98.5  F (36.9  C)   Resp 16   SpO2 94%   Exam:  GENERAL APPEARANCE:  Alert, in no distress, appears healthy, oriented, cooperative, elderly woman resting in bed  EYES:  EOM, conjunctivae, lids, pupils and irises normal  RESP:  Respiratory effort and palpation of chest normal, lungs clear to auscultation, no respiratory distress, diminished breath sounds BLL, cough - none, O2 present via NC  CV:  Palpation and auscultation of heart done , regular rate and rhythm, no murmur, rub, or gallop, no edema, +2 pedal pulses  ABDOMEN: Normal bowel sounds, soft, nontender, no hepatosplenomegaly or other masses  M/S:   Gait and station abnormal - ADRIAN 2/2 patient being in bed; Digits and nails abnormal - arthritic changes present; Examination of left upper extremity  Inspection, ROM, stability and muscle strength limited 2/2 noted procedure, pain and immobilizer  SKIN:   Inspection of skin and subcutaneous tissue baseline, Palpation of skin and subcutaneous tissue baseline, skin thin, fragile and dry  NEURO:   Cranial nerves 2-12 are normal tested and grossly at patient's baseline  PSYCH:  oriented X 3, normal insight, judgement and memory, affect and mood normal    Lab/Diagnostic data:  Recent labs in [facility] reviewed by me today.     ASSESSMENT/PLAN:    ICD-10-CM    1. Acute respiratory failure with hypoxia (H)  J96.01 Acute on chronic - unstable, improving. Pt   2. Chronic obstructive pulmonary disease, unspecified COPD type (H)  J44.9 Denies acute respiratory concerns today. Continues on regimen of Albuterol HFA/neb   3. Shortness of breath  R06.02 Breo Ellipta, Incruse Ellipta, and Zyrtec along   4. Atelectasis  J98.11 With continuous O2 via NC at baseline. Will    5. Hypoxia  R09.02 Continue medications as ordered with ongoing monitoring.   6. Tachycardia  R00.0 Acute - variable. HRs since admission as noted below; denies presence of associated sx. No active tx regimen. Ongoing VS monitoring per facility protocol.     7. Acute post-operative pain  G89.18 Acute on chronic - variable. She notes pain   8. S/P reverse total shoulder arthroplasty, left  Z96.612 Associated with therapies as noted above. Continues on regimen of Tylenol, Gabapentin, Voltaren gel and Oxycodone. She doesn't feel she needs Gabapentin at this time; we will discontinue this today - continue other medications as ordered.   9. Essential hypertension  I10 Chronic - stable. Continues on regimen of Amlodipine. Continue medications as ordered with ongoing monitoring of VS as noted. CMP.     10. Dementia without behavioral disturbance, unspecified dementia type (H)  F03.90 Chronic - variable. No acute associated concerns or behaviors noted since admission.    11. Bipolar 1 disorder (H)  F31.9 Continues on regimen of Lamictal and Abilify. Continue medications as ordered. Will recheck Mag and Phos due to noted  encephalopathy inpatient.   12. Anemia D64.9 Chronic - unstable. Last Hgb as noted below. Continues on daily regimen of iron. Will continue iron as ordered at this time with recheck of CBC, iron saturation, TIBC, ferritin and folic acid levels on 4/28/22.   12. Physical deconditioning  R53.81 Acute on chronic - unstable. 2/2 above noted diagnoses, recent procedures and hospitalizations. PT/OT eval/tx - adv per their recommendations. SW assistance ongoing for discharge planning.     This patient was seen along with a nurse practitioner student, Clarisse Gaytan DNP student.  The histories and review of systems are obtained by Clarisse Gaytan DNP student and confirmed by myself all objective, assessments and plans were completed by myself.    Electronically signed by:  KASANDRA Khanna, DNP, AGNP-BC, PMHNP-BC  [facility]  Office Hours: Tues-Fri 4214-1309  Office: [address] [place] [address]  Fax - [phone]  Triage Phone- [phone]  Business Office- [phone]      Email: [email or web address]                               Sincerely,        KASANDRA Johnson CNP

## 2022-04-27 ENCOUNTER — LAB REQUISITION (OUTPATIENT)
Dept: LAB | Facility: CLINIC | Age: 82
End: 2022-04-27
Payer: MEDICARE

## 2022-04-27 DIAGNOSIS — G93.41 METABOLIC ENCEPHALOPATHY: ICD-10-CM

## 2022-04-27 DIAGNOSIS — D50.9 IRON DEFICIENCY ANEMIA, UNSPECIFIED: ICD-10-CM

## 2022-04-28 LAB
ALBUMIN SERPL-MCNC: 2.1 G/DL (ref 3.5–5)
ALP SERPL-CCNC: 133 U/L (ref 45–120)
ALT SERPL W P-5'-P-CCNC: 17 U/L (ref 0–45)
ANION GAP SERPL CALCULATED.3IONS-SCNC: 12 MMOL/L (ref 5–18)
AST SERPL W P-5'-P-CCNC: 18 U/L (ref 0–40)
BILIRUB SERPL-MCNC: 0.3 MG/DL (ref 0–1)
BUN SERPL-MCNC: 11 MG/DL (ref 8–28)
CALCIUM SERPL-MCNC: 9.5 MG/DL (ref 8.5–10.5)
CHLORIDE BLD-SCNC: 105 MMOL/L (ref 98–107)
CO2 SERPL-SCNC: 27 MMOL/L (ref 22–31)
CREAT SERPL-MCNC: 0.56 MG/DL (ref 0.6–1.1)
ERYTHROCYTE [DISTWIDTH] IN BLOOD BY AUTOMATED COUNT: 15.6 % (ref 10–15)
FERRITIN SERPL-MCNC: 581 NG/ML (ref 10–130)
FOLATE SERPL-MCNC: 10 NG/ML
GFR SERPL CREATININE-BSD FRML MDRD: >90 ML/MIN/1.73M2
GLUCOSE BLD-MCNC: 98 MG/DL (ref 70–125)
HCT VFR BLD AUTO: 30.7 % (ref 35–47)
HGB BLD-MCNC: 9.2 G/DL (ref 11.7–15.7)
IRON SATN MFR SERPL: 12 % (ref 20–50)
IRON SERPL-MCNC: 21 UG/DL (ref 42–175)
MAGNESIUM SERPL-MCNC: 2 MG/DL (ref 1.8–2.6)
MCH RBC QN AUTO: 28 PG (ref 26.5–33)
MCHC RBC AUTO-ENTMCNC: 30 G/DL (ref 31.5–36.5)
MCV RBC AUTO: 94 FL (ref 78–100)
PHOSPHATE SERPL-MCNC: 3.3 MG/DL (ref 2.5–4.5)
PLATELET # BLD AUTO: 708 10E3/UL (ref 150–450)
POTASSIUM BLD-SCNC: 4.4 MMOL/L (ref 3.5–5)
PROT SERPL-MCNC: 6.3 G/DL (ref 6–8)
RBC # BLD AUTO: 3.28 10E6/UL (ref 3.8–5.2)
SODIUM SERPL-SCNC: 144 MMOL/L (ref 136–145)
TIBC SERPL-MCNC: 183 UG/DL (ref 313–563)
TRANSFERRIN SERPL-MCNC: 146 MG/DL (ref 212–360)
WBC # BLD AUTO: 9.1 10E3/UL (ref 4–11)

## 2022-04-28 PROCEDURE — 84100 ASSAY OF PHOSPHORUS: CPT | Performed by: NURSE PRACTITIONER

## 2022-04-28 PROCEDURE — 80053 COMPREHEN METABOLIC PANEL: CPT | Performed by: NURSE PRACTITIONER

## 2022-04-28 PROCEDURE — 83540 ASSAY OF IRON: CPT | Performed by: NURSE PRACTITIONER

## 2022-04-28 PROCEDURE — 85027 COMPLETE CBC AUTOMATED: CPT | Performed by: NURSE PRACTITIONER

## 2022-04-28 PROCEDURE — 82728 ASSAY OF FERRITIN: CPT | Performed by: NURSE PRACTITIONER

## 2022-04-28 PROCEDURE — 83735 ASSAY OF MAGNESIUM: CPT | Performed by: NURSE PRACTITIONER

## 2022-04-28 PROCEDURE — 36415 COLL VENOUS BLD VENIPUNCTURE: CPT | Performed by: NURSE PRACTITIONER

## 2022-04-28 PROCEDURE — 82746 ASSAY OF FOLIC ACID SERUM: CPT | Performed by: NURSE PRACTITIONER

## 2022-04-28 PROCEDURE — P9604 ONE-WAY ALLOW PRORATED TRIP: HCPCS | Performed by: NURSE PRACTITIONER

## 2022-04-29 NOTE — CONFIDENTIAL NOTE
Orders:    Decrease Iron to 325mg PO qMWF    Start Vitamin C 500mg PO BID    Recheck Iron level, Ferritin, Transferrin and TIBC on 5/24/2022  o Dx Anemia    Dr. Chantelle Hoover, APRN, DNP, AGNP-BC, PMHNP-BC  [facility]  Office Hours: Tues-Fri 3912-9271  Office: [address] [place] [address]  Fax - [phone]  Triage Phone- [phone]  Business Office- [phone]      Email: [email or web address]

## 2022-05-03 ENCOUNTER — TRANSITIONAL CARE UNIT VISIT (OUTPATIENT)
Dept: GERIATRICS | Facility: CLINIC | Age: 82
End: 2022-05-03
Payer: MEDICARE

## 2022-05-03 VITALS
OXYGEN SATURATION: 94 % | TEMPERATURE: 98.5 F | DIASTOLIC BLOOD PRESSURE: 70 MMHG | HEART RATE: 69 BPM | WEIGHT: 156 LBS | BODY MASS INDEX: 30.63 KG/M2 | HEIGHT: 60 IN | SYSTOLIC BLOOD PRESSURE: 115 MMHG | RESPIRATION RATE: 20 BRPM

## 2022-05-03 DIAGNOSIS — D50.8 IRON DEFICIENCY ANEMIA SECONDARY TO INADEQUATE DIETARY IRON INTAKE: ICD-10-CM

## 2022-05-03 DIAGNOSIS — R53.81 PHYSICAL DECONDITIONING: ICD-10-CM

## 2022-05-03 DIAGNOSIS — Z96.612 STATUS POST TOTAL SHOULDER ARTHROPLASTY, LEFT: ICD-10-CM

## 2022-05-03 DIAGNOSIS — F31.9 BIPOLAR 1 DISORDER (H): ICD-10-CM

## 2022-05-03 DIAGNOSIS — R09.02 HYPOXIA: ICD-10-CM

## 2022-05-03 DIAGNOSIS — R00.0 TACHYCARDIA: ICD-10-CM

## 2022-05-03 DIAGNOSIS — G89.18 ACUTE POST-OPERATIVE PAIN: ICD-10-CM

## 2022-05-03 DIAGNOSIS — R06.02 SHORTNESS OF BREATH: ICD-10-CM

## 2022-05-03 DIAGNOSIS — J44.9 CHRONIC OBSTRUCTIVE PULMONARY DISEASE, UNSPECIFIED COPD TYPE (H): ICD-10-CM

## 2022-05-03 DIAGNOSIS — J96.01 ACUTE RESPIRATORY FAILURE WITH HYPOXIA (H): Primary | ICD-10-CM

## 2022-05-03 DIAGNOSIS — I10 ESSENTIAL HYPERTENSION: ICD-10-CM

## 2022-05-03 DIAGNOSIS — E43 SEVERE PROTEIN-CALORIE MALNUTRITION (H): ICD-10-CM

## 2022-05-03 DIAGNOSIS — J98.11 ATELECTASIS: ICD-10-CM

## 2022-05-03 DIAGNOSIS — G91.2 NORMAL PRESSURE HYDROCEPHALUS (H): ICD-10-CM

## 2022-05-03 PROCEDURE — 99310 SBSQ NF CARE HIGH MDM 45: CPT | Performed by: NURSE PRACTITIONER

## 2022-05-03 RX ORDER — FERROUS SULFATE 325(65) MG
325 TABLET ORAL
COMMUNITY
Start: 2022-05-04

## 2022-05-03 NOTE — LETTER
"    5/3/2022        RE: Amparo Sharma  [address]  [place] [address]         HEALTH GERIATRIC SERVICES    Chief Complaint   Patient presents with     RECHECK     HPI:  Amparo Sharma is a 81 year old  (1940), who is being seen today for an episodic care visit at: [facility] ([facility]) [[id number]]. Today's concern is:    Acute respiratory failure with hypoxia (H)  Chronic obstructive pulmonary disease, unspecified COPD type (H)  Tachycardia  Shortness of breath  Atelectasis  Hypoxia  Acute post-operative pain  Status post total shoulder arthroplasty, left  Bipolar 1 disorder (H)  Normal pressure hydrocephalus (H)  Essential hypertension  Iron deficiency anemia secondary to inadequate dietary iron intake  Severe protein-calorie malnutrition (H)  Physical deconditioning    Patient seen today for a follow-up visit in TCU. Patient notes she is doing okay. She just finished playing Ehsan with therapy and she enjoyed this, it is one of her favorite game. She does note and increase in pain following therapies and she states that she just took an Oxycodone as therapies are coming back around 1400 and she wants to make sure she's pre-medicated. she does not like taking the Oxycodone very much due to the \"high feeling\" she gets but she notes that it does help with the pain. She notes that her appetite has been okay - we discuss her low-albumin and she notes that she would be interested in trying and ice cream based supplement. She also notes that she has not been sleeping well the past 3-4 nights as she has \"been sleeping in the tamiko\" - she is not sure why, but she notes that staff told her that it is because she wasn't sleeping well - which she finds odd because she felt she slept better - she notes that they have been placing her into the tamiko sheet and leaving it connected to the tamiko machine overnight for the past 3-4 nights - I reassure her I will discuss this with nursing. " Denies CP, palpitations, fatigue, nausea, vomiting, increased SOB/SOLOMON, fever, chills, and/or b/b concerns today.    Allergies, and PMH/PSH reviewed in [facility] today.    Current Outpatient Medications   Medication Sig Dispense Refill     acetaminophen (TYLENOL) 325 MG tablet Take 3 tablets (975 mg) by mouth every 8 hours 90 tablet 0     albuterol (PROAIR HFA/PROVENTIL HFA/VENTOLIN HFA) 108 (90 Base) MCG/ACT inhaler Inhale 2 puffs into the lungs every 4 hours as needed for shortness of breath / dyspnea       albuterol (PROVENTIL) (2.5 MG/3ML) 0.083% neb solution Inhale 2.5 mg into the lungs every 4 hours as needed for shortness of breath / dyspnea       amLODIPine (NORVASC) 5 MG tablet Take 5 mg by mouth daily       amoxicillin (AMOXIL) 500 MG capsule Take 2,000 mg by mouth once as needed (Prior to dental appt)        ARIPiprazole (ABILIFY) 15 MG tablet Take 15 mg by mouth At Bedtime       aspirin (ASA) 81 MG EC tablet Take 1 tablet (81 mg) by mouth in the morning and 1 tablet (81 mg) in the evening. Take with meals. 60 tablet 0     BREO ELLIPTA 100-25 MCG/INH inhaler Inhale 1 puff into the lungs daily 1PM       carboxymethylcellulose (REFRESH PLUS) 0.5 % SOLN ophthalmic solution Place 1 drop into both eyes as needed in the morning and 1 drop as needed at noon and 1 drop as needed in the evening and 1 drop as needed before bedtime for dry eyes.       carboxymethylcellulose (REFRESH PLUS) 0.5 % SOLN ophthalmic solution Place 1 drop into both eyes in the morning.       cetirizine (ZYRTEC) 10 MG tablet Take 10 mg by mouth daily        diclofenac (VOLTAREN) 1 % topical gel Apply 4 g topically 4 times daily Apply to affected knees.       ferrous sulfate (FEROSUL) 325 (65 Fe) MG tablet Take 325 mg by mouth daily       FLUoxetine (PROZAC) 20 MG capsule Take 60 mg by mouth daily       gabapentin (NEURONTIN) 100 MG capsule Take 2 capsules (200 mg) by mouth 3 times daily 180 capsule 0     INCRUSE ELLIPTA 62.5 MCG/INH Inhaler  Inhale 1 puff into the lungs daily        lamoTRIgine (LAMICTAL) 100 MG tablet Take 100 mg by mouth At Bedtime       lamoTRIgine (LAMICTAL) 150 MG tablet Take 150 mg by mouth daily        levothyroxine (SYNTHROID/LEVOTHROID) 75 MCG tablet Take 75 mcg by mouth daily       menthol-zinc oxide (CALMOSEPTINE) 0.44-20.6 % OINT ointment Apply topically 2 times daily as needed for irritation       mirabegron (MYRBETRIQ) 25 MG 24 hr tablet Take 25 mg by mouth daily        Multiple Vitamins-Minerals (MULTIVITAMIN PO) Take 1 tablet by mouth daily        oxyCODONE (ROXICODONE) 5 MG tablet Take 1 tablet (5 mg) by mouth every 4 hours as needed for moderate to severe pain 30 tablet 0     pantoprazole (PROTONIX) 20 MG EC tablet Take 20 mg by mouth daily        polyethylene glycol (MIRALAX) 17 GM/Dose powder Take 17 g by mouth daily 510 g 0     saccharomyces boulardii (FLORASTOR) 250 MG capsule Take 250 mg by mouth in the morning.       SENOKOT S 8.6-50 MG tablet Take 2 tablets by mouth daily        Vitamin D, Cholecalciferol, 25 MCG (1000 UT) CAPS Take 1,000 Units by mouth daily          REVIEW OF SYSTEMS:  10 point ROS of systems including Constitutional, Eyes, Respiratory, Cardiovascular, Gastroenterology, Genitourinary, Integumentary, Musculoskeletal, Psychiatric were all negative except for pertinent positives noted in my HPI.    Objective:   /70   Pulse 69   Temp 98.5  F (36.9  C)   Resp 20   Ht 1.524 m (5')   Wt 70.8 kg (156 lb)   SpO2 94%   BMI 30.47 kg/m    GENERAL APPEARANCE:  Alert, in no distress, appears healthy, oriented, cooperative, elderly woman sitting upright in wheelchair in room  EYES:  EOM, conjunctivae, lids, pupils and irises normal  RESP:  respiratory effort and palpation of chest normal, lungs clear to auscultation , no respiratory distress, diminished breath sounds BLL, cough - none, O2 present via NC  CV:  Palpation and auscultation of heart done , regular rate and rhythm, no murmur, rub, or  gallop, no edema, +2 pedal pulses  ABDOMEN:  normal bowel sounds, soft, nontender, no hepatosplenomegaly or other masses  M/S:   Gait and station abnormal - ADRIAN 2/2 patient being in wheelchair  Digits and nails abnormal - arthritic changes present  Examination of:   left upper extremity  Inspection, ROM, stability and muscle strength limited 2/2 noted procedure, pain and immobilizer  SKIN:  Inspection of skin and subcutaneous tissue baseline, Palpation of skin and subcutaneous tissue baseline, skin thin, fragile and dry  NEURO:   Cranial nerves 2-12 are normal tested and grossly at patient's baseline  PSYCH:  oriented X 3, normal insight, judgement and memory, affect and mood normal    Recent labs in [facility] reviewed by me today.     Assessment/Plan:    ICD-10-CM    1. Acute respiratory failure with hypoxia (H)  J96.01 Acute on chronic - unstable, improving. Pt    2. Chronic obstructive pulmonary disease, unspecified COPD type (H)  J44.9 Continues on regimen of Albuterol, Incruse, and Breo. Continue medication as ordered with    3. Tachycardia  R00.0 ongoing monitoring. HR elevated inpatient -    4. Shortness of breath  R06.02 HRs since admission as shown below with    5. Atelectasis  J98.11 occasional noted elevations.   6. Hypoxia  R09.02 No active treatment regimen at this time. Continue VS per facility protocol with interventions as needed.           7. Acute post-operative pain  G89.18 Acute on chronic - unstable. Patient notes on-   8. Status post total shoulder arthroplasty, left  Z96.612 Going presence of pain to her L shoulder, especially after working with therapies. Oxycodone effective, but she doesn't like how it makes her feel so she uses it minimally - continues to use Voltaren as scheduled. Continue current pain management regimen as ordered. Nursing spoke with Ortho as above - plan to complete Xrays in house and send them to Ortho for review with adaptations made afterwards to weight bearing and  movement.   9. Bipolar 1 disorder (H)  F31.9 Chronic - stable. Patient notes no acute    10. Normal pressure hydrocephalus (H)  G91.2 Concerns at this time - did report odd concerns as noted above - these were communicated to the nurse manager for follow-up. Continues on regimen of Lamictal, Abilify and Prozac. Continue medications as ordered.   11. Essential hypertension  I10 Chronic - stable. Continues on regimen of Amlodipine with BPs as noted below. Continue medications as ordered with VS per facility protocol.     12. Iron deficiency anemia D50.8 Chronic - unstable. Recent anemia panel as noted below. Previously on daily iron replacement with noted elevation of her Ferritin; change to 3x weekly dosing. Today we will add on Vit C 500mg PO BID and recheck as currently scheduled.   Latest Reference Range & Units 04/28/22 07:57   Ferritin 10 - 130 ng/mL 581 (H)   Folate >=3.5 ng/mL 10.0   Iron 42 - 175 ug/dL 21 (L)   Transferrin 212 - 360 mg/dL 146 (L)   Transferrin  - 563 ug/dL 183 (L)   Transferrin Saturation 20 - 50 % 12 (L)      13. Severe protein calorie malnutrition (H) E43 Chronic - unstable. Last albumin low as shown below. She notes that she does not get a lot of protein in her diet. She is interested in a daily ice cream based supplement. Will start Magic cup BID with a planned recheck of albumin in ~6 weeks.  Lab Results   Component Value Date    ALBUMIN 2.1 04/28/2022    ALBUMIN 3.8 10/16/2018      14. Physical deconditioning  R53.81 Acute on chronic - unstable. Continues to be limited by WB status - follow-up with Ortho as above with plans for adjustment to increase ability to work with therapies. PT/OT following - adv per their recommendations. SW ongoing to assist with discharge planning.     Electronically signed by:   Dr. Chantelle Hoover, APRN, DNP, AGNP-BC, PMHNP-BC  [place]  Office Hours: Tues-Fri 5639-8282  Office: [address] [place] [address]  Fax -  [phone]  Triage Phone- [phone]  Business Office- [phone]      Email: [email or web address]                     Sincerely,        KASANDRA Johnson CNP

## 2022-05-03 NOTE — PROGRESS NOTES
"[facility] GERIATRIC SERVICES    Chief Complaint   Patient presents with     RECHECK     HPI:  Amparo Sharma is a 81 year old  (1940), who is being seen today for an episodic care visit at: [facility] ([facility]) [[id number]]. Today's concern is:    Acute respiratory failure with hypoxia (H)  Chronic obstructive pulmonary disease, unspecified COPD type (H)  Tachycardia  Shortness of breath  Atelectasis  Hypoxia  Acute post-operative pain  Status post total shoulder arthroplasty, left  Bipolar 1 disorder (H)  Normal pressure hydrocephalus (H)  Essential hypertension  Iron deficiency anemia secondary to inadequate dietary iron intake  Severe protein-calorie malnutrition (H)  Physical deconditioning    Patient seen today for a follow-up visit in TCU. Patient notes she is doing okay. She just finished playing Ehsan with therapy and she enjoyed this, it is one of her favorite game. She does note and increase in pain following therapies and she states that she just took an Oxycodone as therapies are coming back around 1400 and she wants to make sure she's pre-medicated. she does not like taking the Oxycodone very much due to the \"high feeling\" she gets but she notes that it does help with the pain. She notes that her appetite has been okay - we discuss her low-albumin and she notes that she would be interested in trying and ice cream based supplement. She also notes that she has not been sleeping well the past 3-4 nights as she has \"been sleeping in the tamiko\" - she is not sure why, but she notes that staff told her that it is because she wasn't sleeping well - which she finds odd because she felt she slept better - she notes that they have been placing her into the tamiko sheet and leaving it connected to the tamiko machine overnight for the past 3-4 nights - I reassure her I will discuss this with nursing. Denies CP, palpitations, fatigue, nausea, vomiting, increased SOB/SOLOMON, fever, chills, and/or b/b concerns " today.    Allergies, and PMH/PSH reviewed in EPIC today.    Current Outpatient Medications   Medication Sig Dispense Refill     acetaminophen (TYLENOL) 325 MG tablet Take 3 tablets (975 mg) by mouth every 8 hours 90 tablet 0     albuterol (PROAIR HFA/PROVENTIL HFA/VENTOLIN HFA) 108 (90 Base) MCG/ACT inhaler Inhale 2 puffs into the lungs every 4 hours as needed for shortness of breath / dyspnea       albuterol (PROVENTIL) (2.5 MG/3ML) 0.083% neb solution Inhale 2.5 mg into the lungs every 4 hours as needed for shortness of breath / dyspnea       amLODIPine (NORVASC) 5 MG tablet Take 5 mg by mouth daily       amoxicillin (AMOXIL) 500 MG capsule Take 2,000 mg by mouth once as needed (Prior to dental appt)        ARIPiprazole (ABILIFY) 15 MG tablet Take 15 mg by mouth At Bedtime       aspirin (ASA) 81 MG EC tablet Take 1 tablet (81 mg) by mouth in the morning and 1 tablet (81 mg) in the evening. Take with meals. 60 tablet 0     BREO ELLIPTA 100-25 MCG/INH inhaler Inhale 1 puff into the lungs daily 1PM       carboxymethylcellulose (REFRESH PLUS) 0.5 % SOLN ophthalmic solution Place 1 drop into both eyes as needed in the morning and 1 drop as needed at noon and 1 drop as needed in the evening and 1 drop as needed before bedtime for dry eyes.       carboxymethylcellulose (REFRESH PLUS) 0.5 % SOLN ophthalmic solution Place 1 drop into both eyes in the morning.       cetirizine (ZYRTEC) 10 MG tablet Take 10 mg by mouth daily        diclofenac (VOLTAREN) 1 % topical gel Apply 4 g topically 4 times daily Apply to affected knees.       ferrous sulfate (FEROSUL) 325 (65 Fe) MG tablet Take 325 mg by mouth daily       FLUoxetine (PROZAC) 20 MG capsule Take 60 mg by mouth daily       gabapentin (NEURONTIN) 100 MG capsule Take 2 capsules (200 mg) by mouth 3 times daily 180 capsule 0     INCRUSE ELLIPTA 62.5 MCG/INH Inhaler Inhale 1 puff into the lungs daily        lamoTRIgine (LAMICTAL) 100 MG tablet Take 100 mg by mouth At Bedtime        lamoTRIgine (LAMICTAL) 150 MG tablet Take 150 mg by mouth daily        levothyroxine (SYNTHROID/LEVOTHROID) 75 MCG tablet Take 75 mcg by mouth daily       menthol-zinc oxide (CALMOSEPTINE) 0.44-20.6 % OINT ointment Apply topically 2 times daily as needed for irritation       mirabegron (MYRBETRIQ) 25 MG 24 hr tablet Take 25 mg by mouth daily        Multiple Vitamins-Minerals (MULTIVITAMIN PO) Take 1 tablet by mouth daily        oxyCODONE (ROXICODONE) 5 MG tablet Take 1 tablet (5 mg) by mouth every 4 hours as needed for moderate to severe pain 30 tablet 0     pantoprazole (PROTONIX) 20 MG EC tablet Take 20 mg by mouth daily        polyethylene glycol (MIRALAX) 17 GM/Dose powder Take 17 g by mouth daily 510 g 0     saccharomyces boulardii (FLORASTOR) 250 MG capsule Take 250 mg by mouth in the morning.       SENOKOT S 8.6-50 MG tablet Take 2 tablets by mouth daily        Vitamin D, Cholecalciferol, 25 MCG (1000 UT) CAPS Take 1,000 Units by mouth daily          REVIEW OF SYSTEMS:  10 point ROS of systems including Constitutional, Eyes, Respiratory, Cardiovascular, Gastroenterology, Genitourinary, Integumentary, Musculoskeletal, Psychiatric were all negative except for pertinent positives noted in my HPI.    Objective:   /70   Pulse 69   Temp 98.5  F (36.9  C)   Resp 20   Ht 1.524 m (5')   Wt 70.8 kg (156 lb)   SpO2 94%   BMI 30.47 kg/m    GENERAL APPEARANCE:  Alert, in no distress, appears healthy, oriented, cooperative, elderly woman sitting upright in wheelchair in room  EYES:  EOM, conjunctivae, lids, pupils and irises normal  RESP:  respiratory effort and palpation of chest normal, lungs clear to auscultation , no respiratory distress, diminished breath sounds BLL, cough - none, O2 present via NC  CV:  Palpation and auscultation of heart done , regular rate and rhythm, no murmur, rub, or gallop, no edema, +2 pedal pulses  ABDOMEN:  normal bowel sounds, soft, nontender, no hepatosplenomegaly or  other masses  M/S:   Gait and station abnormal - ADRIAN 2/2 patient being in wheelchair  Digits and nails abnormal - arthritic changes present  Examination of:   left upper extremity  Inspection, ROM, stability and muscle strength limited 2/2 noted procedure, pain and immobilizer  SKIN:  Inspection of skin and subcutaneous tissue baseline, Palpation of skin and subcutaneous tissue baseline, skin thin, fragile and dry  NEURO:   Cranial nerves 2-12 are normal tested and grossly at patient's baseline  PSYCH:  oriented X 3, normal insight, judgement and memory, affect and mood normal    Recent labs in EPIC reviewed by me today.     Assessment/Plan:    ICD-10-CM    1. Acute respiratory failure with hypoxia (H)  J96.01 Acute on chronic - unstable, improving. Pt    2. Chronic obstructive pulmonary disease, unspecified COPD type (H)  J44.9 Continues on regimen of Albuterol, Incruse, and Breo. Continue medication as ordered with    3. Tachycardia  R00.0 ongoing monitoring. HR elevated inpatient -    4. Shortness of breath  R06.02 HRs since admission as shown below with    5. Atelectasis  J98.11 occasional noted elevations.   6. Hypoxia  R09.02 No active treatment regimen at this time. Continue VS per facility protocol with interventions as needed.           7. Acute post-operative pain  G89.18 Acute on chronic - unstable. Patient notes on-   8. Status post total shoulder arthroplasty, left  Z96.612 Going presence of pain to her L shoulder, especially after working with therapies. Oxycodone effective, but she doesn't like how it makes her feel so she uses it minimally - continues to use Voltaren as scheduled. Continue current pain management regimen as ordered. Nursing spoke with Ortho as above - plan to complete Xrays in house and send them to Ortho for review with adaptations made afterwards to weight bearing and movement.   9. Bipolar 1 disorder (H)  F31.9 Chronic - stable. Patient notes no acute    10. Normal pressure  hydrocephalus (H)  G91.2 Concerns at this time - did report odd concerns as noted above - these were communicated to the nurse manager for follow-up. Continues on regimen of Lamictal, Abilify and Prozac. Continue medications as ordered.   11. Essential hypertension  I10 Chronic - stable. Continues on regimen of Amlodipine with BPs as noted below. Continue medications as ordered with VS per facility protocol.     12. Iron deficiency anemia D50.8 Chronic - unstable. Recent anemia panel as noted below. Previously on daily iron replacement with noted elevation of her Ferritin; change to 3x weekly dosing. Today we will add on Vit C 500mg PO BID and recheck as currently scheduled.   Latest Reference Range & Units 04/28/22 07:57   Ferritin 10 - 130 ng/mL 581 (H)   Folate >=3.5 ng/mL 10.0   Iron 42 - 175 ug/dL 21 (L)   Transferrin 212 - 360 mg/dL 146 (L)   Transferrin  - 563 ug/dL 183 (L)   Transferrin Saturation 20 - 50 % 12 (L)      13. Severe protein calorie malnutrition (H) E43 Chronic - unstable. Last albumin low as shown below. She notes that she does not get a lot of protein in her diet. She is interested in a daily ice cream based supplement. Will start Magic cup BID with a planned recheck of albumin in ~6 weeks.  Lab Results   Component Value Date    ALBUMIN 2.1 04/28/2022    ALBUMIN 3.8 10/16/2018      14. Physical deconditioning  R53.81 Acute on chronic - unstable. Continues to be limited by WB status - follow-up with Ortho as above with plans for adjustment to increase ability to work with therapies. PT/OT following - adv per their recommendations. SW ongoing to assist with discharge planning.     Electronically signed by:   Dr. Chantelle Hoover, APRN, DNP, AGNP-BC, PMHNP-BC  [facility]  Office Hours: Tues-Fri 5705-5905  Office: [address] [place] [address]  Fax - [phone]  Triage Phone- [phone]  Business Office- [phone]      Email: [email or web address]

## 2022-05-04 ENCOUNTER — LAB REQUISITION (OUTPATIENT)
Dept: LAB | Facility: CLINIC | Age: 82
End: 2022-05-04
Payer: MEDICARE

## 2022-05-04 DIAGNOSIS — G93.41 METABOLIC ENCEPHALOPATHY: ICD-10-CM

## 2022-05-05 LAB
ALBUMIN SERPL-MCNC: 2.4 G/DL (ref 3.5–5)
ERYTHROCYTE [DISTWIDTH] IN BLOOD BY AUTOMATED COUNT: 15.5 % (ref 10–15)
HCT VFR BLD AUTO: 33.2 % (ref 35–47)
HGB BLD-MCNC: 10.1 G/DL (ref 11.7–15.7)
MCH RBC QN AUTO: 27.3 PG (ref 26.5–33)
MCHC RBC AUTO-ENTMCNC: 30.4 G/DL (ref 31.5–36.5)
MCV RBC AUTO: 90 FL (ref 78–100)
PLATELET # BLD AUTO: 597 10E3/UL (ref 150–450)
RBC # BLD AUTO: 3.7 10E6/UL (ref 3.8–5.2)
WBC # BLD AUTO: 7.7 10E3/UL (ref 4–11)

## 2022-05-05 PROCEDURE — 82040 ASSAY OF SERUM ALBUMIN: CPT | Performed by: NURSE PRACTITIONER

## 2022-05-05 PROCEDURE — P9604 ONE-WAY ALLOW PRORATED TRIP: HCPCS | Performed by: NURSE PRACTITIONER

## 2022-05-05 PROCEDURE — 85027 COMPLETE CBC AUTOMATED: CPT | Performed by: NURSE PRACTITIONER

## 2022-05-05 PROCEDURE — 36415 COLL VENOUS BLD VENIPUNCTURE: CPT | Performed by: NURSE PRACTITIONER

## 2022-05-11 ENCOUNTER — TRANSITIONAL CARE UNIT VISIT (OUTPATIENT)
Dept: GERIATRICS | Facility: CLINIC | Age: 82
End: 2022-05-11
Payer: MEDICARE

## 2022-05-11 VITALS
DIASTOLIC BLOOD PRESSURE: 87 MMHG | TEMPERATURE: 98.3 F | SYSTOLIC BLOOD PRESSURE: 149 MMHG | HEIGHT: 60 IN | HEART RATE: 80 BPM | RESPIRATION RATE: 18 BRPM | WEIGHT: 156 LBS | BODY MASS INDEX: 30.63 KG/M2 | OXYGEN SATURATION: 94 %

## 2022-05-11 DIAGNOSIS — M75.121 COMPLETE TEAR OF RIGHT ROTATOR CUFF, UNSPECIFIED WHETHER TRAUMATIC: ICD-10-CM

## 2022-05-11 DIAGNOSIS — G91.2 NORMAL PRESSURE HYDROCEPHALUS (H): ICD-10-CM

## 2022-05-11 DIAGNOSIS — F01.50 VASCULAR DEMENTIA WITHOUT BEHAVIORAL DISTURBANCE (H): Primary | ICD-10-CM

## 2022-05-11 PROCEDURE — 99309 SBSQ NF CARE MODERATE MDM 30: CPT | Performed by: NURSE PRACTITIONER

## 2022-05-11 NOTE — PROGRESS NOTES
[facility] GERIATRICS    Chief Complaint   Patient presents with     RECHECK     HPI:  Amparo Sharma is a 81 year old  (1940), who is being seen today for an episodic care visit at: [facility] ([facility]) [[id number]]. Today's concern is: TCU follow up for shoulder pain, COPD.  She has a history of hypertension, COPD, hypothyroid, NPH which, bipolar disorder and stage III colon cancer status postresection.  Had a recent left shoulder arthroplasty however was readmitted for hypoxia on 4/14 on postop day 3.  Her sats were in the 70s.  This was thought to be due to atelectasis and respiratory depression due to increased oxycodone use.   Her oxycodone was at 5 mg every 4 hours as needed.  O2 has been weaned completely in the TCU.  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  She tells me she has some left shoulder pain.  She is a Bonnie lift does not ambulate at this time.  Vital signs are stable.  Nurse manager has no major concerns today.       Allergies, and PMH/PSH reviewed in EPIC today.  REVIEW OF SYSTEMS:  4 point ROS including Respiratory, CV, GI and , other than that noted in the HPI,  is negative    Objective:   BP (!) 149/87   Pulse 80   Temp 98.3  F (36.8  C)   Resp 18   Ht 1.524 m (5')   Wt 70.8 kg (156 lb)   SpO2 94%   BMI 30.47 kg/m    Physical Exam   General appearance: alert, appears stated age and cooperative.   Head: Normocephalic, without obvious abnormality, atraumatic, Eyes: sclera anicteric.  Lungs: respirations unlabored on room air.  No wheezing.  Extremities: extremities: Without swelling or redness.  Range of motion at baseline.  Skin: Skin color, texture, turgor normal. No rashes or lesions  Neurologic: oriented. No focal deficits.   Psych: interacts well with  caregivers, exhibits logical thought processes and connections, pleasant      Most Recent 3 CBC's:  Recent Labs   Lab Test 05/05/22  1108 04/28/22  0757 04/19/22  0851   WBC 7.7 9.1 10.7   HGB 10.1* 9.2* 8.2*   MCV 90 94 92   * 708* 494*     Most Recent 3 BMP's:  Recent Labs   Lab Test 04/28/22  0757 04/19/22  0851 04/15/22  0619    142 139   POTASSIUM 4.4 3.6 3.7   CHLORIDE 105 105 104   CO2 27 27 25   BUN 11 13 7*   CR 0.56* 0.57* 0.46*   ANIONGAP 12 10 10   MARTHA 9.5 9.4 8.8   GLC 98 104 91       Assessment/Plan:  (F01.50) Vascular dementia without behavioral disturbance (H)  (primary encounter diagnosis)  Comment: Pleasantly confused.  No major concerns.     (G91.2) Normal pressure hydrocephalus (H)  Comment: With shunt in place.  Plan: PT and OT.    (M75.121) Complete tear of right rotator cuff, unspecified whether traumatic  Comment: Pain currently well controlled, no longer on gabapentin, weaning oxycodone.  Plan: Follow-up with Ortho as needed, pain control as tolerated.    Discussed with nurse manager who has no concerns.      Electronically signed by: Markos Stanley CNP

## 2022-05-11 NOTE — LETTER
5/11/2022        RE: Amparo Sharma  [address]  [place] [address]        [facility] GERIATRICS    Chief Complaint   Patient presents with     RECHECK     HPI:  Amparo Sharma is a 81 year old  (1940), who is being seen today for an episodic care visit at: [facility] ([facility]) [[id number]]. Today's concern is: TCU follow up for shoulder pain, COPD.  She has a history of hypertension, COPD, hypothyroid, NPH which, bipolar disorder and stage III colon cancer status postresection.  Had a recent left shoulder arthroplasty however was readmitted for hypoxia on 4/14 on postop day 3.  Her sats were in the 70s.  This was thought to be due to atelectasis and respiratory depression due to increased oxycodone use.   Her oxycodone was at 5 mg every 4 hours as needed.  O2 has been weaned completely in the TCU.  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  She tells me she has some left shoulder pain.  She is a Bonnie lift does not ambulate at this time.  Vital signs are stable.  Nurse manager has no major concerns today.       Allergies, and PMH/PSH reviewed in EPIC today.  REVIEW OF SYSTEMS:  4 point ROS including Respiratory, CV, GI and , other than that noted in the HPI,  is negative    Objective:   BP (!) 149/87   Pulse 80   Temp 98.3  F (36.8  C)   Resp 18   Ht 1.524 m (5')   Wt 70.8 kg (156 lb)   SpO2 94%   BMI 30.47 kg/m    Physical Exam   General appearance: alert, appears stated age and cooperative.   Head: Normocephalic, without obvious abnormality, atraumatic, Eyes: sclera anicteric.  Lungs: respirations unlabored on room air.  No wheezing.  Extremities: extremities: Without swelling or redness.  Range of motion at baseline.  Skin: Skin color, texture, turgor  normal. No rashes or lesions  Neurologic: oriented. No focal deficits.   Psych: interacts well with caregivers, exhibits logical thought processes and connections, pleasant      Most Recent 3 CBC's:  Recent Labs   Lab Test 05/05/22  1108 04/28/22  0757 04/19/22  0851   WBC 7.7 9.1 10.7   HGB 10.1* 9.2* 8.2*   MCV 90 94 92   * 708* 494*     Most Recent 3 BMP's:  Recent Labs   Lab Test 04/28/22  0757 04/19/22  0851 04/15/22  0619    142 139   POTASSIUM 4.4 3.6 3.7   CHLORIDE 105 105 104   CO2 27 27 25   BUN 11 13 7*   CR 0.56* 0.57* 0.46*   ANIONGAP 12 10 10   MARTHA 9.5 9.4 8.8   GLC 98 104 91       Assessment/Plan:  (F01.50) Vascular dementia without behavioral disturbance (H)  (primary encounter diagnosis)  Comment: Pleasantly confused.  No major concerns.     (G91.2) Normal pressure hydrocephalus (H)  Comment: With shunt in place.  Plan: PT and OT.    (M75.121) Complete tear of right rotator cuff, unspecified whether traumatic  Comment: Pain currently well controlled, no longer on gabapentin, weaning oxycodone.  Plan: Follow-up with Ortho as needed, pain control as tolerated.    Discussed with nurse manager who has no concerns.      Electronically signed by: Markos Stanley CNP             Sincerely,        Markos Stanley CNP

## 2022-05-22 ENCOUNTER — LAB REQUISITION (OUTPATIENT)
Dept: LAB | Facility: CLINIC | Age: 82
End: 2022-05-22
Payer: MEDICARE

## 2022-05-22 VITALS
TEMPERATURE: 98.5 F | DIASTOLIC BLOOD PRESSURE: 83 MMHG | OXYGEN SATURATION: 94 % | HEART RATE: 76 BPM | SYSTOLIC BLOOD PRESSURE: 153 MMHG | RESPIRATION RATE: 16 BRPM

## 2022-05-22 DIAGNOSIS — D50.9 IRON DEFICIENCY ANEMIA, UNSPECIFIED: ICD-10-CM

## 2022-05-23 ENCOUNTER — TELEPHONE (OUTPATIENT)
Dept: NEUROSURGERY | Facility: CLINIC | Age: 82
End: 2022-05-23
Payer: MEDICARE

## 2022-05-23 NOTE — TELEPHONE ENCOUNTER
[facility] Call Center    Phone Message    May a detailed message be left on voicemail: yes     Reason for Call: Other: Pt daughter Nohemi called and stated that she was suppose to schedule a follow up but was unsure what provider to schedule with. Nohemi stated that her mother saw Dr. Dozier but was unsure if there was suppose to be a follow up with someone else. Please call back to schedule.     Action Taken: Message routed to:  [facility] ([facility]    Travel Screening: Not Applicable

## 2022-05-24 ENCOUNTER — LAB REQUISITION (OUTPATIENT)
Dept: LAB | Facility: CLINIC | Age: 82
End: 2022-05-24
Payer: MEDICARE

## 2022-05-24 ENCOUNTER — TRANSITIONAL CARE UNIT VISIT (OUTPATIENT)
Dept: GERIATRICS | Facility: CLINIC | Age: 82
End: 2022-05-24
Payer: MEDICARE

## 2022-05-24 VITALS
HEIGHT: 60 IN | TEMPERATURE: 96.9 F | WEIGHT: 142.9 LBS | SYSTOLIC BLOOD PRESSURE: 145 MMHG | HEART RATE: 99 BPM | BODY MASS INDEX: 28.06 KG/M2 | DIASTOLIC BLOOD PRESSURE: 91 MMHG

## 2022-05-24 DIAGNOSIS — J44.9 CHRONIC OBSTRUCTIVE PULMONARY DISEASE, UNSPECIFIED COPD TYPE (H): ICD-10-CM

## 2022-05-24 DIAGNOSIS — R06.02 SHORTNESS OF BREATH: ICD-10-CM

## 2022-05-24 DIAGNOSIS — G91.2 NORMAL PRESSURE HYDROCEPHALUS (H): ICD-10-CM

## 2022-05-24 DIAGNOSIS — D50.8 IRON DEFICIENCY ANEMIA SECONDARY TO INADEQUATE DIETARY IRON INTAKE: ICD-10-CM

## 2022-05-24 DIAGNOSIS — Z96.612 STATUS POST TOTAL SHOULDER ARTHROPLASTY, LEFT: ICD-10-CM

## 2022-05-24 DIAGNOSIS — G89.18 ACUTE POST-OPERATIVE PAIN: ICD-10-CM

## 2022-05-24 DIAGNOSIS — D50.9 IRON DEFICIENCY ANEMIA, UNSPECIFIED: ICD-10-CM

## 2022-05-24 DIAGNOSIS — R53.81 PHYSICAL DECONDITIONING: ICD-10-CM

## 2022-05-24 DIAGNOSIS — R00.0 TACHYCARDIA: ICD-10-CM

## 2022-05-24 DIAGNOSIS — E43 SEVERE PROTEIN-CALORIE MALNUTRITION (H): ICD-10-CM

## 2022-05-24 DIAGNOSIS — I10 ESSENTIAL HYPERTENSION: ICD-10-CM

## 2022-05-24 DIAGNOSIS — R41.0 DELIRIUM: ICD-10-CM

## 2022-05-24 DIAGNOSIS — R09.02 HYPOXIA: ICD-10-CM

## 2022-05-24 DIAGNOSIS — F31.9 BIPOLAR 1 DISORDER (H): ICD-10-CM

## 2022-05-24 DIAGNOSIS — J96.01 ACUTE RESPIRATORY FAILURE WITH HYPOXIA (H): Primary | ICD-10-CM

## 2022-05-24 DIAGNOSIS — Z96.611 STATUS POST REVERSE TOTAL ARTHROPLASTY OF RIGHT SHOULDER: ICD-10-CM

## 2022-05-24 DIAGNOSIS — J98.11 ATELECTASIS: ICD-10-CM

## 2022-05-24 LAB
FERRITIN SERPL-MCNC: 245 NG/ML (ref 10–130)
IRON SATN MFR SERPL: 11 % (ref 15–46)
IRON SERPL-MCNC: 30 UG/DL (ref 35–180)
TIBC SERPL-MCNC: 281 UG/DL (ref 240–430)
TRANSFERRIN SERPL-MCNC: 227 MG/DL (ref 212–360)

## 2022-05-24 PROCEDURE — 82728 ASSAY OF FERRITIN: CPT | Performed by: NURSE PRACTITIONER

## 2022-05-24 PROCEDURE — 36415 COLL VENOUS BLD VENIPUNCTURE: CPT | Performed by: NURSE PRACTITIONER

## 2022-05-24 PROCEDURE — 99310 SBSQ NF CARE HIGH MDM 45: CPT | Performed by: NURSE PRACTITIONER

## 2022-05-24 PROCEDURE — P9604 ONE-WAY ALLOW PRORATED TRIP: HCPCS | Performed by: NURSE PRACTITIONER

## 2022-05-24 PROCEDURE — 84466 ASSAY OF TRANSFERRIN: CPT | Performed by: NURSE PRACTITIONER

## 2022-05-24 PROCEDURE — 83550 IRON BINDING TEST: CPT | Performed by: NURSE PRACTITIONER

## 2022-05-24 RX ORDER — OXYCODONE HYDROCHLORIDE 5 MG/1
5 TABLET ORAL DAILY PRN
Qty: 4 TABLET | Refills: 0 | Status: SHIPPED | OUTPATIENT
Start: 2022-05-24 | End: 2022-05-31

## 2022-05-24 NOTE — PROGRESS NOTES
"[facility] GERIATRIC SERVICES    Chief Complaint   Patient presents with     RECHECK     HPI:  Amparo Sharma is a 81 year old  (1940), who is being seen today for an episodic care visit at: [facility] ([facility]) [[id number]]. Today's concern is:    Acute respiratory failure with hypoxia (H)  Chronic obstructive pulmonary disease, unspecified COPD type (H)  Tachycardia  Shortness of breath  Atelectasis  Hypoxia  Acute post-operative pain  Status post total shoulder arthroplasty, left  Bipolar 1 disorder (H)  Delirium  Normal pressure hydrocephalus (H)  Essential hypertension  Iron deficiency anemia secondary to inadequate dietary iron intake  Severe protein-calorie malnutrition (H)  Physical deconditioning    Patient is seen today for a follow-up visit in TCU. Patient notes she is good. She is a poor historian and notes she has never met Dr. Hoover. She does note an increase in pain in her left shoulder; she suspects she hurt herself when she \"fell out of bed\" but also notes ongoing left shoulder pain since surgery. She says she wanted to get up and use the walker because she misses being able to walk. Patient is receptive of plan for oxycodone PRN for 7 days and discontinuing thereafter; \"that sounds fair.\" She says her appetite is good but her only issue is having loose stools lately. She requests to hold \"the pill\" for her bowel movements. She also notes that she is sleeping okay and has no other concerns other than her loose stool issue. Denies CP, palpitations, fatigue, SOB, fever, chills, nausea vomiting and b/b concerns today.    Allergies, and PMH/PSH reviewed in [facility] today.    Current Outpatient Medications   Medication Sig Dispense Refill     acetaminophen (TYLENOL) 325 MG tablet Take 3 tablets (975 mg) by mouth every 8 hours 90 tablet 0     albuterol (PROAIR HFA/PROVENTIL HFA/VENTOLIN HFA) 108 (90 Base) MCG/ACT inhaler Inhale 2 puffs into the lungs every 4 hours as needed for shortness of " breath / dyspnea       albuterol (PROVENTIL) (2.5 MG/3ML) 0.083% neb solution Inhale 2.5 mg into the lungs every 4 hours as needed for shortness of breath / dyspnea       amLODIPine (NORVASC) 5 MG tablet Take 5 mg by mouth daily       amoxicillin (AMOXIL) 500 MG capsule Take 2,000 mg by mouth once as needed (Prior to dental appt)        ARIPiprazole (ABILIFY) 15 MG tablet Take 15 mg by mouth At Bedtime       aspirin (ASA) 81 MG EC tablet Take 1 tablet (81 mg) by mouth in the morning and 1 tablet (81 mg) in the evening. Take with meals. 60 tablet 0     BREO ELLIPTA 100-25 MCG/INH inhaler Inhale 1 puff into the lungs daily 1PM       carboxymethylcellulose (REFRESH PLUS) 0.5 % SOLN ophthalmic solution Place 1 drop into both eyes as needed in the morning and 1 drop as needed at noon and 1 drop as needed in the evening and 1 drop as needed before bedtime for dry eyes.       carboxymethylcellulose (REFRESH PLUS) 0.5 % SOLN ophthalmic solution Place 1 drop into both eyes in the morning.       cetirizine (ZYRTEC) 10 MG tablet Take 10 mg by mouth daily        diclofenac (VOLTAREN) 1 % topical gel Apply 4 g topically 4 times daily Apply to affected knees.       ferrous sulfate (FEROSUL) 325 (65 Fe) MG tablet Take 1 tablet (325 mg) by mouth Every Mon, Wed, Fri Morning       FLUoxetine (PROZAC) 20 MG capsule Take 60 mg by mouth daily       gabapentin (NEURONTIN) 100 MG capsule Take 2 capsules (200 mg) by mouth 3 times daily 180 capsule 0     INCRUSE ELLIPTA 62.5 MCG/INH Inhaler Inhale 1 puff into the lungs daily        lamoTRIgine (LAMICTAL) 100 MG tablet Take 100 mg by mouth At Bedtime       lamoTRIgine (LAMICTAL) 150 MG tablet Take 150 mg by mouth daily        levothyroxine (SYNTHROID/LEVOTHROID) 75 MCG tablet Take 75 mcg by mouth daily       menthol-zinc oxide (CALMOSEPTINE) 0.44-20.6 % OINT ointment Apply topically 2 times daily as needed for irritation       mirabegron (MYRBETRIQ) 25 MG 24 hr tablet Take 25 mg by mouth  daily        Multiple Vitamins-Minerals (MULTIVITAMIN PO) Take 1 tablet by mouth daily        oxyCODONE (ROXICODONE) 5 MG tablet Take 1 tablet (5 mg) by mouth every 4 hours as needed for moderate to severe pain 30 tablet 0     pantoprazole (PROTONIX) 20 MG EC tablet Take 20 mg by mouth daily        polyethylene glycol (MIRALAX) 17 GM/Dose powder Take 17 g by mouth daily 510 g 0     saccharomyces boulardii (FLORASTOR) 250 MG capsule Take 250 mg by mouth in the morning.       SENOKOT S 8.6-50 MG tablet Take 2 tablets by mouth daily        Vitamin D, Cholecalciferol, 25 MCG (1000 UT) CAPS Take 1,000 Units by mouth daily          REVIEW OF SYSTEMS:  10 point ROS of systems including Constitutional, Eyes, Respiratory, Cardiovascular, Gastroenterology, Genitourinary, Integumentary, Musculoskeletal, Psychiatric were all negative except for pertinent positives noted in my HPI.    Objective:   BP (!) 145/91   Pulse 99   Temp 96.9  F (36.1  C)   Ht 1.524 m (5')   Wt 64.8 kg (142 lb 14.4 oz)   BMI 27.91 kg/m    GENERAL APPEARANCE:  Alert, in no distress, appears healthy, oriented, cooperative, elderly woman sitting upright in wheelchair in room  EYES:  EOM, conjunctivae, lids, pupils and irises normal  RESP:  respiratory effort and palpation of chest normal, lungs clear to auscultation , no respiratory distress, diminished breath sounds BLL, cough - none, O2 present via NC  CV:  Palpation and auscultation of heart done , regular rate and rhythm, no murmur, rub, or gallop, no edema, +2 pedal pulses  ABDOMEN:  normal bowel sounds, soft, nontender, no hepatosplenomegaly or other masses  M/S:   Gait and station abnormal - ADRIAN 2/2 patient being in wheelchair; Digits and nails abnormal - arthritic changes present; Examination of left upper extremity  Inspection, ROM, stability and muscle strength limited 2/2 noted procedure, pain and immobilizer  SKIN:  Inspection of skin and subcutaneous tissue baseline, Palpation of skin and  subcutaneous tissue baseline, skin thin, fragile and dry  NEURO:   Cranial nerves 2-12 are normal tested and grossly at patient's baseline  PSYCH:  oriented X 3, normal insight, judgement and memory, affect and mood normal    Recent labs in [facility] reviewed by me today.    CBC RESULTS: Recent Labs   Lab Test 05/05/22  1108 04/28/22  0757   WBC 7.7 9.1   RBC 3.70* 3.28*   HGB 10.1* 9.2*   HCT 33.2* 30.7*   MCV 90 94   MCH 27.3 28.0   MCHC 30.4* 30.0*   RDW 15.5* 15.6*   * 708*       Last Basic Metabolic Panel:  Recent Labs   Lab Test 04/28/22  0757 04/19/22  0851    142   POTASSIUM 4.4 3.6   CHLORIDE 105 105   MARTHA 9.5 9.4   CO2 27 27   BUN 11 13   CR 0.56* 0.57*   GLC 98 104       Liver Function Studies -   Recent Labs   Lab Test 05/05/22  1108 04/28/22  0757 04/15/22  0619   PROTTOTAL  --  6.3 5.8*   ALBUMIN 2.4* 2.1* 2.0*   BILITOTAL  --  0.3 0.7   ALKPHOS  --  133* 102   AST  --  18 82*   ALT  --  17 37       TSH   Date Value Ref Range Status   03/09/2022 1.43 0.30 - 5.00 uIU/mL Final   09/15/2021 1.03 0.30 - 5.00 uIU/mL Final   10/16/2018 2.39 0.40 - 4.00 mU/L Final   ]    Lab Results   Component Value Date    A1C 5.7 07/26/2021    A1C 5.7 07/15/2016     Assessment/Plan:    ICD-10-CM    1. Acute respiratory failure with hypoxia (H)  J96.01 Acute on chronic - unstable, improving. Pt    2. Chronic obstructive pulmonary disease, unspecified COPD type (H)  J44.9 Continues on regimen of Albuterol, Incruse, and Breo. Continue medication as ordered with    3. Tachycardia  R00.0 ongoing monitoring. HR elevated inpatient -    4. Shortness of breath  R06.02 HRs since admission as shown below with    5. Atelectasis  J98.11 occasional noted elevations.   6. Hypoxia  R09.02 No active treatment regimen at this time. Continue VS per facility protocol with interventions as needed.           7. Acute post-operative pain  G89.18 Acute on chronic - unstable. Patient notes ongoing presence of pain to her L shoulder   8.  Status post total shoulder arthroplasty, left  Z96.612 Oxycodone effective, but she doesn't like how it makes her feel so she uses it minimally - continues to use Voltaren as scheduled. Today we will decrease Oxycodone to 1 tablet PO qDay x7d then discontinue. Nursing spoke with Ortho as above - plan to complete Xrays in house and send them to Ortho for review with adaptations made afterwards to weight bearing and movement.   9.  10. Bipolar 1 disorder (H)   Delerium F31.9  R41.0 Chronic - stable. Patient notes no acute Concerns at this time - nurse manager did   11. Normal pressure hydrocephalus (H)  G91.2 Note ongoing odd behaviors and statements. They plan to follow-up with Neurology re:adjust of her shunt as this may be effecting her behaviors.  Continues on regimen of Lamictal, Abilify and Prozac. Nurse manager spoke with daughter and she notes that patient has been on the same regimen since ~1990 and is not followed by psychiatry. At this time we will attempt to get the patient of her Prozac and transition her to Celexa to assist with her anxiety/depression (as this isn't recommended in elderly patient's, especially at her current dose) and utilize Hydroxyzine 25mg PO at bedtime to decrease night time behaviors.    Decrease Prozac to 40mg PO qDay x5d; then    Decrease Prozac to 20mg PO qDay x5d; then    Discontinue    Wait for 7d to allow washout of Prozac from patient's system; then    Start Celexa 5mg PO qDay x7d; then    Increase Celexa to 10mg PO qDay ongoing   12. Essential hypertension  I10 Chronic - stable. Continues on regimen of Amlodipine with BPs as noted below. Continue medications as ordered with VS per facility protocol.     13. Iron deficiency anemia D50.8 Chronic - unstable. Today's anemia panel as noted below. Previously on daily iron replacement with noted elevation of her Ferritin; changed to 3x weekly dosing with addition of Vit C - improvement noted. Continue medications as ordered with  plan to follow-up on labs in 3-6 weeks.   Latest Reference Range & Units 04/28/22 07:57 05/24/22 07:55   Ferritin 10 - 130 ng/mL 581 (H) 245 (H)   Folate >=3.5 ng/mL 10.0    Iron 35 - 180 ug/dL 21 (L) 30 (L)   Iron Binding Cap 240 - 430 ug/dL  281   Iron Saturation Index 15 - 46 %  11 (L)   Transferrin 212 - 360 mg/dL 146 (L) 227   Transferrin  - 563 ug/dL 183 (L)    Transferrin Saturation 20 - 50 % 12 (L)       14. Severe protein calorie malnutrition (H) E43 Chronic - unstable. Last albumin low as shown below. She notes that she does not get a lot of protein in her diet. She is interested in a daily ice cream based supplement. Will start Magic cup BID with a planned recheck of albumin in ~4 weeks.  Lab Results   Component Value Date    ALBUMIN 2.1 04/28/2022    ALBUMIN 3.8 10/16/2018      15. Physical deconditioning  R53.81 Acute on chronic - unstable. Continues to be limited by WB status - follow-up with Ortho as above with plans for adjustment to increase ability to work with therapies. PT/OT following - adv per their recommendations. SW ongoing to assist with discharge planning.     This patient was seen along with a nurse practitioner student, Luciana Torres DNP (Kim) student.  The histories and review of systems are obtained by Luciana Torres DNP (Kim) student and confirmed by myself all objective, assessments and plans were completed by myself.    Electronically signed by:   Dr. Chantelle Hoover, KASANDRA, DOV, AGNP-BC, PMHNP-[facility]  Office Hours: Tues-Fri [id number]  Office: [address] [place] [address]  Fax - [phone]  Triage Phone- [phone]  Business Office- [phone]      Email: [email or web address]

## 2022-05-24 NOTE — LETTER
"    5/24/2022        RE: Amparo Sharma  [address]  [place] [address]         HEALTH GERIATRIC SERVICES    Chief Complaint   Patient presents with     RECHECK     HPI:  Amparo Sharma is a 81 year old  (1940), who is being seen today for an episodic care visit at: [facility] ([facility]) [[id number]]. Today's concern is:    Acute respiratory failure with hypoxia (H)  Chronic obstructive pulmonary disease, unspecified COPD type (H)  Tachycardia  Shortness of breath  Atelectasis  Hypoxia  Acute post-operative pain  Status post total shoulder arthroplasty, left  Bipolar 1 disorder (H)  Delirium  Normal pressure hydrocephalus (H)  Essential hypertension  Iron deficiency anemia secondary to inadequate dietary iron intake  Severe protein-calorie malnutrition (H)  Physical deconditioning    Patient is seen today for a follow-up visit in TCU. Patient notes she is good. She is a poor historian and notes she has never met Dr. Hoover. She does note an increase in pain in her left shoulder; she suspects she hurt herself when she \"fell out of bed\" but also notes ongoing left shoulder pain since surgery. She says she wanted to get up and use the walker because she misses being able to walk. Patient is receptive of plan for oxycodone PRN for 7 days and discontinuing thereafter; \"that sounds fair.\" She says her appetite is good but her only issue is having loose stools lately. She requests to hold \"the pill\" for her bowel movements. She also notes that she is sleeping okay and has no other concerns other than her loose stool issue. Denies CP, palpitations, fatigue, SOB, fever, chills, nausea vomiting and b/b concerns today.    Allergies, and PMH/PSH reviewed in EPIC today.    Current Outpatient Medications   Medication Sig Dispense Refill     acetaminophen (TYLENOL) 325 MG tablet Take 3 tablets (975 mg) by mouth every 8 hours 90 tablet 0     albuterol (PROAIR HFA/PROVENTIL HFA/VENTOLIN HFA) 108 " (90 Base) MCG/ACT inhaler Inhale 2 puffs into the lungs every 4 hours as needed for shortness of breath / dyspnea       albuterol (PROVENTIL) (2.5 MG/3ML) 0.083% neb solution Inhale 2.5 mg into the lungs every 4 hours as needed for shortness of breath / dyspnea       amLODIPine (NORVASC) 5 MG tablet Take 5 mg by mouth daily       amoxicillin (AMOXIL) 500 MG capsule Take 2,000 mg by mouth once as needed (Prior to dental appt)        ARIPiprazole (ABILIFY) 15 MG tablet Take 15 mg by mouth At Bedtime       aspirin (ASA) 81 MG EC tablet Take 1 tablet (81 mg) by mouth in the morning and 1 tablet (81 mg) in the evening. Take with meals. 60 tablet 0     BREO ELLIPTA 100-25 MCG/INH inhaler Inhale 1 puff into the lungs daily 1PM       carboxymethylcellulose (REFRESH PLUS) 0.5 % SOLN ophthalmic solution Place 1 drop into both eyes as needed in the morning and 1 drop as needed at noon and 1 drop as needed in the evening and 1 drop as needed before bedtime for dry eyes.       carboxymethylcellulose (REFRESH PLUS) 0.5 % SOLN ophthalmic solution Place 1 drop into both eyes in the morning.       cetirizine (ZYRTEC) 10 MG tablet Take 10 mg by mouth daily        diclofenac (VOLTAREN) 1 % topical gel Apply 4 g topically 4 times daily Apply to affected knees.       ferrous sulfate (FEROSUL) 325 (65 Fe) MG tablet Take 1 tablet (325 mg) by mouth Every Mon, Wed, Fri Morning       FLUoxetine (PROZAC) 20 MG capsule Take 60 mg by mouth daily       gabapentin (NEURONTIN) 100 MG capsule Take 2 capsules (200 mg) by mouth 3 times daily 180 capsule 0     INCRUSE ELLIPTA 62.5 MCG/INH Inhaler Inhale 1 puff into the lungs daily        lamoTRIgine (LAMICTAL) 100 MG tablet Take 100 mg by mouth At Bedtime       lamoTRIgine (LAMICTAL) 150 MG tablet Take 150 mg by mouth daily        levothyroxine (SYNTHROID/LEVOTHROID) 75 MCG tablet Take 75 mcg by mouth daily       menthol-zinc oxide (CALMOSEPTINE) 0.44-20.6 % OINT ointment Apply topically 2 times daily  as needed for irritation       mirabegron (MYRBETRIQ) 25 MG 24 hr tablet Take 25 mg by mouth daily        Multiple Vitamins-Minerals (MULTIVITAMIN PO) Take 1 tablet by mouth daily        oxyCODONE (ROXICODONE) 5 MG tablet Take 1 tablet (5 mg) by mouth every 4 hours as needed for moderate to severe pain 30 tablet 0     pantoprazole (PROTONIX) 20 MG EC tablet Take 20 mg by mouth daily        polyethylene glycol (MIRALAX) 17 GM/Dose powder Take 17 g by mouth daily 510 g 0     saccharomyces boulardii (FLORASTOR) 250 MG capsule Take 250 mg by mouth in the morning.       SENOKOT S 8.6-50 MG tablet Take 2 tablets by mouth daily        Vitamin D, Cholecalciferol, 25 MCG (1000 UT) CAPS Take 1,000 Units by mouth daily          REVIEW OF SYSTEMS:  10 point ROS of systems including Constitutional, Eyes, Respiratory, Cardiovascular, Gastroenterology, Genitourinary, Integumentary, Musculoskeletal, Psychiatric were all negative except for pertinent positives noted in my HPI.    Objective:   BP (!) 145/91   Pulse 99   Temp 96.9  F (36.1  C)   Ht 1.524 m (5')   Wt 64.8 kg (142 lb 14.4 oz)   BMI 27.91 kg/m    GENERAL APPEARANCE:  Alert, in no distress, appears healthy, oriented, cooperative, elderly woman sitting upright in wheelchair in room  EYES:  EOM, conjunctivae, lids, pupils and irises normal  RESP:  respiratory effort and palpation of chest normal, lungs clear to auscultation , no respiratory distress, diminished breath sounds BLL, cough - none, O2 present via NC  CV:  Palpation and auscultation of heart done , regular rate and rhythm, no murmur, rub, or gallop, no edema, +2 pedal pulses  ABDOMEN:  normal bowel sounds, soft, nontender, no hepatosplenomegaly or other masses  M/S:   Gait and station abnormal - ADRIAN 2/2 patient being in wheelchair; Digits and nails abnormal - arthritic changes present; Examination of left upper extremity  Inspection, ROM, stability and muscle strength limited 2/2 noted procedure, pain and  immobilizer  SKIN:  Inspection of skin and subcutaneous tissue baseline, Palpation of skin and subcutaneous tissue baseline, skin thin, fragile and dry  NEURO:   Cranial nerves 2-12 are normal tested and grossly at patient's baseline  PSYCH:  oriented X 3, normal insight, judgement and memory, affect and mood normal    Recent labs in [facility] reviewed by me today.    CBC RESULTS: Recent Labs   Lab Test 05/05/22  1108 04/28/22  0757   WBC 7.7 9.1   RBC 3.70* 3.28*   HGB 10.1* 9.2*   HCT 33.2* 30.7*   MCV 90 94   MCH 27.3 28.0   MCHC 30.4* 30.0*   RDW 15.5* 15.6*   * 708*       Last Basic Metabolic Panel:  Recent Labs   Lab Test 04/28/22 0757 04/19/22  0851    142   POTASSIUM 4.4 3.6   CHLORIDE 105 105   MARTHA 9.5 9.4   CO2 27 27   BUN 11 13   CR 0.56* 0.57*   GLC 98 104       Liver Function Studies -   Recent Labs   Lab Test 05/05/22  1108 04/28/22 0757 04/15/22  0619   PROTTOTAL  --  6.3 5.8*   ALBUMIN 2.4* 2.1* 2.0*   BILITOTAL  --  0.3 0.7   ALKPHOS  --  133* 102   AST  --  18 82*   ALT  --  17 37       TSH   Date Value Ref Range Status   03/09/2022 1.43 0.30 - 5.00 uIU/mL Final   09/15/2021 1.03 0.30 - 5.00 uIU/mL Final   10/16/2018 2.39 0.40 - 4.00 mU/L Final   ]    Lab Results   Component Value Date    A1C 5.7 07/26/2021    A1C 5.7 07/15/2016     Assessment/Plan:    ICD-10-CM    1. Acute respiratory failure with hypoxia (H)  J96.01 Acute on chronic - unstable, improving. Pt    2. Chronic obstructive pulmonary disease, unspecified COPD type (H)  J44.9 Continues on regimen of Albuterol, Incruse, and Breo. Continue medication as ordered with    3. Tachycardia  R00.0 ongoing monitoring. HR elevated inpatient -    4. Shortness of breath  R06.02 HRs since admission as shown below with    5. Atelectasis  J98.11 occasional noted elevations.   6. Hypoxia  R09.02 No active treatment regimen at this time. Continue VS per facility protocol with interventions as needed.           7. Acute post-operative pain   G89.18 Acute on chronic - unstable. Patient notes ongoing presence of pain to her L shoulder   8. Status post total shoulder arthroplasty, left  Z96.612 Oxycodone effective, but she doesn't like how it makes her feel so she uses it minimally - continues to use Voltaren as scheduled. Today we will decrease Oxycodone to 1 tablet PO qDay x7d then discontinue. Nursing spoke with Ortho as above - plan to complete Xrays in house and send them to Ortho for review with adaptations made afterwards to weight bearing and movement.   9.  10. Bipolar 1 disorder (H)   Delerium F31.9  R41.0 Chronic - stable. Patient notes no acute Concerns at this time - nurse manager did   11. Normal pressure hydrocephalus (H)  G91.2 Note ongoing odd behaviors and statements. They plan to follow-up with Neurology re:adjust of her shunt as this may be effecting her behaviors.  Continues on regimen of Lamictal, Abilify and Prozac. Nurse manager spoke with daughter and she notes that patient has been on the same regimen since ~1990 and is not followed by psychiatry. At this time we will attempt to get the patient of her Prozac and transition her to Celexa to assist with her anxiety/depression (as this isn't recommended in elderly patient's, especially at her current dose) and utilize Hydroxyzine 25mg PO at bedtime to decrease night time behaviors.    Decrease Prozac to 40mg PO qDay x5d; then    Decrease Prozac to 20mg PO qDay x5d; then    Discontinue    Wait for 7d to allow washout of Prozac from patient's system; then    Start Celexa 5mg PO qDay x7d; then    Increase Celexa to 10mg PO qDay ongoing   12. Essential hypertension  I10 Chronic - stable. Continues on regimen of Amlodipine with BPs as noted below. Continue medications as ordered with VS per facility protocol.     13. Iron deficiency anemia D50.8 Chronic - unstable. Today's anemia panel as noted below. Previously on daily iron replacement with noted elevation of her Ferritin; changed to 3x  weekly dosing with addition of Vit C - improvement noted. Continue medications as ordered with plan to follow-up on labs in 3-6 weeks.   Latest Reference Range & Units 04/28/22 07:57 05/24/22 07:55   Ferritin 10 - 130 ng/mL 581 (H) 245 (H)   Folate >=3.5 ng/mL 10.0    Iron 35 - 180 ug/dL 21 (L) 30 (L)   Iron Binding Cap 240 - 430 ug/dL  281   Iron Saturation Index 15 - 46 %  11 (L)   Transferrin 212 - 360 mg/dL 146 (L) 227   Transferrin  - 563 ug/dL 183 (L)    Transferrin Saturation 20 - 50 % 12 (L)       14. Severe protein calorie malnutrition (H) E43 Chronic - unstable. Last albumin low as shown below. She notes that she does not get a lot of protein in her diet. She is interested in a daily ice cream based supplement. Will start Magic cup BID with a planned recheck of albumin in ~4 weeks.  Lab Results   Component Value Date    ALBUMIN 2.1 04/28/2022    ALBUMIN 3.8 10/16/2018      15. Physical deconditioning  R53.81 Acute on chronic - unstable. Continues to be limited by WB status - follow-up with Ortho as above with plans for adjustment to increase ability to work with therapies. PT/OT following - adv per their recommendations. SW ongoing to assist with discharge planning.     This patient was seen along with a nurse practitioner student, Luciana Torres DNP ([employer]) student.  The histories and review of systems are obtained by Luciana Trores DNP (Kim) student and confirmed by myself all objective, assessments and plans were completed by myself.    Electronically signed by:   Dr. Chantelle Hoover, APREARLENE, DNP, AGNP-BC, PMHNP-BC  [employer]  Office Hours: Tues-Fri 8746-5348  Office: [address] [place] [address]  Fax - [phone]  Triage Phone- [phone]  Business Office- [phone]      Email: [email or web address]                 Orders:    Hydroxyzine 25mg PO at bedtime to decrease night time behaviors    Prozac taper:    Decrease Prozac to 40mg PO qDay x5d; then    Decrease  Prozac to 20mg PO qDay x5d; then    Discontinue    Wait for 7d to allow washout of Prozac from patient's system; then    Celexa ramp:    Start Celexa 5mg PO qDay x7d starting on 6/16/22; then    Increase Celexa to 10mg PO qDay ongoing     Repeat EKG ~09/15/2022  Dr. Chantelle Hoover, KASANDRA, DNP, AGNP-BC, PMHNP-BC  [facility]  Office Hours: Tues-Fri [phone]  Office: [address] [place] [address]  Fax - [phone]  Triage Phone- [phone]  Business Office- [phone]      Email: [email or web address]                 Sincerely,        KASANDRA Johnson CNP

## 2022-05-24 NOTE — NURSING NOTE
"[facility] GERIATRICS         Chief Complaint   Patient presents with     RECHECK      HPI:  Amparo Sharma is a 81 year old  (1940), who is being seen today for an episodic care visit at: [facility] ([facility]) [[id number]]. Today's concern is:      Acute respiratory failure with hypoxia (H)  Chronic obstructive pulmonary disease, unspecified COPD type (H)  Tachycardia  Shortness of breath  Atelectasis  Hypoxia  Acute post-operative pain  Status post total shoulder arthroplasty, left  Bipolar 1 disorder (H)  Delirium  Normal pressure hydrocephalus (H)  Essential hypertension  Iron deficiency anemia secondary to inadequate dietary iron intake  Severe protein-calorie malnutrition (H)  Physical deconditioning  Status post reverse total arthroplasty of right shoulder      Patient is seen today for a follow-up visit in TCU. Patient notes she is good. She is a poor historian and notes she has never met Dr. Hoover. She does note an increase in pain in her left shoulder; she suspects she hurt herself when she \"fell out of bed\" but also notes ongoing left shoulder pain since surgery. She says she wanted to get up and use the walker because she misses being able to walk. Patient is receptive of plan for oxycodone PRN for 7 days and discontinuing thereafter; \"that sounds fair.\" She says her appetite is good but her only issue is having loose stools lately. She requests to hold \"the pill\" for her bowel movements. She also notes that she is sleeping okay and has no other concerns other than her loose stool issue. Denies CP, palpitations, fatigue, SOB, fever, chills, nausea vomiting and b/b concerns today.    Allergies, and PMH/PSH reviewed in EPIC today.  Allergies   Allergen Reactions     Amantadine      Antihistamine Allergy Relief [Diphenhydramine] Other (See Comments)     hyperactivity     Bactrim [Sulfamethoxazole W/Trimethoprim]      Codeine Itching     Demerol [Meperidine] Nausea     Hmg-Coa-R " Inhibitors Other (See Comments)     Was hospitalized for possible rhabdo     Meloxicam Other (See Comments)     Talwin [Pentazocine] Other (See Comments)     drowsiness     Tramadol Nausea     Valium [Diazepam] Other (See Comments)     Hallucinations/paranoid     Current Outpatient Medications   Medication Sig Dispense Refill     acetaminophen (TYLENOL) 325 MG tablet Take 3 tablets (975 mg) by mouth every 8 hours 90 tablet 0     albuterol (PROAIR HFA/PROVENTIL HFA/VENTOLIN HFA) 108 (90 Base) MCG/ACT inhaler Inhale 2 puffs into the lungs every 4 hours as needed for shortness of breath / dyspnea       albuterol (PROVENTIL) (2.5 MG/3ML) 0.083% neb solution Inhale 2.5 mg into the lungs every 4 hours as needed for shortness of breath / dyspnea       amLODIPine (NORVASC) 5 MG tablet Take 5 mg by mouth daily       amoxicillin (AMOXIL) 500 MG capsule Take 2,000 mg by mouth once as needed (Prior to dental appt)        ARIPiprazole (ABILIFY) 15 MG tablet Take 15 mg by mouth At Bedtime       aspirin (ASA) 81 MG EC tablet Take 1 tablet (81 mg) by mouth in the morning and 1 tablet (81 mg) in the evening. Take with meals. 60 tablet 0     BREO ELLIPTA 100-25 MCG/INH inhaler Inhale 1 puff into the lungs daily 1PM       carboxymethylcellulose (REFRESH PLUS) 0.5 % SOLN ophthalmic solution Place 1 drop into both eyes as needed in the morning and 1 drop as needed at noon and 1 drop as needed in the evening and 1 drop as needed before bedtime for dry eyes.       carboxymethylcellulose (REFRESH PLUS) 0.5 % SOLN ophthalmic solution Place 1 drop into both eyes in the morning.       cetirizine (ZYRTEC) 10 MG tablet Take 10 mg by mouth daily        diclofenac (VOLTAREN) 1 % topical gel Apply 4 g topically 4 times daily Apply to affected knees.       ferrous sulfate (FEROSUL) 325 (65 Fe) MG tablet Take 1 tablet (325 mg) by mouth Every Mon, Wed, Fri Morning       FLUoxetine (PROZAC) 20 MG capsule Take 60 mg by mouth daily       gabapentin  (NEURONTIN) 100 MG capsule Take 2 capsules (200 mg) by mouth 3 times daily 180 capsule 0     INCRUSE ELLIPTA 62.5 MCG/INH Inhaler Inhale 1 puff into the lungs daily        lamoTRIgine (LAMICTAL) 100 MG tablet Take 100 mg by mouth At Bedtime       lamoTRIgine (LAMICTAL) 150 MG tablet Take 150 mg by mouth daily        levothyroxine (SYNTHROID/LEVOTHROID) 75 MCG tablet Take 75 mcg by mouth daily       menthol-zinc oxide (CALMOSEPTINE) 0.44-20.6 % OINT ointment Apply topically 2 times daily as needed for irritation       mirabegron (MYRBETRIQ) 25 MG 24 hr tablet Take 25 mg by mouth daily        Multiple Vitamins-Minerals (MULTIVITAMIN PO) Take 1 tablet by mouth daily        oxyCODONE (ROXICODONE) 5 MG tablet Take 1 tablet (5 mg) by mouth every 4 hours as needed for moderate to severe pain 30 tablet 0     pantoprazole (PROTONIX) 20 MG EC tablet Take 20 mg by mouth daily        polyethylene glycol (MIRALAX) 17 GM/Dose powder Take 17 g by mouth daily 510 g 0     saccharomyces boulardii (FLORASTOR) 250 MG capsule Take 250 mg by mouth in the morning.       SENOKOT S 8.6-50 MG tablet Take 2 tablets by mouth daily        Vitamin D, Cholecalciferol, 25 MCG (1000 UT) CAPS Take 1,000 Units by mouth daily          REVIEW OF SYSTEMS:  10 point ROS of systems including Constitutional, Eyes, Respiratory, Cardiovascular, Gastroenterology, Genitourinary, Integumentary, Musculoskeletal, Psychiatric were all negative except for pertinent positives noted in my HPI.     Objective:   BP (!) 145/91   Pulse 99   Temp 96.9  F (36.1  C)   Ht 1.524 m (5')   Wt 64.8 kg (142 lb 14.4 oz)   BMI 27.91 kg/m      GENERAL APPEARANCE:  Alert, in no distress, appears healthy, cooperative, elderly woman sitting in wheelchair  EYES:  EOM, conjunctivae, lids, pupils and irises normal  RESP:  respiratory effort and palpation of chest normal, lungs clear to auscultation , cough none  CV:  Palpation and auscultation of heart done , regular rate and rhythm,  no murmur, rub, or gallop  ABDOMEN:  normal bowel sounds, soft, nontender, no hepatosplenomegaly or other masses  M/S:   Gait and station abnormal ADRIAN 2/2 patient wheelchair bound  Digits and nails abnormal mild arthritic changes  SKIN:  Inspection of skin and subcutaneous tissue baseline, Palpation of skin and subcutaneous tissue baseline  NEURO:   Cranial nerves 2-12 are normal tested and grossly at patient's baseline  PSYCH:  oriented X 3, affect and mood normal, memory impaired     Recent labs in [facility] reviewed by me today.               Assessment/Plan:  Assessment/Plan:      ICD-10-CM     1. Acute respiratory failure with hypoxia (H)  J96.01 Acute on chronic - unstable, improving. Pt    2. Chronic obstructive pulmonary disease, unspecified COPD type (H)  J44.9 Continues on regimen of Albuterol, Incruse, and Breo. Continue medication as ordered with    3. Tachycardia  R00.0 ongoing monitoring.    4. Shortness of breath  R06.02 No active treatment regimen .   5. Atelectasis  J98.11 at this time   6. Hypoxia  R09.02  Continue VS per facility protocol with interventions as needed.        7. Acute post-operative pain  G89.18 Acute on chronic - unstable. Patient notes on-   8. Status post total shoulder arthroplasty, left  Z96.612 Going presence of pain to her L shoulder, Oxycodone 5MG Daily PRN for 7 days then discontinue thereafter.   9. Bipolar 1 disorder (H)  F31.9 Chronic - stable. Patient notes no acute    10. Normal pressure hydrocephalus (H)  G91.2 Concerns at this time  - nurse manager communicated odd occasions regarding patients psych; patient thinks she needs to go to work/asks nurse manager about schedule/hours. Continues on regimen of Lamictal, Abilify and Prozac. Patient's daughter reports no active psychiatrist following since 1990. Order for hydroxzyine at bedtime?.   11. Essential hypertension  I10 Chronic - stable. Continues on regimen of Amlodipine with BPs as noted below. Continue medications as  ordered with VS per facility protocol.     12. Iron deficiency anemia D50.8 Chronic - unstable. Anemia panel taken today on 5/24.  Continue Ferrous sulfate 3x weekly and Vit C 500mg PO BID and recheck as currently scheduled.         13. Severe protein calorie malnutrition (H) E43 Chronic - unstable. Last albumin low as shown below. She notes that she does not get a lot of protein in her diet. She is interested in a daily ice cream based supplement. Will start Magic cup BID with a planned recheck of albumin in ~6 weeks (6/14).        Lab Results   Component Value Date     ALBUMIN 2.1 04/28/2022     ALBUMIN 3.8 10/16/2018      14. Physical deconditioning  R53.81 Acute on chronic - unstable. Continues to be limited by WB status - follow-up with Ortho as above with plans for adjustment to increase ability to work with therapies. PT/OT following - adv per their recommendations. SW ongoing to assist with discharge planning.       Oxycodone Daily PRN for 7 days then discontinue  Recheck albumin on 6/14  Recheck iron     Electronically signed by: Luciana Torres DNP (Kim) student

## 2022-05-25 LAB — FERRITIN SERPL-MCNC: 238 NG/ML (ref 10–130)

## 2022-05-25 PROCEDURE — P9604 ONE-WAY ALLOW PRORATED TRIP: HCPCS

## 2022-05-25 PROCEDURE — 36415 COLL VENOUS BLD VENIPUNCTURE: CPT

## 2022-05-25 PROCEDURE — 82728 ASSAY OF FERRITIN: CPT

## 2022-05-26 RX ORDER — CITALOPRAM HYDROBROMIDE 10 MG/1
TABLET ORAL
Qty: 31 TABLET | Refills: 0 | Status: SHIPPED | OUTPATIENT
Start: 2022-06-16 | End: 2022-06-22

## 2022-05-27 NOTE — PROGRESS NOTES
Orders:    Hydroxyzine 25mg PO at bedtime to decrease night time behaviors    Prozac taper:    Decrease Prozac to 40mg PO qDay x5d; then    Decrease Prozac to 20mg PO qDay x5d; then    Discontinue    Wait for 7d to allow washout of Prozac from patient's system; then    Celexa ramp:    Start Celexa 5mg PO qDay x7d starting on 6/16/22; then    Increase Celexa to 10mg PO qDay ongoing     Repeat EKG ~09/15/2022  Dr. Chantelle Hoover, APRN, DNP, AGNP-BC, PMHNP-BC  [facility]  Office Hours: Tues-Fri [phone]  Office: [address] [place] [address]  Fax - [phone]  Triage Phone- [phone]  Business Office- [phone]      Email: [email or web address]

## 2022-05-31 ENCOUNTER — LAB REQUISITION (OUTPATIENT)
Dept: LAB | Facility: CLINIC | Age: 82
End: 2022-05-31
Payer: MEDICARE

## 2022-05-31 ENCOUNTER — TRANSITIONAL CARE UNIT VISIT (OUTPATIENT)
Dept: GERIATRICS | Facility: CLINIC | Age: 82
End: 2022-05-31
Payer: MEDICARE

## 2022-05-31 VITALS
WEIGHT: 142.9 LBS | OXYGEN SATURATION: 94 % | RESPIRATION RATE: 16 BRPM | HEART RATE: 74 BPM | TEMPERATURE: 98.1 F | SYSTOLIC BLOOD PRESSURE: 134 MMHG | BODY MASS INDEX: 28.06 KG/M2 | DIASTOLIC BLOOD PRESSURE: 77 MMHG | HEIGHT: 60 IN

## 2022-05-31 DIAGNOSIS — F31.9 BIPOLAR 1 DISORDER (H): ICD-10-CM

## 2022-05-31 DIAGNOSIS — R00.0 TACHYCARDIA: ICD-10-CM

## 2022-05-31 DIAGNOSIS — D50.8 IRON DEFICIENCY ANEMIA SECONDARY TO INADEQUATE DIETARY IRON INTAKE: ICD-10-CM

## 2022-05-31 DIAGNOSIS — J98.11 ATELECTASIS: ICD-10-CM

## 2022-05-31 DIAGNOSIS — R53.81 PHYSICAL DECONDITIONING: ICD-10-CM

## 2022-05-31 DIAGNOSIS — Z96.612 STATUS POST TOTAL SHOULDER ARTHROPLASTY, LEFT: ICD-10-CM

## 2022-05-31 DIAGNOSIS — R09.02 HYPOXIA: ICD-10-CM

## 2022-05-31 DIAGNOSIS — J96.01 ACUTE RESPIRATORY FAILURE WITH HYPOXIA (H): Primary | ICD-10-CM

## 2022-05-31 DIAGNOSIS — I10 ESSENTIAL HYPERTENSION: ICD-10-CM

## 2022-05-31 DIAGNOSIS — R06.02 SHORTNESS OF BREATH: ICD-10-CM

## 2022-05-31 DIAGNOSIS — D50.9 IRON DEFICIENCY ANEMIA, UNSPECIFIED: ICD-10-CM

## 2022-05-31 DIAGNOSIS — R41.0 DELIRIUM: ICD-10-CM

## 2022-05-31 DIAGNOSIS — E43 SEVERE PROTEIN-CALORIE MALNUTRITION (H): ICD-10-CM

## 2022-05-31 DIAGNOSIS — G91.2 NORMAL PRESSURE HYDROCEPHALUS (H): ICD-10-CM

## 2022-05-31 DIAGNOSIS — G89.18 ACUTE POST-OPERATIVE PAIN: ICD-10-CM

## 2022-05-31 DIAGNOSIS — J44.9 CHRONIC OBSTRUCTIVE PULMONARY DISEASE, UNSPECIFIED COPD TYPE (H): ICD-10-CM

## 2022-05-31 PROCEDURE — 99310 SBSQ NF CARE HIGH MDM 45: CPT | Performed by: NURSE PRACTITIONER

## 2022-05-31 NOTE — LETTER
5/31/2022        RE: Amparo Sharma  [address]  [place] [address]         HEALTH GERIATRIC SERVICES    Chief Complaint   Patient presents with     RECHECK     HPI:  Amparo Sharma is a 81 year old  (1940), who is being seen today for an episodic care visit at: [facility] ([facility]) [[id number]]. Today's concern is:    Acute respiratory failure with hypoxia (H)  Chronic obstructive pulmonary disease, unspecified COPD type (H)  Tachycardia  Shortness of breath  Atelectasis  Hypoxia  Acute post-operative pain  Status post total shoulder arthroplasty, left  Bipolar 1 disorder (H)  Delirium  Normal pressure hydrocephalus (H)  Essential hypertension  Iron deficiency anemia secondary to inadequate dietary iron intake  Severe protein-calorie malnutrition (H)  Physical deconditioning    Patient is seen today for a follow-up visit in TCU. She is seen today resting quietly in her bed. Staff note no acute concerns - she is to follow-up with Neurology to address her shunt next week. She is tolerating changes to her psychiatric medications without concern per staff. They deny any s/sx of CP, palpitations, increased fatigue, SOB, fever, chills, nausea vomiting and b/b concerns today.    Allergies, and PMH/PSH reviewed in EPIC today.    Current Outpatient Medications   Medication Sig Dispense Refill     acetaminophen (TYLENOL) 325 MG tablet Take 3 tablets (975 mg) by mouth every 8 hours 90 tablet 0     albuterol (PROAIR HFA/PROVENTIL HFA/VENTOLIN HFA) 108 (90 Base) MCG/ACT inhaler Inhale 2 puffs into the lungs every 4 hours as needed for shortness of breath / dyspnea       albuterol (PROVENTIL) (2.5 MG/3ML) 0.083% neb solution Inhale 2.5 mg into the lungs every 4 hours as needed for shortness of breath / dyspnea       amLODIPine (NORVASC) 5 MG tablet Take 5 mg by mouth daily       amoxicillin (AMOXIL) 500 MG capsule Take 2,000 mg by mouth once as needed (Prior to dental appt)         ARIPiprazole (ABILIFY) 15 MG tablet Take 15 mg by mouth At Bedtime       aspirin (ASA) 81 MG EC tablet Take 1 tablet (81 mg) by mouth in the morning and 1 tablet (81 mg) in the evening. Take with meals. 60 tablet 0     BREO ELLIPTA 100-25 MCG/INH inhaler Inhale 1 puff into the lungs daily 1PM       carboxymethylcellulose (REFRESH PLUS) 0.5 % SOLN ophthalmic solution Place 1 drop into both eyes as needed in the morning and 1 drop as needed at noon and 1 drop as needed in the evening and 1 drop as needed before bedtime for dry eyes.       carboxymethylcellulose (REFRESH PLUS) 0.5 % SOLN ophthalmic solution Place 1 drop into both eyes in the morning.       cetirizine (ZYRTEC) 10 MG tablet Take 10 mg by mouth daily        [START ON 6/16/2022] citalopram (CELEXA) 10 MG tablet Take 0.5 tablets (5 mg) by mouth daily for 7 days, THEN 1 tablet (10 mg) daily. 31 tablet 0     diclofenac (VOLTAREN) 1 % topical gel Apply 4 g topically 4 times daily Apply to affected knees.       ferrous sulfate (FEROSUL) 325 (65 Fe) MG tablet Take 1 tablet (325 mg) by mouth Every Mon, Wed, Fri Morning       FLUoxetine (PROZAC) 20 MG capsule Take 2 capsules (40 mg) by mouth daily for 7 days, THEN 1 capsule (20 mg) daily for 7 days. Then discontinue. Then allow 7 day washout period before starting Celexa as ordered. 21 capsule 0     gabapentin (NEURONTIN) 100 MG capsule Take 2 capsules (200 mg) by mouth 3 times daily 180 capsule 0     INCRUSE ELLIPTA 62.5 MCG/INH Inhaler Inhale 1 puff into the lungs daily        lamoTRIgine (LAMICTAL) 100 MG tablet Take 100 mg by mouth At Bedtime       lamoTRIgine (LAMICTAL) 150 MG tablet Take 150 mg by mouth daily        levothyroxine (SYNTHROID/LEVOTHROID) 75 MCG tablet Take 75 mcg by mouth daily       menthol-zinc oxide (CALMOSEPTINE) 0.44-20.6 % OINT ointment Apply topically 2 times daily as needed for irritation       mirabegron (MYRBETRIQ) 25 MG 24 hr tablet Take 25 mg by mouth daily        Multiple  Vitamins-Minerals (MULTIVITAMIN PO) Take 1 tablet by mouth daily        oxyCODONE (ROXICODONE) 5 MG tablet Take 1 tablet (5 mg) by mouth daily as needed for moderate to severe pain 4 tablet 0     pantoprazole (PROTONIX) 20 MG EC tablet Take 20 mg by mouth daily        polyethylene glycol (MIRALAX) 17 GM/Dose powder Take 17 g by mouth daily 510 g 0     saccharomyces boulardii (FLORASTOR) 250 MG capsule Take 250 mg by mouth in the morning.       SENOKOT S 8.6-50 MG tablet Take 2 tablets by mouth daily        Vitamin D, Cholecalciferol, 25 MCG (1000 UT) CAPS Take 1,000 Units by mouth daily          REVIEW OF SYSTEMS:  10 point ROS of systems including Constitutional, Eyes, Respiratory, Cardiovascular, Gastroenterology, Genitourinary, Integumentary, Musculoskeletal, Psychiatric were all negative except for pertinent positives noted in my HPI.    Objective:   /77   Pulse 74   Temp 98.1  F (36.7  C)   Resp 16   Ht 1.524 m (5')   Wt 64.8 kg (142 lb 14.4 oz)   SpO2 94%   BMI 27.91 kg/m    GENERAL APPEARANCE:  Alert, in no distress, appears healthy, oriented, cooperative, elderly woman resting in bed  EYES:  EOM, conjunctivae, lids, pupils and irises normal  RESP:  Respiratory effort and palpation of chest normal, no respiratory distress  M/S:   Gait and station abnormal - ADRIAN 2/2 patient being in bed; Digits and nails abnormal - arthritic changes presen  SKIN:  Inspection of skin and subcutaneous tissue baseline, Palpation of skin and subcutaneous tissue baseline, skin thin, fragile and dry  PSYCH:  oriented X 1-2, normal insight, judgement and memory, affect and mood normal    Recent labs in [facility] reviewed by me today.    CBC RESULTS: Recent Labs   Lab Test 05/05/22  1108 04/28/22  0757   WBC 7.7 9.1   RBC 3.70* 3.28*   HGB 10.1* 9.2*   HCT 33.2* 30.7*   MCV 90 94   MCH 27.3 28.0   MCHC 30.4* 30.0*   RDW 15.5* 15.6*   * 708*       Last Basic Metabolic Panel:  Recent Labs   Lab Test 04/28/22  5973  04/19/22  0851    142   POTASSIUM 4.4 3.6   CHLORIDE 105 105   MARTHA 9.5 9.4   CO2 27 27   BUN 11 13   CR 0.56* 0.57*   GLC 98 104       Liver Function Studies -   Recent Labs   Lab Test 05/05/22  1108 04/28/22  0757 04/15/22  0619   PROTTOTAL  --  6.3 5.8*   ALBUMIN 2.4* 2.1* 2.0*   BILITOTAL  --  0.3 0.7   ALKPHOS  --  133* 102   AST  --  18 82*   ALT  --  17 37       TSH   Date Value Ref Range Status   03/09/2022 1.43 0.30 - 5.00 uIU/mL Final   09/15/2021 1.03 0.30 - 5.00 uIU/mL Final   10/16/2018 2.39 0.40 - 4.00 mU/L Final   ]    Lab Results   Component Value Date    A1C 5.7 07/26/2021    A1C 5.7 07/15/2016       Assessment/Plan:    ICD-10-CM    1. Acute respiratory failure with hypoxia (H)  J96.01 Acute on chronic - unstable, improving. Pt    2. Chronic obstructive pulmonary disease, unspecified COPD type (H)  J44.9 Continues on regimen of Albuterol, Incruse, and Breo. Continue medication as ordered with    3. Tachycardia  R00.0 ongoing monitoring. HR elevated inpatient -    4. Shortness of breath  R06.02 HRs since admission as shown below with    5. Atelectasis  J98.11 occasional noted elevations.   6. Hypoxia  R09.02 No active treatment regimen at this time. Continue VS per facility protocol with interventions as needed.           7. Acute post-operative pain  G89.18 Acute on chronic - unstable, improving. Patient notes improvement of pain to her L shoulder   8. Status post total shoulder arthroplasty, left  Z96.612 Has tolerated taper and discontinuation of Oxycodone. No WBAT to LUE - tolerating ok per notes. Continues with Tylenol and Voltaren gel for pain control. Continue medications as ordered with therapy as noted below.   9.  10. Bipolar 1 disorder (H)   Delerium F31.9  R41.0 Chronic - stable. Patient notes no acute Concerns at this time - nurse manager did note ongoing   11. Normal pressure hydrocephalus (H)  G91.2  odd behaviors and statements. They plan to follow-up with Neurology re:adjust of  her shunt as this may be effecting her behaviors.    Initiated transition from Prozac to Celexa as noted previously - continues on taper of Prozac at this time with no acute concerns noted. Tolerating Hydroxyzine without oversedation. Continues on regimen of Lamictal and Abilify as previously ordered. Continue Hydroxyzine 25mg PO at bedtime to decrease night time behaviors.  Continue med adjustments as ordered    Decrease Prozac to 20mg PO qDay through 6/6; then    Discontinue    Wait for 7d to allow washout of Prozac from patient's system; then    Start Celexa 5mg PO qDay x7d; then    Increase Celexa to 10mg PO qDay ongoing   12. Essential hypertension  I10 Chronic - stable. Continues on regimen of Amlodipine with BPs as noted below. Continue medications as ordered with VS per facility protocol.     13. Iron deficiency anemia D50.8 Chronic - unstable. Today's anemia panel as noted below. Previously on daily iron replacement with noted elevation of her Ferritin; changed to 3x weekly dosing with addition of Vit C - improvement noted. Continue medications as ordered with plan to follow-up on labs in 2-5 weeks.   Latest Reference Range & Units 04/28/22 07:57 05/24/22 07:55   Ferritin 10 - 130 ng/mL 581 (H) 245 (H)   Folate >=3.5 ng/mL 10.0    Iron 35 - 180 ug/dL 21 (L) 30 (L)   Iron Binding Cap 240 - 430 ug/dL  281   Iron Saturation Index 15 - 46 %  11 (L)   Transferrin 212 - 360 mg/dL 146 (L) 227   Transferrin  - 563 ug/dL 183 (L)    Transferrin Saturation 20 - 50 % 12 (L)       14. Severe protein calorie malnutrition (H) E43 Chronic - unstable. Last albumin low as shown below. She notes that she does not get a lot of protein in her diet. She is interested in a daily ice cream based supplement. Will start Magic cup BID with a planned recheck of albumin in ~3 weeks.  Lab Results   Component Value Date    ALBUMIN 2.1 04/28/2022    ALBUMIN 3.8 10/16/2018      15. Physical deconditioning  R53.81 Acute on chronic -  unstable. Continues to be limited by WB status - follow-up with Ortho as above with plans for adjustment to increase ability to work with therapies. PT/OT following - adv per their recommendations. SW ongoing to assist with discharge planning.     Electronically signed by:   KASANDRA Khanna, DNP, AGNP-BC, PMHNP-BC  [employer]  Office Hours: Tues-Fri 0029-7196  Office: [address] [place] [address]  Fax - [phone]  Triage Phone- [phone]  Business Office- [phone]      Email: [email or web address]                         Sincerely,        KASANDRA Johnson CNP

## 2022-05-31 NOTE — PROGRESS NOTES
[facility] GERIATRIC SERVICES    Chief Complaint   Patient presents with     RECHECK     HPI:  Amparo Sharma is a 81 year old  (1940), who is being seen today for an episodic care visit at: [facility] ([facility]) [[id number]]. Today's concern is:    Acute respiratory failure with hypoxia (H)  Chronic obstructive pulmonary disease, unspecified COPD type (H)  Tachycardia  Shortness of breath  Atelectasis  Hypoxia  Acute post-operative pain  Status post total shoulder arthroplasty, left  Bipolar 1 disorder (H)  Delirium  Normal pressure hydrocephalus (H)  Essential hypertension  Iron deficiency anemia secondary to inadequate dietary iron intake  Severe protein-calorie malnutrition (H)  Physical deconditioning    Patient is seen today for a follow-up visit in TCU. She is seen today resting quietly in her bed. Staff note no acute concerns - she is to follow-up with Neurology to address her shunt next week. She is tolerating changes to her psychiatric medications without concern per staff. They deny any s/sx of CP, palpitations, increased fatigue, SOB, fever, chills, nausea vomiting and b/b concerns today.    Allergies, and PMH/PSH reviewed in EPIC today.    Current Outpatient Medications   Medication Sig Dispense Refill     acetaminophen (TYLENOL) 325 MG tablet Take 3 tablets (975 mg) by mouth every 8 hours 90 tablet 0     albuterol (PROAIR HFA/PROVENTIL HFA/VENTOLIN HFA) 108 (90 Base) MCG/ACT inhaler Inhale 2 puffs into the lungs every 4 hours as needed for shortness of breath / dyspnea       albuterol (PROVENTIL) (2.5 MG/3ML) 0.083% neb solution Inhale 2.5 mg into the lungs every 4 hours as needed for shortness of breath / dyspnea       amLODIPine (NORVASC) 5 MG tablet Take 5 mg by mouth daily       amoxicillin (AMOXIL) 500 MG capsule Take 2,000 mg by mouth once as needed (Prior to dental appt)        ARIPiprazole (ABILIFY) 15 MG tablet Take 15 mg by mouth At Bedtime       aspirin (ASA) 81 MG EC tablet  Take 1 tablet (81 mg) by mouth in the morning and 1 tablet (81 mg) in the evening. Take with meals. 60 tablet 0     BREO ELLIPTA 100-25 MCG/INH inhaler Inhale 1 puff into the lungs daily 1PM       carboxymethylcellulose (REFRESH PLUS) 0.5 % SOLN ophthalmic solution Place 1 drop into both eyes as needed in the morning and 1 drop as needed at noon and 1 drop as needed in the evening and 1 drop as needed before bedtime for dry eyes.       carboxymethylcellulose (REFRESH PLUS) 0.5 % SOLN ophthalmic solution Place 1 drop into both eyes in the morning.       cetirizine (ZYRTEC) 10 MG tablet Take 10 mg by mouth daily        [START ON 6/16/2022] citalopram (CELEXA) 10 MG tablet Take 0.5 tablets (5 mg) by mouth daily for 7 days, THEN 1 tablet (10 mg) daily. 31 tablet 0     diclofenac (VOLTAREN) 1 % topical gel Apply 4 g topically 4 times daily Apply to affected knees.       ferrous sulfate (FEROSUL) 325 (65 Fe) MG tablet Take 1 tablet (325 mg) by mouth Every Mon, Wed, Fri Morning       FLUoxetine (PROZAC) 20 MG capsule Take 2 capsules (40 mg) by mouth daily for 7 days, THEN 1 capsule (20 mg) daily for 7 days. Then discontinue. Then allow 7 day washout period before starting Celexa as ordered. 21 capsule 0     gabapentin (NEURONTIN) 100 MG capsule Take 2 capsules (200 mg) by mouth 3 times daily 180 capsule 0     INCRUSE ELLIPTA 62.5 MCG/INH Inhaler Inhale 1 puff into the lungs daily        lamoTRIgine (LAMICTAL) 100 MG tablet Take 100 mg by mouth At Bedtime       lamoTRIgine (LAMICTAL) 150 MG tablet Take 150 mg by mouth daily        levothyroxine (SYNTHROID/LEVOTHROID) 75 MCG tablet Take 75 mcg by mouth daily       menthol-zinc oxide (CALMOSEPTINE) 0.44-20.6 % OINT ointment Apply topically 2 times daily as needed for irritation       mirabegron (MYRBETRIQ) 25 MG 24 hr tablet Take 25 mg by mouth daily        Multiple Vitamins-Minerals (MULTIVITAMIN PO) Take 1 tablet by mouth daily        oxyCODONE (ROXICODONE) 5 MG tablet Take  1 tablet (5 mg) by mouth daily as needed for moderate to severe pain 4 tablet 0     pantoprazole (PROTONIX) 20 MG EC tablet Take 20 mg by mouth daily        polyethylene glycol (MIRALAX) 17 GM/Dose powder Take 17 g by mouth daily 510 g 0     saccharomyces boulardii (FLORASTOR) 250 MG capsule Take 250 mg by mouth in the morning.       SENOKOT S 8.6-50 MG tablet Take 2 tablets by mouth daily        Vitamin D, Cholecalciferol, 25 MCG (1000 UT) CAPS Take 1,000 Units by mouth daily          REVIEW OF SYSTEMS:  10 point ROS of systems including Constitutional, Eyes, Respiratory, Cardiovascular, Gastroenterology, Genitourinary, Integumentary, Musculoskeletal, Psychiatric were all negative except for pertinent positives noted in my HPI.    Objective:   /77   Pulse 74   Temp 98.1  F (36.7  C)   Resp 16   Ht 1.524 m (5')   Wt 64.8 kg (142 lb 14.4 oz)   SpO2 94%   BMI 27.91 kg/m    GENERAL APPEARANCE:  Alert, in no distress, appears healthy, oriented, cooperative, elderly woman resting in bed  EYES:  EOM, conjunctivae, lids, pupils and irises normal  RESP:  Respiratory effort and palpation of chest normal, no respiratory distress  M/S:   Gait and station abnormal - ADRIAN 2/2 patient being in bed; Digits and nails abnormal - arthritic changes presen  SKIN:  Inspection of skin and subcutaneous tissue baseline, Palpation of skin and subcutaneous tissue baseline, skin thin, fragile and dry  PSYCH:  oriented X 1-2, normal insight, judgement and memory, affect and mood normal    Recent labs in [facility] reviewed by me today.    CBC RESULTS: Recent Labs   Lab Test 05/05/22  1108 04/28/22  0757   WBC 7.7 9.1   RBC 3.70* 3.28*   HGB 10.1* 9.2*   HCT 33.2* 30.7*   MCV 90 94   MCH 27.3 28.0   MCHC 30.4* 30.0*   RDW 15.5* 15.6*   * 708*       Last Basic Metabolic Panel:  Recent Labs   Lab Test 04/28/22  0757 04/19/22  0851    142   POTASSIUM 4.4 3.6   CHLORIDE 105 105   MARTHA 9.5 9.4   CO2 27 27   BUN 11 13   CR 0.56*  0.57*   GLC 98 104       Liver Function Studies -   Recent Labs   Lab Test 05/05/22  1108 04/28/22  0757 04/15/22  0619   PROTTOTAL  --  6.3 5.8*   ALBUMIN 2.4* 2.1* 2.0*   BILITOTAL  --  0.3 0.7   ALKPHOS  --  133* 102   AST  --  18 82*   ALT  --  17 37       TSH   Date Value Ref Range Status   03/09/2022 1.43 0.30 - 5.00 uIU/mL Final   09/15/2021 1.03 0.30 - 5.00 uIU/mL Final   10/16/2018 2.39 0.40 - 4.00 mU/L Final   ]    Lab Results   Component Value Date    A1C 5.7 07/26/2021    A1C 5.7 07/15/2016       Assessment/Plan:    ICD-10-CM    1. Acute respiratory failure with hypoxia (H)  J96.01 Acute on chronic - unstable, improving. Pt    2. Chronic obstructive pulmonary disease, unspecified COPD type (H)  J44.9 Continues on regimen of Albuterol, Incruse, and Breo. Continue medication as ordered with    3. Tachycardia  R00.0 ongoing monitoring. HR elevated inpatient -    4. Shortness of breath  R06.02 HRs since admission as shown below with    5. Atelectasis  J98.11 occasional noted elevations.   6. Hypoxia  R09.02 No active treatment regimen at this time. Continue VS per facility protocol with interventions as needed.           7. Acute post-operative pain  G89.18 Acute on chronic - unstable, improving. Patient notes improvement of pain to her L shoulder   8. Status post total shoulder arthroplasty, left  Z96.612 Has tolerated taper and discontinuation of Oxycodone. No WBAT to LUE - tolerating ok per notes. Continues with Tylenol and Voltaren gel for pain control. Continue medications as ordered with therapy as noted below.   9.  10. Bipolar 1 disorder (H)   Delerium F31.9  R41.0 Chronic - stable. Patient notes no acute Concerns at this time - nurse manager did note ongoing   11. Normal pressure hydrocephalus (H)  G91.2  odd behaviors and statements. They plan to follow-up with Neurology re:adjust of her shunt as this may be effecting her behaviors.    Initiated transition from Prozac to Celexa as noted previously  - continues on taper of Prozac at this time with no acute concerns noted. Tolerating Hydroxyzine without oversedation. Continues on regimen of Lamictal and Abilify as previously ordered. Continue Hydroxyzine 25mg PO at bedtime to decrease night time behaviors.  Continue med adjustments as ordered    Decrease Prozac to 20mg PO qDay through 6/6; then    Discontinue    Wait for 7d to allow washout of Prozac from patient's system; then    Start Celexa 5mg PO qDay x7d; then    Increase Celexa to 10mg PO qDay ongoing   12. Essential hypertension  I10 Chronic - stable. Continues on regimen of Amlodipine with BPs as noted below. Continue medications as ordered with VS per facility protocol.     13. Iron deficiency anemia D50.8 Chronic - unstable. Today's anemia panel as noted below. Previously on daily iron replacement with noted elevation of her Ferritin; changed to 3x weekly dosing with addition of Vit C - improvement noted. Continue medications as ordered with plan to follow-up on labs in 2-5 weeks.   Latest Reference Range & Units 04/28/22 07:57 05/24/22 07:55   Ferritin 10 - 130 ng/mL 581 (H) 245 (H)   Folate >=3.5 ng/mL 10.0    Iron 35 - 180 ug/dL 21 (L) 30 (L)   Iron Binding Cap 240 - 430 ug/dL  281   Iron Saturation Index 15 - 46 %  11 (L)   Transferrin 212 - 360 mg/dL 146 (L) 227   Transferrin  - 563 ug/dL 183 (L)    Transferrin Saturation 20 - 50 % 12 (L)       14. Severe protein calorie malnutrition (H) E43 Chronic - unstable. Last albumin low as shown below. She notes that she does not get a lot of protein in her diet. She is interested in a daily ice cream based supplement. Will start Magic cup BID with a planned recheck of albumin in ~3 weeks.  Lab Results   Component Value Date    ALBUMIN 2.1 04/28/2022    ALBUMIN 3.8 10/16/2018      15. Physical deconditioning  R53.81 Acute on chronic - unstable. Continues to be limited by WB status - follow-up with Ortho as above with plans for adjustment to increase  ability to work with therapies. PT/OT following - adv per their recommendations. SW ongoing to assist with discharge planning.     Electronically signed by:   Dr. Chantelle Hoover, APRN, DNP, AGNP-BC, PMHNP-BC  [employer] [facility]  Office Hours: Tues-Fri 5540-4918  Office: [address] [place] [address]  Fax - [phone]  Triage Phone- [phone]  Business Office- [phone]      Email: [email or web address]

## 2022-06-02 LAB
ALBUMIN SERPL-MCNC: 3.1 G/DL (ref 3.5–5)
ALP SERPL-CCNC: 153 U/L (ref 45–120)
ALT SERPL W P-5'-P-CCNC: 20 U/L (ref 0–45)
ANION GAP SERPL CALCULATED.3IONS-SCNC: 14 MMOL/L (ref 5–18)
AST SERPL W P-5'-P-CCNC: 19 U/L (ref 0–40)
BILIRUB SERPL-MCNC: 0.4 MG/DL (ref 0–1)
BUN SERPL-MCNC: 19 MG/DL (ref 8–28)
CALCIUM SERPL-MCNC: 9.8 MG/DL (ref 8.5–10.5)
CHLORIDE BLD-SCNC: 103 MMOL/L (ref 98–107)
CO2 SERPL-SCNC: 24 MMOL/L (ref 22–31)
CREAT SERPL-MCNC: 0.69 MG/DL (ref 0.6–1.1)
GFR SERPL CREATININE-BSD FRML MDRD: 87 ML/MIN/1.73M2
GLUCOSE BLD-MCNC: 106 MG/DL (ref 70–125)
HGB BLD-MCNC: 11.5 G/DL (ref 11.7–15.7)
POTASSIUM BLD-SCNC: 4 MMOL/L (ref 3.5–5)
PROT SERPL-MCNC: 6.9 G/DL (ref 6–8)
SODIUM SERPL-SCNC: 141 MMOL/L (ref 136–145)

## 2022-06-02 PROCEDURE — 36415 COLL VENOUS BLD VENIPUNCTURE: CPT

## 2022-06-02 PROCEDURE — 82040 ASSAY OF SERUM ALBUMIN: CPT

## 2022-06-02 PROCEDURE — 85018 HEMOGLOBIN: CPT

## 2022-06-02 PROCEDURE — P9604 ONE-WAY ALLOW PRORATED TRIP: HCPCS

## 2022-06-02 PROCEDURE — 80053 COMPREHEN METABOLIC PANEL: CPT

## 2022-06-03 ENCOUNTER — TELEPHONE (OUTPATIENT)
Dept: GERIATRICS | Facility: CLINIC | Age: 82
End: 2022-06-03
Payer: MEDICARE

## 2022-06-03 NOTE — TELEPHONE ENCOUNTER
[facility] Nurse Triage Telephone Note    Provider: KASANDRA Oneal DNP  Facility: [facility]   Facility Type:  TCU    Caller: Sofia  Call Back Number: [phone]    Allergies   Allergen Reactions     Amantadine      Antihistamine Allergy Relief [Diphenhydramine] Other (See Comments)     hyperactivity     Bactrim [Sulfamethoxazole W/Trimethoprim]      Codeine Itching     Demerol [Meperidine] Nausea     Hmg-Coa-R Inhibitors Other (See Comments)     Was hospitalized for possible rhabdo     Meloxicam Other (See Comments)     Talwin [Pentazocine] Other (See Comments)     drowsiness     Tramadol Nausea     Valium [Diazepam] Other (See Comments)     Hallucinations/paranoid       Reason for call: Pt c/o SOB today, RR 24. Wheezing heard in rt upper lung - gave Albuterol with some relief and O2 sat at 96% RA; later audible wheezing returned with O2 sat 88% on RA. Nurse applied O2 at 2LPM NC and increased to 90%. All other VS WNL.     Verbal Order/Direction given by Provider:   - Chest xray AP & Lateral STAT for wheezing/hypoxia  - duonebs q4hrs x 5 days  - Mucinex ER 1200mg BID x 5 days    Provider giving Order:  KASANDRA Oneal DNP    Verbal Order given to: Sofia Back RN

## 2022-06-07 ENCOUNTER — TRANSITIONAL CARE UNIT VISIT (OUTPATIENT)
Dept: GERIATRICS | Facility: CLINIC | Age: 82
End: 2022-06-07
Payer: MEDICARE

## 2022-06-07 ENCOUNTER — OFFICE VISIT (OUTPATIENT)
Dept: NEUROSURGERY | Facility: CLINIC | Age: 82
End: 2022-06-07
Payer: MEDICARE

## 2022-06-07 VITALS — OXYGEN SATURATION: 93 % | HEART RATE: 89 BPM | DIASTOLIC BLOOD PRESSURE: 65 MMHG | SYSTOLIC BLOOD PRESSURE: 102 MMHG

## 2022-06-07 VITALS
WEIGHT: 142.9 LBS | TEMPERATURE: 98.5 F | OXYGEN SATURATION: 96 % | HEART RATE: 99 BPM | BODY MASS INDEX: 28.06 KG/M2 | HEIGHT: 60 IN | SYSTOLIC BLOOD PRESSURE: 133 MMHG | RESPIRATION RATE: 18 BRPM | DIASTOLIC BLOOD PRESSURE: 76 MMHG

## 2022-06-07 DIAGNOSIS — R06.02 SHORTNESS OF BREATH: ICD-10-CM

## 2022-06-07 DIAGNOSIS — G91.2 NPH (NORMAL PRESSURE HYDROCEPHALUS) (H): Primary | ICD-10-CM

## 2022-06-07 DIAGNOSIS — E43 SEVERE PROTEIN-CALORIE MALNUTRITION (H): ICD-10-CM

## 2022-06-07 DIAGNOSIS — R53.81 PHYSICAL DECONDITIONING: ICD-10-CM

## 2022-06-07 DIAGNOSIS — I10 ESSENTIAL HYPERTENSION: ICD-10-CM

## 2022-06-07 DIAGNOSIS — J96.01 ACUTE RESPIRATORY FAILURE WITH HYPOXIA (H): Primary | ICD-10-CM

## 2022-06-07 DIAGNOSIS — Z96.612 STATUS POST TOTAL SHOULDER ARTHROPLASTY, LEFT: ICD-10-CM

## 2022-06-07 DIAGNOSIS — J98.11 ATELECTASIS: ICD-10-CM

## 2022-06-07 DIAGNOSIS — D50.8 IRON DEFICIENCY ANEMIA SECONDARY TO INADEQUATE DIETARY IRON INTAKE: ICD-10-CM

## 2022-06-07 DIAGNOSIS — G89.18 ACUTE POST-OPERATIVE PAIN: ICD-10-CM

## 2022-06-07 DIAGNOSIS — R00.0 TACHYCARDIA: ICD-10-CM

## 2022-06-07 DIAGNOSIS — R41.0 DELIRIUM: ICD-10-CM

## 2022-06-07 DIAGNOSIS — J44.9 CHRONIC OBSTRUCTIVE PULMONARY DISEASE, UNSPECIFIED COPD TYPE (H): ICD-10-CM

## 2022-06-07 DIAGNOSIS — G91.2 NORMAL PRESSURE HYDROCEPHALUS (H): ICD-10-CM

## 2022-06-07 DIAGNOSIS — F31.9 BIPOLAR 1 DISORDER (H): ICD-10-CM

## 2022-06-07 DIAGNOSIS — R09.02 HYPOXIA: ICD-10-CM

## 2022-06-07 PROCEDURE — 62252 CSF SHUNT REPROGRAM: CPT | Performed by: NURSE PRACTITIONER

## 2022-06-07 PROCEDURE — 99310 SBSQ NF CARE HIGH MDM 45: CPT | Performed by: NURSE PRACTITIONER

## 2022-06-07 ASSESSMENT — PAIN SCALES - GENERAL: PAINLEVEL: NO PAIN (0)

## 2022-06-07 NOTE — PROGRESS NOTES
"[facility]  Department of Neurosurgery      Name: Amparo Sharma  MRN: [id number]  Age: 81 year old  : 1940  Referring provider: Rc Dozier  2022      Chief Complaint:   Normal pressure Hydrocephalus  Follow up visit    History of Present Illness:   Amparo Sharma is a 81 year old female with a history of NPH, s/p Right frontal  shunt placement on 2017 by Dr. Dozier  who is seen today for follow up. The shunt pressure was originally set at 1.0. Per prior notes, readjusted the pressure setting to 1.5 on 2017, 2.0 on 5/3/2017 and back to 1.5 on 2017 based on her symptoms.      Patient's daughter contacted the clinic in 2022 with worsening symptoms and a follow-up in clinic was recommended at that time. Patient presents with her daughter, Nohemi for the evaluation. Per patient and her daughter, she had left shoulder replacement surgery on 2022. Post operatively, she needed O2 and was discharged to a TCU from the hospital. Prior to surgery, she was able to do transfers with stand by assist. But now staff has to use Bonnie lift for transfers. Per family, she is having increased muscle rigidity and tremors. Also her cognitive score \"dropped from 30-13\". She was on Gabapentin post operatively, but due to the worsening symptoms, this has been discontinued. But no improvement in her symptoms.     Review of Systems:   Pertinent items are noted in HPI or as in patient entered ROS below, remainder of complete ROS is negative.   No flowsheet data found.     Physical Exam:   /65 (BP Location: Left arm, Patient Position: Sitting, Cuff Size: Adult Regular)   Pulse 89   SpO2 93%    General: No acute distress.    Neuro: The patient is awake and alert, oriented to self, place and month. Speech is normal. Extraocular movements are intact without nystagmus. Facial sensation is intact in V1, V2, V3 distributions. Facial nerve function is normal, rated as a House " Brackmann 1. Left shoulder slightly weak since her recent surgery. There is no pronator drift. Sensation intact throughout. No dysmetria with finger-nose-finger testing. Noted tremors in bilateral hands. Non ambulatory.   Psych: Normal mood and affect. Behavior is normal.      Imaging:  No new imaging.     Procedure:  With the patient's permission, her [employer] Right frontal  shunt was interrogated and found to be set to 2.0.  Rechecked x 2. Due to worsening symptoms, I dialed it down to 1.5 which was her prior setting. Rechecked x 2.       Assessment:  Normal pressure Hydrocephalus  Follow up visit    Plan:  Today, her shunt setting was found to be at 2.0. This was last set at 1.5 on 9/25/2017. We dialed the shunt setting back to the prior setting of 1.5 today.  Discussed that her recent worsening of symptoms could be a result of recovery from surgery, poly pharmacy etc.     We will plan for a head CT and a clinic follow-up in 4 weeks. Patient to contact the clinic sooner if she has any questions or concerns.        I spent 35 minutes on patient care activities related to this encounter on the date of service, including time spent reviewing the chart, obtaining history and examination and in counseling the patient, and in documentation in the electronic medical record.      Jacquelin SLAUGHTER CNP  Department of Neurosurgery

## 2022-06-07 NOTE — PROGRESS NOTES
[facility]ERIATRIC SERVICES    Chief Complaint   Patient presents with     RECHECK     HPI:  Amparo Sharma is a 81 year old  (1940), who is being seen today for an episodic care visit at: [facility] ([facility]) [[id number]]. Today's concern is:     Acute respiratory failure with hypoxia (H)  Chronic obstructive pulmonary disease, unspecified COPD type (H)  Tachycardia  Shortness of breath  Atelectasis  Hypoxia  Acute post-operative pain  Status post total shoulder arthroplasty, left  Bipolar 1 disorder (H)  Delirium  Normal pressure hydrocephalus (H)  Essential hypertension  Iron deficiency anemia secondary to inadequate dietary iron intake  Severe protein-calorie malnutrition (H)  Physical deconditioning    Patient is seen today for a follow-up visit in TCU. She is seen today resting quietly in her bed. Staff note no acute concerns - she had follow-up with Neurology in which they adjusted her shunt pressure - she notes feeling more clear with this adjustment. She is tolerating changes to her psychiatric medications without concern per staff. She denies any s/sx of CP, palpitations, increased fatigue, SOB, fever, chills, nausea vomiting and b/b concerns today.    Allergies, and PMH/PSH reviewed in EPIC today.    Current Outpatient Medications   Medication Sig Dispense Refill     acetaminophen (TYLENOL) 325 MG tablet Take 3 tablets (975 mg) by mouth every 8 hours 90 tablet 0     albuterol (PROAIR HFA/PROVENTIL HFA/VENTOLIN HFA) 108 (90 Base) MCG/ACT inhaler Inhale 2 puffs into the lungs every 4 hours as needed for shortness of breath / dyspnea       albuterol (PROVENTIL) (2.5 MG/3ML) 0.083% neb solution Inhale 2.5 mg into the lungs every 4 hours as needed for shortness of breath / dyspnea       amLODIPine (NORVASC) 5 MG tablet Take 5 mg by mouth daily       amoxicillin (AMOXIL) 500 MG capsule Take 2,000 mg by mouth once as needed (Prior to dental appt)        ARIPiprazole (ABILIFY) 15 MG tablet Take  15 mg by mouth At Bedtime       aspirin (ASA) 81 MG EC tablet Take 1 tablet (81 mg) by mouth in the morning and 1 tablet (81 mg) in the evening. Take with meals. 60 tablet 0     BREO ELLIPTA 100-25 MCG/INH inhaler Inhale 1 puff into the lungs daily 1PM       carboxymethylcellulose (REFRESH PLUS) 0.5 % SOLN ophthalmic solution Place 1 drop into both eyes as needed in the morning and 1 drop as needed at noon and 1 drop as needed in the evening and 1 drop as needed before bedtime for dry eyes.       carboxymethylcellulose (REFRESH PLUS) 0.5 % SOLN ophthalmic solution Place 1 drop into both eyes in the morning.       cetirizine (ZYRTEC) 10 MG tablet Take 10 mg by mouth daily        citalopram (CELEXA) 10 MG tablet Take 0.5 tablets (5 mg) by mouth daily for 7 days, THEN 1 tablet (10 mg) daily. 31 tablet 0     diclofenac (VOLTAREN) 1 % topical gel Apply 4 g topically 4 times daily Apply to affected knees.       ferrous sulfate (FEROSUL) 325 (65 Fe) MG tablet Take 1 tablet (325 mg) by mouth Every Mon, Wed, Fri Morning       FLUoxetine (PROZAC) 20 MG capsule Take 2 capsules (40 mg) by mouth daily for 7 days, THEN 1 capsule (20 mg) daily for 7 days. Then discontinue. Then allow 7 day washout period before starting Celexa as ordered. 21 capsule 0     gabapentin (NEURONTIN) 100 MG capsule Take 2 capsules (200 mg) by mouth 3 times daily 180 capsule 0     INCRUSE ELLIPTA 62.5 MCG/INH Inhaler Inhale 1 puff into the lungs daily        lamoTRIgine (LAMICTAL) 100 MG tablet Take 100 mg by mouth At Bedtime       lamoTRIgine (LAMICTAL) 150 MG tablet Take 150 mg by mouth daily        levothyroxine (SYNTHROID/LEVOTHROID) 75 MCG tablet Take 75 mcg by mouth daily       menthol-zinc oxide (CALMOSEPTINE) 0.44-20.6 % OINT ointment Apply topically 2 times daily as needed for irritation       mirabegron (MYRBETRIQ) 25 MG 24 hr tablet Take 25 mg by mouth daily        Multiple Vitamins-Minerals (MULTIVITAMIN PO) Take 1 tablet by mouth daily         pantoprazole (PROTONIX) 20 MG EC tablet Take 20 mg by mouth daily        polyethylene glycol (MIRALAX) 17 GM/Dose powder Take 17 g by mouth daily 510 g 0     saccharomyces boulardii (FLORASTOR) 250 MG capsule Take 250 mg by mouth in the morning.       SENOKOT S 8.6-50 MG tablet Take 2 tablets by mouth daily        Vitamin D, Cholecalciferol, 25 MCG (1000 UT) CAPS Take 1,000 Units by mouth daily          REVIEW OF SYSTEMS:  10 point ROS of systems including Constitutional, Eyes, Respiratory, Cardiovascular, Gastroenterology, Genitourinary, Integumentary, Musculoskeletal, Psychiatric were all negative except for pertinent positives noted in my HPI.    Objective:   /76   Pulse 99   Temp 98.5  F (36.9  C)   Resp 18   Ht 1.524 m (5')   Wt 64.8 kg (142 lb 14.4 oz)   SpO2 96%   BMI 27.91 kg/m    GENERAL APPEARANCE:  Alert, in no distress, appears healthy, oriented, cooperative, elderly woman resting in bed  ENT:  Mouth and posterior oropharynx normal, moist mucous membranes, [place]  EYES:  EOM, conjunctivae, lids, pupils and irises normal  RESP:  Respiratory effort and palpation of chest normal, no respiratory distress with O2 present via NC  M/S:   Gait and station abnormal - ADRIAN 2/2 patient being in bed; Digits and nails abnormal - arthritic changes present  SKIN:  Inspection of skin and subcutaneous tissue baseline, Palpation of skin and subcutaneous tissue baseline, skin thin, fragile and dry  NEURO:   Cranial nerves 2-12 are normal tested and grossly at patient's baseline  PSYCH:  oriented X 1-2, normal insight, judgement and memory, affect and mood normal    Recent labs in [facility] reviewed by me today.    CBC RESULTS: Recent Labs   Lab Test 05/05/22  1108 04/28/22  0757   WBC 7.7 9.1   RBC 3.70* 3.28*   HGB 10.1* 9.2*   HCT 33.2* 30.7*   MCV 90 94   MCH 27.3 28.0   MCHC 30.4* 30.0*   RDW 15.5* 15.6*   * 708*       Last Basic Metabolic Panel:  Recent Labs   Lab Test 04/28/22  0757 04/19/22  0851   NA  144 142   POTASSIUM 4.4 3.6   CHLORIDE 105 105   MARTHA 9.5 9.4   CO2 27 27   BUN 11 13   CR 0.56* 0.57*   GLC 98 104       Liver Function Studies -   Recent Labs   Lab Test 05/05/22  1108 04/28/22  0757 04/15/22  0619   PROTTOTAL  --  6.3 5.8*   ALBUMIN 2.4* 2.1* 2.0*   BILITOTAL  --  0.3 0.7   ALKPHOS  --  133* 102   AST  --  18 82*   ALT  --  17 37       TSH   Date Value Ref Range Status   03/09/2022 1.43 0.30 - 5.00 uIU/mL Final   09/15/2021 1.03 0.30 - 5.00 uIU/mL Final   10/16/2018 2.39 0.40 - 4.00 mU/L Final   ]    Lab Results   Component Value Date    A1C 5.7 07/26/2021    A1C 5.7 07/15/2016       Assessment/Plan:    ICD-10-CM    1. Acute respiratory failure with hypoxia (H)  J96.01 Acute on chronic - unstable, improving. Pt    2. Chronic obstructive pulmonary disease, unspecified COPD type (H)  J44.9 Continues on regimen of Albuterol, Incruse, and Breo with continuous O2 via NC. Continue medication as ordered with ongoing monitoring.    3. Tachycardia  R00.0 HR elevated inpatient -    4. Shortness of breath  R06.02 HRs since admission as shown below with    5. Atelectasis  J98.11 occasional noted elevations.   6. Hypoxia  R09.02 No active treatment regimen at this time. Continue VS per facility protocol with interventions as needed.           7. Acute post-operative pain  G89.18 Acute on chronic - unstable, improving. Patient notes absence of pain to her L shoulder   8. Status post total shoulder arthroplasty, left  Z96.612 Continues with Tylenol and Voltaren gel for pain control. Continue medications as ordered with therapy as noted below.   9.  10. Bipolar 1 disorder (H)   Delerium F31.9  R41.0 Chronic - stable. Patient notes no acute Concerns at this time - prior behavioral concerns not present   11. Normal pressure hydrocephalus (H)  G91.2 With medication adjustments. Did go see Neuo with mild adjustments to shunt pressure - she notes that she is feeling more clear since this. Has tolerated  transition  from Prozac to Celexa as noted previously - continues on taper of Prozac at this time with no acute concerns noted. Tolerating Hydroxyzine without oversedation. Continues on regimen of Lamictal and Abilify as previously ordered. Continue Hydroxyzine 25mg PO at bedtime to decrease night time behaviors.  Continue med adjustments as ordered    Wait for 6d to allow washout of Prozac from patient's system; then    Start Celexa 5mg PO qDay x7d; then    Increase Celexa to 10mg PO qDay ongoing   12. Essential hypertension  I10 Chronic - stable. Continues on regimen of Amlodipine with BPs as noted below. Continue medications as ordered with VS per facility protocol.     13. Iron deficiency anemia D50.8 Chronic - unstable. Today's anemia panel as noted below. Previously on daily iron replacement with noted elevation of her Ferritin; changed to 3x weekly dosing with addition of Vit C - improvement noted. Continue medications as ordered with plan to follow-up on labs in 1-4 weeks.   Latest Reference Range & Units 04/28/22 07:57 05/24/22 07:55   Ferritin 10 - 130 ng/mL 581 (H) 245 (H)   Folate >=3.5 ng/mL 10.0    Iron 35 - 180 ug/dL 21 (L) 30 (L)   Iron Binding Cap 240 - 430 ug/dL  281   Iron Saturation Index 15 - 46 %  11 (L)   Transferrin 212 - 360 mg/dL 146 (L) 227   Transferrin  - 563 ug/dL 183 (L)    Transferrin Saturation 20 - 50 % 12 (L)       14. Severe protein calorie malnutrition (H) E43 Chronic - unstable. Last albumin low as shown below. She notes that she does not get a lot of protein in her diet. She is interested in a daily ice cream based supplement. Will start Magic cup BID with a planned recheck of albumin in ~2 weeks.  Lab Results   Component Value Date    ALBUMIN 2.1 04/28/2022    ALBUMIN 3.8 10/16/2018      15. Physical deconditioning  R53.81 Acute on chronic - unstable. Continues to be limited by WB status - follow-up with Ortho as above with plans for adjustment to increase ability to work with  therapies. PT/OT following - adv per their recommendations. SW ongoing to assist with discharge planning - current recommendation for LTC, family working on placement of choice.     Electronically signed by:   Dr. Chantelle Hoover, APRN, DNP, AGNP-BC, PMHNP-BC  [employer]  Office Hours: Tues-Fri 1946-2315  Office: [address] [place] [address]  Fax - [phone]  Triage Phone- [phone]  Business Office- [phone]      Email: [email or web address]

## 2022-06-07 NOTE — LETTER
6/7/2022        RE: Amparo Sharma  [address]  [place] [address]         HE[facility]    Chief Complaint   Patient presents with     RECHECK     HPI:  Amparo Sharma is a 81 year old  (1940), who is being seen today for an episodic care visit at: [facility] ([facility]) [[id number]]. Today's concern is:     Acute respiratory failure with hypoxia (H)  Chronic obstructive pulmonary disease, unspecified COPD type (H)  Tachycardia  Shortness of breath  Atelectasis  Hypoxia  Acute post-operative pain  Status post total shoulder arthroplasty, left  Bipolar 1 disorder (H)  Delirium  Normal pressure hydrocephalus (H)  Essential hypertension  Iron deficiency anemia secondary to inadequate dietary iron intake  Severe protein-calorie malnutrition (H)  Physical deconditioning    Patient is seen today for a follow-up visit in TCU. She is seen today resting quietly in her bed. Staff note no acute concerns - she had follow-up with Neurology in which they adjusted her shunt pressure - she notes feeling more clear with this adjustment. She is tolerating changes to her psychiatric medications without concern per staff. She denies any s/sx of CP, palpitations, increased fatigue, SOB, fever, chills, nausea vomiting and b/b concerns today.    Allergies, and PMH/PSH reviewed in EPIC today.    Current Outpatient Medications   Medication Sig Dispense Refill     acetaminophen (TYLENOL) 325 MG tablet Take 3 tablets (975 mg) by mouth every 8 hours 90 tablet 0     albuterol (PROAIR HFA/PROVENTIL HFA/VENTOLIN HFA) 108 (90 Base) MCG/ACT inhaler Inhale 2 puffs into the lungs every 4 hours as needed for shortness of breath / dyspnea       albuterol (PROVENTIL) (2.5 MG/3ML) 0.083% neb solution Inhale 2.5 mg into the lungs every 4 hours as needed for shortness of breath / dyspnea       amLODIPine (NORVASC) 5 MG tablet Take 5 mg by mouth daily       amoxicillin (AMOXIL) 500 MG capsule Take 2,000  mg by mouth once as needed (Prior to dental appt)        ARIPiprazole (ABILIFY) 15 MG tablet Take 15 mg by mouth At Bedtime       aspirin (ASA) 81 MG EC tablet Take 1 tablet (81 mg) by mouth in the morning and 1 tablet (81 mg) in the evening. Take with meals. 60 tablet 0     BREO ELLIPTA 100-25 MCG/INH inhaler Inhale 1 puff into the lungs daily 1PM       carboxymethylcellulose (REFRESH PLUS) 0.5 % SOLN ophthalmic solution Place 1 drop into both eyes as needed in the morning and 1 drop as needed at noon and 1 drop as needed in the evening and 1 drop as needed before bedtime for dry eyes.       carboxymethylcellulose (REFRESH PLUS) 0.5 % SOLN ophthalmic solution Place 1 drop into both eyes in the morning.       cetirizine (ZYRTEC) 10 MG tablet Take 10 mg by mouth daily        citalopram (CELEXA) 10 MG tablet Take 0.5 tablets (5 mg) by mouth daily for 7 days, THEN 1 tablet (10 mg) daily. 31 tablet 0     diclofenac (VOLTAREN) 1 % topical gel Apply 4 g topically 4 times daily Apply to affected knees.       ferrous sulfate (FEROSUL) 325 (65 Fe) MG tablet Take 1 tablet (325 mg) by mouth Every Mon, Wed, Fri Morning       FLUoxetine (PROZAC) 20 MG capsule Take 2 capsules (40 mg) by mouth daily for 7 days, THEN 1 capsule (20 mg) daily for 7 days. Then discontinue. Then allow 7 day washout period before starting Celexa as ordered. 21 capsule 0     gabapentin (NEURONTIN) 100 MG capsule Take 2 capsules (200 mg) by mouth 3 times daily 180 capsule 0     INCRUSE ELLIPTA 62.5 MCG/INH Inhaler Inhale 1 puff into the lungs daily        lamoTRIgine (LAMICTAL) 100 MG tablet Take 100 mg by mouth At Bedtime       lamoTRIgine (LAMICTAL) 150 MG tablet Take 150 mg by mouth daily        levothyroxine (SYNTHROID/LEVOTHROID) 75 MCG tablet Take 75 mcg by mouth daily       menthol-zinc oxide (CALMOSEPTINE) 0.44-20.6 % OINT ointment Apply topically 2 times daily as needed for irritation       mirabegron (MYRBETRIQ) 25 MG 24 hr tablet Take 25 mg by  mouth daily        Multiple Vitamins-Minerals (MULTIVITAMIN PO) Take 1 tablet by mouth daily        pantoprazole (PROTONIX) 20 MG EC tablet Take 20 mg by mouth daily        polyethylene glycol (MIRALAX) 17 GM/Dose powder Take 17 g by mouth daily 510 g 0     saccharomyces boulardii (FLORASTOR) 250 MG capsule Take 250 mg by mouth in the morning.       SENOKOT S 8.6-50 MG tablet Take 2 tablets by mouth daily        Vitamin D, Cholecalciferol, 25 MCG (1000 UT) CAPS Take 1,000 Units by mouth daily          REVIEW OF SYSTEMS:  10 point ROS of systems including Constitutional, Eyes, Respiratory, Cardiovascular, Gastroenterology, Genitourinary, Integumentary, Musculoskeletal, Psychiatric were all negative except for pertinent positives noted in my HPI.    Objective:   /76   Pulse 99   Temp 98.5  F (36.9  C)   Resp 18   Ht 1.524 m (5')   Wt 64.8 kg (142 lb 14.4 oz)   SpO2 96%   BMI 27.91 kg/m    GENERAL APPEARANCE:  Alert, in no distress, appears healthy, oriented, cooperative, elderly woman resting in bed  ENT:  Mouth and posterior oropharynx normal, moist mucous membranes, [place]  EYES:  EOM, conjunctivae, lids, pupils and irises normal  RESP:  Respiratory effort and palpation of chest normal, no respiratory distress with O2 present via NC  M/S:   Gait and station abnormal - ADRIAN 2/2 patient being in bed; Digits and nails abnormal - arthritic changes present  SKIN:  Inspection of skin and subcutaneous tissue baseline, Palpation of skin and subcutaneous tissue baseline, skin thin, fragile and dry  NEURO:   Cranial nerves 2-12 are normal tested and grossly at patient's baseline  PSYCH:  oriented X 1-2, normal insight, judgement and memory, affect and mood normal    Recent labs in [facility] reviewed by me today.    CBC RESULTS: Recent Labs   Lab Test 05/05/22  1108 04/28/22  0757   WBC 7.7 9.1   RBC 3.70* 3.28*   HGB 10.1* 9.2*   HCT 33.2* 30.7*   MCV 90 94   MCH 27.3 28.0   MCHC 30.4* 30.0*   RDW 15.5* 15.6*   *  708*       Last Basic Metabolic Panel:  Recent Labs   Lab Test 04/28/22  0757 04/19/22  0851    142   POTASSIUM 4.4 3.6   CHLORIDE 105 105   MARTHA 9.5 9.4   CO2 27 27   BUN 11 13   CR 0.56* 0.57*   GLC 98 104       Liver Function Studies -   Recent Labs   Lab Test 05/05/22  1108 04/28/22  0757 04/15/22  0619   PROTTOTAL  --  6.3 5.8*   ALBUMIN 2.4* 2.1* 2.0*   BILITOTAL  --  0.3 0.7   ALKPHOS  --  133* 102   AST  --  18 82*   ALT  --  17 37       TSH   Date Value Ref Range Status   03/09/2022 1.43 0.30 - 5.00 uIU/mL Final   09/15/2021 1.03 0.30 - 5.00 uIU/mL Final   10/16/2018 2.39 0.40 - 4.00 mU/L Final   ]    Lab Results   Component Value Date    A1C 5.7 07/26/2021    A1C 5.7 07/15/2016       Assessment/Plan:    ICD-10-CM    1. Acute respiratory failure with hypoxia (H)  J96.01 Acute on chronic - unstable, improving. Pt    2. Chronic obstructive pulmonary disease, unspecified COPD type (H)  J44.9 Continues on regimen of Albuterol, Incruse, and Breo with continuous O2 via NC. Continue medication as ordered with ongoing monitoring.    3. Tachycardia  R00.0 HR elevated inpatient -    4. Shortness of breath  R06.02 HRs since admission as shown below with    5. Atelectasis  J98.11 occasional noted elevations.   6. Hypoxia  R09.02 No active treatment regimen at this time. Continue VS per facility protocol with interventions as needed.           7. Acute post-operative pain  G89.18 Acute on chronic - unstable, improving. Patient notes absence of pain to her L shoulder   8. Status post total shoulder arthroplasty, left  Z96.612 Continues with Tylenol and Voltaren gel for pain control. Continue medications as ordered with therapy as noted below.   9.  10. Bipolar 1 disorder (H)   Delerium F31.9  R41.0 Chronic - stable. Patient notes no acute Concerns at this time - prior behavioral concerns not present   11. Normal pressure hydrocephalus (H)  G91.2 With medication adjustments. Did go see Wolfgang with mild adjustments to  shunt pressure - she notes that she is feeling more clear since this. Has tolerated  transition from Prozac to Celexa as noted previously - continues on taper of Prozac at this time with no acute concerns noted. Tolerating Hydroxyzine without oversedation. Continues on regimen of Lamictal and Abilify as previously ordered. Continue Hydroxyzine 25mg PO at bedtime to decrease night time behaviors.  Continue med adjustments as ordered    Wait for 6d to allow washout of Prozac from patient's system; then    Start Celexa 5mg PO qDay x7d; then    Increase Celexa to 10mg PO qDay ongoing   12. Essential hypertension  I10 Chronic - stable. Continues on regimen of Amlodipine with BPs as noted below. Continue medications as ordered with VS per facility protocol.     13. Iron deficiency anemia D50.8 Chronic - unstable. Today's anemia panel as noted below. Previously on daily iron replacement with noted elevation of her Ferritin; changed to 3x weekly dosing with addition of Vit C - improvement noted. Continue medications as ordered with plan to follow-up on labs in 1-4 weeks.   Latest Reference Range & Units 04/28/22 07:57 05/24/22 07:55   Ferritin 10 - 130 ng/mL 581 (H) 245 (H)   Folate >=3.5 ng/mL 10.0    Iron 35 - 180 ug/dL 21 (L) 30 (L)   Iron Binding Cap 240 - 430 ug/dL  281   Iron Saturation Index 15 - 46 %  11 (L)   Transferrin 212 - 360 mg/dL 146 (L) 227   Transferrin  - 563 ug/dL 183 (L)    Transferrin Saturation 20 - 50 % 12 (L)       14. Severe protein calorie malnutrition (H) E43 Chronic - unstable. Last albumin low as shown below. She notes that she does not get a lot of protein in her diet. She is interested in a daily ice cream based supplement. Will start Magic cup BID with a planned recheck of albumin in ~2 weeks.  Lab Results   Component Value Date    ALBUMIN 2.1 04/28/2022    ALBUMIN 3.8 10/16/2018      15. Physical deconditioning  R53.81 Acute on chronic - unstable. Continues to be limited by WB status  - follow-up with Ortho as above with plans for adjustment to increase ability to work with therapies. PT/OT following - adv per their recommendations. SW ongoing to assist with discharge planning - current recommendation for LTC, family working on placement of choice.     Electronically signed by:   KASANDRA Khanna, DNP, AGNP-BC, PMHNP-BC  [facility]  Office Hours: Tues-Fri 2533-5256  Office: [address] [place] [address]  Fax - [phone]  Triage Phone- [phone]  Business Office- [phone]      Email: [email or web address]                         Sincerely,        KASANDRA Johnson CNP

## 2022-06-07 NOTE — LETTER
"2022       RE: Amparo Sharma  [address]  [place] [address]     Dear Colleague,    Thank you for referring your patient, Amparo Sharma, to the [facility] [place] at [facility]. Please see a copy of my visit note below.    [facility]  Department of Neurosurgery      Name: Amparo Sharma  MRN: [id number]  Age: 81 year old  : 1940  Referring provider: Rc Dozier  2022      Chief Complaint:   Normal pressure Hydrocephalus  Follow up visit    History of Present Illness:   Amparo Sharma is a 81 year old female with a history of NPH, s/p Right frontal  shunt placement on 2017 by Dr. Dozier  who is seen today for follow up. The shunt pressure was originally set at 1.0. Per prior notes, readjusted the pressure setting to 1.5 on 2017, 2.0 on 5/3/2017 and back to 1.5 on 2017 based on her symptoms.      Patient's daughter contacted the clinic in 2022 with worsening symptoms and a follow-up in clinic was recommended at that time. Patient presents with her daughter, Nohemi for the evaluation. Per patient and her daughter, she had left shoulder replacement surgery on 2022. Post operatively, she needed O2 and was discharged to a TCU from the hospital. Prior to surgery, she was able to do transfers with stand by assist. But now staff has to use Bonnie lift for transfers. Per family, she is having increased muscle rigidity and tremors. Also her cognitive score \"dropped from 30-13\". She was on Gabapentin post operatively, but due to the worsening symptoms, this has been discontinued. But no improvement in her symptoms.     Review of Systems:   Pertinent items are noted in HPI or as in patient entered ROS below, remainder of complete ROS is negative.   No flowsheet data found.     Physical Exam:   /65 (BP Location: Left arm, Patient Position: Sitting, Cuff Size: " Adult Regular)   Pulse 89   SpO2 93%    General: No acute distress.    Neuro: The patient is awake and alert, oriented to self, place and month. Speech is normal. Extraocular movements are intact without nystagmus. Facial sensation is intact in V1, V2, V3 distributions. Facial nerve function is normal, rated as a House Brackmann 1. Left shoulder slightly weak since her recent surgery. There is no pronator drift. Sensation intact throughout. No dysmetria with finger-nose-finger testing. Noted tremors in bilateral hands. Non ambulatory.   Psych: Normal mood and affect. Behavior is normal.      Imaging:  No new imaging.     Procedure:  With the patient's permission, her [employer] Right frontal  shunt was interrogated and found to be set to 2.0.  Rechecked x 2. Due to worsening symptoms, I dialed it down to 1.5 which was her prior setting. Rechecked x 2.       Assessment:  Normal pressure Hydrocephalus  Follow up visit    Plan:  Today, her shunt setting was found to be at 2.0. This was last set at 1.5 on 9/25/2017. We dialed the shunt setting back to the prior setting of 1.5 today.  Discussed that her recent worsening of symptoms could be a result of recovery from surgery, poly pharmacy etc.     We will plan for a head CT and a clinic follow-up in 4 weeks. Patient to contact the clinic sooner if she has any questions or concerns.        I spent 35 minutes on patient care activities related to this encounter on the date of service, including time spent reviewing the chart, obtaining history and examination and in counseling the patient, and in documentation in the electronic medical record.      Jacquelin SLAUGHTER CNP  Department of Neurosurgery      Stunt check  Colette Tamayo CMA

## 2022-06-07 NOTE — PATIENT INSTRUCTIONS
Your  shunt setting was changed from 2.0 to 1.5 (Prior setting).     We will follow-up with you in 4 weeks after head CT.

## 2022-06-15 ENCOUNTER — TRANSITIONAL CARE UNIT VISIT (OUTPATIENT)
Dept: GERIATRICS | Facility: CLINIC | Age: 82
End: 2022-06-15
Payer: MEDICARE

## 2022-06-15 DIAGNOSIS — G91.2 NORMAL PRESSURE HYDROCEPHALUS (H): ICD-10-CM

## 2022-06-15 DIAGNOSIS — D50.8 IRON DEFICIENCY ANEMIA SECONDARY TO INADEQUATE DIETARY IRON INTAKE: ICD-10-CM

## 2022-06-15 DIAGNOSIS — E43 SEVERE PROTEIN-CALORIE MALNUTRITION (H): ICD-10-CM

## 2022-06-15 DIAGNOSIS — J98.11 ATELECTASIS: ICD-10-CM

## 2022-06-15 DIAGNOSIS — J96.01 ACUTE RESPIRATORY FAILURE WITH HYPOXIA (H): Primary | ICD-10-CM

## 2022-06-15 DIAGNOSIS — R06.02 SHORTNESS OF BREATH: ICD-10-CM

## 2022-06-15 DIAGNOSIS — R00.0 TACHYCARDIA: ICD-10-CM

## 2022-06-15 DIAGNOSIS — Z96.612 STATUS POST TOTAL SHOULDER ARTHROPLASTY, LEFT: ICD-10-CM

## 2022-06-15 DIAGNOSIS — F31.9 BIPOLAR 1 DISORDER (H): ICD-10-CM

## 2022-06-15 DIAGNOSIS — R53.81 PHYSICAL DECONDITIONING: ICD-10-CM

## 2022-06-15 DIAGNOSIS — G89.18 ACUTE POST-OPERATIVE PAIN: ICD-10-CM

## 2022-06-15 DIAGNOSIS — I10 ESSENTIAL HYPERTENSION: ICD-10-CM

## 2022-06-15 DIAGNOSIS — R09.02 HYPOXIA: ICD-10-CM

## 2022-06-15 DIAGNOSIS — J44.9 CHRONIC OBSTRUCTIVE PULMONARY DISEASE, UNSPECIFIED COPD TYPE (H): ICD-10-CM

## 2022-06-15 DIAGNOSIS — R41.0 DELIRIUM: ICD-10-CM

## 2022-06-15 PROCEDURE — 99310 SBSQ NF CARE HIGH MDM 45: CPT | Performed by: NURSE PRACTITIONER

## 2022-06-15 NOTE — LETTER
6/15/2022        RE: Amparo Sharma  [address]  [place] [address]         HEALTH GERIATRIC SERVICES    Chief Complaint   Patient presents with     RECHECK     HPI:  Amparo Sharma is a 81 year old  (1940), who is being seen today for an episodic care visit at: [facility] ([facility]) [[id number]]. Today's concern is:     Acute respiratory failure with hypoxia (H)  Chronic obstructive pulmonary disease, unspecified COPD type (H)  Tachycardia  Shortness of breath  Atelectasis  Hypoxia  Acute post-operative pain  Status post total shoulder arthroplasty, left  Bipolar 1 disorder (H)  Normal pressure hydrocephalus (H)  Delirium  Essential hypertension  Iron deficiency anemia secondary to inadequate dietary iron intake  Severe protein-calorie malnutrition (H)  Physical deconditioning    Patient is seen today for a follow-up visit in TCU. She is seen today resting quietly in her bed. Staff note no acute concerns - she tells me today that she was discharge from therapies, but they are working on getting it restarted - she wishes to regain some more independence. She is tolerating changes to her psychiatric medications without concern - she continues to feel more clear following shunt adjustments. Today she is wearing her oxygen - she notes she was feeling a little SOB. She is trying to eat lunch while in bed and having quite a bit of difficulty with this - she would like to have some assistance; writer informs staff who note she should be up in her w/c - they will assist her with this. She denies any s/sx of CP, palpitations, increased fatigue, fever, chills, nausea vomiting and b/b concerns today.    Allergies, and PMH/PSH reviewed in EPIC today.    Current Outpatient Medications   Medication Sig Dispense Refill     acetaminophen (TYLENOL) 325 MG tablet Take 3 tablets (975 mg) by mouth every 8 hours 90 tablet 0     albuterol (PROAIR HFA/PROVENTIL HFA/VENTOLIN HFA) 108 (90 Base)  MCG/ACT inhaler Inhale 2 puffs into the lungs every 4 hours as needed for shortness of breath / dyspnea       albuterol (PROVENTIL) (2.5 MG/3ML) 0.083% neb solution Inhale 2.5 mg into the lungs every 4 hours as needed for shortness of breath / dyspnea       amLODIPine (NORVASC) 5 MG tablet Take 5 mg by mouth daily       amoxicillin (AMOXIL) 500 MG capsule Take 2,000 mg by mouth once as needed (Prior to dental appt)        ARIPiprazole (ABILIFY) 15 MG tablet Take 15 mg by mouth At Bedtime       aspirin (ASA) 81 MG EC tablet Take 1 tablet (81 mg) by mouth in the morning and 1 tablet (81 mg) in the evening. Take with meals. 60 tablet 0     BREO ELLIPTA 100-25 MCG/INH inhaler Inhale 1 puff into the lungs daily 1PM       carboxymethylcellulose (REFRESH PLUS) 0.5 % SOLN ophthalmic solution Place 1 drop into both eyes as needed in the morning and 1 drop as needed at noon and 1 drop as needed in the evening and 1 drop as needed before bedtime for dry eyes.       carboxymethylcellulose (REFRESH PLUS) 0.5 % SOLN ophthalmic solution Place 1 drop into both eyes in the morning.       cetirizine (ZYRTEC) 10 MG tablet Take 10 mg by mouth daily        citalopram (CELEXA) 10 MG tablet Take 0.5 tablets (5 mg) by mouth daily for 7 days, THEN 1 tablet (10 mg) daily. 31 tablet 0     diclofenac (VOLTAREN) 1 % topical gel Apply 4 g topically 4 times daily Apply to affected knees.       ferrous sulfate (FEROSUL) 325 (65 Fe) MG tablet Take 1 tablet (325 mg) by mouth Every Mon, Wed, Fri Morning       FLUoxetine (PROZAC) 20 MG capsule Take 2 capsules (40 mg) by mouth daily for 7 days, THEN 1 capsule (20 mg) daily for 7 days. Then discontinue. Then allow 7 day washout period before starting Celexa as ordered. 21 capsule 0     gabapentin (NEURONTIN) 100 MG capsule Take 2 capsules (200 mg) by mouth 3 times daily 180 capsule 0     INCRUSE ELLIPTA 62.5 MCG/INH Inhaler Inhale 1 puff into the lungs daily        lamoTRIgine (LAMICTAL) 100 MG tablet  Take 100 mg by mouth At Bedtime       lamoTRIgine (LAMICTAL) 150 MG tablet Take 150 mg by mouth daily        levothyroxine (SYNTHROID/LEVOTHROID) 75 MCG tablet Take 75 mcg by mouth daily       menthol-zinc oxide (CALMOSEPTINE) 0.44-20.6 % OINT ointment Apply topically 2 times daily as needed for irritation       mirabegron (MYRBETRIQ) 25 MG 24 hr tablet Take 25 mg by mouth daily        Multiple Vitamins-Minerals (MULTIVITAMIN PO) Take 1 tablet by mouth daily        pantoprazole (PROTONIX) 20 MG EC tablet Take 20 mg by mouth daily        polyethylene glycol (MIRALAX) 17 GM/Dose powder Take 17 g by mouth daily 510 g 0     saccharomyces boulardii (FLORASTOR) 250 MG capsule Take 250 mg by mouth in the morning.       SENOKOT S 8.6-50 MG tablet Take 2 tablets by mouth daily        Vitamin D, Cholecalciferol, 25 MCG (1000 UT) CAPS Take 1,000 Units by mouth daily          REVIEW OF SYSTEMS:  10 point ROS of systems including Constitutional, Eyes, Respiratory, Cardiovascular, Gastroenterology, Genitourinary, Integumentary, Musculoskeletal, Psychiatric were all negative except for pertinent positives noted in my HPI.    Objective:   There were no vitals taken for this visit.  GENERAL APPEARANCE:  Alert, in no distress, appears healthy, oriented, cooperative, elderly woman resting in bed  ENT:  Mouth and posterior oropharynx normal, moist mucous membranes, [place]  EYES:  EOM, conjunctivae, lids, pupils and irises normal  RESP:  Respiratory effort and palpation of chest normal, no respiratory distress with O2 present via NC  M/S:   Gait and station abnormal - ADRIAN 2/2 patient being in bed; Digits and nails abnormal - arthritic changes present  SKIN:  Inspection of skin and subcutaneous tissue baseline, Palpation of skin and subcutaneous tissue baseline, skin thin, fragile and dry  NEURO:   Cranial nerves 2-12 are normal tested and grossly at patient's baseline  PSYCH:  oriented X 1-2, normal insight, judgement and memory, affect  and mood normal    Recent labs in [facility] reviewed by me today.    CBC RESULTS: Recent Labs   Lab Test 06/02/22  1306 05/05/22  1108 04/28/22  0757   WBC  --  7.7 9.1   RBC  --  3.70* 3.28*   HGB 11.5* 10.1* 9.2*   HCT  --  33.2* 30.7*   MCV  --  90 94   MCH  --  27.3 28.0   MCHC  --  30.4* 30.0*   RDW  --  15.5* 15.6*   PLT  --  597* 708*       Last Basic Metabolic Panel:  Recent Labs   Lab Test 06/02/22  1306 04/28/22  0757    144   POTASSIUM 4.0 4.4   CHLORIDE 103 105   MARTHA 9.8 9.5   CO2 24 27   BUN 19 11   CR 0.69 0.56*    98       Liver Function Studies -   Recent Labs   Lab Test 06/02/22  1306 05/05/22  1108 04/28/22  0757   PROTTOTAL 6.9  --  6.3   ALBUMIN 3.1* 2.4* 2.1*   BILITOTAL 0.4  --  0.3   ALKPHOS 153*  --  133*   AST 19  --  18   ALT 20  --  17       TSH   Date Value Ref Range Status   03/09/2022 1.43 0.30 - 5.00 uIU/mL Final   09/15/2021 1.03 0.30 - 5.00 uIU/mL Final   10/16/2018 2.39 0.40 - 4.00 mU/L Final   ]    Lab Results   Component Value Date    A1C 5.7 07/26/2021    A1C 5.7 07/15/2016       Assessment/Plan:    ICD-10-CM    1. Acute respiratory failure with hypoxia (H)  J96.01 Acute on chronic - unstable, improving. Pt    2. Chronic obstructive pulmonary disease, unspecified COPD type (H)  J44.9 Continues on regimen of Albuterol, Incruse, and Breo with O2 as needed via NC. Continue medication as ordered with ongoing monitoring.    3. Tachycardia  R00.0 HR elevated inpatient - occasional elevations since    4. Shortness of breath  R06.02 Admission without associated symptoms. No active   5. Atelectasis  J98.11 tx regimen at this time.    6. Hypoxia  R09.02 Continue VS per facility protocol with interventions as needed.           7. Acute post-operative pain  G89.18 Acute on chronic - resolved. Patient notes absence of pain to her L shoulder   8. Status post total shoulder arthroplasty, left  Z96.612 Continues with Tylenol and Voltaren gel for pain control. Continue medications as  ordered.   9.  10. Bipolar 1 disorder (H)   Delerium F31.9  R41.0 Chronic - stable. Patient notes no acute Concerns at this time - prior behavioral concerns not present   11. Normal pressure hydrocephalus (H)  G91.2 With medication adjustments. Did go see Neuro with mild adjustments to shunt pressure - she notes that she is feeling more clear since this. Has tolerated  transition from Prozac to Celexa; although on review today I appears that Celexa was never initiated as ordered.  as noted previously - has completed taper of prozac with no concerns. Tolerating Hydroxyzine without oversedation. Continues on regimen of Lamictal and Abilify as previously ordered. Continue Hydroxyzine 25mg PO at bedtime to decrease night time behaviors. Will not initiate Celexa at this time due to lack of need.   12. Essential hypertension  I10 Chronic - stable. Continues on regimen of Amlodipine with BPs as noted below. Continue medications as ordered with VS per facility protocol.     13. Iron deficiency anemia D50.8 Chronic - unstable. Today's anemia panel as noted below. Previously on daily iron replacement with noted elevation of her Ferritin; changed to 3x weekly dosing with addition of Vit C - improvement noted. Continue medications as ordered with plan to follow-up on labs in 2 weeks.   Latest Reference Range & Units 04/28/22 07:57 05/24/22 07:55   Ferritin 10 - 130 ng/mL 581 (H) 245 (H)   Folate >=3.5 ng/mL 10.0    Iron 35 - 180 ug/dL 21 (L) 30 (L)   Iron Binding Cap 240 - 430 ug/dL  281   Iron Saturation Index 15 - 46 %  11 (L)   Transferrin 212 - 360 mg/dL 146 (L) 227   Transferrin  - 563 ug/dL 183 (L)    Transferrin Saturation 20 - 50 % 12 (L)       14. Severe protein calorie malnutrition (H) E43 Chronic - unstable. Last albumin low as shown below. She notes that she does not get a lot of protein in her diet. She is interested in a daily ice cream based supplement. Will start Magic cup BID with a planned recheck of  albumin in ~1 weeks.  Lab Results   Component Value Date    ALBUMIN 2.1 04/28/2022    ALBUMIN 3.8 10/16/2018      15. Physical deconditioning  R53.81 Acute on chronic - unstable. Continues to be limited by WB status - follow-up with Ortho as above with plans for adjustment to increase ability to work with therapies. PT/OT discharged due to baselining - continues to work with SLP - adv per their recommendations. SW ongoing to assist with discharge planning - current recommendation for LTC, family working on placement of choice.     Electronically signed by:   KASANDRA Khanna, DNP, AGNP-BC, PMHNP-BC  [facility]  Office Hours: Tues-Fri 2822-8061  Office: [address] [place] [address]  Fax - [phone]  Triage Phone- [phone]  Business Office- [phone]      Email: [email or web address]                         Sincerely,        KASANDRA Johnson CNP

## 2022-06-15 NOTE — PROGRESS NOTES
[facility] GERIATRIC SERVICES    Chief Complaint   Patient presents with     RECHECK     HPI:  Amparo Sharma is a 81 year old  (1940), who is being seen today for an episodic care visit at: [facility] ([facility]) [[id number]]. Today's concern is:     Acute respiratory failure with hypoxia (H)  Chronic obstructive pulmonary disease, unspecified COPD type (H)  Tachycardia  Shortness of breath  Atelectasis  Hypoxia  Acute post-operative pain  Status post total shoulder arthroplasty, left  Bipolar 1 disorder (H)  Normal pressure hydrocephalus (H)  Delirium  Essential hypertension  Iron deficiency anemia secondary to inadequate dietary iron intake  Severe protein-calorie malnutrition (H)  Physical deconditioning    Patient is seen today for a follow-up visit in TCU. She is seen today resting quietly in her bed. Staff note no acute concerns - she tells me today that she was discharge from therapies, but they are working on getting it restarted - she wishes to regain some more independence. She is tolerating changes to her psychiatric medications without concern - she continues to feel more clear following shunt adjustments. Today she is wearing her oxygen - she notes she was feeling a little SOB. She is trying to eat lunch while in bed and having quite a bit of difficulty with this - she would like to have some assistance; writer informs staff who note she should be up in her w/c - they will assist her with this. She denies any s/sx of CP, palpitations, increased fatigue, fever, chills, nausea vomiting and b/b concerns today.    Allergies, and PMH/PSH reviewed in EPIC today.    Current Outpatient Medications   Medication Sig Dispense Refill     acetaminophen (TYLENOL) 325 MG tablet Take 3 tablets (975 mg) by mouth every 8 hours 90 tablet 0     albuterol (PROAIR HFA/PROVENTIL HFA/VENTOLIN HFA) 108 (90 Base) MCG/ACT inhaler Inhale 2 puffs into the lungs every 4 hours as needed for shortness of breath / dyspnea        albuterol (PROVENTIL) (2.5 MG/3ML) 0.083% neb solution Inhale 2.5 mg into the lungs every 4 hours as needed for shortness of breath / dyspnea       amLODIPine (NORVASC) 5 MG tablet Take 5 mg by mouth daily       amoxicillin (AMOXIL) 500 MG capsule Take 2,000 mg by mouth once as needed (Prior to dental appt)        ARIPiprazole (ABILIFY) 15 MG tablet Take 15 mg by mouth At Bedtime       aspirin (ASA) 81 MG EC tablet Take 1 tablet (81 mg) by mouth in the morning and 1 tablet (81 mg) in the evening. Take with meals. 60 tablet 0     BREO ELLIPTA 100-25 MCG/INH inhaler Inhale 1 puff into the lungs daily 1PM       carboxymethylcellulose (REFRESH PLUS) 0.5 % SOLN ophthalmic solution Place 1 drop into both eyes as needed in the morning and 1 drop as needed at noon and 1 drop as needed in the evening and 1 drop as needed before bedtime for dry eyes.       carboxymethylcellulose (REFRESH PLUS) 0.5 % SOLN ophthalmic solution Place 1 drop into both eyes in the morning.       cetirizine (ZYRTEC) 10 MG tablet Take 10 mg by mouth daily        citalopram (CELEXA) 10 MG tablet Take 0.5 tablets (5 mg) by mouth daily for 7 days, THEN 1 tablet (10 mg) daily. 31 tablet 0     diclofenac (VOLTAREN) 1 % topical gel Apply 4 g topically 4 times daily Apply to affected knees.       ferrous sulfate (FEROSUL) 325 (65 Fe) MG tablet Take 1 tablet (325 mg) by mouth Every Mon, Wed, Fri Morning       FLUoxetine (PROZAC) 20 MG capsule Take 2 capsules (40 mg) by mouth daily for 7 days, THEN 1 capsule (20 mg) daily for 7 days. Then discontinue. Then allow 7 day washout period before starting Celexa as ordered. 21 capsule 0     gabapentin (NEURONTIN) 100 MG capsule Take 2 capsules (200 mg) by mouth 3 times daily 180 capsule 0     INCRUSE ELLIPTA 62.5 MCG/INH Inhaler Inhale 1 puff into the lungs daily        lamoTRIgine (LAMICTAL) 100 MG tablet Take 100 mg by mouth At Bedtime       lamoTRIgine (LAMICTAL) 150 MG tablet Take 150 mg by mouth daily         levothyroxine (SYNTHROID/LEVOTHROID) 75 MCG tablet Take 75 mcg by mouth daily       menthol-zinc oxide (CALMOSEPTINE) 0.44-20.6 % OINT ointment Apply topically 2 times daily as needed for irritation       mirabegron (MYRBETRIQ) 25 MG 24 hr tablet Take 25 mg by mouth daily        Multiple Vitamins-Minerals (MULTIVITAMIN PO) Take 1 tablet by mouth daily        pantoprazole (PROTONIX) 20 MG EC tablet Take 20 mg by mouth daily        polyethylene glycol (MIRALAX) 17 GM/Dose powder Take 17 g by mouth daily 510 g 0     saccharomyces boulardii (FLORASTOR) 250 MG capsule Take 250 mg by mouth in the morning.       SENOKOT S 8.6-50 MG tablet Take 2 tablets by mouth daily        Vitamin D, Cholecalciferol, 25 MCG (1000 UT) CAPS Take 1,000 Units by mouth daily          REVIEW OF SYSTEMS:  10 point ROS of systems including Constitutional, Eyes, Respiratory, Cardiovascular, Gastroenterology, Genitourinary, Integumentary, Musculoskeletal, Psychiatric were all negative except for pertinent positives noted in my HPI.    Objective:   There were no vitals taken for this visit.  GENERAL APPEARANCE:  Alert, in no distress, appears healthy, oriented, cooperative, elderly woman resting in bed  ENT:  Mouth and posterior oropharynx normal, moist mucous membranes, [place]  EYES:  EOM, conjunctivae, lids, pupils and irises normal  RESP:  Respiratory effort and palpation of chest normal, no respiratory distress with O2 present via NC  M/S:   Gait and station abnormal - ADRIAN 2/2 patient being in bed; Digits and nails abnormal - arthritic changes present  SKIN:  Inspection of skin and subcutaneous tissue baseline, Palpation of skin and subcutaneous tissue baseline, skin thin, fragile and dry  NEURO:   Cranial nerves 2-12 are normal tested and grossly at patient's baseline  PSYCH:  oriented X 1-2, normal insight, judgement and memory, affect and mood normal    Recent labs in EPIC reviewed by me today.    CBC RESULTS: Recent Labs   Lab Test  06/02/22  1306 05/05/22  1108 04/28/22  0757   WBC  --  7.7 9.1   RBC  --  3.70* 3.28*   HGB 11.5* 10.1* 9.2*   HCT  --  33.2* 30.7*   MCV  --  90 94   MCH  --  27.3 28.0   MCHC  --  30.4* 30.0*   RDW  --  15.5* 15.6*   PLT  --  597* 708*       Last Basic Metabolic Panel:  Recent Labs   Lab Test 06/02/22  1306 04/28/22  0757    144   POTASSIUM 4.0 4.4   CHLORIDE 103 105   MARTHA 9.8 9.5   CO2 24 27   BUN 19 11   CR 0.69 0.56*    98       Liver Function Studies -   Recent Labs   Lab Test 06/02/22  1306 05/05/22  1108 04/28/22  0757   PROTTOTAL 6.9  --  6.3   ALBUMIN 3.1* 2.4* 2.1*   BILITOTAL 0.4  --  0.3   ALKPHOS 153*  --  133*   AST 19  --  18   ALT 20  --  17       TSH   Date Value Ref Range Status   03/09/2022 1.43 0.30 - 5.00 uIU/mL Final   09/15/2021 1.03 0.30 - 5.00 uIU/mL Final   10/16/2018 2.39 0.40 - 4.00 mU/L Final   ]    Lab Results   Component Value Date    A1C 5.7 07/26/2021    A1C 5.7 07/15/2016       Assessment/Plan:    ICD-10-CM    1. Acute respiratory failure with hypoxia (H)  J96.01 Acute on chronic - unstable, improving. Pt    2. Chronic obstructive pulmonary disease, unspecified COPD type (H)  J44.9 Continues on regimen of Albuterol, Incruse, and Breo with O2 as needed via NC. Continue medication as ordered with ongoing monitoring.    3. Tachycardia  R00.0 HR elevated inpatient - occasional elevations since    4. Shortness of breath  R06.02 Admission without associated symptoms. No active   5. Atelectasis  J98.11 tx regimen at this time.    6. Hypoxia  R09.02 Continue VS per facility protocol with interventions as needed.           7. Acute post-operative pain  G89.18 Acute on chronic - resolved. Patient notes absence of pain to her L shoulder   8. Status post total shoulder arthroplasty, left  Z96.612 Continues with Tylenol and Voltaren gel for pain control. Continue medications as ordered.   9.  10. Bipolar 1 disorder (H)   Delerium F31.9  R41.0 Chronic - stable. Patient notes no  acute Concerns at this time - prior behavioral concerns not present   11. Normal pressure hydrocephalus (H)  G91.2 With medication adjustments. Did go see Neuro with mild adjustments to shunt pressure - she notes that she is feeling more clear since this. Has tolerated  transition from Prozac to Celexa; although on review today I appears that Celexa was never initiated as ordered.  as noted previously - has completed taper of prozac with no concerns. Tolerating Hydroxyzine without oversedation. Continues on regimen of Lamictal and Abilify as previously ordered. Continue Hydroxyzine 25mg PO at bedtime to decrease night time behaviors. Will not initiate Celexa at this time due to lack of need.   12. Essential hypertension  I10 Chronic - stable. Continues on regimen of Amlodipine with BPs as noted below. Continue medications as ordered with VS per facility protocol.     13. Iron deficiency anemia D50.8 Chronic - unstable. Today's anemia panel as noted below. Previously on daily iron replacement with noted elevation of her Ferritin; changed to 3x weekly dosing with addition of Vit C - improvement noted. Continue medications as ordered with plan to follow-up on labs in 2 weeks.   Latest Reference Range & Units 04/28/22 07:57 05/24/22 07:55   Ferritin 10 - 130 ng/mL 581 (H) 245 (H)   Folate >=3.5 ng/mL 10.0    Iron 35 - 180 ug/dL 21 (L) 30 (L)   Iron Binding Cap 240 - 430 ug/dL  281   Iron Saturation Index 15 - 46 %  11 (L)   Transferrin 212 - 360 mg/dL 146 (L) 227   Transferrin  - 563 ug/dL 183 (L)    Transferrin Saturation 20 - 50 % 12 (L)       14. Severe protein calorie malnutrition (H) E43 Chronic - unstable. Last albumin low as shown below. She notes that she does not get a lot of protein in her diet. She is interested in a daily ice cream based supplement. Will start Magic cup BID with a planned recheck of albumin in ~1 weeks.  Lab Results   Component Value Date    ALBUMIN 2.1 04/28/2022    ALBUMIN 3.8  10/16/2018      15. Physical deconditioning  R53.81 Acute on chronic - unstable. Continues to be limited by WB status - follow-up with Ortho as above with plans for adjustment to increase ability to work with therapies. PT/OT discharged due to baselining - continues to work with SLP - adv per their recommendations. SW ongoing to assist with discharge planning - current recommendation for LTC, family working on placement of choice.     Electronically signed by:   Dr. Chantelle Hoover, APRN, DNP, AGNP-BC, PMHNP-BC  [facility]  Office Hours: Tues-Fri 7201-4106  Office: [address] [place] [address]  Fax - [phone]  Triage Phone- [phone]  Business Office- [phone]      Email: [email or web address]

## 2022-06-20 NOTE — PROGRESS NOTES
[facility] GERIATRIC SERVICES    Chief Complaint   Patient presents with     RECHECK     HPI:  Amparo Sharma is a 81 year old  (1940), who is being seen today for an episodic care visit at: [facility] ([facility]) [[id number]]. Today's concern is:     Acute respiratory failure with hypoxia (H)  Chronic obstructive pulmonary disease, unspecified COPD type (H)  Shortness of breath  Atelectasis  Hypoxia  Tachycardia  Acute post-operative pain  Status post total shoulder arthroplasty, left  Bipolar 1 disorder (H)  Delirium  Normal pressure hydrocephalus (H)  Essential hypertension  Iron deficiency anemia secondary to inadequate dietary iron intake  Severe protein-calorie malnutrition (H)  Itching  Physical deconditioning    Patient seen today for follow-up visit in TCU. Patient notes that she is feeling more tired lately. She would like to titrate off of her oxygen, she reports she uses 2L NC at night. Denies SOB. She states she seldom has to use her O2 during the day. She has been coughing up phlegm from the mucinex. She also reports eczema starting on her index fingers, it feels itchy. She reports having eczema as a child. Since getting her  shunt adjusted she has been feeling better. Appetite is okay but dislikes the food. She is sleeping well. Denies CP, palpitations, nausea, vomiting, increased SOB/SOLOMON, fever, chills, and/or b/b concerns today.       Allergies, and PMH/PSH reviewed in EPIC today.    Current Outpatient Medications   Medication Sig Dispense Refill     acetaminophen (TYLENOL) 325 MG tablet Take 3 tablets (975 mg) by mouth every 8 hours 90 tablet 0     albuterol (PROAIR HFA/PROVENTIL HFA/VENTOLIN HFA) 108 (90 Base) MCG/ACT inhaler Inhale 2 puffs into the lungs every 4 hours as needed for shortness of breath / dyspnea       albuterol (PROVENTIL) (2.5 MG/3ML) 0.083% neb solution Inhale 2.5 mg into the lungs every 4 hours as needed for shortness of breath / dyspnea       amLODIPine  (NORVASC) 5 MG tablet Take 5 mg by mouth daily       amoxicillin (AMOXIL) 500 MG capsule Take 2,000 mg by mouth once as needed (Prior to dental appt)        ARIPiprazole (ABILIFY) 15 MG tablet Take 15 mg by mouth At Bedtime       aspirin (ASA) 81 MG EC tablet Take 1 tablet (81 mg) by mouth in the morning and 1 tablet (81 mg) in the evening. Take with meals. 60 tablet 0     BREO ELLIPTA 100-25 MCG/INH inhaler Inhale 1 puff into the lungs daily 1PM       carboxymethylcellulose (REFRESH PLUS) 0.5 % SOLN ophthalmic solution Place 1 drop into both eyes as needed in the morning and 1 drop as needed at noon and 1 drop as needed in the evening and 1 drop as needed before bedtime for dry eyes.       carboxymethylcellulose (REFRESH PLUS) 0.5 % SOLN ophthalmic solution Place 1 drop into both eyes in the morning.       cetirizine (ZYRTEC) 10 MG tablet Take 10 mg by mouth daily        citalopram (CELEXA) 10 MG tablet Take 0.5 tablets (5 mg) by mouth daily for 7 days, THEN 1 tablet (10 mg) daily. 31 tablet 0     diclofenac (VOLTAREN) 1 % topical gel Apply 4 g topically 4 times daily Apply to affected knees.       ferrous sulfate (FEROSUL) 325 (65 Fe) MG tablet Take 1 tablet (325 mg) by mouth Every Mon, Wed, Fri Morning       FLUoxetine (PROZAC) 20 MG capsule Take 2 capsules (40 mg) by mouth daily for 7 days, THEN 1 capsule (20 mg) daily for 7 days. Then discontinue. Then allow 7 day washout period before starting Celexa as ordered. 21 capsule 0     gabapentin (NEURONTIN) 100 MG capsule Take 2 capsules (200 mg) by mouth 3 times daily 180 capsule 0     INCRUSE ELLIPTA 62.5 MCG/INH Inhaler Inhale 1 puff into the lungs daily        lamoTRIgine (LAMICTAL) 100 MG tablet Take 100 mg by mouth At Bedtime       lamoTRIgine (LAMICTAL) 150 MG tablet Take 150 mg by mouth daily        levothyroxine (SYNTHROID/LEVOTHROID) 75 MCG tablet Take 75 mcg by mouth daily       menthol-zinc oxide (CALMOSEPTINE) 0.44-20.6 % OINT ointment Apply topically 2  times daily as needed for irritation       mirabegron (MYRBETRIQ) 25 MG 24 hr tablet Take 25 mg by mouth daily        Multiple Vitamins-Minerals (MULTIVITAMIN PO) Take 1 tablet by mouth daily        pantoprazole (PROTONIX) 20 MG EC tablet Take 20 mg by mouth daily        polyethylene glycol (MIRALAX) 17 GM/Dose powder Take 17 g by mouth daily 510 g 0     saccharomyces boulardii (FLORASTOR) 250 MG capsule Take 250 mg by mouth in the morning.       SENOKOT S 8.6-50 MG tablet Take 2 tablets by mouth daily        Vitamin D, Cholecalciferol, 25 MCG (1000 UT) CAPS Take 1,000 Units by mouth daily          REVIEW OF SYSTEMS:  10 point ROS of systems including Constitutional, Eyes, Respiratory, Cardiovascular, Gastroenterology, Genitourinary, Integumentary, Musculoskeletal, Psychiatric were all negative except for pertinent positives noted in my HPI.    Objective:   /70   Pulse 93   Temp 98.2  F (36.8  C)   Resp 18   Ht 1.524 m (5')   Wt 68 kg (149 lb 14.4 oz)   SpO2 90%   BMI 29.28 kg/m    GENERAL APPEARANCE:  Alert, in no distress, appears healthy, oriented, cooperative, elderly woman resting in bed  ENT:  Mouth and posterior oropharynx normal, moist mucous membranes, [place]  EYES:  EOM, conjunctivae, lids, pupils and irises normal  RESP:  Respiratory effort and palpation of chest normal, no respiratory distress with O2 present via NC  M/S:   Gait and station abnormal - ADRIAN 2/2 patient being in bed; Digits and nails abnormal - arthritic changes present  SKIN:  Inspection of skin and subcutaneous tissue baseline, Palpation of skin and subcutaneous tissue baseline, skin thin, fragile and dry  NEURO:   Cranial nerves 2-12 are normal tested and grossly at patient's baseline  PSYCH:  oriented X 1-2, normal insight, judgement and memory, affect and mood normal    Recent labs in [facility] reviewed by me today.    CBC RESULTS: Recent Labs   Lab Test 05/05/22  1108 04/28/22  0757   WBC 7.7 9.1   RBC 3.70* 3.28*   HGB 10.1*  9.2*   HCT 33.2* 30.7*   MCV 90 94   MCH 27.3 28.0   MCHC 30.4* 30.0*   RDW 15.5* 15.6*   * 708*       Last Basic Metabolic Panel:  Recent Labs   Lab Test 04/28/22  0757 04/19/22  0851    142   POTASSIUM 4.4 3.6   CHLORIDE 105 105   MARTHA 9.5 9.4   CO2 27 27   BUN 11 13   CR 0.56* 0.57*   GLC 98 104       Liver Function Studies -   Recent Labs   Lab Test 05/05/22  1108 04/28/22  0757 04/15/22  0619   PROTTOTAL  --  6.3 5.8*   ALBUMIN 2.4* 2.1* 2.0*   BILITOTAL  --  0.3 0.7   ALKPHOS  --  133* 102   AST  --  18 82*   ALT  --  17 37       TSH   Date Value Ref Range Status   03/09/2022 1.43 0.30 - 5.00 uIU/mL Final   09/15/2021 1.03 0.30 - 5.00 uIU/mL Final   10/16/2018 2.39 0.40 - 4.00 mU/L Final   ]    Lab Results   Component Value Date    A1C 5.7 07/26/2021    A1C 5.7 07/15/2016       Assessment/Plan:    ICD-10-CM    1. Acute respiratory failure with hypoxia (H)  J96.01 Acute on chronic - unstable, improving. Pt    2. Chronic obstructive pulmonary disease, unspecified COPD type (H)  J44.9 Continues on regimen of Albuterol, Incruse, and Breo with O2 at nighttime only with occasional use    3. Shortness of breath R00.00 during waking hours; is coughing up more with use    4. Atelectasis J98.11 of Mucinex. She would like to reduce use of O2 at    5. Hypoxa R09.02 nighttime - Okay to wean nocturnal O2 with SaO2 goal of >88%.. Continue Abluterol, Incruse, Mucinex and Breo inhaler as ordered with ongoing monitoring.    6. Tachycardia  R00.0 Acute - unstable. HR elevated inpatient - Recent HRs as noted below with ongoing elevations. No active tx regimen at this time. Will start regimen of Metoprolol 12.5mg PO BID with ongoing VS monitoring per facility protocol.     7. Acute post-operative pain  G89.18 Acute on chronic - Resolved. Patient notes absence of pain to her L shoulder Continues with    8. Status post total shoulder arthroplasty, left  Z96.612 Tylenol and Voltaren gel for pain control. Continue  medications as ordered.   9.  10. Bipolar 1 disorder (H)   Delerium F31.9  R41.0 Chronic - stable. Patient notes no acute Concerns at this time - prior behavioral concerns not present   11. Normal pressure hydrocephalus (H)  G91.2 With medication adjustments. Did go see Neuro with mild adjustments to shunt pressure - she notes that she is feeling more clear since this. Has tolerated weaning of Prozac; Unfortunately, prior order to initiate Celexa not processed, thus this wasn't started. Resident stable psychologically with improvement noted above; continue Abilify, Lamictal, and Hydroxyzine as ordered with close monitoring for additional needs.   12. Essential hypertension  I10 Chronic - stable. Continues on regimen of Amlodipine with BPs as noted below. Continue medications as ordered with VS per facility protocol.     13. Iron deficiency anemia D50.8 Chronic - unstable. Recent anemia panel as noted below. Previously on daily iron replacement with noted elevation of her Ferritin; changed to 3x weekly dosing with addition of Vit C - improvement noted. Continue medications as ordered with plan to follow-up on labs next week.   Latest Reference Range & Units 04/28/22 07:57 05/24/22 07:55   Ferritin 10 - 130 ng/mL 581 (H) 245 (H)   Folate >=3.5 ng/mL 10.0    Iron 35 - 180 ug/dL 21 (L) 30 (L)   Iron Binding Cap 240 - 430 ug/dL  281   Iron Saturation Index 15 - 46 %  11 (L)   Transferrin 212 - 360 mg/dL 146 (L) 227   Transferrin  - 563 ug/dL 183 (L)    Transferrin Saturation 20 - 50 % 12 (L)       14. Severe protein calorie malnutrition (H) E43 Chronic - unstable. Last albumin low as shown below. She notes that she does not get a lot of protein in her diet. She is interested in a daily ice cream based supplement. Will start Magic cup BID with a planned recheck of albumin in ~2 weeks.  Lab Results   Component Value Date    ALBUMIN 2.1 04/28/2022    ALBUMIN 3.8 10/16/2018      15. Itching L29.9 Acute - unstable.  Resident without visible rash c/o itching of bilateral fingers with PHx of eczema - will start Hydrocortisone BID x5d   16.   Physical deconditioning  R53.81 Acute on chronic - unstable. Continues to be limited by WB status - follow-up with Ortho as above with plans for adjustment to increase ability to work with therapies. PT/OT discharged due to baselining - continues to work with SLP - adv per their recommendations. SW ongoing to assist with discharge planning - current recommendation for LTC, family working on placement of choice.     This patient was seen along with a nurse practitioner student, ORI Chacon, RN, FNP Student.  The histories and review of systems are obtained by ORI Chacon, RN, FNP Student and confirmed by myself all objective, assessments and plans were completed by myself.    Electronically signed by:   Dr. Chantelle Hoover, APRN, DNP, AGNP-BC, PMHNP-BC  [facility]  Office Hours: Tues-Fri 5417-7568  Office: [address] [place] [address]  Fax - [phone]  Triage Phone- [phone]  Business Office- [phone]      Email: [email or web address]

## 2022-06-21 ENCOUNTER — TRANSITIONAL CARE UNIT VISIT (OUTPATIENT)
Dept: GERIATRICS | Facility: CLINIC | Age: 82
End: 2022-06-21
Payer: MEDICARE

## 2022-06-21 VITALS
TEMPERATURE: 98.2 F | WEIGHT: 149.9 LBS | SYSTOLIC BLOOD PRESSURE: 113 MMHG | OXYGEN SATURATION: 90 % | HEIGHT: 60 IN | DIASTOLIC BLOOD PRESSURE: 70 MMHG | BODY MASS INDEX: 29.43 KG/M2 | HEART RATE: 93 BPM | RESPIRATION RATE: 18 BRPM

## 2022-06-21 DIAGNOSIS — J44.9 CHRONIC OBSTRUCTIVE PULMONARY DISEASE, UNSPECIFIED COPD TYPE (H): ICD-10-CM

## 2022-06-21 DIAGNOSIS — R53.81 PHYSICAL DECONDITIONING: ICD-10-CM

## 2022-06-21 DIAGNOSIS — Z96.612 STATUS POST TOTAL SHOULDER ARTHROPLASTY, LEFT: ICD-10-CM

## 2022-06-21 DIAGNOSIS — G89.18 ACUTE POST-OPERATIVE PAIN: ICD-10-CM

## 2022-06-21 DIAGNOSIS — R06.02 SHORTNESS OF BREATH: ICD-10-CM

## 2022-06-21 DIAGNOSIS — J98.11 ATELECTASIS: ICD-10-CM

## 2022-06-21 DIAGNOSIS — F31.9 BIPOLAR 1 DISORDER (H): ICD-10-CM

## 2022-06-21 DIAGNOSIS — R09.02 HYPOXIA: ICD-10-CM

## 2022-06-21 DIAGNOSIS — R41.0 DELIRIUM: ICD-10-CM

## 2022-06-21 DIAGNOSIS — E43 SEVERE PROTEIN-CALORIE MALNUTRITION (H): ICD-10-CM

## 2022-06-21 DIAGNOSIS — G91.2 NORMAL PRESSURE HYDROCEPHALUS (H): ICD-10-CM

## 2022-06-21 DIAGNOSIS — L29.9 ITCHING: ICD-10-CM

## 2022-06-21 DIAGNOSIS — J96.01 ACUTE RESPIRATORY FAILURE WITH HYPOXIA (H): Primary | ICD-10-CM

## 2022-06-21 DIAGNOSIS — D50.8 IRON DEFICIENCY ANEMIA SECONDARY TO INADEQUATE DIETARY IRON INTAKE: ICD-10-CM

## 2022-06-21 DIAGNOSIS — R00.0 TACHYCARDIA: ICD-10-CM

## 2022-06-21 DIAGNOSIS — I10 ESSENTIAL HYPERTENSION: ICD-10-CM

## 2022-06-21 PROCEDURE — 99309 SBSQ NF CARE MODERATE MDM 30: CPT | Performed by: NURSE PRACTITIONER

## 2022-06-21 NOTE — NURSING NOTE
[facility] GERIATRIC SERVICES    Chief Complaint   Patient presents with     RECHECK     HPI:  Amparo Sharma is a 81 year old  (1940), who is being seen today for an episodic care visit at: [facility] ([facility]) [[id number]]. Today's concern is:    Patient seen today for follow-up visit in TCU. Patient notes that she is feeling more tired lately. She would like to titrate off of her oxygen, she reports she uses 2L NC at night. Denies SOB. She states she seldom has to use her O2 during the day. She has been coughing up phlegm from the mucinex. She also reports eczema starting on her index fingers, it feels itchy. She reports having eczema as a child. Since getting her  shunt adjusted she has been feeling better. Appetite is okay but dislikes the food. She is sleeping well. Denies CP, palpitations, nausea, vomiting, increased SOB/SOLOMON, fever, chills, and/or b/b concerns today.      Allergies, and PMH/PSH reviewed in EPIC today.    Current Outpatient Medications   Medication Sig Dispense Refill     acetaminophen (TYLENOL) 325 MG tablet Take 3 tablets (975 mg) by mouth every 8 hours 90 tablet 0     albuterol (PROAIR HFA/PROVENTIL HFA/VENTOLIN HFA) 108 (90 Base) MCG/ACT inhaler Inhale 2 puffs into the lungs every 4 hours as needed for shortness of breath / dyspnea       albuterol (PROVENTIL) (2.5 MG/3ML) 0.083% neb solution Inhale 2.5 mg into the lungs every 4 hours as needed for shortness of breath / dyspnea       amLODIPine (NORVASC) 5 MG tablet Take 5 mg by mouth daily       amoxicillin (AMOXIL) 500 MG capsule Take 2,000 mg by mouth once as needed (Prior to dental appt)        ARIPiprazole (ABILIFY) 15 MG tablet Take 15 mg by mouth At Bedtime       aspirin (ASA) 81 MG EC tablet Take 1 tablet (81 mg) by mouth in the morning and 1 tablet (81 mg) in the evening. Take with meals. 60 tablet 0     BREO ELLIPTA 100-25 MCG/INH inhaler Inhale 1 puff into the lungs daily 1PM       carboxymethylcellulose (REFRESH  PLUS) 0.5 % SOLN ophthalmic solution Place 1 drop into both eyes as needed in the morning and 1 drop as needed at noon and 1 drop as needed in the evening and 1 drop as needed before bedtime for dry eyes.       carboxymethylcellulose (REFRESH PLUS) 0.5 % SOLN ophthalmic solution Place 1 drop into both eyes in the morning.       cetirizine (ZYRTEC) 10 MG tablet Take 10 mg by mouth daily        citalopram (CELEXA) 10 MG tablet Take 0.5 tablets (5 mg) by mouth daily for 7 days, THEN 1 tablet (10 mg) daily. 31 tablet 0     diclofenac (VOLTAREN) 1 % topical gel Apply 4 g topically 4 times daily Apply to affected knees.       ferrous sulfate (FEROSUL) 325 (65 Fe) MG tablet Take 1 tablet (325 mg) by mouth Every Mon, Wed, Fri Morning       FLUoxetine (PROZAC) 20 MG capsule Take 2 capsules (40 mg) by mouth daily for 7 days, THEN 1 capsule (20 mg) daily for 7 days. Then discontinue. Then allow 7 day washout period before starting Celexa as ordered. 21 capsule 0     gabapentin (NEURONTIN) 100 MG capsule Take 2 capsules (200 mg) by mouth 3 times daily 180 capsule 0     INCRUSE ELLIPTA 62.5 MCG/INH Inhaler Inhale 1 puff into the lungs daily        lamoTRIgine (LAMICTAL) 100 MG tablet Take 100 mg by mouth At Bedtime       lamoTRIgine (LAMICTAL) 150 MG tablet Take 150 mg by mouth daily        levothyroxine (SYNTHROID/LEVOTHROID) 75 MCG tablet Take 75 mcg by mouth daily       menthol-zinc oxide (CALMOSEPTINE) 0.44-20.6 % OINT ointment Apply topically 2 times daily as needed for irritation       mirabegron (MYRBETRIQ) 25 MG 24 hr tablet Take 25 mg by mouth daily        Multiple Vitamins-Minerals (MULTIVITAMIN PO) Take 1 tablet by mouth daily        pantoprazole (PROTONIX) 20 MG EC tablet Take 20 mg by mouth daily        polyethylene glycol (MIRALAX) 17 GM/Dose powder Take 17 g by mouth daily 510 g 0     saccharomyces boulardii (FLORASTOR) 250 MG capsule Take 250 mg by mouth in the morning.       SENOKOT S 8.6-50 MG tablet Take 2  tablets by mouth daily        Vitamin D, Cholecalciferol, 25 MCG (1000 UT) CAPS Take 1,000 Units by mouth daily          REVIEW OF SYSTEMS:  10 point ROS of systems including Constitutional, Eyes, Respiratory, Cardiovascular, Gastroenterology, Genitourinary, Integumentary, Musculoskeletal, Psychiatric were all negative except for pertinent positives noted in my HPI.    Objective:   /70   Pulse 93   Temp 98.2  F (36.8  C)   Resp 18   Ht 1.524 m (5')   Wt 68 kg (149 lb 14.4 oz)   SpO2 90%   BMI 29.28 kg/m    GENERAL APPEARANCE:  Alert, in no distress  EYES:  EOM, conjunctivae, lids, pupils and irises normal  RESP:  respiratory effort and palpation of chest normal, coarse lung sounds throughout  CV:  Palpation and auscultation of heart done , regular rate and rhythm, no murmur, rub, or gallop  M/S:   Gait and station abnormal ADRIAN; patient lying in bed  SKIN:  Inspection of skin and subcutaneous tissue baseline  PSYCH:  affect and mood normal    Recent labs in [facility] reviewed by me today.     Assessment/Plan:        Electronically signed by: ONEIDA ChaconN, RN, FNP Student

## 2022-06-21 NOTE — LETTER
6/21/2022        RE: Amparo Sharma  [address]  [place] [address]         HEALTH GERIATRIC SERVICES    Chief Complaint   Patient presents with     RECHECK     HPI:  Amparo Sharma is a 81 year old  (1940), who is being seen today for an episodic care visit at: [facility] ([facility]) [[id number]]. Today's concern is:     Acute respiratory failure with hypoxia (H)  Chronic obstructive pulmonary disease, unspecified COPD type (H)  Shortness of breath  Atelectasis  Hypoxia  Tachycardia  Acute post-operative pain  Status post total shoulder arthroplasty, left  Bipolar 1 disorder (H)  Delirium  Normal pressure hydrocephalus (H)  Essential hypertension  Iron deficiency anemia secondary to inadequate dietary iron intake  Severe protein-calorie malnutrition (H)  Itching  Physical deconditioning    Patient seen today for follow-up visit in TCU. Patient notes that she is feeling more tired lately. She would like to titrate off of her oxygen, she reports she uses 2L NC at night. Denies SOB. She states she seldom has to use her O2 during the day. She has been coughing up phlegm from the mucinex. She also reports eczema starting on her index fingers, it feels itchy. She reports having eczema as a child. Since getting her  shunt adjusted she has been feeling better. Appetite is okay but dislikes the food. She is sleeping well. Denies CP, palpitations, nausea, vomiting, increased SOB/SOLOMON, fever, chills, and/or b/b concerns today.       Allergies, and PMH/PSH reviewed in EPIC today.    Current Outpatient Medications   Medication Sig Dispense Refill     acetaminophen (TYLENOL) 325 MG tablet Take 3 tablets (975 mg) by mouth every 8 hours 90 tablet 0     albuterol (PROAIR HFA/PROVENTIL HFA/VENTOLIN HFA) 108 (90 Base) MCG/ACT inhaler Inhale 2 puffs into the lungs every 4 hours as needed for shortness of breath / dyspnea       albuterol (PROVENTIL) (2.5 MG/3ML) 0.083% neb solution Inhale 2.5  mg into the lungs every 4 hours as needed for shortness of breath / dyspnea       amLODIPine (NORVASC) 5 MG tablet Take 5 mg by mouth daily       amoxicillin (AMOXIL) 500 MG capsule Take 2,000 mg by mouth once as needed (Prior to dental appt)        ARIPiprazole (ABILIFY) 15 MG tablet Take 15 mg by mouth At Bedtime       aspirin (ASA) 81 MG EC tablet Take 1 tablet (81 mg) by mouth in the morning and 1 tablet (81 mg) in the evening. Take with meals. 60 tablet 0     BREO ELLIPTA 100-25 MCG/INH inhaler Inhale 1 puff into the lungs daily 1PM       carboxymethylcellulose (REFRESH PLUS) 0.5 % SOLN ophthalmic solution Place 1 drop into both eyes as needed in the morning and 1 drop as needed at noon and 1 drop as needed in the evening and 1 drop as needed before bedtime for dry eyes.       carboxymethylcellulose (REFRESH PLUS) 0.5 % SOLN ophthalmic solution Place 1 drop into both eyes in the morning.       cetirizine (ZYRTEC) 10 MG tablet Take 10 mg by mouth daily        citalopram (CELEXA) 10 MG tablet Take 0.5 tablets (5 mg) by mouth daily for 7 days, THEN 1 tablet (10 mg) daily. 31 tablet 0     diclofenac (VOLTAREN) 1 % topical gel Apply 4 g topically 4 times daily Apply to affected knees.       ferrous sulfate (FEROSUL) 325 (65 Fe) MG tablet Take 1 tablet (325 mg) by mouth Every Mon, Wed, Fri Morning       FLUoxetine (PROZAC) 20 MG capsule Take 2 capsules (40 mg) by mouth daily for 7 days, THEN 1 capsule (20 mg) daily for 7 days. Then discontinue. Then allow 7 day washout period before starting Celexa as ordered. 21 capsule 0     gabapentin (NEURONTIN) 100 MG capsule Take 2 capsules (200 mg) by mouth 3 times daily 180 capsule 0     INCRUSE ELLIPTA 62.5 MCG/INH Inhaler Inhale 1 puff into the lungs daily        lamoTRIgine (LAMICTAL) 100 MG tablet Take 100 mg by mouth At Bedtime       lamoTRIgine (LAMICTAL) 150 MG tablet Take 150 mg by mouth daily        levothyroxine (SYNTHROID/LEVOTHROID) 75 MCG tablet Take 75 mcg by  mouth daily       menthol-zinc oxide (CALMOSEPTINE) 0.44-20.6 % OINT ointment Apply topically 2 times daily as needed for irritation       mirabegron (MYRBETRIQ) 25 MG 24 hr tablet Take 25 mg by mouth daily        Multiple Vitamins-Minerals (MULTIVITAMIN PO) Take 1 tablet by mouth daily        pantoprazole (PROTONIX) 20 MG EC tablet Take 20 mg by mouth daily        polyethylene glycol (MIRALAX) 17 GM/Dose powder Take 17 g by mouth daily 510 g 0     saccharomyces boulardii (FLORASTOR) 250 MG capsule Take 250 mg by mouth in the morning.       SENOKOT S 8.6-50 MG tablet Take 2 tablets by mouth daily        Vitamin D, Cholecalciferol, 25 MCG (1000 UT) CAPS Take 1,000 Units by mouth daily          REVIEW OF SYSTEMS:  10 point ROS of systems including Constitutional, Eyes, Respiratory, Cardiovascular, Gastroenterology, Genitourinary, Integumentary, Musculoskeletal, Psychiatric were all negative except for pertinent positives noted in my HPI.    Objective:   /70   Pulse 93   Temp 98.2  F (36.8  C)   Resp 18   Ht 1.524 m (5')   Wt 68 kg (149 lb 14.4 oz)   SpO2 90%   BMI 29.28 kg/m    GENERAL APPEARANCE:  Alert, in no distress, appears healthy, oriented, cooperative, elderly woman resting in bed  ENT:  Mouth and posterior oropharynx normal, moist mucous membranes, [place]  EYES:  EOM, conjunctivae, lids, pupils and irises normal  RESP:  Respiratory effort and palpation of chest normal, no respiratory distress with O2 present via NC  M/S:   Gait and station abnormal - ADRIAN 2/2 patient being in bed; Digits and nails abnormal - arthritic changes present  SKIN:  Inspection of skin and subcutaneous tissue baseline, Palpation of skin and subcutaneous tissue baseline, skin thin, fragile and dry  NEURO:   Cranial nerves 2-12 are normal tested and grossly at patient's baseline  PSYCH:  oriented X 1-2, normal insight, judgement and memory, affect and mood normal    Recent labs in [facility] reviewed by me today.    CBC RESULTS:  Recent Labs   Lab Test 05/05/22  1108 04/28/22  0757   WBC 7.7 9.1   RBC 3.70* 3.28*   HGB 10.1* 9.2*   HCT 33.2* 30.7*   MCV 90 94   MCH 27.3 28.0   MCHC 30.4* 30.0*   RDW 15.5* 15.6*   * 708*       Last Basic Metabolic Panel:  Recent Labs   Lab Test 04/28/22 0757 04/19/22  0851    142   POTASSIUM 4.4 3.6   CHLORIDE 105 105   MARTHA 9.5 9.4   CO2 27 27   BUN 11 13   CR 0.56* 0.57*   GLC 98 104       Liver Function Studies -   Recent Labs   Lab Test 05/05/22  1108 04/28/22  0757 04/15/22  0619   PROTTOTAL  --  6.3 5.8*   ALBUMIN 2.4* 2.1* 2.0*   BILITOTAL  --  0.3 0.7   ALKPHOS  --  133* 102   AST  --  18 82*   ALT  --  17 37       TSH   Date Value Ref Range Status   03/09/2022 1.43 0.30 - 5.00 uIU/mL Final   09/15/2021 1.03 0.30 - 5.00 uIU/mL Final   10/16/2018 2.39 0.40 - 4.00 mU/L Final   ]    Lab Results   Component Value Date    A1C 5.7 07/26/2021    A1C 5.7 07/15/2016       Assessment/Plan:    ICD-10-CM    1. Acute respiratory failure with hypoxia (H)  J96.01 Acute on chronic - unstable, improving. Pt    2. Chronic obstructive pulmonary disease, unspecified COPD type (H)  J44.9 Continues on regimen of Albuterol, Incruse, and Breo with O2 at nighttime only with occasional use    3. Shortness of breath R00.00 during waking hours; is coughing up more with use    4. Atelectasis J98.11 of Mucinex. She would like to reduce use of O2 at    5. Hypoxa R09.02 nighttime - Okay to wean nocturnal O2 with SaO2 goal of >88%.. Continue Abluterol, Incruse, Mucinex and Breo inhaler as ordered with ongoing monitoring.    6. Tachycardia  R00.0 Acute - unstable. HR elevated inpatient - Recent HRs as noted below with ongoing elevations. No active tx regimen at this time. Will start regimen of Metoprolol 12.5mg PO BID with ongoing VS monitoring per facility protocol.     7. Acute post-operative pain  G89.18 Acute on chronic - Resolved. Patient notes absence of pain to her L shoulder Continues with    8. Status post total  shoulder arthroplasty, left  Z96.612 Tylenol and Voltaren gel for pain control. Continue medications as ordered.   9.  10. Bipolar 1 disorder (H)   Delerium F31.9  R41.0 Chronic - stable. Patient notes no acute Concerns at this time - prior behavioral concerns not present   11. Normal pressure hydrocephalus (H)  G91.2 With medication adjustments. Did go see Neuro with mild adjustments to shunt pressure - she notes that she is feeling more clear since this. Has tolerated weaning of Prozac; Unfortunately, prior order to initiate Celexa not processed, thus this wasn't started. Resident stable psychologically with improvement noted above; continue Abilify, Lamictal, and Hydroxyzine as ordered with close monitoring for additional needs.   12. Essential hypertension  I10 Chronic - stable. Continues on regimen of Amlodipine with BPs as noted below. Continue medications as ordered with VS per facility protocol.     13. Iron deficiency anemia D50.8 Chronic - unstable. Recent anemia panel as noted below. Previously on daily iron replacement with noted elevation of her Ferritin; changed to 3x weekly dosing with addition of Vit C - improvement noted. Continue medications as ordered with plan to follow-up on labs next week.   Latest Reference Range & Units 04/28/22 07:57 05/24/22 07:55   Ferritin 10 - 130 ng/mL 581 (H) 245 (H)   Folate >=3.5 ng/mL 10.0    Iron 35 - 180 ug/dL 21 (L) 30 (L)   Iron Binding Cap 240 - 430 ug/dL  281   Iron Saturation Index 15 - 46 %  11 (L)   Transferrin 212 - 360 mg/dL 146 (L) 227   Transferrin  - 563 ug/dL 183 (L)    Transferrin Saturation 20 - 50 % 12 (L)       14. Severe protein calorie malnutrition (H) E43 Chronic - unstable. Last albumin low as shown below. She notes that she does not get a lot of protein in her diet. She is interested in a daily ice cream based supplement. Will start Magic cup BID with a planned recheck of albumin in ~2 weeks.  Lab Results   Component Value Date     ALBUMIN 2.1 04/28/2022    ALBUMIN 3.8 10/16/2018      15. Itching L29.9 Acute - unstable. Resident without visible rash c/o itching of bilateral fingers with PHx of eczema - will start Hydrocortisone BID x5d   16.   Physical deconditioning  R53.81 Acute on chronic - unstable. Continues to be limited by WB status - follow-up with Ortho as above with plans for adjustment to increase ability to work with therapies. PT/OT discharged due to baselining - continues to work with SLP - adv per their recommendations. SW ongoing to assist with discharge planning - current recommendation for LTC, family working on placement of choice.     This patient was seen along with a nurse practitioner student, ORI Chacon, RN, FNP Student.  The histories and review of systems are obtained by ORI Chacon, RN, FNP Student and confirmed by myself all objective, assessments and plans were completed by myself.    Electronically signed by:   KASANDRA Khanna, DNP, AGNP-BC, PMHNP-BC  [facility]  Office Hours: Tues-Fri 3881-4183  Office: [address] [place] [address]  Fax - [phone]  Triage Phone- [phone]  Business Office- [phone]      Email: [email or web address]                         Sincerely,        KASANDRA Johnson CNP

## 2022-06-22 NOTE — NURSING NOTE
[facility] GERIATRIC SERVICES    Chief Complaint   Patient presents with     RECHECK     HPI:  Amparo Sharma is a 81 year old  (1940), who is being seen today for an episodic care visit at: [facility] ([facility]) [[id number]]. Today's concern is:    Patient seen today for follow-up visit in TCU. Patient notes that she is feeling more tired lately. She would like to titrate off of her oxygen, she reports she uses 2L NC at night. Denies SOB. She states she seldom has to use her O2 during the day. She has been coughing up phlegm from the mucinex. She also reports eczema starting on her index fingers, it feels itchy. She reports having eczema as a child. Since getting her  shunt adjusted she has been feeling better. Appetite is okay but dislikes the food. She is sleeping well. Denies CP, palpitations, nausea, vomiting, increased SOB/SOLOMON, fever, chills, and/or b/b concerns today.      Allergies, and PMH/PSH reviewed in EPIC today.    Current Outpatient Medications   Medication Sig Dispense Refill     acetaminophen (TYLENOL) 325 MG tablet Take 3 tablets (975 mg) by mouth every 8 hours 90 tablet 0     albuterol (PROAIR HFA/PROVENTIL HFA/VENTOLIN HFA) 108 (90 Base) MCG/ACT inhaler Inhale 2 puffs into the lungs every 4 hours as needed for shortness of breath / dyspnea       albuterol (PROVENTIL) (2.5 MG/3ML) 0.083% neb solution Inhale 2.5 mg into the lungs every 4 hours as needed for shortness of breath / dyspnea       amLODIPine (NORVASC) 5 MG tablet Take 5 mg by mouth daily       amoxicillin (AMOXIL) 500 MG capsule Take 2,000 mg by mouth once as needed (Prior to dental appt)        ARIPiprazole (ABILIFY) 15 MG tablet Take 15 mg by mouth At Bedtime       aspirin (ASA) 81 MG EC tablet Take 1 tablet (81 mg) by mouth in the morning and 1 tablet (81 mg) in the evening. Take with meals. 60 tablet 0     BREO ELLIPTA 100-25 MCG/INH inhaler Inhale 1 puff into the lungs daily 1PM       carboxymethylcellulose (REFRESH  PLUS) 0.5 % SOLN ophthalmic solution Place 1 drop into both eyes as needed in the morning and 1 drop as needed at noon and 1 drop as needed in the evening and 1 drop as needed before bedtime for dry eyes.       carboxymethylcellulose (REFRESH PLUS) 0.5 % SOLN ophthalmic solution Place 1 drop into both eyes in the morning.       cetirizine (ZYRTEC) 10 MG tablet Take 10 mg by mouth daily        citalopram (CELEXA) 10 MG tablet Take 0.5 tablets (5 mg) by mouth daily for 7 days, THEN 1 tablet (10 mg) daily. 31 tablet 0     diclofenac (VOLTAREN) 1 % topical gel Apply 4 g topically 4 times daily Apply to affected knees.       ferrous sulfate (FEROSUL) 325 (65 Fe) MG tablet Take 1 tablet (325 mg) by mouth Every Mon, Wed, Fri Morning       FLUoxetine (PROZAC) 20 MG capsule Take 2 capsules (40 mg) by mouth daily for 7 days, THEN 1 capsule (20 mg) daily for 7 days. Then discontinue. Then allow 7 day washout period before starting Celexa as ordered. 21 capsule 0     gabapentin (NEURONTIN) 100 MG capsule Take 2 capsules (200 mg) by mouth 3 times daily 180 capsule 0     INCRUSE ELLIPTA 62.5 MCG/INH Inhaler Inhale 1 puff into the lungs daily        lamoTRIgine (LAMICTAL) 100 MG tablet Take 100 mg by mouth At Bedtime       lamoTRIgine (LAMICTAL) 150 MG tablet Take 150 mg by mouth daily        levothyroxine (SYNTHROID/LEVOTHROID) 75 MCG tablet Take 75 mcg by mouth daily       menthol-zinc oxide (CALMOSEPTINE) 0.44-20.6 % OINT ointment Apply topically 2 times daily as needed for irritation       mirabegron (MYRBETRIQ) 25 MG 24 hr tablet Take 25 mg by mouth daily        Multiple Vitamins-Minerals (MULTIVITAMIN PO) Take 1 tablet by mouth daily        pantoprazole (PROTONIX) 20 MG EC tablet Take 20 mg by mouth daily        polyethylene glycol (MIRALAX) 17 GM/Dose powder Take 17 g by mouth daily 510 g 0     saccharomyces boulardii (FLORASTOR) 250 MG capsule Take 250 mg by mouth in the morning.       SENOKOT S 8.6-50 MG tablet Take 2  tablets by mouth daily        Vitamin D, Cholecalciferol, 25 MCG (1000 UT) CAPS Take 1,000 Units by mouth daily          REVIEW OF SYSTEMS:  10 point ROS of systems including Constitutional, Eyes, Respiratory, Cardiovascular, Gastroenterology, Genitourinary, Integumentary, Musculoskeletal, Psychiatric were all negative except for pertinent positives noted in my HPI.    Objective:   /70   Pulse 93   Temp 98.2  F (36.8  C)   Resp 18   Ht 1.524 m (5')   Wt 68 kg (149 lb 14.4 oz)   SpO2 90%   BMI 29.28 kg/m    GENERAL APPEARANCE:  Alert, in no distress  EYES:  EOM, conjunctivae, lids, pupils and irises normal  RESP:  respiratory effort and palpation of chest normal, coarse lung sounds throughout  CV:  Palpation and auscultation of heart done , regular rate and rhythm, no murmur, rub, or gallop  M/S:   Gait and station abnormal ADRIAN; patient lying in bed  SKIN:  Inspection of skin and subcutaneous tissue baseline. No observed rash or erythema bilateral hands  PSYCH:  affect and mood normal    Recent labs in [facility] reviewed by me today.     Assessment/Plan:  1. Tachycardia  Patients HR 's, frequently 's  - Start metoprolol 12.5mg    2. Chronic obstructive pulmonary disease, unspecified COPD type (H)  On albuterol, increase, and breo inhalers. Continue with current regimen and VS monitoring.    3. Acute respiratory failure with hypoxia (H)  Improving. On 2L NC at night. Pt denies SOB. Pt expressed desire to be weaned from O2. O2 has been stable >90%. Placed orders to wean O2 and keep O2 >88%. Continue to monitor VS frequently.    4. Normal pressure hydrocephalus (H)  Improving since shunt adjusted. Continue to monitor    5. Essential hypertension  BP stable 110-150's. Continue amlodipine and VS monitoring.    6. Itching  No observable rash but pt reports bilateral itchiness of index fingers, hx of eczema. Can use hydrocortisone cream BID x5 days for itching    Electronically signed by: Bailey Orozco  BSN, RN, FNP Student

## 2022-06-28 ENCOUNTER — TRANSITIONAL CARE UNIT VISIT (OUTPATIENT)
Dept: GERIATRICS | Facility: CLINIC | Age: 82
End: 2022-06-28
Payer: MEDICARE

## 2022-06-28 VITALS
HEIGHT: 60 IN | HEART RATE: 77 BPM | OXYGEN SATURATION: 97 % | WEIGHT: 149.9 LBS | SYSTOLIC BLOOD PRESSURE: 139 MMHG | TEMPERATURE: 98.4 F | RESPIRATION RATE: 16 BRPM | BODY MASS INDEX: 29.43 KG/M2 | DIASTOLIC BLOOD PRESSURE: 88 MMHG

## 2022-06-28 DIAGNOSIS — J44.9 CHRONIC OBSTRUCTIVE PULMONARY DISEASE, UNSPECIFIED COPD TYPE (H): Primary | ICD-10-CM

## 2022-06-28 DIAGNOSIS — R09.02 HYPOXIA: ICD-10-CM

## 2022-06-28 DIAGNOSIS — R41.0 DELIRIUM: ICD-10-CM

## 2022-06-28 DIAGNOSIS — J96.01 ACUTE RESPIRATORY FAILURE WITH HYPOXIA (H): ICD-10-CM

## 2022-06-28 DIAGNOSIS — Z96.612 STATUS POST TOTAL SHOULDER ARTHROPLASTY, LEFT: ICD-10-CM

## 2022-06-28 DIAGNOSIS — F31.9 BIPOLAR 1 DISORDER (H): ICD-10-CM

## 2022-06-28 DIAGNOSIS — R06.02 SHORTNESS OF BREATH: ICD-10-CM

## 2022-06-28 DIAGNOSIS — E43 SEVERE PROTEIN-CALORIE MALNUTRITION (H): ICD-10-CM

## 2022-06-28 DIAGNOSIS — L29.9 ITCHING: ICD-10-CM

## 2022-06-28 DIAGNOSIS — G89.18 ACUTE POST-OPERATIVE PAIN: ICD-10-CM

## 2022-06-28 DIAGNOSIS — G91.2 NORMAL PRESSURE HYDROCEPHALUS (H): ICD-10-CM

## 2022-06-28 DIAGNOSIS — I10 ESSENTIAL HYPERTENSION: ICD-10-CM

## 2022-06-28 DIAGNOSIS — J98.11 ATELECTASIS: ICD-10-CM

## 2022-06-28 DIAGNOSIS — D50.8 IRON DEFICIENCY ANEMIA SECONDARY TO INADEQUATE DIETARY IRON INTAKE: ICD-10-CM

## 2022-06-28 DIAGNOSIS — R53.81 PHYSICAL DECONDITIONING: ICD-10-CM

## 2022-06-28 DIAGNOSIS — R00.0 TACHYCARDIA: ICD-10-CM

## 2022-06-28 PROBLEM — R54 FRAILTY: Status: ACTIVE | Noted: 2022-06-28

## 2022-06-28 PROBLEM — J45.901 EXACERBATION OF ASTHMA, UNSPECIFIED ASTHMA SEVERITY, UNSPECIFIED WHETHER PERSISTENT: Status: ACTIVE | Noted: 2017-01-20

## 2022-06-28 PROBLEM — J98.6 CHRONICALLY ELEVATED HEMIDIAPHRAGM: Status: ACTIVE | Noted: 2022-06-28

## 2022-06-28 PROCEDURE — 99309 SBSQ NF CARE MODERATE MDM 30: CPT | Performed by: NURSE PRACTITIONER

## 2022-06-28 NOTE — PROGRESS NOTES
"[facility] GERIATRIC SERVICES    Chief Complaint   Patient presents with     RECHECK     HPI:  Amparo Sharma is a 81 year old  (1940), who is being seen today for an episodic care visit at: [facility] ([facility]) [[id number]]. Today's concern is:     Chronic obstructive pulmonary disease, unspecified COPD type (H)  Acute respiratory failure with hypoxia (H)  Atelectasis  Hypoxia  Tachycardia  Shortness of breath  Itching  Essential hypertension  Iron deficiency anemia secondary to inadequate dietary iron intake  Severe protein-calorie malnutrition (H)  Acute post-operative pain  Status post total shoulder arthroplasty, left  Normal pressure hydrocephalus (H)  Bipolar 1 disorder (H)  Delirium  Physical deconditioning     Patient seen today for follow-up visit in TCU. Patient notes that has had a lot of \"clashes\" with staff lately. She is still wearing O2 at night, despite wanting to wean off oxygen. Reports that nursing does not have correct orders to titrate. She expresses frustration with staff repeatedly telling her \"You are going to fall! You are going to fall!\". She states that she would like to walk around to get stronger. She reports walking independently to the bathroom. Patient repeatedly saying abnormal statements as she is wheelchair bound per baseline. Had  shunt recently adjusted, will need to reeval.Therapies - speech. Appetite is good. She is sleeping well. Denies CP, palpitations, fatigue, nausea, vomiting, increased SOB/SOLOMON, fever, chills, and/or b/b concerns today.       Allergies, and PMH/PSH reviewed in EPIC today.    Current Outpatient Medications   Medication Sig Dispense Refill     acetaminophen (TYLENOL) 325 MG tablet Take 3 tablets (975 mg) by mouth every 8 hours 90 tablet 0     albuterol (PROAIR HFA/PROVENTIL HFA/VENTOLIN HFA) 108 (90 Base) MCG/ACT inhaler Inhale 2 puffs into the lungs every 4 hours as needed for shortness of breath / dyspnea       albuterol (PROVENTIL) (2.5 " MG/3ML) 0.083% neb solution Inhale 2.5 mg into the lungs every 4 hours as needed for shortness of breath / dyspnea       amLODIPine (NORVASC) 5 MG tablet Take 5 mg by mouth daily       amoxicillin (AMOXIL) 500 MG capsule Take 2,000 mg by mouth once as needed (Prior to dental appt)        ARIPiprazole (ABILIFY) 15 MG tablet Take 15 mg by mouth At Bedtime       aspirin (ASA) 81 MG EC tablet Take 1 tablet (81 mg) by mouth in the morning and 1 tablet (81 mg) in the evening. Take with meals. 60 tablet 0     BREO ELLIPTA 100-25 MCG/INH inhaler Inhale 1 puff into the lungs daily 1PM       carboxymethylcellulose (REFRESH PLUS) 0.5 % SOLN ophthalmic solution Place 1 drop into both eyes as needed in the morning and 1 drop as needed at noon and 1 drop as needed in the evening and 1 drop as needed before bedtime for dry eyes.       carboxymethylcellulose (REFRESH PLUS) 0.5 % SOLN ophthalmic solution Place 1 drop into both eyes in the morning.       cetirizine (ZYRTEC) 10 MG tablet Take 10 mg by mouth daily        diclofenac (VOLTAREN) 1 % topical gel Apply 4 g topically 4 times daily Apply to affected knees.       ferrous sulfate (FEROSUL) 325 (65 Fe) MG tablet Take 1 tablet (325 mg) by mouth Every Mon, Wed, Fri Morning       gabapentin (NEURONTIN) 100 MG capsule Take 2 capsules (200 mg) by mouth 3 times daily 180 capsule 0     INCRUSE ELLIPTA 62.5 MCG/INH Inhaler Inhale 1 puff into the lungs daily        lamoTRIgine (LAMICTAL) 100 MG tablet Take 100 mg by mouth At Bedtime       lamoTRIgine (LAMICTAL) 150 MG tablet Take 150 mg by mouth daily        levothyroxine (SYNTHROID/LEVOTHROID) 75 MCG tablet Take 75 mcg by mouth daily       menthol-zinc oxide (CALMOSEPTINE) 0.44-20.6 % OINT ointment Apply topically 2 times daily as needed for irritation       mirabegron (MYRBETRIQ) 25 MG 24 hr tablet Take 25 mg by mouth daily        Multiple Vitamins-Minerals (MULTIVITAMIN PO) Take 1 tablet by mouth daily        pantoprazole (PROTONIX)  20 MG EC tablet Take 20 mg by mouth daily        polyethylene glycol (MIRALAX) 17 GM/Dose powder Take 17 g by mouth daily 510 g 0     saccharomyces boulardii (FLORASTOR) 250 MG capsule Take 250 mg by mouth in the morning.       SENOKOT S 8.6-50 MG tablet Take 2 tablets by mouth daily        Vitamin D, Cholecalciferol, 25 MCG (1000 UT) CAPS Take 1,000 Units by mouth daily          REVIEW OF SYSTEMS:  10 point ROS of systems including Constitutional, Eyes, Respiratory, Cardiovascular, Gastroenterology, Genitourinary, Integumentary, Musculoskeletal, Psychiatric were all negative except for pertinent positives noted in my HPI.    Objective:   /88   Pulse 77   Temp 98.4  F (36.9  C)   Resp 16   Ht 1.524 m (5')   Wt 68 kg (149 lb 14.4 oz)   SpO2 97%   BMI 29.28 kg/m    GENERAL APPEARANCE:  Alert, in no distress, appears healthy, oriented, cooperative, elderly woman resting in bed  ENT:  Mouth and posterior oropharynx normal, moist mucous membranes, [place]  EYES:  EOM, conjunctivae, lids, pupils and irises normal  RESP:  Respiratory effort and palpation of chest normal, no respiratory distress with O2 present via NC  M/S:   Gait and station abnormal - ADRIAN 2/2 patient being in bed; Digits and nails abnormal - arthritic changes present  SKIN:  Inspection of skin and subcutaneous tissue baseline, Palpation of skin and subcutaneous tissue baseline, skin thin, fragile ecchymotic, and dry; L heel without erythema or open sores. R heel blanchable erythema, no open sores.   NEURO:   Cranial nerves 2-12 are normal tested and grossly at patient's baseline  PSYCH:  oriented X 1-2, normal insight, judgement and memory, affect and mood normal    Recent labs in [facility] reviewed by me today.    CBC RESULTS: Recent Labs   Lab Test 05/05/22  1108 04/28/22  0757   WBC 7.7 9.1   RBC 3.70* 3.28*   HGB 10.1* 9.2*   HCT 33.2* 30.7*   MCV 90 94   MCH 27.3 28.0   MCHC 30.4* 30.0*   RDW 15.5* 15.6*   * 708*       Last Basic Metabolic  Panel:  Recent Labs   Lab Test 04/28/22  0757 04/19/22  0851    142   POTASSIUM 4.4 3.6   CHLORIDE 105 105   MARTHA 9.5 9.4   CO2 27 27   BUN 11 13   CR 0.56* 0.57*   GLC 98 104       Liver Function Studies -   Recent Labs   Lab Test 05/05/22  1108 04/28/22  0757 04/15/22  0619   PROTTOTAL  --  6.3 5.8*   ALBUMIN 2.4* 2.1* 2.0*   BILITOTAL  --  0.3 0.7   ALKPHOS  --  133* 102   AST  --  18 82*   ALT  --  17 37       TSH   Date Value Ref Range Status   03/09/2022 1.43 0.30 - 5.00 uIU/mL Final   09/15/2021 1.03 0.30 - 5.00 uIU/mL Final   10/16/2018 2.39 0.40 - 4.00 mU/L Final   ]    Lab Results   Component Value Date    A1C 5.7 07/26/2021    A1C 5.7 07/15/2016       Assessment/Plan:    ICD-10-CM    1. Acute respiratory failure with hypoxia (H)  J96.01 Acute on chronic - unstable, improving. Pt    2. Chronic obstructive pulmonary disease, unspecified COPD type (H)  J44.9 Continues on regimen of Albuterol, Incruse, and Breo with continuous O2 via NC. Continue    3. Hypoxia  R09.02 medication as ordered with ongoing    4. Shortness of breath  R06.02 monitoring. HRs since admission as shown    5. Atelectasis  J98.11    6. Tachycardia  R00.00 Chronic - variable. Started on regimen of Metoprolol 12.5mg PO BID with recent HR as shown below. Continue medications as ordered with VS per facility protocol.           7. Acute post-operative pain  G89.18 Chronic -resolved.  Patient notes absence of pain    8. Status post total shoulder arthroplasty, left  Z96.612 to her L shouldontinues with Tylenol and Voltaren gel for pain control. Continue medications as ordered.   9.  10. Bipolar 1 disorder (H)   [place] F31.9  R41.0 Chronic - variable. Patient notes above concerns re:staff, but no person concerns re: her emotional   11. Normal pressure hydrocephalus (H)  G91.2 [place]. Staff did note an acute onset of confusion as noted in HPI - her once again making abnormal statements; nursing will update Neurology to re-eval her shunt.  Recheck CMP and Lamictal level at this time. Will change currently ordered Hydroxyzine to 14d and monitor. Noted initial PRN orders for non antipsychotic psychotropic medications are limited to 14 days. Start new psychotropic medication Hydroxyzine x 14 days for sporadic anxiety. Nsg to update [facility] provider of effect in 7 days.   Continue Lamictal and Abilify as ordered at this time.    12. Essential hypertension  I10 Chronic - stable. Continues on regimen of Amlodipine with BPs as noted below. Continue medications as ordered with VS per facility protocol.     13. Iron deficiency anemia D50.8 Chronic - unstable. Past anemia panel as noted below. Currently on Iron 3x weekly dosing with Vit C - improvement noted. Continue medications as ordered with plan to follow-up on labs on 7/7/22.   Latest Reference Range & Units 04/28/22 07:57 05/24/22 07:55   Ferritin 10 - 130 ng/mL 581 (H) 245 (H)   Folate >=3.5 ng/mL 10.0    Iron 35 - 180 ug/dL 21 (L) 30 (L)   Iron Binding Cap 240 - 430 ug/dL  281   Iron Saturation Index 15 - 46 %  11 (L)   Transferrin 212 - 360 mg/dL 146 (L) 227   Transferrin  - 563 ug/dL 183 (L)    Transferrin Saturation 20 - 50 % 12 (L)       14. Severe protein calorie malnutrition (H) E43 Chronic - unstable. Last albumin low as shown below. She notes that her appetite is good. Will start Magic cup BID with a planned recheck of CMP as noted above next week  Lab Results   Component Value Date    ALBUMIN 2.1 04/28/2022    ALBUMIN 3.8 10/16/2018      15. Physical deconditioning  R53.81 Acute on chronic - unstable. Patient currently at baseline with no further therapies at this time. PT/OT discharge patient due to baseline function achieved. SLP following for cognition and safety - adv per their recommendations. SW ongoing to assist with discharge planning - current recommendation for LTC, family working on placement of choice.       This patient was seen along with a nurse practitioner student, Bailey  ORI Orozco, RN, FNP Student.  The histories and review of systems are obtained by ORI Chacon, RN, FNP Student and confirmed by myself all objective, assessments and plans were completed by myself.    Electronically signed by:   Dr. Chantelle Hoover, APRN, DNP, AGNP-BC, PMHNP-BC  [facility]  Office Hours: Tues-Fri [id number]  Office: [address] [place] [address]  Fax - [phone]  Triage Phone- [phone]  Business Office- [phone]       Email: [email or web address]

## 2022-06-28 NOTE — NURSING NOTE
"[facility] GERIATRIC SERVICES    Chief Complaint   Patient presents with     RECHECK     HPI:  Amparo Sharma is a 81 year old  (1940), who is being seen today for an episodic care visit at: [facility] ([facility]) [[id number]]. Today's concern is:    Tachycardia  Chronic obstructive pulmonary disease, unspecified COPD type (H)  Acute respiratory failure with hypoxia (H)  Normal pressure hydrocephalus (H)  Itching  Essential hypertension  Iron deficiency anemia secondary to inadequate dietary iron intake  Severe protein-calorie malnutrition (H)  Physical deconditioning  Shortness of breath  Atelectasis  Hypoxia  Acute post-operative pain  Status post total shoulder arthroplasty, left  Bipolar 1 disorder (H)  Delirium    Patient seen today for follow-up visit in TCU. Patient notes that has had a lot of \"clashes\" with staff lately. She is still wearing O2 at night, despite wanting to wean off oxygen. Reports that nursing does not have correct orders to titrate. She expresses frustration with staff repeatedly telling her \"You are going to fall! You are going to fall!\". She states that she would like to walk around to get stronger. She reports walking independently to the bathroom. Patient repeatedly saying abnormal statements as she is wheelchair bound per baseline. Had  shunt recently adjusted, will need to reeval.Therapies - speech. Appetite is good. She is sleeping well. Denies CP, palpitations, fatigue, nausea, vomiting, increased SOB/SOLOMON, fever, chills, and/or b/b concerns today.    Allergies, and PMH/PSH reviewed in EPIC today.    Current Outpatient Medications   Medication Sig Dispense Refill     acetaminophen (TYLENOL) 325 MG tablet Take 3 tablets (975 mg) by mouth every 8 hours 90 tablet 0     albuterol (PROAIR HFA/PROVENTIL HFA/VENTOLIN HFA) 108 (90 Base) MCG/ACT inhaler Inhale 2 puffs into the lungs every 4 hours as needed for shortness of breath / dyspnea       albuterol (PROVENTIL) (2.5 " MG/3ML) 0.083% neb solution Inhale 2.5 mg into the lungs every 4 hours as needed for shortness of breath / dyspnea       amLODIPine (NORVASC) 5 MG tablet Take 5 mg by mouth daily       amoxicillin (AMOXIL) 500 MG capsule Take 2,000 mg by mouth once as needed (Prior to dental appt)        ARIPiprazole (ABILIFY) 15 MG tablet Take 15 mg by mouth At Bedtime       aspirin (ASA) 81 MG EC tablet Take 1 tablet (81 mg) by mouth in the morning and 1 tablet (81 mg) in the evening. Take with meals. 60 tablet 0     BREO ELLIPTA 100-25 MCG/INH inhaler Inhale 1 puff into the lungs daily 1PM       carboxymethylcellulose (REFRESH PLUS) 0.5 % SOLN ophthalmic solution Place 1 drop into both eyes as needed in the morning and 1 drop as needed at noon and 1 drop as needed in the evening and 1 drop as needed before bedtime for dry eyes.       carboxymethylcellulose (REFRESH PLUS) 0.5 % SOLN ophthalmic solution Place 1 drop into both eyes in the morning.       cetirizine (ZYRTEC) 10 MG tablet Take 10 mg by mouth daily        diclofenac (VOLTAREN) 1 % topical gel Apply 4 g topically 4 times daily Apply to affected knees.       ferrous sulfate (FEROSUL) 325 (65 Fe) MG tablet Take 1 tablet (325 mg) by mouth Every Mon, Wed, Fri Morning       gabapentin (NEURONTIN) 100 MG capsule Take 2 capsules (200 mg) by mouth 3 times daily 180 capsule 0     INCRUSE ELLIPTA 62.5 MCG/INH Inhaler Inhale 1 puff into the lungs daily        lamoTRIgine (LAMICTAL) 100 MG tablet Take 100 mg by mouth At Bedtime       lamoTRIgine (LAMICTAL) 150 MG tablet Take 150 mg by mouth daily        levothyroxine (SYNTHROID/LEVOTHROID) 75 MCG tablet Take 75 mcg by mouth daily       menthol-zinc oxide (CALMOSEPTINE) 0.44-20.6 % OINT ointment Apply topically 2 times daily as needed for irritation       mirabegron (MYRBETRIQ) 25 MG 24 hr tablet Take 25 mg by mouth daily        Multiple Vitamins-Minerals (MULTIVITAMIN PO) Take 1 tablet by mouth daily        pantoprazole (PROTONIX)  20 MG EC tablet Take 20 mg by mouth daily        polyethylene glycol (MIRALAX) 17 GM/Dose powder Take 17 g by mouth daily 510 g 0     saccharomyces boulardii (FLORASTOR) 250 MG capsule Take 250 mg by mouth in the morning.       SENOKOT S 8.6-50 MG tablet Take 2 tablets by mouth daily        Vitamin D, Cholecalciferol, 25 MCG (1000 UT) CAPS Take 1,000 Units by mouth daily          REVIEW OF SYSTEMS:  10 point ROS of systems including Constitutional, Eyes, Respiratory, Cardiovascular, Gastroenterology, Genitourinary, Integumentary, Musculoskeletal, Psychiatric were all negative except for pertinent positives noted in my HPI.    Objective:   /88   Pulse 77   Temp 98.4  F (36.9  C)   Resp 16   Ht 1.524 m (5')   Wt 68 kg (149 lb 14.4 oz)   SpO2 97%   BMI 29.28 kg/m    GENERAL APPEARANCE:  Alert, in no distress  ENT:    RESP:  lungs clear to auscultation , no respiratory distress  CV:  regular rate and rhythm, no murmur, rub, or gallop, no edema  M/S:   Gait and station abnormal - ADRIAN as patient is wheelchair bound  Digits and nails abnormal - arhritic changes present  SKIN:  Inspection of skin and subcutaneous tissue baseline, Palpation of skin and subcutaneous tissue baseline, skin thin and ecchymotic. L heel without erythema or open sores. R heel with blanchable erythema. No open sores  PSYCH:  insight and judgement impaired, memory impaired , affect and mood normal    Recent labs in [facility] reviewed by me today.     Assessment/Plan:  1. Tachycardia  Improving - Started on metoprolol 12.5mg BID  HR: 77, 73 73, 88, 68, 90. Continue medications and monitor VS as ordered.    2. Chronic obstructive pulmonary disease, unspecified COPD type (H)  Stable. On albuterol, increase, and breo inhalers. Continue with current regimen and VS monitoring.    3. Acute respiratory failure with hypoxia (H)  4 Shortness of breath  5. Atelectasis  6. Hypoxia  Improving. Maintaining sats 88-97%. Per patient, still wearing O2 at  night. Denies SOB. Orders remain in place to wean and keep O2 >88%. Continue with VS monitoring    7. Normal pressure hydrocephalus (H)  8. Delirium  Patient with acute onset confusion as per HPI - making abnormal statements. Nursing to contact neurology to re-eval shunt. Will check CMP, lamictal level   Change hydroxyzine to PRN x14 days  Noted initial PRN orders for non antipsychotic psychotropic medications are limited to 14 days. Start new psychotropic medication hydroxyzine x 14  days for HS sundowning and anxiety. Nsg to update [facility] provider of effect in 7 days.     9. Bipolar 1 disorder (H)  Chronic, stable. Patient titrated off prozac, plan was to initiate celexa however nursing did not receive order and patient was not started on it. Patient remains stable off of selective serotonin reuptake inhibitor so will hold off for now. Continue to evaluate mood and behaviors    10. Itching  Patient reports continued itching on bilateral fingers. Ordered initially placed for hydrocortisone BID x5 days, will extend therapy till itching resolved.    11. Essential hypertension  Improving. On amlodipine 5mg. Recent BPs: 139/88, 118/78, 113/70, 124/75, 114/76. Continue VS monitoring and medications as ordered.    12. Iron deficiency anemia secondary to inadequate dietary iron intake  Chronic, unstable. Previously on daily iron replacement with noted elevation of her Ferritin; changed to 3x weekly dosing with addition of Vit C. Will recheck Hgb and iron panel    13. Severe protein-calorie malnutrition (H)  Chronic, unstable. Started on magic cup BID for low albumin. Patient reports adequate appetite. Recheck CMP/albumin 7/5    14. Physical deconditioning  Continues to be limited by WB status. PT/OT no longer following as patient is at her baseline. SW ongoing to assist with discharge planning. Current recommendation remains LTC.    13. Acute post-operative pain  Improving. Patient notes absence of pain to her L  shoulder    14. Status post total shoulder arthroplasty, left  Continues with Tylenol and Voltaren gel for pain control. Continue medications as ordered with therapy      Electronically signed by: ORI Chacon, RN, FNP Student

## 2022-06-28 NOTE — LETTER
6/28/2022        RE: Amparo Sharma  [address]  [place] [address]        [facility]    No chief complaint on file.    HPI:  Amparo Sharma is a 81 year old  (1940), who is being seen today for an episodic care visit at: No question data found.. Today's concern is:  Data Unavailable    Patient seen today for follow-up visit in TCU. Patient notes that she is feeling more tired lately. She would like to titrate off of her oxygen, she reports she uses 2L NC at night. Denies SOB. She states she seldom has to use her O2 during the day. She has been coughing up phlegm from the mucinex. She also reports eczema starting on her index fingers, it feels itchy. She reports having eczema as a child. Since getting her  shunt adjusted she has been feeling better. Appetite is okay but dislikes the food. She is sleeping well. Denies CP, palpitations, nausea, vomiting, increased SOB/SOLOMON, fever, chills, and/or b/b concerns today.       Allergies, and PMH/PSH reviewed in EPIC today.    Current Outpatient Medications   Medication Sig Dispense Refill     acetaminophen (TYLENOL) 325 MG tablet Take 3 tablets (975 mg) by mouth every 8 hours 90 tablet 0     albuterol (PROAIR HFA/PROVENTIL HFA/VENTOLIN HFA) 108 (90 Base) MCG/ACT inhaler Inhale 2 puffs into the lungs every 4 hours as needed for shortness of breath / dyspnea       albuterol (PROVENTIL) (2.5 MG/3ML) 0.083% neb solution Inhale 2.5 mg into the lungs every 4 hours as needed for shortness of breath / dyspnea       amLODIPine (NORVASC) 5 MG tablet Take 5 mg by mouth daily       amoxicillin (AMOXIL) 500 MG capsule Take 2,000 mg by mouth once as needed (Prior to dental appt)        ARIPiprazole (ABILIFY) 15 MG tablet Take 15 mg by mouth At Bedtime       aspirin (ASA) 81 MG EC tablet Take 1 tablet (81 mg) by mouth in the morning and 1 tablet (81 mg) in the evening. Take with meals. 60 tablet 0     BREO ELLIPTA 100-25 MCG/INH inhaler  Inhale 1 puff into the lungs daily 1PM       carboxymethylcellulose (REFRESH PLUS) 0.5 % SOLN ophthalmic solution Place 1 drop into both eyes as needed in the morning and 1 drop as needed at noon and 1 drop as needed in the evening and 1 drop as needed before bedtime for dry eyes.       carboxymethylcellulose (REFRESH PLUS) 0.5 % SOLN ophthalmic solution Place 1 drop into both eyes in the morning.       cetirizine (ZYRTEC) 10 MG tablet Take 10 mg by mouth daily        diclofenac (VOLTAREN) 1 % topical gel Apply 4 g topically 4 times daily Apply to affected knees.       ferrous sulfate (FEROSUL) 325 (65 Fe) MG tablet Take 1 tablet (325 mg) by mouth Every Mon, Wed, Fri Morning       gabapentin (NEURONTIN) 100 MG capsule Take 2 capsules (200 mg) by mouth 3 times daily 180 capsule 0     INCRUSE ELLIPTA 62.5 MCG/INH Inhaler Inhale 1 puff into the lungs daily        lamoTRIgine (LAMICTAL) 100 MG tablet Take 100 mg by mouth At Bedtime       lamoTRIgine (LAMICTAL) 150 MG tablet Take 150 mg by mouth daily        levothyroxine (SYNTHROID/LEVOTHROID) 75 MCG tablet Take 75 mcg by mouth daily       menthol-zinc oxide (CALMOSEPTINE) 0.44-20.6 % OINT ointment Apply topically 2 times daily as needed for irritation       mirabegron (MYRBETRIQ) 25 MG 24 hr tablet Take 25 mg by mouth daily        Multiple Vitamins-Minerals (MULTIVITAMIN PO) Take 1 tablet by mouth daily        pantoprazole (PROTONIX) 20 MG EC tablet Take 20 mg by mouth daily        polyethylene glycol (MIRALAX) 17 GM/Dose powder Take 17 g by mouth daily 510 g 0     saccharomyces boulardii (FLORASTOR) 250 MG capsule Take 250 mg by mouth in the morning.       SENOKOT S 8.6-50 MG tablet Take 2 tablets by mouth daily        Vitamin D, Cholecalciferol, 25 MCG (1000 UT) CAPS Take 1,000 Units by mouth daily          REVIEW OF SYSTEMS:  10 point ROS of systems including Constitutional, Eyes, Respiratory, Cardiovascular, Gastroenterology, Genitourinary, Integumentary,  Musculoskeletal, Psychiatric were all negative except for pertinent positives noted in my HPI.    Objective:   There were no vitals taken for this visit.  GENERAL APPEARANCE:  Alert, in no distress, appears healthy, oriented, cooperative, elderly woman resting in bed  ENT:  Mouth and posterior oropharynx normal, moist mucous membranes, [place]  EYES:  EOM, conjunctivae, lids, pupils and irises normal  RESP:  Respiratory effort and palpation of chest normal, no respiratory distress with O2 present via NC  M/S:   Gait and station abnormal - ADRIAN 2/2 patient being in bed; Digits and nails abnormal - arthritic changes present  SKIN:  Inspection of skin and subcutaneous tissue baseline, Palpation of skin and subcutaneous tissue baseline, skin thin, fragile and dry  NEURO:   Cranial nerves 2-12 are normal tested and grossly at patient's baseline  PSYCH:  oriented X 1-2, normal insight, judgement and memory, affect and mood normal    Recent labs in [facility] reviewed by me today.    CBC RESULTS: Recent Labs   Lab Test 05/05/22  1108 04/28/22  0757   WBC 7.7 9.1   RBC 3.70* 3.28*   HGB 10.1* 9.2*   HCT 33.2* 30.7*   MCV 90 94   MCH 27.3 28.0   MCHC 30.4* 30.0*   RDW 15.5* 15.6*   * 708*       Last Basic Metabolic Panel:  Recent Labs   Lab Test 04/28/22  0757 04/19/22  0851    142   POTASSIUM 4.4 3.6   CHLORIDE 105 105   MARTHA 9.5 9.4   CO2 27 27   BUN 11 13   CR 0.56* 0.57*   GLC 98 104       Liver Function Studies -   Recent Labs   Lab Test 05/05/22  1108 04/28/22  0757 04/15/22  0619   PROTTOTAL  --  6.3 5.8*   ALBUMIN 2.4* 2.1* 2.0*   BILITOTAL  --  0.3 0.7   ALKPHOS  --  133* 102   AST  --  18 82*   ALT  --  17 37       TSH   Date Value Ref Range Status   03/09/2022 1.43 0.30 - 5.00 uIU/mL Final   09/15/2021 1.03 0.30 - 5.00 uIU/mL Final   10/16/2018 2.39 0.40 - 4.00 mU/L Final   ]    Lab Results   Component Value Date    A1C 5.7 07/26/2021    A1C 5.7 07/15/2016       Assessment/Plan:    ICD-10-CM    1. Acute  respiratory failure with hypoxia (H)  J96.01 Acute on chronic - unstable, improving. Pt    2. Chronic obstructive pulmonary disease, unspecified COPD type (H)  J44.9 Continues on regimen of Albuterol, Incruse, and Breo with continuous O2 via NC. Continue medication as ordered with ongoing monitoring.    3. Tachycardia  R00.0 HR elevated inpatient -    4. Shortness of breath  R06.02 HRs since admission as shown below with    5. Atelectasis  J98.11 occasional noted elevations.   6. Hypoxia  R09.02 No active treatment regimen at this time. Continue VS per facility protocol with interventions as needed.           7. Acute post-operative pain  G89.18 Acute on chronic - unstable, improving. Patient notes absence of pain to her L shoulder   8. Status post total shoulder arthroplasty, left  Z96.612 Continues with Tylenol and Voltaren gel for pain control. Continue medications as ordered with therapy as noted below.   9.  10. Bipolar 1 disorder (H)   Delerium F31.9  R41.0 Chronic - stable. Patient notes no acute Concerns at this time - prior behavioral concerns not present   11. Normal pressure hydrocephalus (H)  G91.2 With medication adjustments. Did go see Neuo with mild adjustments to shunt pressure - she notes that she is feeling more clear since this. Previously ordered to change from Prozac to Celexa - Celexa orders not placed as ordered. However, she appears to be doing well on current regimens. Will not start Celexa at this time - would be a good option in future should depressive sx arise. Tolerating Hydroxyzine without oversedation. Continues on regimen of Lamictal and Abilify as previously ordered. Continue Hydroxyzine 25mg PO at bedtime to decrease night time behaviors.   12. Essential hypertension  I10 Chronic - stable. Continues on regimen of Amlodipine with BPs as noted below. Continue medications as ordered with VS per facility protocol.     13. Iron deficiency anemia D50.8 Chronic - unstable. Today's anemia  panel as noted below. Previously on daily iron replacement with noted elevation of her Ferritin; changed to 3x weekly dosing with addition of Vit C - improvement noted. Continue medications as ordered with plan to follow-up on labs in 1-4 weeks.   Latest Reference Range & Units 04/28/22 07:57 05/24/22 07:55   Ferritin 10 - 130 ng/mL 581 (H) 245 (H)   Folate >=3.5 ng/mL 10.0    Iron 35 - 180 ug/dL 21 (L) 30 (L)   Iron Binding Cap 240 - 430 ug/dL  281   Iron Saturation Index 15 - 46 %  11 (L)   Transferrin 212 - 360 mg/dL 146 (L) 227   Transferrin  - 563 ug/dL 183 (L)    Transferrin Saturation 20 - 50 % 12 (L)       14. Severe protein calorie malnutrition (H) E43 Chronic - unstable. Last albumin low as shown below. She notes that she does not get a lot of protein in her diet. She is interested in a daily ice cream based supplement. Will start Magic cup BID with a planned recheck of albumin in ~2 weeks.  Lab Results   Component Value Date    ALBUMIN 2.1 04/28/2022    ALBUMIN 3.8 10/16/2018      15. Physical deconditioning  R53.81 Acute on chronic - unstable. Continues to be limited by WB status - follow-up with Ortho as above with plans for adjustment to increase ability to work with therapies. PT/OT discharge patient due to baseline function achieved. SLP following for cognition and safety - adv per their recommendations. SW ongoing to assist with discharge planning - current recommendation for LTC, family working on placement of choice.     Electronically signed by:   KASANDRA Khanna, DNP, AGNP-BC, PMHNP-BC  [employer] [facility]  Office Hours: Tues-Fri 0460-9312  Office: [address] [place] [address]  Fax - [phone]  Triage Phone- [phone]  Business Office- [phone]       Email: [email or web address]                     Sincerely,        KASANDRA Johnson CNP

## 2022-07-05 VITALS
SYSTOLIC BLOOD PRESSURE: 117 MMHG | TEMPERATURE: 97.5 F | DIASTOLIC BLOOD PRESSURE: 71 MMHG | OXYGEN SATURATION: 95 % | HEIGHT: 60 IN | BODY MASS INDEX: 29.43 KG/M2 | RESPIRATION RATE: 16 BRPM | HEART RATE: 88 BPM | WEIGHT: 149.9 LBS

## 2022-07-06 ENCOUNTER — TRANSITIONAL CARE UNIT VISIT (OUTPATIENT)
Dept: GERIATRICS | Facility: CLINIC | Age: 82
End: 2022-07-06
Payer: MEDICARE

## 2022-07-06 DIAGNOSIS — J98.11 ATELECTASIS: ICD-10-CM

## 2022-07-06 DIAGNOSIS — R06.02 SHORTNESS OF BREATH: ICD-10-CM

## 2022-07-06 DIAGNOSIS — R41.0 DELIRIUM: ICD-10-CM

## 2022-07-06 DIAGNOSIS — R09.02 HYPOXIA: ICD-10-CM

## 2022-07-06 DIAGNOSIS — J44.9 CHRONIC OBSTRUCTIVE PULMONARY DISEASE, UNSPECIFIED COPD TYPE (H): ICD-10-CM

## 2022-07-06 DIAGNOSIS — J96.01 ACUTE RESPIRATORY FAILURE WITH HYPOXIA (H): Primary | ICD-10-CM

## 2022-07-06 DIAGNOSIS — F31.9 BIPOLAR 1 DISORDER (H): ICD-10-CM

## 2022-07-06 DIAGNOSIS — G91.2 NORMAL PRESSURE HYDROCEPHALUS (H): ICD-10-CM

## 2022-07-06 DIAGNOSIS — L29.9 ITCHING: ICD-10-CM

## 2022-07-06 DIAGNOSIS — R00.0 TACHYCARDIA: ICD-10-CM

## 2022-07-06 DIAGNOSIS — G89.18 ACUTE POST-OPERATIVE PAIN: ICD-10-CM

## 2022-07-06 DIAGNOSIS — E43 SEVERE PROTEIN-CALORIE MALNUTRITION (H): ICD-10-CM

## 2022-07-06 DIAGNOSIS — Z96.612 STATUS POST TOTAL SHOULDER ARTHROPLASTY, LEFT: ICD-10-CM

## 2022-07-06 DIAGNOSIS — D50.8 IRON DEFICIENCY ANEMIA SECONDARY TO INADEQUATE DIETARY IRON INTAKE: ICD-10-CM

## 2022-07-06 DIAGNOSIS — I10 ESSENTIAL HYPERTENSION: ICD-10-CM

## 2022-07-06 DIAGNOSIS — R53.81 PHYSICAL DECONDITIONING: ICD-10-CM

## 2022-07-06 PROCEDURE — 99310 SBSQ NF CARE HIGH MDM 45: CPT | Performed by: NURSE PRACTITIONER

## 2022-07-06 NOTE — LETTER
"    7/6/2022        RE: Amparo Sharma  [address]  [place] [address]         [facility]    Chief Complaint   Patient presents with     RECHECK     HPI:  Amparo Sharma is a 81 year old  (1940), who is being seen today for an episodic care visit at: [facility] ([facility]) [[id number]]. Today's concern is:     Acute respiratory failure with hypoxia (H)  Chronic obstructive pulmonary disease, unspecified COPD type (H)  Shortness of breath  Atelectasis  Hypoxia  Tachycardia  Acute post-operative pain  Status post total shoulder arthroplasty, left  Bipolar 1 disorder (H)  Delirium  Normal pressure hydrocephalus (H)  Essential hypertension  Iron deficiency anemia secondary to inadequate dietary iron intake  Severe protein-calorie malnutrition (H)  Itching  Physical deconditioning    Patient seen today for a follow-up visit in TCU. Patient notes she is doing okay - she is found today attempting to eat lunch in bed; she has quite a bit of this on her clothing - previously it was agreed upon that she would be up in her wheelchair for all meals to help her feed herself - requested she be helped up into her chair. She denies any acute concerns. She does have a follow-up appt with NS on the 8th to reassess her shunt as staff have been noting more odd statements as previously noted - she states she feels fine, denies confusion or \"cloudiness\". Therapies other than SLP have signed off. Appetite is doing well. She is sleeping well. Denies CP, palpitations, fatigue, nausea, vomiting, increased SOB/SOLOMON, fever, chills, and/or b/b concerns today.     Allergies, and PMH/PSH reviewed in EPIC today.    Current Outpatient Medications   Medication Sig Dispense Refill     acetaminophen (TYLENOL) 325 MG tablet Take 3 tablets (975 mg) by mouth every 8 hours 90 tablet 0     albuterol (PROAIR HFA/PROVENTIL HFA/VENTOLIN HFA) 108 (90 Base) MCG/ACT inhaler Inhale 2 puffs into the lungs every 4 " hours as needed for shortness of breath / dyspnea       albuterol (PROVENTIL) (2.5 MG/3ML) 0.083% neb solution Inhale 2.5 mg into the lungs every 4 hours as needed for shortness of breath / dyspnea       amLODIPine (NORVASC) 5 MG tablet Take 5 mg by mouth daily       amoxicillin (AMOXIL) 500 MG capsule Take 2,000 mg by mouth once as needed (Prior to dental appt)        ARIPiprazole (ABILIFY) 15 MG tablet Take 15 mg by mouth At Bedtime       aspirin (ASA) 81 MG EC tablet Take 1 tablet (81 mg) by mouth in the morning and 1 tablet (81 mg) in the evening. Take with meals. 60 tablet 0     BREO ELLIPTA 100-25 MCG/INH inhaler Inhale 1 puff into the lungs daily 1PM       carboxymethylcellulose (REFRESH PLUS) 0.5 % SOLN ophthalmic solution Place 1 drop into both eyes as needed in the morning and 1 drop as needed at noon and 1 drop as needed in the evening and 1 drop as needed before bedtime for dry eyes.       carboxymethylcellulose (REFRESH PLUS) 0.5 % SOLN ophthalmic solution Place 1 drop into both eyes in the morning.       cetirizine (ZYRTEC) 10 MG tablet Take 10 mg by mouth daily        diclofenac (VOLTAREN) 1 % topical gel Apply 4 g topically 4 times daily Apply to affected knees.       ferrous sulfate (FEROSUL) 325 (65 Fe) MG tablet Take 1 tablet (325 mg) by mouth Every Mon, Wed, Fri Morning       gabapentin (NEURONTIN) 100 MG capsule Take 2 capsules (200 mg) by mouth 3 times daily 180 capsule 0     INCRUSE ELLIPTA 62.5 MCG/INH Inhaler Inhale 1 puff into the lungs daily        lamoTRIgine (LAMICTAL) 100 MG tablet Take 100 mg by mouth At Bedtime       lamoTRIgine (LAMICTAL) 150 MG tablet Take 150 mg by mouth daily        levothyroxine (SYNTHROID/LEVOTHROID) 75 MCG tablet Take 75 mcg by mouth daily       menthol-zinc oxide (CALMOSEPTINE) 0.44-20.6 % OINT ointment Apply topically 2 times daily as needed for irritation       mirabegron (MYRBETRIQ) 25 MG 24 hr tablet Take 25 mg by mouth daily        Multiple  Vitamins-Minerals (MULTIVITAMIN PO) Take 1 tablet by mouth daily        pantoprazole (PROTONIX) 20 MG EC tablet Take 20 mg by mouth daily        polyethylene glycol (MIRALAX) 17 GM/Dose powder Take 17 g by mouth daily 510 g 0     saccharomyces boulardii (FLORASTOR) 250 MG capsule Take 250 mg by mouth in the morning.       SENOKOT S 8.6-50 MG tablet Take 2 tablets by mouth daily        Vitamin D, Cholecalciferol, 25 MCG (1000 UT) CAPS Take 1,000 Units by mouth daily          REVIEW OF SYSTEMS:  10 point ROS of systems including Constitutional, Eyes, Respiratory, Cardiovascular, Gastroenterology, Genitourinary, Integumentary, Musculoskeletal, Psychiatric were all negative except for pertinent positives noted in my HPI.    Objective:   /71   Pulse 88   Temp 97.5  F (36.4  C)   Resp 16   Ht 1.524 m (5')   Wt 68 kg (149 lb 14.4 oz)   SpO2 95%   BMI 29.28 kg/m    GENERAL APPEARANCE:  Alert, in no distress, appears healthy, oriented, cooperative, elderly woman resting in bed eating lunch  ENT:  Mouth and posterior oropharynx normal, moist mucous membranes, [place]  EYES:  EOM, conjunctivae, lids, pupils and irises normal  RESP:  Respiratory effort normal, no respiratory distress   M/S:   Gait and station abnormal - ADRIAN 2/2 patient being in bed; Digits and nails abnormal - arthritic changes present  SKIN:  Inspection of skin and subcutaneous tissue baseline, Palpation of skin and subcutaneous tissue baseline, skin thin, fragile and dry  NEURO:   Cranial nerves 2-12 are normal tested and grossly at patient's baseline  PSYCH: Oriented X 1-2, normal insight, judgement and memory, affect and mood normal    Recent labs in [facility] reviewed by me today.     Latest Reference Range & Units 06/02/22 13:06   Sodium 136 - 145 mmol/L 141   Potassium 3.5 - 5.0 mmol/L 4.0   Chloride 98 - 107 mmol/L 103   Carbon Dioxide 22 - 31 mmol/L 24   Urea Nitrogen 8 - 28 mg/dL 19   Creatinine 0.60 - 1.10 mg/dL 0.69   GFR Estimate >60  mL/min/1.73m2 87   Calcium 8.5 - 10.5 mg/dL 9.8   Anion Gap 5 - 18 mmol/L 14   Albumin 3.5 - 5.0 g/dL 3.1 (L)   Protein Total 6.0 - 8.0 g/dL 6.9   Alkaline Phosphatase 45 - 120 U/L 153 (H)   ALT 0 - 45 U/L 20   AST 0 - 40 U/L 19   Bilirubin Total 0.0 - 1.0 mg/dL 0.4   Glucose 70 - 125 mg/dL 106   Hemoglobin 11.7 - 15.7 g/dL 11.5 (L)   (L): Data is abnormally low  (H): Data is abnormally high      TSH   Date Value Ref Range Status   03/09/2022 1.43 0.30 - 5.00 uIU/mL Final   09/15/2021 1.03 0.30 - 5.00 uIU/mL Final   10/16/2018 2.39 0.40 - 4.00 mU/L Final     Lab Results   Component Value Date    A1C 5.7 07/26/2021    A1C 5.7 07/15/2016       Assessment/Plan:    ICD-10-CM    1. Acute respiratory failure with hypoxia (H)  J96.01 Acute on chronic - unstable, improving. Pt    2. Chronic obstructive pulmonary disease, unspecified COPD type (H)  J44.9 Continues on regimen of Albuterol, Incruse, and Breo with O2 at nighttime only with occasional use    3. Shortness of breath R00.00 during waking hours; denies presence of cough     4. Atelectasis J98.11 She is okay at this time with only using her oxygen   5. Hypoxa R09.02 At nighttime PRN - Continue Abluterol, Incruse, Mucinex and Breo inhaler as ordered with ongoing monitoring.    6. Tachycardia  R00.0 Acute - unstable. HR elevated inpatient - Recent HRs as noted below with ongoing elevations. No active tx regimen at this time. Will start regimen of Metoprolol 12.5mg PO BID with ongoing VS monitoring per facility protocol.     7. Acute post-operative pain  G89.18 Acute on chronic - Resolved. Patient notes absence of pain to her L shoulder Continues with    8. Status post total shoulder arthroplasty, left  Z96.612 Tylenol and Voltaren gel for pain control. Continue medications as ordered.   9.  10. Bipolar 1 disorder (H)   Delerium F31.9  R41.0 Chronic - stable. Patient notes no acute Concerns at this time - prior behavioral concerns not present   11. Normal pressure  hydrocephalus (H)  G91.2 With medication adjustments. Did go see Neuro with mild adjustments to shunt pressure - she notes that she is feeling more clear since this. Has tolerated weaning of Prozac; Unfortunately, prior order to initiate Celexa not processed, thus this wasn't started. Resident had been stable psychologically as previously noted now with reports as per HPI and last note. Plans to follow-up with NS as noted. Continue Abilify, Lamictal, and Hydroxyzine as ordered with close monitoring for additional needs.   12. Essential hypertension  I10 Chronic - stable. Continues on regimen of Amlodipine with BPs as noted below. Continue medications as ordered with VS per facility protocol.     13. Iron deficiency anemia D50.8 Chronic - unstable. Prior anemia panel as noted below. Previously on daily iron replacement with noted elevation of her Ferritin; changed to 3x weekly dosing with addition of Vit C - improvement noted. Continue medications as ordered with plan to recheck Hgb and iron level at this time.   Latest Reference Range & Units 04/28/22 07:57 05/24/22 07:55   Ferritin 10 - 130 ng/mL 581 (H) 245 (H)   Folate >=3.5 ng/mL 10.0    Iron 35 - 180 ug/dL 21 (L) 30 (L)   Iron Binding Cap 240 - 430 ug/dL  281   Iron Saturation Index 15 - 46 %  11 (L)   Transferrin 212 - 360 mg/dL 146 (L) 227   Transferrin  - 563 ug/dL 183 (L)    Transferrin Saturation 20 - 50 % 12 (L)       14. Severe protein calorie malnutrition (H) E43 Chronic - unstable. Last albumin low as shown below. She notes that she does not get a lot of protein in her diet. She is interested in a daily ice cream based supplement. Will start Magic cup BID with a planned recheck of CMP; order placed to ensure weekly weight monitoring.  Lab Results   Component Value Date    ALBUMIN 2.1 04/28/2022    ALBUMIN 3.8 10/16/2018      15. Itching L29.9 Acute - improved. Resident previous c/o of itching without visible rash on bilateral fingers with PHx of  eczema - tx with regimen of Hydrocortisone BID x5d with improvement noted   16.   Physical deconditioning  R53.81 Acute on chronic - unstable. Continues to be limited by WB status - follow-up with Ortho as above with plans for adjustment to increase ability to work with therapies. PT/OT discharged due to baselining - continues to work with SLP - adv per their recommendations. SW ongoing to assist with discharge planning - current recommendation for LTC, family working on placement of choice.       Electronically signed by:   KASANDRA Khanna, DNP, AGNP-BC, PMHNP-BC  [facility]  Office Hours: Tues-Fri 4609-0080  Office: [address] [place] [address]  Fax - [phone]  Triage Phone- [phone]  Business Office- [phone]      Email: [email or web address]               Sincerely,        KASANDRA Johnson CNP

## 2022-07-06 NOTE — PROGRESS NOTES
"[facility] GERIATRIC SERVICES    Chief Complaint   Patient presents with     RECHECK     HPI:  Amparo Sharma is a 81 year old  (1940), who is being seen today for an episodic care visit at: [facility] ([facility]) [[id number]]. Today's concern is:     Acute respiratory failure with hypoxia (H)  Chronic obstructive pulmonary disease, unspecified COPD type (H)  Shortness of breath  Atelectasis  Hypoxia  Tachycardia  Acute post-operative pain  Status post total shoulder arthroplasty, left  Bipolar 1 disorder (H)  Delirium  Normal pressure hydrocephalus (H)  Essential hypertension  Iron deficiency anemia secondary to inadequate dietary iron intake  Severe protein-calorie malnutrition (H)  Itching  Physical deconditioning    Patient seen today for a follow-up visit in TCU. Patient notes she is doing okay - she is found today attempting to eat lunch in bed; she has quite a bit of this on her clothing - previously it was agreed upon that she would be up in her wheelchair for all meals to help her feed herself - requested she be helped up into her chair. She denies any acute concerns. She does have a follow-up appt with NS on the 8th to reassess her shunt as staff have been noting more odd statements as previously noted - she states she feels fine, denies confusion or \"cloudiness\". Therapies other than SLP have signed off. Appetite is doing well. She is sleeping well. Denies CP, palpitations, fatigue, nausea, vomiting, increased SOB/SOLOMON, fever, chills, and/or b/b concerns today.     Allergies, and PMH/PSH reviewed in EPIC today.    Current Outpatient Medications   Medication Sig Dispense Refill     acetaminophen (TYLENOL) 325 MG tablet Take 3 tablets (975 mg) by mouth every 8 hours 90 tablet 0     albuterol (PROAIR HFA/PROVENTIL HFA/VENTOLIN HFA) 108 (90 Base) MCG/ACT inhaler Inhale 2 puffs into the lungs every 4 hours as needed for shortness of breath / dyspnea       albuterol (PROVENTIL) (2.5 MG/3ML) 0.083% " neb solution Inhale 2.5 mg into the lungs every 4 hours as needed for shortness of breath / dyspnea       amLODIPine (NORVASC) 5 MG tablet Take 5 mg by mouth daily       amoxicillin (AMOXIL) 500 MG capsule Take 2,000 mg by mouth once as needed (Prior to dental appt)        ARIPiprazole (ABILIFY) 15 MG tablet Take 15 mg by mouth At Bedtime       aspirin (ASA) 81 MG EC tablet Take 1 tablet (81 mg) by mouth in the morning and 1 tablet (81 mg) in the evening. Take with meals. 60 tablet 0     BREO ELLIPTA 100-25 MCG/INH inhaler Inhale 1 puff into the lungs daily 1PM       carboxymethylcellulose (REFRESH PLUS) 0.5 % SOLN ophthalmic solution Place 1 drop into both eyes as needed in the morning and 1 drop as needed at noon and 1 drop as needed in the evening and 1 drop as needed before bedtime for dry eyes.       carboxymethylcellulose (REFRESH PLUS) 0.5 % SOLN ophthalmic solution Place 1 drop into both eyes in the morning.       cetirizine (ZYRTEC) 10 MG tablet Take 10 mg by mouth daily        diclofenac (VOLTAREN) 1 % topical gel Apply 4 g topically 4 times daily Apply to affected knees.       ferrous sulfate (FEROSUL) 325 (65 Fe) MG tablet Take 1 tablet (325 mg) by mouth Every Mon, Wed, Fri Morning       gabapentin (NEURONTIN) 100 MG capsule Take 2 capsules (200 mg) by mouth 3 times daily 180 capsule 0     INCRUSE ELLIPTA 62.5 MCG/INH Inhaler Inhale 1 puff into the lungs daily        lamoTRIgine (LAMICTAL) 100 MG tablet Take 100 mg by mouth At Bedtime       lamoTRIgine (LAMICTAL) 150 MG tablet Take 150 mg by mouth daily        levothyroxine (SYNTHROID/LEVOTHROID) 75 MCG tablet Take 75 mcg by mouth daily       menthol-zinc oxide (CALMOSEPTINE) 0.44-20.6 % OINT ointment Apply topically 2 times daily as needed for irritation       mirabegron (MYRBETRIQ) 25 MG 24 hr tablet Take 25 mg by mouth daily        Multiple Vitamins-Minerals (MULTIVITAMIN PO) Take 1 tablet by mouth daily        pantoprazole (PROTONIX) 20 MG EC tablet  Take 20 mg by mouth daily        polyethylene glycol (MIRALAX) 17 GM/Dose powder Take 17 g by mouth daily 510 g 0     saccharomyces boulardii (FLORASTOR) 250 MG capsule Take 250 mg by mouth in the morning.       SENOKOT S 8.6-50 MG tablet Take 2 tablets by mouth daily        Vitamin D, Cholecalciferol, 25 MCG (1000 UT) CAPS Take 1,000 Units by mouth daily          REVIEW OF SYSTEMS:  10 point ROS of systems including Constitutional, Eyes, Respiratory, Cardiovascular, Gastroenterology, Genitourinary, Integumentary, Musculoskeletal, Psychiatric were all negative except for pertinent positives noted in my HPI.    Objective:   /71   Pulse 88   Temp 97.5  F (36.4  C)   Resp 16   Ht 1.524 m (5')   Wt 68 kg (149 lb 14.4 oz)   SpO2 95%   BMI 29.28 kg/m    GENERAL APPEARANCE:  Alert, in no distress, appears healthy, oriented, cooperative, elderly woman resting in bed eating lunch  ENT:  Mouth and posterior oropharynx normal, moist mucous membranes, [place]  EYES:  EOM, conjunctivae, lids, pupils and irises normal  RESP:  Respiratory effort normal, no respiratory distress   M/S:   Gait and station abnormal - ADRIAN 2/2 patient being in bed; Digits and nails abnormal - arthritic changes present  SKIN:  Inspection of skin and subcutaneous tissue baseline, Palpation of skin and subcutaneous tissue baseline, skin thin, fragile and dry  NEURO:   Cranial nerves 2-12 are normal tested and grossly at patient's baseline  PSYCH: Oriented X 1-2, normal insight, judgement and memory, affect and mood normal    Recent labs in [facility] reviewed by me today.     Latest Reference Range & Units 06/02/22 13:06   Sodium 136 - 145 mmol/L 141   Potassium 3.5 - 5.0 mmol/L 4.0   Chloride 98 - 107 mmol/L 103   Carbon Dioxide 22 - 31 mmol/L 24   Urea Nitrogen 8 - 28 mg/dL 19   Creatinine 0.60 - 1.10 mg/dL 0.69   GFR Estimate >60 mL/min/1.73m2 87   Calcium 8.5 - 10.5 mg/dL 9.8   Anion Gap 5 - 18 mmol/L 14   Albumin 3.5 - 5.0 g/dL 3.1 (L)   Protein  Total 6.0 - 8.0 g/dL 6.9   Alkaline Phosphatase 45 - 120 U/L 153 (H)   ALT 0 - 45 U/L 20   AST 0 - 40 U/L 19   Bilirubin Total 0.0 - 1.0 mg/dL 0.4   Glucose 70 - 125 mg/dL 106   Hemoglobin 11.7 - 15.7 g/dL 11.5 (L)   (L): Data is abnormally low  (H): Data is abnormally high      TSH   Date Value Ref Range Status   03/09/2022 1.43 0.30 - 5.00 uIU/mL Final   09/15/2021 1.03 0.30 - 5.00 uIU/mL Final   10/16/2018 2.39 0.40 - 4.00 mU/L Final     Lab Results   Component Value Date    A1C 5.7 07/26/2021    A1C 5.7 07/15/2016       Assessment/Plan:    ICD-10-CM    1. Acute respiratory failure with hypoxia (H)  J96.01 Acute on chronic - unstable, improving. Pt    2. Chronic obstructive pulmonary disease, unspecified COPD type (H)  J44.9 Continues on regimen of Albuterol, Incruse, and Breo with O2 at nighttime only with occasional use    3. Shortness of breath R00.00 during waking hours; denies presence of cough     4. Atelectasis J98.11 She is okay at this time with only using her oxygen   5. Hypoxa R09.02 At nighttime PRN - Continue Abluterol, Incruse, Mucinex and Breo inhaler as ordered with ongoing monitoring.    6. Tachycardia  R00.0 Acute - unstable. HR elevated inpatient - Recent HRs as noted below with ongoing elevations. No active tx regimen at this time. Will start regimen of Metoprolol 12.5mg PO BID with ongoing VS monitoring per facility protocol.     7. Acute post-operative pain  G89.18 Acute on chronic - Resolved. Patient notes absence of pain to her L shoulder Continues with    8. Status post total shoulder arthroplasty, left  Z96.612 Tylenol and Voltaren gel for pain control. Continue medications as ordered.   9.  10. Bipolar 1 disorder (H)   Delerium F31.9  R41.0 Chronic - stable. Patient notes no acute Concerns at this time - prior behavioral concerns not present   11. Normal pressure hydrocephalus (H)  G91.2 With medication adjustments. Did go see Neuro with mild adjustments to shunt pressure - she notes  that she is feeling more clear since this. Has tolerated weaning of Prozac; Unfortunately, prior order to initiate Celexa not processed, thus this wasn't started. Resident had been stable psychologically as previously noted now with reports as per HPI and last note. Plans to follow-up with NS as noted. Continue Abilify, Lamictal, and Hydroxyzine as ordered with close monitoring for additional needs.   12. Essential hypertension  I10 Chronic - stable. Continues on regimen of Amlodipine with BPs as noted below. Continue medications as ordered with VS per facility protocol.     13. Iron deficiency anemia D50.8 Chronic - unstable. Prior anemia panel as noted below. Previously on daily iron replacement with noted elevation of her Ferritin; changed to 3x weekly dosing with addition of Vit C - improvement noted. Continue medications as ordered with plan to recheck Hgb and iron level at this time.   Latest Reference Range & Units 04/28/22 07:57 05/24/22 07:55   Ferritin 10 - 130 ng/mL 581 (H) 245 (H)   Folate >=3.5 ng/mL 10.0    Iron 35 - 180 ug/dL 21 (L) 30 (L)   Iron Binding Cap 240 - 430 ug/dL  281   Iron Saturation Index 15 - 46 %  11 (L)   Transferrin 212 - 360 mg/dL 146 (L) 227   Transferrin  - 563 ug/dL 183 (L)    Transferrin Saturation 20 - 50 % 12 (L)       14. Severe protein calorie malnutrition (H) E43 Chronic - unstable. Last albumin low as shown below. She notes that she does not get a lot of protein in her diet. She is interested in a daily ice cream based supplement. Will start Magic cup BID with a planned recheck of CMP; order placed to ensure weekly weight monitoring.  Lab Results   Component Value Date    ALBUMIN 2.1 04/28/2022    ALBUMIN 3.8 10/16/2018      15. Itching L29.9 Acute - improved. Resident previous c/o of itching without visible rash on bilateral fingers with PHx of eczema - tx with regimen of Hydrocortisone BID x5d with improvement noted   16.   Physical deconditioning  R53.81 Acute on  chronic - unstable. Continues to be limited by WB status - follow-up with Ortho as above with plans for adjustment to increase ability to work with therapies. PT/OT discharged due to baselining - continues to work with SLP - adv per their recommendations. SW ongoing to assist with discharge planning - current recommendation for LTC, family working on placement of choice.       Electronically signed by:   Dr. Chantelle Hoover, APRN, DNP, AGNP-BC, PMHNP-BC  [facility]  Office Hours: Tues-Fri [id number]  Office: [address] [place] [address]  Fax - [phone]  Triage Phone- [phone]  Business Office- [phone]      Email: [email or web address]

## 2022-07-07 ENCOUNTER — LAB REQUISITION (OUTPATIENT)
Dept: LAB | Facility: CLINIC | Age: 82
End: 2022-07-07
Payer: MEDICARE

## 2022-07-07 DIAGNOSIS — D50.9 IRON DEFICIENCY ANEMIA, UNSPECIFIED: ICD-10-CM

## 2022-07-07 LAB
ALBUMIN SERPL BCG-MCNC: 3.7 G/DL (ref 3.5–5.2)
ALP SERPL-CCNC: 146 U/L (ref 35–104)
ALT SERPL W P-5'-P-CCNC: 17 U/L (ref 10–35)
ANION GAP SERPL CALCULATED.3IONS-SCNC: 13 MMOL/L (ref 7–15)
AST SERPL W P-5'-P-CCNC: 20 U/L (ref 10–35)
BILIRUB SERPL-MCNC: 0.2 MG/DL
BUN SERPL-MCNC: 16.3 MG/DL (ref 8–23)
CALCIUM SERPL-MCNC: 10 MG/DL (ref 8.8–10.2)
CHLORIDE SERPL-SCNC: 102 MMOL/L (ref 98–107)
CREAT SERPL-MCNC: 0.5 MG/DL (ref 0.51–0.95)
DEPRECATED HCO3 PLAS-SCNC: 24 MMOL/L (ref 22–29)
GFR SERPL CREATININE-BSD FRML MDRD: >90 ML/MIN/1.73M2
GLUCOSE SERPL-MCNC: 86 MG/DL (ref 70–99)
HGB BLD-MCNC: 11.9 G/DL (ref 11.7–15.7)
IRON SATN MFR SERPL: 11 % (ref 15–46)
IRON SERPL-MCNC: 30 UG/DL (ref 35–180)
POTASSIUM SERPL-SCNC: 4.3 MMOL/L (ref 3.4–5.3)
PROT SERPL-MCNC: 6.6 G/DL (ref 6.4–8.3)
SODIUM SERPL-SCNC: 139 MMOL/L (ref 136–145)
TIBC SERPL-MCNC: 263 UG/DL (ref 240–430)

## 2022-07-07 PROCEDURE — P9604 ONE-WAY ALLOW PRORATED TRIP: HCPCS | Performed by: NURSE PRACTITIONER

## 2022-07-07 PROCEDURE — 80053 COMPREHEN METABOLIC PANEL: CPT | Performed by: NURSE PRACTITIONER

## 2022-07-07 PROCEDURE — 85018 HEMOGLOBIN: CPT | Mod: ORL | Performed by: NURSE PRACTITIONER

## 2022-07-07 PROCEDURE — 83550 IRON BINDING TEST: CPT | Performed by: NURSE PRACTITIONER

## 2022-07-07 PROCEDURE — 36415 COLL VENOUS BLD VENIPUNCTURE: CPT | Performed by: NURSE PRACTITIONER

## 2022-07-08 ENCOUNTER — OFFICE VISIT (OUTPATIENT)
Dept: NEUROSURGERY | Facility: CLINIC | Age: 82
End: 2022-07-08
Payer: MEDICARE

## 2022-07-08 ENCOUNTER — ANCILLARY PROCEDURE (OUTPATIENT)
Dept: CT IMAGING | Facility: CLINIC | Age: 82
End: 2022-07-08
Attending: NURSE PRACTITIONER
Payer: MEDICARE

## 2022-07-08 VITALS — OXYGEN SATURATION: 92 % | SYSTOLIC BLOOD PRESSURE: 104 MMHG | HEART RATE: 84 BPM | DIASTOLIC BLOOD PRESSURE: 66 MMHG

## 2022-07-08 DIAGNOSIS — G91.2 NORMAL PRESSURE HYDROCEPHALUS (H): Primary | ICD-10-CM

## 2022-07-08 DIAGNOSIS — G91.2 NPH (NORMAL PRESSURE HYDROCEPHALUS) (H): ICD-10-CM

## 2022-07-08 PROCEDURE — 99213 OFFICE O/P EST LOW 20 MIN: CPT | Performed by: NURSE PRACTITIONER

## 2022-07-08 PROCEDURE — G1004 CDSM NDSC: HCPCS | Mod: GC | Performed by: RADIOLOGY

## 2022-07-08 PROCEDURE — 70450 CT HEAD/BRAIN W/O DYE: CPT | Mod: MG | Performed by: RADIOLOGY

## 2022-07-08 NOTE — PROGRESS NOTES
[facility]  Department of Neurosurgery      Name: Amparo Sharma  MRN: [id number]  Age: 81 year old  : 1940  Referring provider: Referred Self  2022      Chief Complaint:   Normal Pressure Hydrocephalus  Medtronic Strata valve currently set at 1.5  Follow-up after imaging    History of Present Illness:   Amparo Sharma is a 81 year old female with a history of NPH, s/p Right frontal  shunt placement on 2017 by Dr. Dozier  who is seen today for follow up. The shunt pressure was originally set at 1.0. Per prior notes, readjusted the pressure setting to 1.5 on 2017, 2.0 on 5/3/2017 and back to 1.5 on 2017 based on her symptoms.       Patient's daughter contacted the clinic in 2022 with worsening symptoms and a follow-up in clinic was recommended at that time. She was seen in clinic on 2022. Her shunt setting was found to be at 2.0. This was last set at 1.5 on 2017. We dialed the shunt setting back to the prior setting of 1.5 and a follow-up head CT in 4 weeks was recommended. Patient is here today after the head CT. She is accompanied by her daughter.     She was recently admitted to [facility] with COPD exacerbations. Currently on O2 at night only. She denies any breathing issues in clinic. Today, patient's daughter denies any new or worsening neurological symptoms since her last visit in the clinic. Her memory remains stable. Since her left shoulder replacement surgery on 2022, she has been in a TCU. Prior to surgery, she was able to do transfers with stand by assist. But now staff has to use Bonnie lift for transfers. Per family, she is having increased muscle rigidity and tremors. Se is not in any PT at her current facility.       Review of Systems:   Pertinent items are noted in HPI or as in patient entered ROS below, remainder of complete ROS is negative.   No flowsheet data found.     Physical Exam:   /66   Pulse 84   SpO2 92%     General: No  acute distress.    Neuro: The patient is fully oriented. Speech is normal. Extraocular movements are intact without nystagmus. Facial sensation is intact in V1, V2, V3 distributions. Facial nerve function is normal, rated as a House Brackmann 1. Left shoulder slightly weak since her recent surgery. There is no pronator drift. Sensation intact throughout. No dysmetria with finger-nose-finger testing. Noted tremors in bilateral hands. Non ambulatory.   Psych: Normal mood and affect. Behavior is normal.        Imagin2022 Head CT:  Impression:  1. Stable size of the shunted ventricular system.  2. Moderate leukoaraiosis.  3. New right maxillary sinusitis.    Procedure:  With patient's permission, her Medtronic Strata Right frontal  shunt was interrogated and found to be set to 1.5.  Rechecked x 2.       Assessment:  Normal Pressure Hydrocephalus  Medtronic Strata valve currently set at 1.5  Follow-up after imaging    Plan:  Clinically stable. Head CT from today shows stable size of the shunted ventricular system. We will plan for a follow up visit in a year. Patient to contact the clinic sooner if new or concerning symptoms. For fatigue and leg weakness, she can discuss PT option with her PCP.        I spent 25 minutes on patient care activities related to this encounter on the date of service, including time spent reviewing the chart, obtaining history and examination and in counseling the patient, and in documentation in the electronic medical record.      Jacquelin SLAUGHTER, CNP  Department of Neurosurgery

## 2022-07-08 NOTE — LETTER
2022       RE: Amparo Sharma  [address]  [place] [address]     Dear Colleague,    Thank you for referring your patient, Amparo Sharma, to the [facility]OSURG[facility] [place] at [facility]. Please see a copy of my visit note below.    [facility]  Department of Neurosurgery      Name: Amparo Sharma  MRN: [id number]  Age: 81 year old  : 1940  Referring provider: Referred Self  2022      Chief Complaint:   Normal Pressure Hydrocephalus  Medtronic Strata valve currently set at 1.5  Follow-up after imaging    History of Present Illness:   Amparo Sharma is a 81 year old female with a history of NPH, s/p Right frontal  shunt placement on 2017 by Dr. Dozier  who is seen today for follow up. The shunt pressure was originally set at 1.0. Per prior notes, readjusted the pressure setting to 1.5 on 2017, 2.0 on 5/3/2017 and back to 1.5 on 2017 based on her symptoms.       Patient's daughter contacted the clinic in 2022 with worsening symptoms and a follow-up in clinic was recommended at that time. She was seen in clinic on 2022. Her shunt setting was found to be at 2.0. This was last set at 1.5 on 2017. We dialed the shunt setting back to the prior setting of 1.5 and a follow-up head CT in 4 weeks was recommended. Patient is here today after the head CT. She is accompanied by her daughter.     She was recently admitted to [facility] with COPD exacerbations. Currently on O2 at night only. She denies any breathing issues in clinic. Today, patient's daughter denies any new or worsening neurological symptoms since her last visit in the clinic. Her memory remains stable. Since her left shoulder replacement surgery on 2022, she has been in a TCU. Prior to surgery, she was able to do transfers with stand by assist. But now staff has to use Bonnie lift for transfers. Per family,  she is having increased muscle rigidity and tremors. Se is not in any PT at her current facility.       Review of Systems:   Pertinent items are noted in HPI or as in patient entered ROS below, remainder of complete ROS is negative.   No flowsheet data found.     Physical Exam:   /66   Pulse 84   SpO2 92%     General: No acute distress.    Neuro: The patient is fully oriented. Speech is normal. Extraocular movements are intact without nystagmus. Facial sensation is intact in V1, V2, V3 distributions. Facial nerve function is normal, rated as a House Brackmann 1. Left shoulder slightly weak since her recent surgery. There is no pronator drift. Sensation intact throughout. No dysmetria with finger-nose-finger testing. Noted tremors in bilateral hands. Non ambulatory.   Psych: Normal mood and affect. Behavior is normal.        Imagin2022 Head CT:  Impression:  1. Stable size of the shunted ventricular system.  2. Moderate leukoaraiosis.  3. New right maxillary sinusitis.    Procedure:  With patient's permission, her Medtronic Strata Right frontal  shunt was interrogated and found to be set to 1.5.  Rechecked x 2.       Assessment:  Normal Pressure Hydrocephalus  Medtronic Strata valve currently set at 1.5  Follow-up after imaging    Plan:  Clinically stable. Head CT from today shows stable size of the shunted ventricular system. We will plan for a follow up visit in a year. Patient to contact the clinic sooner if new or concerning symptoms. For fatigue and leg weakness, she can discuss PT option with her PCP.        I spent 25 minutes on patient care activities related to this encounter on the date of service, including time spent reviewing the chart, obtaining history and examination and in counseling the patient, and in documentation in the electronic medical record.      Jacquelin SLAUGHTER, CNP  Department of Neurosurgery

## 2022-07-13 ENCOUNTER — TRANSITIONAL CARE UNIT VISIT (OUTPATIENT)
Dept: GERIATRICS | Facility: CLINIC | Age: 82
End: 2022-07-13
Payer: MEDICARE

## 2022-07-13 ENCOUNTER — LAB REQUISITION (OUTPATIENT)
Dept: LAB | Facility: CLINIC | Age: 82
End: 2022-07-13
Payer: MEDICARE

## 2022-07-13 VITALS
HEART RATE: 73 BPM | HEIGHT: 60 IN | SYSTOLIC BLOOD PRESSURE: 117 MMHG | WEIGHT: 149.9 LBS | OXYGEN SATURATION: 96 % | TEMPERATURE: 97.9 F | BODY MASS INDEX: 29.43 KG/M2 | RESPIRATION RATE: 18 BRPM | DIASTOLIC BLOOD PRESSURE: 71 MMHG

## 2022-07-13 DIAGNOSIS — R41.0 DELIRIUM: ICD-10-CM

## 2022-07-13 DIAGNOSIS — L29.9 ITCHING: ICD-10-CM

## 2022-07-13 DIAGNOSIS — G91.2 NORMAL PRESSURE HYDROCEPHALUS (H): ICD-10-CM

## 2022-07-13 DIAGNOSIS — R53.81 PHYSICAL DECONDITIONING: ICD-10-CM

## 2022-07-13 DIAGNOSIS — R06.02 SHORTNESS OF BREATH: ICD-10-CM

## 2022-07-13 DIAGNOSIS — I10 ESSENTIAL HYPERTENSION: ICD-10-CM

## 2022-07-13 DIAGNOSIS — G89.18 ACUTE POST-OPERATIVE PAIN: ICD-10-CM

## 2022-07-13 DIAGNOSIS — R00.0 TACHYCARDIA: ICD-10-CM

## 2022-07-13 DIAGNOSIS — Z96.612 STATUS POST TOTAL SHOULDER ARTHROPLASTY, LEFT: ICD-10-CM

## 2022-07-13 DIAGNOSIS — E43 SEVERE PROTEIN-CALORIE MALNUTRITION (H): ICD-10-CM

## 2022-07-13 DIAGNOSIS — F31.9 BIPOLAR DISORDER, UNSPECIFIED (H): ICD-10-CM

## 2022-07-13 DIAGNOSIS — R09.02 HYPOXIA: ICD-10-CM

## 2022-07-13 DIAGNOSIS — J44.9 CHRONIC OBSTRUCTIVE PULMONARY DISEASE, UNSPECIFIED COPD TYPE (H): ICD-10-CM

## 2022-07-13 DIAGNOSIS — D50.8 IRON DEFICIENCY ANEMIA SECONDARY TO INADEQUATE DIETARY IRON INTAKE: ICD-10-CM

## 2022-07-13 DIAGNOSIS — J96.01 ACUTE RESPIRATORY FAILURE WITH HYPOXIA (H): Primary | ICD-10-CM

## 2022-07-13 DIAGNOSIS — F31.9 BIPOLAR 1 DISORDER (H): ICD-10-CM

## 2022-07-13 DIAGNOSIS — J98.11 ATELECTASIS: ICD-10-CM

## 2022-07-13 PROCEDURE — 99310 SBSQ NF CARE HIGH MDM 45: CPT | Performed by: NURSE PRACTITIONER

## 2022-07-13 NOTE — LETTER
"    7/13/2022        RE: Amparo Sharma  [address], [address] \[facility]"" [address]  [place] [address]         [facility]ALTH [facility]ER[facility]ATR[facility]    Chief Complaint   Patient presents with     RECHECK     HPI:  Amparo Sharma is a 81 year old  (1940), who is being seen today for an episodic care visit at: [facility] ([facility]) [[id number]]. Today's concern is:     Acute respiratory failure with hypoxia (H)  Chronic obstructive pulmonary disease, unspecified COPD type (H)  Shortness of breath  Atelectasis  Hypoxia  Tachycardia  Acute post-operative pain  Status post total shoulder arthroplasty, left  Bipolar 1 disorder (H)  Delirium  Normal pressure hydrocephalus (H)  Essential hypertension  Iron deficiency anemia secondary to inadequate dietary iron intake  Severe protein-calorie malnutrition (H)  Itching  Physical deconditioning    Patient seen today for a follow-up visit in TCU. Patient notes she is doing \"ok\". She offers no acute concerns or complaints today. Therapies are planning to re-eval her for further therapies as recommended by Neuro. She continues with occasional odd statements - recent follow-up with Neuro noting stability to NPH - will check labs today before adjusting medications. Appetite is doing well. She is sleeping well. Denies CP, palpitations, fatigue, nausea, vomiting, increased SOB/SOLOMON, fever, chills, and/or b/b concerns today.    Allergies, and PMH/PSH reviewed in EPIC today.    Current Outpatient Medications   Medication Sig Dispense Refill     acetaminophen (TYLENOL) 325 MG tablet Take 3 tablets (975 mg) by mouth every 8 hours 90 tablet 0     albuterol (PROAIR HFA/PROVENTIL HFA/VENTOLIN HFA) 108 (90 Base) MCG/ACT inhaler Inhale 2 puffs into the lungs every 4 hours as needed for shortness of breath / dyspnea       albuterol (PROVENTIL) (2.5 MG/3ML) 0.083% neb solution Inhale 2.5 mg into the lungs every 4 hours as needed for shortness of breath / dyspnea       amLODIPine (NORVASC) 5 MG tablet " Take 5 mg by mouth daily       amoxicillin (AMOXIL) 500 MG capsule Take 2,000 mg by mouth once as needed (Prior to dental appt)        ARIPiprazole (ABILIFY) 15 MG tablet Take 15 mg by mouth At Bedtime       aspirin (ASA) 81 MG EC tablet Take 1 tablet (81 mg) by mouth in the morning and 1 tablet (81 mg) in the evening. Take with meals. 60 tablet 0     BREO ELLIPTA 100-25 MCG/INH inhaler Inhale 1 puff into the lungs daily 1PM       carboxymethylcellulose (REFRESH PLUS) 0.5 % SOLN ophthalmic solution Place 1 drop into both eyes 3 times daily as needed for dry eyes       carboxymethylcellulose (REFRESH PLUS) 0.5 % SOLN ophthalmic solution Place 1 drop into both eyes in the morning.       cetirizine (ZYRTEC) 10 MG tablet Take 10 mg by mouth daily        citalopram (CELEXA) 10 MG tablet Take 10 mg by mouth daily       diclofenac (VOLTAREN) 1 % topical gel Apply 4 g topically 4 times daily Apply to affected knees.       ferrous sulfate (FEROSUL) 325 (65 Fe) MG tablet Take 1 tablet (325 mg) by mouth Every Mon, Wed, Fri Morning       guaiFENesin (MUCINEX) 600 MG 12 hr tablet Take 1,200 mg by mouth 2 times daily       hydrocortisone (CORTAID) 0.5 % external ointment Apply topically 2 times daily       INCRUSE ELLIPTA 62.5 MCG/INH Inhaler Inhale 1 puff into the lungs daily        lamoTRIgine (LAMICTAL) 100 MG tablet Take 100 mg by mouth At Bedtime       lamoTRIgine (LAMICTAL) 150 MG tablet Take 150 mg by mouth daily        levothyroxine (SYNTHROID/LEVOTHROID) 75 MCG tablet Take 75 mcg by mouth daily       menthol-zinc oxide (CALMOSEPTINE) 0.44-20.6 % OINT ointment Apply topically 2 times daily as needed for irritation       metoprolol tartrate (LOPRESSOR) 25 MG tablet Take 12.5 mg by mouth 2 times daily       mirabegron (MYRBETRIQ) 25 MG 24 hr tablet Take 25 mg by mouth daily        Multiple Vitamins-Minerals (MULTIVITAMIN PO) Take 1 tablet by mouth daily        pantoprazole (PROTONIX) 20 MG EC tablet Take 20 mg by mouth  daily        polyethylene glycol (MIRALAX) 17 GM/Dose powder Take 17 g by mouth daily 510 g 0     QUEtiapine (SEROQUEL) 25 MG tablet Take 12.5 mg by mouth At Bedtime       saccharomyces boulardii (FLORASTOR) 250 MG capsule Take 250 mg by mouth in the morning.       SENOKOT S 8.6-50 MG tablet Take 2 tablets by mouth daily        vitamin C (ASCORBIC ACID) 500 MG tablet Take 500 mg by mouth 2 times daily       Vitamin D, Cholecalciferol, 25 MCG (1000 UT) CAPS Take 1,000 Units by mouth daily          REVIEW OF SYSTEMS:  10 point ROS of systems including Constitutional, Eyes, Respiratory, Cardiovascular, Gastroenterology, Genitourinary, Integumentary, Musculoskeletal, Psychiatric were all negative except for pertinent positives noted in my HPI.    Objective:   /71   Pulse 73   Temp 97.9  F (36.6  C)   Resp 18   Ht 1.524 m (5')   Wt 68 kg (149 lb 14.4 oz)   SpO2 96%   BMI 29.28 kg/m    GENERAL APPEARANCE:  Alert, in no distress, appears healthy, oriented, cooperative, elderly woman resting in bed   ENT:  Mouth and posterior oropharynx normal, moist mucous membranes, [place]  EYES:  EOM, conjunctivae, lids, pupils and irises normal  RESP:  Respiratory effort normal, no respiratory distress   M/S:   Gait and station abnormal - ADRIAN 2/2 patient being in bed; Digits and nails abnormal - arthritic changes present  SKIN:  Inspection of skin and subcutaneous tissue baseline, Palpation of skin and subcutaneous tissue baseline, skin thin, fragile and dry  NEURO:   Cranial nerves 2-12 are normal tested and grossly at patient's baseline  PSYCH: Oriented X 1-2, normal insight, judgement and memory, affect and mood normal  **No noted changes since prior assessment**    Recent labs in [facility] reviewed by me today.     Latest Reference Range & Units 07/07/22 05:34   Sodium 136 - 145 mmol/L 139   Potassium 3.4 - 5.3 mmol/L 4.3   Chloride 98 - 107 mmol/L 102   Carbon Dioxide (CO2) 22 - 29 mmol/L 24   Urea Nitrogen 8.0 - 23.0 mg/dL  16.3   Creatinine 0.51 - 0.95 mg/dL 0.50 (L)   GFR Estimate >60 mL/min/1.73m2 >90   Calcium 8.8 - 10.2 mg/dL 10.0   Anion Gap 7 - 15 mmol/L 13   Albumin 3.5 - 5.2 g/dL 3.7   Protein Total 6.4 - 8.3 g/dL 6.6   Alkaline Phosphatase 35 - 104 U/L 146 (H)   ALT 10 - 35 U/L 17   AST 10 - 35 U/L 20   Bilirubin Total <=1.2 mg/dL 0.2   Glucose 70 - 99 mg/dL 86   Iron 35 - 180 ug/dL 30 (L)   Iron Binding Cap 240 - 430 ug/dL 263   Iron Saturation Index 15 - 46 % 11 (L)   Hemoglobin 11.7 - 15.7 g/dL 11.9     TSH   Date Value Ref Range Status   03/09/2022 1.43 0.30 - 5.00 uIU/mL Final   09/15/2021 1.03 0.30 - 5.00 uIU/mL Final   10/16/2018 2.39 0.40 - 4.00 mU/L Final     Lab Results   Component Value Date    A1C 5.7 07/26/2021    A1C 5.7 07/15/2016       Assessment/Plan:    ICD-10-CM    1. Acute respiratory failure with hypoxia (H)  J96.01 Acute on chronic - unstable, improving. Pt    2. Chronic obstructive pulmonary disease, unspecified COPD type (H)  J44.9 Continues on regime O2 at nighttime only with PRN use during waking hours. No noted cough.    3. Shortness of breath R00.00 Continue Abluterol, Incruse, Mucinex and Breo    4. Atelectasis J98.11 inhaler as ordered with ongoing monitoring.    5. Hypoxa R09.02    6. Tachycardia  R00.0 Acute - unstable, improving. HR elevated inpatient - Recent HRs as noted below with stabilization in prior elevations. Continues on regimen of Metoprolol 12.5mg PO BID with ongoing VS monitoring per facility protocol.     7. Acute post-operative pain  G89.18 Acute on chronic - Resolved. Patient notes absence of pain to her L shoulder Continues with    8. Status post total shoulder arthroplasty, left  Z96.612 Tylenol and Voltaren gel for pain control. Continue medications as ordered.   9.  10. Bipolar 1 disorder (H)   Delerium F31.9  R41.0 Chronic - stable. Patient notes no acute Concerns at this time - prior behavioral concerns not present   11. Normal pressure hydrocephalus (H)  G91.2 With  medication adjustments. Did go see Neuro again on 7/8 with no further adjustments in shunt - they do recommend restarting therapies to help with patient's physical abilities. Has tolerated weaning of Prozac; Unfortunately, prior order to initiate Celexa not processed, thus this wasn't started. Resident had been stable psychologically with occasional odd statements noted. Continue Abilify, Lamictal, and Hydroxyzine as ordered with close monitoring for additional needs. Recheck Lamictal level at this time.   12. Essential hypertension  I10 Chronic - stable. Continues on regimen of Amlodipine with BPs as noted below -  No new VS since 7/4. Continue medications as ordered with VS per facility protocol.     13. Iron deficiency anemia D50.8 Chronic - unstable. Prior anemia panel as noted below. Previously on daily iron replacement with noted elevation of her Ferritin; changed to 3x weekly dosing with addition of Vit C - improvement noted. Continue medications as ordered with plan to recheck Hgb and anemia panel in ~6 weeks   Latest Reference Range & Units 04/28/22 07:57 05/24/22 07:55 07/07/22 05:34   Iron 35 - 180 ug/dL 21 (L) 30 (L) 30 (L)   Iron Binding Cap 240 - 430 ug/dL  281 263   Iron Saturation Index 15 - 46 %  11 (L) 11 (L)   Transferrin 212 - 360 mg/dL 146 (L) 227    Transferrin  - 563 ug/dL 183 (L)     Transferrin Saturation 20 - 50 % 12 (L)        14. Severe protein calorie malnutrition (H) E43 Chronic - unstable. Last albumin low as shown below. She notes that she does not get a lot of protein in her diet. She is interested in a daily ice cream based supplement. Continues on Magic cup BID with a plan for ongoing monitoring of CMP; order placed to ensure weekly weight monitoring.  Lab Results   Component Value Date    ALBUMIN 3.7 07/07/2022    ALBUMIN 3.1 06/02/2022    ALBUMIN 3.8 10/16/2018      15. Itching L29.9 Acute - improved. Resident previous c/o of itching without visible rash on bilateral  fingers with PHx of eczema - tx with regimen of Hydrocortisone BID x5d with improvement noted. Monitor.   16.   Physical deconditioning  R53.81 Acute on chronic - unstable. Recommendations for restarting therapies per Neuro as noted above. PT/OT eval/tx - adv per their recommendations as appropriate. Continues to work with SLP - adv per their recommendations. SW ongoing to assist with discharge planning - current recommendation for LTC, family working on placement of choice.       Electronically signed by:   KASANDRA Khanna, DNP, AGNP-BC, PMHNP-BC  [email or web address]  Office Hours: Tues-Fri [phone]  Office: [address] [place] [address]  Fax - [phone]  Triage Phone- [phone]  Business Office- [phone]      Email: [email or web address]               Sincerely,        KASANDRA Johnson CNP

## 2022-07-13 NOTE — PROGRESS NOTES
"[facility] GERIATRIC SERVICES    Chief Complaint   Patient presents with     RECHECK     HPI:  Amparo Sharma is a 81 year old  (1940), who is being seen today for an episodic care visit at: [facility] ([facility]) [[id number]]. Today's concern is:     Acute respiratory failure with hypoxia (H)  Chronic obstructive pulmonary disease, unspecified COPD type (H)  Shortness of breath  Atelectasis  Hypoxia  Tachycardia  Acute post-operative pain  Status post total shoulder arthroplasty, left  Bipolar 1 disorder (H)  Delirium  Normal pressure hydrocephalus (H)  Essential hypertension  Iron deficiency anemia secondary to inadequate dietary iron intake  Severe protein-calorie malnutrition (H)  Itching  Physical deconditioning    Patient seen today for a follow-up visit in TCU. Patient notes she is doing \"ok\". She offers no acute concerns or complaints today. Therapies are planning to re-eval her for further therapies as recommended by Neuro. She continues with occasional odd statements - recent follow-up with Neuro noting stability to NPH - will check labs today before adjusting medications. Appetite is doing well. She is sleeping well. Denies CP, palpitations, fatigue, nausea, vomiting, increased SOB/SOLOMON, fever, chills, and/or b/b concerns today.    Allergies, and PMH/PSH reviewed in EPIC today.    Current Outpatient Medications   Medication Sig Dispense Refill     acetaminophen (TYLENOL) 325 MG tablet Take 3 tablets (975 mg) by mouth every 8 hours 90 tablet 0     albuterol (PROAIR HFA/PROVENTIL HFA/VENTOLIN HFA) 108 (90 Base) MCG/ACT inhaler Inhale 2 puffs into the lungs every 4 hours as needed for shortness of breath / dyspnea       albuterol (PROVENTIL) (2.5 MG/3ML) 0.083% neb solution Inhale 2.5 mg into the lungs every 4 hours as needed for shortness of breath / dyspnea       amLODIPine (NORVASC) 5 MG tablet Take 5 mg by mouth daily       amoxicillin (AMOXIL) 500 MG capsule Take 2,000 mg by mouth once as " needed (Prior to dental appt)        ARIPiprazole (ABILIFY) 15 MG tablet Take 15 mg by mouth At Bedtime       aspirin (ASA) 81 MG EC tablet Take 1 tablet (81 mg) by mouth in the morning and 1 tablet (81 mg) in the evening. Take with meals. 60 tablet 0     BREO ELLIPTA 100-25 MCG/INH inhaler Inhale 1 puff into the lungs daily 1PM       carboxymethylcellulose (REFRESH PLUS) 0.5 % SOLN ophthalmic solution Place 1 drop into both eyes 3 times daily as needed for dry eyes       carboxymethylcellulose (REFRESH PLUS) 0.5 % SOLN ophthalmic solution Place 1 drop into both eyes in the morning.       cetirizine (ZYRTEC) 10 MG tablet Take 10 mg by mouth daily        citalopram (CELEXA) 10 MG tablet Take 10 mg by mouth daily       diclofenac (VOLTAREN) 1 % topical gel Apply 4 g topically 4 times daily Apply to affected knees.       ferrous sulfate (FEROSUL) 325 (65 Fe) MG tablet Take 1 tablet (325 mg) by mouth Every Mon, Wed, Fri Morning       guaiFENesin (MUCINEX) 600 MG 12 hr tablet Take 1,200 mg by mouth 2 times daily       hydrocortisone (CORTAID) 0.5 % external ointment Apply topically 2 times daily       INCRUSE ELLIPTA 62.5 MCG/INH Inhaler Inhale 1 puff into the lungs daily        lamoTRIgine (LAMICTAL) 100 MG tablet Take 100 mg by mouth At Bedtime       lamoTRIgine (LAMICTAL) 150 MG tablet Take 150 mg by mouth daily        levothyroxine (SYNTHROID/LEVOTHROID) 75 MCG tablet Take 75 mcg by mouth daily       menthol-zinc oxide (CALMOSEPTINE) 0.44-20.6 % OINT ointment Apply topically 2 times daily as needed for irritation       metoprolol tartrate (LOPRESSOR) 25 MG tablet Take 12.5 mg by mouth 2 times daily       mirabegron (MYRBETRIQ) 25 MG 24 hr tablet Take 25 mg by mouth daily        Multiple Vitamins-Minerals (MULTIVITAMIN PO) Take 1 tablet by mouth daily        pantoprazole (PROTONIX) 20 MG EC tablet Take 20 mg by mouth daily        polyethylene glycol (MIRALAX) 17 GM/Dose powder Take 17 g by mouth daily 510 g 0      QUEtiapine (SEROQUEL) 25 MG tablet Take 12.5 mg by mouth At Bedtime       saccharomyces boulardii (FLORASTOR) 250 MG capsule Take 250 mg by mouth in the morning.       SENOKOT S 8.6-50 MG tablet Take 2 tablets by mouth daily        vitamin C (ASCORBIC ACID) 500 MG tablet Take 500 mg by mouth 2 times daily       Vitamin D, Cholecalciferol, 25 MCG (1000 UT) CAPS Take 1,000 Units by mouth daily          REVIEW OF SYSTEMS:  10 point ROS of systems including Constitutional, Eyes, Respiratory, Cardiovascular, Gastroenterology, Genitourinary, Integumentary, Musculoskeletal, Psychiatric were all negative except for pertinent positives noted in my HPI.    Objective:   /71   Pulse 73   Temp 97.9  F (36.6  C)   Resp 18   Ht 1.524 m (5')   Wt 68 kg (149 lb 14.4 oz)   SpO2 96%   BMI 29.28 kg/m    GENERAL APPEARANCE:  Alert, in no distress, appears healthy, oriented, cooperative, elderly woman resting in bed   ENT:  Mouth and posterior oropharynx normal, moist mucous membranes, [place]  EYES:  EOM, conjunctivae, lids, pupils and irises normal  RESP:  Respiratory effort normal, no respiratory distress   M/S:   Gait and station abnormal - ADRIAN 2/2 patient being in bed; Digits and nails abnormal - arthritic changes present  SKIN:  Inspection of skin and subcutaneous tissue baseline, Palpation of skin and subcutaneous tissue baseline, skin thin, fragile and dry  NEURO:   Cranial nerves 2-12 are normal tested and grossly at patient's baseline  PSYCH: Oriented X 1-2, normal insight, judgement and memory, affect and mood normal  **No noted changes since prior assessment**    Recent labs in [facility] reviewed by me today.     Latest Reference Range & Units 07/07/22 05:34   Sodium 136 - 145 mmol/L 139   Potassium 3.4 - 5.3 mmol/L 4.3   Chloride 98 - 107 mmol/L 102   Carbon Dioxide (CO2) 22 - 29 mmol/L 24   Urea Nitrogen 8.0 - 23.0 mg/dL 16.3   Creatinine 0.51 - 0.95 mg/dL 0.50 (L)   GFR Estimate >60 mL/min/1.73m2 >90   Calcium 8.8 -  10.2 mg/dL 10.0   Anion Gap 7 - 15 mmol/L 13   Albumin 3.5 - 5.2 g/dL 3.7   Protein Total 6.4 - 8.3 g/dL 6.6   Alkaline Phosphatase 35 - 104 U/L 146 (H)   ALT 10 - 35 U/L 17   AST 10 - 35 U/L 20   Bilirubin Total <=1.2 mg/dL 0.2   Glucose 70 - 99 mg/dL 86   Iron 35 - 180 ug/dL 30 (L)   Iron Binding Cap 240 - 430 ug/dL 263   Iron Saturation Index 15 - 46 % 11 (L)   Hemoglobin 11.7 - 15.7 g/dL 11.9     TSH   Date Value Ref Range Status   03/09/2022 1.43 0.30 - 5.00 uIU/mL Final   09/15/2021 1.03 0.30 - 5.00 uIU/mL Final   10/16/2018 2.39 0.40 - 4.00 mU/L Final     Lab Results   Component Value Date    A1C 5.7 07/26/2021    A1C 5.7 07/15/2016       Assessment/Plan:    ICD-10-CM    1. Acute respiratory failure with hypoxia (H)  J96.01 Acute on chronic - unstable, improving. Pt    2. Chronic obstructive pulmonary disease, unspecified COPD type (H)  J44.9 Continues on regime O2 at nighttime only with PRN use during waking hours. No noted cough.    3. Shortness of breath R00.00 Continue Abluterol, Incruse, Mucinex and Breo    4. Atelectasis J98.11 inhaler as ordered with ongoing monitoring.    5. Hypoxa R09.02    6. Tachycardia  R00.0 Acute - unstable, improving. HR elevated inpatient - Recent HRs as noted below with stabilization in prior elevations. Continues on regimen of Metoprolol 12.5mg PO BID with ongoing VS monitoring per facility protocol.     7. Acute post-operative pain  G89.18 Acute on chronic - Resolved. Patient notes absence of pain to her L shoulder Continues with    8. Status post total shoulder arthroplasty, left  Z96.612 Tylenol and Voltaren gel for pain control. Continue medications as ordered.   9.  10. Bipolar 1 disorder (H)   Delerium F31.9  R41.0 Chronic - stable. Patient notes no acute Concerns at this time - prior behavioral concerns not present   11. Normal pressure hydrocephalus (H)  G91.2 With medication adjustments. Did go see Neuro again on 7/8 with no further adjustments in shunt - they do  recommend restarting therapies to help with patient's physical abilities. Has tolerated weaning of Prozac; Unfortunately, prior order to initiate Celexa not processed, thus this wasn't started. Resident had been stable psychologically with occasional odd statements noted. Continue Abilify, Lamictal, and Hydroxyzine as ordered with close monitoring for additional needs. Recheck Lamictal level at this time.   12. Essential hypertension  I10 Chronic - stable. Continues on regimen of Amlodipine with BPs as noted below -  No new VS since 7/4. Continue medications as ordered with VS per facility protocol.     13. Iron deficiency anemia D50.8 Chronic - unstable. Prior anemia panel as noted below. Previously on daily iron replacement with noted elevation of her Ferritin; changed to 3x weekly dosing with addition of Vit C - improvement noted. Continue medications as ordered with plan to recheck Hgb and anemia panel in ~6 weeks   Latest Reference Range & Units 04/28/22 07:57 05/24/22 07:55 07/07/22 05:34   Iron 35 - 180 ug/dL 21 (L) 30 (L) 30 (L)   Iron Binding Cap 240 - 430 ug/dL  281 263   Iron Saturation Index 15 - 46 %  11 (L) 11 (L)   Transferrin 212 - 360 mg/dL 146 (L) 227    Transferrin  - 563 ug/dL 183 (L)     Transferrin Saturation 20 - 50 % 12 (L)        14. Severe protein calorie malnutrition (H) E43 Chronic - unstable. Last albumin low as shown below. She notes that she does not get a lot of protein in her diet. She is interested in a daily ice cream based supplement. Continues on Magic cup BID with a plan for ongoing monitoring of CMP; order placed to ensure weekly weight monitoring.  Lab Results   Component Value Date    ALBUMIN 3.7 07/07/2022    ALBUMIN 3.1 06/02/2022    ALBUMIN 3.8 10/16/2018      15. Itching L29.9 Acute - improved. Resident previous c/o of itching without visible rash on bilateral fingers with PHx of eczema - tx with regimen of Hydrocortisone BID x5d with improvement noted. Monitor.    16.   Physical deconditioning  R53.81 Acute on chronic - unstable. Recommendations for restarting therapies per Neuro as noted above. PT/OT eval/tx - adv per their recommendations as appropriate. Continues to work with SLP - adv per their recommendations. SW ongoing to assist with discharge planning - current recommendation for LTC, family working on placement of choice.       Electronically signed by:   Dr. Chantelle Hoover, APRN, DNP, AGNP-BC, PMHNP-BC  [facility]  Office Hours: Tues-Fri [phone]  Office: [address] [place] [address]  Fax - [phone]  Triage Phone- [phone]  Business Office- [phone]      Email: [email or web address]

## 2022-07-14 ENCOUNTER — LAB REQUISITION (OUTPATIENT)
Dept: LAB | Facility: CLINIC | Age: 82
End: 2022-07-14
Payer: MEDICARE

## 2022-07-14 DIAGNOSIS — F31.9 BIPOLAR DISORDER, UNSPECIFIED (H): ICD-10-CM

## 2022-07-15 PROCEDURE — P9604 ONE-WAY ALLOW PRORATED TRIP: HCPCS | Mod: ORL

## 2022-07-15 PROCEDURE — 80175 DRUG SCREEN QUAN LAMOTRIGINE: CPT

## 2022-07-15 PROCEDURE — 36415 COLL VENOUS BLD VENIPUNCTURE: CPT

## 2022-07-16 LAB — LAMOTRIGINE SERPL-MCNC: 4.9 UG/ML

## 2022-07-26 ENCOUNTER — TRANSITIONAL CARE UNIT VISIT (OUTPATIENT)
Dept: GERIATRICS | Facility: CLINIC | Age: 82
End: 2022-07-26
Payer: MEDICARE

## 2022-07-26 VITALS
DIASTOLIC BLOOD PRESSURE: 76 MMHG | OXYGEN SATURATION: 94 % | WEIGHT: 145.6 LBS | HEART RATE: 71 BPM | TEMPERATURE: 98.2 F | HEIGHT: 60 IN | BODY MASS INDEX: 28.58 KG/M2 | RESPIRATION RATE: 17 BRPM | SYSTOLIC BLOOD PRESSURE: 129 MMHG

## 2022-07-26 DIAGNOSIS — K59.01 SLOW TRANSIT CONSTIPATION: ICD-10-CM

## 2022-07-26 DIAGNOSIS — I10 ESSENTIAL HYPERTENSION: Primary | ICD-10-CM

## 2022-07-26 DIAGNOSIS — F31.9 BIPOLAR 1 DISORDER (H): ICD-10-CM

## 2022-07-26 DIAGNOSIS — J44.9 CHRONIC OBSTRUCTIVE PULMONARY DISEASE, UNSPECIFIED COPD TYPE (H): ICD-10-CM

## 2022-07-26 PROCEDURE — 99309 SBSQ NF CARE MODERATE MDM 30: CPT | Performed by: INTERNAL MEDICINE

## 2022-07-26 RX ORDER — CITALOPRAM HYDROBROMIDE 10 MG/1
10 TABLET ORAL DAILY
COMMUNITY
End: 2022-10-21

## 2022-07-26 RX ORDER — ASCORBIC ACID 500 MG
500 TABLET ORAL 2 TIMES DAILY
COMMUNITY
End: 2023-01-24

## 2022-07-26 RX ORDER — METOPROLOL TARTRATE 25 MG/1
12.5 TABLET, FILM COATED ORAL 2 TIMES DAILY
COMMUNITY
End: 2023-01-01

## 2022-07-26 RX ORDER — GUAIFENESIN 600 MG/1
1200 TABLET, EXTENDED RELEASE ORAL 2 TIMES DAILY
COMMUNITY
End: 2022-11-16

## 2022-07-26 RX ORDER — QUETIAPINE FUMARATE 25 MG/1
12.5 TABLET, FILM COATED ORAL AT BEDTIME
COMMUNITY
End: 2022-09-27

## 2022-07-26 RX ORDER — DIAPER,BRIEF,INFANT-TODD,DISP
EACH MISCELLANEOUS 2 TIMES DAILY
COMMUNITY
End: 2022-11-16

## 2022-07-26 NOTE — LETTER
"    7/26/2022        RE: Amparo Sharma  [address]  [place] [address]        [facility] GERIATRICS  REGULATORY VISIT  July 26, 2022    [facility] Medical Record Number:  [id number]  Place of Service where encounter took place:  [facility] ([facility]) [[id number]]    Chief Complaint   Patient presents with     senior care Regulatory       HPI:    Amparo Sharma is a 81 year old  (1940), who is being seen today for a federally mandated E/M visit at [facility] where she has been since an April hospitalization. HPI information obtained from: facility chart records, facility staff, patient report and [facility] chart review.    Today, Ms. Sharma is seen in her room sitting in a wheelchair. Both she and nursing feel the addition of quetiapine has been very beneficial. Amparo notes that she doesn't feel any different with the citalopram and we dicussed it's too soon to know. She would like to start to reduce some of the \"long list\" of meds she is on. Is OK with no changes to the quetiapine or citalopram today. No concerns per discussion with nursing.     ALLERGIES:    Allergies   Allergen Reactions     Amantadine      Antihistamine Allergy Relief [Diphenhydramine] Other (See Comments)     hyperactivity     Bactrim [Sulfamethoxazole W/Trimethoprim]      Codeine Itching     Demerol [Meperidine] Nausea     Diazepam Other (See Comments)     Hallucinations/paranoid     Gabapentin      Hmg-Coa-R Inhibitors Other (See Comments)     Was hospitalized for possible rhabdo     Meloxicam Other (See Comments)     Pentazocine Other (See Comments)     drowsiness     Sulfamethoxazole-Trimethoprim      Tramadol Nausea        Past Medical, Surgical, Family and Social History: Reviewed and updated in EPIC.    MEDICATIONS:  Current Outpatient Medications   Medication Sig Dispense Refill     acetaminophen (TYLENOL) 325 MG tablet Take 3 tablets (975 mg) by mouth every 8 hours 90 " tablet 0     albuterol (PROAIR HFA/PROVENTIL HFA/VENTOLIN HFA) 108 (90 Base) MCG/ACT inhaler Inhale 2 puffs into the lungs every 4 hours as needed for shortness of breath / dyspnea       albuterol (PROVENTIL) (2.5 MG/3ML) 0.083% neb solution Inhale 2.5 mg into the lungs every 4 hours as needed for shortness of breath / dyspnea       amLODIPine (NORVASC) 5 MG tablet Take 5 mg by mouth daily       ARIPiprazole (ABILIFY) 15 MG tablet Take 15 mg by mouth At Bedtime       aspirin (ASA) 81 MG EC tablet Take 1 tablet (81 mg) by mouth in the morning and 1 tablet (81 mg) in the evening. Take with meals. 60 tablet 0     BREO ELLIPTA 100-25 MCG/INH inhaler Inhale 1 puff into the lungs daily 1PM       carboxymethylcellulose (REFRESH PLUS) 0.5 % SOLN ophthalmic solution Place 1 drop into both eyes 3 times daily as needed for dry eyes       carboxymethylcellulose (REFRESH PLUS) 0.5 % SOLN ophthalmic solution Place 1 drop into both eyes in the morning.       cetirizine (ZYRTEC) 10 MG tablet Take 10 mg by mouth daily        citalopram (CELEXA) 10 MG tablet Take 10 mg by mouth daily       diclofenac (VOLTAREN) 1 % topical gel Apply 4 g topically 4 times daily Apply to affected knees.       ferrous sulfate (FEROSUL) 325 (65 Fe) MG tablet Take 1 tablet (325 mg) by mouth Every Mon, Wed, Fri Morning       guaiFENesin (MUCINEX) 600 MG 12 hr tablet Take 1,200 mg by mouth 2 times daily       hydrocortisone (CORTAID) 0.5 % external ointment Apply topically 2 times daily       INCRUSE ELLIPTA 62.5 MCG/INH Inhaler Inhale 1 puff into the lungs daily        lamoTRIgine (LAMICTAL) 100 MG tablet Take 100 mg by mouth At Bedtime       lamoTRIgine (LAMICTAL) 150 MG tablet Take 150 mg by mouth daily        levothyroxine (SYNTHROID/LEVOTHROID) 75 MCG tablet Take 75 mcg by mouth daily       menthol-zinc oxide (CALMOSEPTINE) 0.44-20.6 % OINT ointment Apply topically 2 times daily as needed for irritation       metoprolol tartrate (LOPRESSOR) 25 MG  tablet Take 12.5 mg by mouth 2 times daily       mirabegron (MYRBETRIQ) 25 MG 24 hr tablet Take 25 mg by mouth daily        Multiple Vitamins-Minerals (MULTIVITAMIN PO) Take 1 tablet by mouth daily        pantoprazole (PROTONIX) 20 MG EC tablet Take 20 mg by mouth daily        polyethylene glycol (MIRALAX) 17 GM/Dose powder Take 17 g by mouth daily 510 g 0     QUEtiapine (SEROQUEL) 25 MG tablet Take 12.5 mg by mouth At Bedtime       saccharomyces boulardii (FLORASTOR) 250 MG capsule Take 250 mg by mouth in the morning.       SENOKOT S 8.6-50 MG tablet Take 2 tablets by mouth daily        vitamin C (ASCORBIC ACID) 500 MG tablet Take 500 mg by mouth 2 times daily       Vitamin D, Cholecalciferol, 25 MCG (1000 UT) CAPS Take 1,000 Units by mouth daily        amoxicillin (AMOXIL) 500 MG capsule Take 2,000 mg by mouth once as needed (Prior to dental appt)        Medications reviewed:  Medications reconciled to facility chart and changes were made to reflect current medications as identified as above med list. Below are the changes that were made:   Medications stopped since last EPIC medication reconciliation:   Medications Discontinued During This Encounter   Medication Reason     gabapentin (NEURONTIN) 100 MG capsule        Medications started since last [facility] medication reconciliation:  Orders Placed This Encounter   Medications     citalopram (CELEXA) 10 MG tablet     Sig: Take 10 mg by mouth daily     hydrocortisone (CORTAID) 0.5 % external ointment     Sig: Apply topically 2 times daily     metoprolol tartrate (LOPRESSOR) 25 MG tablet     Sig: Take 12.5 mg by mouth 2 times daily     guaiFENesin (MUCINEX) 600 MG 12 hr tablet     Sig: Take 1,200 mg by mouth 2 times daily     vitamin C (ASCORBIC ACID) 500 MG tablet     Sig: Take 500 mg by mouth 2 times daily     QUEtiapine (SEROQUEL) 25 MG tablet     Sig: Take 12.5 mg by mouth At Bedtime       REVIEW OF SYSTEMS:  4 point ROS neg other than the symptoms noted above in  the HPI    PHYSICAL EXAM:  /76   Pulse 71   Temp 98.2  F (36.8  C)   Resp 17   Ht 1.524 m (5')   Wt 66 kg (145 lb 9.6 oz)   SpO2 94%   BMI 28.44 kg/m    Gen: sitting in wheelchair, alert, cooperative and in no acute distress  Card: RRR, S1, S2, no murmurs  Resp: lungs clear to auscultation bilaterally, no crackles or wheezes and moving good air  Ext: no LE edema  Neuro: CX II-XII grossly in tact; ROM in all four extremities grossly in tact  Psych: alert and oriented x3; normal affect    LABS/IMAGING: Reviewed as per Epic and/or [facility]    ASSESSMENT / PLAN:      HTN  SBPs 110s-130s.   -- amlodipine 5 mg daily, metoprolol 12.5 mg BID   -- follow BPs and adjust medications as needed    Bipolar Disorder  See HPI re: recent med changes  -- aripiprazole 15 mg at bedtime, citalopram 10 mg daily, lamotrigine 150 mg in the morning and 100 mg at bedtime, quetiapine 12.5 mg at bedtime  -- ongoing supportive cares    COPD  Lungs clear today  -- guaifenesin 1200 mg BID, umeclidinium 62.5 mcg daily and albuterol PRN  -- follow clinically     Slow Transit Constipation  -- Miralax 17g daily and Senna 2 tabs daily   -- adjust bowel regimen as needed    Electronically signed by  Megan Bajwa MD             Sincerely,        Megan Bajwa MD

## 2022-07-26 NOTE — PROGRESS NOTES
"[facility] GERIATRICS  REGULATORY VISIT  July 26, 2022    [facility] Medical Record Number:  [id number]  Place of Service where encounter took place:  [facility] ([facility]) [[id number]]    Chief Complaint   Patient presents with     residential Regulatory       HPI:    Amparo Sharma is a 81 year old  (1940), who is being seen today for a federally mandated E/M visit at [facility] where she has been since an April hospitalization. HPI information obtained from: facility chart records, facility staff, patient report and [facility] chart review.    Today, Ms. Sharma is seen in her room sitting in a wheelchair. Both she and nursing feel the addition of quetiapine has been very beneficial. Amparo notes that she doesn't feel any different with the citalopram and we dicussed it's too soon to know. She would like to start to reduce some of the \"long list\" of meds she is on. Is OK with no changes to the quetiapine or citalopram today. No concerns per discussion with nursing.     ALLERGIES:    Allergies   Allergen Reactions     Amantadine      Antihistamine Allergy Relief [Diphenhydramine] Other (See Comments)     hyperactivity     Bactrim [Sulfamethoxazole W/Trimethoprim]      Codeine Itching     Demerol [Meperidine] Nausea     Diazepam Other (See Comments)     Hallucinations/paranoid     Gabapentin      Hmg-Coa-R Inhibitors Other (See Comments)     Was hospitalized for possible rhabdo     Meloxicam Other (See Comments)     Pentazocine Other (See Comments)     drowsiness     Sulfamethoxazole-Trimethoprim      Tramadol Nausea        Past Medical, Surgical, Family and Social History: Reviewed and updated in EPIC.    MEDICATIONS:  Current Outpatient Medications   Medication Sig Dispense Refill     acetaminophen (TYLENOL) 325 MG tablet Take 3 tablets (975 mg) by mouth every 8 hours 90 tablet 0     albuterol (PROAIR HFA/PROVENTIL HFA/VENTOLIN HFA) 108 (90 Base) MCG/ACT inhaler Inhale 2 " puffs into the lungs every 4 hours as needed for shortness of breath / dyspnea       albuterol (PROVENTIL) (2.5 MG/3ML) 0.083% neb solution Inhale 2.5 mg into the lungs every 4 hours as needed for shortness of breath / dyspnea       amLODIPine (NORVASC) 5 MG tablet Take 5 mg by mouth daily       ARIPiprazole (ABILIFY) 15 MG tablet Take 15 mg by mouth At Bedtime       aspirin (ASA) 81 MG EC tablet Take 1 tablet (81 mg) by mouth in the morning and 1 tablet (81 mg) in the evening. Take with meals. 60 tablet 0     BREO ELLIPTA 100-25 MCG/INH inhaler Inhale 1 puff into the lungs daily 1PM       carboxymethylcellulose (REFRESH PLUS) 0.5 % SOLN ophthalmic solution Place 1 drop into both eyes 3 times daily as needed for dry eyes       carboxymethylcellulose (REFRESH PLUS) 0.5 % SOLN ophthalmic solution Place 1 drop into both eyes in the morning.       cetirizine (ZYRTEC) 10 MG tablet Take 10 mg by mouth daily        citalopram (CELEXA) 10 MG tablet Take 10 mg by mouth daily       diclofenac (VOLTAREN) 1 % topical gel Apply 4 g topically 4 times daily Apply to affected knees.       ferrous sulfate (FEROSUL) 325 (65 Fe) MG tablet Take 1 tablet (325 mg) by mouth Every Mon, Wed, Fri Morning       guaiFENesin (MUCINEX) 600 MG 12 hr tablet Take 1,200 mg by mouth 2 times daily       hydrocortisone (CORTAID) 0.5 % external ointment Apply topically 2 times daily       INCRUSE ELLIPTA 62.5 MCG/INH Inhaler Inhale 1 puff into the lungs daily        lamoTRIgine (LAMICTAL) 100 MG tablet Take 100 mg by mouth At Bedtime       lamoTRIgine (LAMICTAL) 150 MG tablet Take 150 mg by mouth daily        levothyroxine (SYNTHROID/LEVOTHROID) 75 MCG tablet Take 75 mcg by mouth daily       menthol-zinc oxide (CALMOSEPTINE) 0.44-20.6 % OINT ointment Apply topically 2 times daily as needed for irritation       metoprolol tartrate (LOPRESSOR) 25 MG tablet Take 12.5 mg by mouth 2 times daily       mirabegron (MYRBETRIQ) 25 MG 24 hr tablet Take 25 mg by  mouth daily        Multiple Vitamins-Minerals (MULTIVITAMIN PO) Take 1 tablet by mouth daily        pantoprazole (PROTONIX) 20 MG EC tablet Take 20 mg by mouth daily        polyethylene glycol (MIRALAX) 17 GM/Dose powder Take 17 g by mouth daily 510 g 0     QUEtiapine (SEROQUEL) 25 MG tablet Take 12.5 mg by mouth At Bedtime       saccharomyces boulardii (FLORASTOR) 250 MG capsule Take 250 mg by mouth in the morning.       SENOKOT S 8.6-50 MG tablet Take 2 tablets by mouth daily        vitamin C (ASCORBIC ACID) 500 MG tablet Take 500 mg by mouth 2 times daily       Vitamin D, Cholecalciferol, 25 MCG (1000 UT) CAPS Take 1,000 Units by mouth daily        amoxicillin (AMOXIL) 500 MG capsule Take 2,000 mg by mouth once as needed (Prior to dental appt)        Medications reviewed:  Medications reconciled to facility chart and changes were made to reflect current medications as identified as above med list. Below are the changes that were made:   Medications stopped since last EPIC medication reconciliation:   Medications Discontinued During This Encounter   Medication Reason     gabapentin (NEURONTIN) 100 MG capsule        Medications started since last [facility] medication reconciliation:  Orders Placed This Encounter   Medications     citalopram (CELEXA) 10 MG tablet     Sig: Take 10 mg by mouth daily     hydrocortisone (CORTAID) 0.5 % external ointment     Sig: Apply topically 2 times daily     metoprolol tartrate (LOPRESSOR) 25 MG tablet     Sig: Take 12.5 mg by mouth 2 times daily     guaiFENesin (MUCINEX) 600 MG 12 hr tablet     Sig: Take 1,200 mg by mouth 2 times daily     vitamin C (ASCORBIC ACID) 500 MG tablet     Sig: Take 500 mg by mouth 2 times daily     QUEtiapine (SEROQUEL) 25 MG tablet     Sig: Take 12.5 mg by mouth At Bedtime       REVIEW OF SYSTEMS:  4 point ROS neg other than the symptoms noted above in the HPI    PHYSICAL EXAM:  /76   Pulse 71   Temp 98.2  F (36.8  C)   Resp 17   Ht 1.524 m (5')    Wt 66 kg (145 lb 9.6 oz)   SpO2 94%   BMI 28.44 kg/m    Gen: sitting in wheelchair, alert, cooperative and in no acute distress  Card: RRR, S1, S2, no murmurs  Resp: lungs clear to auscultation bilaterally, no crackles or wheezes and moving good air  Ext: no LE edema  Neuro: CX II-XII grossly in tact; ROM in all four extremities grossly in tact  Psych: alert and oriented x3; normal affect    LABS/IMAGING: Reviewed as per Epic and/or [facility]    ASSESSMENT / PLAN:      HTN  SBPs 110s-130s.   -- amlodipine 5 mg daily, metoprolol 12.5 mg BID   -- follow BPs and adjust medications as needed    Bipolar Disorder  See HPI re: recent med changes  -- aripiprazole 15 mg at bedtime, citalopram 10 mg daily, lamotrigine 150 mg in the morning and 100 mg at bedtime, quetiapine 12.5 mg at bedtime  -- ongoing supportive cares    COPD  Lungs clear today  -- guaifenesin 1200 mg BID, umeclidinium 62.5 mcg daily and albuterol PRN  -- follow clinically     Slow Transit Constipation  -- Miralax 17g daily and Senna 2 tabs daily   -- adjust bowel regimen as needed    Electronically signed by  Megan Bajwa MD

## 2022-08-09 ENCOUNTER — TRANSITIONAL CARE UNIT VISIT (OUTPATIENT)
Dept: GERIATRICS | Facility: CLINIC | Age: 82
End: 2022-08-09
Payer: MEDICARE

## 2022-08-09 VITALS
SYSTOLIC BLOOD PRESSURE: 93 MMHG | HEIGHT: 60 IN | DIASTOLIC BLOOD PRESSURE: 54 MMHG | HEART RATE: 77 BPM | RESPIRATION RATE: 16 BRPM | OXYGEN SATURATION: 92 % | TEMPERATURE: 97.9 F | BODY MASS INDEX: 29.27 KG/M2 | WEIGHT: 149.1 LBS

## 2022-08-09 DIAGNOSIS — I10 ESSENTIAL HYPERTENSION: Primary | ICD-10-CM

## 2022-08-09 DIAGNOSIS — G89.18 ACUTE POST-OPERATIVE PAIN: ICD-10-CM

## 2022-08-09 DIAGNOSIS — J44.9 CHRONIC OBSTRUCTIVE PULMONARY DISEASE, UNSPECIFIED COPD TYPE (H): ICD-10-CM

## 2022-08-09 DIAGNOSIS — R09.02 HYPOXIA: ICD-10-CM

## 2022-08-09 DIAGNOSIS — J98.11 ATELECTASIS: ICD-10-CM

## 2022-08-09 DIAGNOSIS — R06.02 SHORTNESS OF BREATH: ICD-10-CM

## 2022-08-09 DIAGNOSIS — R41.0 DELIRIUM: ICD-10-CM

## 2022-08-09 DIAGNOSIS — Z96.612 STATUS POST TOTAL SHOULDER ARTHROPLASTY, LEFT: ICD-10-CM

## 2022-08-09 DIAGNOSIS — R53.81 PHYSICAL DECONDITIONING: ICD-10-CM

## 2022-08-09 DIAGNOSIS — G91.2 NORMAL PRESSURE HYDROCEPHALUS (H): ICD-10-CM

## 2022-08-09 DIAGNOSIS — D50.8 IRON DEFICIENCY ANEMIA SECONDARY TO INADEQUATE DIETARY IRON INTAKE: ICD-10-CM

## 2022-08-09 DIAGNOSIS — E43 SEVERE PROTEIN-CALORIE MALNUTRITION (H): ICD-10-CM

## 2022-08-09 DIAGNOSIS — F31.9 BIPOLAR 1 DISORDER (H): ICD-10-CM

## 2022-08-09 DIAGNOSIS — J96.01 ACUTE RESPIRATORY FAILURE WITH HYPOXIA (H): ICD-10-CM

## 2022-08-09 DIAGNOSIS — L29.9 ITCHING: ICD-10-CM

## 2022-08-09 DIAGNOSIS — R00.0 TACHYCARDIA: ICD-10-CM

## 2022-08-09 PROCEDURE — 99309 SBSQ NF CARE MODERATE MDM 30: CPT | Performed by: NURSE PRACTITIONER

## 2022-08-09 NOTE — LETTER
"    8/9/2022        RE: Amparo Sharma  [address]  [place] [address]        [facility] GERIATRICS    Chief Complaint   [facility]     [place] [facility] Record Number:  [id number]  Place of Service where encounter took place:  [facility] [[id number]]    HPI:  Amparo Sharma is a 82 year old  (1940), who is being seen today for an episodic care visit at: [facility] [[id number]]. Today's concern is:      Essential hypertension  Bipolar 1 disorder (H)  Chronic obstructive pulmonary disease, unspecified COPD type (H)  Acute respiratory failure with hypoxia (H)  Shortness of breath  Atelectasis  Hypoxia  Tachycardia  Acute post-operative pain  Status post total shoulder arthroplasty, left  Delirium  Normal pressure hydrocephalus (H)  Iron deficiency anemia secondary to inadequate dietary iron intake  Severe protein-calorie malnutrition (H)  Itching  Physical deconditioning    Patient seen today for follow-up visit in TCU. Patient notes that she is on \"too many meds\" and wonders if she needs them all or if any can be discontinued. She notes that her family also feels this way too. She was recently started on seroquel and celexa for sundowning and anxiety. She has noticed that she has been having \"vivid weird dreams\" since starting the new meds. She thinks she has been sleeping better but anxiety feels worse. She states that she feels defeated lately since being in TCU for so long. She reported having suicidal thoughts a few weeks ago and felt really low but since then she only has fleeting thoughts. No active suicidal plan. She is interested in meeting with therapy. She also wonders if she could have her seroquel increased to help her sleep, but upon further conversation patient would like to consider taking it an hour earlier. Appetite is poor. She is sleeping well. Denies CP, palpitations, fatigue, nausea, vomiting, " increased SOB/SOLOMON, fever, chills, and/or b/b concerns today.    ALLERGIES:Amantadine, Antihistamine allergy relief [diphenhydramine], Bactrim [sulfamethoxazole w/trimethoprim], Codeine, Demerol [meperidine], Diazepam, Gabapentin, Hmg-coa-r inhibitors, Meloxicam, Pentazocine, Sulfamethoxazole-trimethoprim, and Tramadol  PAST MEDICAL HISTORY:   Past Medical History:   Diagnosis Date     Abdominal hernia      Abdominal hernia      Alcoholism in remission (H)      Alcoholism in remission (H) 2017     Anemia      Arthritis      Arthritis      Asthma      Asthma 1/20/2017     Bipolar 1 disorder (H)      Bipolar disorder (H)      Bladder incontinence 2013     Cancer (H)      Cellulitis      Cellulitis 12/12/2016    of left elbow     Chronic pain syndrome      COPD (chronic obstructive pulmonary disease) (H)      COPD with acute exacerbation (H) 12/21/2018     Dementia (H)      Depression      Depression      Disease of thyroid gland      Falls frequently      Frequent falls 2016     Frequent falls 2017     GERD (gastroesophageal reflux disease)      GERD (gastroesophageal reflux disease)      History of blood transfusion     20 years ago     History of colon cancer     s/p resection 1/2015, treated with 5-6 cycles of Folfox     History of transfusion      HTN (hypertension)      Hydrocephalus (H)      Hydrocephalus (H)      Hyperlipidemia      Hypertension      Hypothyroidism      Hypothyroidism      Infection of skin due to methicillin resistant Staphylococcus aureus (MRSA)      Loss of balance      Memory loss      Memory loss 2017     Normal pressure hydrocephalus (H) 02/03/2017     NPH (normal pressure hydrocephalus) (H)      Parkinson disease (H)      Renal insufficiency      Squamous cell cancer of multiple sites of skin of upper arm, left 2016    Dr. Andrea      Thyroid disease      Urinary incontinence      Urinary incontinence      PAST SURGICAL HISTORY:   has a past surgical history that includes Hysterectomy;  multiple knee surgeries; rotator cuff surgeries; Cholecystectomy; colon cancer resection (1/2015); GI surgery; Abdomen surgery; orthopedic surgery; Insert drain lumbar (N/A, 2/5/2017); Optical tracking system implant shunt ventriculoperitoneal (Right, 2/8/2017); GYN surgery; Esophagoscopy, gastroscopy, duodenoscopy (EGD), combined (N/A, 9/7/2017); Colon surgery; Laparoscopic assisted colectomy; Hysterectomy; joint replacement; Arthroscopy shoulder rotator cuff repair; fracture surgery; Laparoscopic cholecystectomy; Implant shunt ventriculoperitoneal (02/03/2017); Lumbar Puncture (02/03/2017); RECONSTR TOTAL SHOULDER IMPLANT (Right, 3/5/2019); Colonoscopy (N/A, 2/3/2020); and Reverse arthroplasty shoulder (Left, 4/11/2022).  FAMILY HISTORY: family history includes Arthritis in her brother; Breast Cancer (age of onset: 60) in her mother; Depression (age of onset: 55) in her father; Peptic Ulcer Disease in her father; Prostate Cancer in her brother.  SOCIAL HISTORY:  reports that she quit smoking about 25 years ago. Her smoking use included cigarettes. She started smoking about 42 years ago. She smoked 0.25 packs per day. She has quit using smokeless tobacco. She reports that she does not drink alcohol and does not use drugs.    MEDICATIONS:  Current Outpatient Medications   Medication Sig Dispense Refill     acetaminophen (TYLENOL) 325 MG tablet Take 3 tablets (975 mg) by mouth every 8 hours 90 tablet 0     albuterol (PROAIR HFA/PROVENTIL HFA/VENTOLIN HFA) 108 (90 Base) MCG/ACT inhaler Inhale 2 puffs into the lungs every 4 hours as needed for shortness of breath / dyspnea       albuterol (PROVENTIL) (2.5 MG/3ML) 0.083% neb solution Inhale 2.5 mg into the lungs every 4 hours as needed for shortness of breath / dyspnea       amLODIPine (NORVASC) 5 MG tablet Take 5 mg by mouth daily       amoxicillin (AMOXIL) 500 MG capsule Take 2,000 mg by mouth once as needed (Prior to dental appt)        ARIPiprazole (ABILIFY) 15 MG  tablet Take 15 mg by mouth At Bedtime       aspirin (ASA) 81 MG EC tablet Take 1 tablet (81 mg) by mouth in the morning and 1 tablet (81 mg) in the evening. Take with meals. 60 tablet 0     BREO ELLIPTA 100-25 MCG/INH inhaler Inhale 1 puff into the lungs daily 1PM       carboxymethylcellulose (REFRESH PLUS) 0.5 % SOLN ophthalmic solution Place 1 drop into both eyes 3 times daily as needed for dry eyes       carboxymethylcellulose (REFRESH PLUS) 0.5 % SOLN ophthalmic solution Place 1 drop into both eyes in the morning.       cetirizine (ZYRTEC) 10 MG tablet Take 10 mg by mouth daily        citalopram (CELEXA) 10 MG tablet Take 10 mg by mouth daily       diclofenac (VOLTAREN) 1 % topical gel Apply 4 g topically 4 times daily Apply to affected knees.       ferrous sulfate (FEROSUL) 325 (65 Fe) MG tablet Take 1 tablet (325 mg) by mouth Every Mon, Wed, Fri Morning       guaiFENesin (MUCINEX) 600 MG 12 hr tablet Take 1,200 mg by mouth 2 times daily       hydrocortisone (CORTAID) 0.5 % external ointment Apply topically 2 times daily       INCRUSE ELLIPTA 62.5 MCG/INH Inhaler Inhale 1 puff into the lungs daily        lamoTRIgine (LAMICTAL) 100 MG tablet Take 100 mg by mouth At Bedtime       lamoTRIgine (LAMICTAL) 150 MG tablet Take 150 mg by mouth daily        levothyroxine (SYNTHROID/LEVOTHROID) 75 MCG tablet Take 75 mcg by mouth daily       menthol-zinc oxide (CALMOSEPTINE) 0.44-20.6 % OINT ointment Apply topically 2 times daily as needed for irritation       metoprolol tartrate (LOPRESSOR) 25 MG tablet Take 12.5 mg by mouth 2 times daily       mirabegron (MYRBETRIQ) 25 MG 24 hr tablet Take 25 mg by mouth daily        Multiple Vitamins-Minerals (MULTIVITAMIN PO) Take 1 tablet by mouth daily        pantoprazole (PROTONIX) 20 MG EC tablet Take 20 mg by mouth daily        polyethylene glycol (MIRALAX) 17 GM/Dose powder Take 17 g by mouth daily 510 g 0     QUEtiapine (SEROQUEL) 25 MG tablet Take 12.5 mg by mouth At Bedtime        saccharomyces boulardii (FLORASTOR) 250 MG capsule Take 250 mg by mouth in the morning.       SENOKOT S 8.6-50 MG tablet Take 2 tablets by mouth daily        vitamin C (ASCORBIC ACID) 500 MG tablet Take 500 mg by mouth 2 times daily       Vitamin D, Cholecalciferol, 25 MCG (1000 UT) CAPS Take 1,000 Units by mouth daily          REVIEW OF SYSTEMS:  10 point ROS of systems including Constitutional, Eyes, Respiratory, Cardiovascular, Gastroenterology, Genitourinary, Integumentary, Musculoskeletal, Psychiatric were all negative except for pertinent positives noted in my HPI.    Objective:   BP 93/54   Pulse 77   Temp 97.9  F (36.6  C)   Resp 16   Ht 1.524 m (5')   Wt 67.6 kg (149 lb 1.6 oz)   SpO2 92%   BMI 29.12 kg/m    GENERAL APPEARANCE:  Alert, in no distress, appears healthy, oriented, cooperative, elderly woman resting in bed   ENT:  Mouth and posterior oropharynx normal, moist mucous membranes, [place]  EYES:  EOM, conjunctivae, lids, pupils and irises normal  RESP:  Respiratory effort normal, no respiratory distress   M/S:   Gait and station abnormal - ADRIAN 2/2 patient being in bed; Digits and nails abnormal - arthritic changes present  SKIN:  Inspection of skin and subcutaneous tissue baseline, Palpation of skin and subcutaneous tissue baseline, skin thin, fragile and dry  NEURO:   Cranial nerves 2-12 are normal tested and grossly at patient's baseline  PSYCH: Oriented X 1-2, normal insight, judgement and memory, affect and mood normal  **No noted changes since prior assessment**    Recent labs in [facility] reviewed by me today.   CBC RESULTS:   Recent Labs   Lab Test 07/07/22  0534 06/02/22  1306 05/05/22  1108 04/28/22  0757   WBC  --   --  7.7 9.1   RBC  --   --  3.70* 3.28*   HGB 11.9 11.5* 10.1* 9.2*   HCT  --   --  33.2* 30.7*   MCV  --   --  90 94   MCH  --   --  27.3 28.0   MCHC  --   --  30.4* 30.0*   RDW  --   --  15.5* 15.6*   PLT  --   --  597* 708*       Last Basic Metabolic Panel:  Recent Labs    Lab Test 07/07/22  0534 06/02/22  1306    141   POTASSIUM 4.3 4.0   CHLORIDE 102 103   MARTHA 10.0 9.8   CO2 24 24   BUN 16.3 19   CR 0.50* 0.69   GLC 86 106       Liver Function Studies -   Recent Labs   Lab Test 07/07/22  0534 06/02/22  1306   PROTTOTAL 6.6 6.9   ALBUMIN 3.7 3.1*   BILITOTAL 0.2 0.4   ALKPHOS 146* 153*   AST 20 19   ALT 17 20       TSH   Date Value Ref Range Status   03/09/2022 1.43 0.30 - 5.00 uIU/mL Final   09/15/2021 1.03 0.30 - 5.00 uIU/mL Final   10/16/2018 2.39 0.40 - 4.00 mU/L Final     Lab Results   Component Value Date    A1C 5.7 07/26/2021    A1C 5.7 07/15/2016       Assessment/Plan:    ICD-10-CM    1. Acute respiratory failure with hypoxia (H)  J96.01 Acute on chronic - unstable, improving. Pt continues on    2. Chronic obstructive pulmonary disease, unspecified COPD type (H)  J44.9 Regimen of O2 2lpm at bedtime PRN - no longer needing Guaifenesin - will discontinue. Continue regimen of    3. Shortness of breath R00.00 Abluterol, Incruse, and Breo inhaler    4. Atelectasis J98.11 as ordered with ongoing monitoring.    5. Hypoxa R09.02    6. Tachycardia  R00.0 Acute - unstable, improving. HR elevated inpatient - Recent HRs as noted below with stabilization in prior elevations. Continues on regimen of Metoprolol 12.5mg PO BID with ongoing VS monitoring per facility protocol.     7. Acute post-operative pain  G89.18 Acute on chronic - Resolved. Patient notes absence of pain to her L shoulder Continues with Tylenol and Voltaren   8. Status post total shoulder arthroplasty, left  Z96.612  gel for pain control. Continue medications as ordered with resumption of therapies.   9.  10. Bipolar 1 disorder (H)   Delerium F31.9  R41.0 Chronic - stable. Patient notes worsening sx of anxiety and depression - discussion re:onset of Celexa as noted; she is interested in psychotherapy. She does request to increase Seroquel as it helps her sleep - upon further discussion it's noted that she is taking  this medication earlier in the evening. Prior behavioral concerns not present with prior medication   11. Normal pressure hydrocephalus (H)  G91.2  adjustments.Continue Abilify, Lamictal, Seroquel and Celexa as ordered with close monitoring for additional needs.    12. Essential hypertension  I10 Chronic - stable. Continues on regimen of Amlodipine and Metoprolol with BPs as noted below -  Continue medications as ordered with VS per facility protocol.     13. Iron deficiency anemia D50.8 Chronic - unstable. Prior anemia panel as noted below. Previously on daily iron replacement with noted elevation of her Ferritin; changed to 3x weekly dosing with addition of Vit C - improvement noted. Continue medications as ordered with plan to recheck Hgb and anemia panel on 8/18.   Latest Reference Range & Units 04/28/22 07:57 05/24/22 07:55 07/07/22 05:34   Iron 35 - 180 ug/dL 21 (L) 30 (L) 30 (L)   Iron Binding Cap 240 - 430 ug/dL  281 263   Iron Saturation Index 15 - 46 %  11 (L) 11 (L)   Transferrin 212 - 360 mg/dL 146 (L) 227    Transferrin  - 563 ug/dL 183 (L)     Transferrin Saturation 20 - 50 % 12 (L)        14. Severe protein calorie malnutrition (H) E43 Chronic - unstable. Last albumin low as shown below. She notes that she does not get a lot of protein in her diet. She is interested in a daily ice cream based supplement. Continues on Magic cup BID with a plan for ongoing monitoring of CMP on 8/18.  Lab Results   Component Value Date    ALBUMIN 3.7 07/07/2022    ALBUMIN 3.1 06/02/2022    ALBUMIN 3.8 10/16/2018      15. Itching L29.9 Acute - resolved. Resident previous c/o of itching without visible rash on bilateral fingers with PHx of eczema - tx with regimen of Hydrocortisone BID x5d with improvement noted. Monitor.   16.   Physical deconditioning  R53.81 Acute on chronic - unstable. Recommendations for restarting therapies per Neuro as noted above. PT/OT following - adv per their recommendations as  appropriate. Continues to work with SLP - adv per their recommendations. SW ongoing to assist with discharge planning - current recommendation for LTC, family working on placement of choice.     This patient was seen along with a nurse practitioner student, ORI Chacon, RN, FNP Student.  The histories and review of systems are obtained by ORI Chacon, RN, FNP Student and confirmed by myself all objective, assessments and plans were completed by myself.    Electronically signed by:   KASANDRA Khanna, DNP, AGNP-BC, PMHNP-BC  [facility]  Office Hours: Tues-Fri [id number]  Office: [address] [place] [address]  Fax - [phone]  Triage Phone- [phone]  Business Office- [phone]      Email: [email or web address]             Sincerely,        KASANDRA Johnson CNP

## 2022-08-09 NOTE — PROGRESS NOTES
"[facility] GERIATRICS    Chief Complaint   Patient presents with     SHAHIDA     [facility] [facility] Record Number:  [id number]  Place of Service where encounter took place:  [facility] ([facility]) [[id number]]    HPI:  Amparo Sharma is a 82 year old  (1940), who is being seen today for an episodic care visit at: [facility] ([facility]) [[id number]]. Today's concern is:      Essential hypertension  Bipolar 1 disorder (H)  Chronic obstructive pulmonary disease, unspecified COPD type (H)  Acute respiratory failure with hypoxia (H)  Shortness of breath  Atelectasis  Hypoxia  Tachycardia  Acute post-operative pain  Status post total shoulder arthroplasty, left  Delirium  Normal pressure hydrocephalus (H)  Iron deficiency anemia secondary to inadequate dietary iron intake  Severe protein-calorie malnutrition (H)  Itching  Physical deconditioning    Patient seen today for follow-up visit in TCU. Patient notes that she is on \"too many meds\" and wonders if she needs them all or if any can be discontinued. She notes that her family also feels this way too. She was recently started on seroquel and celexa for sundowning and anxiety. She has noticed that she has been having \"vivid weird dreams\" since starting the new meds. She thinks she has been sleeping better but anxiety feels worse. She states that she feels defeated lately since being in TCU for so long. She reported having suicidal thoughts a few weeks ago and felt really low but since then she only has fleeting thoughts. No active suicidal plan. She is interested in meeting with therapy. She also wonders if she could have her seroquel increased to help her sleep, but upon further conversation patient would like to consider taking it an hour earlier. Appetite is poor. She is sleeping well. Denies CP, palpitations, fatigue, nausea, vomiting, increased SOB/SOLOMON, fever, chills, and/or b/b concerns today.    ALLERGIES:Amantadine, Antihistamine " allergy relief [diphenhydramine], Bactrim [sulfamethoxazole w/trimethoprim], Codeine, Demerol [meperidine], Diazepam, Gabapentin, Hmg-coa-r inhibitors, Meloxicam, Pentazocine, Sulfamethoxazole-trimethoprim, and Tramadol  PAST MEDICAL HISTORY:   Past Medical History:   Diagnosis Date     Abdominal hernia      Abdominal hernia      Alcoholism in remission (H)      Alcoholism in remission (H) 2017     Anemia      Arthritis      Arthritis      Asthma      Asthma 1/20/2017     Bipolar 1 disorder (H)      Bipolar disorder (H)      Bladder incontinence 2013     Cancer (H)      Cellulitis      Cellulitis 12/12/2016    of left elbow     Chronic pain syndrome      COPD (chronic obstructive pulmonary disease) (H)      COPD with acute exacerbation (H) 12/21/2018     Dementia (H)      Depression      Depression      Disease of thyroid gland      Falls frequently      Frequent falls 2016     Frequent falls 2017     GERD (gastroesophageal reflux disease)      GERD (gastroesophageal reflux disease)      History of blood transfusion     20 years ago     History of colon cancer     s/p resection 1/2015, treated with 5-6 cycles of Folfox     History of transfusion      HTN (hypertension)      Hydrocephalus (H)      Hydrocephalus (H)      Hyperlipidemia      Hypertension      Hypothyroidism      Hypothyroidism      Infection of skin due to methicillin resistant Staphylococcus aureus (MRSA)      Loss of balance      Memory loss      Memory loss 2017     Normal pressure hydrocephalus (H) 02/03/2017     NPH (normal pressure hydrocephalus) (H)      Parkinson disease (H)      Renal insufficiency      Squamous cell cancer of multiple sites of skin of upper arm, left 2016    Dr. Andrea      Thyroid disease      Urinary incontinence      Urinary incontinence      PAST SURGICAL HISTORY:   has a past surgical history that includes Hysterectomy; multiple knee surgeries; rotator cuff surgeries; Cholecystectomy; colon cancer resection (1/2015); GI  surgery; Abdomen surgery; orthopedic surgery; Insert drain lumbar (N/A, 2/5/2017); Optical tracking system implant shunt ventriculoperitoneal (Right, 2/8/2017); GYN surgery; Esophagoscopy, gastroscopy, duodenoscopy (EGD), combined (N/A, 9/7/2017); Colon surgery; Laparoscopic assisted colectomy; Hysterectomy; joint replacement; Arthroscopy shoulder rotator cuff repair; fracture surgery; Laparoscopic cholecystectomy; Implant shunt ventriculoperitoneal (02/03/2017); Lumbar Puncture (02/03/2017); RECONSTR TOTAL SHOULDER IMPLANT (Right, 3/5/2019); Colonoscopy (N/A, 2/3/2020); and Reverse arthroplasty shoulder (Left, 4/11/2022).  FAMILY HISTORY: family history includes Arthritis in her brother; Breast Cancer (age of onset: 60) in her mother; Depression (age of onset: 55) in her father; Peptic Ulcer Disease in her father; Prostate Cancer in her brother.  SOCIAL HISTORY:  reports that she quit smoking about 25 years ago. Her smoking use included cigarettes. She started smoking about 42 years ago. She smoked 0.25 packs per day. She has quit using smokeless tobacco. She reports that she does not drink alcohol and does not use drugs.    MEDICATIONS:  Current Outpatient Medications   Medication Sig Dispense Refill     acetaminophen (TYLENOL) 325 MG tablet Take 3 tablets (975 mg) by mouth every 8 hours 90 tablet 0     albuterol (PROAIR HFA/PROVENTIL HFA/VENTOLIN HFA) 108 (90 Base) MCG/ACT inhaler Inhale 2 puffs into the lungs every 4 hours as needed for shortness of breath / dyspnea       albuterol (PROVENTIL) (2.5 MG/3ML) 0.083% neb solution Inhale 2.5 mg into the lungs every 4 hours as needed for shortness of breath / dyspnea       amLODIPine (NORVASC) 5 MG tablet Take 5 mg by mouth daily       amoxicillin (AMOXIL) 500 MG capsule Take 2,000 mg by mouth once as needed (Prior to dental appt)        ARIPiprazole (ABILIFY) 15 MG tablet Take 15 mg by mouth At Bedtime       aspirin (ASA) 81 MG EC tablet Take 1 tablet (81 mg) by  mouth in the morning and 1 tablet (81 mg) in the evening. Take with meals. 60 tablet 0     BREO ELLIPTA 100-25 MCG/INH inhaler Inhale 1 puff into the lungs daily 1PM       carboxymethylcellulose (REFRESH PLUS) 0.5 % SOLN ophthalmic solution Place 1 drop into both eyes 3 times daily as needed for dry eyes       carboxymethylcellulose (REFRESH PLUS) 0.5 % SOLN ophthalmic solution Place 1 drop into both eyes in the morning.       cetirizine (ZYRTEC) 10 MG tablet Take 10 mg by mouth daily        citalopram (CELEXA) 10 MG tablet Take 10 mg by mouth daily       diclofenac (VOLTAREN) 1 % topical gel Apply 4 g topically 4 times daily Apply to affected knees.       ferrous sulfate (FEROSUL) 325 (65 Fe) MG tablet Take 1 tablet (325 mg) by mouth Every Mon, Wed, Fri Morning       guaiFENesin (MUCINEX) 600 MG 12 hr tablet Take 1,200 mg by mouth 2 times daily       hydrocortisone (CORTAID) 0.5 % external ointment Apply topically 2 times daily       INCRUSE ELLIPTA 62.5 MCG/INH Inhaler Inhale 1 puff into the lungs daily        lamoTRIgine (LAMICTAL) 100 MG tablet Take 100 mg by mouth At Bedtime       lamoTRIgine (LAMICTAL) 150 MG tablet Take 150 mg by mouth daily        levothyroxine (SYNTHROID/LEVOTHROID) 75 MCG tablet Take 75 mcg by mouth daily       menthol-zinc oxide (CALMOSEPTINE) 0.44-20.6 % OINT ointment Apply topically 2 times daily as needed for irritation       metoprolol tartrate (LOPRESSOR) 25 MG tablet Take 12.5 mg by mouth 2 times daily       mirabegron (MYRBETRIQ) 25 MG 24 hr tablet Take 25 mg by mouth daily        Multiple Vitamins-Minerals (MULTIVITAMIN PO) Take 1 tablet by mouth daily        pantoprazole (PROTONIX) 20 MG EC tablet Take 20 mg by mouth daily        polyethylene glycol (MIRALAX) 17 GM/Dose powder Take 17 g by mouth daily 510 g 0     QUEtiapine (SEROQUEL) 25 MG tablet Take 12.5 mg by mouth At Bedtime       saccharomyces boulardii (FLORASTOR) 250 MG capsule Take 250 mg by mouth in the morning.        SENOKOT S 8.6-50 MG tablet Take 2 tablets by mouth daily        vitamin C (ASCORBIC ACID) 500 MG tablet Take 500 mg by mouth 2 times daily       Vitamin D, Cholecalciferol, 25 MCG (1000 UT) CAPS Take 1,000 Units by mouth daily          REVIEW OF SYSTEMS:  10 point ROS of systems including Constitutional, Eyes, Respiratory, Cardiovascular, Gastroenterology, Genitourinary, Integumentary, Musculoskeletal, Psychiatric were all negative except for pertinent positives noted in my HPI.    Objective:   BP 93/54   Pulse 77   Temp 97.9  F (36.6  C)   Resp 16   Ht 1.524 m (5')   Wt 67.6 kg (149 lb 1.6 oz)   SpO2 92%   BMI 29.12 kg/m    GENERAL APPEARANCE:  Alert, in no distress, appears healthy, oriented, cooperative, elderly woman resting in bed   ENT:  Mouth and posterior oropharynx normal, moist mucous membranes, Nikolai  EYES:  EOM, conjunctivae, lids, pupils and irises normal  RESP:  Respiratory effort normal, no respiratory distress   M/S:   Gait and station abnormal - ADRIAN 2/2 patient being in bed; Digits and nails abnormal - arthritic changes present  SKIN:  Inspection of skin and subcutaneous tissue baseline, Palpation of skin and subcutaneous tissue baseline, skin thin, fragile and dry  NEURO:   Cranial nerves 2-12 are normal tested and grossly at patient's baseline  PSYCH: Oriented X 1-2, normal insight, judgement and memory, affect and mood normal  **No noted changes since prior assessment**    Recent labs in [facility] reviewed by me today.   CBC RESULTS:   Recent Labs   Lab Test 07/07/22  0534 06/02/22  1306 05/05/22  1108 04/28/22  0757   WBC  --   --  7.7 9.1   RBC  --   --  3.70* 3.28*   HGB 11.9 11.5* 10.1* 9.2*   HCT  --   --  33.2* 30.7*   MCV  --   --  90 94   MCH  --   --  27.3 28.0   MCHC  --   --  30.4* 30.0*   RDW  --   --  15.5* 15.6*   PLT  --   --  597* 708*       Last Basic Metabolic Panel:  Recent Labs   Lab Test 07/07/22  0534 06/02/22  1306    141   POTASSIUM 4.3 4.0   CHLORIDE 102 103   MARTHA  10.0 9.8   CO2 24 24   BUN 16.3 19   CR 0.50* 0.69   GLC 86 106       Liver Function Studies -   Recent Labs   Lab Test 07/07/22  0534 06/02/22  1306   PROTTOTAL 6.6 6.9   ALBUMIN 3.7 3.1*   BILITOTAL 0.2 0.4   ALKPHOS 146* 153*   AST 20 19   ALT 17 20       TSH   Date Value Ref Range Status   03/09/2022 1.43 0.30 - 5.00 uIU/mL Final   09/15/2021 1.03 0.30 - 5.00 uIU/mL Final   10/16/2018 2.39 0.40 - 4.00 mU/L Final     Lab Results   Component Value Date    A1C 5.7 07/26/2021    A1C 5.7 07/15/2016       Assessment/Plan:    ICD-10-CM    1. Acute respiratory failure with hypoxia (H)  J96.01 Acute on chronic - unstable, improving. Pt continues on    2. Chronic obstructive pulmonary disease, unspecified COPD type (H)  J44.9 Regimen of O2 2lpm at bedtime PRN - no longer needing Guaifenesin - will discontinue. Continue regimen of    3. Shortness of breath R00.00 Abluterol, Incruse, and Breo inhaler    4. Atelectasis J98.11 as ordered with ongoing monitoring.    5. Hypoxa R09.02    6. Tachycardia  R00.0 Acute - unstable, improving. HR elevated inpatient - Recent HRs as noted below with stabilization in prior elevations. Continues on regimen of Metoprolol 12.5mg PO BID with ongoing VS monitoring per facility protocol.     7. Acute post-operative pain  G89.18 Acute on chronic - Resolved. Patient notes absence of pain to her L shoulder Continues with Tylenol and Voltaren   8. Status post total shoulder arthroplasty, left  Z96.612  gel for pain control. Continue medications as ordered with resumption of therapies.   9.  10. Bipolar 1 disorder (H)   Delerium F31.9  R41.0 Chronic - stable. Patient notes worsening sx of anxiety and depression - discussion re:onset of Celexa as noted; she is interested in psychotherapy. She does request to increase Seroquel as it helps her sleep - upon further discussion it's noted that she is taking this medication earlier in the evening. Prior behavioral concerns not present with prior  medication   11. Normal pressure hydrocephalus (H)  G91.2  adjustments.Continue Abilify, Lamictal, Seroquel and Celexa as ordered with close monitoring for additional needs.    12. Essential hypertension  I10 Chronic - stable. Continues on regimen of Amlodipine and Metoprolol with BPs as noted below -  Continue medications as ordered with VS per facility protocol.     13. Iron deficiency anemia D50.8 Chronic - unstable. Prior anemia panel as noted below. Previously on daily iron replacement with noted elevation of her Ferritin; changed to 3x weekly dosing with addition of Vit C - improvement noted. Continue medications as ordered with plan to recheck Hgb and anemia panel on 8/18.   Latest Reference Range & Units 04/28/22 07:57 05/24/22 07:55 07/07/22 05:34   Iron 35 - 180 ug/dL 21 (L) 30 (L) 30 (L)   Iron Binding Cap 240 - 430 ug/dL  281 263   Iron Saturation Index 15 - 46 %  11 (L) 11 (L)   Transferrin 212 - 360 mg/dL 146 (L) 227    Transferrin  - 563 ug/dL 183 (L)     Transferrin Saturation 20 - 50 % 12 (L)        14. Severe protein calorie malnutrition (H) E43 Chronic - unstable. Last albumin low as shown below. She notes that she does not get a lot of protein in her diet. She is interested in a daily ice cream based supplement. Continues on Magic cup BID with a plan for ongoing monitoring of CMP on 8/18.  Lab Results   Component Value Date    ALBUMIN 3.7 07/07/2022    ALBUMIN 3.1 06/02/2022    ALBUMIN 3.8 10/16/2018      15. Itching L29.9 Acute - resolved. Resident previous c/o of itching without visible rash on bilateral fingers with PHx of eczema - tx with regimen of Hydrocortisone BID x5d with improvement noted. Monitor.   16.   Physical deconditioning  R53.81 Acute on chronic - unstable. Recommendations for restarting therapies per Neuro as noted above. PT/OT following - adv per their recommendations as appropriate. Continues to work with SLP - adv per their recommendations. SW ongoing to assist with  discharge planning - current recommendation for LTC, family working on placement of choice.     This patient was seen along with a nurse practitioner student, ORI Chacon, RN, FNP Student.  The histories and review of systems are obtained by ORI Chacon, RN, FNP Student and confirmed by myself all objective, assessments and plans were completed by myself.    Electronically signed by:   Dr. Chantelle Hoover, APRN, DNP, AGNP-BC, PMHNP-BC  [facility]  Office Hours: Tues-Fri [id number]  Office: [address] [place] [address]  Fax - [phone]  Triage Phone- [phone]  Business Office- [phone]      Email: [email or web address]

## 2022-08-09 NOTE — NURSING NOTE
"[facility] GERIATRICS    Chief Complaint   Patient presents with     SHAHIDA     [place] [place] Medical Record Number:  [id number]  Place of Service where encounter took place:  [facility] ([facility]) [[id number]]    HPI:  Amparo Sharma is a 82 year old  (1940), who is being seen today for an episodic care visit at: [facility] ([facility]) [[id number]]. Today's concern is:      Essential hypertension  Bipolar 1 disorder (H)  Chronic obstructive pulmonary disease, unspecified COPD type (H)  Acute respiratory failure with hypoxia (H)  Shortness of breath  Atelectasis  Hypoxia  Tachycardia  Acute post-operative pain  Status post total shoulder arthroplasty, left  Delirium  Normal pressure hydrocephalus (H)  Iron deficiency anemia secondary to inadequate dietary iron intake  Severe protein-calorie malnutrition (H)  Itching  Physical deconditioning    Patient seen today for follow-up visit in TCU. Patient notes that she is on \"too many meds\" and wonders if she needs them all or if any can be discontinued. She notes that her family also feels this way too. She was recently started on seroquel and celexa for sundowning and anxiety. She has noticed that she has been having \"vivid weird dreams\" since starting the new meds. She thinks she has been sleeping better but anxiety feels worse. She states that she feels defeated lately since being in TCU for so long. She reported having suicidal thoughts a few weeks ago and felt really low but since then she only has fleeting thoughts. No active suicidal plan. She is interested in meeting with therapy. She also wonders if she could have her seroquel increased to help her sleep, but upon further conversation patient would like to consider taking it an hour earlier. Appetite is poor. She is sleeping well. Denies CP, palpitations, fatigue, nausea, vomiting, increased SOB/SOLOMON, fever, chills, and/or b/b concerns today.    ALLERGIES:Amantadine, Antihistamine " allergy relief [diphenhydramine], Bactrim [sulfamethoxazole w/trimethoprim], Codeine, Demerol [meperidine], Diazepam, Gabapentin, Hmg-coa-r inhibitors, Meloxicam, Pentazocine, Sulfamethoxazole-trimethoprim, and Tramadol  PAST MEDICAL HISTORY:   Past Medical History:   Diagnosis Date     Abdominal hernia      Abdominal hernia      Alcoholism in remission (H)      Alcoholism in remission (H) 2017     Anemia      Arthritis      Arthritis      Asthma      Asthma 1/20/2017     Bipolar 1 disorder (H)      Bipolar disorder (H)      Bladder incontinence 2013     Cancer (H)      Cellulitis      Cellulitis 12/12/2016    of left elbow     Chronic pain syndrome      COPD (chronic obstructive pulmonary disease) (H)      COPD with acute exacerbation (H) 12/21/2018     Dementia (H)      Depression      Depression      Disease of thyroid gland      Falls frequently      Frequent falls 2016     Frequent falls 2017     GERD (gastroesophageal reflux disease)      GERD (gastroesophageal reflux disease)      History of blood transfusion     20 years ago     History of colon cancer     s/p resection 1/2015, treated with 5-6 cycles of Folfox     History of transfusion      HTN (hypertension)      Hydrocephalus (H)      Hydrocephalus (H)      Hyperlipidemia      Hypertension      Hypothyroidism      Hypothyroidism      Infection of skin due to methicillin resistant Staphylococcus aureus (MRSA)      Loss of balance      Memory loss      Memory loss 2017     Normal pressure hydrocephalus (H) 02/03/2017     NPH (normal pressure hydrocephalus) (H)      Parkinson disease (H)      Renal insufficiency      Squamous cell cancer of multiple sites of skin of upper arm, left 2016    Dr. Andrea      Thyroid disease      Urinary incontinence      Urinary incontinence      PAST SURGICAL HISTORY:   has a past surgical history that includes Hysterectomy; multiple knee surgeries; rotator cuff surgeries; Cholecystectomy; colon cancer resection (1/2015); GI  surgery; Abdomen surgery; orthopedic surgery; Insert drain lumbar (N/A, 2/5/2017); Optical tracking system implant shunt ventriculoperitoneal (Right, 2/8/2017); GYN surgery; Esophagoscopy, gastroscopy, duodenoscopy (EGD), combined (N/A, 9/7/2017); Colon surgery; Laparoscopic assisted colectomy; Hysterectomy; joint replacement; Arthroscopy shoulder rotator cuff repair; fracture surgery; Laparoscopic cholecystectomy; Implant shunt ventriculoperitoneal (02/03/2017); Lumbar Puncture (02/03/2017); RECONSTR TOTAL SHOULDER IMPLANT (Right, 3/5/2019); Colonoscopy (N/A, 2/3/2020); and Reverse arthroplasty shoulder (Left, 4/11/2022).  FAMILY HISTORY: family history includes Arthritis in her brother; Breast Cancer (age of onset: 60) in her mother; Depression (age of onset: 55) in her father; Peptic Ulcer Disease in her father; Prostate Cancer in her brother.  SOCIAL HISTORY:  reports that she quit smoking about 25 years ago. Her smoking use included cigarettes. She started smoking about 42 years ago. She smoked 0.25 packs per day. She has quit using smokeless tobacco. She reports that she does not drink alcohol and does not use drugs.    MEDICATIONS:  Current Outpatient Medications   Medication Sig Dispense Refill     acetaminophen (TYLENOL) 325 MG tablet Take 3 tablets (975 mg) by mouth every 8 hours 90 tablet 0     albuterol (PROAIR HFA/PROVENTIL HFA/VENTOLIN HFA) 108 (90 Base) MCG/ACT inhaler Inhale 2 puffs into the lungs every 4 hours as needed for shortness of breath / dyspnea       albuterol (PROVENTIL) (2.5 MG/3ML) 0.083% neb solution Inhale 2.5 mg into the lungs every 4 hours as needed for shortness of breath / dyspnea       amLODIPine (NORVASC) 5 MG tablet Take 5 mg by mouth daily       amoxicillin (AMOXIL) 500 MG capsule Take 2,000 mg by mouth once as needed (Prior to dental appt)        ARIPiprazole (ABILIFY) 15 MG tablet Take 15 mg by mouth At Bedtime       aspirin (ASA) 81 MG EC tablet Take 1 tablet (81 mg) by  mouth in the morning and 1 tablet (81 mg) in the evening. Take with meals. 60 tablet 0     BREO ELLIPTA 100-25 MCG/INH inhaler Inhale 1 puff into the lungs daily 1PM       carboxymethylcellulose (REFRESH PLUS) 0.5 % SOLN ophthalmic solution Place 1 drop into both eyes 3 times daily as needed for dry eyes       carboxymethylcellulose (REFRESH PLUS) 0.5 % SOLN ophthalmic solution Place 1 drop into both eyes in the morning.       cetirizine (ZYRTEC) 10 MG tablet Take 10 mg by mouth daily        citalopram (CELEXA) 10 MG tablet Take 10 mg by mouth daily       diclofenac (VOLTAREN) 1 % topical gel Apply 4 g topically 4 times daily Apply to affected knees.       ferrous sulfate (FEROSUL) 325 (65 Fe) MG tablet Take 1 tablet (325 mg) by mouth Every Mon, Wed, Fri Morning       guaiFENesin (MUCINEX) 600 MG 12 hr tablet Take 1,200 mg by mouth 2 times daily       hydrocortisone (CORTAID) 0.5 % external ointment Apply topically 2 times daily       INCRUSE ELLIPTA 62.5 MCG/INH Inhaler Inhale 1 puff into the lungs daily        lamoTRIgine (LAMICTAL) 100 MG tablet Take 100 mg by mouth At Bedtime       lamoTRIgine (LAMICTAL) 150 MG tablet Take 150 mg by mouth daily        levothyroxine (SYNTHROID/LEVOTHROID) 75 MCG tablet Take 75 mcg by mouth daily       menthol-zinc oxide (CALMOSEPTINE) 0.44-20.6 % OINT ointment Apply topically 2 times daily as needed for irritation       metoprolol tartrate (LOPRESSOR) 25 MG tablet Take 12.5 mg by mouth 2 times daily       mirabegron (MYRBETRIQ) 25 MG 24 hr tablet Take 25 mg by mouth daily        Multiple Vitamins-Minerals (MULTIVITAMIN PO) Take 1 tablet by mouth daily        pantoprazole (PROTONIX) 20 MG EC tablet Take 20 mg by mouth daily        polyethylene glycol (MIRALAX) 17 GM/Dose powder Take 17 g by mouth daily 510 g 0     QUEtiapine (SEROQUEL) 25 MG tablet Take 12.5 mg by mouth At Bedtime       saccharomyces boulardii (FLORASTOR) 250 MG capsule Take 250 mg by mouth in the morning.        SENOKOT S 8.6-50 MG tablet Take 2 tablets by mouth daily        vitamin C (ASCORBIC ACID) 500 MG tablet Take 500 mg by mouth 2 times daily       Vitamin D, Cholecalciferol, 25 MCG (1000 UT) CAPS Take 1,000 Units by mouth daily          REVIEW OF SYSTEMS:  4 point ROS including Respiratory, CV, GI and , other than that noted in the HPI,  is negative    Objective:   BP 93/54   Pulse 77   Temp 97.9  F (36.6  C)   Resp 16   Ht 1.524 m (5')   Wt 67.6 kg (149 lb 1.6 oz)   SpO2 92%   BMI 29.12 kg/m    GENERAL APPEARANCE:  Alert, in no distress  RESP:  respiratory effort and palpation of chest normal, lungs clear to auscultation , no respiratory distress  CV:  Palpation and auscultation of heart done , regular rate and rhythm, no murmur, rub, or gallop, no edema  M/S:   Gait and station abnormal - ADRIAN 2/2 patient lying in bed  Digits and nails abnormal - arthritic changes present  SKIN:  Inspection of skin and subcutaneous tissue baseline, Palpation of skin and subcutaneous tissue baseline  PSYCH:  memory impaired , affect and mood normal    Recent labs in [facility] reviewed by me today.   CBC RESULTS:   Recent Labs   Lab Test 07/07/22 0534 06/02/22  1306 05/05/22  1108 04/28/22  0757   WBC  --   --  7.7 9.1   RBC  --   --  3.70* 3.28*   HGB 11.9 11.5* 10.1* 9.2*   HCT  --   --  33.2* 30.7*   MCV  --   --  90 94   MCH  --   --  27.3 28.0   MCHC  --   --  30.4* 30.0*   RDW  --   --  15.5* 15.6*   PLT  --   --  597* 708*       Last Basic Metabolic Panel:  Recent Labs   Lab Test 07/07/22 0534 06/02/22  1306    141   POTASSIUM 4.3 4.0   CHLORIDE 102 103   MARTHA 10.0 9.8   CO2 24 24   BUN 16.3 19   CR 0.50* 0.69   GLC 86 106       Liver Function Studies -   Recent Labs   Lab Test 07/07/22 0534 06/02/22  1306   PROTTOTAL 6.6 6.9   ALBUMIN 3.7 3.1*   BILITOTAL 0.2 0.4   ALKPHOS 146* 153*   AST 20 19   ALT 17 20       TSH   Date Value Ref Range Status   03/09/2022 1.43 0.30 - 5.00 uIU/mL Final   09/15/2021 1.03 0.30 -  5.00 uIU/mL Final   10/16/2018 2.39 0.40 - 4.00 mU/L Final   ]    Lab Results   Component Value Date    A1C 5.7 07/26/2021    A1C 5.7 07/15/2016       Assessment/Plan:  Essential hypertension  On Amlodipine 5 mg daily and metoprolol 12.5 mg BID   BPs 's, HR 70-80. BPs soft, patient endorsed some dizziness upon standing upright this morning. Will decrease amlodipine to avoid hypotension as BPs have been running soft  - Amlodipine 2.5mg    Tachycardia  Controlled. On Metoprolol 12.5mg BID, HR 70-80.   - Continue with ongoing VS monitoring per protocol    Chronic obstructive pulmonary disease, unspecified COPD type (H)  Acute respiratory failure with hypoxia (H)  Shortness of breath  Atelectasis  Hypoxia  Improving. On Guaifenesin 1200mg BID, breo ellipta inhaler, incruse ellipta, and 2L o2 at night/PRN. Guaifenesin no longer indicated. Will discontinue   - Discontinue guaifenesin  - Ongoing VS monitoring    Status post total shoulder arthroplasty, left  Acute post-operative pain  Resolved. Continues with scheduled tylenol and voltaren gel.   - Continue current regimen and continue working with therapies as ordered    Normal pressure hydrocephalus (H)  Patient followed by neurology clinic, last visit 7/8 with no further adjustments in shunt - they recommended restarting therapies to help with patients physical abilities    Bipolar 1 disorder (H)  Delirium  On Aripiprazole 15mg HS, lamotrigine 150mg qAM and 100mg at bedtime. She was started on seroquel 12.5mg HS and celexa 10mg for sundowning and increased anxiety. Patient reports worsening anxiety/depression. Discussed with patient it usually takes 4-8 weeks to notice effect. Patient interested in psychotherapy. Patient also verbalized wanting to increase seroquel to help sleep. After further conversation patient open to taking this medication earlier in the evening  - Continue with current regimen, will f/u with patient 6-8 weeks after celexa was initiated  -  Change seroquel to earlier time    Iron deficiency anemia secondary to inadequate dietary iron intake  On iron 325mg MWF, vit C 500mg BID, pantoprazole 40mg daily. Last iron labs checked 7/7.   - Recheck CBC and iron panel 8/18    Severe protein-calorie malnutrition (H)  Weight remains stable 145-149lb. Last albumin 3.7 on 7/7. On daily supplements BID.   - Recheck CMP 8/18    Itching  Improved/resolved. Patient previously reported itching of fingers without visible rash, was treated with hydrocortisone with improvement.     Physical deconditioning  Recommendations for restarting therapies per Neuro as noted above. PT/OT eval/tx - adv per their recommendations as appropriate. Continues to work with SLP - adv per their recommendations. SW ongoing to assist with discharge planning - current recommendation for LTC, family working on placement of choice.      Electronically signed by: ORI Chacon, RN, FNP Student

## 2022-08-16 ENCOUNTER — LAB REQUISITION (OUTPATIENT)
Dept: LAB | Facility: CLINIC | Age: 82
End: 2022-08-16
Payer: MEDICARE

## 2022-08-16 DIAGNOSIS — M12.812 OTHER SPECIFIC ARTHROPATHIES, NOT ELSEWHERE CLASSIFIED, LEFT SHOULDER: ICD-10-CM

## 2022-08-16 DIAGNOSIS — J44.9 CHRONIC OBSTRUCTIVE PULMONARY DISEASE, UNSPECIFIED (H): ICD-10-CM

## 2022-08-16 DIAGNOSIS — Z96.612 PRESENCE OF LEFT ARTIFICIAL SHOULDER JOINT: ICD-10-CM

## 2022-08-16 DIAGNOSIS — F32.A DEPRESSION, UNSPECIFIED: ICD-10-CM

## 2022-08-18 LAB
ALBUMIN SERPL BCG-MCNC: 3.5 G/DL (ref 3.5–5.2)
ALP SERPL-CCNC: 143 U/L (ref 35–104)
ALT SERPL W P-5'-P-CCNC: 14 U/L (ref 10–35)
ANION GAP SERPL CALCULATED.3IONS-SCNC: 11 MMOL/L (ref 7–15)
AST SERPL W P-5'-P-CCNC: 23 U/L (ref 10–35)
BILIRUB SERPL-MCNC: <0.2 MG/DL
BUN SERPL-MCNC: 23.4 MG/DL (ref 8–23)
CALCIUM SERPL-MCNC: 9.8 MG/DL (ref 8.8–10.2)
CHLORIDE SERPL-SCNC: 104 MMOL/L (ref 98–107)
CREAT SERPL-MCNC: 0.57 MG/DL (ref 0.51–0.95)
DEPRECATED HCO3 PLAS-SCNC: 26 MMOL/L (ref 22–29)
ERYTHROCYTE [DISTWIDTH] IN BLOOD BY AUTOMATED COUNT: 15.5 % (ref 10–15)
FERRITIN SERPL-MCNC: 89 NG/ML (ref 11–328)
GFR SERPL CREATININE-BSD FRML MDRD: >90 ML/MIN/1.73M2
GLUCOSE SERPL-MCNC: 96 MG/DL (ref 70–99)
HCT VFR BLD AUTO: 36.6 % (ref 35–47)
HGB BLD-MCNC: 10.8 G/DL (ref 11.7–15.7)
IRON SATN MFR SERPL: 12 % (ref 15–46)
IRON SERPL-MCNC: 34 UG/DL (ref 35–180)
IRON SERPL-MCNC: 34 UG/DL (ref 35–180)
MCH RBC QN AUTO: 25.8 PG (ref 26.5–33)
MCHC RBC AUTO-ENTMCNC: 29.5 G/DL (ref 31.5–36.5)
MCV RBC AUTO: 88 FL (ref 78–100)
PLATELET # BLD AUTO: 286 10E3/UL (ref 150–450)
POTASSIUM SERPL-SCNC: 4.3 MMOL/L (ref 3.4–5.3)
PROT SERPL-MCNC: 6.3 G/DL (ref 6.4–8.3)
RBC # BLD AUTO: 4.18 10E6/UL (ref 3.8–5.2)
SODIUM SERPL-SCNC: 141 MMOL/L (ref 136–145)
TIBC SERPL-MCNC: 283 UG/DL (ref 240–430)
TRANSFERRIN SERPL-MCNC: 224 MG/DL (ref 200–360)
WBC # BLD AUTO: 6.1 10E3/UL (ref 4–11)

## 2022-08-18 PROCEDURE — 36415 COLL VENOUS BLD VENIPUNCTURE: CPT | Mod: ORL | Performed by: INTERNAL MEDICINE

## 2022-08-18 PROCEDURE — 83540 ASSAY OF IRON: CPT | Mod: ORL | Performed by: INTERNAL MEDICINE

## 2022-08-18 PROCEDURE — 85027 COMPLETE CBC AUTOMATED: CPT | Mod: ORL | Performed by: INTERNAL MEDICINE

## 2022-08-18 PROCEDURE — 82728 ASSAY OF FERRITIN: CPT | Mod: ORL | Performed by: INTERNAL MEDICINE

## 2022-08-18 PROCEDURE — P9604 ONE-WAY ALLOW PRORATED TRIP: HCPCS | Mod: ORL | Performed by: INTERNAL MEDICINE

## 2022-08-18 PROCEDURE — 84466 ASSAY OF TRANSFERRIN: CPT | Mod: ORL | Performed by: INTERNAL MEDICINE

## 2022-08-18 PROCEDURE — 83550 IRON BINDING TEST: CPT | Mod: ORL | Performed by: INTERNAL MEDICINE

## 2022-08-18 PROCEDURE — 80053 COMPREHEN METABOLIC PANEL: CPT | Mod: ORL | Performed by: INTERNAL MEDICINE

## 2022-08-22 ENCOUNTER — TELEPHONE (OUTPATIENT)
Dept: GERIATRICS | Facility: CLINIC | Age: 82
End: 2022-08-22

## 2022-08-22 NOTE — TELEPHONE ENCOUNTER
[facility] Nurse Triage Telephone Note    Provider: KASADNRA Oneal DNP  Facility: [facility]   Facility Type:  TCU    Caller: Lior    Allergies   Allergen Reactions     Amantadine      Antihistamine Allergy Relief [Diphenhydramine] Other (See Comments)     hyperactivity     Bactrim [Sulfamethoxazole W/Trimethoprim]      Codeine Itching     Demerol [Meperidine] Nausea     Diazepam Other (See Comments)     Hallucinations/paranoid     Gabapentin      Hmg-Coa-R Inhibitors Other (See Comments)     Was hospitalized for possible rhabdo     Meloxicam Other (See Comments)     Pentazocine Other (See Comments)     drowsiness     Sulfamethoxazole-Trimethoprim      Tramadol Nausea       Reason for call: Patient has not been taking Florastor 250mg capsules for weeks and pharmacy reports this is not covered by insurance.     Verbal Order/Direction given by Provider:   - discontinue Florastor    Provider giving Order:  KASANDRA Oneal DNP    Verbal Order given to: Lior Back RN

## 2022-09-05 NOTE — PROGRESS NOTES
[facility] GERIATRICS  Chief Complaint   Patient presents with     long term Regulatory     [place] Medical Record Number:  [id number]  Place of Service where encounter took place:  [facility] ([facility]) [[id number]]    HPI:    Amparo Sharma  is 82 year old (1940), who is being seen today for a federally mandated E/M visit. Today's concerns are:     Physical deconditioning  Generalized muscle weakness  Itching  Severe protein-calorie malnutrition (H)  Iron deficiency anemia secondary to inadequate dietary iron intake  Essential hypertension  Normal pressure hydrocephalus (H)  Bipolar 1 disorder (H)  Status post total shoulder arthroplasty, left  Acute post-operative pain  Tachycardia  Hypoxia  Chronic obstructive pulmonary disease, unspecified COPD type (H)  Acute respiratory failure with hypoxia (H)  Atelectasis  Shortness of breath  Slow transit constipation     Met with patient who denies any chest pain, palpitations, shortness of breath, SOLOMON, lightheadedness, dizziness, or cough. Denies any abdominal discomfort. Denies N&V. Denies B&B concerns. Denies dysuria or frequency. Denies loose or constipation. Appetite good. Sleeping well. Minimal pain complaints to left knee which she reports good relief from current pain regimen. Nursing denies any acute concerns.     BP Readings from Last 3 Encounters:   09/05/22 106/70   08/09/22 93/54   07/26/22 129/76     Wt Readings from Last 5 Encounters:   09/05/22 66.8 kg (147 lb 4.8 oz)   08/09/22 67.6 kg (149 lb 1.6 oz)   07/26/22 66 kg (145 lb 9.6 oz)   07/13/22 68 kg (149 lb 14.4 oz)   07/05/22 68 kg (149 lb 14.4 oz)     ALLERGIES:Amantadine, Antihistamine allergy relief [diphenhydramine], Bactrim [sulfamethoxazole w/trimethoprim], Codeine, Demerol [meperidine], Diazepam, Gabapentin, Hmg-coa-r inhibitors, Meloxicam, Pentazocine, Sulfamethoxazole-trimethoprim, and Tramadol  PAST MEDICAL HISTORY:   Past Medical History:   Diagnosis Date      Abdominal hernia      Abdominal hernia      Alcoholism in remission (H)      Alcoholism in remission (H) 2017     Anemia      Arthritis      Arthritis      Asthma      Asthma 1/20/2017     Bipolar 1 disorder (H)      Bipolar disorder (H)      Bladder incontinence 2013     Cancer (H)      Cellulitis      Cellulitis 12/12/2016    of left elbow     Chronic pain syndrome      COPD (chronic obstructive pulmonary disease) (H)      COPD with acute exacerbation (H) 12/21/2018     Dementia (H)      Depression      Depression      Disease of thyroid gland      Falls frequently      Frequent falls 2016     Frequent falls 2017     GERD (gastroesophageal reflux disease)      GERD (gastroesophageal reflux disease)      History of blood transfusion     20 years ago     History of colon cancer     s/p resection 1/2015, treated with 5-6 cycles of Folfox     History of transfusion      HTN (hypertension)      Hydrocephalus (H)      Hydrocephalus (H)      Hyperlipidemia      Hypertension      Hypothyroidism      Hypothyroidism      Infection of skin due to methicillin resistant Staphylococcus aureus (MRSA)      Loss of balance      Memory loss      Memory loss 2017     Normal pressure hydrocephalus (H) 02/03/2017     NPH (normal pressure hydrocephalus) (H)      Parkinson disease (H)      Renal insufficiency      Squamous cell cancer of multiple sites of skin of upper arm, left 2016    Dr. Andrea      Thyroid disease      Urinary incontinence      Urinary incontinence      PAST SURGICAL HISTORY:   has a past surgical history that includes Hysterectomy; multiple knee surgeries; rotator cuff surgeries; Cholecystectomy; colon cancer resection (1/2015); GI surgery; Abdomen surgery; orthopedic surgery; Insert drain lumbar (N/A, 2/5/2017); Optical tracking system implant shunt ventriculoperitoneal (Right, 2/8/2017); GYN surgery; Esophagoscopy, gastroscopy, duodenoscopy (EGD), combined (N/A, 9/7/2017); Colon surgery; Laparoscopic assisted  colectomy; Hysterectomy; joint replacement; Arthroscopy shoulder rotator cuff repair; fracture surgery; Laparoscopic cholecystectomy; Implant shunt ventriculoperitoneal (02/03/2017); Lumbar Puncture (02/03/2017); RECONSTR TOTAL SHOULDER IMPLANT (Right, 3/5/2019); Colonoscopy (N/A, 2/3/2020); and Reverse arthroplasty shoulder (Left, 4/11/2022).  FAMILY HISTORY: family history includes Arthritis in her brother; Breast Cancer (age of onset: 60) in her mother; Depression (age of onset: 55) in her father; Peptic Ulcer Disease in her father; Prostate Cancer in her brother.  SOCIAL HISTORY:  reports that she quit smoking about 25 years ago. Her smoking use included cigarettes. She started smoking about 42 years ago. She smoked 0.25 packs per day. She has quit using smokeless tobacco. She reports that she does not drink alcohol and does not use drugs.    MEDICATIONS:     Review of your medicines          Accurate as of September 6, 2022  9:03 AM. If you have any questions, ask your nurse or doctor.            CONTINUE these medicines which have NOT CHANGED      Dose / Directions   acetaminophen 325 MG tablet  Commonly known as: TYLENOL  Used for: Acute post-operative pain      Dose: 975 mg  Take 3 tablets (975 mg) by mouth every 8 hours  Quantity: 90 tablet  Refills: 0     * albuterol (2.5 MG/3ML) 0.083% neb solution  Commonly known as: PROVENTIL      Dose: 2.5 mg  Inhale 2.5 mg into the lungs every 4 hours as needed for shortness of breath / dyspnea  Refills: 0     * albuterol 108 (90 Base) MCG/ACT inhaler  Commonly known as: PROAIR HFA/PROVENTIL HFA/VENTOLIN HFA      Dose: 2 puff  Inhale 2 puffs into the lungs every 4 hours as needed for shortness of breath / dyspnea  Refills: 0     amLODIPine 5 MG tablet  Commonly known as: NORVASC      Dose: 5 mg  Take 5 mg by mouth daily  Refills: 0     amoxicillin 500 MG capsule  Commonly known as: AMOXIL      Dose: 2,000 mg  Take 2,000 mg by mouth once as needed (Prior to dental  appt)  Refills: 0     ARIPiprazole 15 MG tablet  Commonly known as: ABILIFY      Dose: 15 mg  Take 15 mg by mouth At Bedtime  Refills: 0     aspirin 81 MG EC tablet  Commonly known as: ASA  Used for: S/P reverse total shoulder arthroplasty, left      Dose: 81 mg  Take 1 tablet (81 mg) by mouth in the morning and 1 tablet (81 mg) in the evening. Take with meals.  Quantity: 60 tablet  Refills: 0     Breo Ellipta 100-25 MCG/INH inhaler  Generic drug: fluticasone-vilanterol      Dose: 1 puff  Inhale 1 puff into the lungs daily 1PM  Refills: 0     * carboxymethylcellulose 0.5 % Soln ophthalmic solution  Commonly known as: REFRESH PLUS      Dose: 1 drop  Place 1 drop into both eyes in the morning.  Refills: 0     * carboxymethylcellulose 0.5 % Soln ophthalmic solution  Commonly known as: REFRESH PLUS      Dose: 1 drop  Place 1 drop into both eyes 3 times daily as needed for dry eyes  Refills: 0     cetirizine 10 MG tablet  Commonly known as: zyrTEC      Dose: 10 mg  Take 10 mg by mouth daily  Refills: 0     citalopram 10 MG tablet  Commonly known as: celeXA      Dose: 10 mg  Take 10 mg by mouth daily  Refills: 0     diclofenac 1 % topical gel  Commonly known as: VOLTAREN      Dose: 4 g  Apply 4 g topically 4 times daily Apply to affected knees.  Refills: 0     ferrous sulfate 325 (65 Fe) MG tablet  Commonly known as: FEROSUL      Dose: 325 mg  Take 1 tablet (325 mg) by mouth Every Mon, Wed, Fri Morning  Refills: 0     guaiFENesin 600 MG 12 hr tablet  Commonly known as: MUCINEX      Dose: 1,200 mg  Take 1,200 mg by mouth 2 times daily  Refills: 0     hydrocortisone 0.5 % external ointment  Commonly known as: CORTAID      Apply topically 2 times daily  Refills: 0     Incruse Ellipta 62.5 MCG/INH inhaler  Generic drug: umeclidinium      Dose: 1 puff  Inhale 1 puff into the lungs daily  Refills: 0     * lamoTRIgine 150 MG tablet  Commonly known as: LaMICtal      Dose: 150 mg  Take 150 mg by mouth daily  Refills: 0     *  lamoTRIgine 100 MG tablet  Commonly known as: LaMICtal      Dose: 100 mg  Take 100 mg by mouth At Bedtime  Refills: 0     levothyroxine 75 MCG tablet  Commonly known as: SYNTHROID/LEVOTHROID      Dose: 75 mcg  Take 75 mcg by mouth daily  Refills: 0     menthol-zinc oxide 0.44-20.6 % Oint ointment  Commonly known as: CALMOSEPTINE      Apply topically 2 times daily as needed for irritation  Refills: 0     metoprolol tartrate 25 MG tablet  Commonly known as: LOPRESSOR      Dose: 12.5 mg  Take 12.5 mg by mouth 2 times daily  Refills: 0     mirabegron 25 MG 24 hr tablet  Commonly known as: MYRBETRIQ      Dose: 25 mg  Take 25 mg by mouth daily  Refills: 0     MULTIVITAMIN PO  Used for: Gait disturbance, Memory loss, Malignant neoplasm of sigmoid colon (H)      Dose: 1 tablet  Take 1 tablet by mouth daily  Refills: 0     pantoprazole 20 MG EC tablet  Commonly known as: PROTONIX      Dose: 20 mg  Take 20 mg by mouth daily  Refills: 0     polyethylene glycol 17 GM/Dose powder  Commonly known as: MIRALAX  Used for: Drug-induced constipation      Dose: 17 g  Take 17 g by mouth daily  Quantity: 510 g  Refills: 0     QUEtiapine 25 MG tablet  Commonly known as: SEROquel      Dose: 12.5 mg  Take 12.5 mg by mouth At Bedtime  Refills: 0     Senokot S 8.6-50 MG tablet  Generic drug: senna-docusate      Dose: 2 tablet  Take 2 tablets by mouth daily  Refills: 0     vitamin C 500 MG tablet  Commonly known as: ASCORBIC ACID      Dose: 500 mg  Take 500 mg by mouth 2 times daily  Refills: 0     Vitamin D (Cholecalciferol) 25 MCG (1000 UT) Caps      Dose: 1,000 Units  Take 1,000 Units by mouth daily  Refills: 0         * This list has 6 medication(s) that are the same as other medications prescribed for you. Read the directions carefully, and ask your doctor or other care provider to review them with you.               Case Management:  I have reviewed the care plan and MDS and do agree with the plan. Patient's desire to return to the  community is present, but is not able due to care needs . Information reviewed:  Medications, vital signs, orders, and nursing notes.    ROS:  10 point ROS of systems including Constitutional, Eyes, Respiratory, Cardiovascular, Gastroenterology, Genitourinary, Integumentary, Musculoskeletal, Psychiatric were all negative except for pertinent positives noted in my HPI.    Vitals:  /70   Pulse 78   Temp 98.7  F (37.1  C)   Resp 16   Ht 1.524 m (5')   Wt 66.8 kg (147 lb 4.8 oz)   SpO2 96%   BMI 28.77 kg/m    Body mass index is 28.77 kg/m .  Exam:  GENERAL APPEARANCE:  Alert, in no distress, cooperative  ENT:  Mouth and posterior oropharynx normal, moist mucous membranes, [place]  EYES:  EOM, conjunctivae, lids, pupils and irises normal  NECK:  No adenopathy,masses or thyromegaly  RESP:  respiratory effort and palpation of chest normal, lungs clear to auscultation , no respiratory distress  CV:  Palpation and auscultation of heart done , regular rate and rhythm, no murmur, rub, or gallop, no edema, +2 pedal pulses  ABDOMEN:  normal bowel sounds, soft, nontender, no hepatosplenomegaly or other masses, no guarding or rebound  M/S:   Easy stand transfers. Wheelchair bound  SKIN:  Inspection of skin and subcutaneous tissue baseline, Palpation of skin and subcutaneous tissue baseline  NEURO:   Cranial nerves 2-12 are normal tested and grossly at patient's baseline, no purposeful movement in upper and lower extremities  PSYCH:  oriented X 3, insight and judgement impaired, memory impaired , affect and mood normal    Lab/Diagnostic data:     Most Recent 3 CBC's:  Recent Labs   Lab Test 08/18/22  0513 07/07/22  0534 06/02/22  1306 05/05/22  1108 04/28/22  0757   WBC 6.1  --   --  7.7 9.1   HGB 10.8* 11.9 11.5* 10.1* 9.2*   MCV 88  --   --  90 94     --   --  597* 708*     Most Recent 3 BMP's:  Recent Labs   Lab Test 08/18/22  0513 07/07/22  0534 06/02/22  1306    139 141   POTASSIUM 4.3 4.3 4.0   CHLORIDE  104 102 103   CO2 26 24 24   BUN 23.4* 16.3 19   CR 0.57 0.50* 0.69   ANIONGAP 11 13 14   MARTHA 9.8 10.0 9.8   GLC 96 86 106     Most Recent 2 LFT's:  Recent Labs   Lab Test 08/18/22  0513 07/07/22  0534   AST 23 20   ALT 14 17   ALKPHOS 143* 146*   BILITOTAL <0.2 0.2     Most Recent TSH and T4:  Recent Labs   Lab Test 03/09/22  1115   TSH 1.43     Most Recent Hemoglobin A1c:  Recent Labs   Lab Test 07/26/21  1157   A1C 5.7*     Most Recent Urinalysis:  Recent Labs   Lab Test 08/04/20  1914   COLOR Yellow   APPEARANCE Clear   URINEGLC Negative   URINEBILI Negative   URINEKETONE Negative   SG 1.024   UBLD Trace*   URINEPH 5.5   PROTEIN Negative   UROBILINOGEN <2.0 E.U./dL   NITRITE Negative   LEUKEST Negative   RBCU 0-2   WBCU 0-5     Most Recent Anemia Panel:  Recent Labs   Lab Test 08/18/22  0513 05/05/22  1108 04/28/22  0757 04/27/21  0450 02/03/21  0503   WBC 6.1   < > 9.1   < > 6.3   HGB 10.8*   < > 9.2*   < > 12.5   HCT 36.6   < > 30.7*   < > 41.1   MCV 88   < > 94   < > 89      < > 708*   < > 280   IRON 34*  34*   < > 21*   < >  --    IRONSAT 12*   < >  --   --   --       < >  --   --   --    JOAN 89   < > 581*   < >  --    B12  --   --   --   --  770   FOLIC  --   --  10.0  --   --     < > = values in this interval not displayed.       ASSESSMENT/PLAN  (R53.81) Physical deconditioning  (primary encounter diagnosis)  (M62.81) Generalized muscle weakness  Comment: Chronic. S/T below diagnosis  Plan:   -Continue therapies as directed  -Recommend LTC placement. Family working on placement options  -SW to remain involved for safe discharge planning needs    (L29.9) Itching  Comment: Acute on chronic. RESOLVING. History of c/o itching without visible rash noted to bilateral fingers with PHx of eczema. Treated with regimen of hydrocortisone BID x 5 day with good improvement.   Plan:   -Monitor itching complaints  -Monitor skin  -Trend labs within 1-2 months. Due Oct 2022     (E43) Severe protein-calorie  malnutrition (H)  Comment: Chronic. Not at goal. Last albumin low as noted. She notes tjhat she does not get a lot of protein in her diet. She is interested in daily ice cream based supplement. Continue with magic cup BID with plan for ongoing monitoring  Plan:   -Magic cup as directed  -Dietician on site as directed  -Monitor weights weekly  -Trend labs within 1-2 months. Due Oct 2022     (D50.8) Iron deficiency anemia secondary to inadequate dietary iron intake  Comment: Chronic. Prior anemia panel as noted. Previously on daily iron replacement with noted elevation of her ferritin. Changed to 3x weekly dosing with addition of vitamin C with noted improvement.   Plan:   -Monitor bleeding risks and concerns  -Continue vitamin C  -Continue ferrous sulfate 3x weekly.    -Trend labs within 1-2 months. Due Oct 2022     (I10) Essential hypertension  Comment: Chronic. Stable. Based on JNC-8 goals,  patients age of 82 year old, presence of diabetes or CKD, and goals of care goal BP is  <140/90 mm Hg. Patient is stable with current plan of care and routine assessment..  Plan:   -Monitor BP and HR as directed  -Continue amlodipine as directed  -Continue metoprolol as directed  -Trend labs within 1-2 months. Due Oct 2022     (G91.2) Normal pressure hydrocephalus (H)  Comment: Acute on chronic. With medication adjustments. Did go see Neuro again on 7/8 with no further adjustments in shunt - they do recommend restarting therapies to help with patient's physical abilities. Has tolerated weaning of Prozac; Unfortunately, prior order to initiate Celexa not processed, thus this wasn't started. Resident had been stable psychologically with occasional odd statements noted. Recent Lamictal levels in July 2022 was stable of 4.9  Plan:  -Continue Abilify, Lamictal, and Hydroxyzine as ordered with close monitoring for additional needs.   -Monitor for worsening s/sx of concerns.   -Trend labs within 1-2 months. Due Oct 2022     (F31.9)  Bipolar 1 disorder (H)  Comment: Chronic. Stable.   Plan:   -Monitor mood and behaviors  -Monitor for worsening s/sx of concerns  -Monitor falls and changes in mobility, eating and sleeping patterns  -Trend labs within 1-2 months. Due Oct 2022     (Z96.612) Status post total shoulder arthroplasty, left  (G89.18) Acute post-operative pain  Comment: Acute. Stable.   Plan:   -Monitor pain complaints  -Tylenol as directed  -Voltaren as directed  -Trend labs within 1-2 months. Due Oct 2022     (R00.0) Tachycardia  Comment: Acute on chronic. Slowly improving. HR elevated inpatient. Recent HR noted with stabilization in prior elevations.   Plan:   -Metoprolol 12.5mg BID as directed  -Monitor BP and HR monitoring as directed  -Trend labs within 1-2 months. Due Oct 2022     (R09.02) Hypoxia  (J44.9) Chronic obstructive pulmonary disease, unspecified COPD type (H)  (J96.01) Acute respiratory failure with hypoxia (H)  (J98.11) Atelectasis  (R06.02) Shortness of breath  Comment: Chronic. Using oxygen nigh-time only with PRN use during waking hours. No cough noted.   Plan:   -Monitor respiratory status  -Oxygen as directed  -Continue albuterol as directed  -Continue incruse as directed  -Continue breo as directed  -Mucinex as directed  -Trend labs within 1-2 months. Due Oct 2022     (K59.01) Slow transit constipation  Comment: Chronic. Stable  Plan:   -Monitor BM patterns  -Miralax 17gm daily  -Senna 2 tabs daily  -Trend labs within 1-2 months. Due Oct 2022     Electronically signed by:  Taylor Ledbetter DNP, APRN

## 2022-09-06 ENCOUNTER — NURSING HOME VISIT (OUTPATIENT)
Dept: GERIATRICS | Facility: CLINIC | Age: 82
End: 2022-09-06
Payer: MEDICARE

## 2022-09-06 VITALS
BODY MASS INDEX: 28.92 KG/M2 | RESPIRATION RATE: 16 BRPM | HEART RATE: 78 BPM | HEIGHT: 60 IN | TEMPERATURE: 98.7 F | OXYGEN SATURATION: 96 % | WEIGHT: 147.3 LBS | DIASTOLIC BLOOD PRESSURE: 70 MMHG | SYSTOLIC BLOOD PRESSURE: 106 MMHG

## 2022-09-06 DIAGNOSIS — I10 ESSENTIAL HYPERTENSION: ICD-10-CM

## 2022-09-06 DIAGNOSIS — J96.01 ACUTE RESPIRATORY FAILURE WITH HYPOXIA (H): ICD-10-CM

## 2022-09-06 DIAGNOSIS — E43 SEVERE PROTEIN-CALORIE MALNUTRITION (H): ICD-10-CM

## 2022-09-06 DIAGNOSIS — G91.2 NORMAL PRESSURE HYDROCEPHALUS (H): ICD-10-CM

## 2022-09-06 DIAGNOSIS — L29.9 ITCHING: ICD-10-CM

## 2022-09-06 DIAGNOSIS — J98.11 ATELECTASIS: ICD-10-CM

## 2022-09-06 DIAGNOSIS — R53.81 PHYSICAL DECONDITIONING: Primary | ICD-10-CM

## 2022-09-06 DIAGNOSIS — R09.02 HYPOXIA: ICD-10-CM

## 2022-09-06 DIAGNOSIS — G89.18 ACUTE POST-OPERATIVE PAIN: ICD-10-CM

## 2022-09-06 DIAGNOSIS — K59.01 SLOW TRANSIT CONSTIPATION: ICD-10-CM

## 2022-09-06 DIAGNOSIS — R00.0 TACHYCARDIA: ICD-10-CM

## 2022-09-06 DIAGNOSIS — M62.81 GENERALIZED MUSCLE WEAKNESS: ICD-10-CM

## 2022-09-06 DIAGNOSIS — R06.02 SHORTNESS OF BREATH: ICD-10-CM

## 2022-09-06 DIAGNOSIS — Z96.612 STATUS POST TOTAL SHOULDER ARTHROPLASTY, LEFT: ICD-10-CM

## 2022-09-06 DIAGNOSIS — J44.9 CHRONIC OBSTRUCTIVE PULMONARY DISEASE, UNSPECIFIED COPD TYPE (H): ICD-10-CM

## 2022-09-06 DIAGNOSIS — D50.8 IRON DEFICIENCY ANEMIA SECONDARY TO INADEQUATE DIETARY IRON INTAKE: ICD-10-CM

## 2022-09-06 DIAGNOSIS — F31.9 BIPOLAR 1 DISORDER (H): ICD-10-CM

## 2022-09-06 PROCEDURE — 99309 SBSQ NF CARE MODERATE MDM 30: CPT | Performed by: NURSE PRACTITIONER

## 2022-09-06 NOTE — LETTER
9/6/2022        RE: Amparo Sharma  [address]  [place] [address]        [facility]  Chief Complaint   Patient presents with     long-term Regulatory     [facility] Record Number:  [id number]  Place of Service where encounter took place:  No question data found.    HPI:    Amparo Sharma  is 82 year old (1940), who is being seen today for a federally mandated E/M visit. Today's concerns are:  {FGS DX:178270}    ALLERGIES:Amantadine, Antihistamine allergy relief [diphenhydramine], Bactrim [sulfamethoxazole w/trimethoprim], Codeine, Demerol [meperidine], Diazepam, Gabapentin, Hmg-coa-r inhibitors, Meloxicam, Pentazocine, Sulfamethoxazole-trimethoprim, and Tramadol  PAST MEDICAL HISTORY:   Past Medical History:   Diagnosis Date     Abdominal hernia      Abdominal hernia      Alcoholism in remission (H)      Alcoholism in remission (H) 2017     Anemia      Arthritis      Arthritis      Asthma      Asthma 1/20/2017     Bipolar 1 disorder (H)      Bipolar disorder (H)      Bladder incontinence 2013     Cancer (H)      Cellulitis      Cellulitis 12/12/2016    of left elbow     Chronic pain syndrome      COPD (chronic obstructive pulmonary disease) (H)      COPD with acute exacerbation (H) 12/21/2018     Dementia (H)      Depression      Depression      Disease of thyroid gland      Falls frequently      Frequent falls 2016     Frequent falls 2017     GERD (gastroesophageal reflux disease)      GERD (gastroesophageal reflux disease)      History of blood transfusion     20 years ago     History of colon cancer     s/p resection 1/2015, treated with 5-6 cycles of Folfox     History of transfusion      HTN (hypertension)      Hydrocephalus (H)      Hydrocephalus (H)      Hyperlipidemia      Hypertension      Hypothyroidism      Hypothyroidism      Infection of skin due to methicillin resistant Staphylococcus aureus (MRSA)      Loss of balance      Memory loss       Memory loss 2017     Normal pressure hydrocephalus (H) 02/03/2017     NPH (normal pressure hydrocephalus) (H)      Parkinson disease (H)      Renal insufficiency      Squamous cell cancer of multiple sites of skin of upper arm, left 2016    Dr. Andrea      Thyroid disease      Urinary incontinence      Urinary incontinence      PAST SURGICAL HISTORY:   has a past surgical history that includes Hysterectomy; multiple knee surgeries; rotator cuff surgeries; Cholecystectomy; colon cancer resection (1/2015); GI surgery; Abdomen surgery; orthopedic surgery; Insert drain lumbar (N/A, 2/5/2017); Optical tracking system implant shunt ventriculoperitoneal (Right, 2/8/2017); GYN surgery; Esophagoscopy, gastroscopy, duodenoscopy (EGD), combined (N/A, 9/7/2017); Colon surgery; Laparoscopic assisted colectomy; Hysterectomy; joint replacement; Arthroscopy shoulder rotator cuff repair; fracture surgery; Laparoscopic cholecystectomy; Implant shunt ventriculoperitoneal (02/03/2017); Lumbar Puncture (02/03/2017); RECONSTR TOTAL SHOULDER IMPLANT (Right, 3/5/2019); Colonoscopy (N/A, 2/3/2020); and Reverse arthroplasty shoulder (Left, 4/11/2022).  FAMILY HISTORY: family history includes Arthritis in her brother; Breast Cancer (age of onset: 60) in her mother; Depression (age of onset: 55) in her father; Peptic Ulcer Disease in her father; Prostate Cancer in her brother.  SOCIAL HISTORY:  reports that she quit smoking about 25 years ago. Her smoking use included cigarettes. She started smoking about 42 years ago. She smoked 0.25 packs per day. She has quit using smokeless tobacco. She reports that she does not drink alcohol and does not use drugs.    MEDICATIONS:     Review of your medicines          Accurate as of September 4, 2022  9:28 PM. If you have any questions, ask your nurse or doctor.            CONTINUE these medicines which have NOT CHANGED      Dose / Directions   acetaminophen 325 MG tablet  Commonly known as: TYLENOL  Used  for: Acute post-operative pain      Dose: 975 mg  Take 3 tablets (975 mg) by mouth every 8 hours  Quantity: 90 tablet  Refills: 0     * albuterol (2.5 MG/3ML) 0.083% neb solution  Commonly known as: PROVENTIL      Dose: 2.5 mg  Inhale 2.5 mg into the lungs every 4 hours as needed for shortness of breath / dyspnea  Refills: 0     * albuterol 108 (90 Base) MCG/ACT inhaler  Commonly known as: PROAIR HFA/PROVENTIL HFA/VENTOLIN HFA      Dose: 2 puff  Inhale 2 puffs into the lungs every 4 hours as needed for shortness of breath / dyspnea  Refills: 0     amLODIPine 5 MG tablet  Commonly known as: NORVASC      Dose: 5 mg  Take 5 mg by mouth daily  Refills: 0     amoxicillin 500 MG capsule  Commonly known as: AMOXIL      Dose: 2,000 mg  Take 2,000 mg by mouth once as needed (Prior to dental appt)  Refills: 0     ARIPiprazole 15 MG tablet  Commonly known as: ABILIFY      Dose: 15 mg  Take 15 mg by mouth At Bedtime  Refills: 0     aspirin 81 MG EC tablet  Commonly known as: ASA  Used for: S/P reverse total shoulder arthroplasty, left      Dose: 81 mg  Take 1 tablet (81 mg) by mouth in the morning and 1 tablet (81 mg) in the evening. Take with meals.  Quantity: 60 tablet  Refills: 0     Breo Ellipta 100-25 MCG/INH inhaler  Generic drug: fluticasone-vilanterol      Dose: 1 puff  Inhale 1 puff into the lungs daily 1PM  Refills: 0     * carboxymethylcellulose 0.5 % Soln ophthalmic solution  Commonly known as: REFRESH PLUS      Dose: 1 drop  Place 1 drop into both eyes in the morning.  Refills: 0     * carboxymethylcellulose 0.5 % Soln ophthalmic solution  Commonly known as: REFRESH PLUS      Dose: 1 drop  Place 1 drop into both eyes 3 times daily as needed for dry eyes  Refills: 0     cetirizine 10 MG tablet  Commonly known as: zyrTEC      Dose: 10 mg  Take 10 mg by mouth daily  Refills: 0     citalopram 10 MG tablet  Commonly known as: celeXA      Dose: 10 mg  Take 10 mg by mouth daily  Refills: 0     diclofenac 1 % topical  gel  Commonly known as: VOLTAREN      Dose: 4 g  Apply 4 g topically 4 times daily Apply to affected knees.  Refills: 0     ferrous sulfate 325 (65 Fe) MG tablet  Commonly known as: FEROSUL      Dose: 325 mg  Take 1 tablet (325 mg) by mouth Every Mon, Wed, Fri Morning  Refills: 0     guaiFENesin 600 MG 12 hr tablet  Commonly known as: MUCINEX      Dose: 1,200 mg  Take 1,200 mg by mouth 2 times daily  Refills: 0     hydrocortisone 0.5 % external ointment  Commonly known as: CORTAID      Apply topically 2 times daily  Refills: 0     Incruse Ellipta 62.5 MCG/INH inhaler  Generic drug: umeclidinium      Dose: 1 puff  Inhale 1 puff into the lungs daily  Refills: 0     * lamoTRIgine 150 MG tablet  Commonly known as: LaMICtal      Dose: 150 mg  Take 150 mg by mouth daily  Refills: 0     * lamoTRIgine 100 MG tablet  Commonly known as: LaMICtal      Dose: 100 mg  Take 100 mg by mouth At Bedtime  Refills: 0     levothyroxine 75 MCG tablet  Commonly known as: SYNTHROID/LEVOTHROID      Dose: 75 mcg  Take 75 mcg by mouth daily  Refills: 0     menthol-zinc oxide 0.44-20.6 % Oint ointment  Commonly known as: CALMOSEPTINE      Apply topically 2 times daily as needed for irritation  Refills: 0     metoprolol tartrate 25 MG tablet  Commonly known as: LOPRESSOR      Dose: 12.5 mg  Take 12.5 mg by mouth 2 times daily  Refills: 0     mirabegron 25 MG 24 hr tablet  Commonly known as: MYRBETRIQ      Dose: 25 mg  Take 25 mg by mouth daily  Refills: 0     MULTIVITAMIN PO  Used for: Gait disturbance, Memory loss, Malignant neoplasm of sigmoid colon (H)      Dose: 1 tablet  Take 1 tablet by mouth daily  Refills: 0     pantoprazole 20 MG EC tablet  Commonly known as: PROTONIX      Dose: 20 mg  Take 20 mg by mouth daily  Refills: 0     polyethylene glycol 17 GM/Dose powder  Commonly known as: MIRALAX  Used for: Drug-induced constipation      Dose: 17 g  Take 17 g by mouth daily  Quantity: 510 g  Refills: 0     QUEtiapine 25 MG tablet  Commonly  known as: SEROquel      Dose: 12.5 mg  Take 12.5 mg by mouth At Bedtime  Refills: 0     Senokot S 8.6-50 MG tablet  Generic drug: senna-docusate      Dose: 2 tablet  Take 2 tablets by mouth daily  Refills: 0     vitamin C 500 MG tablet  Commonly known as: ASCORBIC ACID      Dose: 500 mg  Take 500 mg by mouth 2 times daily  Refills: 0     Vitamin D (Cholecalciferol) 25 MCG (1000 UT) Caps      Dose: 1,000 Units  Take 1,000 Units by mouth daily  Refills: 0         * This list has 6 medication(s) that are the same as other medications prescribed for you. Read the directions carefully, and ask your doctor or other care provider to review them with you.             ***  Case Management:  I have reviewed the care plan and MDS and do agree with the plan. Patient's desire to return to the community is {FGS RETURN TO COMMUNITY:277460}. Information reviewed:  Medications, vital signs, orders, and nursing notes.    ROS:  {ROS FGS:061213}    Vitals:  There were no vitals taken for this visit.  There is no height or weight on file to calculate BMI.  Exam:  {retirement physical exam :404851}    Lab/Diagnostic data:   {fgslab:897065}    ASSESSMENT/PLAN  {FGS DX2:415944}    {fgsorders:183278}  ***    {fgstime1:847955}    Electronically signed by:  Kimi Jose MA***            Sincerely,        KASANDRA Lou CNP

## 2022-09-06 NOTE — LETTER
9/6/2022        RE: Amparo Sharma  [address]  [place] [address]        [facility] GERIATRICS  Chief Complaint   Patient presents with     jail [facility] Medical Record Number:  [id number]  Place of Service where encounter took place:  [facility] ([facility]) [[id number]]    HPI:    Amparo Sharma  is 82 year old (1940), who is being seen today for a federally mandated E/M visit. Today's concerns are:     Physical deconditioning  Generalized muscle weakness  Itching  Severe protein-calorie malnutrition (H)  Iron deficiency anemia secondary to inadequate dietary iron intake  Essential hypertension  Normal pressure hydrocephalus (H)  Bipolar 1 disorder (H)  Status post total shoulder arthroplasty, left  Acute post-operative pain  Tachycardia  Hypoxia  Chronic obstructive pulmonary disease, unspecified COPD type (H)  Acute respiratory failure with hypoxia (H)  Atelectasis  Shortness of breath  Slow transit constipation     Met with patient who denies any chest pain, palpitations, shortness of breath, SOLOMON, lightheadedness, dizziness, or cough. Denies any abdominal discomfort. Denies N&V. Denies B&B concerns. Denies dysuria or frequency. Denies loose or constipation. Appetite good. Sleeping well. Minimal pain complaints to left knee which she reports good relief from current pain regimen. Nursing denies any acute concerns.     BP Readings from Last 3 Encounters:   09/05/22 106/70   08/09/22 93/54   07/26/22 129/76     Wt Readings from Last 5 Encounters:   09/05/22 66.8 kg (147 lb 4.8 oz)   08/09/22 67.6 kg (149 lb 1.6 oz)   07/26/22 66 kg (145 lb 9.6 oz)   07/13/22 68 kg (149 lb 14.4 oz)   07/05/22 68 kg (149 lb 14.4 oz)     ALLERGIES:Amantadine, Antihistamine allergy relief [diphenhydramine], Bactrim [sulfamethoxazole w/trimethoprim], Codeine, Demerol [meperidine], Diazepam, Gabapentin, Hmg-coa-r inhibitors, Meloxicam, Pentazocine,  Sulfamethoxazole-trimethoprim, and Tramadol  PAST MEDICAL HISTORY:   Past Medical History:   Diagnosis Date     Abdominal hernia      Abdominal hernia      Alcoholism in remission (H)      Alcoholism in remission (H) 2017     Anemia      Arthritis      Arthritis      Asthma      Asthma 1/20/2017     Bipolar 1 disorder (H)      Bipolar disorder (H)      Bladder incontinence 2013     Cancer (H)      Cellulitis      Cellulitis 12/12/2016    of left elbow     Chronic pain syndrome      COPD (chronic obstructive pulmonary disease) (H)      COPD with acute exacerbation (H) 12/21/2018     Dementia (H)      Depression      Depression      Disease of thyroid gland      Falls frequently      Frequent falls 2016     Frequent falls 2017     GERD (gastroesophageal reflux disease)      GERD (gastroesophageal reflux disease)      History of blood transfusion     20 years ago     History of colon cancer     s/p resection 1/2015, treated with 5-6 cycles of Folfox     History of transfusion      HTN (hypertension)      Hydrocephalus (H)      Hydrocephalus (H)      Hyperlipidemia      Hypertension      Hypothyroidism      Hypothyroidism      Infection of skin due to methicillin resistant Staphylococcus aureus (MRSA)      Loss of balance      Memory loss      Memory loss 2017     Normal pressure hydrocephalus (H) 02/03/2017     NPH (normal pressure hydrocephalus) (H)      Parkinson disease (H)      Renal insufficiency      Squamous cell cancer of multiple sites of skin of upper arm, left 2016    Dr. Andrea      Thyroid disease      Urinary incontinence      Urinary incontinence      PAST SURGICAL HISTORY:   has a past surgical history that includes Hysterectomy; multiple knee surgeries; rotator cuff surgeries; Cholecystectomy; colon cancer resection (1/2015); GI surgery; Abdomen surgery; orthopedic surgery; Insert drain lumbar (N/A, 2/5/2017); Optical tracking system implant shunt ventriculoperitoneal (Right, 2/8/2017); GYN surgery;  Esophagoscopy, gastroscopy, duodenoscopy (EGD), combined (N/A, 9/7/2017); Colon surgery; Laparoscopic assisted colectomy; Hysterectomy; joint replacement; Arthroscopy shoulder rotator cuff repair; fracture surgery; Laparoscopic cholecystectomy; Implant shunt ventriculoperitoneal (02/03/2017); Lumbar Puncture (02/03/2017); RECONSTR TOTAL SHOULDER IMPLANT (Right, 3/5/2019); Colonoscopy (N/A, 2/3/2020); and Reverse arthroplasty shoulder (Left, 4/11/2022).  FAMILY HISTORY: family history includes Arthritis in her brother; Breast Cancer (age of onset: 60) in her mother; Depression (age of onset: 55) in her father; Peptic Ulcer Disease in her father; Prostate Cancer in her brother.  SOCIAL HISTORY:  reports that she quit smoking about 25 years ago. Her smoking use included cigarettes. She started smoking about 42 years ago. She smoked 0.25 packs per day. She has quit using smokeless tobacco. She reports that she does not drink alcohol and does not use drugs.    MEDICATIONS:     Review of your medicines          Accurate as of September 6, 2022  9:03 AM. If you have any questions, ask your nurse or doctor.            CONTINUE these medicines which have NOT CHANGED      Dose / Directions   acetaminophen 325 MG tablet  Commonly known as: TYLENOL  Used for: Acute post-operative pain      Dose: 975 mg  Take 3 tablets (975 mg) by mouth every 8 hours  Quantity: 90 tablet  Refills: 0     * albuterol (2.5 MG/3ML) 0.083% neb solution  Commonly known as: PROVENTIL      Dose: 2.5 mg  Inhale 2.5 mg into the lungs every 4 hours as needed for shortness of breath / dyspnea  Refills: 0     * albuterol 108 (90 Base) MCG/ACT inhaler  Commonly known as: PROAIR HFA/PROVENTIL HFA/VENTOLIN HFA      Dose: 2 puff  Inhale 2 puffs into the lungs every 4 hours as needed for shortness of breath / dyspnea  Refills: 0     amLODIPine 5 MG tablet  Commonly known as: NORVASC      Dose: 5 mg  Take 5 mg by mouth daily  Refills: 0     amoxicillin 500 MG  capsule  Commonly known as: AMOXIL      Dose: 2,000 mg  Take 2,000 mg by mouth once as needed (Prior to dental appt)  Refills: 0     ARIPiprazole 15 MG tablet  Commonly known as: ABILIFY      Dose: 15 mg  Take 15 mg by mouth At Bedtime  Refills: 0     aspirin 81 MG EC tablet  Commonly known as: ASA  Used for: S/P reverse total shoulder arthroplasty, left      Dose: 81 mg  Take 1 tablet (81 mg) by mouth in the morning and 1 tablet (81 mg) in the evening. Take with meals.  Quantity: 60 tablet  Refills: 0     Breo Ellipta 100-25 MCG/INH inhaler  Generic drug: fluticasone-vilanterol      Dose: 1 puff  Inhale 1 puff into the lungs daily 1PM  Refills: 0     * carboxymethylcellulose 0.5 % Soln ophthalmic solution  Commonly known as: REFRESH PLUS      Dose: 1 drop  Place 1 drop into both eyes in the morning.  Refills: 0     * carboxymethylcellulose 0.5 % Soln ophthalmic solution  Commonly known as: REFRESH PLUS      Dose: 1 drop  Place 1 drop into both eyes 3 times daily as needed for dry eyes  Refills: 0     cetirizine 10 MG tablet  Commonly known as: zyrTEC      Dose: 10 mg  Take 10 mg by mouth daily  Refills: 0     citalopram 10 MG tablet  Commonly known as: celeXA      Dose: 10 mg  Take 10 mg by mouth daily  Refills: 0     diclofenac 1 % topical gel  Commonly known as: VOLTAREN      Dose: 4 g  Apply 4 g topically 4 times daily Apply to affected knees.  Refills: 0     ferrous sulfate 325 (65 Fe) MG tablet  Commonly known as: FEROSUL      Dose: 325 mg  Take 1 tablet (325 mg) by mouth Every Mon, Wed, Fri Morning  Refills: 0     guaiFENesin 600 MG 12 hr tablet  Commonly known as: MUCINEX      Dose: 1,200 mg  Take 1,200 mg by mouth 2 times daily  Refills: 0     hydrocortisone 0.5 % external ointment  Commonly known as: CORTAID      Apply topically 2 times daily  Refills: 0     Incruse Ellipta 62.5 MCG/INH inhaler  Generic drug: umeclidinium      Dose: 1 puff  Inhale 1 puff into the lungs daily  Refills: 0     * lamoTRIgine  150 MG tablet  Commonly known as: LaMICtal      Dose: 150 mg  Take 150 mg by mouth daily  Refills: 0     * lamoTRIgine 100 MG tablet  Commonly known as: LaMICtal      Dose: 100 mg  Take 100 mg by mouth At Bedtime  Refills: 0     levothyroxine 75 MCG tablet  Commonly known as: SYNTHROID/LEVOTHROID      Dose: 75 mcg  Take 75 mcg by mouth daily  Refills: 0     menthol-zinc oxide 0.44-20.6 % Oint ointment  Commonly known as: CALMOSEPTINE      Apply topically 2 times daily as needed for irritation  Refills: 0     metoprolol tartrate 25 MG tablet  Commonly known as: LOPRESSOR      Dose: 12.5 mg  Take 12.5 mg by mouth 2 times daily  Refills: 0     mirabegron 25 MG 24 hr tablet  Commonly known as: MYRBETRIQ      Dose: 25 mg  Take 25 mg by mouth daily  Refills: 0     MULTIVITAMIN PO  Used for: Gait disturbance, Memory loss, Malignant neoplasm of sigmoid colon (H)      Dose: 1 tablet  Take 1 tablet by mouth daily  Refills: 0     pantoprazole 20 MG EC tablet  Commonly known as: PROTONIX      Dose: 20 mg  Take 20 mg by mouth daily  Refills: 0     polyethylene glycol 17 GM/Dose powder  Commonly known as: MIRALAX  Used for: Drug-induced constipation      Dose: 17 g  Take 17 g by mouth daily  Quantity: 510 g  Refills: 0     QUEtiapine 25 MG tablet  Commonly known as: SEROquel      Dose: 12.5 mg  Take 12.5 mg by mouth At Bedtime  Refills: 0     Senokot S 8.6-50 MG tablet  Generic drug: senna-docusate      Dose: 2 tablet  Take 2 tablets by mouth daily  Refills: 0     vitamin C 500 MG tablet  Commonly known as: ASCORBIC ACID      Dose: 500 mg  Take 500 mg by mouth 2 times daily  Refills: 0     Vitamin D (Cholecalciferol) 25 MCG (1000 UT) Caps      Dose: 1,000 Units  Take 1,000 Units by mouth daily  Refills: 0         * This list has 6 medication(s) that are the same as other medications prescribed for you. Read the directions carefully, and ask your doctor or other care provider to review them with you.               Case Management:   I have reviewed the care plan and MDS and do agree with the plan. Patient's desire to return to the community is present, but is not able due to care needs . Information reviewed:  Medications, vital signs, orders, and nursing notes.    ROS:  10 point ROS of systems including Constitutional, Eyes, Respiratory, Cardiovascular, Gastroenterology, Genitourinary, Integumentary, Musculoskeletal, Psychiatric were all negative except for pertinent positives noted in my HPI.    Vitals:  /70   Pulse 78   Temp 98.7  F (37.1  C)   Resp 16   Ht 1.524 m (5')   Wt 66.8 kg (147 lb 4.8 oz)   SpO2 96%   BMI 28.77 kg/m    Body mass index is 28.77 kg/m .  Exam:  GENERAL APPEARANCE:  Alert, in no distress, cooperative  ENT:  Mouth and posterior oropharynx normal, moist mucous membranes, [place]  EYES:  EOM, conjunctivae, lids, pupils and irises normal  NECK:  No adenopathy,masses or thyromegaly  RESP:  respiratory effort and palpation of chest normal, lungs clear to auscultation , no respiratory distress  CV:  Palpation and auscultation of heart done , regular rate and rhythm, no murmur, rub, or gallop, no edema, +2 pedal pulses  ABDOMEN:  normal bowel sounds, soft, nontender, no hepatosplenomegaly or other masses, no guarding or rebound  M/S:   Easy stand transfers. Wheelchair bound  SKIN:  Inspection of skin and subcutaneous tissue baseline, Palpation of skin and subcutaneous tissue baseline  NEURO:   Cranial nerves 2-12 are normal tested and grossly at patient's baseline, no purposeful movement in upper and lower extremities  PSYCH:  oriented X 3, insight and judgement impaired, memory impaired , affect and mood normal    Lab/Diagnostic data:     Most Recent 3 CBC's:  Recent Labs   Lab Test 08/18/22  0513 07/07/22  0534 06/02/22  1306 05/05/22  1108 04/28/22  0757   WBC 6.1  --   --  7.7 9.1   HGB 10.8* 11.9 11.5* 10.1* 9.2*   MCV 88  --   --  90 94     --   --  597* 708*     Most Recent 3 BMP's:  Recent Labs   Lab  Test 08/18/22  0513 07/07/22  0534 06/02/22  1306    139 141   POTASSIUM 4.3 4.3 4.0   CHLORIDE 104 102 103   CO2 26 24 24   BUN 23.4* 16.3 19   CR 0.57 0.50* 0.69   ANIONGAP 11 13 14   MARTHA 9.8 10.0 9.8   GLC 96 86 106     Most Recent 2 LFT's:  Recent Labs   Lab Test 08/18/22  0513 07/07/22  0534   AST 23 20   ALT 14 17   ALKPHOS 143* 146*   BILITOTAL <0.2 0.2     Most Recent TSH and T4:  Recent Labs   Lab Test 03/09/22  1115   TSH 1.43     Most Recent Hemoglobin A1c:  Recent Labs   Lab Test 07/26/21  1157   A1C 5.7*     Most Recent Urinalysis:  Recent Labs   Lab Test 08/04/20  1914   COLOR Yellow   APPEARANCE Clear   URINEGLC Negative   URINEBILI Negative   URINEKETONE Negative   SG 1.024   UBLD Trace*   URINEPH 5.5   PROTEIN Negative   UROBILINOGEN <2.0 E.U./dL   NITRITE Negative   LEUKEST Negative   RBCU 0-2   WBCU 0-5     Most Recent Anemia Panel:  Recent Labs   Lab Test 08/18/22  0513 05/05/22  1108 04/28/22  0757 04/27/21  0450 02/03/21  0503   WBC 6.1   < > 9.1   < > 6.3   HGB 10.8*   < > 9.2*   < > 12.5   HCT 36.6   < > 30.7*   < > 41.1   MCV 88   < > 94   < > 89      < > 708*   < > 280   IRON 34*  34*   < > 21*   < >  --    IRONSAT 12*   < >  --   --   --       < >  --   --   --    JOAN 89   < > 581*   < >  --    B12  --   --   --   --  770   FOLIC  --   --  10.0  --   --     < > = values in this interval not displayed.       ASSESSMENT/PLAN  (R53.81) Physical deconditioning  (primary encounter diagnosis)  (M62.81) Generalized muscle weakness  Comment: Chronic. S/T below diagnosis  Plan:   -Continue therapies as directed  -Recommend LTC placement. Family working on placement options  -SW to remain involved for safe discharge planning needs    (L29.9) Itching  Comment: Acute on chronic. RESOLVING. History of c/o itching without visible rash noted to bilateral fingers with PHx of eczema. Treated with regimen of hydrocortisone BID x 5 day with good improvement.   Plan:   -Monitor itching  complaints  -Monitor skin  -Trend labs within 1-2 months. Due Oct 2022     (E43) Severe protein-calorie malnutrition (H)  Comment: Chronic. Not at goal. Last albumin low as noted. She notes tjhat she does not get a lot of protein in her diet. She is interested in daily ice cream based supplement. Continue with magic cup BID with plan for ongoing monitoring  Plan:   -Magic cup as directed  -Dietician on site as directed  -Monitor weights weekly  -Trend labs within 1-2 months. Due Oct 2022     (D50.8) Iron deficiency anemia secondary to inadequate dietary iron intake  Comment: Chronic. Prior anemia panel as noted. Previously on daily iron replacement with noted elevation of her ferritin. Changed to 3x weekly dosing with addition of vitamin C with noted improvement.   Plan:   -Monitor bleeding risks and concerns  -Continue vitamin C  -Continue ferrous sulfate 3x weekly.    -Trend labs within 1-2 months. Due Oct 2022     (I10) Essential hypertension  Comment: Chronic. Stable. Based on JNC-8 goals,  patients age of 82 year old, presence of diabetes or CKD, and goals of care goal BP is  <140/90 mm Hg. Patient is stable with current plan of care and routine assessment..  Plan:   -Monitor BP and HR as directed  -Continue amlodipine as directed  -Continue metoprolol as directed  -Trend labs within 1-2 months. Due Oct 2022     (G91.2) Normal pressure hydrocephalus (H)  Comment: Acute on chronic. With medication adjustments. Did go see Neuro again on 7/8 with no further adjustments in shunt - they do recommend restarting therapies to help with patient's physical abilities. Has tolerated weaning of Prozac; Unfortunately, prior order to initiate Celexa not processed, thus this wasn't started. Resident had been stable psychologically with occasional odd statements noted. Recent Lamictal levels in July 2022 was stable of 4.9  Plan:  -Continue Abilify, Lamictal, and Hydroxyzine as ordered with close monitoring for additional needs.    -Monitor for worsening s/sx of concerns.   -Trend labs within 1-2 months. Due Oct 2022     (F31.9) Bipolar 1 disorder (H)  Comment: Chronic. Stable.   Plan:   -Monitor mood and behaviors  -Monitor for worsening s/sx of concerns  -Monitor falls and changes in mobility, eating and sleeping patterns  -Trend labs within 1-2 months. Due Oct 2022     (Z96.612) Status post total shoulder arthroplasty, left  (G89.18) Acute post-operative pain  Comment: Acute. Stable.   Plan:   -Monitor pain complaints  -Tylenol as directed  -Voltaren as directed  -Trend labs within 1-2 months. Due Oct 2022     (R00.0) Tachycardia  Comment: Acute on chronic. Slowly improving. HR elevated inpatient. Recent HR noted with stabilization in prior elevations.   Plan:   -Metoprolol 12.5mg BID as directed  -Monitor BP and HR monitoring as directed  -Trend labs within 1-2 months. Due Oct 2022     (R09.02) Hypoxia  (J44.9) Chronic obstructive pulmonary disease, unspecified COPD type (H)  (J96.01) Acute respiratory failure with hypoxia (H)  (J98.11) Atelectasis  (R06.02) Shortness of breath  Comment: Chronic. Using oxygen nigh-time only with PRN use during waking hours. No cough noted.   Plan:   -Monitor respiratory status  -Oxygen as directed  -Continue albuterol as directed  -Continue incruse as directed  -Continue breo as directed  -Mucinex as directed  -Trend labs within 1-2 months. Due Oct 2022     (K59.01) Slow transit constipation  Comment: Chronic. Stable  Plan:   -Monitor BM patterns  -Miralax 17gm daily  -Senna 2 tabs daily  -Trend labs within 1-2 months. Due Oct 2022     Electronically signed by:  Taylor Ledbetter DNP, APRN          Sincerely,        Taylor Ledbetter, KASANDRA CNP

## 2022-09-26 NOTE — PROGRESS NOTES
[facility] GERIATRICS    Chief Complaint   Patient presents with     RECHECK     HPI:  Amparo Sharma is a 82 year old  (1940), who is being seen today for an episodic care visit at: [place] [facility] ([facility]) [[id number]]. Today's concern is: The primary encounter diagnosis was Physical deconditioning. Diagnoses of Generalized muscle weakness, Itching, Severe protein-calorie malnutrition (H), Iron deficiency anemia secondary to inadequate dietary iron intake, Essential hypertension, Bipolar 1 disorder (H), Normal pressure hydrocephalus (H), Status post total shoulder arthroplasty, left, Acute post-operative pain, Tachycardia, Hypoxia, Acute respiratory failure with hypoxia (H), Chronic obstructive pulmonary disease, unspecified COPD type (H), Shortness of breath, Atelectasis, Slow transit constipation, Suicidal thoughts, and Episode of recurrent major depressive disorder, unspecified depression episode severity (H) were also pertinent to this visit.    Met with patient who denies any chest pain, palpitations, shortness of breath, SOLOMON, lightheadedness, dizziness, or cough. Denies any abdominal discomfort. Denies N&V. Denies dysuria or frequency. She reports her stools are loose for the past few days. Suspect senna related. Appetite fair. Sleeping well. She reports increased depression complaints with thoughts of suicide per report. She reports she is not happy with her current health status and not on any therapy anymore as she is hopeful she could return back to previous living condition, however in order to do that she must be more independent with transfers which she is not. I did discuss this with patient that she is not progressing anymore therefore returning to previous living arrangements would be not safe. I did update SW to advise about her suicidal thoughts statements today. No plan reported. Nursing denies any acute concerns.     BP Readings from Last 3 Encounters:   09/05/22 106/70    09/05/22 106/70   08/09/22 93/54     Wt Readings from Last 5 Encounters:   09/05/22 66.7 kg (147 lb)   09/05/22 66.8 kg (147 lb 4.8 oz)   08/09/22 67.6 kg (149 lb 1.6 oz)   07/26/22 66 kg (145 lb 9.6 oz)   07/13/22 68 kg (149 lb 14.4 oz)     Allergies, and PMH/PSH reviewed in EPIC today.  REVIEW OF SYSTEMS:  10 point ROS of systems including Constitutional, Eyes, Respiratory, Cardiovascular, Gastroenterology, Genitourinary, Integumentary, Musculoskeletal, Psychiatric were all negative except for pertinent positives noted in my HPI.    Objective:   /70   Pulse 78   Temp 98.7  F (37.1  C)   Resp 16   Ht 1.524 m (5')   Wt 66.7 kg (147 lb)   SpO2 96%   BMI 28.71 kg/m    GENERAL APPEARANCE:  Alert, in no distress, cooperative  ENT:  Mouth and posterior oropharynx normal, moist mucous membranes, [place]  EYES:  EOM, conjunctivae, lids, pupils and irises normal  NECK:  No adenopathy,masses or thyromegaly  RESP:  respiratory effort and palpation of chest normal, lungs clear to auscultation , no respiratory distress  CV:  Palpation and auscultation of heart done , regular rate and rhythm, no murmur, rub, or gallop, no edema, +2 pedal pulses  ABDOMEN:  normal bowel sounds, soft, nontender, no hepatosplenomegaly or other masses, no guarding or rebound  M/S:   wheelchair bound. easy stand transfers. staff to assist with all needs and cares  SKIN:  Inspection of skin and subcutaneous tissue baseline, Palpation of skin and subcutaneous tissue baseline  NEURO:   Cranial nerves 2-12 are normal tested and grossly at patient's baseline, no purposeful movement in upper and lower extremities  PSYCH:  oriented X 3, memory impaired , affect and mood normal      Most Recent 3 CBC's:  Recent Labs   Lab Test 08/18/22  0513 07/07/22  0534 06/02/22  1306 05/05/22  1108 04/28/22  0757   WBC 6.1  --   --  7.7 9.1   HGB 10.8* 11.9 11.5* 10.1* 9.2*   MCV 88  --   --  90 94     --   --  597* 708*     Most Recent 3 BMP's:  Recent  Labs   Lab Test 08/18/22  0513 07/07/22  0534 06/02/22  1306    139 141   POTASSIUM 4.3 4.3 4.0   CHLORIDE 104 102 103   CO2 26 24 24   BUN 23.4* 16.3 19   CR 0.57 0.50* 0.69   ANIONGAP 11 13 14   MARTHA 9.8 10.0 9.8   GLC 96 86 106     Most Recent 2 LFT's:  Recent Labs   Lab Test 08/18/22  0513 07/07/22  0534   AST 23 20   ALT 14 17   ALKPHOS 143* 146*   BILITOTAL <0.2 0.2     Most Recent Anemia Panel:  Recent Labs   Lab Test 08/18/22  0513 05/05/22  1108 04/28/22  0757 04/27/21  0450 02/03/21  0503   WBC 6.1   < > 9.1   < > 6.3   HGB 10.8*   < > 9.2*   < > 12.5   HCT 36.6   < > 30.7*   < > 41.1   MCV 88   < > 94   < > 89      < > 708*   < > 280   IRON 34*  34*   < > 21*   < >  --    IRONSAT 12*   < >  --   --   --       < >  --   --   --    JOAN 89   < > 581*   < >  --    B12  --   --   --   --  770   FOLIC  --   --  10.0  --   --     < > = values in this interval not displayed.     Lab Results   Component Value Date    ALBUMIN 3.5 08/18/2022    ALBUMIN 3.1 06/02/2022    ALBUMIN 3.8 10/16/2018     Magnesium   Date Value Ref Range Status   04/28/2022 2.0 1.8 - 2.6 mg/dL Final   10/16/2018 2.0 1.6 - 2.3 mg/dL Final       ASSESSMENT/PLAN  (R53.81) Physical deconditioning  (primary encounter diagnosis)  (M62.81) Generalized muscle weakness  Comment: Chronic. S/T below diagnosis  Plan:   -Continue therapies as directed  -Recommend LTC placement. Family working on placement options  -SW to remain involved for safe discharge planning needs     (L29.9) Itching  Comment: Acute on chronic. RESOLVING. History of c/o itching without visible rash noted to bilateral fingers with PHx of eczema. Treated with regimen of hydrocortisone BID x 5 day with good improvement.   Plan:   -Monitor itching complaints  -Monitor skin  -CMP, CBC, vitamin D, magnesium, lamotrigine level due 10/4/22     (E43) Severe protein-calorie malnutrition (H)  Comment: Chronic. Not at goal. Recent weight stable of 147#  Plan:   -Magic cup  as directed  -Dietician on site as directed  -Monitor weights weekly  -CMP, CBC, vitamin D, magnesium, lamotrigine level due 10/4/22     (D50.8) Iron deficiency anemia secondary to inadequate dietary iron intake  Comment: Chronic. Prior anemia panel as noted. Previously on daily iron replacement with noted elevation of her ferritin. Changed to 3x weekly dosing with addition of vitamin C with noted improvement.   Plan:   -Monitor bleeding risks and concerns  -Continue vitamin C  -Continue ferrous sulfate 3x weekly.    -CMP, CBC, vitamin D, magnesium, lamotrigine level due 10/4/22     (I10) Essential hypertension  Comment: Chronic. Stable. Based on JNC-8 goals,  patients age of 82 year old, presence of diabetes or CKD, and goals of care goal BP is  <140/90 mm Hg. Patient is stable with current plan of care and routine assessment..  Plan:   -Monitor BP and HR as directed  -Continue amlodipine as directed  -Continue metoprolol as directed. ADD hold parameters accordingly.   -CMP, CBC, vitamin D, magnesium, lamotrigine level due 10/4/22     (G91.2) Normal pressure hydrocephalus (H)  Comment: Acute on chronic. With medication adjustments. Did go see Neuro again on 7/8 with no further adjustments in shunt - they do recommend restarting therapies to help with patient's physical abilities. Has tolerated weaning of Prozac; Unfortunately, prior order to initiate Celexa not processed, thus this wasn't started. Resident had been stable psychologically with occasional odd statements noted. Recent Lamictal levels in July 2022 was stable of 4.9  Plan:  -Continue Abilify, Lamictal as ordered with close monitoring for additional needs.   -Monitor for worsening s/sx of concerns.   -CMP, CBC, vitamin D, magnesium, lamotrigine level due 10/4/22     (F31.9) Bipolar 1 disorder (H)  (R45.851) Suicidal thoughts  (F33.9) Major depression  Comment: Chronic. Not at goal. She reports increased depression complaints with thoughts of suicide per  report. She reports she is not happy with her current health status and not on any therapy anymore as she is hopeful she could return back to previous living condition, however in order to do that she must be more independent with transfers which she is not. I did discuss this with patient that she is not progressing anymore therefore returning to previous living arrangements would be not safe. I did update SW to advise about her suicidal thoughts statements today. No plan reported.   Plan:   -Monitor mood and behaviors  -ACP eval and treatment  -Start 15 minute checks x 3 days due to suicidal reports.   -Increase seroquel to 25mg díaz at HS.   -Continue abilify as directed  -Continue celexa 10mg daily for now. If no relief from seroquel dose adjustment, would recommend to increase this dose as well.    -Monitor for worsening s/sx of concerns  -Monitor falls and changes in mobility, eating and sleeping patterns  -CMP, CBC, vitamin D, magnesium, lamotrigine level due 10/4/22     (Z96.612) Status post total shoulder arthroplasty, left  (G89.18) Acute post-operative pain  Comment: Acute. Stable.   Plan:   -Monitor pain complaints  -Tylenol as directed  -Voltaren as directed  -CMP, CBC, vitamin D, magnesium, lamotrigine level due 10/4/22     (R00.0) Tachycardia  Comment: Acute on chronic. Slowly improving. HR elevated inpatient. Recent HR noted with stabilization in prior elevations.   Plan:   -Metoprolol 12.5mg BID as directed  -Monitor BP and HR monitoring as directed  -CMP, CBC, vitamin D, magnesium, lamotrigine level due 10/4/22     (R09.02) Hypoxia  (J44.9) Chronic obstructive pulmonary disease, unspecified COPD type (H)  (J96.01) Acute respiratory failure with hypoxia (H)  (J98.11) Atelectasis  (R06.02) Shortness of breath  Comment: Chronic. Using oxygen nigh-time only with PRN use during waking hours. No cough noted.   Plan:   -Monitor respiratory status  -Discontinue oxygen due to non use  -Continue albuterol as  directed  -Continue incruse as directed  -Continue breo as directed  -Mucinex as directed  -CMP, CBC, vitamin D, magnesium, lamotrigine level due 10/4/22     (K59.01) Slow transit constipation  Comment: Chronic. Not at goal. Reports now having diarrhea concerns.   Plan:   -Monitor BM patterns  -Miralax 17gm daily  -Change senna to 1 tab BID PRN     Electronically signed by:  Taylor Ledbetter DNP, APRN

## 2022-09-27 ENCOUNTER — TRANSITIONAL CARE UNIT VISIT (OUTPATIENT)
Dept: GERIATRICS | Facility: CLINIC | Age: 82
End: 2022-09-27
Payer: MEDICARE

## 2022-09-27 VITALS
SYSTOLIC BLOOD PRESSURE: 106 MMHG | WEIGHT: 147 LBS | RESPIRATION RATE: 16 BRPM | HEART RATE: 78 BPM | DIASTOLIC BLOOD PRESSURE: 70 MMHG | BODY MASS INDEX: 28.86 KG/M2 | HEIGHT: 60 IN | OXYGEN SATURATION: 96 % | TEMPERATURE: 98.7 F

## 2022-09-27 DIAGNOSIS — D50.8 IRON DEFICIENCY ANEMIA SECONDARY TO INADEQUATE DIETARY IRON INTAKE: ICD-10-CM

## 2022-09-27 DIAGNOSIS — R06.02 SHORTNESS OF BREATH: ICD-10-CM

## 2022-09-27 DIAGNOSIS — R45.851 SUICIDAL THOUGHTS: ICD-10-CM

## 2022-09-27 DIAGNOSIS — L29.9 ITCHING: ICD-10-CM

## 2022-09-27 DIAGNOSIS — J98.11 ATELECTASIS: ICD-10-CM

## 2022-09-27 DIAGNOSIS — E43 SEVERE PROTEIN-CALORIE MALNUTRITION (H): ICD-10-CM

## 2022-09-27 DIAGNOSIS — J96.01 ACUTE RESPIRATORY FAILURE WITH HYPOXIA (H): ICD-10-CM

## 2022-09-27 DIAGNOSIS — R00.0 TACHYCARDIA: ICD-10-CM

## 2022-09-27 DIAGNOSIS — F33.9 EPISODE OF RECURRENT MAJOR DEPRESSIVE DISORDER, UNSPECIFIED DEPRESSION EPISODE SEVERITY (H): ICD-10-CM

## 2022-09-27 DIAGNOSIS — R09.02 HYPOXIA: ICD-10-CM

## 2022-09-27 DIAGNOSIS — R53.81 PHYSICAL DECONDITIONING: Primary | ICD-10-CM

## 2022-09-27 DIAGNOSIS — M62.81 GENERALIZED MUSCLE WEAKNESS: ICD-10-CM

## 2022-09-27 DIAGNOSIS — F31.9 BIPOLAR 1 DISORDER (H): ICD-10-CM

## 2022-09-27 DIAGNOSIS — J44.9 CHRONIC OBSTRUCTIVE PULMONARY DISEASE, UNSPECIFIED COPD TYPE (H): ICD-10-CM

## 2022-09-27 DIAGNOSIS — I10 ESSENTIAL HYPERTENSION: ICD-10-CM

## 2022-09-27 DIAGNOSIS — Z96.612 STATUS POST TOTAL SHOULDER ARTHROPLASTY, LEFT: ICD-10-CM

## 2022-09-27 DIAGNOSIS — G89.18 ACUTE POST-OPERATIVE PAIN: ICD-10-CM

## 2022-09-27 DIAGNOSIS — K59.01 SLOW TRANSIT CONSTIPATION: ICD-10-CM

## 2022-09-27 DIAGNOSIS — G91.2 NORMAL PRESSURE HYDROCEPHALUS (H): ICD-10-CM

## 2022-09-27 PROCEDURE — 99309 SBSQ NF CARE MODERATE MDM 30: CPT | Performed by: NURSE PRACTITIONER

## 2022-09-27 RX ORDER — QUETIAPINE FUMARATE 25 MG/1
25 TABLET, FILM COATED ORAL AT BEDTIME
Qty: 60 TABLET | Refills: 3 | Status: SHIPPED | OUTPATIENT
Start: 2022-09-27

## 2022-09-27 RX ORDER — STANDARDIZED SENNA CONCENTRATE AND DOCUSATE SODIUM 8.6; 5 MG/1; MG/1
1 TABLET ORAL 2 TIMES DAILY PRN
Start: 2022-09-27 | End: 2022-10-18

## 2022-09-27 NOTE — LETTER
9/27/2022        RE: Amparo Sharma  [address]  [place] [address]        [facility] GERIATRICS    Chief Complaint   Patient presents with     RECHECK     HPI:  Amparo Sharma is a 82 year old  (1940), who is being seen today for an episodic care visit at: [facility] ([facility]) [[id number]]. Today's concern is: The primary encounter diagnosis was Physical deconditioning. Diagnoses of Generalized muscle weakness, Itching, Severe protein-calorie malnutrition (H), Iron deficiency anemia secondary to inadequate dietary iron intake, Essential hypertension, Bipolar 1 disorder (H), Normal pressure hydrocephalus (H), Status post total shoulder arthroplasty, left, Acute post-operative pain, Tachycardia, Hypoxia, Acute respiratory failure with hypoxia (H), Chronic obstructive pulmonary disease, unspecified COPD type (H), Shortness of breath, Atelectasis, Slow transit constipation, Suicidal thoughts, and Episode of recurrent major depressive disorder, unspecified depression episode severity (H) were also pertinent to this visit.    Met with patient who denies any chest pain, palpitations, shortness of breath, SOLOMON, lightheadedness, dizziness, or cough. Denies any abdominal discomfort. Denies N&V. Denies dysuria or frequency. She reports her stools are loose for the past few days. Suspect senna related. Appetite fair. Sleeping well. She reports increased depression complaints with thoughts of suicide per report. She reports she is not happy with her current health status and not on any therapy anymore as she is hopeful she could return back to previous living condition, however in order to do that she must be more independent with transfers which she is not. I did discuss this with patient that she is not progressing anymore therefore returning to previous living arrangements would be not safe. I did update SW to advise about her suicidal thoughts statements today. No plan  reported. Nursing denies any acute concerns.     BP Readings from Last 3 Encounters:   09/05/22 106/70   09/05/22 106/70   08/09/22 93/54     Wt Readings from Last 5 Encounters:   09/05/22 66.7 kg (147 lb)   09/05/22 66.8 kg (147 lb 4.8 oz)   08/09/22 67.6 kg (149 lb 1.6 oz)   07/26/22 66 kg (145 lb 9.6 oz)   07/13/22 68 kg (149 lb 14.4 oz)     Allergies, and PMH/PSH reviewed in EPIC today.  REVIEW OF SYSTEMS:  10 point ROS of systems including Constitutional, Eyes, Respiratory, Cardiovascular, Gastroenterology, Genitourinary, Integumentary, Musculoskeletal, Psychiatric were all negative except for pertinent positives noted in my HPI.    Objective:   /70   Pulse 78   Temp 98.7  F (37.1  C)   Resp 16   Ht 1.524 m (5')   Wt 66.7 kg (147 lb)   SpO2 96%   BMI 28.71 kg/m    GENERAL APPEARANCE:  Alert, in no distress, cooperative  ENT:  Mouth and posterior oropharynx normal, moist mucous membranes, [place]  EYES:  EOM, conjunctivae, lids, pupils and irises normal  NECK:  No adenopathy,masses or thyromegaly  RESP:  respiratory effort and palpation of chest normal, lungs clear to auscultation , no respiratory distress  CV:  Palpation and auscultation of heart done , regular rate and rhythm, no murmur, rub, or gallop, no edema, +2 pedal pulses  ABDOMEN:  normal bowel sounds, soft, nontender, no hepatosplenomegaly or other masses, no guarding or rebound  M/S:   wheelchair bound. easy stand transfers. staff to assist with all needs and cares  SKIN:  Inspection of skin and subcutaneous tissue baseline, Palpation of skin and subcutaneous tissue baseline  NEURO:   Cranial nerves 2-12 are normal tested and grossly at patient's baseline, no purposeful movement in upper and lower extremities  PSYCH:  oriented X 3, memory impaired , affect and mood normal      Most Recent 3 CBC's:  Recent Labs   Lab Test 08/18/22  0513 07/07/22  0534 06/02/22  1306 05/05/22  1108 04/28/22  0757   WBC 6.1  --   --  7.7 9.1   HGB 10.8* 11.9  11.5* 10.1* 9.2*   MCV 88  --   --  90 94     --   --  597* 708*     Most Recent 3 BMP's:  Recent Labs   Lab Test 08/18/22  0513 07/07/22  0534 06/02/22  1306    139 141   POTASSIUM 4.3 4.3 4.0   CHLORIDE 104 102 103   CO2 26 24 24   BUN 23.4* 16.3 19   CR 0.57 0.50* 0.69   ANIONGAP 11 13 14   MARTHA 9.8 10.0 9.8   GLC 96 86 106     Most Recent 2 LFT's:  Recent Labs   Lab Test 08/18/22  0513 07/07/22  0534   AST 23 20   ALT 14 17   ALKPHOS 143* 146*   BILITOTAL <0.2 0.2     Most Recent Anemia Panel:  Recent Labs   Lab Test 08/18/22  0513 05/05/22  1108 04/28/22  0757 04/27/21  0450 02/03/21  0503   WBC 6.1   < > 9.1   < > 6.3   HGB 10.8*   < > 9.2*   < > 12.5   HCT 36.6   < > 30.7*   < > 41.1   MCV 88   < > 94   < > 89      < > 708*   < > 280   IRON 34*  34*   < > 21*   < >  --    IRONSAT 12*   < >  --   --   --       < >  --   --   --    JOAN 89   < > 581*   < >  --    B12  --   --   --   --  770   FOLIC  --   --  10.0  --   --     < > = values in this interval not displayed.     Lab Results   Component Value Date    ALBUMIN 3.5 08/18/2022    ALBUMIN 3.1 06/02/2022    ALBUMIN 3.8 10/16/2018     Magnesium   Date Value Ref Range Status   04/28/2022 2.0 1.8 - 2.6 mg/dL Final   10/16/2018 2.0 1.6 - 2.3 mg/dL Final       ASSESSMENT/PLAN  (R53.81) Physical deconditioning  (primary encounter diagnosis)  (M62.81) Generalized muscle weakness  Comment: Chronic. S/T below diagnosis  Plan:   -Continue therapies as directed  -Recommend LTC placement. Family working on placement options  -SW to remain involved for safe discharge planning needs     (L29.9) Itching  Comment: Acute on chronic. RESOLVING. History of c/o itching without visible rash noted to bilateral fingers with PHx of eczema. Treated with regimen of hydrocortisone BID x 5 day with good improvement.   Plan:   -Monitor itching complaints  -Monitor skin  -CMP, CBC, vitamin D, magnesium, lamotrigine level due 10/4/22     (E43) Severe  protein-calorie malnutrition (H)  Comment: Chronic. Not at goal. Recent weight stable of 147#  Plan:   -Magic cup as directed  -Dietician on site as directed  -Monitor weights weekly  -CMP, CBC, vitamin D, magnesium, lamotrigine level due 10/4/22     (D50.8) Iron deficiency anemia secondary to inadequate dietary iron intake  Comment: Chronic. Prior anemia panel as noted. Previously on daily iron replacement with noted elevation of her ferritin. Changed to 3x weekly dosing with addition of vitamin C with noted improvement.   Plan:   -Monitor bleeding risks and concerns  -Continue vitamin C  -Continue ferrous sulfate 3x weekly.    -CMP, CBC, vitamin D, magnesium, lamotrigine level due 10/4/22     (I10) Essential hypertension  Comment: Chronic. Stable. Based on JNC-8 goals,  patients age of 82 year old, presence of diabetes or CKD, and goals of care goal BP is  <140/90 mm Hg. Patient is stable with current plan of care and routine assessment..  Plan:   -Monitor BP and HR as directed  -Continue amlodipine as directed  -Continue metoprolol as directed. ADD hold parameters accordingly.   -CMP, CBC, vitamin D, magnesium, lamotrigine level due 10/4/22     (G91.2) Normal pressure hydrocephalus (H)  Comment: Acute on chronic. With medication adjustments. Did go see Neuro again on 7/8 with no further adjustments in shunt - they do recommend restarting therapies to help with patient's physical abilities. Has tolerated weaning of Prozac; Unfortunately, prior order to initiate Celexa not processed, thus this wasn't started. Resident had been stable psychologically with occasional odd statements noted. Recent Lamictal levels in July 2022 was stable of 4.9  Plan:  -Continue Abilify, Lamictal as ordered with close monitoring for additional needs.   -Monitor for worsening s/sx of concerns.   -CMP, CBC, vitamin D, magnesium, lamotrigine level due 10/4/22     (F31.9) Bipolar 1 disorder (H)  (R45.851) Suicidal thoughts  (F33.9) Major  depression  Comment: Chronic. Not at goal. She reports increased depression complaints with thoughts of suicide per report. She reports she is not happy with her current health status and not on any therapy anymore as she is hopeful she could return back to previous living condition, however in order to do that she must be more independent with transfers which she is not. I did discuss this with patient that she is not progressing anymore therefore returning to previous living arrangements would be not safe. I did update SW to advise about her suicidal thoughts statements today. No plan reported.   Plan:   -Monitor mood and behaviors  -ACP eval and treatment  -Start 15 minute checks x 3 days due to suicidal reports.   -Increase seroquel to 25mg díaz at HS.   -Continue abilify as directed  -Continue celexa 10mg daily for now. If no relief from seroquel dose adjustment, would recommend to increase this dose as well.    -Monitor for worsening s/sx of concerns  -Monitor falls and changes in mobility, eating and sleeping patterns  -CMP, CBC, vitamin D, magnesium, lamotrigine level due 10/4/22     (Z96.612) Status post total shoulder arthroplasty, left  (G89.18) Acute post-operative pain  Comment: Acute. Stable.   Plan:   -Monitor pain complaints  -Tylenol as directed  -Voltaren as directed  -CMP, CBC, vitamin D, magnesium, lamotrigine level due 10/4/22     (R00.0) Tachycardia  Comment: Acute on chronic. Slowly improving. HR elevated inpatient. Recent HR noted with stabilization in prior elevations.   Plan:   -Metoprolol 12.5mg BID as directed  -Monitor BP and HR monitoring as directed  -CMP, CBC, vitamin D, magnesium, lamotrigine level due 10/4/22     (R09.02) Hypoxia  (J44.9) Chronic obstructive pulmonary disease, unspecified COPD type (H)  (J96.01) Acute respiratory failure with hypoxia (H)  (J98.11) Atelectasis  (R06.02) Shortness of breath  Comment: Chronic. Using oxygen nigh-time only with PRN use during waking  hours. No cough noted.   Plan:   -Monitor respiratory status  -Discontinue oxygen due to non use  -Continue albuterol as directed  -Continue incruse as directed  -Continue breo as directed  -Mucinex as directed  -CMP, CBC, vitamin D, magnesium, lamotrigine level due 10/4/22     (K59.01) Slow transit constipation  Comment: Chronic. Not at goal. Reports now having diarrhea concerns.   Plan:   -Monitor BM patterns  -Miralax 17gm daily  -Change senna to 1 tab BID PRN     Electronically signed by:  Taylor Ledbetter DNP, APRN            Sincerely,        Taylor Ledbetter, APRN CNP

## 2022-09-27 NOTE — LETTER
9/27/2022        RE: Amparo Sharma  [address]  [place] [address]        [facility]    No chief complaint on file.    HPI:  Amparo Sharma is a 82 year old  (1940), who is being seen today for an episodic care visit at: No question data found.. Today's concern is: The primary encounter diagnosis was Physical deconditioning. Diagnoses of Generalized muscle weakness, Itching, Severe protein-calorie malnutrition (H), Iron deficiency anemia secondary to inadequate dietary iron intake, Essential hypertension, Bipolar 1 disorder (H), Normal pressure hydrocephalus (H), Status post total shoulder arthroplasty, left, Acute post-operative pain, Tachycardia, Hypoxia, Acute respiratory failure with hypoxia (H), Chronic obstructive pulmonary disease, unspecified COPD type (H), Shortness of breath, Atelectasis, and Slow transit constipation were also pertinent to this visit.    ***    BP Readings from Last 3 Encounters:   09/05/22 106/70   08/09/22 93/54   07/26/22 129/76     Wt Readings from Last 5 Encounters:   09/05/22 66.8 kg (147 lb 4.8 oz)   08/09/22 67.6 kg (149 lb 1.6 oz)   07/26/22 66 kg (145 lb 9.6 oz)   07/13/22 68 kg (149 lb 14.4 oz)   07/05/22 68 kg (149 lb 14.4 oz)     Allergies, and PMH/PSH reviewed in EPIC today.  REVIEW OF SYSTEMS:  10 point ROS of systems including Constitutional, Eyes, Respiratory, Cardiovascular, Gastroenterology, Genitourinary, Integumentary, Musculoskeletal, Psychiatric were all negative except for pertinent positives noted in my HPI.    Objective:   There were no vitals taken for this visit.  GENERAL APPEARANCE:  {FCI GENERAL APPEARANCE:611650}  ENT:  {FCI ENT PE:036928}  EYES:  {FCI EYES PE :826161}  NECK:  {NURSING HOME NECK PE:156283}  RESP:  {NURSING HOME RESP PE:356709}  CV:  {FCI CV PE:564747}  ABDOMEN:  {NURSING HOME GI PE:020504}  M/S:   {NURSING HOME M/S PE:600748}  SKIN:  {NURSING HOME SKIN  PE:413624}  NEURO:   {NURSING HOME NEURO PE:764630}  PSYCH:  {halfway PSYCH PE:582981}      Most Recent 3 CBC's:Recent Labs   Lab Test 08/18/22  0513 07/07/22  0534 06/02/22  1306 05/05/22  1108 04/28/22  0757   WBC 6.1  --   --  7.7 9.1   HGB 10.8* 11.9 11.5* 10.1* 9.2*   MCV 88  --   --  90 94     --   --  597* 708*     Most Recent 3 BMP's:Recent Labs   Lab Test 08/18/22  0513 07/07/22  0534 06/02/22  1306    139 141   POTASSIUM 4.3 4.3 4.0   CHLORIDE 104 102 103   CO2 26 24 24   BUN 23.4* 16.3 19   CR 0.57 0.50* 0.69   ANIONGAP 11 13 14   MARTHA 9.8 10.0 9.8   GLC 96 86 106     Most Recent 2 LFT's:Recent Labs   Lab Test 08/18/22  0513 07/07/22  0534   AST 23 20   ALT 14 17   ALKPHOS 143* 146*   BILITOTAL <0.2 0.2     Most Recent Anemia Panel:Recent Labs   Lab Test 08/18/22  0513 05/05/22  1108 04/28/22  0757 04/27/21  0450 02/03/21  0503   WBC 6.1   < > 9.1   < > 6.3   HGB 10.8*   < > 9.2*   < > 12.5   HCT 36.6   < > 30.7*   < > 41.1   MCV 88   < > 94   < > 89      < > 708*   < > 280   IRON 34*  34*   < > 21*   < >  --    IRONSAT 12*   < >  --   --   --       < >  --   --   --    JOAN 89   < > 581*   < >  --    B12  --   --   --   --  770   FOLIC  --   --  10.0  --   --     < > = values in this interval not displayed.     Lab Results   Component Value Date    ALBUMIN 3.5 08/18/2022    ALBUMIN 3.1 06/02/2022    ALBUMIN 3.8 10/16/2018     Magnesium   Date Value Ref Range Status   04/28/2022 2.0 1.8 - 2.6 mg/dL Final   10/16/2018 2.0 1.6 - 2.3 mg/dL Final       ASSESSMENT/PLAN  (R53.81) Physical deconditioning  (primary encounter diagnosis)  (M62.81) Generalized muscle weakness  Comment: Chronic. S/T below diagnosis  Plan:   -Continue therapies as directed  -Recommend LTC placement. Family working on placement options  -SW to remain involved for safe discharge planning needs     (L29.9) Itching  Comment: Acute on chronic. RESOLVING. History of c/o itching without visible rash noted to  bilateral fingers with PHx of eczema. Treated with regimen of hydrocortisone BID x 5 day with good improvement.   Plan:   -Monitor itching complaints  -Monitor skin  -CMP, CBC, vitamin D, magnesium, lamotrigine level due 10/4/22     (E43) Severe protein-calorie malnutrition (H)  Comment: Chronic. Not at goal. Recent weight stable of 147#  Plan:   -Magic cup as directed  -Dietician on site as directed  -Monitor weights weekly  -CMP, CBC, vitamin D, magnesium, lamotrigine level due 10/4/22     (D50.8) Iron deficiency anemia secondary to inadequate dietary iron intake  Comment: Chronic. Prior anemia panel as noted. Previously on daily iron replacement with noted elevation of her ferritin. Changed to 3x weekly dosing with addition of vitamin C with noted improvement.   Plan:   -Monitor bleeding risks and concerns  -Continue vitamin C  -Continue ferrous sulfate 3x weekly.    -CMP, CBC, vitamin D, magnesium, lamotrigine level due 10/4/22     (I10) Essential hypertension  Comment: Chronic. Stable. Based on JNC-8 goals,  patients age of 82 year old, presence of diabetes or CKD, and goals of care goal BP is  <140/90 mm Hg. Patient is stable with current plan of care and routine assessment..  Plan:   -Monitor BP and HR as directed  -Continue amlodipine as directed  -Continue metoprolol as directed. ADD hold parameters accordingly.   -CMP, CBC, vitamin D, magnesium, lamotrigine level due 10/4/22     (G91.2) Normal pressure hydrocephalus (H)  Comment: Acute on chronic. With medication adjustments. Did go see Neuro again on 7/8 with no further adjustments in shunt - they do recommend restarting therapies to help with patient's physical abilities. Has tolerated weaning of Prozac; Unfortunately, prior order to initiate Celexa not processed, thus this wasn't started. Resident had been stable psychologically with occasional odd statements noted. Recent Lamictal levels in July 2022 was stable of 4.9  Plan:  -Continue Abilify,  Lamictal as ordered with close monitoring for additional needs.   -Monitor for worsening s/sx of concerns.   -CMP, CBC, vitamin D, magnesium, lamotrigine level due 10/4/22     (F31.9) Bipolar 1 disorder (H)  Comment: Chronic. Stable.   Plan:   -Monitor mood and behaviors  -Continue seroquel 12.5mg daily at HS. Would recommend discontinuation in near future if able.   -Monitor for worsening s/sx of concerns  -Monitor falls and changes in mobility, eating and sleeping patterns  -CMP, CBC, vitamin D, magnesium, lamotrigine level due 10/4/22     (Z96.612) Status post total shoulder arthroplasty, left  (G89.18) Acute post-operative pain  Comment: Acute. Stable.   Plan:   -Monitor pain complaints  -Tylenol as directed  -Voltaren as directed  -CMP, CBC, vitamin D, magnesium, lamotrigine level due 10/4/22     (R00.0) Tachycardia  Comment: Acute on chronic. Slowly improving. HR elevated inpatient. Recent HR noted with stabilization in prior elevations.   Plan:   -Metoprolol 12.5mg BID as directed  -Monitor BP and HR monitoring as directed  -CMP, CBC, vitamin D, magnesium, lamotrigine level due 10/4/22     (R09.02) Hypoxia  (J44.9) Chronic obstructive pulmonary disease, unspecified COPD type (H)  (J96.01) Acute respiratory failure with hypoxia (H)  (J98.11) Atelectasis  (R06.02) Shortness of breath  Comment: Chronic. Using oxygen nigh-time only with PRN use during waking hours. No cough noted.   Plan:   -Monitor respiratory status  -Oxygen as directed PRN  -Continue albuterol as directed  -Continue incruse as directed  -Continue breo as directed  -Mucinex as directed  -CMP, CBC, vitamin D, magnesium, lamotrigine level due 10/4/22     (K59.01) Slow transit constipation  Comment: Chronic. Stable  Plan:   -Monitor BM patterns  -Miralax 17gm daily  -Senna 2 tabs daily     Electronically signed by:  Taylor Ledbetter DNP, [facility]    Chief Complaint   Patient presents with     RECHECK     HPI:  Amparo  JOESPH Sharma is a 82 year old  (1940), who is being seen today for an episodic care visit at: [facility] () [[id number]]. Today's concern is: The primary encounter diagnosis was Physical deconditioning. Diagnoses of Generalized muscle weakness, Itching, Severe protein-calorie malnutrition (H), Iron deficiency anemia secondary to inadequate dietary iron intake, Essential hypertension, Bipolar 1 disorder (H), Normal pressure hydrocephalus (H), Status post total shoulder arthroplasty, left, Acute post-operative pain, Tachycardia, Hypoxia, Acute respiratory failure with hypoxia (H), Chronic obstructive pulmonary disease, unspecified COPD type (H), Shortness of breath, Atelectasis, and Slow transit constipation were also pertinent to this visit.    ***    BP Readings from Last 3 Encounters:   09/05/22 106/70   09/05/22 106/70   08/09/22 93/54     Wt Readings from Last 5 Encounters:   09/05/22 66.7 kg (147 lb)   09/05/22 66.8 kg (147 lb 4.8 oz)   08/09/22 67.6 kg (149 lb 1.6 oz)   07/26/22 66 kg (145 lb 9.6 oz)   07/13/22 68 kg (149 lb 14.4 oz)     Allergies, and PMH/PSH reviewed in EPIC today.  REVIEW OF SYSTEMS:  10 point ROS of systems including Constitutional, Eyes, Respiratory, Cardiovascular, Gastroenterology, Genitourinary, Integumentary, Musculoskeletal, Psychiatric were all negative except for pertinent positives noted in my HPI.    Objective:   /70   Pulse 78   Temp 98.7  F (37.1  C)   Resp 16   Ht 1.524 m (5')   Wt 66.7 kg (147 lb)   SpO2 96%   BMI 28.71 kg/m    GENERAL APPEARANCE:  {halfway GENERAL APPEARANCE:708573}  ENT:  {halfway ENT PE:749738}  EYES:  {halfway EYES PE :918387}  NECK:  {NURSING HOME NECK PE:473722}  RESP:  {NURSING HOME RESP PE:812453}  CV:  {halfway CV PE:017530}  ABDOMEN:  {NURSING HOME GI PE:907695}  M/S:   {NURSING HOME M/S PE:583338}  SKIN:  {NURSING HOME SKIN PE:391809}  NEURO:   {halfway NEURO PE:027473}  PSYCH:  {NURSING HOME  PSYCH PE:368610}      Most Recent 3 CBC's:  Recent Labs   Lab Test 08/18/22  0513 07/07/22  0534 06/02/22  1306 05/05/22  1108 04/28/22  0757   WBC 6.1  --   --  7.7 9.1   HGB 10.8* 11.9 11.5* 10.1* 9.2*   MCV 88  --   --  90 94     --   --  597* 708*     Most Recent 3 BMP's:  Recent Labs   Lab Test 08/18/22  0513 07/07/22  0534 06/02/22  1306    139 141   POTASSIUM 4.3 4.3 4.0   CHLORIDE 104 102 103   CO2 26 24 24   BUN 23.4* 16.3 19   CR 0.57 0.50* 0.69   ANIONGAP 11 13 14   MARTHA 9.8 10.0 9.8   GLC 96 86 106     Most Recent 2 LFT's:  Recent Labs   Lab Test 08/18/22  0513 07/07/22  0534   AST 23 20   ALT 14 17   ALKPHOS 143* 146*   BILITOTAL <0.2 0.2     Most Recent Anemia Panel:  Recent Labs   Lab Test 08/18/22  0513 05/05/22  1108 04/28/22  0757 04/27/21  0450 02/03/21  0503   WBC 6.1   < > 9.1   < > 6.3   HGB 10.8*   < > 9.2*   < > 12.5   HCT 36.6   < > 30.7*   < > 41.1   MCV 88   < > 94   < > 89      < > 708*   < > 280   IRON 34*  34*   < > 21*   < >  --    IRONSAT 12*   < >  --   --   --       < >  --   --   --    JOAN 89   < > 581*   < >  --    B12  --   --   --   --  770   FOLIC  --   --  10.0  --   --     < > = values in this interval not displayed.     Lab Results   Component Value Date    ALBUMIN 3.5 08/18/2022    ALBUMIN 3.1 06/02/2022    ALBUMIN 3.8 10/16/2018     Magnesium   Date Value Ref Range Status   04/28/2022 2.0 1.8 - 2.6 mg/dL Final   10/16/2018 2.0 1.6 - 2.3 mg/dL Final       ASSESSMENT/PLAN  (R53.81) Physical deconditioning  (primary encounter diagnosis)  (M62.81) Generalized muscle weakness  Comment: Chronic. S/T below diagnosis  Plan:   -Continue therapies as directed  -Recommend LTC placement. Family working on placement options  -SW to remain involved for safe discharge planning needs     (L29.9) Itching  Comment: Acute on chronic. RESOLVING. History of c/o itching without visible rash noted to bilateral fingers with PHx of eczema. Treated with regimen of  hydrocortisone BID x 5 day with good improvement.   Plan:   -Monitor itching complaints  -Monitor skin  -CMP, CBC, vitamin D, magnesium, lamotrigine level due 10/4/22     (E43) Severe protein-calorie malnutrition (H)  Comment: Chronic. Not at goal. Recent weight stable of 147#  Plan:   -Magic cup as directed  -Dietician on site as directed  -Monitor weights weekly  -CMP, CBC, vitamin D, magnesium, lamotrigine level due 10/4/22     (D50.8) Iron deficiency anemia secondary to inadequate dietary iron intake  Comment: Chronic. Prior anemia panel as noted. Previously on daily iron replacement with noted elevation of her ferritin. Changed to 3x weekly dosing with addition of vitamin C with noted improvement.   Plan:   -Monitor bleeding risks and concerns  -Continue vitamin C  -Continue ferrous sulfate 3x weekly.    -CMP, CBC, vitamin D, magnesium, lamotrigine level due 10/4/22     (I10) Essential hypertension  Comment: Chronic. Stable. Based on JNC-8 goals,  patients age of 82 year old, presence of diabetes or CKD, and goals of care goal BP is  <140/90 mm Hg. Patient is stable with current plan of care and routine assessment..  Plan:   -Monitor BP and HR as directed  -Continue amlodipine as directed  -Continue metoprolol as directed. ADD hold parameters accordingly.   -CMP, CBC, vitamin D, magnesium, lamotrigine level due 10/4/22     (G91.2) Normal pressure hydrocephalus (H)  Comment: Acute on chronic. With medication adjustments. Did go see Neuro again on 7/8 with no further adjustments in shunt - they do recommend restarting therapies to help with patient's physical abilities. Has tolerated weaning of Prozac; Unfortunately, prior order to initiate Celexa not processed, thus this wasn't started. Resident had been stable psychologically with occasional odd statements noted. Recent Lamictal levels in July 2022 was stable of 4.9  Plan:  -Continue Abilify, Lamictal as ordered with close monitoring for additional  needs.   -Monitor for worsening s/sx of concerns.   -CMP, CBC, vitamin D, magnesium, lamotrigine level due 10/4/22     (F31.9) Bipolar 1 disorder (H)  Comment: Chronic. Stable.   Plan:   -Monitor mood and behaviors  -Continue seroquel 12.5mg daily at HS. Would recommend discontinuation in near future if able.   -Monitor for worsening s/sx of concerns  -Monitor falls and changes in mobility, eating and sleeping patterns  -CMP, CBC, vitamin D, magnesium, lamotrigine level due 10/4/22     (Z96.612) Status post total shoulder arthroplasty, left  (G89.18) Acute post-operative pain  Comment: Acute. Stable.   Plan:   -Monitor pain complaints  -Tylenol as directed  -Voltaren as directed  -CMP, CBC, vitamin D, magnesium, lamotrigine level due 10/4/22     (R00.0) Tachycardia  Comment: Acute on chronic. Slowly improving. HR elevated inpatient. Recent HR noted with stabilization in prior elevations.   Plan:   -Metoprolol 12.5mg BID as directed  -Monitor BP and HR monitoring as directed  -CMP, CBC, vitamin D, magnesium, lamotrigine level due 10/4/22     (R09.02) Hypoxia  (J44.9) Chronic obstructive pulmonary disease, unspecified COPD type (H)  (J96.01) Acute respiratory failure with hypoxia (H)  (J98.11) Atelectasis  (R06.02) Shortness of breath  Comment: Chronic. Using oxygen nigh-time only with PRN use during waking hours. No cough noted.   Plan:   -Monitor respiratory status  -Oxygen as directed PRN  -Continue albuterol as directed  -Continue incruse as directed  -Continue breo as directed  -Mucinex as directed  -CMP, CBC, vitamin D, magnesium, lamotrigine level due 10/4/22     (K59.01) Slow transit constipation  Comment: Chronic. Stable  Plan:   -Monitor BM patterns  -Miralax 17gm daily  -Senna 2 tabs daily     Electronically signed by:  Taylor Ledbetter DNP, APRN            Sincerely,        Taylor Ledbetter, APRN CNP

## 2022-09-29 ENCOUNTER — LAB REQUISITION (OUTPATIENT)
Dept: LAB | Facility: CLINIC | Age: 82
End: 2022-09-29
Payer: MEDICARE

## 2022-09-29 DIAGNOSIS — Z96.612 PRESENCE OF LEFT ARTIFICIAL SHOULDER JOINT: ICD-10-CM

## 2022-09-29 DIAGNOSIS — M12.812 OTHER SPECIFIC ARTHROPATHIES, NOT ELSEWHERE CLASSIFIED, LEFT SHOULDER: ICD-10-CM

## 2022-09-29 DIAGNOSIS — F32.A DEPRESSION, UNSPECIFIED: ICD-10-CM

## 2022-09-30 ENCOUNTER — TELEPHONE (OUTPATIENT)
Dept: NEUROSURGERY | Facility: CLINIC | Age: 82
End: 2022-09-30

## 2022-09-30 NOTE — TELEPHONE ENCOUNTER
[facility] Call Center    Phone Message    May a detailed message be left on voicemail: yes     Reason for Call: Other: Patient's daughter called regarding patient needing a letter stating her symptoms are physical and not neurological related so she can continue PT.      Action Taken: Other: Neurosurgery    Travel Screening: Not Applicable

## 2022-10-02 NOTE — PROGRESS NOTES
[facility] GERIATRICS    Chief Complaint   Patient presents with     RECHECK     HPI:  Amparo Sharma is a 82 year old  (1940), who is being seen today for an episodic care visit at: [facility] ([facility]) [[id number]]. Today's concern is: The primary encounter diagnosis was Physical deconditioning. Diagnoses of Generalized muscle weakness, Slow transit constipation, Hypoxia, Acute respiratory failure with hypoxia (H), Chronic obstructive pulmonary disease, unspecified COPD type (H), Shortness of breath, Atelectasis, Tachycardia, Status post total shoulder arthroplasty, left, Acute post-operative pain, Bipolar 1 disorder (H), Normal pressure hydrocephalus (H), Suicidal thoughts, Episode of recurrent major depressive disorder, unspecified depression episode severity (H), Essential hypertension, Iron deficiency anemia secondary to inadequate dietary iron intake, Severe protein-calorie malnutrition (H), and Itching were also pertinent to this visit.    Met with patient who denies any chest pain, palpitations, shortness of breath, SOLOMON, lightheadedness, dizziness, or cough. Denies any abdominal discomfort. Denies N&V. Denies B&B concerns. Denies dysuria or frequency. Now she reports increased straining to have a BM. Appetite fair. Sleeping well. She reports mood has improved with adjustments made to medications last week. No thoughts of self harm. She continues to be frustrated as she believes she should have more therapy even though she has had months of therapies already and had not make any significant improvement. Highly recommend LTC placement for ongoing needs and cares.     BP Readings from Last 3 Encounters:   10/03/22 118/72   09/05/22 106/70   09/05/22 106/70     Wt Readings from Last 5 Encounters:   10/03/22 68.1 kg (150 lb 3.2 oz)   09/05/22 66.7 kg (147 lb)   09/05/22 66.8 kg (147 lb 4.8 oz)   08/09/22 67.6 kg (149 lb 1.6 oz)   07/26/22 66 kg (145 lb 9.6 oz)     Allergies, and PMH/PSH  reviewed in [facility] today.  REVIEW OF SYSTEMS:  10 point ROS of systems including Constitutional, Eyes, Respiratory, Cardiovascular, Gastroenterology, Genitourinary, Integumentary, Musculoskeletal, Psychiatric were all negative except for pertinent positives noted in my HPI.    Objective:   /72   Pulse 65   Temp 97.2  F (36.2  C)   Resp 17   Ht 1.524 m (5')   Wt 68.1 kg (150 lb 3.2 oz)   SpO2 93%   BMI 29.33 kg/m    GENERAL APPEARANCE:  Alert, in no distress, cooperative  ENT:  Mouth and posterior oropharynx normal, moist mucous membranes, [place]  EYES:  EOM, conjunctivae, lids, pupils and irises normal  NECK:  No adenopathy,masses or thyromegaly  RESP:  respiratory effort and palpation of chest normal, lungs clear to auscultation , no respiratory distress  CV:  Palpation and auscultation of heart done , regular rate and rhythm, no murmur, rub, or gallop, no edema, +2 pedal pulses  ABDOMEN:  normal bowel sounds, soft, nontender, no hepatosplenomegaly or other masses, no guarding or rebound  M/S:   Easy stand tranfsers. Wheelchair bound. Nonambulatory. Staff to assist with all cares, ADLs and transfers  SKIN:  Inspection of skin and subcutaneous tissue baseline, Palpation of skin and subcutaneous tissue baseline  NEURO:   Cranial nerves 2-12 are normal tested and grossly at patient's baseline, no purposeful movement in upper and lower extremities  PSYCH:  oriented X 3, memory impaired , affect and mood normal      Most Recent 3 CBC's:  Recent Labs   Lab Test 08/18/22  0513 07/07/22  0534 06/02/22  1306 05/05/22  1108 04/28/22  0757   WBC 6.1  --   --  7.7 9.1   HGB 10.8* 11.9 11.5* 10.1* 9.2*   MCV 88  --   --  90 94     --   --  597* 708*     Most Recent 3 BMP's:  Recent Labs   Lab Test 08/18/22  0513 07/07/22  0534 06/02/22  1306    139 141   POTASSIUM 4.3 4.3 4.0   CHLORIDE 104 102 103   CO2 26 24 24   BUN 23.4* 16.3 19   CR 0.57 0.50* 0.69   ANIONGAP 11 13 14   MARTHA 9.8 10.0 9.8   GLC 96 86 106      Most Recent 2 LFT's:  Recent Labs   Lab Test 08/18/22  0513 07/07/22  0534   AST 23 20   ALT 14 17   ALKPHOS 143* 146*   BILITOTAL <0.2 0.2     Most Recent TSH and T4:  Recent Labs   Lab Test 03/09/22  1115   TSH 1.43     Most Recent Anemia Panel:  Recent Labs   Lab Test 08/18/22  0513 05/05/22  1108 04/28/22  0757 04/27/21  0450 02/03/21  0503   WBC 6.1   < > 9.1   < > 6.3   HGB 10.8*   < > 9.2*   < > 12.5   HCT 36.6   < > 30.7*   < > 41.1   MCV 88   < > 94   < > 89      < > 708*   < > 280   IRON 34*  34*   < > 21*   < >  --    IRONSAT 12*   < >  --   --   --       < >  --   --   --    JOAN 89   < > 581*   < >  --    B12  --   --   --   --  770   FOLIC  --   --  10.0  --   --     < > = values in this interval not displayed.     Magnesium   Date Value Ref Range Status   04/28/2022 2.0 1.8 - 2.6 mg/dL Final   10/16/2018 2.0 1.6 - 2.3 mg/dL Final     ASSESSMENT/PLAN  (R53.81) Physical deconditioning  (primary encounter diagnosis)  (M62.81) Generalized muscle weakness  Comment: Chronic. S/T below diagnosis. No longer on therapies due to no progression,.   Plan:   -Recommend LTC placement. Family working on placement options  -SW to remain involved for safe discharge planning needs     (L29.9) Itching  Comment: Acute on chronic. History of c/o itching without visible rash noted to bilateral fingers with PHx of eczema. Treated with regimen of hydrocortisone BID x 5 day with good improvement.   Plan:   -Monitor itching complaints  -Monitor skin  -CMP, CBC, vitamin D, magnesium, lamotrigine level due 10/4/22--results pending  -Trend labs periodically     (E43) Severe protein-calorie malnutrition (H)  Comment: Chronic. Not at goal. Recent weight stable of 150#  Plan:   -Magic cup as directed  -Dietician on site as directed  -Monitor weights weekly  -CMP, CBC, vitamin D, magnesium, lamotrigine level due 10/4/22--results pending  -Trend labs periodically     (D50.8) Iron deficiency anemia secondary to  inadequate dietary iron intake  Comment: Chronic. Prior anemia panel as noted. Previously on daily iron replacement with noted elevation of her ferritin. Changed to 3x weekly dosing with addition of vitamin C with noted improvement.   Plan:   -Monitor bleeding risks and concerns  -Continue vitamin C  -Continue ferrous sulfate 3x weekly.    -CMP, CBC, vitamin D, magnesium, lamotrigine level due 10/4/22--results pending  -Trend labs periodically     (I10) Essential hypertension  Comment: Chronic. Stable. Based on JNC-8 goals,  patients age of 82 year old, presence of diabetes or CKD, and goals of care goal BP is  <140/90 mm Hg. Patient is stable with current plan of care and routine assessment..  Plan:   -Monitor BP and HR as directed  -Continue amlodipine as directed  -Continue metoprolol as directed.    -CMP, CBC, vitamin D, magnesium, lamotrigine level due 10/4/22--results pending  -Trend labs periodically         (G91.2) Normal pressure hydrocephalus (H)  Comment: Acute on chronic. With medication adjustments. Did go see Neuro again on 7/8 with no further adjustments in shunt - they do recommend restarting therapies to help with patient's physical abilities. Has tolerated weaning of Prozac; Unfortunately, prior order to initiate Celexa not processed, thus this wasn't started. Resident had been stable psychologically with occasional odd statements noted. Recent Lamictal levels in July 2022 was stable of 4.9  Plan:  -Continue Abilify, Lamictal as ordered with close monitoring for additional needs.   -Monitor for worsening s/sx of concerns.   -CMP, CBC, vitamin D, magnesium, lamotrigine level due 10/4/22--results pending  -Trend labs periodically     (F31.9) Bipolar 1 disorder (H)  (R45.851) Suicidal thoughts  (F33.9) Major depression  Comment: Chronic. Not at goal. She reports increased depression complaints with thoughts of suicide per report on last weeks visit. She reports these symptoms have improved. She  reports she is not happy with her current health status and not on any therapy anymore as she is hopeful she could return back to previous living condition, however in order to do that she must be more independent with transfers which she is not. I did discuss this with patient again that she is not progressing anymore therefore returning to previous living arrangements would be not safe.   Plan:   -Monitor mood and behaviors  -ACP eval and treatment  -Continue seroquel to 25mg díaz at HS.   -Continue abilify as directed  -Continue celexa 10mg daily for now. If no relief from seroquel dose adjustment, would recommend to increase this dose as well.    -Monitor for worsening s/sx of concerns  -Monitor falls and changes in mobility, eating and sleeping patterns  -CMP, CBC, vitamin D, magnesium, lamotrigine level due 10/4/22--results pending  -Trend labs periodically     (Z96.612) Status post total shoulder arthroplasty, left  (G89.18) Acute post-operative pain  Comment: Acute. Stable.   Plan:   -Monitor pain complaints  -Tylenol as directed  -Voltaren as directed  -CMP, CBC, vitamin D, magnesium, lamotrigine level due 10/4/22--results pending  -Trend labs periodically     (R00.0) Tachycardia  Comment: Acute on chronic. Slowly improving. HR elevated inpatient. Recent HR noted with stabilization in prior elevations.   Plan:   -Metoprolol 12.5mg BID as directed  -Monitor BP and HR monitoring as directed  -CMP, CBC, vitamin D, magnesium, lamotrigine level due 10/4/22--results pending  -Trend labs periodically     (R09.02) Hypoxia  (J44.9) Chronic obstructive pulmonary disease, unspecified COPD type (H)  (J96.01) Acute respiratory failure with hypoxia (H)  (J98.11) Atelectasis  (R06.02) Shortness of breath  Comment: Chronic. Stable No cough noted.   Plan:   -Monitor respiratory status  -Continue albuterol as directed  -Continue incruse as directed  -Continue breo as directed  -Mucinex as directed  -CMP, CBC, vitamin D,  magnesium, lamotrigine level due 10/4/22--results pending  -Trend labs periodically     (K59.01) Slow transit constipation  Comment: Chronic. Not at goal. Reports straining at times to have BM  Plan:   -Monitor BM patterns  -Continue Miralax 17gm daily  -Continue senna 1 tab BID PRN. Encourage if no BM >2 days.      Electronically signed by:  Taylor Ledbetter DNP, APRN

## 2022-10-03 VITALS
BODY MASS INDEX: 29.49 KG/M2 | RESPIRATION RATE: 17 BRPM | HEIGHT: 60 IN | OXYGEN SATURATION: 93 % | TEMPERATURE: 97.2 F | SYSTOLIC BLOOD PRESSURE: 118 MMHG | WEIGHT: 150.2 LBS | HEART RATE: 65 BPM | DIASTOLIC BLOOD PRESSURE: 72 MMHG

## 2022-10-03 NOTE — TELEPHONE ENCOUNTER
Call back to daughterNohemi .  Left detailed message letter written for Physical Therapy for Amparo.  I can mail or fax letter, left my name and number for daughter to return call and give me fax # or I can mail.  Patient does not have My Chart.

## 2022-10-04 ENCOUNTER — TRANSITIONAL CARE UNIT VISIT (OUTPATIENT)
Dept: GERIATRICS | Facility: CLINIC | Age: 82
End: 2022-10-04
Payer: MEDICARE

## 2022-10-04 DIAGNOSIS — R53.81 PHYSICAL DECONDITIONING: Primary | ICD-10-CM

## 2022-10-04 DIAGNOSIS — M62.81 GENERALIZED MUSCLE WEAKNESS: ICD-10-CM

## 2022-10-04 DIAGNOSIS — D50.8 IRON DEFICIENCY ANEMIA SECONDARY TO INADEQUATE DIETARY IRON INTAKE: ICD-10-CM

## 2022-10-04 DIAGNOSIS — F31.9 BIPOLAR 1 DISORDER (H): ICD-10-CM

## 2022-10-04 DIAGNOSIS — G91.2 NORMAL PRESSURE HYDROCEPHALUS (H): ICD-10-CM

## 2022-10-04 DIAGNOSIS — E43 SEVERE PROTEIN-CALORIE MALNUTRITION (H): ICD-10-CM

## 2022-10-04 DIAGNOSIS — J96.01 ACUTE RESPIRATORY FAILURE WITH HYPOXIA (H): ICD-10-CM

## 2022-10-04 DIAGNOSIS — Z96.612 STATUS POST TOTAL SHOULDER ARTHROPLASTY, LEFT: ICD-10-CM

## 2022-10-04 DIAGNOSIS — F33.9 EPISODE OF RECURRENT MAJOR DEPRESSIVE DISORDER, UNSPECIFIED DEPRESSION EPISODE SEVERITY (H): ICD-10-CM

## 2022-10-04 DIAGNOSIS — J44.9 CHRONIC OBSTRUCTIVE PULMONARY DISEASE, UNSPECIFIED COPD TYPE (H): ICD-10-CM

## 2022-10-04 DIAGNOSIS — I10 ESSENTIAL HYPERTENSION: ICD-10-CM

## 2022-10-04 DIAGNOSIS — R09.02 HYPOXIA: ICD-10-CM

## 2022-10-04 DIAGNOSIS — R06.02 SHORTNESS OF BREATH: ICD-10-CM

## 2022-10-04 DIAGNOSIS — R45.851 SUICIDAL THOUGHTS: ICD-10-CM

## 2022-10-04 DIAGNOSIS — G89.18 ACUTE POST-OPERATIVE PAIN: ICD-10-CM

## 2022-10-04 DIAGNOSIS — J98.11 ATELECTASIS: ICD-10-CM

## 2022-10-04 DIAGNOSIS — K59.01 SLOW TRANSIT CONSTIPATION: ICD-10-CM

## 2022-10-04 DIAGNOSIS — R00.0 TACHYCARDIA: ICD-10-CM

## 2022-10-04 DIAGNOSIS — L29.9 ITCHING: ICD-10-CM

## 2022-10-04 LAB
ALBUMIN SERPL BCG-MCNC: 3.4 G/DL (ref 3.5–5.2)
ALP SERPL-CCNC: 137 U/L (ref 35–104)
ALT SERPL W P-5'-P-CCNC: 10 U/L (ref 10–35)
ANION GAP SERPL CALCULATED.3IONS-SCNC: 12 MMOL/L (ref 7–15)
AST SERPL W P-5'-P-CCNC: 22 U/L (ref 10–35)
BILIRUB SERPL-MCNC: 0.2 MG/DL
BUN SERPL-MCNC: 23.5 MG/DL (ref 8–23)
CALCIUM SERPL-MCNC: 9.9 MG/DL (ref 8.8–10.2)
CHLORIDE SERPL-SCNC: 107 MMOL/L (ref 98–107)
CREAT SERPL-MCNC: 0.55 MG/DL (ref 0.51–0.95)
DEPRECATED CALCIDIOL+CALCIFEROL SERPL-MC: 42 UG/L (ref 20–75)
DEPRECATED HCO3 PLAS-SCNC: 24 MMOL/L (ref 22–29)
ERYTHROCYTE [DISTWIDTH] IN BLOOD BY AUTOMATED COUNT: 16.3 % (ref 10–15)
GFR SERPL CREATININE-BSD FRML MDRD: >90 ML/MIN/1.73M2
GLUCOSE SERPL-MCNC: 77 MG/DL (ref 70–99)
HCT VFR BLD AUTO: 36.8 % (ref 35–47)
HGB BLD-MCNC: 10.9 G/DL (ref 11.7–15.7)
MAGNESIUM SERPL-MCNC: 1.8 MG/DL (ref 1.7–2.3)
MCH RBC QN AUTO: 26.6 PG (ref 26.5–33)
MCHC RBC AUTO-ENTMCNC: 29.6 G/DL (ref 31.5–36.5)
MCV RBC AUTO: 90 FL (ref 78–100)
PLATELET # BLD AUTO: 317 10E3/UL (ref 150–450)
POTASSIUM SERPL-SCNC: 4.5 MMOL/L (ref 3.4–5.3)
PROT SERPL-MCNC: 6.3 G/DL (ref 6.4–8.3)
RBC # BLD AUTO: 4.1 10E6/UL (ref 3.8–5.2)
SODIUM SERPL-SCNC: 143 MMOL/L (ref 136–145)
WBC # BLD AUTO: 5.4 10E3/UL (ref 4–11)

## 2022-10-04 PROCEDURE — 85027 COMPLETE CBC AUTOMATED: CPT | Mod: ORL | Performed by: NURSE PRACTITIONER

## 2022-10-04 PROCEDURE — 83735 ASSAY OF MAGNESIUM: CPT | Mod: ORL | Performed by: NURSE PRACTITIONER

## 2022-10-04 PROCEDURE — 99309 SBSQ NF CARE MODERATE MDM 30: CPT | Performed by: NURSE PRACTITIONER

## 2022-10-04 PROCEDURE — 36415 COLL VENOUS BLD VENIPUNCTURE: CPT | Mod: ORL | Performed by: NURSE PRACTITIONER

## 2022-10-04 PROCEDURE — 82306 VITAMIN D 25 HYDROXY: CPT | Mod: ORL | Performed by: NURSE PRACTITIONER

## 2022-10-04 PROCEDURE — 80053 COMPREHEN METABOLIC PANEL: CPT | Mod: ORL | Performed by: NURSE PRACTITIONER

## 2022-10-04 PROCEDURE — P9604 ONE-WAY ALLOW PRORATED TRIP: HCPCS | Mod: ORL | Performed by: NURSE PRACTITIONER

## 2022-10-04 PROCEDURE — 80175 DRUG SCREEN QUAN LAMOTRIGINE: CPT | Mod: ORL | Performed by: NURSE PRACTITIONER

## 2022-10-04 NOTE — LETTER
10/4/2022        RE: Amprao Sharma  [address]  [place] [address]        [facility] GERIATRICS    Chief Complaint   Patient presents with     RECHECK     HPI:  Amparo Sharma is a 82 year old  (1940), who is being seen today for an episodic care visit at: [facility] ([facility]) [[id number]]. Today's concern is: The primary encounter diagnosis was Physical deconditioning. Diagnoses of Generalized muscle weakness, Slow transit constipation, Hypoxia, Acute respiratory failure with hypoxia (H), Chronic obstructive pulmonary disease, unspecified COPD type (H), Shortness of breath, Atelectasis, Tachycardia, Status post total shoulder arthroplasty, left, Acute post-operative pain, Bipolar 1 disorder (H), Normal pressure hydrocephalus (H), Suicidal thoughts, Episode of recurrent major depressive disorder, unspecified depression episode severity (H), Essential hypertension, Iron deficiency anemia secondary to inadequate dietary iron intake, Severe protein-calorie malnutrition (H), and Itching were also pertinent to this visit.    Met with patient who denies any chest pain, palpitations, shortness of breath, SOLOMON, lightheadedness, dizziness, or cough. Denies any abdominal discomfort. Denies N&V. Denies B&B concerns. Denies dysuria or frequency. Now she reports increased straining to have a BM. Appetite fair. Sleeping well. She reports mood has improved with adjustments made to medications last week. No thoughts of self harm. She continues to be frustrated as she believes she should have more therapy even though she has had months of therapies already and had not make any significant improvement. Highly recommend LTC placement for ongoing needs and cares.     BP Readings from Last 3 Encounters:   10/03/22 118/72   09/05/22 106/70   09/05/22 106/70     Wt Readings from Last 5 Encounters:   10/03/22 68.1 kg (150 lb 3.2 oz)   09/05/22 66.7 kg (147 lb)   09/05/22 66.8 kg (147 lb 4.8  oz)   08/09/22 67.6 kg (149 lb 1.6 oz)   07/26/22 66 kg (145 lb 9.6 oz)     Allergies, and PMH/PSH reviewed in [facility] today.  REVIEW OF SYSTEMS:  10 point ROS of systems including Constitutional, Eyes, Respiratory, Cardiovascular, Gastroenterology, Genitourinary, Integumentary, Musculoskeletal, Psychiatric were all negative except for pertinent positives noted in my HPI.    Objective:   /72   Pulse 65   Temp 97.2  F (36.2  C)   Resp 17   Ht 1.524 m (5')   Wt 68.1 kg (150 lb 3.2 oz)   SpO2 93%   BMI 29.33 kg/m    GENERAL APPEARANCE:  Alert, in no distress, cooperative  ENT:  Mouth and posterior oropharynx normal, moist mucous membranes, [place]  EYES:  EOM, conjunctivae, lids, pupils and irises normal  NECK:  No adenopathy,masses or thyromegaly  RESP:  respiratory effort and palpation of chest normal, lungs clear to auscultation , no respiratory distress  CV:  Palpation and auscultation of heart done , regular rate and rhythm, no murmur, rub, or gallop, no edema, +2 pedal pulses  ABDOMEN:  normal bowel sounds, soft, nontender, no hepatosplenomegaly or other masses, no guarding or rebound  M/S:   Easy stand tranfsers. Wheelchair bound. Nonambulatory. Staff to assist with all cares, ADLs and transfers  SKIN:  Inspection of skin and subcutaneous tissue baseline, Palpation of skin and subcutaneous tissue baseline  NEURO:   Cranial nerves 2-12 are normal tested and grossly at patient's baseline, no purposeful movement in upper and lower extremities  PSYCH:  oriented X 3, memory impaired , affect and mood normal      Most Recent 3 CBC's:  Recent Labs   Lab Test 08/18/22  0513 07/07/22  0534 06/02/22  1306 05/05/22  1108 04/28/22  0757   WBC 6.1  --   --  7.7 9.1   HGB 10.8* 11.9 11.5* 10.1* 9.2*   MCV 88  --   --  90 94     --   --  597* 708*     Most Recent 3 BMP's:  Recent Labs   Lab Test 08/18/22 0513 07/07/22  0534 06/02/22  1306    139 141   POTASSIUM 4.3 4.3 4.0   CHLORIDE 104 102 103   CO2 26 24  24   BUN 23.4* 16.3 19   CR 0.57 0.50* 0.69   ANIONGAP 11 13 14   MARTHA 9.8 10.0 9.8   GLC 96 86 106     Most Recent 2 LFT's:  Recent Labs   Lab Test 08/18/22  0513 07/07/22  0534   AST 23 20   ALT 14 17   ALKPHOS 143* 146*   BILITOTAL <0.2 0.2     Most Recent TSH and T4:  Recent Labs   Lab Test 03/09/22  1115   TSH 1.43     Most Recent Anemia Panel:  Recent Labs   Lab Test 08/18/22  0513 05/05/22  1108 04/28/22  0757 04/27/21  0450 02/03/21  0503   WBC 6.1   < > 9.1   < > 6.3   HGB 10.8*   < > 9.2*   < > 12.5   HCT 36.6   < > 30.7*   < > 41.1   MCV 88   < > 94   < > 89      < > 708*   < > 280   IRON 34*  34*   < > 21*   < >  --    IRONSAT 12*   < >  --   --   --       < >  --   --   --    JOAN 89   < > 581*   < >  --    B12  --   --   --   --  770   FOLIC  --   --  10.0  --   --     < > = values in this interval not displayed.     Magnesium   Date Value Ref Range Status   04/28/2022 2.0 1.8 - 2.6 mg/dL Final   10/16/2018 2.0 1.6 - 2.3 mg/dL Final     ASSESSMENT/PLAN  (R53.81) Physical deconditioning  (primary encounter diagnosis)  (M62.81) Generalized muscle weakness  Comment: Chronic. S/T below diagnosis. No longer on therapies due to no progression,.   Plan:   -Recommend LTC placement. Family working on placement options  -SW to remain involved for safe discharge planning needs     (L29.9) Itching  Comment: Acute on chronic. History of c/o itching without visible rash noted to bilateral fingers with PHx of eczema. Treated with regimen of hydrocortisone BID x 5 day with good improvement.   Plan:   -Monitor itching complaints  -Monitor skin  -CMP, CBC, vitamin D, magnesium, lamotrigine level due 10/4/22--results pending  -Trend labs periodically     (E43) Severe protein-calorie malnutrition (H)  Comment: Chronic. Not at goal. Recent weight stable of 150#  Plan:   -Magic cup as directed  -Dietician on site as directed  -Monitor weights weekly  -CMP, CBC, vitamin D, magnesium, lamotrigine level due  10/4/22--results pending  -Trend labs periodically     (D50.8) Iron deficiency anemia secondary to inadequate dietary iron intake  Comment: Chronic. Prior anemia panel as noted. Previously on daily iron replacement with noted elevation of her ferritin. Changed to 3x weekly dosing with addition of vitamin C with noted improvement.   Plan:   -Monitor bleeding risks and concerns  -Continue vitamin C  -Continue ferrous sulfate 3x weekly.    -CMP, CBC, vitamin D, magnesium, lamotrigine level due 10/4/22--results pending  -Trend labs periodically     (I10) Essential hypertension  Comment: Chronic. Stable. Based on JNC-8 goals,  patients age of 82 year old, presence of diabetes or CKD, and goals of care goal BP is  <140/90 mm Hg. Patient is stable with current plan of care and routine assessment..  Plan:   -Monitor BP and HR as directed  -Continue amlodipine as directed  -Continue metoprolol as directed.    -CMP, CBC, vitamin D, magnesium, lamotrigine level due 10/4/22--results pending  -Trend labs periodically         (G91.2) Normal pressure hydrocephalus (H)  Comment: Acute on chronic. With medication adjustments. Did go see Neuro again on 7/8 with no further adjustments in shunt - they do recommend restarting therapies to help with patient's physical abilities. Has tolerated weaning of Prozac; Unfortunately, prior order to initiate Celexa not processed, thus this wasn't started. Resident had been stable psychologically with occasional odd statements noted. Recent Lamictal levels in July 2022 was stable of 4.9  Plan:  -Continue Abilify, Lamictal as ordered with close monitoring for additional needs.   -Monitor for worsening s/sx of concerns.   -CMP, CBC, vitamin D, magnesium, lamotrigine level due 10/4/22--results pending  -Trend labs periodically     (F31.9) Bipolar 1 disorder (H)  (R45.851) Suicidal thoughts  (F33.9) Major depression  Comment: Chronic. Not at goal. She reports increased depression complaints with  thoughts of suicide per report on last weeks visit. She reports these symptoms have improved. She reports she is not happy with her current health status and not on any therapy anymore as she is hopeful she could return back to previous living condition, however in order to do that she must be more independent with transfers which she is not. I did discuss this with patient again that she is not progressing anymore therefore returning to previous living arrangements would be not safe.   Plan:   -Monitor mood and behaviors  -ACP eval and treatment  -Continue seroquel to 25mg díaz at HS.   -Continue abilify as directed  -Continue celexa 10mg daily for now. If no relief from seroquel dose adjustment, would recommend to increase this dose as well.    -Monitor for worsening s/sx of concerns  -Monitor falls and changes in mobility, eating and sleeping patterns  -CMP, CBC, vitamin D, magnesium, lamotrigine level due 10/4/22--results pending  -Trend labs periodically     (Z96.612) Status post total shoulder arthroplasty, left  (G89.18) Acute post-operative pain  Comment: Acute. Stable.   Plan:   -Monitor pain complaints  -Tylenol as directed  -Voltaren as directed  -CMP, CBC, vitamin D, magnesium, lamotrigine level due 10/4/22--results pending  -Trend labs periodically     (R00.0) Tachycardia  Comment: Acute on chronic. Slowly improving. HR elevated inpatient. Recent HR noted with stabilization in prior elevations.   Plan:   -Metoprolol 12.5mg BID as directed  -Monitor BP and HR monitoring as directed  -CMP, CBC, vitamin D, magnesium, lamotrigine level due 10/4/22--results pending  -Trend labs periodically     (R09.02) Hypoxia  (J44.9) Chronic obstructive pulmonary disease, unspecified COPD type (H)  (J96.01) Acute respiratory failure with hypoxia (H)  (J98.11) Atelectasis  (R06.02) Shortness of breath  Comment: Chronic. Stable No cough noted.   Plan:   -Monitor respiratory status  -Continue albuterol as directed  -Continue  incruse as directed  -Continue breo as directed  -Mucinex as directed  -CMP, CBC, vitamin D, magnesium, lamotrigine level due 10/4/22--results pending  -Trend labs periodically     (K59.01) Slow transit constipation  Comment: Chronic. Not at goal. Reports straining at times to have BM  Plan:   -Monitor BM patterns  -Continue Miralax 17gm daily  -Continue senna 1 tab BID PRN. Encourage if no BM >2 days.      Electronically signed by:  Taylor Ledbetter DNP, APRN               Sincerely,        Taylor Ledbetter, APRN CNP

## 2022-10-06 LAB — LAMOTRIGINE SERPL-MCNC: 4.3 UG/ML

## 2022-10-17 NOTE — PROGRESS NOTES
[facility] GERIATRICS    Chief Complaint   Patient presents with     RECHECK     HPI:  Amparo Sharma is a 82 year old  (1940), who is being seen today for an episodic care visit at: [facility] ([facility]) [[id number]]. Today's concern is: The primary encounter diagnosis was Physical deconditioning. Diagnoses of Generalized muscle weakness, Slow transit constipation, Hypoxia, Acute respiratory failure with hypoxia (H), Shortness of breath, Chronic obstructive pulmonary disease, unspecified COPD type (H), Atelectasis, Tachycardia, Status post total shoulder arthroplasty, left, Acute post-operative pain, Bipolar 1 disorder (H), Normal pressure hydrocephalus (H), Suicidal thoughts, Episode of recurrent major depressive disorder, unspecified depression episode severity (H), Iron deficiency anemia secondary to inadequate dietary iron intake, Essential hypertension, Severe protein-calorie malnutrition (H), Vascular dementia without behavioral disturbance (H), Umbilical hernia without obstruction and without gangrene, Malignant neoplasm of sigmoid colon (H), PVD (peripheral vascular disease) (H), and Nausea were also pertinent to this visit.    Met with patient who denies any chest pain, palpitations, shortness of breath, SOLOMON, lightheadedness, dizziness, or cough. Reports occasional nausea complaints with abdominal discomfort. Large Umbilical hernia present, soft, reducible and non-tender. She complains of some nausea at times. Denies B&B concerns. Denies dysuria or frequency. Denies loose or constipation. Appetite fair. Sleeping well. Mood stable. No reports of suicidal ideations. She reports her daughter and her really would like a care conference with staff to discuss goals. She is really wanting to return back to previous living situation. I did discuss with patient again that this is not a safe plan nor will her other facility allow her back as she is needing increased nursing needs.     BP  Readings from Last 3 Encounters:   10/18/22 133/84   10/03/22 118/72   09/05/22 106/70     Wt Readings from Last 5 Encounters:   10/18/22 67.4 kg (148 lb 8 oz)   10/03/22 68.1 kg (150 lb 3.2 oz)   09/05/22 66.7 kg (147 lb)   09/05/22 66.8 kg (147 lb 4.8 oz)   08/09/22 67.6 kg (149 lb 1.6 oz)     Allergies, and PMH/PSH reviewed in EPIC today.  REVIEW OF SYSTEMS:  10 point ROS of systems including Constitutional, Eyes, Respiratory, Cardiovascular, Gastroenterology, Genitourinary, Integumentary, Musculoskeletal, Psychiatric were all negative except for pertinent positives noted in my HPI.    Objective:   /84   Pulse 65   Temp 98.2  F (36.8  C)   Resp 22   Ht 1.524 m (5')   Wt 67.4 kg (148 lb 8 oz)   SpO2 98%   BMI 29.00 kg/m    GENERAL APPEARANCE:  Alert, in no distress, cooperative  ENT:  Mouth and posterior oropharynx normal, moist mucous membranes, [place]  EYES:  EOM, conjunctivae, lids, pupils and irises normal  NECK:  No adenopathy,masses or thyromegaly  RESP:  respiratory effort and palpation of chest normal, lungs clear to auscultation , no respiratory distress  CV:  Palpation and auscultation of heart done , no edema, rate-normal, grade 3/6 murmur  ABDOMEN:  normal bowel sounds, soft, nontender, no hepatosplenomegaly or other masses, no guarding or rebound  M/S:   Easy stand transfer. Wheelchair bound. Dependent on staff for all ADLs, transfers and cares  SKIN:  Inspection of skin and subcutaneous tissue baseline, Palpation of skin and subcutaneous tissue baseline  NEURO:   Cranial nerves 2-12 are normal tested and grossly at patient's baseline, no purposeful movement in upper and lower extremities  PSYCH:  oriented to self and place, memory impaired , affect and mood normal      Most Recent 3 CBC's:  Recent Labs   Lab Test 10/04/22  0709 08/18/22  0513 07/07/22  0534 06/02/22  1306 05/05/22  1108   WBC 5.4 6.1  --   --  7.7   HGB 10.9* 10.8* 11.9   < > 10.1*   MCV 90 88  --   --  90    550  --    --  597*    < > = values in this interval not displayed.     Most Recent 3 BMP's:  Recent Labs   Lab Test 10/04/22  0709 08/18/22  0513 07/07/22  0534    141 139   POTASSIUM 4.5 4.3 4.3   CHLORIDE 107 104 102   CO2 24 26 24   BUN 23.5* 23.4* 16.3   CR 0.55 0.57 0.50*   ANIONGAP 12 11 13   MARTHA 9.9 9.8 10.0   GLC 77 96 86     Most Recent 2 LFT's:  Recent Labs   Lab Test 10/04/22  0709 08/18/22  0513   AST 22 23   ALT 10 14   ALKPHOS 137* 143*   BILITOTAL 0.2 <0.2     Most Recent TSH and T4:  Recent Labs   Lab Test 03/09/22  1115   TSH 1.43     Most Recent Anemia Panel:  Recent Labs   Lab Test 10/04/22  0709 08/18/22  0513 05/05/22  1108 04/28/22  0757 04/27/21  0450 02/03/21  0503   WBC 5.4 6.1   < > 9.1   < > 6.3   HGB 10.9* 10.8*   < > 9.2*   < > 12.5   HCT 36.8 36.6   < > 30.7*   < > 41.1   MCV 90 88   < > 94   < > 89    286   < > 708*   < > 280   IRON  --  34*  34*   < > 21*   < >  --    IRONSAT  --  12*   < >  --   --   --    FEB  --  283   < >  --   --   --    JOAN  --  89   < > 581*   < >  --    B12  --   --   --   --   --  770   FOLIC  --   --   --  10.0  --   --     < > = values in this interval not displayed.   lamotrigine level stable 4.3 on 10/4/22    ASSESSMENT/PLAN  (R53.81) Physical deconditioning  (primary encounter diagnosis)  (M62.81) Generalized muscle weakness  Comment: Chronic. S/T below diagnosis. No longer on therapies due to no progression,.   Plan:   -Recommend LTC placement. Family working on placement options  -SW to remain involved for safe discharge planning needs     (L29.9) Itching  Comment: Acute on chronic. History of c/o itching without visible rash noted to bilateral fingers with PHx of eczema. Treated with regimen of hydrocortisone BID x 5 day with good improvement.   Plan:   -Monitor itching complaints  -Monitor skin  -Trend labs in 3 months. Due Jan 2023     (E43) Severe protein-calorie malnutrition (H)  Comment: Chronic. Not at goal. Recent weight stable of  150#  Plan:   -Magic cup as directed  -Dietician on site as directed  -Monitor weights weekly  -Trend labs in 3 months. Due Jan 2023    (I73.9) PVD  Comment: Acute on chronic. No wounds.   Plan:  -Monitor for worsening s/sx of concerns.   -Dietician on site as directed  -Monitor weights weekly  -Trend labs in 3 months. Due Jan 2023     (D50.8) Iron deficiency anemia secondary to inadequate dietary iron intake  Comment: Chronic. Prior anemia panel as noted. Previously on daily iron replacement with noted elevation of her ferritin. Changed to 3x weekly dosing with addition of vitamin C with noted improvement. Baseline hgb-10  Plan:   -Monitor bleeding risks and concerns  -Continue vitamin C  -Continue ferrous sulfate 3x weekly.    -Trend labs in 3 months. Due Jan 2023     (I10) Essential hypertension  Comment: Chronic. Based on JNC-8 goals,  patients age of 82 year old, presence of diabetes or CKD, and goals of care goal BP is  <140/90 mm Hg. Noted patients BP is lower than goal and will adjust plan of care by ..  Plan:   -Monitor BP and HR as directed  -Discontinue amlodipine  -Continue metoprolol as directed.    -Trend labs in 3 months. Due Jan 2023     (G91.2) Normal pressure hydrocephalus (H)  Comment: Acute on chronic. With medication adjustments. Has tolerated weaning of Prozac; Unfortunately, prior order to initiate Celexa not processed, thus this wasn't started. Resident had been stable psychologically with occasional odd statements noted. Recent Lamictal levels in Oct 2022 was stable of 4.3  Plan:  -Continue Abilify, Lamictal as ordered with close monitoring for additional needs.   -Monitor for worsening s/sx of concerns.   -Trend labs in 3 months. Due Jan 2023     (F31.9) Bipolar 1 disorder (H)  (R45.851) Suicidal thoughts  (F33.9) Major depression  Comment: Chronic. Not at goal. She reported increased depression complaints with thoughts of suicide per report few weeks ago. She reports these symptoms have  improved with seroquel dose adjustment. She reports she is not happy with her current health status and not on any therapy anymore as she is hopeful she could return back to previous living condition, however in order to do that she must be more independent with transfers which she is not. I did discuss this with patient again that she is not progressing anymore therefore returning to previous living arrangements would be not safe.   Plan:   -Monitor mood and behaviors  -ACP eval and treatment  -Continue seroquel to 25mg díaz at HS.   -Continue abilify as directed  -Continue celexa 10mg daily for now. If no relief from seroquel dose adjustment, would recommend to increase this dose as well.    -Monitor for worsening s/sx of concerns  -Monitor falls and changes in mobility, eating and sleeping patterns  -Trend labs in 3 months. Due Jan 2023     (Z96.612) Status post total shoulder arthroplasty, left  (G89.18) Acute post-operative pain  Comment: Acute on chronic. Not at goal.   Plan:   -Monitor pain complaints  -Increase Tylenol to 975mg QID scheduled.   -Add Tramadol BID PRN x 14 days (allergy risk for nausea complaints. Patient open to trialing)  -Voltaren as directed  -Trend labs in 3 months. Due Jan 2023     (R00.0) Tachycardia  Comment: Acute on chronic. Slowly improving. HR elevated inpatient. Recent HR noted with stabilization in prior elevations.   Plan:   -Metoprolol 12.5mg BID as directed  -Monitor BP and HR monitoring as directed  -Trend labs in 3 months. Due Jan 2023     (R09.02) Hypoxia  (J44.9) Chronic obstructive pulmonary disease, unspecified COPD type (H)  (J96.01) Acute respiratory failure with hypoxia (H)  (J98.11) Atelectasis  (R06.02) Shortness of breath  Comment: Chronic. Stable No cough noted.   Plan:   -Monitor respiratory status  -Continue albuterol as directed  -Continue incruse as directed  -Continue breo as directed  -Mucinex as directed  -Trend labs in 3 months. Due Jan 2023     (K59.01)  Slow transit constipation  (K42.9) Umbilical hernia without obstruction and without gangrene  (C18.7) History of neoplasm of sigmoid colon  (R11.0) Nausea  Comment: Chronic. Not at goal. Off/on nausea complaints. Hernia non-tender and soft but protrudes. The patient did have stage III cancer of the colon diagnosed in 01/2015.  She did have chemotherapy after what sounds like surgery and is in remission.  She also has had a cholecystectomy.  Plan:   -Monitor BM patterns  -Continue Miralax 17gm daily  -Change senna to daily at HS and PRN  -Zofran 4mg PRN   -Trend labs in 3 months. Due Jan 2023     Electronically signed by:  Taylor Ledbetter DNP, APRN

## 2022-10-18 ENCOUNTER — TRANSITIONAL CARE UNIT VISIT (OUTPATIENT)
Dept: GERIATRICS | Facility: CLINIC | Age: 82
End: 2022-10-18
Payer: MEDICARE

## 2022-10-18 VITALS
WEIGHT: 148.5 LBS | HEIGHT: 60 IN | SYSTOLIC BLOOD PRESSURE: 133 MMHG | DIASTOLIC BLOOD PRESSURE: 84 MMHG | TEMPERATURE: 98.2 F | OXYGEN SATURATION: 98 % | HEART RATE: 65 BPM | RESPIRATION RATE: 22 BRPM | BODY MASS INDEX: 29.16 KG/M2

## 2022-10-18 DIAGNOSIS — D50.8 IRON DEFICIENCY ANEMIA SECONDARY TO INADEQUATE DIETARY IRON INTAKE: ICD-10-CM

## 2022-10-18 DIAGNOSIS — Z96.612 STATUS POST TOTAL SHOULDER ARTHROPLASTY, LEFT: ICD-10-CM

## 2022-10-18 DIAGNOSIS — R11.0 NAUSEA: ICD-10-CM

## 2022-10-18 DIAGNOSIS — R25.2 SPASTICITY: ICD-10-CM

## 2022-10-18 DIAGNOSIS — F01.50 VASCULAR DEMENTIA WITHOUT BEHAVIORAL DISTURBANCE (H): ICD-10-CM

## 2022-10-18 DIAGNOSIS — J44.9 CHRONIC OBSTRUCTIVE PULMONARY DISEASE, UNSPECIFIED COPD TYPE (H): ICD-10-CM

## 2022-10-18 DIAGNOSIS — R00.0 TACHYCARDIA: ICD-10-CM

## 2022-10-18 DIAGNOSIS — I73.9 PVD (PERIPHERAL VASCULAR DISEASE) (H): ICD-10-CM

## 2022-10-18 DIAGNOSIS — I10 ESSENTIAL HYPERTENSION: ICD-10-CM

## 2022-10-18 DIAGNOSIS — M62.81 GENERALIZED MUSCLE WEAKNESS: ICD-10-CM

## 2022-10-18 DIAGNOSIS — G91.2 NORMAL PRESSURE HYDROCEPHALUS (H): ICD-10-CM

## 2022-10-18 DIAGNOSIS — R06.02 SHORTNESS OF BREATH: ICD-10-CM

## 2022-10-18 DIAGNOSIS — C18.7 MALIGNANT NEOPLASM OF SIGMOID COLON (H): ICD-10-CM

## 2022-10-18 DIAGNOSIS — R09.02 HYPOXIA: ICD-10-CM

## 2022-10-18 DIAGNOSIS — K42.9 UMBILICAL HERNIA WITHOUT OBSTRUCTION AND WITHOUT GANGRENE: ICD-10-CM

## 2022-10-18 DIAGNOSIS — R53.81 PHYSICAL DECONDITIONING: Primary | ICD-10-CM

## 2022-10-18 DIAGNOSIS — F33.9 EPISODE OF RECURRENT MAJOR DEPRESSIVE DISORDER, UNSPECIFIED DEPRESSION EPISODE SEVERITY (H): ICD-10-CM

## 2022-10-18 DIAGNOSIS — R45.851 SUICIDAL THOUGHTS: ICD-10-CM

## 2022-10-18 DIAGNOSIS — J96.01 ACUTE RESPIRATORY FAILURE WITH HYPOXIA (H): ICD-10-CM

## 2022-10-18 DIAGNOSIS — J98.11 ATELECTASIS: ICD-10-CM

## 2022-10-18 DIAGNOSIS — F31.9 BIPOLAR 1 DISORDER (H): ICD-10-CM

## 2022-10-18 DIAGNOSIS — K59.01 SLOW TRANSIT CONSTIPATION: ICD-10-CM

## 2022-10-18 DIAGNOSIS — G89.18 ACUTE POST-OPERATIVE PAIN: ICD-10-CM

## 2022-10-18 DIAGNOSIS — E43 SEVERE PROTEIN-CALORIE MALNUTRITION (H): ICD-10-CM

## 2022-10-18 PROBLEM — L97.521 FOOT ULCER, LEFT, LIMITED TO BREAKDOWN OF SKIN (H): Status: RESOLVED | Noted: 2018-12-14 | Resolved: 2022-10-18

## 2022-10-18 PROCEDURE — 99309 SBSQ NF CARE MODERATE MDM 30: CPT | Performed by: NURSE PRACTITIONER

## 2022-10-18 RX ORDER — STANDARDIZED SENNA CONCENTRATE AND DOCUSATE SODIUM 8.6; 5 MG/1; MG/1
TABLET ORAL
Qty: 90 TABLET | Refills: 11 | Status: SHIPPED | OUTPATIENT
Start: 2022-10-18 | End: 2022-11-09

## 2022-10-18 RX ORDER — ONDANSETRON 4 MG/1
4 TABLET, FILM COATED ORAL EVERY 6 HOURS PRN
Qty: 30 TABLET | Refills: 11 | Status: SHIPPED | OUTPATIENT
Start: 2022-10-18 | End: 2023-01-01

## 2022-10-18 RX ORDER — ACETAMINOPHEN 325 MG/1
975 TABLET ORAL EVERY 6 HOURS
Qty: 360 TABLET | Refills: 11 | Status: SHIPPED | OUTPATIENT
Start: 2022-10-18 | End: 2023-01-01

## 2022-10-18 RX ORDER — TRAMADOL HYDROCHLORIDE 50 MG/1
25 TABLET ORAL 2 TIMES DAILY PRN
Qty: 12 TABLET | Refills: 0 | Status: SHIPPED | OUTPATIENT
Start: 2022-10-18 | End: 2022-11-01

## 2022-10-18 NOTE — LETTER
10/18/2022        RE: Amparo Sharma  [address]  [place] [address]        [facility] GERIATRICS    Chief Complaint   Patient presents with     RECHECK     HPI:  Amparo Sharma is a 82 year old  (1940), who is being seen today for an episodic care visit at: [facility] ([facility]) [[id number]]. Today's concern is: The primary encounter diagnosis was Physical deconditioning. Diagnoses of Generalized muscle weakness, Slow transit constipation, Hypoxia, Acute respiratory failure with hypoxia (H), Shortness of breath, Chronic obstructive pulmonary disease, unspecified COPD type (H), Atelectasis, Tachycardia, Status post total shoulder arthroplasty, left, Acute post-operative pain, Bipolar 1 disorder (H), Normal pressure hydrocephalus (H), Suicidal thoughts, Episode of recurrent major depressive disorder, unspecified depression episode severity (H), Iron deficiency anemia secondary to inadequate dietary iron intake, Essential hypertension, Severe protein-calorie malnutrition (H), Vascular dementia without behavioral disturbance (H), Umbilical hernia without obstruction and without gangrene, Malignant neoplasm of sigmoid colon (H), PVD (peripheral vascular disease) (H), and Nausea were also pertinent to this visit.    Met with patient who denies any chest pain, palpitations, shortness of breath, SOLOMON, lightheadedness, dizziness, or cough. Reports occasional nausea complaints with abdominal discomfort. Large Umbilical hernia present, soft, reducible and non-tender. She complains of some nausea at times. Denies B&B concerns. Denies dysuria or frequency. Denies loose or constipation. Appetite fair. Sleeping well. Mood stable. No reports of suicidal ideations. She reports her daughter and her really would like a care conference with staff to discuss goals. She is really wanting to return back to previous living situation. I did discuss with patient again that this is not a safe  plan nor will her other facility allow her back as she is needing increased nursing needs.     BP Readings from Last 3 Encounters:   10/18/22 133/84   10/03/22 118/72   09/05/22 106/70     Wt Readings from Last 5 Encounters:   10/18/22 67.4 kg (148 lb 8 oz)   10/03/22 68.1 kg (150 lb 3.2 oz)   09/05/22 66.7 kg (147 lb)   09/05/22 66.8 kg (147 lb 4.8 oz)   08/09/22 67.6 kg (149 lb 1.6 oz)     Allergies, and PMH/PSH reviewed in EPIC today.  REVIEW OF SYSTEMS:  10 point ROS of systems including Constitutional, Eyes, Respiratory, Cardiovascular, Gastroenterology, Genitourinary, Integumentary, Musculoskeletal, Psychiatric were all negative except for pertinent positives noted in my HPI.    Objective:   /84   Pulse 65   Temp 98.2  F (36.8  C)   Resp 22   Ht 1.524 m (5')   Wt 67.4 kg (148 lb 8 oz)   SpO2 98%   BMI 29.00 kg/m    GENERAL APPEARANCE:  Alert, in no distress, cooperative  ENT:  Mouth and posterior oropharynx normal, moist mucous membranes, [place]  EYES:  EOM, conjunctivae, lids, pupils and irises normal  NECK:  No adenopathy,masses or thyromegaly  RESP:  respiratory effort and palpation of chest normal, lungs clear to auscultation , no respiratory distress  CV:  Palpation and auscultation of heart done , no edema, rate-normal, grade 3/6 murmur  ABDOMEN:  normal bowel sounds, soft, nontender, no hepatosplenomegaly or other masses, no guarding or rebound  M/S:   Easy stand transfer. Wheelchair bound. Dependent on staff for all ADLs, transfers and cares  SKIN:  Inspection of skin and subcutaneous tissue baseline, Palpation of skin and subcutaneous tissue baseline  NEURO:   Cranial nerves 2-12 are normal tested and grossly at patient's baseline, no purposeful movement in upper and lower extremities  PSYCH:  oriented to self and place, memory impaired , affect and mood normal      Most Recent 3 CBC's:  Recent Labs   Lab Test 10/04/22  0709 08/18/22  0513 07/07/22  0534 06/02/22  1306 05/05/22  1108   WBC  5.4 6.1  --   --  7.7   HGB 10.9* 10.8* 11.9   < > 10.1*   MCV 90 88  --   --  90    286  --   --  597*    < > = values in this interval not displayed.     Most Recent 3 BMP's:  Recent Labs   Lab Test 10/04/22  0709 08/18/22  0513 07/07/22  0534    141 139   POTASSIUM 4.5 4.3 4.3   CHLORIDE 107 104 102   CO2 24 26 24   BUN 23.5* 23.4* 16.3   CR 0.55 0.57 0.50*   ANIONGAP 12 11 13   MARTHA 9.9 9.8 10.0   GLC 77 96 86     Most Recent 2 LFT's:  Recent Labs   Lab Test 10/04/22  0709 08/18/22  0513   AST 22 23   ALT 10 14   ALKPHOS 137* 143*   BILITOTAL 0.2 <0.2     Most Recent TSH and T4:  Recent Labs   Lab Test 03/09/22  1115   TSH 1.43     Most Recent Anemia Panel:  Recent Labs   Lab Test 10/04/22  0709 08/18/22  0513 05/05/22  1108 04/28/22  0757 04/27/21  0450 02/03/21  0503   WBC 5.4 6.1   < > 9.1   < > 6.3   HGB 10.9* 10.8*   < > 9.2*   < > 12.5   HCT 36.8 36.6   < > 30.7*   < > 41.1   MCV 90 88   < > 94   < > 89    286   < > 708*   < > 280   IRON  --  34*  34*   < > 21*   < >  --    IRONSAT  --  12*   < >  --   --   --    FEB  --  283   < >  --   --   --    JOAN  --  89   < > 581*   < >  --    B12  --   --   --   --   --  770   FOLIC  --   --   --  10.0  --   --     < > = values in this interval not displayed.   lamotrigine level stable 4.3 on 10/4/22    ASSESSMENT/PLAN  (R53.81) Physical deconditioning  (primary encounter diagnosis)  (M62.81) Generalized muscle weakness  Comment: Chronic. S/T below diagnosis. No longer on therapies due to no progression,.   Plan:   -Recommend LTC placement. Family working on placement options  -SW to remain involved for safe discharge planning needs     (L29.9) Itching  Comment: Acute on chronic. History of c/o itching without visible rash noted to bilateral fingers with PHx of eczema. Treated with regimen of hydrocortisone BID x 5 day with good improvement.   Plan:   -Monitor itching complaints  -Monitor skin  -Trend labs in 3 months. Due Jan 2023     (E43)  Severe protein-calorie malnutrition (H)  Comment: Chronic. Not at goal. Recent weight stable of 150#  Plan:   -Magic cup as directed  -Dietician on site as directed  -Monitor weights weekly  -Trend labs in 3 months. Due Jan 2023    (I73.9) PVD  Comment: Acute on chronic. No wounds.   Plan:  -Monitor for worsening s/sx of concerns.   -Dietician on site as directed  -Monitor weights weekly  -Trend labs in 3 months. Due Jan 2023     (D50.8) Iron deficiency anemia secondary to inadequate dietary iron intake  Comment: Chronic. Prior anemia panel as noted. Previously on daily iron replacement with noted elevation of her ferritin. Changed to 3x weekly dosing with addition of vitamin C with noted improvement. Baseline hgb-10  Plan:   -Monitor bleeding risks and concerns  -Continue vitamin C  -Continue ferrous sulfate 3x weekly.    -Trend labs in 3 months. Due Jan 2023     (I10) Essential hypertension  Comment: Chronic. Based on JNC-8 goals,  patients age of 82 year old, presence of diabetes or CKD, and goals of care goal BP is  <140/90 mm Hg. Noted patients BP is lower than goal and will adjust plan of care by ..  Plan:   -Monitor BP and HR as directed  -Discontinue amlodipine  -Continue metoprolol as directed.    -Trend labs in 3 months. Due Jan 2023     (G91.2) Normal pressure hydrocephalus (H)  Comment: Acute on chronic. With medication adjustments. Has tolerated weaning of Prozac; Unfortunately, prior order to initiate Celexa not processed, thus this wasn't started. Resident had been stable psychologically with occasional odd statements noted. Recent Lamictal levels in Oct 2022 was stable of 4.3  Plan:  -Continue Abilify, Lamictal as ordered with close monitoring for additional needs.   -Monitor for worsening s/sx of concerns.   -Trend labs in 3 months. Due Jan 2023     (F31.9) Bipolar 1 disorder (H)  (R45.851) Suicidal thoughts  (F33.9) Major depression  Comment: Chronic. Not at goal. She reported increased depression  complaints with thoughts of suicide per report few weeks ago. She reports these symptoms have improved with seroquel dose adjustment. She reports she is not happy with her current health status and not on any therapy anymore as she is hopeful she could return back to previous living condition, however in order to do that she must be more independent with transfers which she is not. I did discuss this with patient again that she is not progressing anymore therefore returning to previous living arrangements would be not safe.   Plan:   -Monitor mood and behaviors  -ACP eval and treatment  -Continue seroquel to 25mg díaz at HS.   -Continue abilify as directed  -Continue celexa 10mg daily for now. If no relief from seroquel dose adjustment, would recommend to increase this dose as well.    -Monitor for worsening s/sx of concerns  -Monitor falls and changes in mobility, eating and sleeping patterns  -Trend labs in 3 months. Due Jan 2023     (Z96.612) Status post total shoulder arthroplasty, left  (G89.18) Acute post-operative pain  Comment: Acute on chronic. Not at goal.   Plan:   -Monitor pain complaints  -Increase Tylenol to 975mg QID scheduled.   -Add Tramadol BID PRN x 14 days (allergy risk for nausea complaints. Patient open to trialing)  -Voltaren as directed  -Trend labs in 3 months. Due Jan 2023     (R00.0) Tachycardia  Comment: Acute on chronic. Slowly improving. HR elevated inpatient. Recent HR noted with stabilization in prior elevations.   Plan:   -Metoprolol 12.5mg BID as directed  -Monitor BP and HR monitoring as directed  -Trend labs in 3 months. Due Jan 2023     (R09.02) Hypoxia  (J44.9) Chronic obstructive pulmonary disease, unspecified COPD type (H)  (J96.01) Acute respiratory failure with hypoxia (H)  (J98.11) Atelectasis  (R06.02) Shortness of breath  Comment: Chronic. Stable No cough noted.   Plan:   -Monitor respiratory status  -Continue albuterol as directed  -Continue incruse as  directed  -Continue breo as directed  -Mucinex as directed  -Trend labs in 3 months. Due Jan 2023     (K59.01) Slow transit constipation  (K42.9) Umbilical hernia without obstruction and without gangrene  (C18.7) History of neoplasm of sigmoid colon  (R11.0) Nausea  Comment: Chronic. Not at goal. Off/on nausea complaints. Hernia non-tender and soft but protrudes. The patient did have stage III cancer of the colon diagnosed in 01/2015.  She did have chemotherapy after what sounds like surgery and is in remission.  She also has had a cholecystectomy.  Plan:   -Monitor BM patterns  -Continue Miralax 17gm daily  -Change senna to daily at HS and PRN  -Zofran 4mg PRN   -Trend labs in 3 months. Due Jan 2023     Electronically signed by:  Taylor Ledbetter DNP, APRN             Sincerely,        KASANDRA Lou CNP

## 2022-10-21 ENCOUNTER — TELEPHONE (OUTPATIENT)
Dept: GERIATRICS | Facility: CLINIC | Age: 82
End: 2022-10-21

## 2022-10-21 RX ORDER — BACLOFEN 10 MG/1
TABLET ORAL
Qty: 108 TABLET | Refills: 3 | Status: SHIPPED | OUTPATIENT
Start: 2022-10-21 | End: 2022-11-09

## 2022-10-21 RX ORDER — CITALOPRAM HYDROBROMIDE 20 MG/1
20 TABLET ORAL DAILY
Qty: 30 TABLET | Refills: 11 | Status: SHIPPED | OUTPATIENT
Start: 2022-10-21

## 2022-10-21 NOTE — TELEPHONE ENCOUNTER
[facility] TELEPHONE ENCOUNTER    Chief Complaint   Patient presents with     Patient Request       Amparo Sharma is a 82 year old  (1940),Nurse called today to report: Increased bilateral lower extremities spasticity and patient is really wanting to try to walk again. Per therapy, ambulation has not happened for many months especially due to leg tightness. Patient is open to trying medication below.     ASSESSMENT/PLAN      Start baclofen 5mg TID x 1 week then increase to 10mg TID thereafter to assist with muscle spasticity to lower extremity.     Increase celexa to 20mg daily due to increased depressive symptoms    Electronically signed by:   KASANDRA Lou CNP

## 2022-11-09 ENCOUNTER — NURSING HOME VISIT (OUTPATIENT)
Dept: GERIATRICS | Facility: CLINIC | Age: 82
End: 2022-11-09
Payer: MEDICARE

## 2022-11-09 VITALS
HEART RATE: 84 BPM | WEIGHT: 143.1 LBS | BODY MASS INDEX: 28.09 KG/M2 | DIASTOLIC BLOOD PRESSURE: 71 MMHG | RESPIRATION RATE: 16 BRPM | OXYGEN SATURATION: 94 % | TEMPERATURE: 98.3 F | SYSTOLIC BLOOD PRESSURE: 139 MMHG | HEIGHT: 60 IN

## 2022-11-09 DIAGNOSIS — R11.0 NAUSEA: ICD-10-CM

## 2022-11-09 DIAGNOSIS — D50.8 IRON DEFICIENCY ANEMIA SECONDARY TO INADEQUATE DIETARY IRON INTAKE: ICD-10-CM

## 2022-11-09 DIAGNOSIS — M62.81 GENERALIZED MUSCLE WEAKNESS: ICD-10-CM

## 2022-11-09 DIAGNOSIS — R53.81 PHYSICAL DECONDITIONING: Primary | ICD-10-CM

## 2022-11-09 DIAGNOSIS — R09.02 HYPOXIA: ICD-10-CM

## 2022-11-09 DIAGNOSIS — F33.9 EPISODE OF RECURRENT MAJOR DEPRESSIVE DISORDER, UNSPECIFIED DEPRESSION EPISODE SEVERITY (H): ICD-10-CM

## 2022-11-09 DIAGNOSIS — J96.01 ACUTE RESPIRATORY FAILURE WITH HYPOXIA (H): ICD-10-CM

## 2022-11-09 DIAGNOSIS — K59.01 SLOW TRANSIT CONSTIPATION: ICD-10-CM

## 2022-11-09 DIAGNOSIS — R06.02 SHORTNESS OF BREATH: ICD-10-CM

## 2022-11-09 DIAGNOSIS — C18.7 MALIGNANT NEOPLASM OF SIGMOID COLON (H): ICD-10-CM

## 2022-11-09 DIAGNOSIS — I73.9 PVD (PERIPHERAL VASCULAR DISEASE) (H): ICD-10-CM

## 2022-11-09 DIAGNOSIS — I10 ESSENTIAL HYPERTENSION: ICD-10-CM

## 2022-11-09 DIAGNOSIS — Z96.612 STATUS POST TOTAL SHOULDER ARTHROPLASTY, LEFT: ICD-10-CM

## 2022-11-09 DIAGNOSIS — F31.9 BIPOLAR 1 DISORDER (H): ICD-10-CM

## 2022-11-09 DIAGNOSIS — R45.851 SUICIDAL THOUGHTS: ICD-10-CM

## 2022-11-09 DIAGNOSIS — G91.2 NORMAL PRESSURE HYDROCEPHALUS (H): ICD-10-CM

## 2022-11-09 DIAGNOSIS — J98.11 ATELECTASIS: ICD-10-CM

## 2022-11-09 DIAGNOSIS — F01.50 VASCULAR DEMENTIA WITHOUT BEHAVIORAL DISTURBANCE (H): ICD-10-CM

## 2022-11-09 DIAGNOSIS — G89.18 ACUTE POST-OPERATIVE PAIN: ICD-10-CM

## 2022-11-09 DIAGNOSIS — R25.2 SPASTICITY: ICD-10-CM

## 2022-11-09 DIAGNOSIS — R00.0 TACHYCARDIA: ICD-10-CM

## 2022-11-09 DIAGNOSIS — K42.9 UMBILICAL HERNIA WITHOUT OBSTRUCTION AND WITHOUT GANGRENE: ICD-10-CM

## 2022-11-09 DIAGNOSIS — E43 SEVERE PROTEIN-CALORIE MALNUTRITION (H): ICD-10-CM

## 2022-11-09 DIAGNOSIS — J44.9 CHRONIC OBSTRUCTIVE PULMONARY DISEASE, UNSPECIFIED COPD TYPE (H): ICD-10-CM

## 2022-11-09 PROCEDURE — 99309 SBSQ NF CARE MODERATE MDM 30: CPT | Performed by: NURSE PRACTITIONER

## 2022-11-09 RX ORDER — BACLOFEN 10 MG/1
10 TABLET ORAL 3 TIMES DAILY
Qty: 180 TABLET | Refills: 11 | Status: SHIPPED | OUTPATIENT
Start: 2022-11-09 | End: 2023-04-25

## 2022-11-09 RX ORDER — TRAMADOL HYDROCHLORIDE 50 MG/1
25 TABLET ORAL 2 TIMES DAILY PRN
Qty: 60 TABLET | Refills: 0 | Status: SHIPPED | OUTPATIENT
Start: 2022-11-09 | End: 2022-12-07

## 2022-11-09 RX ORDER — BISACODYL 10 MG
10 SUPPOSITORY, RECTAL RECTAL DAILY PRN
Qty: 8 SUPPOSITORY | Refills: 11 | Status: SHIPPED | OUTPATIENT
Start: 2022-11-09

## 2022-11-09 RX ORDER — STANDARDIZED SENNA CONCENTRATE AND DOCUSATE SODIUM 8.6; 5 MG/1; MG/1
1 TABLET ORAL 2 TIMES DAILY
Qty: 90 TABLET | Refills: 11 | Status: SHIPPED | OUTPATIENT
Start: 2022-11-09 | End: 2023-01-24

## 2022-11-09 NOTE — PROGRESS NOTES
[facility] GERIATRICS    Chief Complaint   Patient presents with     RECHECK     HPI:  Amparo Sharma is a 82 year old  (1940), who is being seen today for an episodic care visit at: [facility] () [[id number]]. Today's concern is: The primary encounter diagnosis was Physical deconditioning. Diagnoses of Generalized muscle weakness, Slow transit constipation, Hypoxia, Acute respiratory failure with hypoxia (H), Shortness of breath, Chronic obstructive pulmonary disease, unspecified COPD type (H), Atelectasis, Tachycardia, Status post total shoulder arthroplasty, left, Acute post-operative pain, Bipolar 1 disorder (H), Normal pressure hydrocephalus (H), Suicidal thoughts, Episode of recurrent major depressive disorder, unspecified depression episode severity (H), Iron deficiency anemia secondary to inadequate dietary iron intake, Essential hypertension, Severe protein-calorie malnutrition (H), Vascular dementia without behavioral disturbance (H), Umbilical hernia without obstruction and without gangrene, Malignant neoplasm of sigmoid colon (H), PVD (peripheral vascular disease) (H), Nausea, and Spasticity were also pertinent to this visit.    Per staff update- patient and family would really like to restart therapies with goals to hopefully transition back to half-way setting. Patient is currently needing an easy stand for transfers and does not ambulate as patient really wants to be able to ambulate. I suspect patient will not be able to ambulate as we have attempted therapies multiple rounds with little to no progression. Today-Met with patient who denies any chest pain, palpitations, shortness of breath, SOLOMON, lightheadedness, dizziness, or cough. Denies any abdominal discomfort. Denies N&V. She reports hernia concerns as they continue to protrude outward. I did notify her that we need to maintain approprate BM management along with limiting abdominal straining. She is not surgical candidate.  Denies dysuria or frequency. She reports constipation concerns with LBM 2 days ago per reports, however per chart review she last had BM yesterday 11/8/22. Appetite good. Sleeping well. No suicidal behaviors. No recent falls. No behaviors. Nursing denies any acute concerns.     BP Readings from Last 3 Encounters:   11/09/22 139/71   10/18/22 133/84   10/03/22 118/72     Wt Readings from Last 5 Encounters:   11/09/22 64.9 kg (143 lb 1.6 oz)   10/18/22 67.4 kg (148 lb 8 oz)   10/03/22 68.1 kg (150 lb 3.2 oz)   09/05/22 66.7 kg (147 lb)   09/05/22 66.8 kg (147 lb 4.8 oz)     Allergies, and PMH/PSH reviewed in EPIC today.  REVIEW OF SYSTEMS:  10 point ROS of systems including Constitutional, Eyes, Respiratory, Cardiovascular, Gastroenterology, Genitourinary, Integumentary, Musculoskeletal, Psychiatric were all negative except for pertinent positives noted in my HPI.    Objective:   /71   Pulse 84   Temp 98.3  F (36.8  C)   Resp 16   Ht 1.524 m (5')   Wt 64.9 kg (143 lb 1.6 oz)   SpO2 94%   BMI 27.95 kg/m    GENERAL APPEARANCE:  Alert, in no distress, oriented, cooperative  ENT:  Mouth and posterior oropharynx normal, moist mucous membranes, [place] IRA  EYES:  EOM, conjunctivae, lids, pupils and irises normal  NECK:  No adenopathy,masses or thyromegaly  RESP:  respiratory effort and palpation of chest normal, lungs clear to auscultation , no respiratory distress  CV:  Palpation and auscultation of heart done , regular rate and rhythm, no murmur, rub, or gallop, no edema  ABDOMEN:  normal bowel sounds, soft, nontender, no hepatosplenomegaly or other masses, umbilical hernia present, no guarding or rebound  M/S:   Wheelchair bound. Easy stand needs. Nonambulatory. staff to assist with all cares, ADLS, hygiene and transfers  SKIN:  Inspection of skin and subcutaneous tissue baseline, Palpation of skin and subcutaneous tissue baseline  NEURO:   Cranial nerves 2-12 are normal tested and grossly at patient's baseline, no  purposeful movement in upper and lower extremities  PSYCH:  oriented X 3, memory impaired , affect and mood normal      Most Recent 3 CBC's:  Recent Labs   Lab Test 10/04/22  0709 08/18/22  0513 07/07/22  0534 06/02/22  1306 05/05/22  1108   WBC 5.4 6.1  --   --  7.7   HGB 10.9* 10.8* 11.9   < > 10.1*   MCV 90 88  --   --  90    286  --   --  597*    < > = values in this interval not displayed.     Most Recent 3 BMP's:  Recent Labs   Lab Test 10/04/22  0709 08/18/22  0513 07/07/22  0534    141 139   POTASSIUM 4.5 4.3 4.3   CHLORIDE 107 104 102   CO2 24 26 24   BUN 23.5* 23.4* 16.3   CR 0.55 0.57 0.50*   ANIONGAP 12 11 13   MARTHA 9.9 9.8 10.0   GLC 77 96 86     Most Recent 2 LFT's:  Recent Labs   Lab Test 10/04/22  0709 08/18/22  0513   AST 22 23   ALT 10 14   ALKPHOS 137* 143*   BILITOTAL 0.2 <0.2     Most Recent TSH and T4:  Recent Labs   Lab Test 03/09/22  1115   TSH 1.43     Most Recent Anemia Panel:  Recent Labs   Lab Test 10/04/22  0709 08/18/22  0513 05/05/22  1108 04/28/22  0757 04/27/21  0450 02/03/21  0503   WBC 5.4 6.1   < > 9.1   < > 6.3   HGB 10.9* 10.8*   < > 9.2*   < > 12.5   HCT 36.8 36.6   < > 30.7*   < > 41.1   MCV 90 88   < > 94   < > 89    286   < > 708*   < > 280   IRON  --  34*  34*   < > 21*   < >  --    IRONSAT  --  12*   < >  --   --   --    FEB  --  283   < >  --   --   --    JOAN  --  89   < > 581*   < >  --    B12  --   --   --   --   --  770   FOLIC  --   --   --  10.0  --   --     < > = values in this interval not displayed.       ASSESSMENT/PLAN  (R53.81) Physical deconditioning  (primary encounter diagnosis)  (M62.81) Generalized muscle weakness  Comment: Chronic. S/T below diagnosis. Have attempted and re-attempted therapies several times, however patient continues to have no progression. She is wanting to restart as per patient and family goals is for patient to be able to return back to [facility] setting.   Plan:   -Recommend LTC placement.   -Restart Physical therapy  and Occupational therapy per reports. [facility] to re-assess patient for ongoing needs and cares. I do not anticipate much more progression than currently allowing. Currently using easy stand along with 24/7 nursing assistance for all hygiene cares. [facility] will need to allow this in order to return back,   -SW to remain involved for safe discharge planning needs  -Will send new referral out to follow up with neurology for neuromuscular concerns within 1 month     (E43) Severe protein-calorie malnutrition (H)  Comment: Chronic. Not at goal. Recent weight 141#, baseline weight around 150#  Plan:   -Magic cup as directed  -Dietician on site as directed  -Monitor weights weekly  -BMP and CBC 11/15/22     (I73.9) PVD  Comment: Acute on chronic. No wounds.   Plan:  -Monitor for worsening s/sx of concerns.   -Dietician on site as directed  -Monitor weights weekly  -BMP and CBC 11/15/22     (D50.8) Iron deficiency anemia secondary to inadequate dietary iron intake  Comment: Chronic. Prior anemia panel as noted. Previously on daily iron replacement with noted elevation of her ferritin. Changed to 3x weekly dosing with addition of vitamin C with noted improvement. Baseline hgb-10  Plan:   -Monitor bleeding risks and concerns  -Continue vitamin C  -Continue ferrous sulfate 3x weekly.    -BMP and CBC 11/15/22     (I10) Essential hypertension  Comment: Chronic. Based on JNC-8 goals,  patients age of 82 year old, no presence of diabetes or CKD, and goals of care goal BP is <150/90 mm Hg. Patient is stable with current plan of care and routine assessment..  Plan:   -Monitor BP and HR as directed  -Continue metoprolol as directed.    -BMP and CBC 11/15/22     (G91.2) Normal pressure hydrocephalus (H)  Comment: Acute on chronic. With medication adjustments. Has tolerated weaning of Prozac; Unfortunately, prior order to initiate Celexa not processed, thus this wasn't started. Resident had been stable psychologically with occasional odd  statements noted. Recent Lamictal levels in Oct 2022 was stable of 4.3  Plan:  -Continue Abilify, Lamictal as ordered with close monitoring for additional needs.  -Will send new referral out to follow up with neurology for neuromuscular concerns within 1 month   -Monitor for worsening s/sx of concerns.   -BMP and CBC 11/15/22     (F31.9) Bipolar 1 disorder (H)  (R45.851) Suicidal thoughts  (F33.9) Major depression  Comment: Chronic. Stable. No behaviors, although patient continues to want to be able to discharge back to previous [facility] setting  Plan:   -Monitor mood and behaviors  -ACP eval and treatment  -Continue seroquel to 25mg díaz at HS.   -Continue abilify as directed  -Continue celexa 20mg daily  -Monitor for worsening s/sx of concerns  -Monitor falls and changes in mobility, eating and sleeping patterns  -BMP and CBC 11/15/22     (Z96.612) Status post total shoulder arthroplasty, left  (G89.18) Acute post-operative pain  (R25.2) Spasticity  Comment: Acute on chronic. Not at goal. Continues to have increased spacticity which patient believes is why she is not ambulating anymore, which patient has not ambulated in years, however she was previously able to stand pivot at [facility]. Increased pain complaints which I suspect lack of mobility is contributing. Family reports she was told by neurosurgeon that current status is not neuro related but muscular and family are worried that she is no longer able to stand pivot as she was prior to hospitalization.   Plan:   -Monitor pain complaints  -Continue Tylenol 975mg QID scheduled.   -Change baclofen to 10mg TID ongoing  -Restart tramadol 25mg BID PRN  -Voltaren as directed  -Will send new referral out to follow up with neurology for neuromuscular concerns within 1 month  -BMP and CBC 11/15/22     (R00.0) Tachycardia  Comment: Acute on chronic. Slowly improving. HR elevated inpatient. Recent HR noted with stabilization in prior elevations.   Plan:   -Metoprolol 12.5mg BID as  directed  -Monitor BP and HR monitoring as directed  -BMP and CBC 11/15/22     (R09.02) Hypoxia  (J44.9) Chronic obstructive pulmonary disease, unspecified COPD type (H)  (J96.01) Acute respiratory failure with hypoxia (H)  (J98.11) Atelectasis  (R06.02) Shortness of breath  Comment: Chronic. Stable No cough noted.   Plan:   -Monitor respiratory status  -Continue albuterol as directed  -Continue incruse as directed  -Continue breo as directed  -Mucinex as directed  -BMP and CBC 11/15/22     (K59.01) Slow transit constipation  (K42.9) Umbilical hernia without obstruction and without gangrene  (C18.7) History of neoplasm of sigmoid colon  (R11.0) Nausea  Comment: Chronic. Not at goal. Hernia non-tender and soft but protrudes. The patient did have stage III cancer of the colon diagnosed in 01/2015.  She did have chemotherapy after what sounds like surgery and is in remission.  She also has had a cholecystectomy.  Plan:   -Monitor BM patterns  -Continue Miralax 17gm daily  -Change senna 1 tab BID scheduled  -Bisacodyl daily PRN if no BM in 3 days  -Zofran 4mg PRN   -BMP and CBC 11/15/22     Electronically signed by:  Taylor Ledbetter DNP, APRN

## 2022-11-09 NOTE — LETTER
11/9/2022        RE: Amparo Sharma  [address]  [place] [address]        [facility] GERIATRICS    Chief Complaint   Patient presents with     RECHECK     HPI:  Amparo Sharma is a 82 year old  (1940), who is being seen today for an episodic care visit at: [facility] () [[id number]]. Today's concern is: The primary encounter diagnosis was Physical deconditioning. Diagnoses of Generalized muscle weakness, Slow transit constipation, Hypoxia, Acute respiratory failure with hypoxia (H), Shortness of breath, Chronic obstructive pulmonary disease, unspecified COPD type (H), Atelectasis, Tachycardia, Status post total shoulder arthroplasty, left, Acute post-operative pain, Bipolar 1 disorder (H), Normal pressure hydrocephalus (H), Suicidal thoughts, Episode of recurrent major depressive disorder, unspecified depression episode severity (H), Iron deficiency anemia secondary to inadequate dietary iron intake, Essential hypertension, Severe protein-calorie malnutrition (H), Vascular dementia without behavioral disturbance (H), Umbilical hernia without obstruction and without gangrene, Malignant neoplasm of sigmoid colon (H), PVD (peripheral vascular disease) (H), Nausea, and Spasticity were also pertinent to this visit.    Per staff update- patient and family would really like to restart therapies with [facility]. Patient is currently needing an easy stand for transfers and does not ambulate as patient really wants to be able to ambulate. I suspect patient will not be able to ambulate as we have attempted therapies multiple rounds with little to no progression. Today-Met with patient who denies any chest pain, palpitations, shortness of breath, SOLOMON, lightheadedness, dizziness, or cough. Denies any abdominal discomfort. Denies N&V. She reports hernia concerns as they continue to protrude outward. I did notify her that we need to  maintain approprate BM management along with limiting abdominal straining. She is not surgical candidate. Denies dysuria or frequency. She reports constipation concerns with LBM 2 days ago per reports, however per chart review she last had BM yesterday 11/8/22. Appetite good. Sleeping well. No suicidal behaviors. No recent falls. No behaviors. Nursing denies any acute concerns.     BP Readings from Last 3 Encounters:   11/09/22 139/71   10/18/22 133/84   10/03/22 118/72     Wt Readings from Last 5 Encounters:   11/09/22 64.9 kg (143 lb 1.6 oz)   10/18/22 67.4 kg (148 lb 8 oz)   10/03/22 68.1 kg (150 lb 3.2 oz)   09/05/22 66.7 kg (147 lb)   09/05/22 66.8 kg (147 lb 4.8 oz)     Allergies, and PMH/PSH reviewed in EPIC today.  REVIEW OF SYSTEMS:  10 point ROS of systems including Constitutional, Eyes, Respiratory, Cardiovascular, Gastroenterology, Genitourinary, Integumentary, Musculoskeletal, Psychiatric were all negative except for pertinent positives noted in my HPI.    Objective:   /71   Pulse 84   Temp 98.3  F (36.8  C)   Resp 16   Ht 1.524 m (5')   Wt 64.9 kg (143 lb 1.6 oz)   SpO2 94%   BMI 27.95 kg/m    GENERAL APPEARANCE:  Alert, in no distress, oriented, cooperative  ENT:  Mouth and posterior oropharynx normal, moist mucous membranes, [place] IRA  EYES:  EOM, conjunctivae, lids, pupils and irises normal  NECK:  No adenopathy,masses or thyromegaly  RESP:  respiratory effort and palpation of chest normal, lungs clear to auscultation , no respiratory distress  CV:  Palpation and auscultation of heart done , regular rate and rhythm, no murmur, rub, or gallop, no edema  ABDOMEN:  normal bowel sounds, soft, nontender, no hepatosplenomegaly or other masses, umbilical hernia present, no guarding or rebound  M/S:   Wheelchair bound. Easy stand needs. Nonambulatory. staff to assist with all cares, ADLS, hygiene and transfers  SKIN:  Inspection of skin and subcutaneous tissue baseline, Palpation of skin and  subcutaneous tissue baseline  NEURO:   Cranial nerves 2-12 are normal tested and grossly at patient's baseline, no purposeful movement in upper and lower extremities  PSYCH:  oriented X 3, memory impaired , affect and mood normal      Most Recent 3 CBC's:  Recent Labs   Lab Test 10/04/22  0709 08/18/22  0513 07/07/22  0534 06/02/22  1306 05/05/22  1108   WBC 5.4 6.1  --   --  7.7   HGB 10.9* 10.8* 11.9   < > 10.1*   MCV 90 88  --   --  90    286  --   --  597*    < > = values in this interval not displayed.     Most Recent 3 BMP's:  Recent Labs   Lab Test 10/04/22  0709 08/18/22  0513 07/07/22  0534    141 139   POTASSIUM 4.5 4.3 4.3   CHLORIDE 107 104 102   CO2 24 26 24   BUN 23.5* 23.4* 16.3   CR 0.55 0.57 0.50*   ANIONGAP 12 11 13   MARTHA 9.9 9.8 10.0   GLC 77 96 86     Most Recent 2 LFT's:  Recent Labs   Lab Test 10/04/22  0709 08/18/22  0513   AST 22 23   ALT 10 14   ALKPHOS 137* 143*   BILITOTAL 0.2 <0.2     Most Recent TSH and T4:  Recent Labs   Lab Test 03/09/22  1115   TSH 1.43     Most Recent Anemia Panel:  Recent Labs   Lab Test 10/04/22  0709 08/18/22  0513 05/05/22  1108 04/28/22  0757 04/27/21  0450 02/03/21  0503   WBC 5.4 6.1   < > 9.1   < > 6.3   HGB 10.9* 10.8*   < > 9.2*   < > 12.5   HCT 36.8 36.6   < > 30.7*   < > 41.1   MCV 90 88   < > 94   < > 89    286   < > 708*   < > 280   IRON  --  34*  34*   < > 21*   < >  --    IRONSAT  --  12*   < >  --   --   --    FEB  --  283   < >  --   --   --    JOAN  --  89   < > 581*   < >  --    B12  --   --   --   --   --  770   FOLIC  --   --   --  10.0  --   --     < > = values in this interval not displayed.       ASSESSMENT/PLAN  (R53.81) Physical deconditioning  (primary encounter diagnosis)  (M62.81) Generalized muscle weakness  Comment: Chronic. S/T below diagnosis. Have attempted and re-attempted therapies several times, however patient continues to have no progression. She is wanting to restart as per patient and family goals is for  patient to be able to return back to [facility] setting.   Plan:   -Recommend LTC placement.   -Restart Physical therapy and Occupational therapy per reports. [facility] to re-assess patient for ongoing needs and cares. I do not anticipate much more progression than currently allowing. Currently using easy stand along with 24/7 nursing assistance for all hygiene cares. [facility] will need to allow this in order to return back,   -SW to remain involved for safe discharge planning needs  -Will send new referral out to follow up with neurology for neuromuscular concerns within 1 month     (E43) Severe protein-calorie malnutrition (H)  Comment: Chronic. Not at goal. Recent weight 141#, baseline weight around 150#  Plan:   -Magic cup as directed  -Dietician on site as directed  -Monitor weights weekly  -BMP and CBC 11/15/22     (I73.9) PVD  Comment: Acute on chronic. No wounds.   Plan:  -Monitor for worsening s/sx of concerns.   -Dietician on site as directed  -Monitor weights weekly  -BMP and CBC 11/15/22     (D50.8) Iron deficiency anemia secondary to inadequate dietary iron intake  Comment: Chronic. Prior anemia panel as noted. Previously on daily iron replacement with noted elevation of her ferritin. Changed to 3x weekly dosing with addition of vitamin C with noted improvement. Baseline hgb-10  Plan:   -Monitor bleeding risks and concerns  -Continue vitamin C  -Continue ferrous sulfate 3x weekly.    -BMP and CBC 11/15/22     (I10) Essential hypertension  Comment: Chronic. Based on JNC-8 goals,  patients age of 82 year old, no presence of diabetes or CKD, and goals of care goal BP is <150/90 mm Hg. Patient is stable with current plan of care and routine assessment..  Plan:   -Monitor BP and HR as directed  -Continue metoprolol as directed.    -BMP and CBC 11/15/22     (G91.2) Normal pressure hydrocephalus (H)  Comment: Acute on chronic. With medication adjustments. Has tolerated weaning of Prozac; Unfortunately, prior order to initiate  Celexa not processed, thus this wasn't started. Resident had been stable psychologically with occasional odd statements noted. Recent Lamictal levels in Oct 2022 was stable of 4.3  Plan:  -Continue Abilify, Lamictal as ordered with close monitoring for additional needs.  -Will send new referral out to follow up with neurology for neuromuscular concerns within 1 month   -Monitor for worsening s/sx of concerns.   -BMP and CBC 11/15/22     (F31.9) Bipolar 1 disorder (H)  (R45.851) Suicidal thoughts  (F33.9) Major depression  Comment: Chronic. Stable. No behaviors, although patient continues to want to be able to discharge back to previous [facility] setting  Plan:   -Monitor mood and behaviors  -ACP eval and treatment  -Continue seroquel to 25mg díaz at HS.   -Continue abilify as directed  -Continue celexa 20mg daily  -Monitor for worsening s/sx of concerns  -Monitor falls and changes in mobility, eating and sleeping patterns  -BMP and CBC 11/15/22     (Z96.612) Status post total shoulder arthroplasty, left  (G89.18) Acute post-operative pain  (R25.2) Spasticity  Comment: Acute on chronic. Not at goal. Continues to have increased spacticity which patient believes is why she is not ambulating anymore, which patient has not ambulated in years, however she was previously able to stand pivot at [facility]. Increased pain complaints which I suspect lack of mobility is contributing. Family reports she was told by neurosurgeon that current status is not neuro related but muscular and family are worried that she is no longer able to stand pivot as she was prior to hospitalization.   Plan:   -Monitor pain complaints  -Continue Tylenol 975mg QID scheduled.   -Change baclofen to 10mg TID ongoing  -Restart tramadol 25mg BID PRN  -Voltaren as directed  -Will send new referral out to follow up with neurology for neuromuscular concerns within 1 month  -BMP and CBC 11/15/22     (R00.0) Tachycardia  Comment: Acute on chronic. Slowly improving. HR  elevated inpatient. Recent HR noted with stabilization in prior elevations.   Plan:   -Metoprolol 12.5mg BID as directed  -Monitor BP and HR monitoring as directed  -BMP and CBC 11/15/22     (R09.02) Hypoxia  (J44.9) Chronic obstructive pulmonary disease, unspecified COPD type (H)  (J96.01) Acute respiratory failure with hypoxia (H)  (J98.11) Atelectasis  (R06.02) Shortness of breath  Comment: Chronic. Stable No cough noted.   Plan:   -Monitor respiratory status  -Continue albuterol as directed  -Continue incruse as directed  -Continue breo as directed  -Mucinex as directed  -BMP and CBC 11/15/22     (K59.01) Slow transit constipation  (K42.9) Umbilical hernia without obstruction and without gangrene  (C18.7) History of neoplasm of sigmoid colon  (R11.0) Nausea  Comment: Chronic. Not at goal. Hernia non-tender and soft but protrudes. The patient did have stage III cancer of the colon diagnosed in 01/2015.  She did have chemotherapy after what sounds like surgery and is in remission.  She also has had a cholecystectomy.  Plan:   -Monitor BM patterns  -Continue Miralax 17gm daily  -Change senna 1 tab BID scheduled  -Bisacodyl daily PRN if no BM in 3 days  -Zofran 4mg PRN   -BMP and CBC 11/15/22     Electronically signed by:  Taylor Ledbetter DNP, APRN          Sincerely,        Taylor Ledbetter, KASANDRA CNP

## 2022-11-11 ENCOUNTER — LAB REQUISITION (OUTPATIENT)
Dept: LAB | Facility: CLINIC | Age: 82
End: 2022-11-11
Payer: MEDICARE

## 2022-11-11 DIAGNOSIS — M12.812 OTHER SPECIFIC ARTHROPATHIES, NOT ELSEWHERE CLASSIFIED, LEFT SHOULDER: ICD-10-CM

## 2022-11-11 DIAGNOSIS — I10 ESSENTIAL (PRIMARY) HYPERTENSION: ICD-10-CM

## 2022-11-15 LAB
ANION GAP SERPL CALCULATED.3IONS-SCNC: 10 MMOL/L (ref 7–15)
BUN SERPL-MCNC: 22.2 MG/DL (ref 8–23)
CALCIUM SERPL-MCNC: 9.7 MG/DL (ref 8.8–10.2)
CHLORIDE SERPL-SCNC: 99 MMOL/L (ref 98–107)
CREAT SERPL-MCNC: 0.58 MG/DL (ref 0.51–0.95)
DEPRECATED HCO3 PLAS-SCNC: 26 MMOL/L (ref 22–29)
ERYTHROCYTE [DISTWIDTH] IN BLOOD BY AUTOMATED COUNT: 14.6 % (ref 10–15)
GFR SERPL CREATININE-BSD FRML MDRD: 90 ML/MIN/1.73M2
GLUCOSE SERPL-MCNC: 88 MG/DL (ref 70–99)
HCT VFR BLD AUTO: 44 % (ref 35–47)
HGB BLD-MCNC: 12.9 G/DL (ref 11.7–15.7)
MCH RBC QN AUTO: 26.9 PG (ref 26.5–33)
MCHC RBC AUTO-ENTMCNC: 29.3 G/DL (ref 31.5–36.5)
MCV RBC AUTO: 92 FL (ref 78–100)
PLATELET # BLD AUTO: 248 10E3/UL (ref 150–450)
POTASSIUM SERPL-SCNC: 5 MMOL/L (ref 3.4–5.3)
RBC # BLD AUTO: 4.8 10E6/UL (ref 3.8–5.2)
SODIUM SERPL-SCNC: 135 MMOL/L (ref 136–145)
WBC # BLD AUTO: 6.5 10E3/UL (ref 4–11)

## 2022-11-15 PROCEDURE — 85027 COMPLETE CBC AUTOMATED: CPT | Mod: ORL | Performed by: NURSE PRACTITIONER

## 2022-11-15 PROCEDURE — 80048 BASIC METABOLIC PNL TOTAL CA: CPT | Mod: ORL | Performed by: NURSE PRACTITIONER

## 2022-11-15 PROCEDURE — P9604 ONE-WAY ALLOW PRORATED TRIP: HCPCS | Mod: ORL | Performed by: NURSE PRACTITIONER

## 2022-11-15 PROCEDURE — 36415 COLL VENOUS BLD VENIPUNCTURE: CPT | Mod: ORL | Performed by: NURSE PRACTITIONER

## 2022-11-16 ENCOUNTER — NURSING HOME VISIT (OUTPATIENT)
Dept: GERIATRICS | Facility: CLINIC | Age: 82
End: 2022-11-16
Payer: MEDICARE

## 2022-11-16 VITALS
OXYGEN SATURATION: 90 % | SYSTOLIC BLOOD PRESSURE: 175 MMHG | DIASTOLIC BLOOD PRESSURE: 80 MMHG | WEIGHT: 145.7 LBS | HEART RATE: 76 BPM | BODY MASS INDEX: 28.61 KG/M2 | RESPIRATION RATE: 19 BRPM | HEIGHT: 60 IN | TEMPERATURE: 98.3 F

## 2022-11-16 DIAGNOSIS — J44.9 CHRONIC OBSTRUCTIVE PULMONARY DISEASE, UNSPECIFIED COPD TYPE (H): ICD-10-CM

## 2022-11-16 DIAGNOSIS — I10 ESSENTIAL HYPERTENSION: ICD-10-CM

## 2022-11-16 DIAGNOSIS — F31.9 BIPOLAR 1 DISORDER (H): Primary | ICD-10-CM

## 2022-11-16 PROCEDURE — 99309 SBSQ NF CARE MODERATE MDM 30: CPT | Performed by: INTERNAL MEDICINE

## 2022-11-16 NOTE — LETTER
11/16/2022        RE: Amparo Sharma  [address]  [place] [address]        [facility] GERIATRICS  REGULATORY VISIT  November 16, 2022      [facility] Medical Record Number:  [id number]  Place of Service where encounter took place:  [facility] () [[id number]]    Chief Complaint   Patient presents with     MCFP Regulatory       HPI:    Amparo Sharma is a 82 year old  (1940), who is being seen today for a federally mandated E/M visit at [facility] where she has resided since April of this year. HPI information obtained from: facility chart records, facility staff, patient report and [facility] chart review.    Today, Ms. Sharma is seen in her room sitting up in bed. Overall is doing OK. She has some R sided neck pain that she thinks PT is making worse? The pain is worse in the morning, better once she's out of bed sitting up. No headaches, vision changes, dizziness. She isn't sure if she should see neurosurgery for this? No chest pain. No dyspnea. Her breathing feels better than it has in some time.     ALLERGIES:    Allergies   Allergen Reactions     Amantadine      Antihistamine Allergy Relief [Diphenhydramine] Other (See Comments)     hyperactivity     Bactrim [Sulfamethoxazole W/Trimethoprim]      Codeine Itching     Demerol [Meperidine] Nausea     Diazepam Other (See Comments)     Hallucinations/paranoid     Gabapentin      Hmg-Coa-R Inhibitors Other (See Comments)     Was hospitalized for possible rhabdo     Meloxicam Other (See Comments)     Pentazocine Other (See Comments)     drowsiness     Sulfamethoxazole-Trimethoprim      Tramadol Nausea        Past Medical, Surgical, Family and Social History: Reviewed and updated in EPIC.    MEDICATIONS:  Current Outpatient Medications   Medication Sig Dispense Refill     acetaminophen (TYLENOL) 325 MG tablet Take 3 tablets (975 mg) by mouth every 6 hours 360 tablet 11     ARIPiprazole  (ABILIFY) 15 MG tablet Take 15 mg by mouth At Bedtime       aspirin (ASA) 81 MG EC tablet Take 1 tablet (81 mg) by mouth in the morning and 1 tablet (81 mg) in the evening. Take with meals. 60 tablet 0     baclofen (LIORESAL) 10 MG tablet Take 1 tablet (10 mg) by mouth 3 times daily 180 tablet 11     bisacodyl (DULCOLAX) 10 MG suppository Place 1 suppository (10 mg) rectally daily as needed for constipation 8 suppository 11     BREO ELLIPTA 100-25 MCG/INH inhaler Inhale 1 puff into the lungs daily 1PM       carboxymethylcellulose (REFRESH PLUS) 0.5 % SOLN ophthalmic solution Place 1 drop into both eyes in the morning.       cetirizine (ZYRTEC) 10 MG tablet Take 10 mg by mouth daily        citalopram (CELEXA) 20 MG tablet Take 1 tablet (20 mg) by mouth daily 30 tablet 11     diclofenac (VOLTAREN) 1 % topical gel Apply 4 g topically 4 times daily Apply to affected knees.       ferrous sulfate (FEROSUL) 325 (65 Fe) MG tablet Take 1 tablet (325 mg) by mouth Every Mon, Wed, Fri Morning       INCRUSE ELLIPTA 62.5 MCG/INH Inhaler Inhale 1 puff into the lungs daily        lamoTRIgine (LAMICTAL) 100 MG tablet Take 100 mg by mouth At Bedtime       lamoTRIgine (LAMICTAL) 150 MG tablet Take 150 mg by mouth daily        levothyroxine (SYNTHROID/LEVOTHROID) 75 MCG tablet Take 75 mcg by mouth daily       menthol-zinc oxide (CALMOSEPTINE) 0.44-20.6 % OINT ointment Apply topically 2 times daily as needed for irritation       metoprolol tartrate (LOPRESSOR) 25 MG tablet Take 12.5 mg by mouth 2 times daily HOLD if SBP<100 and/or HR<55       mirabegron (MYRBETRIQ) 25 MG 24 hr tablet Take 25 mg by mouth daily        Multiple Vitamins-Minerals (MULTIVITAMIN PO) Take 1 tablet by mouth daily        ondansetron (ZOFRAN) 4 MG tablet Take 1 tablet (4 mg) by mouth every 6 hours as needed for nausea 30 tablet 11     pantoprazole (PROTONIX) 20 MG EC tablet Take 20 mg by mouth daily        polyethylene glycol (MIRALAX) 17 GM/Dose powder Take 17 g  "by mouth daily 510 g 0     QUEtiapine (SEROQUEL) 25 MG tablet Take 1 tablet (25 mg) by mouth At Bedtime 60 tablet 3     SENOKOT S 8.6-50 MG tablet Take 1 tablet by mouth 2 times daily 90 tablet 11     traMADol (ULTRAM) 50 MG tablet Take 0.5 tablets (25 mg) by mouth 2 times daily as needed for severe pain 60 tablet 0     vitamin C (ASCORBIC ACID) 500 MG tablet Take 500 mg by mouth 2 times daily       Vitamin D, Cholecalciferol, 25 MCG (1000 UT) CAPS Take 1,000 Units by mouth daily        albuterol (PROAIR HFA/PROVENTIL HFA/VENTOLIN HFA) 108 (90 Base) MCG/ACT inhaler Inhale 2 puffs into the lungs every 4 hours as needed for shortness of breath / dyspnea       amoxicillin (AMOXIL) 500 MG capsule Take 2,000 mg by mouth once as needed (Prior to dental appt)        carboxymethylcellulose (REFRESH PLUS) 0.5 % SOLN ophthalmic solution Place 1 drop into both eyes 3 times daily as needed for dry eyes       Medications reviewed:  Medications reconciled to facility chart and changes were made to reflect current medications as identified as above med list. Below are the changes that were made:   Medications stopped since last EPIC medication reconciliation:   Medications Discontinued During This Encounter   Medication Reason     hydrocortisone (CORTAID) 0.5 % external ointment      guaiFENesin (MUCINEX) 600 MG 12 hr tablet      Medications started since last [facility] medication reconciliation:  No orders of the defined types were placed in this encounter.    REVIEW OF SYSTEMS:  4 point ROS neg other than the symptoms noted above in the HPI.    PHYSICAL EXAM:  BP (!) 175/80   Pulse 76   Temp 98.3  F (36.8  C)   Resp 19   Ht 1.524 m (5')   Wt 66.1 kg (145 lb 11.2 oz)   SpO2 90%   BMI 28.46 kg/m    Gen: sitting up in bed, alert, cooperative and in no acute distress  HEENT: tight SCM on the R; can feel the catheter from her  shunt and it's \"mobile\" and not painful to palpation along the length of her neck  Neuro: CX II-XII " grossly in tact; ROM in all four extremities grossly in tact  Psych: alert and oriented to self and general situation  Skin: neck without erythema, warmth    LABS/IMAGING: [facility] Epic [facility]    ASSESSMENT / PLAN:     Right Sided Neck Pain  Unclear etiology. Not reproducible on exam. She points more posterior over where the catheter is for the  shunt. Tight SCM but otherwise able to turn her neck OK. No associated headaches, vision changes, dizziness.   -- we could have her see neurosurgery -- not sure this is related though?     Bipolar Disorder  I think this has been stable at the TCU.   -- aripiprazole 15 mg at bedtime, citalopram 20 mg daily, lamotrigine 150 mg in the morning and 100 mg at bedtime, quetiapine 25 mg at bedtime  -- ongoing supportive cares    COPD  No recent exacerbation.   -- fluticasone-vilanterol 100-25 mcg daily, umeclidinium 62.5 mcg daily and albuterol PRN  -- follow clinically     HTN  SBPs 130s.   -- metoprolol 12.5 mg BID   -- follow BPs and adjust medications as needed       Electronically signed by  Megan Bajwa MD                Sincerely,        Megan Bajwa MD

## 2022-11-16 NOTE — PROGRESS NOTES
[facility] GERIATRICS  REGULATORY VISIT  November 16, 2022      [facility] Medical Record Number:  [id number]  Place of Service where encounter took place:  [facility] () [[id number]]    Chief Complaint   Patient presents with     halfway Regulatory       HPI:    Amparo Sharma is a 82 year old  (1940), who is being seen today for a federally mandated E/M visit at [facility] where she has resided since April of this year. HPI information obtained from: facility chart records, facility staff, patient report and [facility] chart review.    Today, Ms. Sharma is seen in her room sitting up in bed. Overall is doing OK. She has some R sided neck pain that she thinks PT is making worse? The pain is worse in the morning, better once she's out of bed sitting up. No headaches, vision changes, dizziness. She isn't sure if she should see neurosurgery for this? No chest pain. No dyspnea. Her breathing feels better than it has in some time.     ALLERGIES:    Allergies   Allergen Reactions     Amantadine      Antihistamine Allergy Relief [Diphenhydramine] Other (See Comments)     hyperactivity     Bactrim [Sulfamethoxazole W/Trimethoprim]      Codeine Itching     Demerol [Meperidine] Nausea     Diazepam Other (See Comments)     Hallucinations/paranoid     Gabapentin      Hmg-Coa-R Inhibitors Other (See Comments)     Was hospitalized for possible rhabdo     Meloxicam Other (See Comments)     Pentazocine Other (See Comments)     drowsiness     Sulfamethoxazole-Trimethoprim      Tramadol Nausea        Past Medical, Surgical, Family and Social History: Reviewed and updated in EPIC.    MEDICATIONS:  Current Outpatient Medications   Medication Sig Dispense Refill     acetaminophen (TYLENOL) 325 MG tablet Take 3 tablets (975 mg) by mouth every 6 hours 360 tablet 11     ARIPiprazole (ABILIFY) 15 MG tablet Take 15 mg by mouth At Bedtime       aspirin (ASA) 81 MG EC tablet Take 1 tablet  (81 mg) by mouth in the morning and 1 tablet (81 mg) in the evening. Take with meals. 60 tablet 0     baclofen (LIORESAL) 10 MG tablet Take 1 tablet (10 mg) by mouth 3 times daily 180 tablet 11     bisacodyl (DULCOLAX) 10 MG suppository Place 1 suppository (10 mg) rectally daily as needed for constipation 8 suppository 11     BREO ELLIPTA 100-25 MCG/INH inhaler Inhale 1 puff into the lungs daily 1PM       carboxymethylcellulose (REFRESH PLUS) 0.5 % SOLN ophthalmic solution Place 1 drop into both eyes in the morning.       cetirizine (ZYRTEC) 10 MG tablet Take 10 mg by mouth daily        citalopram (CELEXA) 20 MG tablet Take 1 tablet (20 mg) by mouth daily 30 tablet 11     diclofenac (VOLTAREN) 1 % topical gel Apply 4 g topically 4 times daily Apply to affected knees.       ferrous sulfate (FEROSUL) 325 (65 Fe) MG tablet Take 1 tablet (325 mg) by mouth Every Mon, Wed, Fri Morning       INCRUSE ELLIPTA 62.5 MCG/INH Inhaler Inhale 1 puff into the lungs daily        lamoTRIgine (LAMICTAL) 100 MG tablet Take 100 mg by mouth At Bedtime       lamoTRIgine (LAMICTAL) 150 MG tablet Take 150 mg by mouth daily        levothyroxine (SYNTHROID/LEVOTHROID) 75 MCG tablet Take 75 mcg by mouth daily       menthol-zinc oxide (CALMOSEPTINE) 0.44-20.6 % OINT ointment Apply topically 2 times daily as needed for irritation       metoprolol tartrate (LOPRESSOR) 25 MG tablet Take 12.5 mg by mouth 2 times daily HOLD if SBP<100 and/or HR<55       mirabegron (MYRBETRIQ) 25 MG 24 hr tablet Take 25 mg by mouth daily        Multiple Vitamins-Minerals (MULTIVITAMIN PO) Take 1 tablet by mouth daily        ondansetron (ZOFRAN) 4 MG tablet Take 1 tablet (4 mg) by mouth every 6 hours as needed for nausea 30 tablet 11     pantoprazole (PROTONIX) 20 MG EC tablet Take 20 mg by mouth daily        polyethylene glycol (MIRALAX) 17 GM/Dose powder Take 17 g by mouth daily 510 g 0     QUEtiapine (SEROQUEL) 25 MG tablet Take 1 tablet (25 mg) by mouth At Bedtime  "60 tablet 3     SENOKOT S 8.6-50 MG tablet Take 1 tablet by mouth 2 times daily 90 tablet 11     traMADol (ULTRAM) 50 MG tablet Take 0.5 tablets (25 mg) by mouth 2 times daily as needed for severe pain 60 tablet 0     vitamin C (ASCORBIC ACID) 500 MG tablet Take 500 mg by mouth 2 times daily       Vitamin D, Cholecalciferol, 25 MCG (1000 UT) CAPS Take 1,000 Units by mouth daily        albuterol (PROAIR HFA/PROVENTIL HFA/VENTOLIN HFA) 108 (90 Base) MCG/ACT inhaler Inhale 2 puffs into the lungs every 4 hours as needed for shortness of breath / dyspnea       amoxicillin (AMOXIL) 500 MG capsule Take 2,000 mg by mouth once as needed (Prior to dental appt)        carboxymethylcellulose (REFRESH PLUS) 0.5 % SOLN ophthalmic solution Place 1 drop into both eyes 3 times daily as needed for dry eyes       Medications reviewed:  Medications reconciled to facility chart and changes were made to reflect current medications as identified as above med list. Below are the changes that were made:   Medications stopped since last EPIC medication reconciliation:   Medications Discontinued During This Encounter   Medication Reason     hydrocortisone (CORTAID) 0.5 % external ointment      guaiFENesin (MUCINEX) 600 MG 12 hr tablet      Medications started since last [facility] medication reconciliation:  No orders of the defined types were placed in this encounter.    REVIEW OF SYSTEMS:  4 point ROS neg other than the symptoms noted above in the HPI.    PHYSICAL EXAM:  BP (!) 175/80   Pulse 76   Temp 98.3  F (36.8  C)   Resp 19   Ht 1.524 m (5')   Wt 66.1 kg (145 lb 11.2 oz)   SpO2 90%   BMI 28.46 kg/m    Gen: sitting up in bed, alert, cooperative and in no acute distress  HEENT: tight SCM on the R; can feel the catheter from her  shunt and it's \"mobile\" and not painful to palpation along the length of her neck  Neuro: CX II-XII grossly in tact; ROM in all four extremities grossly in tact  Psych: alert and oriented to self and general " situation  Skin: neck without erythema, warmth    LABS/IMAGING: Reviewed as per Epic and/or [facility]    ASSESSMENT / PLAN:     Right Sided Neck Pain  Unclear etiology. Not reproducible on exam. She points more posterior over where the catheter is for the  shunt. Tight SCM but otherwise able to turn her neck OK. No associated headaches, vision changes, dizziness.   -- we could have her see neurosurgery -- not sure this is related though?     Bipolar Disorder  I think this has been stable at the TCU.   -- aripiprazole 15 mg at bedtime, citalopram 20 mg daily, lamotrigine 150 mg in the morning and 100 mg at bedtime, quetiapine 25 mg at bedtime  -- ongoing supportive cares    COPD  No recent exacerbation.   -- fluticasone-vilanterol 100-25 mcg daily, umeclidinium 62.5 mcg daily and albuterol PRN  -- follow clinically     HTN  SBPs 130s.   -- metoprolol 12.5 mg BID   -- follow BPs and adjust medications as needed       Electronically signed by  Megan Bajwa MD

## 2022-11-30 ENCOUNTER — NURSING HOME VISIT (OUTPATIENT)
Dept: GERIATRICS | Facility: CLINIC | Age: 82
End: 2022-11-30
Payer: MEDICARE

## 2022-11-30 VITALS
WEIGHT: 246.2 LBS | OXYGEN SATURATION: 94 % | HEIGHT: 60 IN | BODY MASS INDEX: 48.33 KG/M2 | RESPIRATION RATE: 18 BRPM | SYSTOLIC BLOOD PRESSURE: 140 MMHG | HEART RATE: 74 BPM | DIASTOLIC BLOOD PRESSURE: 103 MMHG | TEMPERATURE: 98.3 F

## 2022-11-30 DIAGNOSIS — F33.9 RECURRENT MAJOR DEPRESSIVE DISORDER, REMISSION STATUS UNSPECIFIED (H): ICD-10-CM

## 2022-11-30 DIAGNOSIS — Z98.2 S/P VENTRICULOPERITONEAL SHUNT: ICD-10-CM

## 2022-11-30 DIAGNOSIS — G91.2 NPH (NORMAL PRESSURE HYDROCEPHALUS) (H): ICD-10-CM

## 2022-11-30 DIAGNOSIS — F01.B3 MODERATE VASCULAR DEMENTIA WITH MOOD DISTURBANCE (H): Primary | ICD-10-CM

## 2022-11-30 DIAGNOSIS — F31.9 BIPOLAR 1 DISORDER (H): ICD-10-CM

## 2022-11-30 PROCEDURE — 99310 SBSQ NF CARE HIGH MDM 45: CPT | Performed by: NURSE PRACTITIONER

## 2022-11-30 NOTE — PROGRESS NOTES
"[facility] GERIATRIC SERVICES    Chief Complaint   Patient presents with     PSYCH     HPI:  Amparo Sharma is a 82 year old  (1940), who is being seen today for an episodic care visit at: [facility] () [[id number]]. Today's concern is:      Moderate vascular dementia with mood disturbance  Recurrent major depressive disorder, remission status unspecified (H)  Bipolar 1 disorder (H)  NPH (normal pressure hydrocephalus) (H)  S/P ventriculoperitoneal shunt    Resident is met with today to follow-up on chronic, currently Recurrent major depressive disorder, Bipolar 1 disorder, NPH, Vascular dementia with mood disturbance, mental health diagnoses. Patient notes she has been feeling depressed for the past month with some irritableness and \"manic behavior\" on and off. Her Appetite has been fair to good. She says she has had some problems sleeping well but when asked further about sleep she had one episode where noise kept her awake. Denies CP, palpitations, fatigue, nausea, vomiting, increased SOB/SOLOMON, fever, chills, and/or b/b concerns today. Patient requires frequent reassurance.    Recent behavioral or pertient notes from facility:    10/30 - 1355: Resident alert with some confusion, unable to feed herself for lunch, when asked she said she just had a bad night, \" i had some dogs last night which i was supposed to take to rescue home but i was not able to \" resident denied having pain, will continue to monitor.    Prior visit notes:  PCP only    Allergies, and PMH/PSH reviewed in EPIC today.    Current psychiatric medications:    Abilify 15mg PO qDay - Bipolar 1 Disorder    Celexa 20mg PO qDay - Depression in Bipolar Disorder    Lamictal 150mg PO qAM - Bipolar 1 Disorder    Lamictal 100mg PO at bedtime - Bipolar 1 Disorder    Seroquel 25mg PO at bedtime - Hallucinations/SI    Recently changed psychiatric medications:    None    Other pertinent medications:    Synthroid 75mcg    Tramadol 50mg     Past " Medical and Surgical History reviewed in Epic today.    MEDICATIONS:  Current Outpatient Medications   Medication Sig Dispense Refill     acetaminophen (TYLENOL) 325 MG tablet Take 3 tablets (975 mg) by mouth every 6 hours 360 tablet 11     albuterol (PROAIR HFA/PROVENTIL HFA/VENTOLIN HFA) 108 (90 Base) MCG/ACT inhaler Inhale 2 puffs into the lungs every 4 hours as needed for shortness of breath / dyspnea       amoxicillin (AMOXIL) 500 MG capsule Take 2,000 mg by mouth once as needed (Prior to dental appt)        ARIPiprazole (ABILIFY) 15 MG tablet Take 15 mg by mouth At Bedtime       aspirin (ASA) 81 MG EC tablet Take 1 tablet (81 mg) by mouth in the morning and 1 tablet (81 mg) in the evening. Take with meals. 60 tablet 0     baclofen (LIORESAL) 10 MG tablet Take 1 tablet (10 mg) by mouth 3 times daily 180 tablet 11     bisacodyl (DULCOLAX) 10 MG suppository Place 1 suppository (10 mg) rectally daily as needed for constipation 8 suppository 11     BREO ELLIPTA 100-25 MCG/INH inhaler Inhale 1 puff into the lungs daily 1PM       carboxymethylcellulose (REFRESH PLUS) 0.5 % SOLN ophthalmic solution Place 1 drop into both eyes 3 times daily as needed for dry eyes       carboxymethylcellulose (REFRESH PLUS) 0.5 % SOLN ophthalmic solution Place 1 drop into both eyes in the morning.       cetirizine (ZYRTEC) 10 MG tablet Take 10 mg by mouth daily        citalopram (CELEXA) 20 MG tablet Take 1 tablet (20 mg) by mouth daily 30 tablet 11     diclofenac (VOLTAREN) 1 % topical gel Apply 4 g topically 4 times daily Apply to affected knees.       ferrous sulfate (FEROSUL) 325 (65 Fe) MG tablet Take 1 tablet (325 mg) by mouth Every Mon, Wed, Fri Morning       INCRUSE ELLIPTA 62.5 MCG/INH Inhaler Inhale 1 puff into the lungs daily        lamoTRIgine (LAMICTAL) 100 MG tablet Take 100 mg by mouth At Bedtime       lamoTRIgine (LAMICTAL) 150 MG tablet Take 150 mg by mouth daily        levothyroxine (SYNTHROID/LEVOTHROID) 75 MCG tablet  Take 75 mcg by mouth daily       menthol-zinc oxide (CALMOSEPTINE) 0.44-20.6 % OINT ointment Apply topically 2 times daily as needed for irritation       metoprolol tartrate (LOPRESSOR) 25 MG tablet Take 12.5 mg by mouth 2 times daily HOLD if SBP<100 and/or HR<55       mirabegron (MYRBETRIQ) 25 MG 24 hr tablet Take 25 mg by mouth daily        Multiple Vitamins-Minerals (MULTIVITAMIN PO) Take 1 tablet by mouth daily        ondansetron (ZOFRAN) 4 MG tablet Take 1 tablet (4 mg) by mouth every 6 hours as needed for nausea 30 tablet 11     pantoprazole (PROTONIX) 20 MG EC tablet Take 20 mg by mouth daily        polyethylene glycol (MIRALAX) 17 GM/Dose powder Take 17 g by mouth daily 510 g 0     QUEtiapine (SEROQUEL) 25 MG tablet Take 1 tablet (25 mg) by mouth At Bedtime 60 tablet 3     SENOKOT S 8.6-50 MG tablet Take 1 tablet by mouth 2 times daily 90 tablet 11     traMADol (ULTRAM) 50 MG tablet Take 0.5 tablets (25 mg) by mouth 2 times daily as needed for severe pain 60 tablet 0     vitamin C (ASCORBIC ACID) 500 MG tablet Take 500 mg by mouth 2 times daily       Vitamin D, Cholecalciferol, 25 MCG (1000 UT) CAPS Take 1,000 Units by mouth daily            MEDICAL REVIEW OF SYSTEMS:   9 point ROS of systems including Constitutional, Eyes, Respiratory, Cardiovascular, Gastroenterology, Genitourinary, Integumentary, Musculoskeletal were all negative except for pertinent positives noted in my HPI.    PSYCHIATRIC REVIEW OF SYSTEMS:  Anxiety:  negative  Depression:  suicidal ideation, depressed mood, low energy and irritability  Eating Disorders:  negative  Impulse Control:  negative  Marnie: elevated/irritable mood  OCD:  negative  Psychosis:  negative  PTSD:   left her and her two small kids.    PHYSICAL EXAM:  BP (!) 140/103   Pulse 74   Temp 98.3  F (36.8  C)   Resp 18   Ht 1.524 m (5')   Wt 111.7 kg (246 lb 3.2 oz)   SpO2 94%   BMI 48.08 kg/m    GENERAL APPEARANCE:  Alert, in no distress, cooperative  NEURO:    Examination of sensation by touch normal  PSYCH:  oriented to person, insight and judgement impaired, thought content delusional about yelling at another residents grandchildren.    PSYCHIATRIC EXAM:  Appearance:  awake, alert, adequately groomed, appeared older than stated age and slightly unkempt  Attitude:  cooperative  Eye Contact:  good  Mood:  good  Affect:  appropriate and in normal range  Speech:  clear, coherent  Psychomotor Behavior:  no evidence of tardive dyskinesia, dystonia, or tics  Thought Process:  logical  Associations:  none  Thought Content:  passive suicidal ideation present.  Some delusional thoughts and possible hallucinations.  She was seeing dogs during the night and thinking she had to take care of them.  She stated that she had seen another patient's grandchildren making a ruckus at bed time a few days ago and yelled at them, but when the nursing staff were asked they said none of this happened.  Insight:  fair  Judgment:  fair  Oriented to:  person and place, didn't know the date or month.  Attention Span and Concentration:  fair  Recent and Remote Memory:  fair  Language: Able to name objects and Able to repeat phrases  Fund of Knowledge:normal  Muscle Strength and Tone: flaccid  Gait and Station: not observed, sitting in wheelchair.     Labs:   Recent labs in [facility] reviewed by me today.    Latest Reference Range & Units 11/15/22 05:37   Sodium 136 - 145 mmol/L 135 (L)   Potassium 3.4 - 5.3 mmol/L 5.0   Chloride 98 - 107 mmol/L 99   Carbon Dioxide (CO2) 22 - 29 mmol/L 26   Urea Nitrogen 8.0 - 23.0 mg/dL 22.2   Creatinine 0.51 - 0.95 mg/dL 0.58   GFR Estimate >60 mL/min/1.73m2 90   Calcium 8.8 - 10.2 mg/dL 9.7   Anion Gap 7 - 15 mmol/L 10   Glucose 70 - 99 mg/dL 88   WBC 4.0 - 11.0 10e3/uL 6.5   Hemoglobin 11.7 - 15.7 g/dL 12.9   Hematocrit 35.0 - 47.0 % 44.0   Platelet Count 150 - 450 10e3/uL 248   RBC Count 3.80 - 5.20 10e6/uL 4.80   MCV 78 - 100 fL 92   MCH 26.5 - 33.0 pg 26.9   MCHC  31.5 - 36.5 g/dL 29.3 (L)   RDW 10.0 - 15.0 % 14.6       ASSESSMENT/PLAN:    ICD-10-CM    1. Moderate vascular dementia with mood disturbance  F01.B3 Status depressed - unstable. The resident appears to be unstable within noted diagnoses requiring ongoing medication adjustments and close monitoring. Resident cooperative with today's visit. However, reports ongoing MH sx as noted in HPI. Will adjust medications today as noted below with lab recheck and follow-up as noted below.   2. Bipolar 1 disorder (H)  G91.2 Medications:    Increase Lamictal to 150 mg Po BID.    Continue Abilify 15mg PO qDay    Continue Celexa 20mg PO qDay     Continue Seroquel 25mg PO at bedtime - Hallucinations/SI   3. Recurrent major depressive disorder, remission status unspecified (H)  Z98.2 Labs:    Recheck Lamictal level 1-10-23    Regular monitoring of EKG, CBC, CMP, LFT, TSH and A1c given tx regimen   4. NPH (normal pressure hydrocephalus) (H)  F31.9 Psychological support:    Ongoing behavioral interventions and redirection per facility staff    Ongoing psychotherapy from [facility] in-house PRN  Additional follow-up:    She says her daughter made an appointment to see Dr. Vicente at the [facility], who is a neurologist, to check on shunt to see if it is clogged.   5. S/P ventriculoperitoneal shunt  F33.9 Plan to follow-up with resident in 6 weeks, or PRN for acute concerns.     This patient was seen along with a nurse practitioner student, TRUNG Mays-Student.  The histories and review of systems are obtained by TRUNG Mays-Joyce and confirmed by myself all objective, assessments and plans were completed by myself.    Electronically signed by:  Dr. Chantelle Hoover, APRN, DNP, AGNP-BC, PMHNP-BC  [facility]  Office Hours: Tues-Fri 2758-9068  Office: [address] [place] [address]  Fax - [phone]  Triage Phone - [phone]  Business Office- [phone]      Email: [email or web address]

## 2022-11-30 NOTE — LETTER
"    11/30/2022        RE: Amparo Sharma  [facility]  [address] [place] [address]         HEALTH GERIATRIC SERVICES    Chief Complaint   Patient presents with     PSYCH     HPI:  Amparo Sharma is a 82 year old  (1940), who is being seen today for an episodic care visit at: [facility] () [[id number]]. Today's concern is:      Moderate vascular dementia with mood disturbance  Recurrent major depressive disorder, remission status unspecified (H)  Bipolar 1 disorder (H)  NPH (normal pressure hydrocephalus) (H)  S/P ventriculoperitoneal shunt    Resident is met with today to follow-up on chronic, currently Recurrent major depressive disorder, Bipolar 1 disorder, NPH, Vascular dementia with mood disturbance, mental health diagnoses. Patient notes she has been feeling depressed for the past month with some irritableness and \"manic behavior\" on and off. Her Appetite has been fair to good. She says she has had some problems sleeping well but when asked further about sleep she had one episode where noise kept her awake. Denies CP, palpitations, fatigue, nausea, vomiting, increased SOB/SOLOMON, fever, chills, and/or b/b concerns today. Patient requires frequent reassurance.    Recent behavioral or pertient notes from facility:    10/30 - 1355: Resident alert with some confusion, unable to feed herself for lunch, when asked she said she just had a bad night, \" i had some dogs last night which i was supposed to take to rescue home but i was not able to \" resident denied having pain, will continue to monitor.    Prior visit notes:  PCP only    Allergies, and PMH/PSH reviewed in [facility] today.    Current psychiatric medications:    Abilify 15mg PO qDay - Bipolar 1 Disorder    Celexa 20mg PO qDay - Depression in Bipolar Disorder    Lamictal 150mg PO qAM - Bipolar 1 Disorder    Lamictal 100mg PO at bedtime - Bipolar 1 Disorder    Seroquel 25mg PO at bedtime - Hallucinations/SI    Recently changed " psychiatric medications:    None    Other pertinent medications:    Synthroid 75mcg    Tramadol 50mg     Past Medical and Surgical History reviewed in Epic today.    MEDICATIONS:  Current Outpatient Medications   Medication Sig Dispense Refill     acetaminophen (TYLENOL) 325 MG tablet Take 3 tablets (975 mg) by mouth every 6 hours 360 tablet 11     albuterol (PROAIR HFA/PROVENTIL HFA/VENTOLIN HFA) 108 (90 Base) MCG/ACT inhaler Inhale 2 puffs into the lungs every 4 hours as needed for shortness of breath / dyspnea       amoxicillin (AMOXIL) 500 MG capsule Take 2,000 mg by mouth once as needed (Prior to dental appt)        ARIPiprazole (ABILIFY) 15 MG tablet Take 15 mg by mouth At Bedtime       aspirin (ASA) 81 MG EC tablet Take 1 tablet (81 mg) by mouth in the morning and 1 tablet (81 mg) in the evening. Take with meals. 60 tablet 0     baclofen (LIORESAL) 10 MG tablet Take 1 tablet (10 mg) by mouth 3 times daily 180 tablet 11     bisacodyl (DULCOLAX) 10 MG suppository Place 1 suppository (10 mg) rectally daily as needed for constipation 8 suppository 11     BREO ELLIPTA 100-25 MCG/INH inhaler Inhale 1 puff into the lungs daily 1PM       carboxymethylcellulose (REFRESH PLUS) 0.5 % SOLN ophthalmic solution Place 1 drop into both eyes 3 times daily as needed for dry eyes       carboxymethylcellulose (REFRESH PLUS) 0.5 % SOLN ophthalmic solution Place 1 drop into both eyes in the morning.       cetirizine (ZYRTEC) 10 MG tablet Take 10 mg by mouth daily        citalopram (CELEXA) 20 MG tablet Take 1 tablet (20 mg) by mouth daily 30 tablet 11     diclofenac (VOLTAREN) 1 % topical gel Apply 4 g topically 4 times daily Apply to affected knees.       ferrous sulfate (FEROSUL) 325 (65 Fe) MG tablet Take 1 tablet (325 mg) by mouth Every Mon, Wed, Fri Morning       INCRUSE ELLIPTA 62.5 MCG/INH Inhaler Inhale 1 puff into the lungs daily        lamoTRIgine (LAMICTAL) 100 MG tablet Take 100 mg by mouth At Bedtime       lamoTRIgine  (LAMICTAL) 150 MG tablet Take 150 mg by mouth daily        levothyroxine (SYNTHROID/LEVOTHROID) 75 MCG tablet Take 75 mcg by mouth daily       menthol-zinc oxide (CALMOSEPTINE) 0.44-20.6 % OINT ointment Apply topically 2 times daily as needed for irritation       metoprolol tartrate (LOPRESSOR) 25 MG tablet Take 12.5 mg by mouth 2 times daily HOLD if SBP<100 and/or HR<55       mirabegron (MYRBETRIQ) 25 MG 24 hr tablet Take 25 mg by mouth daily        Multiple Vitamins-Minerals (MULTIVITAMIN PO) Take 1 tablet by mouth daily        ondansetron (ZOFRAN) 4 MG tablet Take 1 tablet (4 mg) by mouth every 6 hours as needed for nausea 30 tablet 11     pantoprazole (PROTONIX) 20 MG EC tablet Take 20 mg by mouth daily        polyethylene glycol (MIRALAX) 17 GM/Dose powder Take 17 g by mouth daily 510 g 0     QUEtiapine (SEROQUEL) 25 MG tablet Take 1 tablet (25 mg) by mouth At Bedtime 60 tablet 3     SENOKOT S 8.6-50 MG tablet Take 1 tablet by mouth 2 times daily 90 tablet 11     traMADol (ULTRAM) 50 MG tablet Take 0.5 tablets (25 mg) by mouth 2 times daily as needed for severe pain 60 tablet 0     vitamin C (ASCORBIC ACID) 500 MG tablet Take 500 mg by mouth 2 times daily       Vitamin D, Cholecalciferol, 25 MCG (1000 UT) CAPS Take 1,000 Units by mouth daily            MEDICAL REVIEW OF SYSTEMS:   9 point ROS of systems including Constitutional, Eyes, Respiratory, Cardiovascular, Gastroenterology, Genitourinary, Integumentary, Musculoskeletal were all negative except for pertinent positives noted in my HPI.    PSYCHIATRIC REVIEW OF SYSTEMS:  Anxiety:  negative  Depression:  suicidal ideation, depressed mood, low energy and irritability  Eating Disorders:  negative  Impulse Control:  negative  Marnie: elevated/irritable mood  OCD:  negative  Psychosis:  negative  PTSD:   left her and her two small kids.    PHYSICAL EXAM:  BP (!) 140/103   Pulse 74   Temp 98.3  F (36.8  C)   Resp 18   Ht 1.524 m (5')   Wt 111.7 kg (246  lb 3.2 oz)   SpO2 94%   BMI 48.08 kg/m    GENERAL APPEARANCE:  Alert, in no distress, cooperative  NEURO:   Examination of sensation by touch normal  PSYCH:  oriented to person, insight and judgement impaired, thought content delusional about yelling at another residents grandchildren.    PSYCHIATRIC EXAM:  Appearance:  awake, alert, adequately groomed, appeared older than stated age and slightly unkempt  Attitude:  cooperative  Eye Contact:  good  Mood:  good  Affect:  appropriate and in normal range  Speech:  clear, coherent  Psychomotor Behavior:  no evidence of tardive dyskinesia, dystonia, or tics  Thought Process:  logical  Associations:  none  Thought Content:  passive suicidal ideation present.  Some delusional thoughts and possible hallucinations.  She was seeing dogs during the night and thinking she had to take care of them.  She stated that she had seen another patient's grandchildren making a ruckus at bed time a few days ago and yelled at them, but when the nursing staff were asked they said none of this happened.  Insight:  fair  Judgment:  fair  Oriented to:  person and place, didn't know the date or month.  Attention Span and Concentration:  fair  Recent and Remote Memory:  fair  Language: Able to name objects and Able to repeat phrases  Fund of Knowledge:normal  Muscle Strength and Tone: flaccid  Gait and Station: not observed, sitting in wheelchair.     Labs:   Recent labs in [facility] reviewed by me today.    Latest Reference Range & Units 11/15/22 05:37   Sodium 136 - 145 mmol/L 135 (L)   Potassium 3.4 - 5.3 mmol/L 5.0   Chloride 98 - 107 mmol/L 99   Carbon Dioxide (CO2) 22 - 29 mmol/L 26   Urea Nitrogen 8.0 - 23.0 mg/dL 22.2   Creatinine 0.51 - 0.95 mg/dL 0.58   GFR Estimate >60 mL/min/1.73m2 90   Calcium 8.8 - 10.2 mg/dL 9.7   Anion Gap 7 - 15 mmol/L 10   Glucose 70 - 99 mg/dL 88   WBC 4.0 - 11.0 10e3/uL 6.5   Hemoglobin 11.7 - 15.7 g/dL 12.9   Hematocrit 35.0 - 47.0 % 44.0   Platelet Count 150 -  450 10e3/uL 248   RBC Count 3.80 - 5.20 10e6/uL 4.80   MCV 78 - 100 fL 92   MCH 26.5 - 33.0 pg 26.9   MCHC 31.5 - 36.5 g/dL 29.3 (L)   RDW 10.0 - 15.0 % 14.6       ASSESSMENT/PLAN:    ICD-10-CM    1. Moderate vascular dementia with mood disturbance  F01.B3 Status depressed - unstable. The resident appears to be unstable within noted diagnoses requiring ongoing medication adjustments and close monitoring. Resident cooperative with today's visit. However, reports ongoing MH sx as noted in HPI. Will adjust medications today as noted below with lab recheck and follow-up as noted below.   2. Bipolar 1 disorder (H)  G91.2 Medications:    Increase Lamictal to 150 mg Po BID.    Continue Abilify 15mg PO qDay    Continue Celexa 20mg PO qDay     Continue Seroquel 25mg PO at bedtime - Hallucinations/SI   3. Recurrent major depressive disorder, remission status unspecified (H)  Z98.2 Labs:    Recheck Lamictal level 1-10-23    Regular monitoring of EKG, CBC, CMP, LFT, TSH and A1c given tx regimen   4. NPH (normal pressure hydrocephalus) (H)  F31.9 Psychological support:    Ongoing behavioral interventions and redirection per facility staff    Ongoing psychotherapy from [facility] in-house PRN  Additional follow-up:    She says her daughter made an appointment to see [facility]. Vciente at the [facility], who is a neurologist, to check on shunt to see if it is clogged.   5. S/P ventriculoperitoneal shunt  F33.9 Plan to follow-up with resident in 6 weeks, or PRN for acute concerns.     This patient was seen along with a nurse practitioner student, Heather Cronin, ANTHONYP-Student.  The histories and review of systems are obtained by TRUNG Mays-Student and confirmed by myself all objective, assessments and plans were completed by myself.    Electronically signed by:  Dr. Chantelle Hoover, APRN, DNP, AGNP-BC, PMHNP-BC  [facility]  Office Hours: Tues-Fri 5531-6083  Office: [address] [place] [address]  Fax -  [phone]  Triage Phone - [phone]  Business Office- [phone]      Email: [email or web address]                 Sincerely,        KASANDRA Johnson CNP

## 2022-12-06 NOTE — PROGRESS NOTES
[facility] GERIATRICS    Chief Complaint   Patient presents with     RECHECK     Mood/Moblity     HPI:  Amparo Sharma is a 82 year old  (1940), who is being seen today for an episodic care visit at: [facility] () [[id number]]. Today's concern is: The primary encounter diagnosis was Moderate vascular dementia with mood disturbance. Diagnoses of Recurrent major depressive disorder, remission status unspecified (H), Bipolar 1 disorder (H), NPH (normal pressure hydrocephalus) (H), Chronic obstructive pulmonary disease, unspecified COPD type (H), S/P ventriculoperitoneal shunt, Essential hypertension, Physical deconditioning, Slow transit constipation, Generalized muscle weakness, Nausea, Severe protein-calorie malnutrition (H), Vascular dementia without behavioral disturbance (H), Umbilical hernia without obstruction and without gangrene, Malignant neoplasm of sigmoid colon (H), PVD (peripheral vascular disease) (H), Tachycardia, Status post total shoulder arthroplasty, left, Acute post-operative pain, Spasticity, Suicidal thoughts, Normal pressure hydrocephalus (H), Episode of recurrent major depressive disorder, unspecified depression episode severity (H), Iron deficiency anemia secondary to inadequate dietary iron intake, and Hypothyroidism, unspecified type were also pertinent to this visit.    Per therapy update-Here is a functional update. We have been working on standing, range of motion and bed mobility. She is completing sit<> stand  in the parallel bars with mod -max A and can stand about 1 minute with therapist blocking right knee and providing mod A. For supine to sit, pt needs mod -max A. For sitting balance, patient needs mod A    Today-Met with patient who denies any chest pain, palpitations, shortness of breath, SOLOMON, lightheadedness, dizziness, or cough. Denies any abdominal discomfort. Denies N&V. Protruding hernia x 2 noted which is chronic. No pain or obstruction noted.  Denies B&B concerns. Denies dysuria or frequency. Denies loose or constipation. Appetite fair. She is open to starting ensure for additional calories and protein needs. Sleeping well. No pain complaints. I observed her during her therapy session. Patient is very difficult with standing. Standing in parrallel bars with therapy assistance and another therapy holding the wheelchair in place. Poor mobility continued.       BP Readings from Last 3 Encounters:   12/07/22 119/67   11/30/22 (!) 140/103   11/16/22 (!) 175/80     Wt Readings from Last 5 Encounters:   12/07/22 67 kg (147 lb 11.2 oz)   11/30/22 111.7 kg (246 lb 3.2 oz)   11/16/22 66.1 kg (145 lb 11.2 oz)   11/09/22 64.9 kg (143 lb 1.6 oz)   10/18/22 67.4 kg (148 lb 8 oz)     Allergies, and PMH/PSH reviewed in EPIC today.  REVIEW OF SYSTEMS:  10 point ROS of systems including Constitutional, Eyes, Respiratory, Cardiovascular, Gastroenterology, Genitourinary, Integumentary, Musculoskeletal, Psychiatric were all negative except for pertinent positives noted in my HPI.    Objective:   /67   Pulse 82   Temp 97.3  F (36.3  C)   Resp 20   Ht 1.524 m (5')   Wt 67 kg (147 lb 11.2 oz)   SpO2 91%   BMI 28.85 kg/m    GENERAL APPEARANCE:  Alert, in no distress, cooperative  ENT:  Mouth and posterior oropharynx normal, moist mucous membranes, [place]  EYES:  EOM, conjunctivae, lids, pupils and irises normal  NECK:  No adenopathy,masses or thyromegaly  RESP:  respiratory effort and palpation of chest normal, lungs clear to auscultation , no respiratory distress  CV:  Palpation and auscultation of heart done , regular rate and rhythm, no murmur, rub, or gallop, no edema, +2 pedal pulses  ABDOMEN:  normal bowel sounds, soft, nontender, no hepatosplenomegaly or other masses, no guarding or rebound  M/S:   Easy stand transfers. Wheelchair bound.   SKIN:  Inspection of skin and subcutaneous tissue baseline, Palpation of skin and subcutaneous tissue baseline  NEURO:    Cranial nerves 2-12 are normal tested and grossly at patient's baseline, no purposeful movement in upper and lower extremities  PSYCH:  oriented to self and place, insight and judgement impaired, memory impaired , affect and mood normal      Most Recent 3 CBC's:  Recent Labs   Lab Test 11/15/22  0537 10/04/22  0709 08/18/22  0513   WBC 6.5 5.4 6.1   HGB 12.9 10.9* 10.8*   MCV 92 90 88    317 286     Most Recent 3 BMP's:  Recent Labs   Lab Test 11/15/22  0537 10/04/22  0709 08/18/22  0513   * 143 141   POTASSIUM 5.0 4.5 4.3   CHLORIDE 99 107 104   CO2 26 24 26   BUN 22.2 23.5* 23.4*   CR 0.58 0.55 0.57   ANIONGAP 10 12 11   MARTHA 9.7 9.9 9.8   GLC 88 77 96     Most Recent 2 LFT's:  Recent Labs   Lab Test 10/04/22  0709 08/18/22  0513   AST 22 23   ALT 10 14   ALKPHOS 137* 143*   BILITOTAL 0.2 <0.2     Most Recent Anemia Panel:  Recent Labs   Lab Test 11/15/22  0537 10/04/22  0709 08/18/22  0513 05/05/22  1108 04/28/22  0757 04/27/21  0450 02/03/21  0503   WBC 6.5   < > 6.1   < > 9.1   < > 6.3   HGB 12.9   < > 10.8*   < > 9.2*   < > 12.5   HCT 44.0   < > 36.6   < > 30.7*   < > 41.1   MCV 92   < > 88   < > 94   < > 89      < > 286   < > 708*   < > 280   IRON  --   --  34*  34*   < > 21*   < >  --    IRONSAT  --   --  12*   < >  --   --   --    FEB  --   --  283   < >  --   --   --    JOAN  --   --  89   < > 581*   < >  --    B12  --   --   --   --   --   --  770   FOLIC  --   --   --   --  10.0  --   --     < > = values in this interval not displayed.         ASSESSMENT/PLAN  (R53.81) Physical deconditioning  (primary encounter diagnosis)  (M62.81) Generalized muscle weakness  Comment: Chronic. S/T below diagnosis. Have attempted and re-attempted therapies several times.  She is wanting to restart as per patient and family goals is for patient to be able to return back to [facility] setting if able. Per therapy update-Here is a functional update. We have been working on standing, range of motion and bed  mobility. She is completing sit<> stand  in the parallel bars with mod -max A and can stand about 1 minute with therapist blocking right knee and providing mod A. For supine to sit, pt needs mod -max A. For sitting balance, patient needs mod A  Plan:   -Recommend LTC placement  -Continue Physical therapy as directed.   -care home to re-assess patient for ongoing needs and cares. I do not anticipate much more progression than currently allowing. Currently using easy stand along with 24/7 nursing assistance for all hygiene cares. [facility] will need to allow this in order to return back.  -SW to remain involved for safe discharge planning needs  -Recommend to follow up with new referred neurology for neuromuscular concerns--Scheduled for 4/19/23  -BMP, CBC, and TSH and free T4 lamictal levels due 1/10/23     (E43) Severe protein-calorie malnutrition (H)  Comment: Chronic. Not at goal. Recent weight 145#, baseline weight around 150#  Plan:   -Magic cup as directed  -Dietician on site as directed  -Monitor weights weekly  -BMP, CBC, and TSH and free T4 lamictal levels due 1/10/23     (I73.9) PVD  Comment: Acute on chronic. No wounds.   Plan:  -Monitor for worsening s/sx of concerns.   -Dietician on site as directed  -Monitor weights weekly  -BMP, CBC, and TSH and free T4 lamictal levels due 1/10/23     (D50.8) Iron deficiency anemia secondary to inadequate dietary iron intake  Comment: Chronic. Prior anemia panel as noted. Previously on daily iron replacement with noted elevation of her ferritin. Changed to 3x weekly dosing with addition of vitamin C with noted improvement. Baseline hgb-10  Plan:   -Monitor bleeding risks and concerns  -Continue vitamin C  -Continue ferrous sulfate 3x weekly.    -BMP, CBC, and TSH and free T4 lamictal levels due 1/10/23     (I10) Essential hypertension  Comment: Chronic. Based on JNC-8 goals,  patients age of 82 year old, no presence of diabetes or CKD, and goals of care goal BP is <150/90 mm Hg.  Patient is stable with current plan of care and routine assessment..  Plan:   -Monitor BP and HR as directed  -Continue metoprolol as directed.    -BMP, CBC, and TSH and free T4 lamictal levels due 1/10/23         (G91.2) Normal pressure hydrocephalus (H)  Comment: Acute on chronic. With medication adjustments. Has tolerated weaning of Prozac; Resident had been stable psychologically with occasional odd statements noted. Recent Lamictal levels in Oct 2022 was stable of 4.3  Plan:  -Continue Abilify, Lamictal as ordered with close monitoring for additional needs.  -Follow up with neurology as directed.   -Monitor for worsening s/sx of concerns.   -BMP, CBC, and TSH and free T4 lamictal levels due 1/10/23     (F31.9) Bipolar 1 disorder (H)  (R45.851) Suicidal thoughts  (F33.9) Major depression  Comment: Chronic. Stable. No behaviors, although patient continues to want to be able to discharge back to previous [facility] setting  Plan:   -Monitor mood and behaviors  -Psych to follow and further assess ongoing needs  -[facility] eval and treatment  -Continue seroquel to 25mg díaz at HS.   -Continue abilify as directed  -Continue celexa 20mg daily  -Monitor for worsening s/sx of concerns  -Monitor falls and changes in mobility, eating and sleeping patterns  -BMP, CBC, and TSH and free T4 lamictal levels due 1/10/23     (Z96.612) Status post total shoulder arthroplasty, left  (G89.18) Acute post-operative pain  (R25.2) Spasticity  Comment: Acute on chronic. Not at goal. Continues to have increased spacticity which patient believes is why she is not ambulating anymore, which patient has not ambulated in years, however she was previously able to stand pivot at [facility]. Increased pain complaints which I suspect lack of mobility is contributing. Family reports she was told by neurosurgeon that current status is not neuro related but muscular and family are worried that she is no longer able to stand pivot as she was prior to hospitalization.    Plan:   -Monitor pain complaints  -Continue Tylenol 975mg QID scheduled.   -Continue baclofen to 10mg TID ongoing  -Discontinue PRN tramadol due to non use  -Voltaren as directed  -Follow up with neurology as directed  -BMP, CBC, and TSH and free T4 lamictal levels due 1/10/23     (R00.0) Tachycardia  Comment: Acute on chronic. Slowly improving. HR elevated inpatient. Recent HR noted with stabilization in prior elevations.   Plan:   -Metoprolol 12.5mg BID as directed  -Monitor BP and HR monitoring as directed  -BMP, CBC, and TSH and free T4 lamictal levels due 1/10/23     (J44.9) Chronic obstructive pulmonary disease, unspecified COPD type (H)  Comment: Chronic. Stable No cough noted.   Plan:   -Monitor respiratory status  -Continue albuterol as directed  -Continue incruse as directed  -Continue breo as directed  -Mucinex as directed  -BMP, CBC, and TSH and free T4 lamictal levels due 1/10/23     (K59.01) Slow transit constipation  (K42.9) Umbilical hernia without obstruction and without gangrene  (C18.7) History of neoplasm of sigmoid colon  (R11.0) Nausea  Comment: Chronic. Not at goal. Hernia non-tender and soft but protrudes. The patient did have stage III cancer of the colon diagnosed in 01/2015.  She did have chemotherapy after what sounds like surgery and is in remission.  She also has had a cholecystectomy.  Plan:   -Monitor BM patterns  -Continue Miralax 17gm daily  -Change senna 1 tab BID scheduled  -Bisacodyl daily PRN if no BM in 3 days  -Zofran 4mg PRN   -BMP, CBC, and TSH and free T4 lamictal levels due 1/10/23    (E03.9) Hypothyroidism  Comment: Chronic. Recent TSH was 1.43 on March 2022. Goal 4-6 per age to reduce adverse effects  Plan:  -Continue levothyroxine 75mcg daily  -Monitor for worsening s/sx of concerns  -BMP, CBC, and TSH and free T4 lamictal levels due 1/10/23     Electronically signed by:  Taylor Ledbetter DNP, APRN

## 2022-12-07 ENCOUNTER — NURSING HOME VISIT (OUTPATIENT)
Dept: GERIATRICS | Facility: CLINIC | Age: 82
End: 2022-12-07
Payer: MEDICARE

## 2022-12-07 VITALS
WEIGHT: 147.7 LBS | DIASTOLIC BLOOD PRESSURE: 67 MMHG | TEMPERATURE: 97.3 F | OXYGEN SATURATION: 91 % | SYSTOLIC BLOOD PRESSURE: 119 MMHG | HEART RATE: 82 BPM | RESPIRATION RATE: 20 BRPM | HEIGHT: 60 IN | BODY MASS INDEX: 29 KG/M2

## 2022-12-07 DIAGNOSIS — Z98.2 S/P VENTRICULOPERITONEAL SHUNT: ICD-10-CM

## 2022-12-07 DIAGNOSIS — J44.9 CHRONIC OBSTRUCTIVE PULMONARY DISEASE, UNSPECIFIED COPD TYPE (H): ICD-10-CM

## 2022-12-07 DIAGNOSIS — K42.9 UMBILICAL HERNIA WITHOUT OBSTRUCTION AND WITHOUT GANGRENE: ICD-10-CM

## 2022-12-07 DIAGNOSIS — G91.2 NPH (NORMAL PRESSURE HYDROCEPHALUS) (H): ICD-10-CM

## 2022-12-07 DIAGNOSIS — Z96.612 STATUS POST TOTAL SHOULDER ARTHROPLASTY, LEFT: ICD-10-CM

## 2022-12-07 DIAGNOSIS — R00.0 TACHYCARDIA: ICD-10-CM

## 2022-12-07 DIAGNOSIS — F01.B3 MODERATE VASCULAR DEMENTIA WITH MOOD DISTURBANCE (H): Primary | ICD-10-CM

## 2022-12-07 DIAGNOSIS — K59.01 SLOW TRANSIT CONSTIPATION: ICD-10-CM

## 2022-12-07 DIAGNOSIS — R53.81 PHYSICAL DECONDITIONING: ICD-10-CM

## 2022-12-07 DIAGNOSIS — F01.50 VASCULAR DEMENTIA WITHOUT BEHAVIORAL DISTURBANCE (H): ICD-10-CM

## 2022-12-07 DIAGNOSIS — I73.9 PVD (PERIPHERAL VASCULAR DISEASE) (H): ICD-10-CM

## 2022-12-07 DIAGNOSIS — I10 ESSENTIAL HYPERTENSION: ICD-10-CM

## 2022-12-07 DIAGNOSIS — E43 SEVERE PROTEIN-CALORIE MALNUTRITION (H): ICD-10-CM

## 2022-12-07 DIAGNOSIS — F33.9 EPISODE OF RECURRENT MAJOR DEPRESSIVE DISORDER, UNSPECIFIED DEPRESSION EPISODE SEVERITY (H): ICD-10-CM

## 2022-12-07 DIAGNOSIS — M62.81 GENERALIZED MUSCLE WEAKNESS: ICD-10-CM

## 2022-12-07 DIAGNOSIS — F31.9 BIPOLAR 1 DISORDER (H): ICD-10-CM

## 2022-12-07 DIAGNOSIS — F33.9 RECURRENT MAJOR DEPRESSIVE DISORDER, REMISSION STATUS UNSPECIFIED (H): ICD-10-CM

## 2022-12-07 DIAGNOSIS — R45.851 SUICIDAL THOUGHTS: ICD-10-CM

## 2022-12-07 DIAGNOSIS — G91.2 NORMAL PRESSURE HYDROCEPHALUS (H): ICD-10-CM

## 2022-12-07 DIAGNOSIS — D50.8 IRON DEFICIENCY ANEMIA SECONDARY TO INADEQUATE DIETARY IRON INTAKE: ICD-10-CM

## 2022-12-07 DIAGNOSIS — R11.0 NAUSEA: ICD-10-CM

## 2022-12-07 DIAGNOSIS — G89.18 ACUTE POST-OPERATIVE PAIN: ICD-10-CM

## 2022-12-07 DIAGNOSIS — E03.9 HYPOTHYROIDISM, UNSPECIFIED TYPE: ICD-10-CM

## 2022-12-07 DIAGNOSIS — C18.7 MALIGNANT NEOPLASM OF SIGMOID COLON (H): ICD-10-CM

## 2022-12-07 DIAGNOSIS — R25.2 SPASTICITY: ICD-10-CM

## 2022-12-07 PROCEDURE — 99309 SBSQ NF CARE MODERATE MDM 30: CPT | Performed by: NURSE PRACTITIONER

## 2022-12-07 NOTE — LETTER
12/7/2022        RE: Amparo Sharma  [facility]  [address] [place] [address]        [facility]    Chief Complaint   Patient presents with     RECHECK     Mood/Moblity     HPI:  Amparo Sharma is a 82 year old  (1940), who is being seen today for an episodic care visit at: [facility] () [[id number]]. Today's concern is: The primary encounter diagnosis was Moderate vascular dementia with mood disturbance. Diagnoses of Recurrent major depressive disorder, remission status unspecified (H), Bipolar 1 disorder (H), NPH (normal pressure hydrocephalus) (H), Chronic obstructive pulmonary disease, unspecified COPD type (H), S/P ventriculoperitoneal shunt, Essential hypertension, Physical deconditioning, Slow transit constipation, Generalized muscle weakness, Nausea, Severe protein-calorie malnutrition (H), Vascular dementia without behavioral disturbance (H), Umbilical hernia without obstruction and without gangrene, Malignant neoplasm of sigmoid colon (H), PVD (peripheral vascular disease) (H), Tachycardia, Status post total shoulder arthroplasty, left, Acute post-operative pain, Spasticity, Suicidal thoughts, Normal pressure hydrocephalus (H), Episode of recurrent major depressive disorder, unspecified depression episode severity (H), Iron deficiency anemia secondary to inadequate dietary iron intake, and Hypothyroidism, unspecified type were also pertinent to this visit.    Per therapy update-Here is a functional update. We have been working on standing, range of motion and bed mobility. She is completing sit<> stand  in the parallel bars with mod -max A and can stand about 1 minute with therapist blocking right knee and providing mod A. For supine to sit, pt needs mod -max A. For sitting balance, patient needs mod A    Today-Met with patient who denies any chest pain, palpitations, shortness of breath, SOLOMON, lightheadedness, dizziness, or cough. Denies any  abdominal discomfort. Denies N&V. Protruding hernia x 2 noted which is chronic. No pain or obstruction noted. Denies B&B concerns. Denies dysuria or frequency. Denies loose or constipation. Appetite fair. She is open to starting ensure for additional calories and protein needs. Sleeping well. No pain complaints. I observed her during her therapy session. Patient is very difficult with standing. Standing in parrallel bars with therapy assistance and another therapy holding the wheelchair in place. Poor mobility continued.       BP Readings from Last 3 Encounters:   12/07/22 119/67   11/30/22 (!) 140/103   11/16/22 (!) 175/80     Wt Readings from Last 5 Encounters:   12/07/22 67 kg (147 lb 11.2 oz)   11/30/22 111.7 kg (246 lb 3.2 oz)   11/16/22 66.1 kg (145 lb 11.2 oz)   11/09/22 64.9 kg (143 lb 1.6 oz)   10/18/22 67.4 kg (148 lb 8 oz)     Allergies, and PMH/PSH reviewed in EPIC today.  REVIEW OF SYSTEMS:  10 point ROS of systems including Constitutional, Eyes, Respiratory, Cardiovascular, Gastroenterology, Genitourinary, Integumentary, Musculoskeletal, Psychiatric were all negative except for pertinent positives noted in my HPI.    Objective:   /67   Pulse 82   Temp 97.3  F (36.3  C)   Resp 20   Ht 1.524 m (5')   Wt 67 kg (147 lb 11.2 oz)   SpO2 91%   BMI 28.85 kg/m    GENERAL APPEARANCE:  Alert, in no distress, cooperative  ENT:  Mouth and posterior oropharynx normal, moist mucous membranes, [place]  EYES:  EOM, conjunctivae, lids, pupils and irises normal  NECK:  No adenopathy,masses or thyromegaly  RESP:  respiratory effort and palpation of chest normal, lungs clear to auscultation , no respiratory distress  CV:  Palpation and auscultation of heart done , regular rate and rhythm, no murmur, rub, or gallop, no edema, +2 pedal pulses  ABDOMEN:  normal bowel sounds, soft, nontender, no hepatosplenomegaly or other masses, no guarding or rebound  M/S:   Easy stand transfers. Wheelchair bound.   SKIN:   Inspection of skin and subcutaneous tissue baseline, Palpation of skin and subcutaneous tissue baseline  NEURO:   Cranial nerves 2-12 are normal tested and grossly at patient's baseline, no purposeful movement in upper and lower extremities  PSYCH:  oriented to self and place, insight and judgement impaired, memory impaired , affect and mood normal      Most Recent 3 CBC's:  Recent Labs   Lab Test 11/15/22  0537 10/04/22  0709 08/18/22  0513   WBC 6.5 5.4 6.1   HGB 12.9 10.9* 10.8*   MCV 92 90 88    317 286     Most Recent 3 BMP's:  Recent Labs   Lab Test 11/15/22  0537 10/04/22  0709 08/18/22  0513   * 143 141   POTASSIUM 5.0 4.5 4.3   CHLORIDE 99 107 104   CO2 26 24 26   BUN 22.2 23.5* 23.4*   CR 0.58 0.55 0.57   ANIONGAP 10 12 11   MARTHA 9.7 9.9 9.8   GLC 88 77 96     Most Recent 2 LFT's:  Recent Labs   Lab Test 10/04/22  0709 08/18/22  0513   AST 22 23   ALT 10 14   ALKPHOS 137* 143*   BILITOTAL 0.2 <0.2     Most Recent Anemia Panel:  Recent Labs   Lab Test 11/15/22  0537 10/04/22  0709 08/18/22  0513 05/05/22  1108 04/28/22  0757 04/27/21  0450 02/03/21  0503   WBC 6.5   < > 6.1   < > 9.1   < > 6.3   HGB 12.9   < > 10.8*   < > 9.2*   < > 12.5   HCT 44.0   < > 36.6   < > 30.7*   < > 41.1   MCV 92   < > 88   < > 94   < > 89      < > 286   < > 708*   < > 280   IRON  --   --  34*  34*   < > 21*   < >  --    IRONSAT  --   --  12*   < >  --   --   --    FEB  --   --  283   < >  --   --   --    JOAN  --   --  89   < > 581*   < >  --    B12  --   --   --   --   --   --  770   FOLIC  --   --   --   --  10.0  --   --     < > = values in this interval not displayed.         ASSESSMENT/PLAN  (R53.81) Physical deconditioning  (primary encounter diagnosis)  (M62.81) Generalized muscle weakness  Comment: Chronic. S/T below diagnosis. Have attempted and re-attempted therapies several times.  She is wanting to restart as per patient and family goals is for patient to be able to return back to shelter setting if  able. Per therapy update-Here is a functional update. We have been working on standing, range of motion and bed mobility. She is completing sit<> stand  in the parallel bars with mod -max A and can stand about 1 minute with therapist blocking right knee and providing mod A. For supine to sit, pt needs mod -max A. For sitting balance, patient needs mod A  Plan:   -Recommend LTC placement  -Continue Physical therapy as directed.   -JAZMIN to re-assess patient for ongoing needs and cares. I do not anticipate much more progression than currently allowing. Currently using easy stand along with 24/7 nursing assistance for all hygiene cares. [facility] will need to allow this in order to return back.  -SW to remain involved for safe discharge planning needs  -Recommend to follow up with new referred neurology for neuromuscular concerns--Scheduled for 4/19/23  -BMP, CBC, and TSH and free T4 lamictal levels due 1/10/23     (E43) Severe protein-calorie malnutrition (H)  Comment: Chronic. Not at goal. Recent weight 145#, baseline weight around 150#  Plan:   -Magic cup as directed  -Dietician on site as directed  -Monitor weights weekly  -BMP, CBC, and TSH and free T4 lamictal levels due 1/10/23     (I73.9) PVD  Comment: Acute on chronic. No wounds.   Plan:  -Monitor for worsening s/sx of concerns.   -Dietician on site as directed  -Monitor weights weekly  -BMP, CBC, and TSH and free T4 lamictal levels due 1/10/23     (D50.8) Iron deficiency anemia secondary to inadequate dietary iron intake  Comment: Chronic. Prior anemia panel as noted. Previously on daily iron replacement with noted elevation of her ferritin. Changed to 3x weekly dosing with addition of vitamin C with noted improvement. Baseline hgb-10  Plan:   -Monitor bleeding risks and concerns  -Continue vitamin C  -Continue ferrous sulfate 3x weekly.    -BMP, CBC, and TSH and free T4 lamictal levels due 1/10/23     (I10) Essential hypertension  Comment: Chronic. Based on JNC-8  goals,  patients age of 82 year old, no presence of diabetes or CKD, and goals of care goal BP is <150/90 mm Hg. Patient is stable with current plan of care and routine assessment..  Plan:   -Monitor BP and HR as directed  -Continue metoprolol as directed.    -BMP, CBC, and TSH and free T4 lamictal levels due 1/10/23         (G91.2) Normal pressure hydrocephalus (H)  Comment: Acute on chronic. With medication adjustments. Has tolerated weaning of Prozac; Resident had been stable psychologically with occasional odd statements noted. Recent Lamictal levels in Oct 2022 was stable of 4.3  Plan:  -Continue Abilify, Lamictal as ordered with close monitoring for additional needs.  -Follow up with neurology as directed.   -Monitor for worsening s/sx of concerns.   -BMP, CBC, and TSH and free T4 lamictal levels due 1/10/23     (F31.9) Bipolar 1 disorder (H)  (R45.851) Suicidal thoughts  (F33.9) Major depression  Comment: Chronic. Stable. No behaviors, although patient continues to want to be able to discharge back to previous [facility] setting  Plan:   -Monitor mood and behaviors  -Psych to follow and further assess ongoing needs  -ACP eval and treatment  -Continue seroquel to 25mg díaz at HS.   -Continue abilify as directed  -Continue celexa 20mg daily  -Monitor for worsening s/sx of concerns  -Monitor falls and changes in mobility, eating and sleeping patterns  -BMP, CBC, and TSH and free T4 lamictal levels due 1/10/23     (Z96.612) Status post total shoulder arthroplasty, left  (G89.18) Acute post-operative pain  (R25.2) Spasticity  Comment: Acute on chronic. Not at goal. Continues to have increased spacticity which patient believes is why she is not ambulating anymore, which patient has not ambulated in years, however she was previously able to stand pivot at [facility]. Increased pain complaints which I suspect lack of mobility is contributing. Family reports she was told by neurosurgeon that current status is not neuro related but  muscular and family are worried that she is no longer able to stand pivot as she was prior to hospitalization.   Plan:   -Monitor pain complaints  -Continue Tylenol 975mg QID scheduled.   -Continue baclofen to 10mg TID ongoing  -Discontinue PRN tramadol due to non use  -Voltaren as directed  -Follow up with neurology as directed  -BMP, CBC, and TSH and free T4 lamictal levels due 1/10/23     (R00.0) Tachycardia  Comment: Acute on chronic. Slowly improving. HR elevated inpatient. Recent HR noted with stabilization in prior elevations.   Plan:   -Metoprolol 12.5mg BID as directed  -Monitor BP and HR monitoring as directed  -BMP, CBC, and TSH and free T4 lamictal levels due 1/10/23     (J44.9) Chronic obstructive pulmonary disease, unspecified COPD type (H)  Comment: Chronic. Stable No cough noted.   Plan:   -Monitor respiratory status  -Continue albuterol as directed  -Continue incruse as directed  -Continue breo as directed  -Mucinex as directed  -BMP, CBC, and TSH and free T4 lamictal levels due 1/10/23     (K59.01) Slow transit constipation  (K42.9) Umbilical hernia without obstruction and without gangrene  (C18.7) History of neoplasm of sigmoid colon  (R11.0) Nausea  Comment: Chronic. Not at goal. Hernia non-tender and soft but protrudes. The patient did have stage III cancer of the colon diagnosed in 01/2015.  She did have chemotherapy after what sounds like surgery and is in remission.  She also has had a cholecystectomy.  Plan:   -Monitor BM patterns  -Continue Miralax 17gm daily  -Change senna 1 tab BID scheduled  -Bisacodyl daily PRN if no BM in 3 days  -Zofran 4mg PRN   -BMP, CBC, and TSH and free T4 lamictal levels due 1/10/23    (E03.9) Hypothyroidism  Comment: Chronic. Recent TSH was 1.43 on March 2022. Goal 4-6 per age to reduce adverse effects  Plan:  -Continue levothyroxine 75mcg daily  -Monitor for worsening s/sx of concerns  -BMP, CBC, and TSH and free T4 lamictal levels due  1/10/23     Electronically signed by:  Taylor Ledbetter DNP, KASANDRA             Sincerely,        KASANDRA Lou CNP

## 2022-12-28 ENCOUNTER — NURSING HOME VISIT (OUTPATIENT)
Dept: GERIATRICS | Facility: CLINIC | Age: 82
End: 2022-12-28
Payer: MEDICARE

## 2022-12-28 VITALS
OXYGEN SATURATION: 93 % | RESPIRATION RATE: 16 BRPM | DIASTOLIC BLOOD PRESSURE: 88 MMHG | HEART RATE: 69 BPM | HEIGHT: 60 IN | BODY MASS INDEX: 28.31 KG/M2 | SYSTOLIC BLOOD PRESSURE: 151 MMHG | TEMPERATURE: 98.5 F | WEIGHT: 144.2 LBS

## 2022-12-28 DIAGNOSIS — F01.B3 MODERATE VASCULAR DEMENTIA WITH MOOD DISTURBANCE (H): Primary | ICD-10-CM

## 2022-12-28 DIAGNOSIS — F33.9 RECURRENT MAJOR DEPRESSIVE DISORDER, REMISSION STATUS UNSPECIFIED (H): ICD-10-CM

## 2022-12-28 DIAGNOSIS — Z98.2 S/P VENTRICULOPERITONEAL SHUNT: ICD-10-CM

## 2022-12-28 DIAGNOSIS — G91.2 NPH (NORMAL PRESSURE HYDROCEPHALUS) (H): ICD-10-CM

## 2022-12-28 DIAGNOSIS — F31.9 BIPOLAR 1 DISORDER (H): ICD-10-CM

## 2022-12-28 PROCEDURE — 99310 SBSQ NF CARE HIGH MDM 45: CPT | Performed by: NURSE PRACTITIONER

## 2022-12-28 NOTE — PROGRESS NOTES
[facility] GERIATRIC SERVICES    Chief Complaint   Patient presents with     Psychiatric Problem     HPI:  Amparo Sharma is a 82 year old  (1940), who is being seen today for an episodic care visit at: [facility] () [[id number]]. Today's concern is:      Moderate vascular dementia with mood disturbance  Bipolar 1 disorder (H)  Recurrent major depressive disorder, remission status unspecified (H)  NPH (normal pressure hydrocephalus) (H)  S/P ventriculoperitoneal shunt    Resident was met with today to follow-up on chronic, currently Recurrent major depressive disorder, Bipolar 1 disorder, NPH, Vascular dementia with mood disturbance, mental health diagnoses. Patient notes she has been feeling depressed for the past month due to the uncertainness of her upcoming discharge and placement issues.  She says she may not be able to go back to [facility] because they have to use the sit to stand for all of her transfers and she is supposed to be able to ambulate to the bathroom by herself and do ADS's on her own to go back there. Her Appetite has been fair to good.  She saw the dietician yesterday and her meals are being adjusted to her likes.  She mentioned that she used to have bulimia and it has been in remission since 1970.  She advocates for herself and messaged the  and OT to address some questions she has.  She made an appointment with the eye doctor for 1/3/23 due to her left eye being blurry. She has been sleeping well and wakes up in the morning feeling rested. Denies CP, palpitations, fatigue, nausea, vomiting, increased SOB/SOLOMON, fever, chills, and/or b/b concerns today. Patient requires frequent reassurance.  She continues to require assistance with all ADL's and medication management.    Recent behavioral or pertient notes from facility:    None    Prior visit notes:    11/30/22: Resident is met with today to follow-up on chronic, currently Recurrent major depressive disorder,  "Bipolar 1 disorder, NPH, Vascular dementia with mood disturbance, mental health diagnoses. Patient notes she has been feeling depressed for the past month with some irritableness and \"manic behavior\" on and off. Her Appetite has been fair to good. She says she has had some problems sleeping well but when asked further about sleep she had one episode where noise kept her awake. Denies CP, palpitations, fatigue, nausea, vomiting, increased SOB/SOLOMON, fever, chills, and/or b/b concerns today. Patient requires frequent reassurance.    10/30 - 1355: Resident alert with some confusion, unable to feed herself for lunch, when asked she said she just had a bad night, \" i had some dogs last night which i was supposed to take to rescue home but i was not able to \" resident denied having pain, will continue to monitor.    Will increase Lamictal to 150 mg Po BID.    Allergies, and PMH/PSH reviewed in [email or web address] today.    Current psychiatric medications:    Abilify 15mg PO qDay - Bipolar 1 Disorder    Celexa 20mg PO qDay - Depression in Bipolar Disorder    Lamictal 150mg PO BID- Bipolar 1 Disorder    Seroquel 25mg PO at bedtime - Hallucinations/SI    Recently changed psychiatric medications:    11/30 - Lamictal increased to 150mg PO BID - Bipolar Disorder    Other pertinent medications:    Synthroid 75mcg    Tramadol 50mg     Past Medical and Surgical History reviewed in Epic today.    MEDICATIONS:  Current Outpatient Medications   Medication Sig Dispense Refill     acetaminophen (TYLENOL) 325 MG tablet Take 3 tablets (975 mg) by mouth every 6 hours 360 tablet 11     albuterol (PROAIR HFA/PROVENTIL HFA/VENTOLIN HFA) 108 (90 Base) MCG/ACT inhaler Inhale 2 puffs into the lungs every 4 hours as needed for shortness of breath / dyspnea       amoxicillin (AMOXIL) 500 MG capsule Take 2,000 mg by mouth once as needed (Prior to dental appt)        ARIPiprazole (ABILIFY) 15 MG tablet Take 15 mg by mouth At Bedtime       aspirin (ASA) 81 MG EC " tablet Take 1 tablet (81 mg) by mouth in the morning and 1 tablet (81 mg) in the evening. Take with meals. 60 tablet 0     baclofen (LIORESAL) 10 MG tablet Take 1 tablet (10 mg) by mouth 3 times daily 180 tablet 11     bisacodyl (DULCOLAX) 10 MG suppository Place 1 suppository (10 mg) rectally daily as needed for constipation 8 suppository 11     BREO ELLIPTA 100-25 MCG/INH inhaler Inhale 1 puff into the lungs daily 1PM       carboxymethylcellulose (REFRESH PLUS) 0.5 % SOLN ophthalmic solution Place 1 drop into both eyes 3 times daily as needed for dry eyes       carboxymethylcellulose (REFRESH PLUS) 0.5 % SOLN ophthalmic solution Place 1 drop into both eyes in the morning.       cetirizine (ZYRTEC) 10 MG tablet Take 10 mg by mouth daily        citalopram (CELEXA) 20 MG tablet Take 1 tablet (20 mg) by mouth daily 30 tablet 11     diclofenac (VOLTAREN) 1 % topical gel Apply 4 g topically 4 times daily Apply to affected knees.       ferrous sulfate (FEROSUL) 325 (65 Fe) MG tablet Take 1 tablet (325 mg) by mouth Every Mon, Wed, Fri Morning       INCRUSE ELLIPTA 62.5 MCG/INH Inhaler Inhale 1 puff into the lungs daily        lamoTRIgine (LAMICTAL) 100 MG tablet Take 100 mg by mouth At Bedtime       lamoTRIgine (LAMICTAL) 150 MG tablet Take 150 mg by mouth daily        levothyroxine (SYNTHROID/LEVOTHROID) 75 MCG tablet Take 75 mcg by mouth daily       menthol-zinc oxide (CALMOSEPTINE) 0.44-20.6 % OINT ointment Apply topically 2 times daily as needed for irritation       metoprolol tartrate (LOPRESSOR) 25 MG tablet Take 12.5 mg by mouth 2 times daily HOLD if SBP<100 and/or HR<55       mirabegron (MYRBETRIQ) 25 MG 24 hr tablet Take 25 mg by mouth daily        Multiple Vitamins-Minerals (MULTIVITAMIN PO) Take 1 tablet by mouth daily        ondansetron (ZOFRAN) 4 MG tablet Take 1 tablet (4 mg) by mouth every 6 hours as needed for nausea 30 tablet 11     pantoprazole (PROTONIX) 20 MG EC tablet Take 20 mg by mouth daily         polyethylene glycol (MIRALAX) 17 GM/Dose powder Take 17 g by mouth daily 510 g 0     QUEtiapine (SEROQUEL) 25 MG tablet Take 1 tablet (25 mg) by mouth At Bedtime 60 tablet 3     SENOKOT S 8.6-50 MG tablet Take 1 tablet by mouth 2 times daily 90 tablet 11     vitamin C (ASCORBIC ACID) 500 MG tablet Take 500 mg by mouth 2 times daily       Vitamin D, Cholecalciferol, 25 MCG (1000 UT) CAPS Take 1,000 Units by mouth daily            MEDICAL REVIEW OF SYSTEMS:   9 point ROS of systems including Constitutional, Eyes, Respiratory, Cardiovascular, Gastroenterology, Genitourinary, Integumentary, Musculoskeletal were all negative except for pertinent positives noted in my HPI.    PSYCHIATRIC REVIEW OF SYSTEMS:  Anxiety:  negative  Depression:  suicidal ideation, depressed mood, low energy and irritability  Eating Disorders:  negative  Impulse Control:  negative  Marnie: elevated/irritable mood  OCD:  negative  Psychosis:  negative  PTSD:   left her and her two small kids.    PHYSICAL EXAM:  BP (!) 151/88   Pulse 69   Temp 98.5  F (36.9  C)   Resp 16   Ht 1.524 m (5')   Wt 65.4 kg (144 lb 3.2 oz)   SpO2 93%   BMI 28.16 kg/m    GENERAL APPEARANCE:  Alert, in no distress, cooperative  NEURO:   Examination of sensation by touch normal  PSYCH:  oriented to person, insight and judgement impaired, thought content delusional about yelling at another residents grandchildren.    PSYCHIATRIC EXAM:  Appearance:  awake, alert, adequately groomed, appeared older than stated age and slightly unkempt  Attitude:  cooperative  Eye Contact:  good  Mood:  good  Affect:  appropriate and in normal range  Speech:  clear, coherent  Psychomotor Behavior:  no evidence of tardive dyskinesia, dystonia, or tics  Thought Process:  logical  Associations:  none  Thought Content:  passive suicidal ideation present.  Some delusional thoughts and possible hallucinations.  She was seeing dogs during the night and thinking she had to take care of  them.  She stated that she had seen another patient's grandchildren making a ruckus at bed time a few days ago and yelled at them, but when the nursing staff were asked they said none of this happened.  Insight:  fair  Judgment:  fair  Oriented to:  person and place, didn't know the date or month.  Attention Span and Concentration:  fair  Recent and Remote Memory:  fair  Language: Able to name objects and Able to repeat phrases  Fund of Knowledge:normal  Muscle Strength and Tone: flaccid  Gait and Station: not observed, sitting in wheelchair.     Labs:   Recent labs in EPIC reviewed by me today.      ASSESSMENT/PLAN:    ICD-10-CM    1. Moderate vascular dementia with mood disturbance  F01.B3 Chronic - unstable. The resident appears to be unstable within noted diagnoses requiring ongoing medication adjustments and close monitoring. Resident with reports as per HPI; although conflicting with staff report. She is cooperative with today's visit. She appears to ruminate on prior diagnoses recurring, despite current symptoms being present - does well with reassurance and re-direction. Will continue medications as ordered at this time with follow-up and labs as noted.   2. Bipolar 1 disorder (H)  G91.2 Medications:    Continue Abilify 15mg PO qDay - Bipolar 1 Disorder    Continue Celexa 20mg PO qDay - Depression in Bipolar Disorder    Continue Lamictal 150mg PO BID- Bipolar 1 Disorder    Continue Seroquel 25mg PO at bedtime - Hallucinations   3. Recurrent major depressive disorder, remission status unspecified (H)  Z98.2 Labs:    Recheck Lamictal level 1-10-23   4. NPH (normal pressure hydrocephalus) (H)  F31.9 Psychological support:    Ongoing support from facility staff    ACP in-house PRN   5. S/P ventriculoperitoneal shunt  F33.9 Plan to follow-up with resident in 2 weeks, or PRN for acute concerns.     This patient was seen along with a nurse practitioner student, Heather Cronin, ANTHONYP-Student.  The histories and  review of systems are obtained by Heather Cronin, PMHNP-Student and confirmed by myself all objective, assessments and plans were completed by myself.    Electronically signed by:  Dr. Chantelle Hoover, APRN, DNP, AGNP-BC, PMHNP-BC  [facility]  Office: [address] [place] [address]  [facility] Cell: [phone]  Fax: [phone]  Triage Phone: [phone]  Voicemail: [phone]    Email: [email or web address]

## 2022-12-28 NOTE — LETTER
12/28/2022        RE: Amparo Sharma  [facility]  [address] [place] [address]        [facility]    Chief Complaint   Patient presents with     Psychiatric Problem     HPI:  Amparo Sharma is a 82 year old  (1940), who is being seen today for an episodic care visit at: [facility] () [[id number]]. Today's concern is:      Moderate vascular dementia with mood disturbance  Bipolar 1 disorder (H)  Recurrent major depressive disorder, remission status unspecified (H)  NPH (normal pressure hydrocephalus) (H)  S/P ventriculoperitoneal shunt    Resident was met with today to follow-up on chronic, currently Recurrent major depressive disorder, Bipolar 1 disorder, NPH, Vascular dementia with mood disturbance, mental health diagnoses. Patient notes she has been feeling depressed for the past month due to the uncertainness of her upcoming discharge and placement issues.  She says she may not be able to go back to [facility] because they have to use the sit to stand for all of her transfers and she is supposed to be able to ambulate to the bathroom by herself and do ADS's on her own to go back there. Her Appetite has been fair to good.  She saw the dietician yesterday and her meals are being adjusted to her likes.  She mentioned that she used to have bulimia and it has been in remission since 1970.  She advocates for herself and messaged the  and OT to address some questions she has.  She made an appointment with the eye doctor for 1/3/23 due to her left eye being blurry. She has been sleeping well and wakes up in the morning feeling rested. Denies CP, palpitations, fatigue, nausea, vomiting, increased SOB/SOLOMON, fever, chills, and/or b/b concerns today. Patient requires frequent reassurance.  She continues to require assistance with all ADL's and medication management.    Recent behavioral or pertient notes from facility:    None    Prior visit  "notes:    11/30/22: Resident is met with today to follow-up on chronic, currently Recurrent major depressive disorder, Bipolar 1 disorder, NPH, Vascular dementia with mood disturbance, mental health diagnoses. Patient notes she has been feeling depressed for the past month with some irritableness and \"manic behavior\" on and off. Her Appetite has been fair to good. She says she has had some problems sleeping well but when asked further about sleep she had one episode where noise kept her awake. Denies CP, palpitations, fatigue, nausea, vomiting, increased SOB/SOLOMON, fever, chills, and/or b/b concerns today. Patient requires frequent reassurance.    10/30 - 1355: Resident alert with some confusion, unable to feed herself for lunch, when asked she said she just had a bad night, \" i had some dogs last night which i was supposed to take to rescue home but i was not able to \" resident denied having pain, will continue to monitor.    Will increase Lamictal to 150 mg Po BID.    Allergies, and PMH/PSH reviewed in [employer] today.    Current psychiatric medications:    Abilify 15mg PO qDay - Bipolar 1 Disorder    Celexa 20mg PO qDay - Depression in Bipolar Disorder    Lamictal 150mg PO BID- Bipolar 1 Disorder    Seroquel 25mg PO at bedtime - Hallucinations/SI    Recently changed psychiatric medications:    11/30 - Lamictal increased to 150mg PO BID - Bipolar Disorder    Other pertinent medications:    Synthroid 75mcg    Tramadol 50mg     Past Medical and Surgical History reviewed in Epic today.    MEDICATIONS:  Current Outpatient Medications   Medication Sig Dispense Refill     acetaminophen (TYLENOL) 325 MG tablet Take 3 tablets (975 mg) by mouth every 6 hours 360 tablet 11     albuterol (PROAIR HFA/PROVENTIL HFA/VENTOLIN HFA) 108 (90 Base) MCG/ACT inhaler Inhale 2 puffs into the lungs every 4 hours as needed for shortness of breath / dyspnea       amoxicillin (AMOXIL) 500 MG capsule Take 2,000 mg by mouth once as needed (Prior to " dental appt)        ARIPiprazole (ABILIFY) 15 MG tablet Take 15 mg by mouth At Bedtime       aspirin (ASA) 81 MG EC tablet Take 1 tablet (81 mg) by mouth in the morning and 1 tablet (81 mg) in the evening. Take with meals. 60 tablet 0     baclofen (LIORESAL) 10 MG tablet Take 1 tablet (10 mg) by mouth 3 times daily 180 tablet 11     bisacodyl (DULCOLAX) 10 MG suppository Place 1 suppository (10 mg) rectally daily as needed for constipation 8 suppository 11     BREO ELLIPTA 100-25 MCG/INH inhaler Inhale 1 puff into the lungs daily 1PM       carboxymethylcellulose (REFRESH PLUS) 0.5 % SOLN ophthalmic solution Place 1 drop into both eyes 3 times daily as needed for dry eyes       carboxymethylcellulose (REFRESH PLUS) 0.5 % SOLN ophthalmic solution Place 1 drop into both eyes in the morning.       cetirizine (ZYRTEC) 10 MG tablet Take 10 mg by mouth daily        citalopram (CELEXA) 20 MG tablet Take 1 tablet (20 mg) by mouth daily 30 tablet 11     diclofenac (VOLTAREN) 1 % topical gel Apply 4 g topically 4 times daily Apply to affected knees.       ferrous sulfate (FEROSUL) 325 (65 Fe) MG tablet Take 1 tablet (325 mg) by mouth Every Mon, Wed, Fri Morning       INCRUSE ELLIPTA 62.5 MCG/INH Inhaler Inhale 1 puff into the lungs daily        lamoTRIgine (LAMICTAL) 100 MG tablet Take 100 mg by mouth At Bedtime       lamoTRIgine (LAMICTAL) 150 MG tablet Take 150 mg by mouth daily        levothyroxine (SYNTHROID/LEVOTHROID) 75 MCG tablet Take 75 mcg by mouth daily       menthol-zinc oxide (CALMOSEPTINE) 0.44-20.6 % OINT ointment Apply topically 2 times daily as needed for irritation       metoprolol tartrate (LOPRESSOR) 25 MG tablet Take 12.5 mg by mouth 2 times daily HOLD if SBP<100 and/or HR<55       mirabegron (MYRBETRIQ) 25 MG 24 hr tablet Take 25 mg by mouth daily        Multiple Vitamins-Minerals (MULTIVITAMIN PO) Take 1 tablet by mouth daily        ondansetron (ZOFRAN) 4 MG tablet Take 1 tablet (4 mg) by mouth every 6  hours as needed for nausea 30 tablet 11     pantoprazole (PROTONIX) 20 MG EC tablet Take 20 mg by mouth daily        polyethylene glycol (MIRALAX) 17 GM/Dose powder Take 17 g by mouth daily 510 g 0     QUEtiapine (SEROQUEL) 25 MG tablet Take 1 tablet (25 mg) by mouth At Bedtime 60 tablet 3     SENOKOT S 8.6-50 MG tablet Take 1 tablet by mouth 2 times daily 90 tablet 11     vitamin C (ASCORBIC ACID) 500 MG tablet Take 500 mg by mouth 2 times daily       Vitamin D, Cholecalciferol, 25 MCG (1000 UT) CAPS Take 1,000 Units by mouth daily            MEDICAL REVIEW OF SYSTEMS:   9 point ROS of systems including Constitutional, Eyes, Respiratory, Cardiovascular, Gastroenterology, Genitourinary, Integumentary, Musculoskeletal were all negative except for pertinent positives noted in my HPI.    PSYCHIATRIC REVIEW OF SYSTEMS:  Anxiety:  negative  Depression:  suicidal ideation, depressed mood, low energy and irritability  Eating Disorders:  negative  Impulse Control:  negative  Marnie: elevated/irritable mood  OCD:  negative  Psychosis:  negative  PTSD:   left her and her two small kids.    PHYSICAL EXAM:  BP (!) 151/88   Pulse 69   Temp 98.5  F (36.9  C)   Resp 16   Ht 1.524 m (5')   Wt 65.4 kg (144 lb 3.2 oz)   SpO2 93%   BMI 28.16 kg/m    GENERAL APPEARANCE:  Alert, in no distress, cooperative  NEURO:   Examination of sensation by touch normal  PSYCH:  oriented to person, insight and judgement impaired, thought content delusional about yelling at another residents grandchildren.    PSYCHIATRIC EXAM:  Appearance:  awake, alert, adequately groomed, appeared older than stated age and slightly unkempt  Attitude:  cooperative  Eye Contact:  good  Mood:  good  Affect:  appropriate and in normal range  Speech:  clear, coherent  Psychomotor Behavior:  no evidence of tardive dyskinesia, dystonia, or tics  Thought Process:  logical  Associations:  none  Thought Content:  passive suicidal ideation present.  Some  delusional thoughts and possible hallucinations.  She was seeing dogs during the night and thinking she had to take care of them.  She stated that she had seen another patient's grandchildren making a ruckus at bed time a few days ago and yelled at them, but when the nursing staff were asked they said none of this happened.  Insight:  fair  Judgment:  fair  Oriented to:  person and place, didn't know the date or month.  Attention Span and Concentration:  fair  Recent and Remote Memory:  fair  Language: Able to name objects and Able to repeat phrases  Fund of Knowledge:normal  Muscle Strength and Tone: flaccid  Gait and Station: not observed, sitting in wheelchair.     Labs:   Recent labs in EPIC reviewed by me today.      ASSESSMENT/PLAN:    ICD-10-CM    1. Moderate vascular dementia with mood disturbance  F01.B3 Chronic - unstable. The resident appears to be unstable within noted diagnoses requiring ongoing medication adjustments and close monitoring. Resident with reports as per HPI; although conflicting with staff report. She is cooperative with today's visit. She appears to ruminate on prior diagnoses recurring, despite current symptoms being present - does well with reassurance and re-direction. Will continue medications as ordered at this time with follow-up and labs as noted.   2. Bipolar 1 disorder (H)  G91.2 Medications:    Continue Abilify 15mg PO qDay - Bipolar 1 Disorder    Continue Celexa 20mg PO qDay - Depression in Bipolar Disorder    Continue Lamictal 150mg PO BID- Bipolar 1 Disorder    Continue Seroquel 25mg PO at bedtime - Hallucinations   3. Recurrent major depressive disorder, remission status unspecified (H)  Z98.2 Labs:    Recheck Lamictal level 1-10-23   4. NPH (normal pressure hydrocephalus) (H)  F31.9 Psychological support:    Ongoing support from facility staff    ACP in-house PRN   5. S/P ventriculoperitoneal shunt  F33.9 Plan to follow-up with resident in 2 weeks, or PRN for acute  concerns.     This patient was seen along with a nurse practitioner student, Heather Cronin, ANTHONYP-Student.  The histories and review of systems are obtained by TRUNG Mays-Joyce and confirmed by myself all objective, assessments and plans were completed by myself.    Electronically signed by:  KASANDRA Khanna, DNP, AGNP-BC, PMHNP-[facility]  Office: [address] [place] [address] [place] Cell: [phone]  Fax: [phone]  Triage Phone: [phone]  Voicemail: [phone]    Email: [email or web address]           Sincerely,        KASANDRA Johnson CNP

## 2023-01-01 ENCOUNTER — TELEPHONE (OUTPATIENT)
Dept: GERIATRICS | Facility: CLINIC | Age: 83
End: 2023-01-01
Payer: MEDICARE

## 2023-01-01 ENCOUNTER — LAB REQUISITION (OUTPATIENT)
Dept: LAB | Facility: CLINIC | Age: 83
End: 2023-01-01
Payer: MEDICARE

## 2023-01-01 ENCOUNTER — NURSING HOME VISIT (OUTPATIENT)
Dept: GERIATRICS | Facility: CLINIC | Age: 83
End: 2023-01-01
Payer: MEDICARE

## 2023-01-01 ENCOUNTER — TELEPHONE (OUTPATIENT)
Dept: NEUROSURGERY | Facility: CLINIC | Age: 83
End: 2023-01-01
Payer: MEDICARE

## 2023-01-01 ENCOUNTER — TRANSFERRED RECORDS (OUTPATIENT)
Dept: HEALTH INFORMATION MANAGEMENT | Facility: CLINIC | Age: 83
End: 2023-01-01
Payer: MEDICARE

## 2023-01-01 VITALS
TEMPERATURE: 97.8 F | RESPIRATION RATE: 20 BRPM | HEIGHT: 60 IN | OXYGEN SATURATION: 91 % | DIASTOLIC BLOOD PRESSURE: 84 MMHG | WEIGHT: 164.8 LBS | BODY MASS INDEX: 32.35 KG/M2 | SYSTOLIC BLOOD PRESSURE: 153 MMHG | HEART RATE: 71 BPM

## 2023-01-01 VITALS
BODY MASS INDEX: 32.28 KG/M2 | TEMPERATURE: 97.3 F | DIASTOLIC BLOOD PRESSURE: 71 MMHG | HEART RATE: 70 BPM | OXYGEN SATURATION: 94 % | SYSTOLIC BLOOD PRESSURE: 142 MMHG | RESPIRATION RATE: 18 BRPM | HEIGHT: 60 IN | WEIGHT: 164.4 LBS

## 2023-01-01 VITALS
BODY MASS INDEX: 32 KG/M2 | WEIGHT: 163 LBS | TEMPERATURE: 97.2 F | DIASTOLIC BLOOD PRESSURE: 77 MMHG | HEIGHT: 60 IN | HEART RATE: 76 BPM | RESPIRATION RATE: 16 BRPM | SYSTOLIC BLOOD PRESSURE: 129 MMHG | OXYGEN SATURATION: 91 %

## 2023-01-01 VITALS
BODY MASS INDEX: 31 KG/M2 | DIASTOLIC BLOOD PRESSURE: 73 MMHG | SYSTOLIC BLOOD PRESSURE: 126 MMHG | HEIGHT: 60 IN | TEMPERATURE: 96.9 F | WEIGHT: 157.9 LBS | OXYGEN SATURATION: 93 % | RESPIRATION RATE: 18 BRPM | HEART RATE: 64 BPM

## 2023-01-01 DIAGNOSIS — J44.9 CHRONIC OBSTRUCTIVE PULMONARY DISEASE, UNSPECIFIED COPD TYPE (H): ICD-10-CM

## 2023-01-01 DIAGNOSIS — Z01.30 EXAMINATION OF BLOOD PRESSURE: ICD-10-CM

## 2023-01-01 DIAGNOSIS — I10 ESSENTIAL (PRIMARY) HYPERTENSION: ICD-10-CM

## 2023-01-01 DIAGNOSIS — L89.613 PRESSURE INJURY OF RIGHT HEEL, STAGE 3 (H): ICD-10-CM

## 2023-01-01 DIAGNOSIS — U07.1 COVID-19: ICD-10-CM

## 2023-01-01 DIAGNOSIS — F31.9 BIPOLAR 1 DISORDER (H): Primary | ICD-10-CM

## 2023-01-01 DIAGNOSIS — E03.9 HYPOTHYROIDISM, UNSPECIFIED TYPE: ICD-10-CM

## 2023-01-01 DIAGNOSIS — I10 ESSENTIAL HYPERTENSION: Primary | ICD-10-CM

## 2023-01-01 DIAGNOSIS — Z86.16 PERSONAL HISTORY OF COVID-19: ICD-10-CM

## 2023-01-01 DIAGNOSIS — I10 ESSENTIAL HYPERTENSION: ICD-10-CM

## 2023-01-01 DIAGNOSIS — I99.8 POORLY CONTROLLED BLOOD PRESSURE: ICD-10-CM

## 2023-01-01 DIAGNOSIS — R00.1 BRADYCARDIA: ICD-10-CM

## 2023-01-01 DIAGNOSIS — Z51.81 ENCOUNTER FOR THERAPEUTIC DRUG MONITORING: ICD-10-CM

## 2023-01-01 DIAGNOSIS — Z79.2 PROPHYLACTIC ANTIBIOTIC: ICD-10-CM

## 2023-01-01 DIAGNOSIS — F31.9 BIPOLAR 1 DISORDER (H): ICD-10-CM

## 2023-01-01 DIAGNOSIS — R06.2 WHEEZING: ICD-10-CM

## 2023-01-01 DIAGNOSIS — N32.81 OVERACTIVE BLADDER: ICD-10-CM

## 2023-01-01 DIAGNOSIS — F10.21 ALCOHOL DEPENDENCE IN REMISSION (H): ICD-10-CM

## 2023-01-01 DIAGNOSIS — D50.8 IRON DEFICIENCY ANEMIA SECONDARY TO INADEQUATE DIETARY IRON INTAKE: ICD-10-CM

## 2023-01-01 LAB
BASOPHILS # BLD AUTO: 0.1 10E3/UL (ref 0–0.2)
BASOPHILS NFR BLD AUTO: 1 %
EOSINOPHIL # BLD AUTO: 0.2 10E3/UL (ref 0–0.7)
EOSINOPHIL NFR BLD AUTO: 4 %
ERYTHROCYTE [DISTWIDTH] IN BLOOD BY AUTOMATED COUNT: 13.2 % (ref 10–15)
FLUAV RNA SPEC QL NAA+PROBE: NEGATIVE
FLUBV RNA RESP QL NAA+PROBE: NEGATIVE
HCT VFR BLD AUTO: 43.7 % (ref 35–47)
HGB BLD-MCNC: 13.2 G/DL (ref 11.7–15.7)
IMM GRANULOCYTES # BLD: 0 10E3/UL
IMM GRANULOCYTES NFR BLD: 0 %
LYMPHOCYTES # BLD AUTO: 1.8 10E3/UL (ref 0.8–5.3)
LYMPHOCYTES NFR BLD AUTO: 31 %
MCH RBC QN AUTO: 29.3 PG (ref 26.5–33)
MCHC RBC AUTO-ENTMCNC: 30.2 G/DL (ref 31.5–36.5)
MCV RBC AUTO: 97 FL (ref 78–100)
MONOCYTES # BLD AUTO: 0.4 10E3/UL (ref 0–1.3)
MONOCYTES NFR BLD AUTO: 8 %
NEUTROPHILS # BLD AUTO: 3.2 10E3/UL (ref 1.6–8.3)
NEUTROPHILS NFR BLD AUTO: 56 %
NRBC # BLD AUTO: 0 10E3/UL
NRBC BLD AUTO-RTO: 0 /100
PLATELET # BLD AUTO: 253 10E3/UL (ref 150–450)
RBC # BLD AUTO: 4.51 10E6/UL (ref 3.8–5.2)
RSV RNA SPEC NAA+PROBE: NEGATIVE
SARS-COV-2 RNA RESP QL NAA+PROBE: POSITIVE
WBC # BLD AUTO: 5.8 10E3/UL (ref 4–11)

## 2023-01-01 PROCEDURE — 99309 SBSQ NF CARE MODERATE MDM 30: CPT | Performed by: NURSE PRACTITIONER

## 2023-01-01 PROCEDURE — 87637 SARSCOV2&INF A&B&RSV AMP PRB: CPT | Mod: ORL | Performed by: INTERNAL MEDICINE

## 2023-01-01 PROCEDURE — P9604 ONE-WAY ALLOW PRORATED TRIP: HCPCS | Mod: ORL | Performed by: INTERNAL MEDICINE

## 2023-01-01 PROCEDURE — 99309 SBSQ NF CARE MODERATE MDM 30: CPT | Performed by: INTERNAL MEDICINE

## 2023-01-01 PROCEDURE — 36415 COLL VENOUS BLD VENIPUNCTURE: CPT | Mod: ORL | Performed by: INTERNAL MEDICINE

## 2023-01-01 PROCEDURE — 85025 COMPLETE CBC W/AUTO DIFF WBC: CPT | Mod: ORL | Performed by: INTERNAL MEDICINE

## 2023-01-01 RX ORDER — ACETAMINOPHEN 325 MG/1
325-650 TABLET ORAL 3 TIMES DAILY
COMMUNITY

## 2023-01-01 RX ORDER — TIZANIDINE 2 MG/1
2 TABLET ORAL DAILY
COMMUNITY
Start: 2023-01-01

## 2023-01-01 RX ORDER — HYDRALAZINE HYDROCHLORIDE 25 MG/1
25 TABLET, FILM COATED ORAL 3 TIMES DAILY PRN
COMMUNITY
End: 2023-01-01

## 2023-01-01 RX ORDER — CARBOXYMETHYLCELLULOSE SODIUM 5 MG/ML
1 SOLUTION/ DROPS OPHTHALMIC 3 TIMES DAILY PRN
Status: CANCELLED
Start: 2023-01-01

## 2023-01-01 RX ORDER — POLYVINYL ALCOHOL, POVIDONE 14; 6 MG/ML; MG/ML
1-2 SOLUTION/ DROPS OPHTHALMIC
Status: CANCELLED
Start: 2023-01-01

## 2023-01-01 RX ORDER — CELECOXIB 200 MG/1
200 CAPSULE ORAL DAILY PRN
COMMUNITY
Start: 2023-01-01

## 2023-01-01 RX ORDER — DOXYCYCLINE HYCLATE 100 MG
100 TABLET ORAL 2 TIMES DAILY
COMMUNITY
Start: 2023-01-01 | End: 2023-01-01

## 2023-01-01 RX ORDER — HYDRALAZINE HYDROCHLORIDE 10 MG/1
10 TABLET, FILM COATED ORAL 3 TIMES DAILY PRN
COMMUNITY
End: 2023-01-01

## 2023-01-01 RX ORDER — ASPIRIN 81 MG/1
81 TABLET ORAL 2 TIMES DAILY
COMMUNITY
Start: 2023-01-01

## 2023-01-01 RX ORDER — CARVEDILOL 3.12 MG/1
3.12 TABLET ORAL 2 TIMES DAILY WITH MEALS
Start: 2023-01-01

## 2023-01-06 ENCOUNTER — LAB REQUISITION (OUTPATIENT)
Dept: LAB | Facility: CLINIC | Age: 83
End: 2023-01-06
Payer: MEDICARE

## 2023-01-06 DIAGNOSIS — F31.9 BIPOLAR DISORDER, UNSPECIFIED (H): ICD-10-CM

## 2023-01-10 ENCOUNTER — TELEPHONE (OUTPATIENT)
Dept: GERIATRICS | Facility: CLINIC | Age: 83
End: 2023-01-10

## 2023-01-10 DIAGNOSIS — E03.9 HYPOTHYROIDISM, UNSPECIFIED TYPE: Primary | ICD-10-CM

## 2023-01-10 LAB
ANION GAP SERPL CALCULATED.3IONS-SCNC: 15 MMOL/L (ref 7–15)
BUN SERPL-MCNC: 19.3 MG/DL (ref 8–23)
CALCIUM SERPL-MCNC: 9.6 MG/DL (ref 8.8–10.2)
CHLORIDE SERPL-SCNC: 105 MMOL/L (ref 98–107)
CREAT SERPL-MCNC: 0.55 MG/DL (ref 0.51–0.95)
DEPRECATED HCO3 PLAS-SCNC: 19 MMOL/L (ref 22–29)
ERYTHROCYTE [DISTWIDTH] IN BLOOD BY AUTOMATED COUNT: 15 % (ref 10–15)
GFR SERPL CREATININE-BSD FRML MDRD: >90 ML/MIN/1.73M2
GLUCOSE SERPL-MCNC: 117 MG/DL (ref 70–99)
HCT VFR BLD AUTO: 44 % (ref 35–47)
HGB BLD-MCNC: 12.8 G/DL (ref 11.7–15.7)
MCH RBC QN AUTO: 27.2 PG (ref 26.5–33)
MCHC RBC AUTO-ENTMCNC: 29.1 G/DL (ref 31.5–36.5)
MCV RBC AUTO: 93 FL (ref 78–100)
PLATELET # BLD AUTO: 267 10E3/UL (ref 150–450)
POTASSIUM SERPL-SCNC: 4.5 MMOL/L (ref 3.4–5.3)
RBC # BLD AUTO: 4.71 10E6/UL (ref 3.8–5.2)
SODIUM SERPL-SCNC: 139 MMOL/L (ref 136–145)
T4 FREE SERPL-MCNC: 1.17 NG/DL (ref 0.9–1.7)
TSH SERPL DL<=0.005 MIU/L-ACNC: 0.89 UIU/ML (ref 0.3–4.2)
WBC # BLD AUTO: 5.8 10E3/UL (ref 4–11)

## 2023-01-10 PROCEDURE — 85027 COMPLETE CBC AUTOMATED: CPT | Mod: ORL | Performed by: NURSE PRACTITIONER

## 2023-01-10 PROCEDURE — 84439 ASSAY OF FREE THYROXINE: CPT | Mod: ORL | Performed by: NURSE PRACTITIONER

## 2023-01-10 PROCEDURE — 80175 DRUG SCREEN QUAN LAMOTRIGINE: CPT | Mod: ORL | Performed by: NURSE PRACTITIONER

## 2023-01-10 PROCEDURE — 36415 COLL VENOUS BLD VENIPUNCTURE: CPT | Mod: ORL | Performed by: NURSE PRACTITIONER

## 2023-01-10 PROCEDURE — P9604 ONE-WAY ALLOW PRORATED TRIP: HCPCS | Mod: ORL | Performed by: NURSE PRACTITIONER

## 2023-01-10 PROCEDURE — 84443 ASSAY THYROID STIM HORMONE: CPT | Mod: ORL | Performed by: NURSE PRACTITIONER

## 2023-01-10 PROCEDURE — 80048 BASIC METABOLIC PNL TOTAL CA: CPT | Mod: ORL | Performed by: NURSE PRACTITIONER

## 2023-01-10 RX ORDER — LEVOTHYROXINE SODIUM 50 UG/1
50 TABLET ORAL DAILY
Qty: 60 TABLET | Refills: 11 | Status: SHIPPED | OUTPATIENT
Start: 2023-01-10 | End: 2023-03-22

## 2023-01-10 NOTE — TELEPHONE ENCOUNTER
[facility] TELEPHONE ENCOUNTER    Chief Complaint   Patient presents with     Results       Amparo Sharma is a 82 year old  (1940),Nurse called today to report: Labs results today. Overall labs stable except for TSH levels <1.0 today. Remains on 75mcg levothyroxine daily.     ASSESSMENT/PLAN      Reduce levothyroxine down to 50mcg daily for now    Recommend to recheck thyroid levels in 12 weeks. Due April 2023    Electronically signed by:   KASANDRA Lou CNP

## 2023-01-11 LAB — LAMOTRIGINE SERPL-MCNC: 4.6 UG/ML

## 2023-01-17 NOTE — TELEPHONE ENCOUNTER
"Follow up after September upper GI endoscopy (EGD)\"  did she get omeprazole (Prilosec) ?  20 mg daily.  Avoid nsaids?    Also, need to plan follow up upper GI endoscopy (EGD) to establish healing.    We will call again next week.  " Deep Sutures: 5-0 Monocryl

## 2023-01-20 ENCOUNTER — TRANSFERRED RECORDS (OUTPATIENT)
Dept: HEALTH INFORMATION MANAGEMENT | Facility: CLINIC | Age: 83
End: 2023-01-20
Payer: MEDICARE

## 2023-01-23 VITALS
RESPIRATION RATE: 18 BRPM | HEIGHT: 60 IN | DIASTOLIC BLOOD PRESSURE: 67 MMHG | WEIGHT: 145 LBS | TEMPERATURE: 98 F | OXYGEN SATURATION: 96 % | SYSTOLIC BLOOD PRESSURE: 158 MMHG | HEART RATE: 57 BPM | BODY MASS INDEX: 28.47 KG/M2

## 2023-01-23 NOTE — PROGRESS NOTES
"[facility] GERIATRICS  Chief Complaint   Patient presents with     Annual Comprehensive Nursing Home     [facility] Record Number:  [id number]  Place of Service where encounter took place:  [facility] () [[id number]]    HPI:    Amparo Sharma  is a 82 year old  (1940), who is being seen today for an annual comprehensive visit. HPI information obtained from: facility chart records, facility staff and patient report.     The primary encounter diagnosis was Hypothyroidism, unspecified type. Diagnoses of Moderate vascular dementia with mood disturbance, Bipolar 1 disorder (H), NPH (normal pressure hydrocephalus) (H), Recurrent major depressive disorder, remission status unspecified (H), S/P ventriculoperitoneal shunt, Chronic obstructive pulmonary disease, unspecified COPD type (H), Essential hypertension, Physical deconditioning, Slow transit constipation, Generalized muscle weakness, Nausea, Severe protein-calorie malnutrition (H), Vascular dementia without behavioral disturbance (H), Umbilical hernia without obstruction and without gangrene, Malignant neoplasm of sigmoid colon (H), PVD (peripheral vascular disease) (H), Tachycardia, Status post total shoulder arthroplasty, left, Acute post-operative pain, Spasticity, Suicidal thoughts, Normal pressure hydrocephalus (H), Episode of recurrent major depressive disorder, unspecified depression episode severity (H), Iron deficiency anemia secondary to inadequate dietary iron intake, Hypoxia, Acute respiratory failure with hypoxia (H), Shortness of breath, Atelectasis, and S/P reverse total shoulder arthroplasty, left were also pertinent to this visit.     Met patient in room. Denies issues with SOB, palpitations,chest pain, SOLOMON, and cough. Did have an episode of dizziness this am but attributes to anxiety and high b/p this am. Patient is moving to another floor within the facility and states that is making her feel \"anxious\". Denies any " "concerns with voiding, but does have occasional constipation which is resolved with the use of Miralax and did have BM this am. Patient states appetite is \"fair\" and would like to start ensure to help supplement nutrition likes chocolate and vanilla. Dietician followed on site for ongoing needs. Denies any new pain issues. Denies any issues with sleep. Protruding hernia x2 is chronic, no new issues. Nursing denies any acute concerns.     BP Readings from Last 3 Encounters:   01/23/23 (!) 158/67   12/28/22 (!) 151/88   12/07/22 119/67     Wt Readings from Last 5 Encounters:   01/23/23 65.8 kg (145 lb)   12/28/22 65.4 kg (144 lb 3.2 oz)   12/07/22 67 kg (147 lb 11.2 oz)   11/30/22 111.7 kg (246 lb 3.2 oz)   11/16/22 66.1 kg (145 lb 11.2 oz)     ALLERGIES: Amantadine, Antihistamine allergy relief [diphenhydramine], Bactrim [sulfamethoxazole w/trimethoprim], Codeine, Demerol [meperidine], Diazepam, Gabapentin, Hmg-coa-r inhibitors, Meloxicam, Pentazocine, Sulfamethoxazole-trimethoprim, and Tramadol  PAST MEDICAL HISTORY:   Past Medical History:   Diagnosis Date     Abdominal hernia      Abdominal hernia      Alcoholism in remission (H)      Alcoholism in remission (H) 2017     Anemia      Arthritis      Arthritis      Asthma      Asthma 1/20/2017     Bipolar 1 disorder (H)      Bipolar disorder (H)      Bladder incontinence 2013     Cancer (H)      Cellulitis      Cellulitis 12/12/2016    of left elbow     Chronic pain syndrome      COPD (chronic obstructive pulmonary disease) (H)      COPD with acute exacerbation (H) 12/21/2018     Dementia (H)      Depression      Depression      Disease of thyroid gland      Falls frequently      Frequent falls 2016     Frequent falls 2017     GERD (gastroesophageal reflux disease)      GERD (gastroesophageal reflux disease)      History of blood transfusion     20 years ago     History of colon cancer     s/p resection 1/2015, treated with 5-6 cycles of Folfox     History of " transfusion      HTN (hypertension)      Hydrocephalus (H)      Hydrocephalus (H)      Hyperlipidemia      Hypertension      Hypothyroidism      Hypothyroidism      Infection of skin due to methicillin resistant Staphylococcus aureus (MRSA)      Loss of balance      Memory loss      Memory loss 2017     Normal pressure hydrocephalus (H) 02/03/2017     NPH (normal pressure hydrocephalus) (H)      Parkinson disease (H)      Renal insufficiency      Squamous cell cancer of multiple sites of skin of upper arm, left 2016    Dr. Andrea      Thyroid disease      Urinary incontinence      Urinary incontinence       PAST SURGICAL HISTORY:  has a past surgical history that includes Hysterectomy; multiple knee surgeries; rotator cuff surgeries; Cholecystectomy; colon cancer resection (1/2015); GI surgery; Abdomen surgery; orthopedic surgery; Insert drain lumbar (N/A, 2/5/2017); Optical tracking system implant shunt ventriculoperitoneal (Right, 2/8/2017); GYN surgery; Esophagoscopy, gastroscopy, duodenoscopy (EGD), combined (N/A, 9/7/2017); Colon surgery; Laparoscopic assisted colectomy; Hysterectomy; joint replacement; Arthroscopy shoulder rotator cuff repair; fracture surgery; Laparoscopic cholecystectomy; Implant shunt ventriculoperitoneal (02/03/2017); Lumbar Puncture (02/03/2017); RECONSTR TOTAL SHOULDER IMPLANT (Right, 3/5/2019); Colonoscopy (N/A, 2/3/2020); and Reverse arthroplasty shoulder (Left, 4/11/2022).  IMMUNIZATIONS:  Immunization History   Administered Date(s) Administered     COVID-19 Vaccine 18+ (Moderna) 01/13/2021, 01/17/2021, 02/10/2021, 11/02/2021     COVID-19 Vaccine Bivalent Booster 12+ (Pfizer) 11/21/2022     FLUAD(HD)65+ QUAD 10/07/2021     Influenza (High Dose) 3 valent vaccine 12/09/2015, 09/07/2021     Influenza Vaccine >6 months (Alfuria,Fluzone) 10/19/2022     Influenza Vaccine, 6+MO IM (QUADRIVALENT W/PRESERVATIVES) 10/07/2020     Pneumo Conj 13-V (2010&after) 12/09/2015, 10/23/2019      Pneumococcal 23 valent 10/15/2014     Tdap (Adacel,Boostrix) 12/09/2015     Above immunizations pulled from [facility]. [facility] and facility records also reconciled. Outstanding information sent to  to update [facility].  Future immunizations needed:  Shigles to be offered      Current Outpatient Medications:      diclofenac (VOLTAREN) 1 % topical gel, Apply 4 g topically 4 times daily Apply to affected knees., Disp: , Rfl:      SENOKOT S 8.6-50 MG tablet, Take 1 tablet by mouth daily AND daily PRN, Disp: 90 tablet, Rfl: 11     acetaminophen (TYLENOL) 325 MG tablet, Take 3 tablets (975 mg) by mouth every 6 hours, Disp: 360 tablet, Rfl: 11     ARIPiprazole (ABILIFY) 15 MG tablet, Take 15 mg by mouth At Bedtime, Disp: , Rfl:      aspirin (ASA) 81 MG EC tablet, Take 1 tablet (81 mg) by mouth daily, Disp: 60 tablet, Rfl: 11     baclofen (LIORESAL) 10 MG tablet, Take 1 tablet (10 mg) by mouth 3 times daily, Disp: 180 tablet, Rfl: 11     bisacodyl (DULCOLAX) 10 MG suppository, Place 1 suppository (10 mg) rectally daily as needed for constipation, Disp: 8 suppository, Rfl: 11     BREO ELLIPTA 100-25 MCG/INH inhaler, Inhale 1 puff into the lungs daily 1PM, Disp: , Rfl:      carboxymethylcellulose (REFRESH PLUS) 0.5 % SOLN ophthalmic solution, Place 1 drop into both eyes 3 times daily as needed for dry eyes, Disp: , Rfl:      carboxymethylcellulose (REFRESH PLUS) 0.5 % SOLN ophthalmic solution, Place 1 drop into both eyes in the morning., Disp: , Rfl:      cetirizine (ZYRTEC) 10 MG tablet, Take 10 mg by mouth daily , Disp: , Rfl:      citalopram (CELEXA) 20 MG tablet, Take 1 tablet (20 mg) by mouth daily, Disp: 30 tablet, Rfl: 11     ferrous sulfate (FEROSUL) 325 (65 Fe) MG tablet, Take 1 tablet (325 mg) by mouth Every Mon, Wed, Fri Morning, Disp: , Rfl:      INCRUSE ELLIPTA 62.5 MCG/INH Inhaler, Inhale 1 puff into the lungs daily , Disp: , Rfl:      lamoTRIgine (LAMICTAL) 100 MG tablet, Take 100 mg by  mouth At Bedtime, Disp: , Rfl:      lamoTRIgine (LAMICTAL) 150 MG tablet, Take 150 mg by mouth 2 times daily, Disp: , Rfl:      levothyroxine (SYNTHROID/LEVOTHROID) 50 MCG tablet, Take 1 tablet (50 mcg) by mouth daily, Disp: 60 tablet, Rfl: 11     menthol-zinc oxide (CALMOSEPTINE) 0.44-20.6 % OINT ointment, Apply topically 2 times daily as needed for irritation, Disp: , Rfl:      metoprolol tartrate (LOPRESSOR) 25 MG tablet, Take 12.5 mg by mouth 2 times daily HOLD if SBP<100 and/or HR<55, Disp: , Rfl:      mirabegron (MYRBETRIQ) 25 MG 24 hr tablet, Take 25 mg by mouth daily , Disp: , Rfl:      Multiple Vitamins-Minerals (MULTIVITAMIN PO), Take 1 tablet by mouth daily , Disp: , Rfl:      ondansetron (ZOFRAN) 4 MG tablet, Take 1 tablet (4 mg) by mouth every 6 hours as needed for nausea, Disp: 30 tablet, Rfl: 11     pantoprazole (PROTONIX) 20 MG EC tablet, Take 40 mg by mouth daily, Disp: , Rfl:      polyethylene glycol (MIRALAX) 17 GM/Dose powder, Take 17 g by mouth daily, Disp: 510 g, Rfl: 0     QUEtiapine (SEROQUEL) 25 MG tablet, Take 1 tablet (25 mg) by mouth At Bedtime, Disp: 60 tablet, Rfl: 3     Vitamin D, Cholecalciferol, 25 MCG (1000 UT) CAPS, Take 1,000 Units by mouth daily , Disp: , Rfl:        MED REC REQUIRED  Post Medication Reconciliation Status:  Medication reconciliation previously completed during another office visit    Case Management:  I have reviewed the facility/SNF care plan/MDS, including the falls risk, nutrition and pain screening. I also reviewed the current immunizations, and preventive care.. Future cancer screening is not clinically indicated secondary to age/goals of care Patient's desire to return to the community is present, but is not able due to care needs . Current Level of Care is appropriate.    Advance Directive Discussion:    I reviewed the current advanced directives as reflected in EPIC, the POLST and the facility chart, and verified the congruency of orders. I contacted the  first party Daughter- Nohemi and left a message regarding the plan of Care.  I did review the advance directives with the resident. Patient's goal is pain control and comfort.    Team Discussion:  I communicated with the appropriate disciplines involved with the Plan of Care:   Nursing  ,    and Dietitian    Information reviewed:  Medications, vital signs, orders, and nursing notes.    ROS:  10 point ROS of systems including Constitutional, Eyes, Respiratory, Cardiovascular, Gastroenterology, Genitourinary, Integumentary, Musculoskeletal, Psychiatric were all negative except for pertinent positives noted in my HPI.    Vitals:  BP (!) 158/67   Pulse 57   Temp 98  F (36.7  C)   Resp 18   Ht 1.524 m (5')   Wt 65.8 kg (145 lb)   SpO2 96%   BMI 28.32 kg/m   Body mass index is 28.32 kg/m .  Exam:  GENERAL APPEARANCE:  Alert, in no distress, cooperative  ENT:  Mouth and posterior oropharynx normal, moist mucous membranes, [place]  EYES:  EOM normal, conjunctiva and lids normal  NECK:  No adenopathy,masses or thyromegaly  RESP:  respiratory effort and palpation of chest normal, lungs clear to auscultation , no respiratory distress  CV:  Palpation and auscultation of heart done , regular rate and rhythm, no murmur, rub, or gallop, no edema, +2 pedal pulses  ABDOMEN:  normal bowel sounds, soft, nontender, no hepatosplenomegaly or other masses, no guarding or rebound, bowel sounds normal, hernia abdomen Chronic hernia non surgical due to age  M/S:   Easy stand for transfers. Wheelchair bound.  SKIN:  Inspection of skin and subcutaneous tissue baseline, Palpation of skin and subcutaneous tissue baseline  NEURO:   Cranial nerves 2-12 are normal tested and grossly at patient's baseline  PSYCH:  oriented to oriented to self and place, insight and judgement impaired, memory impaired      Lab/Diagnostic data:     Most Recent 3 CBC's:  Recent Labs   Lab Test 01/10/23  0952 11/15/22  0537 10/04/22  0709   WBC 5.8 6.5  5.4   HGB 12.8 12.9 10.9*   MCV 93 92 90    248 317     Most Recent 3 BMP's:  Recent Labs   Lab Test 01/10/23  0952 11/15/22  0537 10/04/22  0709    135* 143   POTASSIUM 4.5 5.0 4.5   CHLORIDE 105 99 107   CO2 19* 26 24   BUN 19.3 22.2 23.5*   CR 0.55 0.58 0.55   ANIONGAP 15 10 12   MARTHA 9.6 9.7 9.9   * 88 77     Most Recent 2 LFT's:  Recent Labs   Lab Test 10/04/22  0709 08/18/22  0513   AST 22 23   ALT 10 14   ALKPHOS 137* 143*   BILITOTAL 0.2 <0.2     Most Recent TSH and T4:  Recent Labs   Lab Test 01/10/23  0952   TSH 0.89   T4 1.17     Most Recent Anemia Panel:  Recent Labs   Lab Test 01/10/23  0952 10/04/22  0709 08/18/22  0513 05/05/22  1108 04/28/22  0757 04/27/21  0450 02/03/21  0503   WBC 5.8   < > 6.1   < > 9.1   < > 6.3   HGB 12.8   < > 10.8*   < > 9.2*   < > 12.5   HCT 44.0   < > 36.6   < > 30.7*   < > 41.1   MCV 93   < > 88   < > 94   < > 89      < > 286   < > 708*   < > 280   IRON  --   --  34*  34*   < > 21*   < >  --    IRONSAT  --   --  12*   < >  --   --   --    FEB  --   --  283   < >  --   --   --    JOAN  --   --  89   < > 581*   < >  --    B12  --   --   --   --   --   --  770   FOLIC  --   --   --   --  10.0  --   --     < > = values in this interval not displayed.     ASSESSMENT/PLAN  (R53.81) Physical deconditioning  (primary encounter diagnosis)  (M62.81) Generalized muscle weakness  Comment: Chronic. S/T below diagnosis. Have attempted and re-attempted therapies several times continuing to need extensive assistance with ADL's and is non ambulatory and will be  transitioning to LTC unit.   Plan:   -Moving to LTC RM #[id number]   -Continue LTc for all ongoing needs and management.   -Recommend to follow up with new referred neurology for neuromuscular concerns--Scheduled for 4/19/23  -Trend labs at next routine visit in April 2023.     (E43) Severe protein-calorie malnutrition (H)  Comment: Chronic. Not at goal. Recent weight 145#, baseline weight around  150#  Plan:   -Magic cup as directed  -Dietician on site as directed   -Monitor weights weekly  -Trend labs at next routine visit in April 2023.     (I73.9) PVD  Comment: Acute on chronic. No wounds.   Plan:  -Monitor for worsening s/sx of concerns.   -Dietician on site as directed  -Monitor weights weekly  -Trend labs at next routine visit in April 2023.     (D50.8) Iron deficiency anemia secondary to inadequate dietary iron intake  Comment: Chronic. Prior anemia panel as noted. Previously on daily iron replacement with noted elevation of her ferritin. Changed to 3x weekly dosing with addition of vitamin C with noted improvement. Baseline hgb-10  Plan:   -Monitor bleeding risks and concerns  -Continue vitamin C  -Continue ferrous sulfate 3x weekly.    -Trend labs at next routine visit in April 2023.     (I10) Essential hypertension  Comment: Chronic. Based on JNC-8 goals,  patients age of 82 year old, no presence of diabetes or CKD, and goals of care goal BP is <150/90 mm Hg. Patient is stable with current plan of care and routine assessment. Recent SBPs 140-150's.  Plan:   -Monitor BP and HR as directed  -Continue metoprolol as directed.    -Trend labs at next routine visit in April 2023.     (G91.2) Normal pressure hydrocephalus (H)  Comment: Acute on chronic. With medication adjustments. Has tolerated weaning of Prozac; Resident had been stable psychologically with occasional odd statements noted. Recent Lamictal levels on January 10 was 4.6.  Plan:  -Continue Abilify, Lamictal as ordered with close monitoring for additional needs.  -Follow up with neurology as directed.   -Monitor for worsening s/sx of concerns.   -Trend labs at next routine visit in April 2023.     (F31.9) Bipolar 1 disorder (H)  (R45.851) Suicidal thoughts  (F33.9) Major depression  Comment: Chronic. Stable. No behaviors.  Plan:   -Monitor mood and behaviors  -Psych to follow and further assess ongoing needs  -Continue seroquel 25mg daily at  HS.   -Continue abilify as directed  -Continue celexa 20mg daily  -Monitor for worsening s/sx of concerns  -Monitor falls and changes in mobility, eating and sleeping patterns  -Trend labs at next routine visit in April 2023.     (Z96.612) Status post total shoulder arthroplasty, left  (G89.18) Acute post-operative pain  (R25.2) Spasticity  Comment: Acute on chronic.Stable. Recent shoulder X-ray stable with no infection or trauma concerns.   Plan:  -Monitor pain complaints  -Continue Tylenol 975mg QID scheduled.   -Continue baclofen to 10mg TID ongoing  -Discontinue PRN tramadol due to non use  -Voltaren as directed  -Follow up with neurology as directed  -Trend labs at next routine visit in April 2023.    (R00.0) Tachycardia  Comment: Acute on chronic. Stable  Plan:   -Metoprolol 12.5mg BID as directed  -Monitor BP and HR monitoring as directed  -Trend labs at next routine visit in April 2023.     (J44.9) Chronic obstructive pulmonary disease, unspecified COPD type (H)  Comment: Chronic. Stable No cough noted.   Plan:   -Monitor respiratory status  -Continue albuterol as directed  -Continue incruse as directed  -Continue breo as directed  -Trend labs at next routine visit in April 2023.     (K59.01) Slow transit constipation  (K42.9) Umbilical hernia without obstruction and without gangrene  (C18.7) History of neoplasm of sigmoid colon  (R11.0) Nausea  Comment: Chronic. Not at goal. Hernia non-tender and soft but protrudes. The patient did have stage III cancer of the colon diagnosed in 01/2015.  She did have chemotherapy after what sounds like surgery and is in remission.  She also has had a cholecystectomy.  Plan:   -Monitor BM patterns  -Continue Miralax 17gm daily  -Continue senna S 1 tab at HS and PRN.   -Bisacodyl daily PRN if no BM in 3 days  -Zofran 4mg PRN   -Trend labs at next routine visit in April 2023.     (E03.9) Hypothyroidism  Comment: Chronic. Recent TSH was 0.89 on 1/10/23--Levothyroxine was  adjusted. Goal 4-6 per age to reduce adverse effects  Plan:  -Continue levothyroxine 50mcg daily  -Monitor for worsening s/sx of concerns  -Trend labs at next routine visit in April 2023.    Electronically signed by:  Taylor Ledbetter DNP, APRN    I was present with the medical student/advanced practice registered nurse, Gabriela Olvera, who participated in the service and in the documentation of this note. I have verified the history and personally performed the physical exam and medical decision making. I agree with the assessment and plan of care as documented in the note.

## 2023-01-24 ENCOUNTER — NURSING HOME VISIT (OUTPATIENT)
Dept: GERIATRICS | Facility: CLINIC | Age: 83
End: 2023-01-24
Payer: MEDICARE

## 2023-01-24 DIAGNOSIS — R53.81 PHYSICAL DECONDITIONING: ICD-10-CM

## 2023-01-24 DIAGNOSIS — J96.01 ACUTE RESPIRATORY FAILURE WITH HYPOXIA (H): ICD-10-CM

## 2023-01-24 DIAGNOSIS — J98.11 ATELECTASIS: ICD-10-CM

## 2023-01-24 DIAGNOSIS — E43 SEVERE PROTEIN-CALORIE MALNUTRITION (H): ICD-10-CM

## 2023-01-24 DIAGNOSIS — F01.50 VASCULAR DEMENTIA WITHOUT BEHAVIORAL DISTURBANCE (H): ICD-10-CM

## 2023-01-24 DIAGNOSIS — R45.851 SUICIDAL THOUGHTS: ICD-10-CM

## 2023-01-24 DIAGNOSIS — R11.0 NAUSEA: ICD-10-CM

## 2023-01-24 DIAGNOSIS — C18.7 MALIGNANT NEOPLASM OF SIGMOID COLON (H): ICD-10-CM

## 2023-01-24 DIAGNOSIS — R06.02 SHORTNESS OF BREATH: ICD-10-CM

## 2023-01-24 DIAGNOSIS — Z98.2 S/P VENTRICULOPERITONEAL SHUNT: ICD-10-CM

## 2023-01-24 DIAGNOSIS — D50.8 IRON DEFICIENCY ANEMIA SECONDARY TO INADEQUATE DIETARY IRON INTAKE: ICD-10-CM

## 2023-01-24 DIAGNOSIS — G91.2 NORMAL PRESSURE HYDROCEPHALUS (H): ICD-10-CM

## 2023-01-24 DIAGNOSIS — R00.0 TACHYCARDIA: ICD-10-CM

## 2023-01-24 DIAGNOSIS — F01.B3 MODERATE VASCULAR DEMENTIA WITH MOOD DISTURBANCE (H): ICD-10-CM

## 2023-01-24 DIAGNOSIS — G91.2 NPH (NORMAL PRESSURE HYDROCEPHALUS) (H): ICD-10-CM

## 2023-01-24 DIAGNOSIS — I10 ESSENTIAL HYPERTENSION: ICD-10-CM

## 2023-01-24 DIAGNOSIS — I73.9 PVD (PERIPHERAL VASCULAR DISEASE) (H): ICD-10-CM

## 2023-01-24 DIAGNOSIS — G89.18 ACUTE POST-OPERATIVE PAIN: ICD-10-CM

## 2023-01-24 DIAGNOSIS — F33.9 EPISODE OF RECURRENT MAJOR DEPRESSIVE DISORDER, UNSPECIFIED DEPRESSION EPISODE SEVERITY (H): ICD-10-CM

## 2023-01-24 DIAGNOSIS — Z96.612 STATUS POST TOTAL SHOULDER ARTHROPLASTY, LEFT: ICD-10-CM

## 2023-01-24 DIAGNOSIS — F33.9 RECURRENT MAJOR DEPRESSIVE DISORDER, REMISSION STATUS UNSPECIFIED (H): ICD-10-CM

## 2023-01-24 DIAGNOSIS — R25.2 SPASTICITY: ICD-10-CM

## 2023-01-24 DIAGNOSIS — E03.9 HYPOTHYROIDISM, UNSPECIFIED TYPE: Primary | ICD-10-CM

## 2023-01-24 DIAGNOSIS — R09.02 HYPOXIA: ICD-10-CM

## 2023-01-24 DIAGNOSIS — M62.81 GENERALIZED MUSCLE WEAKNESS: ICD-10-CM

## 2023-01-24 DIAGNOSIS — Z96.612 S/P REVERSE TOTAL SHOULDER ARTHROPLASTY, LEFT: ICD-10-CM

## 2023-01-24 DIAGNOSIS — K42.9 UMBILICAL HERNIA WITHOUT OBSTRUCTION AND WITHOUT GANGRENE: ICD-10-CM

## 2023-01-24 DIAGNOSIS — J44.9 CHRONIC OBSTRUCTIVE PULMONARY DISEASE, UNSPECIFIED COPD TYPE (H): ICD-10-CM

## 2023-01-24 DIAGNOSIS — K59.01 SLOW TRANSIT CONSTIPATION: ICD-10-CM

## 2023-01-24 DIAGNOSIS — F31.9 BIPOLAR 1 DISORDER (H): ICD-10-CM

## 2023-01-24 PROCEDURE — 99309 SBSQ NF CARE MODERATE MDM 30: CPT | Performed by: NURSE PRACTITIONER

## 2023-01-24 RX ORDER — STANDARDIZED SENNA CONCENTRATE AND DOCUSATE SODIUM 8.6; 5 MG/1; MG/1
1 TABLET ORAL DAILY
Qty: 90 TABLET | Refills: 11
Start: 2023-01-24

## 2023-01-24 NOTE — LETTER
1/24/2023        RE: Amparo Sharma  [facility] [place] [address] [place] [address]        [facility] GERIATRICS  Chief Complaint   Patient presents with     Annual Comprehensive Nursing Home     [facility] Record Number:  [id number]  Place of Service where encounter took place:  [facility] () [[id number]]    HPI:    Amparo Sharma  is a 82 year old  (1940), who is being seen today for an annual comprehensive visit. HPI information obtained from: facility chart records, facility staff and patient report.     The primary encounter diagnosis was Hypothyroidism, unspecified type. Diagnoses of Moderate vascular dementia with mood disturbance, Bipolar 1 disorder (H), NPH (normal pressure hydrocephalus) (H), Recurrent major depressive disorder, remission status unspecified (H), S/P ventriculoperitoneal shunt, Chronic obstructive pulmonary disease, unspecified COPD type (H), Essential hypertension, Physical deconditioning, Slow transit constipation, Generalized muscle weakness, Nausea, Severe protein-calorie malnutrition (H), Vascular dementia without behavioral disturbance (H), Umbilical hernia without obstruction and without gangrene, Malignant neoplasm of sigmoid colon (H), PVD (peripheral vascular disease) (H), Tachycardia, Status post total shoulder arthroplasty, left, Acute post-operative pain, Spasticity, Suicidal thoughts, Normal pressure hydrocephalus (H), Episode of recurrent major depressive disorder, unspecified depression episode severity (H), Iron deficiency anemia secondary to inadequate dietary iron intake, Hypoxia, Acute respiratory failure with hypoxia (H), Shortness of breath, Atelectasis, and S/P reverse total shoulder arthroplasty, left were also pertinent to this visit.     Met patient in room. Denies issues with SOB, palpitations,chest pain, SOLOMON, and cough. Did have an episode of dizziness this am but attributes to anxiety and high b/p this am.  "Patient is moving to another floor within the facility and states that is making her feel \"anxious\". Denies any concerns with voiding, but does have occasional constipation which is resolved with the use of Miralax and did have BM this am. Patient states appetite is \"fair\" and would like to start ensure to help supplement nutrition likes chocolate and vanilla. Dietician followed on site for ongoing needs. Denies any new pain issues. Denies any issues with sleep. Protruding hernia x2 is chronic, no new issues. Nursing denies any acute concerns.     BP Readings from Last 3 Encounters:   01/23/23 (!) 158/67   12/28/22 (!) 151/88   12/07/22 119/67     Wt Readings from Last 5 Encounters:   01/23/23 65.8 kg (145 lb)   12/28/22 65.4 kg (144 lb 3.2 oz)   12/07/22 67 kg (147 lb 11.2 oz)   11/30/22 111.7 kg (246 lb 3.2 oz)   11/16/22 66.1 kg (145 lb 11.2 oz)     ALLERGIES: Amantadine, Antihistamine allergy relief [diphenhydramine], Bactrim [sulfamethoxazole w/trimethoprim], Codeine, Demerol [meperidine], Diazepam, Gabapentin, Hmg-coa-r inhibitors, Meloxicam, Pentazocine, Sulfamethoxazole-trimethoprim, and Tramadol  PAST MEDICAL HISTORY:   Past Medical History:   Diagnosis Date     Abdominal hernia      Abdominal hernia      Alcoholism in remission (H)      Alcoholism in remission (H) 2017     Anemia      Arthritis      Arthritis      Asthma      Asthma 1/20/2017     Bipolar 1 disorder (H)      Bipolar disorder (H)      Bladder incontinence 2013     Cancer (H)      Cellulitis      Cellulitis 12/12/2016    of left elbow     Chronic pain syndrome      COPD (chronic obstructive pulmonary disease) (H)      COPD with acute exacerbation (H) 12/21/2018     Dementia (H)      Depression      Depression      Disease of thyroid gland      Falls frequently      Frequent falls 2016     Frequent falls 2017     GERD (gastroesophageal reflux disease)      GERD (gastroesophageal reflux disease)      History of blood transfusion     20 years " ago     History of colon cancer     s/p resection 1/2015, treated with 5-6 cycles of Folfox     History of transfusion      HTN (hypertension)      Hydrocephalus (H)      Hydrocephalus (H)      Hyperlipidemia      Hypertension      Hypothyroidism      Hypothyroidism      Infection of skin due to methicillin resistant Staphylococcus aureus (MRSA)      Loss of balance      Memory loss      Memory loss 2017     Normal pressure hydrocephalus (H) 02/03/2017     NPH (normal pressure hydrocephalus) (H)      Parkinson disease (H)      Renal insufficiency      Squamous cell cancer of multiple sites of skin of upper arm, left 2016    Dr. Andrea      Thyroid disease      Urinary incontinence      Urinary incontinence       PAST SURGICAL HISTORY:  has a past surgical history that includes Hysterectomy; multiple knee surgeries; rotator cuff surgeries; Cholecystectomy; colon cancer resection (1/2015); GI surgery; Abdomen surgery; orthopedic surgery; Insert drain lumbar (N/A, 2/5/2017); Optical tracking system implant shunt ventriculoperitoneal (Right, 2/8/2017); GYN surgery; Esophagoscopy, gastroscopy, duodenoscopy (EGD), combined (N/A, 9/7/2017); Colon surgery; Laparoscopic assisted colectomy; Hysterectomy; joint replacement; Arthroscopy shoulder rotator cuff repair; fracture surgery; Laparoscopic cholecystectomy; Implant shunt ventriculoperitoneal (02/03/2017); Lumbar Puncture (02/03/2017); RECONSTR TOTAL SHOULDER IMPLANT (Right, 3/5/2019); Colonoscopy (N/A, 2/3/2020); and Reverse arthroplasty shoulder (Left, 4/11/2022).  IMMUNIZATIONS:  Immunization History   Administered Date(s) Administered     COVID-19 Vaccine 18+ (Moderna) 01/13/2021, 01/17/2021, 02/10/2021, 11/02/2021     COVID-19 Vaccine Bivalent Booster 12+ (Pfizer) 11/21/2022     FLUAD(HD)65+ QUAD 10/07/2021     Influenza (High Dose) 3 valent vaccine 12/09/2015, 09/07/2021     Influenza Vaccine >6 months (Alfuria,Fluzone) 10/19/2022     Influenza Vaccine, 6+MO IM  (QUADRIVALENT W/PRESERVATIVES) 10/07/2020     Pneumo Conj 13-V (2010&after) 12/09/2015, 10/23/2019     Pneumococcal 23 valent 10/15/2014     Tdap (Adacel,Boostrix) 12/09/2015     Above immunizations pulled from [facility]. [facility] and facility records also reconciled. Outstanding information sent to  to update [facility].  Future immunizations needed:  Shigles to be offered      Current Outpatient Medications:      diclofenac (VOLTAREN) 1 % topical gel, Apply 4 g topically 4 times daily Apply to affected knees., Disp: , Rfl:      SENOKOT S 8.6-50 MG tablet, Take 1 tablet by mouth daily AND daily PRN, Disp: 90 tablet, Rfl: 11     acetaminophen (TYLENOL) 325 MG tablet, Take 3 tablets (975 mg) by mouth every 6 hours, Disp: 360 tablet, Rfl: 11     ARIPiprazole (ABILIFY) 15 MG tablet, Take 15 mg by mouth At Bedtime, Disp: , Rfl:      aspirin (ASA) 81 MG EC tablet, Take 1 tablet (81 mg) by mouth daily, Disp: 60 tablet, Rfl: 11     baclofen (LIORESAL) 10 MG tablet, Take 1 tablet (10 mg) by mouth 3 times daily, Disp: 180 tablet, Rfl: 11     bisacodyl (DULCOLAX) 10 MG suppository, Place 1 suppository (10 mg) rectally daily as needed for constipation, Disp: 8 suppository, Rfl: 11     BREO ELLIPTA 100-25 MCG/INH inhaler, Inhale 1 puff into the lungs daily 1PM, Disp: , Rfl:      carboxymethylcellulose (REFRESH PLUS) 0.5 % SOLN ophthalmic solution, Place 1 drop into both eyes 3 times daily as needed for dry eyes, Disp: , Rfl:      carboxymethylcellulose (REFRESH PLUS) 0.5 % SOLN ophthalmic solution, Place 1 drop into both eyes in the morning., Disp: , Rfl:      cetirizine (ZYRTEC) 10 MG tablet, Take 10 mg by mouth daily , Disp: , Rfl:      citalopram (CELEXA) 20 MG tablet, Take 1 tablet (20 mg) by mouth daily, Disp: 30 tablet, Rfl: 11     ferrous sulfate (FEROSUL) 325 (65 Fe) MG tablet, Take 1 tablet (325 mg) by mouth Every Mon, Wed, Fri Morning, Disp: , Rfl:      INCRUSE ELLIPTA 62.5 MCG/INH Inhaler, Inhale 1  puff into the lungs daily , Disp: , Rfl:      lamoTRIgine (LAMICTAL) 100 MG tablet, Take 100 mg by mouth At Bedtime, Disp: , Rfl:      lamoTRIgine (LAMICTAL) 150 MG tablet, Take 150 mg by mouth 2 times daily, Disp: , Rfl:      levothyroxine (SYNTHROID/LEVOTHROID) 50 MCG tablet, Take 1 tablet (50 mcg) by mouth daily, Disp: 60 tablet, Rfl: 11     menthol-zinc oxide (CALMOSEPTINE) 0.44-20.6 % OINT ointment, Apply topically 2 times daily as needed for irritation, Disp: , Rfl:      metoprolol tartrate (LOPRESSOR) 25 MG tablet, Take 12.5 mg by mouth 2 times daily HOLD if SBP<100 and/or HR<55, Disp: , Rfl:      mirabegron (MYRBETRIQ) 25 MG 24 hr tablet, Take 25 mg by mouth daily , Disp: , Rfl:      Multiple Vitamins-Minerals (MULTIVITAMIN PO), Take 1 tablet by mouth daily , Disp: , Rfl:      ondansetron (ZOFRAN) 4 MG tablet, Take 1 tablet (4 mg) by mouth every 6 hours as needed for nausea, Disp: 30 tablet, Rfl: 11     pantoprazole (PROTONIX) 20 MG EC tablet, Take 40 mg by mouth daily, Disp: , Rfl:      polyethylene glycol (MIRALAX) 17 GM/Dose powder, Take 17 g by mouth daily, Disp: 510 g, Rfl: 0     QUEtiapine (SEROQUEL) 25 MG tablet, Take 1 tablet (25 mg) by mouth At Bedtime, Disp: 60 tablet, Rfl: 3     Vitamin D, Cholecalciferol, 25 MCG (1000 UT) CAPS, Take 1,000 Units by mouth daily , Disp: , Rfl:        MED REC REQUIRED  Post Medication Reconciliation Status:  Medication reconciliation previously completed during another office visit    Case Management:  I have reviewed the facility/SNF care plan/MDS, including the falls risk, nutrition and pain screening. I also reviewed the current immunizations, and preventive care.. Future cancer screening is not clinically indicated secondary to age/goals of care Patient's desire to return to the community is present, but is not able due to care needs . Current Level of Care is appropriate.    Advance Directive Discussion:    I reviewed the current advanced directives as reflected  in EPIC, the POLST and the facility chart, and verified the congruency of orders. I contacted the first party Daughter- Nohemi and left a message regarding the plan of Care.  I did review the advance directives with the resident. Patient's goal is pain control and comfort.    Team Discussion:  I communicated with the appropriate disciplines involved with the Plan of Care:   Nursing  ,    and Dietitian    Information reviewed:  Medications, vital signs, orders, and nursing notes.    ROS:  10 point ROS of systems including Constitutional, Eyes, Respiratory, Cardiovascular, Gastroenterology, Genitourinary, Integumentary, Musculoskeletal, Psychiatric were all negative except for pertinent positives noted in my HPI.    Vitals:  BP (!) 158/67   Pulse 57   Temp 98  F (36.7  C)   Resp 18   Ht 1.524 m (5')   Wt 65.8 kg (145 lb)   SpO2 96%   BMI 28.32 kg/m   Body mass index is 28.32 kg/m .  Exam:  GENERAL APPEARANCE:  Alert, in no distress, cooperative  ENT:  Mouth and posterior oropharynx normal, moist mucous membranes, [place]  EYES:  EOM normal, conjunctiva and lids normal  NECK:  No adenopathy,masses or thyromegaly  RESP:  respiratory effort and palpation of chest normal, lungs clear to auscultation , no respiratory distress  CV:  Palpation and auscultation of heart done , regular rate and rhythm, no murmur, rub, or gallop, no edema, +2 pedal pulses  ABDOMEN:  normal bowel sounds, soft, nontender, no hepatosplenomegaly or other masses, no guarding or rebound, bowel sounds normal, hernia abdomen Chronic hernia non surgical due to age  M/S:   Easy stand for transfers. Wheelchair bound.  SKIN:  Inspection of skin and subcutaneous tissue baseline, Palpation of skin and subcutaneous tissue baseline  NEURO:   Cranial nerves 2-12 are normal tested and grossly at patient's baseline  PSYCH:  oriented to oriented to self and place, insight and judgement impaired, memory impaired      Lab/Diagnostic data:     Most  Recent 3 CBC's:  Recent Labs   Lab Test 01/10/23  0952 11/15/22  0537 10/04/22  0709   WBC 5.8 6.5 5.4   HGB 12.8 12.9 10.9*   MCV 93 92 90    248 317     Most Recent 3 BMP's:  Recent Labs   Lab Test 01/10/23  0952 11/15/22  0537 10/04/22  0709    135* 143   POTASSIUM 4.5 5.0 4.5   CHLORIDE 105 99 107   CO2 19* 26 24   BUN 19.3 22.2 23.5*   CR 0.55 0.58 0.55   ANIONGAP 15 10 12   MARTHA 9.6 9.7 9.9   * 88 77     Most Recent 2 LFT's:  Recent Labs   Lab Test 10/04/22  0709 08/18/22  0513   AST 22 23   ALT 10 14   ALKPHOS 137* 143*   BILITOTAL 0.2 <0.2     Most Recent TSH and T4:  Recent Labs   Lab Test 01/10/23  0952   TSH 0.89   T4 1.17     Most Recent Anemia Panel:  Recent Labs   Lab Test 01/10/23  0952 10/04/22  0709 08/18/22  0513 05/05/22  1108 04/28/22  0757 04/27/21  0450 02/03/21  0503   WBC 5.8   < > 6.1   < > 9.1   < > 6.3   HGB 12.8   < > 10.8*   < > 9.2*   < > 12.5   HCT 44.0   < > 36.6   < > 30.7*   < > 41.1   MCV 93   < > 88   < > 94   < > 89      < > 286   < > 708*   < > 280   IRON  --   --  34*  34*   < > 21*   < >  --    IRONSAT  --   --  12*   < >  --   --   --    FEB  --   --  283   < >  --   --   --    JOAN  --   --  89   < > 581*   < >  --    B12  --   --   --   --   --   --  770   FOLIC  --   --   --   --  10.0  --   --     < > = values in this interval not displayed.     ASSESSMENT/PLAN  (R53.81) Physical deconditioning  (primary encounter diagnosis)  (M62.81) Generalized muscle weakness  Comment: Chronic. S/T below diagnosis. Have attempted and re-attempted therapies several times continuing to need extensive assistance with ADL's and is non ambulatory and will be  transitioning to LTC unit.   Plan:   -Moving to LTC RM #[id number]   -Continue LTc for all ongoing needs and management.   -Recommend to follow up with new referred neurology for neuromuscular concerns--Scheduled for 4/19/23  -Trend labs at next routine visit in April 2023.     (E43) Severe protein-calorie  malnutrition (H)  Comment: Chronic. Not at goal. Recent weight 145#, baseline weight around 150#  Plan:   -Magic cup as directed  -Dietician on site as directed   -Monitor weights weekly  -Trend labs at next routine visit in April 2023.     (I73.9) PVD  Comment: Acute on chronic. No wounds.   Plan:  -Monitor for worsening s/sx of concerns.   -Dietician on site as directed  -Monitor weights weekly  -Trend labs at next routine visit in April 2023.     (D50.8) Iron deficiency anemia secondary to inadequate dietary iron intake  Comment: Chronic. Prior anemia panel as noted. Previously on daily iron replacement with noted elevation of her ferritin. Changed to 3x weekly dosing with addition of vitamin C with noted improvement. Baseline hgb-10  Plan:   -Monitor bleeding risks and concerns  -Continue vitamin C  -Continue ferrous sulfate 3x weekly.    -Trend labs at next routine visit in April 2023.     (I10) Essential hypertension  Comment: Chronic. Based on JNC-8 goals,  patients age of 82 year old, no presence of diabetes or CKD, and goals of care goal BP is <150/90 mm Hg. Patient is stable with current plan of care and routine assessment. Recent SBPs 140-150's.  Plan:   -Monitor BP and HR as directed  -Continue metoprolol as directed.    -Trend labs at next routine visit in April 2023.     (G91.2) Normal pressure hydrocephalus (H)  Comment: Acute on chronic. With medication adjustments. Has tolerated weaning of Prozac; Resident had been stable psychologically with occasional odd statements noted. Recent Lamictal levels on January 10 was 4.6.  Plan:  -Continue Abilify, Lamictal as ordered with close monitoring for additional needs.  -Follow up with neurology as directed.   -Monitor for worsening s/sx of concerns.   -Trend labs at next routine visit in April 2023.     (F31.9) Bipolar 1 disorder (H)  (R45.851) Suicidal thoughts  (F33.9) Major depression  Comment: Chronic. Stable. No behaviors.  Plan:   -Monitor mood and  behaviors  -Psych to follow and further assess ongoing needs  -Continue seroquel 25mg daily at HS.   -Continue abilify as directed  -Continue celexa 20mg daily  -Monitor for worsening s/sx of concerns  -Monitor falls and changes in mobility, eating and sleeping patterns  -Trend labs at next routine visit in April 2023.     (Z96.612) Status post total shoulder arthroplasty, left  (G89.18) Acute post-operative pain  (R25.2) Spasticity  Comment: Acute on chronic.Stable. Recent shoulder X-ray stable with no infection or trauma concerns.   Plan:  -Monitor pain complaints  -Continue Tylenol 975mg QID scheduled.   -Continue baclofen to 10mg TID ongoing  -Discontinue PRN tramadol due to non use  -Voltaren as directed  -Follow up with neurology as directed  -Trend labs at next routine visit in April 2023.    (R00.0) Tachycardia  Comment: Acute on chronic. Stable  Plan:   -Metoprolol 12.5mg BID as directed  -Monitor BP and HR monitoring as directed  -Trend labs at next routine visit in April 2023.     (J44.9) Chronic obstructive pulmonary disease, unspecified COPD type (H)  Comment: Chronic. Stable No cough noted.   Plan:   -Monitor respiratory status  -Continue albuterol as directed  -Continue incruse as directed  -Continue breo as directed  -Trend labs at next routine visit in April 2023.     (K59.01) Slow transit constipation  (K42.9) Umbilical hernia without obstruction and without gangrene  (C18.7) History of neoplasm of sigmoid colon  (R11.0) Nausea  Comment: Chronic. Not at goal. Hernia non-tender and soft but protrudes. The patient did have stage III cancer of the colon diagnosed in 01/2015.  She did have chemotherapy after what sounds like surgery and is in remission.  She also has had a cholecystectomy.  Plan:   -Monitor BM patterns  -Continue Miralax 17gm daily  -Continue senna S 1 tab at HS and PRN.   -Bisacodyl daily PRN if no BM in 3 days  -Zofran 4mg PRN   -Trend labs at next routine visit in April  2023.     (E03.9) Hypothyroidism  Comment: Chronic. Recent TSH was 0.89 on 1/10/23--Levothyroxine was adjusted. Goal 4-6 per age to reduce adverse effects  Plan:  -Continue levothyroxine 50mcg daily  -Monitor for worsening s/sx of concerns  -Trend labs at next routine visit in April 2023.    Electronically signed by:  aTylor Ledbetter DNP, KASANDRA            Sincerely,        KASANDRA Lou CNP

## 2023-01-24 NOTE — TELEPHONE ENCOUNTER
3/27/2023-Request for images faxed to [facility]-MR @ 234am    3/28/2023-Images now in PACS-MR @ 645am     FUTURE VISIT INFORMATION      FUTURE VISIT INFORMATION:    Date: 4/19/2023    Time: 215pm    Location: [facility]  REFERRAL INFORMATION:    Referring provider:  Taylor SLAUGHTER CNP     Referring providers clinic:  [facility]     Reason for visit/diagnosis  [facility]     RECORDS REQUESTED FROM:       Clinic name Comments Records Status Imaging Status   Internal JUNAID Ledbetter-11/9/2022, 12/7/2022, 1/24/2023    CT Head-7/8/2022 [facility] Everywhere Requested

## 2023-03-22 ENCOUNTER — NURSING HOME VISIT (OUTPATIENT)
Dept: GERIATRICS | Facility: CLINIC | Age: 83
End: 2023-03-22
Payer: MEDICARE

## 2023-03-22 VITALS
SYSTOLIC BLOOD PRESSURE: 143 MMHG | OXYGEN SATURATION: 94 % | BODY MASS INDEX: 29.09 KG/M2 | HEIGHT: 60 IN | RESPIRATION RATE: 17 BRPM | HEART RATE: 75 BPM | WEIGHT: 148.2 LBS | TEMPERATURE: 97.8 F | DIASTOLIC BLOOD PRESSURE: 76 MMHG

## 2023-03-22 DIAGNOSIS — J44.9 CHRONIC OBSTRUCTIVE PULMONARY DISEASE, UNSPECIFIED COPD TYPE (H): ICD-10-CM

## 2023-03-22 DIAGNOSIS — F31.9 BIPOLAR 1 DISORDER (H): Primary | ICD-10-CM

## 2023-03-22 DIAGNOSIS — I10 ESSENTIAL HYPERTENSION: ICD-10-CM

## 2023-03-22 PROCEDURE — 99309 SBSQ NF CARE MODERATE MDM 30: CPT | Performed by: INTERNAL MEDICINE

## 2023-03-22 RX ORDER — LEVOTHYROXINE SODIUM 75 UG/1
75 TABLET ORAL DAILY
COMMUNITY

## 2023-03-22 RX ORDER — ASCORBIC ACID 500 MG
500 TABLET ORAL 2 TIMES DAILY
COMMUNITY

## 2023-03-22 NOTE — LETTER
3/22/2023        RE: Amparo Sharma  [place] [address] [place] [address]        [facility] GERIATRICS  REGULATORY VISIT  March 22, 2023      [facility] Medical Record Number:  [id number]  Place of Service where encounter took place:  [facility] () [[id number]]    Chief Complaint   Patient presents with     senior living Regulatory       HPI:    Amparo Sharma is a 82 year old  (1940), who is being seen today for a federally mandated E/M visit at [facility] where she has resided since April 2022. HPI information obtained from: facility chart records, facility staff, patient report and [facility] chart review.    Today, Ms. Sharma is seen in her room sitting in a wheelchair. She is at a 45 degree angle, tells me it's the chair and not her posture. She feels more manic lately. Hoping to move to an AL and not stay long(er) term at this facility. No concerns today per nursing.     ALLERGIES:    Allergies   Allergen Reactions     Amantadine      Antihistamine Allergy Relief [Diphenhydramine] Other (See Comments)     hyperactivity     Bactrim [Sulfamethoxazole W/Trimethoprim]      Codeine Itching     Demerol [Meperidine] Nausea     Diazepam Other (See Comments)     Hallucinations/paranoid     Gabapentin      Hmg-Coa-R Inhibitors Other (See Comments)     Was hospitalized for possible rhabdo     Meloxicam Other (See Comments)     Pentazocine Other (See Comments)     drowsiness     Sulfamethoxazole-Trimethoprim      Tramadol Nausea        Past Medical, Surgical, Family and Social History: Reviewed and updated in EPIC.    MEDICATIONS:  Current Outpatient Medications   Medication Sig Dispense Refill     acetaminophen (TYLENOL) 325 MG tablet Take 3 tablets (975 mg) by mouth every 6 hours 360 tablet 11     ARIPiprazole (ABILIFY) 15 MG tablet Take 15 mg by mouth At Bedtime       aspirin (ASA) 81 MG EC tablet Take 1 tablet (81 mg) by mouth daily 60 tablet 11      baclofen (LIORESAL) 10 MG tablet Take 1 tablet (10 mg) by mouth 3 times daily 180 tablet 11     bisacodyl (DULCOLAX) 10 MG suppository Place 1 suppository (10 mg) rectally daily as needed for constipation 8 suppository 11     BREO ELLIPTA 100-25 MCG/INH inhaler Inhale 1 puff into the lungs daily 1PM       carboxymethylcellulose (REFRESH PLUS) 0.5 % SOLN ophthalmic solution Place 1 drop into both eyes 3 times daily as needed for dry eyes       carboxymethylcellulose (REFRESH PLUS) 0.5 % SOLN ophthalmic solution Place 1 drop into both eyes in the morning.       cetirizine (ZYRTEC) 10 MG tablet Take 10 mg by mouth daily        citalopram (CELEXA) 20 MG tablet Take 1 tablet (20 mg) by mouth daily 30 tablet 11     diclofenac (VOLTAREN) 1 % topical gel Apply 4 g topically 4 times daily Apply to affected knees.       ferrous sulfate (FEROSUL) 325 (65 Fe) MG tablet Take 1 tablet (325 mg) by mouth Every Mon, Wed, Fri Morning       INCRUSE ELLIPTA 62.5 MCG/INH Inhaler Inhale 1 puff into the lungs daily        lamoTRIgine (LAMICTAL) 150 MG tablet Take 150 mg by mouth 2 times daily       levothyroxine (SYNTHROID/LEVOTHROID) 75 MCG tablet Take 75 mcg by mouth daily       menthol-zinc oxide (CALMOSEPTINE) 0.44-20.6 % OINT ointment Apply topically 2 times daily as needed for irritation       metoprolol tartrate (LOPRESSOR) 25 MG tablet Take 12.5 mg by mouth 2 times daily HOLD if SBP<100 and/or HR<55       mirabegron (MYRBETRIQ) 25 MG 24 hr tablet Take 25 mg by mouth daily        Multiple Vitamins-Minerals (MULTIVITAMIN PO) Take 1 tablet by mouth daily        ondansetron (ZOFRAN) 4 MG tablet Take 1 tablet (4 mg) by mouth every 6 hours as needed for nausea 30 tablet 11     pantoprazole (PROTONIX) 20 MG EC tablet Take 40 mg by mouth daily       polyethylene glycol (MIRALAX) 17 GM/Dose powder Take 17 g by mouth daily 510 g 0     QUEtiapine (SEROQUEL) 25 MG tablet Take 1 tablet (25 mg) by mouth At Bedtime 60 tablet 3     SENOKOT S  8.6-50 MG tablet Take 1 tablet by mouth daily AND daily PRN 90 tablet 11     vitamin C (ASCORBIC ACID) 500 MG tablet Take 500 mg by mouth 2 times daily       Vitamin D, Cholecalciferol, 25 MCG (1000 UT) CAPS Take 1,000 Units by mouth daily        Medications reviewed:  Medications reconciled to facility chart and changes were made to reflect current medications as identified as above med list. Below are the changes that were made:   Medications stopped since last EPIC medication reconciliation:   Medications Discontinued During This Encounter   Medication Reason     lamoTRIgine (LAMICTAL) 100 MG tablet Med Rec(No AVS / No eCancel)     levothyroxine (SYNTHROID/LEVOTHROID) 50 MCG tablet Med Rec(No AVS / No eCancel)     Medications started since last [facility] medication reconciliation:  Orders Placed This Encounter   Medications     levothyroxine (SYNTHROID/LEVOTHROID) 75 MCG tablet     Sig: Take 75 mcg by mouth daily     vitamin C (ASCORBIC ACID) 500 MG tablet     Sig: Take 500 mg by mouth 2 times daily       REVIEW OF SYSTEMS:  4 point ROS neg other than the symptoms noted above in the HPI.    PHYSICAL EXAM:  BP (!) 143/76   Pulse 75   Temp 97.8  F (36.6  C)   Resp 17   Ht 1.524 m (5')   Wt 67.2 kg (148 lb 3.2 oz)   SpO2 94%   BMI 28.94 kg/m    Gen: sitting in wheelchair, alert, cooperative and in no acute distress  Resp: breathing non labored, no tachypnea  Neuro: CX II-XII grossly in tact; ROM in all four extremities grossly in tact  Psych: alert and oriented to self and general situation     LABS/IMAGING: Reviewed as per Epic and/or [facility]    ASSESSMENT / PLAN:    Bipolar Disorder  She feels more manic as of late - attributes this to moving/changing floors and a desire to move to a different place.   -- aripiprazole 15 mg at bedtime, citalopram 20 mg daily, lamotrigine 150 mg BID and quetiapine 25 mg at bedtime  -- ongoing supportive cares    HTN  SBPs 120s-130s overall.   -- metoprolol 12.5 mg BID   --  follow BPs and adjust medications as needed    COPD  No recent exacerbation.   -- fluticasone-vilanterol 100-25 mcg daily, umeclidinium 62.5 mcg daily  -- follow clinically     Electronically signed by  Megan Bajwa MD                Sincerely,        Megan Bawja MD

## 2023-03-22 NOTE — PROGRESS NOTES
[facility] GERIATRICS  REGULATORY VISIT  March 22, 2023      [facility] Medical Record Number:  [id number]  Place of Service where encounter took place:  [facility] () [[id number]]    Chief Complaint   Patient presents with     alf Regulatory       HPI:    Amparo Sharma is a 82 year old  (1940), who is being seen today for a federally mandated E/M visit at [facility] where she has resided since April 2022. HPI information obtained from: facility chart records, facility staff, patient report and [facility] chart review.    Today, Ms. Sharma is seen in her room sitting in a wheelchair. She is at a 45 degree angle, tells me it's the chair and not her posture. She feels more manic lately. Hoping to move to an AL and not stay long(er) term at this facility. No concerns today per nursing.     ALLERGIES:    Allergies   Allergen Reactions     Amantadine      Antihistamine Allergy Relief [Diphenhydramine] Other (See Comments)     hyperactivity     Bactrim [Sulfamethoxazole W/Trimethoprim]      Codeine Itching     Demerol [Meperidine] Nausea     Diazepam Other (See Comments)     Hallucinations/paranoid     Gabapentin      Hmg-Coa-R Inhibitors Other (See Comments)     Was hospitalized for possible rhabdo     Meloxicam Other (See Comments)     Pentazocine Other (See Comments)     drowsiness     Sulfamethoxazole-Trimethoprim      Tramadol Nausea        Past Medical, Surgical, Family and Social History: Reviewed and updated in EPIC.    MEDICATIONS:  Current Outpatient Medications   Medication Sig Dispense Refill     acetaminophen (TYLENOL) 325 MG tablet Take 3 tablets (975 mg) by mouth every 6 hours 360 tablet 11     ARIPiprazole (ABILIFY) 15 MG tablet Take 15 mg by mouth At Bedtime       aspirin (ASA) 81 MG EC tablet Take 1 tablet (81 mg) by mouth daily 60 tablet 11     baclofen (LIORESAL) 10 MG tablet Take 1 tablet (10 mg) by mouth 3 times daily 180 tablet 11     bisacodyl  (DULCOLAX) 10 MG suppository Place 1 suppository (10 mg) rectally daily as needed for constipation 8 suppository 11     BREO ELLIPTA 100-25 MCG/INH inhaler Inhale 1 puff into the lungs daily 1PM       carboxymethylcellulose (REFRESH PLUS) 0.5 % SOLN ophthalmic solution Place 1 drop into both eyes 3 times daily as needed for dry eyes       carboxymethylcellulose (REFRESH PLUS) 0.5 % SOLN ophthalmic solution Place 1 drop into both eyes in the morning.       cetirizine (ZYRTEC) 10 MG tablet Take 10 mg by mouth daily        citalopram (CELEXA) 20 MG tablet Take 1 tablet (20 mg) by mouth daily 30 tablet 11     diclofenac (VOLTAREN) 1 % topical gel Apply 4 g topically 4 times daily Apply to affected knees.       ferrous sulfate (FEROSUL) 325 (65 Fe) MG tablet Take 1 tablet (325 mg) by mouth Every Mon, Wed, Fri Morning       INCRUSE ELLIPTA 62.5 MCG/INH Inhaler Inhale 1 puff into the lungs daily        lamoTRIgine (LAMICTAL) 150 MG tablet Take 150 mg by mouth 2 times daily       levothyroxine (SYNTHROID/LEVOTHROID) 75 MCG tablet Take 75 mcg by mouth daily       menthol-zinc oxide (CALMOSEPTINE) 0.44-20.6 % OINT ointment Apply topically 2 times daily as needed for irritation       metoprolol tartrate (LOPRESSOR) 25 MG tablet Take 12.5 mg by mouth 2 times daily HOLD if SBP<100 and/or HR<55       mirabegron (MYRBETRIQ) 25 MG 24 hr tablet Take 25 mg by mouth daily        Multiple Vitamins-Minerals (MULTIVITAMIN PO) Take 1 tablet by mouth daily        ondansetron (ZOFRAN) 4 MG tablet Take 1 tablet (4 mg) by mouth every 6 hours as needed for nausea 30 tablet 11     pantoprazole (PROTONIX) 20 MG EC tablet Take 40 mg by mouth daily       polyethylene glycol (MIRALAX) 17 GM/Dose powder Take 17 g by mouth daily 510 g 0     QUEtiapine (SEROQUEL) 25 MG tablet Take 1 tablet (25 mg) by mouth At Bedtime 60 tablet 3     SENOKOT S 8.6-50 MG tablet Take 1 tablet by mouth daily AND daily PRN 90 tablet 11     vitamin C (ASCORBIC ACID) 500 MG  tablet Take 500 mg by mouth 2 times daily       Vitamin D, Cholecalciferol, 25 MCG (1000 UT) CAPS Take 1,000 Units by mouth daily        Medications reviewed:  Medications reconciled to facility chart and changes were made to reflect current medications as identified as above med list. Below are the changes that were made:   Medications stopped since last EPIC medication reconciliation:   Medications Discontinued During This Encounter   Medication Reason     lamoTRIgine (LAMICTAL) 100 MG tablet Med Rec(No AVS / No eCancel)     levothyroxine (SYNTHROID/LEVOTHROID) 50 MCG tablet Med Rec(No AVS / No eCancel)     Medications started since last [facility] medication reconciliation:  Orders Placed This Encounter   Medications     levothyroxine (SYNTHROID/LEVOTHROID) 75 MCG tablet     Sig: Take 75 mcg by mouth daily     vitamin C (ASCORBIC ACID) 500 MG tablet     Sig: Take 500 mg by mouth 2 times daily       REVIEW OF SYSTEMS:  4 point ROS neg other than the symptoms noted above in the HPI.    PHYSICAL EXAM:  BP (!) 143/76   Pulse 75   Temp 97.8  F (36.6  C)   Resp 17   Ht 1.524 m (5')   Wt 67.2 kg (148 lb 3.2 oz)   SpO2 94%   BMI 28.94 kg/m    Gen: sitting in wheelchair, alert, cooperative and in no acute distress  Resp: breathing non labored, no tachypnea  Neuro: CX II-XII grossly in tact; ROM in all four extremities grossly in tact  Psych: alert and oriented to self and general situation     LABS/IMAGING: Reviewed as per Epic and/or [facility]    ASSESSMENT / PLAN:    Bipolar Disorder  She feels more manic as of late - attributes this to moving/changing floors and a desire to move to a different place.   -- aripiprazole 15 mg at bedtime, citalopram 20 mg daily, lamotrigine 150 mg BID and quetiapine 25 mg at bedtime  -- ongoing supportive cares    HTN  SBPs 120s-130s overall.   -- metoprolol 12.5 mg BID   -- follow BPs and adjust medications as needed    COPD  No recent exacerbation.   -- fluticasone-vilanterol  100-25 mcg daily, umeclidinium 62.5 mcg daily  -- follow clinically     Electronically signed by  Megan Bajwa MD

## 2023-03-28 ENCOUNTER — DISCHARGE SUMMARY NURSING HOME (OUTPATIENT)
Dept: GERIATRICS | Facility: CLINIC | Age: 83
End: 2023-03-28
Payer: MEDICARE

## 2023-03-28 VITALS
TEMPERATURE: 97.8 F | WEIGHT: 148.2 LBS | OXYGEN SATURATION: 94 % | RESPIRATION RATE: 17 BRPM | SYSTOLIC BLOOD PRESSURE: 148 MMHG | HEIGHT: 60 IN | DIASTOLIC BLOOD PRESSURE: 84 MMHG | HEART RATE: 65 BPM | BODY MASS INDEX: 29.09 KG/M2

## 2023-03-28 DIAGNOSIS — I73.9 PVD (PERIPHERAL VASCULAR DISEASE) (H): ICD-10-CM

## 2023-03-28 DIAGNOSIS — G91.2 NPH (NORMAL PRESSURE HYDROCEPHALUS) (H): ICD-10-CM

## 2023-03-28 DIAGNOSIS — R53.81 PHYSICAL DECONDITIONING: ICD-10-CM

## 2023-03-28 DIAGNOSIS — F33.9 EPISODE OF RECURRENT MAJOR DEPRESSIVE DISORDER, UNSPECIFIED DEPRESSION EPISODE SEVERITY (H): ICD-10-CM

## 2023-03-28 DIAGNOSIS — C18.7 MALIGNANT NEOPLASM OF SIGMOID COLON (H): ICD-10-CM

## 2023-03-28 DIAGNOSIS — R00.0 TACHYCARDIA: ICD-10-CM

## 2023-03-28 DIAGNOSIS — Z98.2 S/P VENTRICULOPERITONEAL SHUNT: ICD-10-CM

## 2023-03-28 DIAGNOSIS — Z96.612 S/P REVERSE TOTAL SHOULDER ARTHROPLASTY, LEFT: ICD-10-CM

## 2023-03-28 DIAGNOSIS — K42.9 UMBILICAL HERNIA WITHOUT OBSTRUCTION AND WITHOUT GANGRENE: ICD-10-CM

## 2023-03-28 DIAGNOSIS — J98.11 ATELECTASIS: ICD-10-CM

## 2023-03-28 DIAGNOSIS — M62.81 GENERALIZED MUSCLE WEAKNESS: ICD-10-CM

## 2023-03-28 DIAGNOSIS — G89.18 ACUTE POST-OPERATIVE PAIN: ICD-10-CM

## 2023-03-28 DIAGNOSIS — F01.B3 MODERATE VASCULAR DEMENTIA WITH MOOD DISTURBANCE (H): ICD-10-CM

## 2023-03-28 DIAGNOSIS — R09.02 HYPOXIA: ICD-10-CM

## 2023-03-28 DIAGNOSIS — R06.02 SHORTNESS OF BREATH: ICD-10-CM

## 2023-03-28 DIAGNOSIS — J96.01 ACUTE RESPIRATORY FAILURE WITH HYPOXIA (H): ICD-10-CM

## 2023-03-28 DIAGNOSIS — D50.8 IRON DEFICIENCY ANEMIA SECONDARY TO INADEQUATE DIETARY IRON INTAKE: ICD-10-CM

## 2023-03-28 DIAGNOSIS — R45.851 SUICIDAL THOUGHTS: ICD-10-CM

## 2023-03-28 DIAGNOSIS — F33.9 RECURRENT MAJOR DEPRESSIVE DISORDER, REMISSION STATUS UNSPECIFIED (H): ICD-10-CM

## 2023-03-28 DIAGNOSIS — R25.2 SPASTICITY: ICD-10-CM

## 2023-03-28 DIAGNOSIS — J44.9 CHRONIC OBSTRUCTIVE PULMONARY DISEASE, UNSPECIFIED COPD TYPE (H): ICD-10-CM

## 2023-03-28 DIAGNOSIS — E03.9 HYPOTHYROIDISM, UNSPECIFIED TYPE: ICD-10-CM

## 2023-03-28 DIAGNOSIS — F01.50 VASCULAR DEMENTIA WITHOUT BEHAVIORAL DISTURBANCE (H): ICD-10-CM

## 2023-03-28 DIAGNOSIS — G91.2 NORMAL PRESSURE HYDROCEPHALUS (H): ICD-10-CM

## 2023-03-28 DIAGNOSIS — E43 SEVERE PROTEIN-CALORIE MALNUTRITION (H): ICD-10-CM

## 2023-03-28 DIAGNOSIS — F31.9 BIPOLAR 1 DISORDER (H): Primary | ICD-10-CM

## 2023-03-28 DIAGNOSIS — R11.0 NAUSEA: ICD-10-CM

## 2023-03-28 DIAGNOSIS — Z96.612 STATUS POST TOTAL SHOULDER ARTHROPLASTY, LEFT: ICD-10-CM

## 2023-03-28 DIAGNOSIS — K59.01 SLOW TRANSIT CONSTIPATION: ICD-10-CM

## 2023-03-28 DIAGNOSIS — I10 ESSENTIAL HYPERTENSION: ICD-10-CM

## 2023-03-28 PROCEDURE — 99316 NF DSCHRG MGMT 30 MIN+: CPT | Performed by: NURSE PRACTITIONER

## 2023-03-28 NOTE — PROGRESS NOTES
[facility] GERIATRICS DISCHARGE SUMMARY  PATIENT'S NAME: Amparo Sharma  YOB: 1940  MEDICAL RECORD NUMBER:  [id number]  Place of Service where encounter took place:  [facility] [[id number]]    PRIMARY CARE PROVIDER AND CLINIC RESPONSIBLE AFTER TRANSFER:   Follow up with new PCP post discharge     Transferring providers: KASANDRA Lou CNP, Megan Bajwa MD  Recent Hospitalization/ED:  [facility]  stay 6/8/23 to 6/9/22.  Date of SNF Admission: 6/9/22  Date of SNF (anticipated) Discharge: 3/29/23  Discharged to: [facility]  Cognitive Scores: BIMS: 8/15  Physical Function: Wheelchair dependent and Easy stand for transfers  DME: SNF  coordinating DME needs     CODE STATUS/ADVANCE DIRECTIVES DISCUSSION:  Full Code   ALLERGIES: Amantadine, Antihistamine allergy relief [diphenhydramine], Bactrim [sulfamethoxazole w/trimethoprim], Codeine, Demerol [meperidine], Diazepam, Gabapentin, Hmg-coa-r inhibitors, Meloxicam, Pentazocine, Sulfamethoxazole-trimethoprim, and Tramadol    NURSING FACILITY COURSE   Medication Changes/Rationale:     See below    Summary of nursing facility stay:     (R53.81) Physical deconditioning  (primary encounter diagnosis)  (M62.81) Generalized muscle weakness  Comment: Chronic. S/T below diagnosis. Have attempted and re-attempted therapies several times continuing to need extensive assistance with ADL's and is non ambulatory and will be  transitioning to LTC unit.   Plan:   -Recommend to follow up with new referred neurology for neuromuscular concerns--Scheduled for 4/19/23  -Recommend to trend labs with new PCP post discharge  -Discharging to [facility] on 3/29/23 with services in place  -Follow up with PCP post discharge     (E43) Severe protein-calorie malnutrition (H)  Comment: Chronic. Not at goal. Recent weight 148#, baseline weight around 150#  Plan:   -Magic cup as directed  -Dietician on site as directed    -Monitor weights weekly  -Recommend to trend labs with new PCP post discharge     (I73.9) PVD  Comment: Acute on chronic. No wounds.   Plan:  -Monitor for worsening s/sx of concerns.   -Dietician on site as directed  -Monitor weights weekly  -Recommend to trend labs with new PCP post discharge     (D50.8) Iron deficiency anemia secondary to inadequate dietary iron intake  Comment: Chronic. Prior anemia panel as noted. Previously on daily iron replacement with noted elevation of her ferritin. Changed to 3x weekly dosing with addition of vitamin C with noted improvement. Baseline hgb-10  Plan:   -Monitor bleeding risks and concerns  -Continue vitamin C  -Continue ferrous sulfate 3x weekly.    -Recommend to trend labs with new PCP post discharge     (I10) Essential hypertension  Comment: Chronic. Based on JNC-8 goals,  patients age of 82 year old, no presence of diabetes or CKD, and goals of care goal BP is <150/90 mm Hg. Patient is stable with current plan of care and routine assessment. Recent SBPs 140-150's.  Plan:   -Monitor BP and HR as directed  -Continue metoprolol as directed.    -Trend labs at next routine visit in April 2023.     (G91.2) Normal pressure hydrocephalus (H)  Comment: Acute on chronic. With medication adjustments. Has tolerated weaning of Prozac; Resident had been stable psychologically with occasional odd statements noted. Recent Lamictal levels on Oct 2022 was 4.3.  Plan:  -Continue Abilify, Lamictal as ordered with close monitoring for additional needs.  -Follow up with neurology as directed.   -Monitor for worsening s/sx of concerns.   -Recommend to trend labs with new PCP post discharge     (F31.9) Bipolar 1 disorder (H)  (R45.851) Suicidal thoughts  (F33.9) Major depression  Comment: Chronic. Stable. No behaviors.  Plan:   -Monitor mood and behaviors  -Psych to follow and further assess ongoing needs  -Continue seroquel 25mg daily at HS.   -Continue abilify as directed  -Continue  celexa 20mg daily  -Monitor for worsening s/sx of concerns  -Monitor falls and changes in mobility, eating and sleeping patterns  -Recommend to trend labs with new PCP post discharge     (Z96.612) Status post total shoulder arthroplasty, left  (G89.18) Acute post-operative pain  (R25.2) Spasticity  Comment: Acute on chronic.Stable. Recent shoulder X-ray stable with no infection or trauma concerns.   Plan:  -Monitor pain complaints  -Continue Tylenol 975mg QID scheduled.   -Continue baclofen to 10mg TID ongoing  -Voltaren as directed  -Follow up with neurology as directed. Scheduled for 4/19/23  -Recommend to trend labs with new PCP post discharge     (R00.0) Tachycardia  Comment: Acute on chronic. Stable. HR taxggr-31b-38h primarily  Plan:   -Metoprolol 12.5mg BID as directed  -Monitor BP and HR monitoring as directed  -Recommend to trend labs with new PCP post discharge     (J44.9) Chronic obstructive pulmonary disease, unspecified COPD type (H)  Comment: Chronic. Stable No cough noted.   Plan:   -Monitor respiratory status  -Continue albuterol as directed  -Continue incruse as directed  -Continue breo as directed  -Recommend to trend labs with new PCP post discharge     (K59.01) Slow transit constipation  (K42.9) Umbilical hernia without obstruction and without gangrene  (C18.7) History of neoplasm of sigmoid colon  (R11.0) Nausea  Comment: Chronic. Not at goal. Hernia non-tender and soft but protrudes. The patient did have stage III cancer of the colon diagnosed in 01/2015.  She did have chemotherapy after what sounds like surgery and is in remission.  She also has had a cholecystectomy.  Plan:   -Monitor BM patterns  -Continue Miralax 17gm daily  -Continue senna S 1 tab at HS and PRN.   -Bisacodyl daily PRN if no BM in 3 days  -Zofran 4mg PRN   -Recommend to trend labs with new PCP post discharge     (E03.9) Hypothyroidism  Comment: Chronic. Recent TSH was 0.89 on 1/10/23--Levothyroxine was adjusted. Goal 4-6  per age to reduce adverse effects  Plan:  -Continue levothyroxine 50mcg daily  -Monitor for worsening s/sx of concerns  -Recommend to trend labs with new PCP post discharge    Discharge Medications:  MED REC REQUIRED  Post Medication Reconciliation Status:  Medication reconciliation previously completed during another office visit      Current Outpatient Medications   Medication Sig Dispense Refill     acetaminophen (TYLENOL) 325 MG tablet Take 3 tablets (975 mg) by mouth every 6 hours 360 tablet 11     ARIPiprazole (ABILIFY) 15 MG tablet Take 15 mg by mouth At Bedtime       aspirin (ASA) 81 MG EC tablet Take 1 tablet (81 mg) by mouth daily 60 tablet 11     baclofen (LIORESAL) 10 MG tablet Take 1 tablet (10 mg) by mouth 3 times daily 180 tablet 11     bisacodyl (DULCOLAX) 10 MG suppository Place 1 suppository (10 mg) rectally daily as needed for constipation 8 suppository 11     BREO ELLIPTA 100-25 MCG/INH inhaler Inhale 1 puff into the lungs daily 1PM       carboxymethylcellulose (REFRESH PLUS) 0.5 % SOLN ophthalmic solution Place 1 drop into both eyes 3 times daily as needed for dry eyes       carboxymethylcellulose (REFRESH PLUS) 0.5 % SOLN ophthalmic solution Place 1 drop into both eyes in the morning.       cetirizine (ZYRTEC) 10 MG tablet Take 10 mg by mouth daily        citalopram (CELEXA) 20 MG tablet Take 1 tablet (20 mg) by mouth daily 30 tablet 11     diclofenac (VOLTAREN) 1 % topical gel Apply 4 g topically 4 times daily Apply to affected knees.       ferrous sulfate (FEROSUL) 325 (65 Fe) MG tablet Take 1 tablet (325 mg) by mouth Every Mon, Wed, Fri Morning       INCRUSE ELLIPTA 62.5 MCG/INH Inhaler Inhale 1 puff into the lungs daily        lamoTRIgine (LAMICTAL) 150 MG tablet Take 150 mg by mouth 2 times daily       levothyroxine (SYNTHROID/LEVOTHROID) 75 MCG tablet Take 75 mcg by mouth daily       menthol-zinc oxide (CALMOSEPTINE) 0.44-20.6 % OINT ointment Apply topically 2 times daily as needed for  irritation       metoprolol tartrate (LOPRESSOR) 25 MG tablet Take 12.5 mg by mouth 2 times daily HOLD if SBP<100 and/or HR<55       mirabegron (MYRBETRIQ) 25 MG 24 hr tablet Take 25 mg by mouth daily        Multiple Vitamins-Minerals (MULTIVITAMIN PO) Take 1 tablet by mouth daily        ondansetron (ZOFRAN) 4 MG tablet Take 1 tablet (4 mg) by mouth every 6 hours as needed for nausea 30 tablet 11     pantoprazole (PROTONIX) 20 MG EC tablet Take 40 mg by mouth daily       polyethylene glycol (MIRALAX) 17 GM/Dose powder Take 17 g by mouth daily 510 g 0     QUEtiapine (SEROQUEL) 25 MG tablet Take 1 tablet (25 mg) by mouth At Bedtime 60 tablet 3     SENOKOT S 8.6-50 MG tablet Take 1 tablet by mouth daily AND daily PRN 90 tablet 11     vitamin C (ASCORBIC ACID) 500 MG tablet Take 500 mg by mouth 2 times daily       Vitamin D, Cholecalciferol, 25 MCG (1000 UT) CAPS Take 1,000 Units by mouth daily         Controlled medications:   not applicable/none     Past Medical History:   Past Medical History:   Diagnosis Date     Abdominal hernia      Abdominal hernia      Alcoholism in remission (H)      Alcoholism in remission (H) 2017     Anemia      Arthritis      Arthritis      Asthma      Asthma 1/20/2017     Bipolar 1 disorder (H)      Bipolar disorder (H)      Bladder incontinence 2013     Cancer (H)      Cellulitis      Cellulitis 12/12/2016    of left elbow     Chronic pain syndrome      COPD (chronic obstructive pulmonary disease) (H)      COPD with acute exacerbation (H) 12/21/2018     Dementia (H)      Depression      Depression      Disease of thyroid gland      Falls frequently      Frequent falls 2016     Frequent falls 2017     GERD (gastroesophageal reflux disease)      GERD (gastroesophageal reflux disease)      History of blood transfusion     20 years ago     History of colon cancer     s/p resection 1/2015, treated with 5-6 cycles of Folfox     History of transfusion      HTN (hypertension)      Hydrocephalus  (H)      Hydrocephalus (H)      Hyperlipidemia      Hypertension      Hypothyroidism      Hypothyroidism      Infection of skin due to methicillin resistant Staphylococcus aureus (MRSA)      Loss of balance      Memory loss      Memory loss 2017     Normal pressure hydrocephalus (H) 02/03/2017     NPH (normal pressure hydrocephalus) (H)      Parkinson disease (H)      Renal insufficiency      Squamous cell cancer of multiple sites of skin of upper arm, left 2016    Dr. Andrea      Thyroid disease      Urinary incontinence      Urinary incontinence      Physical Exam:   Vitals: BP (!) 148/84   Pulse 65   Temp 97.8  F (36.6  C)   Resp 17   Ht 1.524 m (5')   Wt 67.2 kg (148 lb 3.2 oz)   SpO2 94%   BMI 28.94 kg/m    BMI: Body mass index is 28.94 kg/m .  GENERAL APPEARANCE:  Alert, in no distress, oriented, cooperative  ENT:  Mouth and posterior oropharynx normal, moist mucous membranes, [place]  EYES:  EOM, conjunctivae, lids, pupils and irises normal  NECK:  No adenopathy,masses or thyromegaly  RESP:  respiratory effort and palpation of chest normal, lungs clear to auscultation , no respiratory distress  CV:  Palpation and auscultation of heart done , regular rate and rhythm, no murmur, rub, or gallop, no edema  ABDOMEN:  normal bowel sounds, soft, nontender, no hepatosplenomegaly or other masses, no guarding or rebound  M/S:   Easy stand transfers. Not able to bear weight. Wheelchair bound for all needs and transfers  SKIN:  Inspection of skin and subcutaneous tissue baseline, Palpation of skin and subcutaneous tissue baseline  NEURO:   Cranial nerves 2-12 are normal tested and grossly at patient's baseline, no purposeful movement in upper and lower extremities  PSYCH:  oriented X 3, memory impaired , affect and mood normal     SNF labs:   Most Recent 3 CBC's:  Recent Labs   Lab Test 01/10/23  0952 11/15/22  0537 10/04/22  0709   WBC 5.8 6.5 5.4   HGB 12.8 12.9 10.9*   MCV 93 92 90    248 317     Most Recent 3  BMP's:  Recent Labs   Lab Test 01/10/23  0952 11/15/22  0537 10/04/22  0709    135* 143   POTASSIUM 4.5 5.0 4.5   CHLORIDE 105 99 107   CO2 19* 26 24   BUN 19.3 22.2 23.5*   CR 0.55 0.58 0.55   ANIONGAP 15 10 12   MARTHA 9.6 9.7 9.9   * 88 77     Most Recent 2 LFT's:  Recent Labs   Lab Test 10/04/22  0709 08/18/22  0513   AST 22 23   ALT 10 14   ALKPHOS 137* 143*   BILITOTAL 0.2 <0.2     Most Recent TSH and T4:  Recent Labs   Lab Test 01/10/23  0952   TSH 0.89   T4 1.17     Most Recent Anemia Panel:  Recent Labs   Lab Test 01/10/23  0952 10/04/22  0709 08/18/22  0513 05/05/22  1108 04/28/22  0757 04/27/21  0450 02/03/21  0503   WBC 5.8   < > 6.1   < > 9.1   < > 6.3   HGB 12.8   < > 10.8*   < > 9.2*   < > 12.5   HCT 44.0   < > 36.6   < > 30.7*   < > 41.1   MCV 93   < > 88   < > 94   < > 89      < > 286   < > 708*   < > 280   IRON  --   --  34*  34*   < > 21*   < >  --    IRONSAT  --   --  12*   < >  --   --   --    FEB  --   --  283   < >  --   --   --    JOAN  --   --  89   < > 581*   < >  --    B12  --   --   --   --   --   --  770   FOLIC  --   --   --   --  10.0  --   --     < > = values in this interval not displayed.     Magnesium   Date Value Ref Range Status   10/04/2022 1.8 1.7 - 2.3 mg/dL Final   10/16/2018 2.0 1.6 - 2.3 mg/dL Final     Vitamin D Deficiency Screening Results:  Lab Results   Component Value Date    VITDT 42 10/04/2022     Lamotrigine Level   Date Value Ref Range Status   12/13/2016 9.6  Final     Comment:     Reference range: 2.0  to  20.0  Unit: ug/mL  (Note)       Current clinical information suggests the recommended       concentrations for lamotrigine during chronic therapy       are 2 - 20 ug/ml.  Analysis performed by [facility] [place] [address]         DISCHARGE PLAN:    Follow up labs: No labs orders/due    Medical Follow Up:      Follow up with primary care provider in 1-2 weeks  Follow up with specialist neurology as directed above     Current  [place] scheduled appointments:    Discharge Services: Home Care:  Occupational Therapy and Physical Therapy    Discharge Instructions Verbalized to Patient at Discharge:     Weight bearing restrictions:  Weight bearing as tolerated.     Continue to follow your diet:  regular.     Weigh yourself daily in the morning and keep a record. Call your primary clinic: a) if you are more short of breath, or b) if your weight changes more than 3 pounds in one day or more than 5 pounds in one week.     24-hour supervision is recommended for safety.     TOTAL DISCHARGE TIME:   Greater than 30 minutes  Electronically signed by:  Taylor Ledbetter DNP, APRN    Documentation of Face to Face and Certification for Home Health Services    I certify that patient: Amparo Sharma is under my care and that I, or a nurse practitioner or physician's assistant working with me, had a face-to-face encounter that meets the physician face-to-face encounter requirements with this patient on: 3/28/2023.    This encounter with the patient was in whole, or in part, for the following medical condition, which is the primary reason for home health care: The primary encounter diagnosis was Bipolar 1 disorder (H). 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), Recurrent major depressive disorder, remission status unspecified (H), NPH (normal pressure hydrocephalus) (H), S/P ventriculoperitoneal shunt, Slow transit constipation, Physical deconditioning, Generalized muscle weakness, Nausea, Severe protein-calorie malnutrition (H), Vascular dementia without behavioral disturbance (H), Malignant neoplasm of sigmoid colon (H), Umbilical hernia without obstruction and without gangrene, PVD (peripheral vascular disease) (H), Tachycardia, Status post total shoulder arthroplasty, left, Acute post-operative pain,  Spasticity, Suicidal thoughts, Normal pressure hydrocephalus (H), Episode of recurrent major depressive disorder, unspecified depression episode severity (H), Iron deficiency anemia secondary to inadequate dietary iron intake, Hypoxia, Acute respiratory failure with hypoxia (H), Shortness of breath, Atelectasis, and S/P reverse total shoulder arthroplasty, left were also pertinent to this visit..    I certify that, based on my findings, the following services are medically necessary home health services: Occupational Therapy and Physical Therapy.    My clinical findings support the need for the above services because: Occupational Therapy Services are needed to assess and treat cognitive ability and address ADL safety due to impairment in deconditioning. and Physical Therapy Services are needed to assess and treat the following functional impairments: deconditioning.    Further, I certify that my clinical findings support that this patient is homebound (i.e. absences from home require considerable and taxing effort and are for medical reasons or Worship services or infrequently or of short duration when for other reasons) because: Requires assistance of another person or specialized equipment to access medical services because patient:  nonambulatory. easy stand for transfers..    Based on the above findings. I certify that this patient is confined to the home and needs intermittent skilled nursing care, physical therapy and/or speech therapy.  The patient is under my care, and I have initiated the establishment of the plan of care.  This patient will be followed by a physician who will periodically review the plan of care.  Physician/Provider to provide follow up care: Taylor Ledbetter    Attending \[facility] [place] [facility]\"" physician (the Medicare certified [facility] provider): Dr.Sara Aydee MD, signing F2F and only signing for initial order. Please send all follow up questions and concerns or needed follow up signatures to the PCP, who  [facility] has on file as:  Taylor Ledbetter.    Physician Signature: See electronic signature associated with these discharge orders.  Date: 3/27/2023

## 2023-03-28 NOTE — LETTER
3/28/2023        RE: Amparo Sharma  [place] [address] [place] [address]        [facility] GERIATRICS DISCHARGE SUMMARY  PATIENT'S NAME: Amparo Sharma  YOB: 1940  MEDICAL RECORD NUMBER:  [id number]  Place of Service where encounter took place:  [facility] () [[id number]]    PRIMARY CARE PROVIDER AND CLINIC RESPONSIBLE AFTER TRANSFER:   Follow up with new PCP post discharge     Transferring providers: KASANDRA Lou CNP, Megan Bajwa MD  Recent Hospitalization/ED:  [facility]  stay 6/8/23 to 6/9/22.  Date of SNF Admission: 6/9/22  Date of SNF (anticipated) Discharge: 3/29/23  Discharged to: [facility]  Cognitive Scores: BIMS: 8/15  Physical Function: Wheelchair dependent and Easy stand for transfers  DME: SNF  coordinating DME needs     CODE STATUS/ADVANCE DIRECTIVES DISCUSSION:  Full Code   ALLERGIES: Amantadine, Antihistamine allergy relief [diphenhydramine], Bactrim [sulfamethoxazole w/trimethoprim], Codeine, Demerol [meperidine], Diazepam, Gabapentin, Hmg-coa-r inhibitors, Meloxicam, Pentazocine, Sulfamethoxazole-trimethoprim, and Tramadol    NURSING FACILITY COURSE   Medication Changes/Rationale:     See below    Summary of nursing facility stay:     (R53.81) Physical deconditioning  (primary encounter diagnosis)  (M62.81) Generalized muscle weakness  Comment: Chronic. S/T below diagnosis. Have attempted and re-attempted therapies several times continuing to need extensive assistance with ADL's and is non ambulatory and will be  transitioning to LTC unit.   Plan:   -Recommend to follow up with new referred neurology for neuromuscular concerns--Scheduled for 4/19/23  -Recommend to trend labs with new PCP post discharge  -Discharging to [facility] on 3/29/23 with services in place  -Follow up with PCP post discharge     (E43) Severe protein-calorie malnutrition (H)  Comment: Chronic. Not at goal. Recent  weight 148#, baseline weight around 150#  Plan:   -Magic cup as directed  -Dietician on site as directed   -Monitor weights weekly  -Recommend to trend labs with new PCP post discharge     (I73.9) PVD  Comment: Acute on chronic. No wounds.   Plan:  -Monitor for worsening s/sx of concerns.   -Dietician on site as directed  -Monitor weights weekly  -Recommend to trend labs with new PCP post discharge     (D50.8) Iron deficiency anemia secondary to inadequate dietary iron intake  Comment: Chronic. Prior anemia panel as noted. Previously on daily iron replacement with noted elevation of her ferritin. Changed to 3x weekly dosing with addition of vitamin C with noted improvement. Baseline hgb-10  Plan:   -Monitor bleeding risks and concerns  -Continue vitamin C  -Continue ferrous sulfate 3x weekly.    -Recommend to trend labs with new PCP post discharge     (I10) Essential hypertension  Comment: Chronic. Based on JNC-8 goals,  patients age of 82 year old, no presence of diabetes or CKD, and goals of care goal BP is <150/90 mm Hg. Patient is stable with current plan of care and routine assessment. Recent SBPs 140-150's.  Plan:   -Monitor BP and HR as directed  -Continue metoprolol as directed.    -Trend labs at next routine visit in April 2023.     (G91.2) Normal pressure hydrocephalus (H)  Comment: Acute on chronic. With medication adjustments. Has tolerated weaning of Prozac; Resident had been stable psychologically with occasional odd statements noted. Recent Lamictal levels on Oct 2022 was 4.3.  Plan:  -Continue Abilify, Lamictal as ordered with close monitoring for additional needs.  -Follow up with neurology as directed.   -Monitor for worsening s/sx of concerns.   -Recommend to trend labs with new PCP post discharge     (F31.9) Bipolar 1 disorder (H)  (R45.851) Suicidal thoughts  (F33.9) Major depression  Comment: Chronic. Stable. No behaviors.  Plan:   -Monitor mood and behaviors  -Psych to follow and further  assess ongoing needs  -Continue seroquel 25mg daily at HS.   -Continue abilify as directed  -Continue celexa 20mg daily  -Monitor for worsening s/sx of concerns  -Monitor falls and changes in mobility, eating and sleeping patterns  -Recommend to trend labs with new PCP post discharge     (Z96.612) Status post total shoulder arthroplasty, left  (G89.18) Acute post-operative pain  (R25.2) Spasticity  Comment: Acute on chronic.Stable. Recent shoulder X-ray stable with no infection or trauma concerns.   Plan:  -Monitor pain complaints  -Continue Tylenol 975mg QID scheduled.   -Continue baclofen to 10mg TID ongoing  -Voltaren as directed  -Follow up with neurology as directed. Scheduled for 4/19/23  -Recommend to trend labs with new PCP post discharge     (R00.0) Tachycardia  Comment: Acute on chronic. Stable. HR [id number]-[id number] primarily  Plan:   -Metoprolol 12.5mg BID as directed  -Monitor BP and HR monitoring as directed  -Recommend to trend labs with new PCP post discharge     (J44.9) Chronic obstructive pulmonary disease, unspecified COPD type (H)  Comment: Chronic. Stable No cough noted.   Plan:   -Monitor respiratory status  -Continue albuterol as directed  -Continue incruse as directed  -Continue breo as directed  -Recommend to trend labs with new PCP post discharge     (K59.01) Slow transit constipation  (K42.9) Umbilical hernia without obstruction and without gangrene  (C18.7) History of neoplasm of sigmoid colon  (R11.0) Nausea  Comment: Chronic. Not at goal. Hernia non-tender and soft but protrudes. The patient did have stage III cancer of the colon diagnosed in 01/2015.  She did have chemotherapy after what sounds like surgery and is in remission.  She also has had a cholecystectomy.  Plan:   -Monitor BM patterns  -Continue Miralax 17gm daily  -Continue senna S 1 tab at HS and PRN.   -Bisacodyl daily PRN if no BM in 3 days  -Zofran 4mg PRN   -Recommend to trend labs with new PCP post discharge     (E03.9)  Hypothyroidism  Comment: Chronic. Recent TSH was 0.89 on 1/10/23--Levothyroxine was adjusted. Goal 4-6 per age to reduce adverse effects  Plan:  -Continue levothyroxine 50mcg daily  -Monitor for worsening s/sx of concerns  -Recommend to trend labs with new PCP post discharge    Discharge Medications:  MED REC REQUIRED  Post Medication Reconciliation Status:  Medication reconciliation previously completed during another office visit      Current Outpatient Medications   Medication Sig Dispense Refill     acetaminophen (TYLENOL) 325 MG tablet Take 3 tablets (975 mg) by mouth every 6 hours 360 tablet 11     ARIPiprazole (ABILIFY) 15 MG tablet Take 15 mg by mouth At Bedtime       aspirin (ASA) 81 MG EC tablet Take 1 tablet (81 mg) by mouth daily 60 tablet 11     baclofen (LIORESAL) 10 MG tablet Take 1 tablet (10 mg) by mouth 3 times daily 180 tablet 11     bisacodyl (DULCOLAX) 10 MG suppository Place 1 suppository (10 mg) rectally daily as needed for constipation 8 suppository 11     BREO ELLIPTA 100-25 MCG/INH inhaler Inhale 1 puff into the lungs daily 1PM       carboxymethylcellulose (REFRESH PLUS) 0.5 % SOLN ophthalmic solution Place 1 drop into both eyes 3 times daily as needed for dry eyes       carboxymethylcellulose (REFRESH PLUS) 0.5 % SOLN ophthalmic solution Place 1 drop into both eyes in the morning.       cetirizine (ZYRTEC) 10 MG tablet Take 10 mg by mouth daily        citalopram (CELEXA) 20 MG tablet Take 1 tablet (20 mg) by mouth daily 30 tablet 11     diclofenac (VOLTAREN) 1 % topical gel Apply 4 g topically 4 times daily Apply to affected knees.       ferrous sulfate (FEROSUL) 325 (65 Fe) MG tablet Take 1 tablet (325 mg) by mouth Every Mon, Wed, Fri Morning       INCRUSE ELLIPTA 62.5 MCG/INH Inhaler Inhale 1 puff into the lungs daily        lamoTRIgine (LAMICTAL) 150 MG tablet Take 150 mg by mouth 2 times daily       levothyroxine (SYNTHROID/LEVOTHROID) 75 MCG tablet Take 75 mcg by mouth daily        menthol-zinc oxide (CALMOSEPTINE) 0.44-20.6 % OINT ointment Apply topically 2 times daily as needed for irritation       metoprolol tartrate (LOPRESSOR) 25 MG tablet Take 12.5 mg by mouth 2 times daily HOLD if SBP<100 and/or HR<55       mirabegron (MYRBETRIQ) 25 MG 24 hr tablet Take 25 mg by mouth daily        Multiple Vitamins-Minerals (MULTIVITAMIN PO) Take 1 tablet by mouth daily        ondansetron (ZOFRAN) 4 MG tablet Take 1 tablet (4 mg) by mouth every 6 hours as needed for nausea 30 tablet 11     pantoprazole (PROTONIX) 20 MG EC tablet Take 40 mg by mouth daily       polyethylene glycol (MIRALAX) 17 GM/Dose powder Take 17 g by mouth daily 510 g 0     QUEtiapine (SEROQUEL) 25 MG tablet Take 1 tablet (25 mg) by mouth At Bedtime 60 tablet 3     SENOKOT S 8.6-50 MG tablet Take 1 tablet by mouth daily AND daily PRN 90 tablet 11     vitamin C (ASCORBIC ACID) 500 MG tablet Take 500 mg by mouth 2 times daily       Vitamin D, Cholecalciferol, 25 MCG (1000 UT) CAPS Take 1,000 Units by mouth daily         Controlled medications:   not applicable/none     Past Medical History:   Past Medical History:   Diagnosis Date     Abdominal hernia      Abdominal hernia      Alcoholism in remission (H)      Alcoholism in remission (H) 2017     Anemia      Arthritis      Arthritis      Asthma      Asthma 1/20/2017     Bipolar 1 disorder (H)      Bipolar disorder (H)      Bladder incontinence 2013     Cancer (H)      Cellulitis      Cellulitis 12/12/2016    of left elbow     Chronic pain syndrome      COPD (chronic obstructive pulmonary disease) (H)      COPD with acute exacerbation (H) 12/21/2018     Dementia (H)      Depression      Depression      Disease of thyroid gland      Falls frequently      Frequent falls 2016     Frequent falls 2017     GERD (gastroesophageal reflux disease)      GERD (gastroesophageal reflux disease)      History of blood transfusion     20 years ago     History of colon cancer     s/p resection 1/2015,  treated with 5-6 cycles of Folfox     History of transfusion      HTN (hypertension)      Hydrocephalus (H)      Hydrocephalus (H)      Hyperlipidemia      Hypertension      Hypothyroidism      Hypothyroidism      Infection of skin due to methicillin resistant Staphylococcus aureus (MRSA)      Loss of balance      Memory loss      Memory loss 2017     Normal pressure hydrocephalus (H) 02/03/2017     NPH (normal pressure hydrocephalus) (H)      Parkinson disease (H)      Renal insufficiency      Squamous cell cancer of multiple sites of skin of upper arm, left 2016    Dr. Andrea      Thyroid disease      Urinary incontinence      Urinary incontinence      Physical Exam:   Vitals: BP (!) 148/84   Pulse 65   Temp 97.8  F (36.6  C)   Resp 17   Ht 1.524 m (5')   Wt 67.2 kg (148 lb 3.2 oz)   SpO2 94%   BMI 28.94 kg/m    BMI: Body mass index is 28.94 kg/m .  GENERAL APPEARANCE:  Alert, in no distress, oriented, cooperative  ENT:  Mouth and posterior oropharynx normal, moist mucous membranes, [place]  EYES:  EOM, conjunctivae, lids, pupils and irises normal  NECK:  No adenopathy,masses or thyromegaly  RESP:  respiratory effort and palpation of chest normal, lungs clear to auscultation , no respiratory distress  CV:  Palpation and auscultation of heart done , regular rate and rhythm, no murmur, rub, or gallop, no edema  ABDOMEN:  normal bowel sounds, soft, nontender, no hepatosplenomegaly or other masses, no guarding or rebound  M/S:   Easy stand transfers. Not able to bear weight. Wheelchair bound for all needs and transfers  SKIN:  Inspection of skin and subcutaneous tissue baseline, Palpation of skin and subcutaneous tissue baseline  NEURO:   Cranial nerves 2-12 are normal tested and grossly at patient's baseline, no purposeful movement in upper and lower extremities  PSYCH:  oriented X 3, memory impaired , affect and mood normal     SNF labs:   Most Recent 3 CBC's:  Recent Labs   Lab Test 01/10/23  0952 11/15/22  0537  10/04/22  0709   WBC 5.8 6.5 5.4   HGB 12.8 12.9 10.9*   MCV 93 92 90    248 317     Most Recent 3 BMP's:  Recent Labs   Lab Test 01/10/23  0952 11/15/22  0537 10/04/22  0709    135* 143   POTASSIUM 4.5 5.0 4.5   CHLORIDE 105 99 107   CO2 19* 26 24   BUN 19.3 22.2 23.5*   CR 0.55 0.58 0.55   ANIONGAP 15 10 12   MARTHA 9.6 9.7 9.9   * 88 77     Most Recent 2 LFT's:  Recent Labs   Lab Test 10/04/22  0709 08/18/22  0513   AST 22 23   ALT 10 14   ALKPHOS 137* 143*   BILITOTAL 0.2 <0.2     Most Recent TSH and T4:  Recent Labs   Lab Test 01/10/23  0952   TSH 0.89   T4 1.17     Most Recent Anemia Panel:  Recent Labs   Lab Test 01/10/23  0952 10/04/22  0709 08/18/22  0513 05/05/22  1108 04/28/22  0757 04/27/21  0450 02/03/21  0503   WBC 5.8   < > 6.1   < > 9.1   < > 6.3   HGB 12.8   < > 10.8*   < > 9.2*   < > 12.5   HCT 44.0   < > 36.6   < > 30.7*   < > 41.1   MCV 93   < > 88   < > 94   < > 89      < > 286   < > 708*   < > 280   IRON  --   --  34*  34*   < > 21*   < >  --    IRONSAT  --   --  12*   < >  --   --   --    FEB  --   --  283   < >  --   --   --    JOAN  --   --  89   < > 581*   < >  --    B12  --   --   --   --   --   --  770   FOLIC  --   --   --   --  10.0  --   --     < > = values in this interval not displayed.     Magnesium   Date Value Ref Range Status   10/04/2022 1.8 1.7 - 2.3 mg/dL Final   10/16/2018 2.0 1.6 - 2.3 mg/dL Final     Vitamin D Deficiency Screening Results:  Lab Results   Component Value Date    VITDT 42 10/04/2022     Lamotrigine Level   Date Value Ref Range Status   12/13/2016 9.6  Final     Comment:     Reference range: 2.0  to  20.0  Unit: ug/mL  (Note)       Current clinical information suggests the recommended       concentrations for lamotrigine during chronic therapy       are 2 - 20 ug/ml.  Analysis performed by [employer] [place] [address]         DISCHARGE PLAN:    Follow up labs: No labs orders/due    Medical Follow Up:      Follow up with  primary care provider in 1-2 weeks  Follow up with specialist neurology as directed above     Current [facility] scheduled appointments:    Discharge Services: Home Care:  Occupational Therapy and Physical Therapy    Discharge Instructions Verbalized to Patient at Discharge:     Weight bearing restrictions:  Weight bearing as tolerated.     Continue to follow your diet:  regular.     Weigh yourself daily in the morning and keep a record. Call your primary clinic: a) if you are more short of breath, or b) if your weight changes more than 3 pounds in one day or more than 5 pounds in one week.     24-hour supervision is recommended for safety.     TOTAL DISCHARGE TIME:   Greater than 30 minutes  Electronically signed by:  Taylor Ledbetter DNP, APRN    Documentation of Face to Face and Certification for Home Health Services    I certify that patient: Amparo Shamra is under my care and that I, or a nurse practitioner or physician's assistant working with me, had a face-to-face encounter that meets the physician face-to-face encounter requirements with this patient on: 3/28/2023.    This encounter with the patient was in whole, or in part, for the following medical condition, which is the primary reason for home health care: The primary encounter diagnosis was Bipolar 1 disorder (H). 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), Recurrent major depressive disorder, remission status unspecified (H), NPH (normal pressure hydrocephalus) (H), S/P ventriculoperitoneal shunt, Slow transit constipation, Physical deconditioning, Generalized muscle weakness, Nausea, Severe protein-calorie malnutrition (H), Vascular dementia without behavioral disturbance (H), Malignant neoplasm of sigmoid colon (H), Umbilical hernia without obstruction and without gangrene, PVD (peripheral vascular disease)  (H), Tachycardia, Status post total shoulder arthroplasty, left, Acute post-operative pain, Spasticity, Suicidal thoughts, Normal pressure hydrocephalus (H), Episode of recurrent major depressive disorder, unspecified depression episode severity (H), Iron deficiency anemia secondary to inadequate dietary iron intake, Hypoxia, Acute respiratory failure with hypoxia (H), Shortness of breath, Atelectasis, and S/P reverse total shoulder arthroplasty, left were also pertinent to this visit..    I certify that, based on my findings, the following services are medically necessary home health services: Occupational Therapy and Physical Therapy.    My clinical findings support the need for the above services because: Occupational Therapy Services are needed to assess and treat cognitive ability and address ADL safety due to impairment in deconditioning. and Physical Therapy Services are needed to assess and treat the following functional impairments: deconditioning.    Further, I certify that my clinical findings support that this patient is homebound (i.e. absences from home require considerable and taxing effort and are for medical reasons or [employer] services or infrequently or of short duration when for other reasons) because: Requires assistance of another person or specialized equipment to access medical services because patient:  nonambulatory. easy stand for transfers..    Based on the above findings. I certify that this patient is confined to the home and needs intermittent skilled nursing care, physical therapy and/or speech therapy.  The patient is under my care, and I have initiated the establishment of the plan of care.  This patient will be followed by a physician who will periodically review the plan of care.  Physician/Provider to provide follow up care: Taylor Ledbetter    Attending hospital physician (the Medicare certified [facility] provider): Dr.Sara Aydee MD, signing F2F and only signing for initial order.  Please send all follow up questions and concerns or needed follow up signatures to the PCP, who [facility] has on file as:  Taylor Ledbetter.    Physician Signature: See electronic signature associated with these discharge orders.  Date: 3/27/2023                  Sincerely,        KASANDRA Lou CNP

## 2023-03-30 ENCOUNTER — DOCUMENTATION ONLY (OUTPATIENT)
Dept: GERIATRICS | Facility: CLINIC | Age: 83
End: 2023-03-30

## 2023-03-30 ENCOUNTER — TELEPHONE (OUTPATIENT)
Dept: GERIATRICS | Facility: CLINIC | Age: 83
End: 2023-03-30
Payer: MEDICARE

## 2023-03-30 DIAGNOSIS — R09.89 CHEST CONGESTION: ICD-10-CM

## 2023-03-30 DIAGNOSIS — R06.02 SOB (SHORTNESS OF BREATH): Primary | ICD-10-CM

## 2023-03-30 DIAGNOSIS — R06.2 WHEEZING: ICD-10-CM

## 2023-03-31 NOTE — TELEPHONE ENCOUNTER
Staff paged noting resident with increased SOB, wheezing, and a decrease in SaO2 levels - dipped to 83% on RA; placed on 1L of O2 via NC and stabilized at 91%. Hx of COPD and chronic respiratory failure. Mild wheezing noted throughout lung fields. No nebulizers ordered; only scheduled inhalers.    Orders:    Start Duonebs QID scheduled x5d    Start Duonebs BID PRN - SOB    Mucinex ER 600mg PO BID x5d    2 view CXR    Oxygen 1-4LPM to keep SaO2 levels >88%    Dr. Chantelle Hoover, APRN, DNP, AGNP-BC, PMHNP-BC  [facility]  Office Hours: Tues-Fri [phone]  Office: [address] [place] [address]  Fax - [phone]  Triage Phone- [phone]  Business Office- [phone]      Email: [email or web address]

## 2023-04-03 ENCOUNTER — TELEPHONE (OUTPATIENT)
Dept: GERIATRICS | Facility: CLINIC | Age: 83
End: 2023-04-03
Payer: MEDICARE

## 2023-04-03 NOTE — TELEPHONE ENCOUNTER
[facility] Triage Nurse Telephone Encounter    Provider: MARCIANO Jung  Facility: [facility] Facility Type:  LTC    Caller: Rose Marie  Call Back Number: [phone]    Allergies:    Allergies   Allergen Reactions     Amantadine      Antihistamine Allergy Relief [Diphenhydramine] Other (See Comments)     hyperactivity     Bactrim [Sulfamethoxazole W/Trimethoprim]      Codeine Itching     Demerol [Meperidine] Nausea     Diazepam Other (See Comments)     Hallucinations/paranoid     Gabapentin      Hmg-Coa-R Inhibitors Other (See Comments)     Was hospitalized for possible rhabdo     Meloxicam Other (See Comments)     Pentazocine Other (See Comments)     drowsiness     Sulfamethoxazole-Trimethoprim      Tramadol Nausea        Reason for call:   Xray was done on 4/1, today VS stable, no sob, O2 92% on RA      Verbal Order/Direction given by Provider: NNO, continue to monitor and call with any changes.     Provider giving Order:  MARCIANO Jung    Verbal Order given to: Rose Marie Felix RN

## 2023-04-03 NOTE — PROGRESS NOTES
"[facility] GERIATRIC SERVICES  Chief Complaint   Patient presents with     P Care Coordination - Regulatory     [facility] Record Number:  [id number]  Place of Service where encounter took place: [facility] [place] 2[place]  Code Status:  Full Code     HISTORY:      HPI:  Amparo Sharma  is 82 year old (1940) currently residing at [facility].  She transferred to this facility from the TCU at [facility] on 3/29/2023.  She is with past medical history COPD, normal pressure hydrocephalus, memory loss, frequent falls, bladder incontinence, abdominal hernia, alcoholism in remission, anemia, arthritis, asthma, bipolar 1 disorder, depression, GERD, hypertension, hyperlipidemia, and hypothyroidism    Today she was seen to review vital signs, labs, routine visit and to establish care.  She recently transferred from [facility] to [facility] [place]note 2[place]..  She denied chest pain shortness of breath cough and constipation or diarrhea.  She is with a large ventral hernia.  She tells writer she was supposed to have surgery approximately 3 years ago but was deemed \"to old\".  She reports having a large BM today.  She does have chronic bilateral knee pain but reports that the Tylenol takes care of it.  She requested to come off of the scheduled nebulizers.  She did not have any wheezing however she did have crackles bilateral bases. She was educated on the importance of cough and deep breathing.  She is wheelchair-bound and EZ stand transfer.  Labs reviewed last TSH 1/10/2023 was 0.89.  CBC and BMP within normal limits, lamotrigine within normal limits.      ALLERGIES:Amantadine, Antihistamine allergy relief [diphenhydramine], Bactrim [sulfamethoxazole w/trimethoprim], Codeine, Demerol [meperidine], Diazepam, Gabapentin, Hmg-coa-r inhibitors, Meloxicam, Pentazocine, Sulfamethoxazole-trimethoprim, and Tramadol    PAST MEDICAL HISTORY:   Past Medical History:   Diagnosis Date     Abdominal hernia      Abdominal " hernia      Alcoholism in remission (H)      Alcoholism in remission (H) 2017     Anemia      Arthritis      Arthritis      Asthma      Asthma 1/20/2017     Bipolar 1 disorder (H)      Bipolar disorder (H)      Bladder incontinence 2013     Cancer (H)      Cellulitis      Cellulitis 12/12/2016    of left elbow     Chronic pain syndrome      COPD (chronic obstructive pulmonary disease) (H)      COPD with acute exacerbation (H) 12/21/2018     Dementia (H)      Depression      Depression      Disease of thyroid gland      Falls frequently      Frequent falls 2016     Frequent falls 2017     GERD (gastroesophageal reflux disease)      GERD (gastroesophageal reflux disease)      History of blood transfusion     20 years ago     History of colon cancer     s/p resection 1/2015, treated with 5-6 cycles of Folfox     History of transfusion      HTN (hypertension)      Hydrocephalus (H)      Hydrocephalus (H)      Hyperlipidemia      Hypertension      Hypothyroidism      Hypothyroidism      Infection of skin due to methicillin resistant Staphylococcus aureus (MRSA)      Loss of balance      Memory loss      Memory loss 2017     Normal pressure hydrocephalus (H) 02/03/2017     NPH (normal pressure hydrocephalus) (H)      Parkinson disease (H)      Renal insufficiency      Squamous cell cancer of multiple sites of skin of upper arm, left 2016    Dr. Andrea      Thyroid disease      Urinary incontinence      Urinary incontinence        PAST SURGICAL HISTORY:   has a past surgical history that includes Hysterectomy; multiple knee surgeries; rotator cuff surgeries; Cholecystectomy; colon cancer resection (1/2015); GI surgery; Abdomen surgery; orthopedic surgery; Insert drain lumbar (N/A, 2/5/2017); Optical tracking system implant shunt ventriculoperitoneal (Right, 2/8/2017); GYN surgery; Esophagoscopy, gastroscopy, duodenoscopy (EGD), combined (N/A, 9/7/2017); Colon surgery; Laparoscopic assisted colectomy; Hysterectomy; joint  replacement; Arthroscopy shoulder rotator cuff repair; fracture surgery; Laparoscopic cholecystectomy; Implant shunt ventriculoperitoneal (02/03/2017); Lumbar Puncture (02/03/2017); RECONSTR TOTAL SHOULDER IMPLANT (Right, 3/5/2019); Colonoscopy (N/A, 2/3/2020); and Reverse arthroplasty shoulder (Left, 4/11/2022).    FAMILY HISTORY: family history includes Arthritis in her brother; Breast Cancer (age of onset: 60) in her mother; Depression (age of onset: 55) in her father; Peptic Ulcer Disease in her father; Prostate Cancer in her brother.    SOCIAL HISTORY:  reports that she quit smoking about 26 years ago. Her smoking use included cigarettes. She started smoking about 43 years ago. She smoked an average of .25 packs per day. She has quit using smokeless tobacco. She reports that she does not drink alcohol and does not use drugs.    ROS:  Constitutional: Negative for activity change, appetite change, fatigue and fever. Wheelchair bound   HENT: Negative for congestion.  2  shunts  Respiratory: Negative for cough, shortness of breath and HX wheezing.    Cardiovascular: Negative for chest pain and leg swelling.   Gastrointestinal: Negative for abdominal distention, abdominal pain, constipation, diarrhea and nausea. Ventral hernia   Genitourinary: Negative for dysuria.   Musculoskeletal: Negative for arthralgia. Negative for back pain.   Skin: Negative for color change and wound.   Neurological: Negative for dizziness.   Psychiatric/Behavioral: Negative for agitation, behavioral problems and confusion.     Physical Exam:  Constitutional:       Appearance: Patient is well-developed.   HENT:      Head: Normocephalic.   Eyes:      Conjunctiva/sclera: Conjunctivae normal.   Neck:      Musculoskeletal: Normal range of motion.   Cardiovascular:      Rate and Rhythm: Normal rate and regular rhythm.      Heart sounds: Normal heart sounds. No murmur.   Pulmonary:      Effort: No respiratory distress.      Breath sounds: Normal  breath sounds. No wheezing or rales.   Abdominal:      General: Bowel sounds are normal. There is no distension.      Palpations: Ventral hernia     Tenderness: There is no abdominal tenderness.   Musculoskeletal:       Normal range of motion.   Wheelchair bound Easy stand transfer  Skin:General:        Skin is warm.   Neurological:         Mental Status: Patient is alert and oriented to person, place, and time.   Psychiatric:         Behavior: Behavior normal.     Vitals:/65   Pulse 65   Temp 97.8  F (36.6  C)   Resp 19   Wt 69.4 kg (153 lb)   SpO2 93%   BMI 29.88 kg/m   and Body mass index is 29.88 kg/m .    Lab/Diagnostic data:   No results found for this or any previous visit (from the past 240 hour(s)).    MEDICATIONS:     Review of your medicines          Accurate as of April 3, 2023  2:49 PM. If you have any questions, ask your nurse or doctor.            CONTINUE these medicines which have NOT CHANGED      Dose / Directions   acetaminophen 325 MG tablet  Commonly known as: TYLENOL  Used for: Acute post-operative pain      Dose: 975 mg  Take 3 tablets (975 mg) by mouth every 6 hours  Quantity: 360 tablet  Refills: 11     ARIPiprazole 15 MG tablet  Commonly known as: ABILIFY      Dose: 15 mg  Take 15 mg by mouth At Bedtime  Refills: 0     aspirin 81 MG EC tablet  Commonly known as: ASA  Used for: S/P reverse total shoulder arthroplasty, left      Dose: 81 mg  Take 1 tablet (81 mg) by mouth daily  Quantity: 60 tablet  Refills: 11     baclofen 10 MG tablet  Commonly known as: LIORESAL  Used for: Spasticity      Dose: 10 mg  Take 1 tablet (10 mg) by mouth 3 times daily  Quantity: 180 tablet  Refills: 11     bisacodyl 10 MG suppository  Commonly known as: DULCOLAX  Used for: Slow transit constipation      Dose: 10 mg  Place 1 suppository (10 mg) rectally daily as needed for constipation  Quantity: 8 suppository  Refills: 11     Breo Ellipta 100-25 MCG/ACT inhaler  Generic drug: fluticasone-vilanterol       Dose: 1 puff  Inhale 1 puff into the lungs daily 1PM  Refills: 0     * carboxymethylcellulose 0.5 % Soln ophthalmic solution  Commonly known as: REFRESH PLUS      Dose: 1 drop  Place 1 drop into both eyes in the morning.  Refills: 0     * carboxymethylcellulose 0.5 % Soln ophthalmic solution  Commonly known as: REFRESH PLUS      Dose: 1 drop  Place 1 drop into both eyes 3 times daily as needed for dry eyes  Refills: 0     cetirizine 10 MG tablet  Commonly known as: zyrTEC      Dose: 10 mg  Take 10 mg by mouth daily  Refills: 0     citalopram 20 MG tablet  Commonly known as: celeXA  Used for: Bipolar 1 disorder (H), Suicidal thoughts, Episode of recurrent major depressive disorder, unspecified depression episode severity (H)      Dose: 20 mg  Take 1 tablet (20 mg) by mouth daily  Quantity: 30 tablet  Refills: 11     diclofenac 1 % topical gel  Commonly known as: VOLTAREN  Used for: Acute post-operative pain      Dose: 4 g  Apply 4 g topically 4 times daily Apply to affected knees.  Refills: 0     ferrous sulfate 325 (65 Fe) MG tablet  Commonly known as: FEROSUL      Dose: 325 mg  Take 1 tablet (325 mg) by mouth Every Mon, Wed, Fri Morning  Refills: 0     Incruse Ellipta 62.5 MCG/ACT inhaler  Generic drug: umeclidinium      Dose: 1 puff  Inhale 1 puff into the lungs daily  Refills: 0     lamoTRIgine 150 MG tablet  Commonly known as: LaMICtal      Dose: 150 mg  Take 150 mg by mouth 2 times daily  Refills: 0     levothyroxine 75 MCG tablet  Commonly known as: SYNTHROID/LEVOTHROID      Dose: 75 mcg  Take 75 mcg by mouth daily  Refills: 0     menthol-zinc oxide 0.44-20.6 % Oint ointment  Commonly known as: CALMOSEPTINE      Apply topically 2 times daily as needed for irritation  Refills: 0     metoprolol tartrate 25 MG tablet  Commonly known as: LOPRESSOR      Dose: 12.5 mg  Take 12.5 mg by mouth 2 times daily HOLD if SBP<100 and/or HR<55  Refills: 0     mirabegron 25 MG 24 hr tablet  Commonly known as: MYRBETRIQ       Dose: 25 mg  Take 25 mg by mouth daily  Refills: 0     MULTIVITAMIN PO  Used for: Gait disturbance, Memory loss, Malignant neoplasm of sigmoid colon (H)      Dose: 1 tablet  Take 1 tablet by mouth daily  Refills: 0     ondansetron 4 MG tablet  Commonly known as: ZOFRAN  Used for: Nausea      Dose: 4 mg  Take 1 tablet (4 mg) by mouth every 6 hours as needed for nausea  Quantity: 30 tablet  Refills: 11     pantoprazole 20 MG EC tablet  Commonly known as: PROTONIX      Dose: 40 mg  Take 40 mg by mouth daily  Refills: 0     polyethylene glycol 17 GM/Dose powder  Commonly known as: MIRALAX  Used for: Drug-induced constipation      Dose: 17 g  Take 17 g by mouth daily  Quantity: 510 g  Refills: 0     QUEtiapine 25 MG tablet  Commonly known as: SEROquel  Used for: Bipolar 1 disorder (H), Suicidal thoughts, Episode of recurrent major depressive disorder, unspecified depression episode severity (H)      Dose: 25 mg  Take 1 tablet (25 mg) by mouth At Bedtime  Quantity: 60 tablet  Refills: 3     Senokot S 8.6-50 MG tablet  Used for: Slow transit constipation  Generic drug: senna-docusate      Dose: 1 tablet  Take 1 tablet by mouth daily AND daily PRN  Quantity: 90 tablet  Refills: 11     vitamin C 500 MG tablet  Commonly known as: ASCORBIC ACID      Dose: 500 mg  Take 500 mg by mouth 2 times daily  Refills: 0     Vitamin D (Cholecalciferol) 25 MCG (1000 UT) Caps      Dose: 1,000 Units  Take 1,000 Units by mouth daily  Refills: 0         * This list has 2 medication(s) that are the same as other medications prescribed for you. Read the directions carefully, and ask your doctor or other care provider to review them with you.                ASSESSMENT/PLAN  Encounter Diagnoses   Name Primary?     Bipolar 1 disorder (H) Yes     Chronic obstructive pulmonary disease, unspecified COPD type (H)      Hypothyroidism, unspecified type      Essential hypertension        Pain management scheduled Tylenol, baclofen 3 times daily, Voltaren  gel 4 times daily to affected knee    COPD on Breo Ellipta) Tropium albuterol nebulizer 4 times daily as needed    Mood disorder continue Celexa    Allergies on Zyrtec    Anemia continue ferrous sulfate 3 times weekly,    Bipolar on Abilify, lamotrigine twice daily, Seroquel 25 mg at bedtime    Hypothyroidism continue levothyroxine TSH 1/10/23 was 0.89    Hypertension on metoprolol tartrate    GERD on Protonix    Electronically signed by: Treasure Castaneda CNP

## 2023-04-04 ENCOUNTER — NURSING HOME VISIT (OUTPATIENT)
Dept: GERIATRICS | Facility: CLINIC | Age: 83
End: 2023-04-04
Payer: MEDICARE

## 2023-04-04 VITALS
SYSTOLIC BLOOD PRESSURE: 125 MMHG | WEIGHT: 153 LBS | HEIGHT: 60 IN | TEMPERATURE: 97.8 F | HEART RATE: 65 BPM | DIASTOLIC BLOOD PRESSURE: 65 MMHG | OXYGEN SATURATION: 93 % | RESPIRATION RATE: 19 BRPM | BODY MASS INDEX: 30.04 KG/M2

## 2023-04-04 DIAGNOSIS — F31.9 BIPOLAR 1 DISORDER (H): Primary | ICD-10-CM

## 2023-04-04 DIAGNOSIS — E03.9 HYPOTHYROIDISM, UNSPECIFIED TYPE: ICD-10-CM

## 2023-04-04 DIAGNOSIS — J44.9 CHRONIC OBSTRUCTIVE PULMONARY DISEASE, UNSPECIFIED COPD TYPE (H): ICD-10-CM

## 2023-04-04 DIAGNOSIS — I10 ESSENTIAL HYPERTENSION: ICD-10-CM

## 2023-04-04 PROCEDURE — 99309 SBSQ NF CARE MODERATE MDM 30: CPT | Performed by: NURSE PRACTITIONER

## 2023-04-04 RX ORDER — IPRATROPIUM BROMIDE AND ALBUTEROL SULFATE 2.5; .5 MG/3ML; MG/3ML
1 SOLUTION RESPIRATORY (INHALATION) EVERY 6 HOURS PRN
COMMUNITY
End: 2023-05-01

## 2023-04-04 RX ORDER — AMOXICILLIN 500 MG/1
2000 TABLET, FILM COATED ORAL PRN
COMMUNITY

## 2023-04-04 NOTE — LETTER
"    4/4/2023        RE: Amparo Sharma  [facility]  [address] [place] [address]        [facility]  Chief Complaint   Patient presents with     [facility]VP Care Coordination - Regulatory     [facility] Medical Record Number:  [id number]  Place of Service where encounter took place: [facility]  Code Status:  Full Code     HISTORY:      HPI:  Amparo Sharma  is 82 year old (1940) currently residing at [facility].  She transferred to this facility from the TCU at [facility] on 3/29/2023.  She is with past medical history COPD, normal pressure hydrocephalus, memory loss, frequent falls, bladder incontinence, abdominal hernia, alcoholism in remission, anemia, arthritis, asthma, bipolar 1 disorder, depression, GERD, hypertension, hyperlipidemia, and hypothyroidism    Today she was seen to review vital signs, labs, routine visit and to establish care.  She recently transferred from [facility] to [facility]..  She denied chest pain shortness of breath cough and constipation or diarrhea.  She is with a large ventral hernia.  She tells writer she was supposed to have surgery approximately 3 years ago but was deemed \"to old\".  She reports having a large BM today.  She does have chronic bilateral knee pain but reports that the Tylenol takes care of it.  She requested to come off of the scheduled nebulizers.  She did not have any wheezing however she did have crackles bilateral bases. She was educated on the importance of cough and deep breathing.  She is wheelchair-bound and EZ stand transfer.  Labs reviewed last TSH 1/10/2023 was 0.89.  CBC and BMP within normal limits, lamotrigine within normal limits.      ALLERGIES:Amantadine, Antihistamine allergy relief [diphenhydramine], Bactrim [sulfamethoxazole w/trimethoprim], Codeine, Demerol [meperidine], Diazepam, Gabapentin, Hmg-coa-r inhibitors, Meloxicam, Pentazocine, Sulfamethoxazole-trimethoprim, and " Tramadol    PAST MEDICAL HISTORY:   Past Medical History:   Diagnosis Date     Abdominal hernia      Abdominal hernia      Alcoholism in remission (H)      Alcoholism in remission (H) 2017     Anemia      Arthritis      Arthritis      Asthma      Asthma 1/20/2017     Bipolar 1 disorder (H)      Bipolar disorder (H)      Bladder incontinence 2013     Cancer (H)      Cellulitis      Cellulitis 12/12/2016    of left elbow     Chronic pain syndrome      COPD (chronic obstructive pulmonary disease) (H)      COPD with acute exacerbation (H) 12/21/2018     Dementia (H)      Depression      Depression      Disease of thyroid gland      Falls frequently      Frequent falls 2016     Frequent falls 2017     GERD (gastroesophageal reflux disease)      GERD (gastroesophageal reflux disease)      History of blood transfusion     20 years ago     History of colon cancer     s/p resection 1/2015, treated with 5-6 cycles of Folfox     History of transfusion      HTN (hypertension)      Hydrocephalus (H)      Hydrocephalus (H)      Hyperlipidemia      Hypertension      Hypothyroidism      Hypothyroidism      Infection of skin due to methicillin resistant Staphylococcus aureus (MRSA)      Loss of balance      Memory loss      Memory loss 2017     Normal pressure hydrocephalus (H) 02/03/2017     NPH (normal pressure hydrocephalus) (H)      Parkinson disease (H)      Renal insufficiency      Squamous cell cancer of multiple sites of skin of upper arm, left 2016    Dr. Andrea      Thyroid disease      Urinary incontinence      Urinary incontinence        PAST SURGICAL HISTORY:   has a past surgical history that includes Hysterectomy; multiple knee surgeries; rotator cuff surgeries; Cholecystectomy; colon cancer resection (1/2015); GI surgery; Abdomen surgery; orthopedic surgery; Insert drain lumbar (N/A, 2/5/2017); Optical tracking system implant shunt ventriculoperitoneal (Right, 2/8/2017); GYN surgery; Esophagoscopy, gastroscopy,  duodenoscopy (EGD), combined (N/A, 9/7/2017); Colon surgery; Laparoscopic assisted colectomy; Hysterectomy; joint replacement; Arthroscopy shoulder rotator cuff repair; fracture surgery; Laparoscopic cholecystectomy; Implant shunt ventriculoperitoneal (02/03/2017); Lumbar Puncture (02/03/2017); RECONSTR TOTAL SHOULDER IMPLANT (Right, 3/5/2019); Colonoscopy (N/A, 2/3/2020); and Reverse arthroplasty shoulder (Left, 4/11/2022).    FAMILY HISTORY: family history includes Arthritis in her brother; Breast Cancer (age of onset: 60) in her mother; Depression (age of onset: 55) in her father; Peptic Ulcer Disease in her father; Prostate Cancer in her brother.    SOCIAL HISTORY:  reports that she quit smoking about 26 years ago. Her smoking use included cigarettes. She started smoking about 43 years ago. She smoked an average of .25 packs per day. She has quit using smokeless tobacco. She reports that she does not drink alcohol and does not use drugs.    ROS:  Constitutional: Negative for activity change, appetite change, fatigue and fever. Wheelchair bound   HENT: Negative for congestion.  2  shunts  Respiratory: Negative for cough, shortness of breath and HX wheezing.    Cardiovascular: Negative for chest pain and leg swelling.   Gastrointestinal: Negative for abdominal distention, abdominal pain, constipation, diarrhea and nausea. Ventral hernia   Genitourinary: Negative for dysuria.   Musculoskeletal: Negative for arthralgia. Negative for back pain.   Skin: Negative for color change and wound.   Neurological: Negative for dizziness.   Psychiatric/Behavioral: Negative for agitation, behavioral problems and confusion.     Physical Exam:  Constitutional:       Appearance: Patient is well-developed.   HENT:      Head: Normocephalic.   Eyes:      Conjunctiva/sclera: Conjunctivae normal.   Neck:      Musculoskeletal: Normal range of motion.   Cardiovascular:      Rate and Rhythm: Normal rate and regular rhythm.      Heart  sounds: Normal heart sounds. No murmur.   Pulmonary:      Effort: No respiratory distress.      Breath sounds: Normal breath sounds. No wheezing or rales.   Abdominal:      General: Bowel sounds are normal. There is no distension.      Palpations: Ventral hernia     Tenderness: There is no abdominal tenderness.   Musculoskeletal:       Normal range of motion.   Wheelchair bound Easy stand transfer  Skin:General:        Skin is warm.   Neurological:         Mental Status: Patient is alert and oriented to person, place, and time.   Psychiatric:         Behavior: Behavior normal.     Vitals:/65   Pulse 65   Temp 97.8  F (36.6  C)   Resp 19   Wt 69.4 kg (153 lb)   SpO2 93%   BMI 29.88 kg/m   and Body mass index is 29.88 kg/m .    Lab/Diagnostic data:   No results found for this or any previous visit (from the past 240 hour(s)).    MEDICATIONS:     Review of your medicines          Accurate as of April 3, 2023  2:49 PM. If you have any questions, ask your nurse or doctor.            CONTINUE these medicines which have NOT CHANGED      Dose / Directions   acetaminophen 325 MG tablet  Commonly known as: TYLENOL  Used for: Acute post-operative pain      Dose: 975 mg  Take 3 tablets (975 mg) by mouth every 6 hours  Quantity: 360 tablet  Refills: 11     ARIPiprazole 15 MG tablet  Commonly known as: ABILIFY      Dose: 15 mg  Take 15 mg by mouth At Bedtime  Refills: 0     aspirin 81 MG EC tablet  Commonly known as: ASA  Used for: S/P reverse total shoulder arthroplasty, left      Dose: 81 mg  Take 1 tablet (81 mg) by mouth daily  Quantity: 60 tablet  Refills: 11     baclofen 10 MG tablet  Commonly known as: LIORESAL  Used for: Spasticity      Dose: 10 mg  Take 1 tablet (10 mg) by mouth 3 times daily  Quantity: 180 tablet  Refills: 11     bisacodyl 10 MG suppository  Commonly known as: DULCOLAX  Used for: Slow transit constipation      Dose: 10 mg  Place 1 suppository (10 mg) rectally daily as needed for  constipation  Quantity: 8 suppository  Refills: 11     Breo Ellipta 100-25 MCG/ACT inhaler  Generic drug: fluticasone-vilanterol      Dose: 1 puff  Inhale 1 puff into the lungs daily 1PM  Refills: 0     * carboxymethylcellulose 0.5 % Soln ophthalmic solution  Commonly known as: REFRESH PLUS      Dose: 1 drop  Place 1 drop into both eyes in the morning.  Refills: 0     * carboxymethylcellulose 0.5 % Soln ophthalmic solution  Commonly known as: REFRESH PLUS      Dose: 1 drop  Place 1 drop into both eyes 3 times daily as needed for dry eyes  Refills: 0     cetirizine 10 MG tablet  Commonly known as: zyrTEC      Dose: 10 mg  Take 10 mg by mouth daily  Refills: 0     citalopram 20 MG tablet  Commonly known as: celeXA  Used for: Bipolar 1 disorder (H), Suicidal thoughts, Episode of recurrent major depressive disorder, unspecified depression episode severity (H)      Dose: 20 mg  Take 1 tablet (20 mg) by mouth daily  Quantity: 30 tablet  Refills: 11     diclofenac 1 % topical gel  Commonly known as: VOLTAREN  Used for: Acute post-operative pain      Dose: 4 g  Apply 4 g topically 4 times daily Apply to affected knees.  Refills: 0     ferrous sulfate 325 (65 Fe) MG tablet  Commonly known as: FEROSUL      Dose: 325 mg  Take 1 tablet (325 mg) by mouth Every Mon, Wed, Fri Morning  Refills: 0     Incruse Ellipta 62.5 MCG/ACT inhaler  Generic drug: umeclidinium      Dose: 1 puff  Inhale 1 puff into the lungs daily  Refills: 0     lamoTRIgine 150 MG tablet  Commonly known as: LaMICtal      Dose: 150 mg  Take 150 mg by mouth 2 times daily  Refills: 0     levothyroxine 75 MCG tablet  Commonly known as: SYNTHROID/LEVOTHROID      Dose: 75 mcg  Take 75 mcg by mouth daily  Refills: 0     menthol-zinc oxide 0.44-20.6 % Oint ointment  Commonly known as: CALMOSEPTINE      Apply topically 2 times daily as needed for irritation  Refills: 0     metoprolol tartrate 25 MG tablet  Commonly known as: LOPRESSOR      Dose: 12.5 mg  Take 12.5 mg by  mouth 2 times daily HOLD if SBP<100 and/or HR<55  Refills: 0     mirabegron 25 MG 24 hr tablet  Commonly known as: MYRBETRIQ      Dose: 25 mg  Take 25 mg by mouth daily  Refills: 0     MULTIVITAMIN PO  Used for: Gait disturbance, Memory loss, Malignant neoplasm of sigmoid colon (H)      Dose: 1 tablet  Take 1 tablet by mouth daily  Refills: 0     ondansetron 4 MG tablet  Commonly known as: ZOFRAN  Used for: Nausea      Dose: 4 mg  Take 1 tablet (4 mg) by mouth every 6 hours as needed for nausea  Quantity: 30 tablet  Refills: 11     pantoprazole 20 MG EC tablet  Commonly known as: PROTONIX      Dose: 40 mg  Take 40 mg by mouth daily  Refills: 0     polyethylene glycol 17 GM/Dose powder  Commonly known as: MIRALAX  Used for: Drug-induced constipation      Dose: 17 g  Take 17 g by mouth daily  Quantity: 510 g  Refills: 0     QUEtiapine 25 MG tablet  Commonly known as: SEROquel  Used for: Bipolar 1 disorder (H), Suicidal thoughts, Episode of recurrent major depressive disorder, unspecified depression episode severity (H)      Dose: 25 mg  Take 1 tablet (25 mg) by mouth At Bedtime  Quantity: 60 tablet  Refills: 3     Senokot S 8.6-50 MG tablet  Used for: Slow transit constipation  Generic drug: senna-docusate      Dose: 1 tablet  Take 1 tablet by mouth daily AND daily PRN  Quantity: 90 tablet  Refills: 11     vitamin C 500 MG tablet  Commonly known as: ASCORBIC ACID      Dose: 500 mg  Take 500 mg by mouth 2 times daily  Refills: 0     Vitamin D (Cholecalciferol) 25 MCG (1000 UT) Caps      Dose: 1,000 Units  Take 1,000 Units by mouth daily  Refills: 0         * This list has 2 medication(s) that are the same as other medications prescribed for you. Read the directions carefully, and ask your doctor or other care provider to review them with you.                ASSESSMENT/PLAN  Encounter Diagnoses   Name Primary?     Bipolar 1 disorder (H) Yes     Chronic obstructive pulmonary disease, unspecified COPD type (H)       Hypothyroidism, unspecified type      Essential hypertension        Pain management scheduled Tylenol, baclofen 3 times daily, Voltaren gel 4 times daily to affected knee    COPD on Breo Ellipta) Tropium albuterol nebulizer 4 times daily as needed    Mood disorder continue Celexa    Allergies on Zyrtec    Anemia continue ferrous sulfate 3 times weekly,    Bipolar on Abilify, lamotrigine twice daily, Seroquel 25 mg at bedtime    Hypothyroidism continue levothyroxine TSH 1/10/23 was 0.89    Hypertension on metoprolol tartrate    GERD on Protonix    Electronically signed by: Treasure Castaneda CNP        Sincerely,        Treasure Castaneda CNP

## 2023-04-19 ENCOUNTER — PRE VISIT (OUTPATIENT)
Dept: NEUROLOGY | Facility: CLINIC | Age: 83
End: 2023-04-19

## 2023-04-20 ENCOUNTER — TELEPHONE (OUTPATIENT)
Dept: GERIATRICS | Facility: CLINIC | Age: 83
End: 2023-04-20
Payer: MEDICARE

## 2023-04-20 NOTE — TELEPHONE ENCOUNTER
[facility] NON-EMERG[facility]  ENCOUNTER    Amparo Sharma is a 82 year old  (1940), phone call received today regarding:     Disposition    Pt obtained skin tear 2cm x 2 cm on her left hand during transfer with EZ stand. Nursing applied steri-strips and will continue to monitor daily. PCP updated.      Electronically signed by:   Annmarie Cook RN

## 2023-04-25 ENCOUNTER — NURSING HOME VISIT (OUTPATIENT)
Dept: GERIATRICS | Facility: CLINIC | Age: 83
End: 2023-04-25
Payer: MEDICARE

## 2023-04-25 VITALS
WEIGHT: 153 LBS | SYSTOLIC BLOOD PRESSURE: 137 MMHG | OXYGEN SATURATION: 93 % | HEART RATE: 70 BPM | DIASTOLIC BLOOD PRESSURE: 80 MMHG | HEIGHT: 60 IN | BODY MASS INDEX: 30.04 KG/M2

## 2023-04-25 DIAGNOSIS — Z51.81 ENCOUNTER FOR THERAPEUTIC DRUG MONITORING: Primary | ICD-10-CM

## 2023-04-25 PROCEDURE — 99309 SBSQ NF CARE MODERATE MDM 30: CPT | Performed by: NURSE PRACTITIONER

## 2023-04-25 RX ORDER — CYCLOBENZAPRINE HCL 5 MG
5 TABLET ORAL DAILY
COMMUNITY
End: 2023-01-01

## 2023-04-25 NOTE — LETTER
"    4/25/2023        RE: Amparo Sharma  [facility]  [address] [place] [address]         HEALTH GERIATRIC SERVICES  Chief Complaint   Patient presents with     care home Acute     [facility] Record Number:  [id number]  Place of Service where encounter took place: [facility]  Code Status:  Full Code     HISTORY:      HPI:  Amparo Sharma  is 82 year old (1940) currently residing at [facility].  She transferred to this facility from the TCU at [facility] on 3/29/2023.  She is with past medical history COPD, normal pressure hydrocephalus, memory loss, frequent falls, bladder incontinence, abdominal hernia, alcoholism in remission, anemia, arthritis, asthma, bipolar 1 disorder, depression, GERD, hypertension, hyperlipidemia, and hypothyroidism    Today she was seen to review vital signs,  routine visit and medication encounter.  Writer met with patient who requested to have her baclofen discontinued.  She reports, \"it is like water and does not often for me \".  Baclofen was discontinued and she was started on Flexeril 5 mg daily.  She also requested to have a second dose if needed of her Seroquel at bedtime because it does not always work for her.  She recently transferred from [facility] to [facility]..  She denied chest pain shortness of breath cough and constipation or diarrhea.  Her pain is controlled with Tylenol.  She is wheelchair-bound and EZ stand transfer.      ALLERGIES:Amantadine, Antihistamine allergy relief [diphenhydramine], Bactrim [sulfamethoxazole w/trimethoprim], Codeine, Demerol [meperidine], Diazepam, Gabapentin, Hmg-coa-r inhibitors, Meloxicam, Pentazocine, Sulfamethoxazole-trimethoprim, and Tramadol    PAST MEDICAL HISTORY:   Past Medical History:   Diagnosis Date     Abdominal hernia      Abdominal hernia      Alcoholism in remission (H)      Alcoholism in remission (H) 2017     Anemia      Arthritis      Arthritis      " Asthma      Asthma 1/20/2017     Bipolar 1 disorder (H)      Bipolar disorder (H)      Bladder incontinence 2013     Cancer (H)      Cellulitis      Cellulitis 12/12/2016    of left elbow     Chronic pain syndrome      COPD (chronic obstructive pulmonary disease) (H)      COPD with acute exacerbation (H) 12/21/2018     Dementia (H)      Depression      Depression      Disease of thyroid gland      Falls frequently      Frequent falls 2016     Frequent falls 2017     GERD (gastroesophageal reflux disease)      GERD (gastroesophageal reflux disease)      History of blood transfusion     20 years ago     History of colon cancer     s/p resection 1/2015, treated with 5-6 cycles of Folfox     History of transfusion      HTN (hypertension)      Hydrocephalus (H)      Hydrocephalus (H)      Hyperlipidemia      Hypertension      Hypothyroidism      Hypothyroidism      Infection of skin due to methicillin resistant Staphylococcus aureus (MRSA)      Loss of balance      Memory loss      Memory loss 2017     Normal pressure hydrocephalus (H) 02/03/2017     NPH (normal pressure hydrocephalus) (H)      Parkinson disease (H)      Renal insufficiency      Squamous cell cancer of multiple sites of skin of upper arm, left 2016    Dr. Andrea      Thyroid disease      Urinary incontinence      Urinary incontinence        PAST SURGICAL HISTORY:   has a past surgical history that includes Hysterectomy; multiple knee surgeries; rotator cuff surgeries; Cholecystectomy; colon cancer resection (1/2015); GI surgery; Abdomen surgery; orthopedic surgery; Insert drain lumbar (N/A, 2/5/2017); Optical tracking system implant shunt ventriculoperitoneal (Right, 2/8/2017); GYN surgery; Esophagoscopy, gastroscopy, duodenoscopy (EGD), combined (N/A, 9/7/2017); Colon surgery; Laparoscopic assisted colectomy; Hysterectomy; joint replacement; Arthroscopy shoulder rotator cuff repair; fracture surgery; Laparoscopic cholecystectomy; Implant shunt  ventriculoperitoneal (02/03/2017); Lumbar Puncture (02/03/2017); RECONSTR TOTAL SHOULDER IMPLANT (Right, 3/5/2019); Colonoscopy (N/A, 2/3/2020); and Reverse arthroplasty shoulder (Left, 4/11/2022).    FAMILY HISTORY: family history includes Arthritis in her brother; Breast Cancer (age of onset: 60) in her mother; Depression (age of onset: 55) in her father; Peptic Ulcer Disease in her father; Prostate Cancer in her brother.    SOCIAL HISTORY:  reports that she quit smoking about 26 years ago. Her smoking use included cigarettes. She started smoking about 43 years ago. She smoked an average of .25 packs per day. She has quit using smokeless tobacco. She reports that she does not drink alcohol and does not use drugs.    ROS:  Constitutional: Negative for activity change, appetite change, fatigue and fever. Wheelchair bound   HENT: Negative for congestion.  2  shunts  Respiratory: Negative for cough, shortness of breath and HX wheezing.    Cardiovascular: Negative for chest pain and leg swelling.   Gastrointestinal: Negative for abdominal distention, abdominal pain, constipation, diarrhea and nausea. Ventral hernia   Genitourinary: Negative for dysuria.   Musculoskeletal: Negative for arthralgia. Negative for back pain.   Skin: Negative for color change and wound.   Neurological: Negative for dizziness.   Psychiatric/Behavioral: Negative for agitation, behavioral problems and confusion.     Physical Exam:  Constitutional:       Appearance: Patient is well-developed.   HENT:      Head: Normocephalic.   Eyes:      Conjunctiva/sclera: Conjunctivae normal.   Neck:      Musculoskeletal: Normal range of motion.   Cardiovascular:      Rate and Rhythm: Normal rate and regular rhythm.      Heart sounds: Normal heart sounds. No murmur.   Pulmonary:      Effort: No respiratory distress.      Breath sounds: Normal breath sounds. No wheezing or rales.   Abdominal:      General: Bowel sounds are normal. There is no distension.       Palpations: Ventral hernia     Tenderness: There is no abdominal tenderness.   Musculoskeletal:       Normal range of motion.   Wheelchair bound Easy stand transfer  Skin:General:        Skin is warm.   Neurological:         Mental Status: Patient is alert and oriented to person, place, and time.   Psychiatric:         Behavior: Behavior normal.     Vitals:/80   Pulse 70   Ht 1.524 m (5')   Wt 69.4 kg (153 lb)   SpO2 93%   BMI 29.88 kg/m   and Body mass index is 29.88 kg/m .    Lab/Diagnostic data:   No results found for this or any previous visit (from the past 240 hour(s)).    MEDICATIONS:     Review of your medicines          Accurate as of April 25, 2023 11:24 AM. If you have any questions, ask your nurse or doctor.            CONTINUE these medicines which may have CHANGED, or have new prescriptions. If we are uncertain of the size of tablets/capsules you have at home, strength may be listed as something that might have changed.      Dose / Directions   QUEtiapine 25 MG tablet  Commonly known as: SEROquel  This may have changed: additional instructions  Used for: Bipolar 1 disorder (H), Suicidal thoughts, Episode of recurrent major depressive disorder, unspecified depression episode severity (H)      Dose: 25 mg  Take 1 tablet (25 mg) by mouth At Bedtime  Quantity: 60 tablet  Refills: 3        CONTINUE these medicines which have NOT CHANGED      Dose / Directions   acetaminophen 325 MG tablet  Commonly known as: TYLENOL  Used for: Acute post-operative pain      Dose: 975 mg  Take 3 tablets (975 mg) by mouth every 6 hours  Quantity: 360 tablet  Refills: 11     amoxicillin 500 MG tablet  Commonly known as: AMOXIL      Dose: 2,000 mg  Take 2,000 mg by mouth as needed (prior to dental appointment)  Refills: 0     ARIPiprazole 15 MG tablet  Commonly known as: ABILIFY      Dose: 15 mg  Take 15 mg by mouth At Bedtime  Refills: 0     aspirin 81 MG EC tablet  Commonly known as: ASA  Used for: S/P reverse  total shoulder arthroplasty, left      Dose: 81 mg  Take 1 tablet (81 mg) by mouth daily  Quantity: 60 tablet  Refills: 11     bisacodyl 10 MG suppository  Commonly known as: DULCOLAX  Used for: Slow transit constipation      Dose: 10 mg  Place 1 suppository (10 mg) rectally daily as needed for constipation  Quantity: 8 suppository  Refills: 11     Breo Ellipta 100-25 MCG/ACT inhaler  Generic drug: fluticasone-vilanterol      Dose: 1 puff  Inhale 1 puff into the lungs daily 1PM  Refills: 0     * carboxymethylcellulose 0.5 % Soln ophthalmic solution  Commonly known as: REFRESH PLUS      Dose: 1 drop  Place 1 drop into both eyes in the morning.  Refills: 0     * carboxymethylcellulose 0.5 % Soln ophthalmic solution  Commonly known as: REFRESH PLUS      Dose: 1 drop  Place 1 drop into both eyes 3 times daily as needed for dry eyes  Refills: 0     cetirizine 10 MG tablet  Commonly known as: zyrTEC      Dose: 10 mg  Take 10 mg by mouth daily  Refills: 0     citalopram 20 MG tablet  Commonly known as: celeXA  Used for: Bipolar 1 disorder (H), Suicidal thoughts, Episode of recurrent major depressive disorder, unspecified depression episode severity (H)      Dose: 20 mg  Take 1 tablet (20 mg) by mouth daily  Quantity: 30 tablet  Refills: 11     cyclobenzaprine 5 MG tablet  Commonly known as: FLEXERIL      Dose: 5 mg  Take 5 mg by mouth daily  Refills: 0     diclofenac 1 % topical gel  Commonly known as: VOLTAREN  Used for: Acute post-operative pain      Dose: 4 g  Apply 4 g topically 4 times daily Apply to affected knees.  Refills: 0     ferrous sulfate 325 (65 Fe) MG tablet  Commonly known as: FEROSUL      Dose: 325 mg  Take 1 tablet (325 mg) by mouth Every Mon, Wed, Fri Morning  Refills: 0     Incruse Ellipta 62.5 MCG/ACT inhaler  Generic drug: umeclidinium      Dose: 1 puff  Inhale 1 puff into the lungs daily  Refills: 0     ipratropium - albuterol 0.5 mg/2.5 mg/3 mL 0.5-2.5 (3) MG/3ML neb solution  Commonly known as:  DUONEB      Dose: 1 vial  Take 1 vial by nebulization every 6 hours as needed for shortness of breath, wheezing or cough  Refills: 0     lamoTRIgine 150 MG tablet  Commonly known as: LaMICtal      Dose: 150 mg  Take 150 mg by mouth 2 times daily  Refills: 0     levothyroxine 75 MCG tablet  Commonly known as: SYNTHROID/LEVOTHROID      Dose: 75 mcg  Take 75 mcg by mouth daily  Refills: 0     menthol-zinc oxide 0.44-20.6 % Oint ointment  Commonly known as: CALMOSEPTINE      Apply topically 2 times daily as needed for irritation  Refills: 0     metoprolol tartrate 25 MG tablet  Commonly known as: LOPRESSOR      Dose: 12.5 mg  Take 12.5 mg by mouth 2 times daily HOLD if SBP<100 and/or HR<55  Refills: 0     mirabegron 25 MG 24 hr tablet  Commonly known as: MYRBETRIQ      Dose: 25 mg  Take 25 mg by mouth daily  Refills: 0     MULTIVITAMIN PO  Used for: Gait disturbance, Memory loss, Malignant neoplasm of sigmoid colon (H)      Dose: 1 tablet  Take 1 tablet by mouth daily  Refills: 0     ondansetron 4 MG tablet  Commonly known as: ZOFRAN  Used for: Nausea      Dose: 4 mg  Take 1 tablet (4 mg) by mouth every 6 hours as needed for nausea  Quantity: 30 tablet  Refills: 11     pantoprazole 20 MG EC tablet  Commonly known as: PROTONIX      Dose: 40 mg  Take 40 mg by mouth daily  Refills: 0     polyethylene glycol 17 GM/Dose powder  Commonly known as: MIRALAX  Used for: Drug-induced constipation      Dose: 17 g  Take 17 g by mouth daily  Quantity: 510 g  Refills: 0     Senokot S 8.6-50 MG tablet  Used for: Slow transit constipation  Generic drug: senna-docusate      Dose: 1 tablet  Take 1 tablet by mouth daily AND daily PRN  Quantity: 90 tablet  Refills: 11     vitamin C 500 MG tablet  Commonly known as: ASCORBIC ACID      Dose: 500 mg  Take 500 mg by mouth 2 times daily  Refills: 0     Vitamin D (Cholecalciferol) 25 MCG (1000 UT) Caps      Dose: 1,000 Units  Take 1,000 Units by mouth daily  Refills: 0         * This list has 2  medication(s) that are the same as other medications prescribed for you. Read the directions carefully, and ask your doctor or other care provider to review them with you.            STOP taking    baclofen 10 MG tablet  Commonly known as: LIORESAL  Stopped by: Treasure Castaneda CNP               ASSESSMENT/PLAN  Encounter Diagnosis   Name Primary?     Encounter for therapeutic drug monitoring Yes       Pain management scheduled Tylenol, baclofen 3 times daily discontinued 4/25/2023 per her request and she was started on Flexeril 5 mg p.o. daily, Voltaren gel 4 times daily to affected knee    COPD on Breo Ellipta) Tropium albuterol nebulizer 4 times daily as needed    Mood disorder continue Celexa    Allergies on Zyrtec    Anemia continue ferrous sulfate 3 times weekly,    Bipolar on Abilify, lamotrigine twice daily, Seroquel 25 mg at bedtime    Hypothyroidism continue levothyroxine TSH 1/10/23 was 0.89    Hypertension on metoprolol tartrate    GERD on Protonix    Electronically signed by: Treasure Castaneda CNP        Sincerely,        Treasure Castaneda CNP

## 2023-04-25 NOTE — PROGRESS NOTES
"[facility] GERIATRIC SERVICES  Chief Complaint   Patient presents with     long term Acute     [place] Medical Record Number:  [id number]  Place of Service where encounter took place: [facility]  Code Status:  Full Code     HISTORY:      HPI:  Amparo Sharma  is 82 year old (1940) currently residing at [facility].  She transferred to this facility from the TCU at [facility] on 3/29/2023.  She is with past medical history COPD, normal pressure hydrocephalus, memory loss, frequent falls, bladder incontinence, abdominal hernia, alcoholism in remission, anemia, arthritis, asthma, bipolar 1 disorder, depression, GERD, hypertension, hyperlipidemia, and hypothyroidism    Today she was seen to review vital signs,  routine visit and medication encounter.  Writer met with patient who requested to have her baclofen discontinued.  She reports, \"it is like water and does not often for me \".  Baclofen was discontinued and she was started on Flexeril 5 mg daily.  She also requested to have a second dose if needed of her Seroquel at bedtime because it does not always work for her.  She recently transferred from [facility] to [facility]..  She denied chest pain shortness of breath cough and constipation or diarrhea.  Her pain is controlled with Tylenol.  She is wheelchair-bound and EZ stand transfer.      ALLERGIES:Amantadine, Antihistamine allergy relief [diphenhydramine], Bactrim [sulfamethoxazole w/trimethoprim], Codeine, Demerol [meperidine], Diazepam, Gabapentin, Hmg-coa-r inhibitors, Meloxicam, Pentazocine, Sulfamethoxazole-trimethoprim, and Tramadol    PAST MEDICAL HISTORY:   Past Medical History:   Diagnosis Date     Abdominal hernia      Abdominal hernia      Alcoholism in remission (H)      Alcoholism in remission (H) 2017     Anemia      Arthritis      Arthritis      Asthma      Asthma 1/20/2017     Bipolar 1 disorder (H)      Bipolar disorder (H)      Bladder incontinence 2013 "     Cancer (H)      Cellulitis      Cellulitis 12/12/2016    of left elbow     Chronic pain syndrome      COPD (chronic obstructive pulmonary disease) (H)      COPD with acute exacerbation (H) 12/21/2018     Dementia (H)      Depression      Depression      Disease of thyroid gland      Falls frequently      Frequent falls 2016     Frequent falls 2017     GERD (gastroesophageal reflux disease)      GERD (gastroesophageal reflux disease)      History of blood transfusion     20 years ago     History of colon cancer     s/p resection 1/2015, treated with 5-6 cycles of Folfox     History of transfusion      HTN (hypertension)      Hydrocephalus (H)      Hydrocephalus (H)      Hyperlipidemia      Hypertension      Hypothyroidism      Hypothyroidism      Infection of skin due to methicillin resistant Staphylococcus aureus (MRSA)      Loss of balance      Memory loss      Memory loss 2017     Normal pressure hydrocephalus (H) 02/03/2017     NPH (normal pressure hydrocephalus) (H)      Parkinson disease (H)      Renal insufficiency      Squamous cell cancer of multiple sites of skin of upper arm, left 2016    Dr. Andrea      Thyroid disease      Urinary incontinence      Urinary incontinence        PAST SURGICAL HISTORY:   has a past surgical history that includes Hysterectomy; multiple knee surgeries; rotator cuff surgeries; Cholecystectomy; colon cancer resection (1/2015); GI surgery; Abdomen surgery; orthopedic surgery; Insert drain lumbar (N/A, 2/5/2017); Optical tracking system implant shunt ventriculoperitoneal (Right, 2/8/2017); GYN surgery; Esophagoscopy, gastroscopy, duodenoscopy (EGD), combined (N/A, 9/7/2017); Colon surgery; Laparoscopic assisted colectomy; Hysterectomy; joint replacement; Arthroscopy shoulder rotator cuff repair; fracture surgery; Laparoscopic cholecystectomy; Implant shunt ventriculoperitoneal (02/03/2017); Lumbar Puncture (02/03/2017); RECONSTR TOTAL SHOULDER IMPLANT (Right, 3/5/2019);  Colonoscopy (N/A, 2/3/2020); and Reverse arthroplasty shoulder (Left, 4/11/2022).    FAMILY HISTORY: family history includes Arthritis in her brother; Breast Cancer (age of onset: 60) in her mother; Depression (age of onset: 55) in her father; Peptic Ulcer Disease in her father; Prostate Cancer in her brother.    SOCIAL HISTORY:  reports that she quit smoking about 26 years ago. Her smoking use included cigarettes. She started smoking about 43 years ago. She smoked an average of .25 packs per day. She has quit using smokeless tobacco. She reports that she does not drink alcohol and does not use drugs.    ROS:  Constitutional: Negative for activity change, appetite change, fatigue and fever. Wheelchair bound   HENT: Negative for congestion.  2  shunts  Respiratory: Negative for cough, shortness of breath and HX wheezing.    Cardiovascular: Negative for chest pain and leg swelling.   Gastrointestinal: Negative for abdominal distention, abdominal pain, constipation, diarrhea and nausea. Ventral hernia   Genitourinary: Negative for dysuria.   Musculoskeletal: Negative for arthralgia. Negative for back pain.   Skin: Negative for color change and wound.   Neurological: Negative for dizziness.   Psychiatric/Behavioral: Negative for agitation, behavioral problems and confusion.     Physical Exam:  Constitutional:       Appearance: Patient is well-developed.   HENT:      Head: Normocephalic.   Eyes:      Conjunctiva/sclera: Conjunctivae normal.   Neck:      Musculoskeletal: Normal range of motion.   Cardiovascular:      Rate and Rhythm: Normal rate and regular rhythm.      Heart sounds: Normal heart sounds. No murmur.   Pulmonary:      Effort: No respiratory distress.      Breath sounds: Normal breath sounds. No wheezing or rales.   Abdominal:      General: Bowel sounds are normal. There is no distension.      Palpations: Ventral hernia     Tenderness: There is no abdominal tenderness.   Musculoskeletal:       Normal range  of motion.   Wheelchair bound Easy stand transfer  Skin:General:        Skin is warm.   Neurological:         Mental Status: Patient is alert and oriented to person, place, and time.   Psychiatric:         Behavior: Behavior normal.     Vitals:/80   Pulse 70   Ht 1.524 m (5')   Wt 69.4 kg (153 lb)   SpO2 93%   BMI 29.88 kg/m   and Body mass index is 29.88 kg/m .    Lab/Diagnostic data:   No results found for this or any previous visit (from the past 240 hour(s)).    MEDICATIONS:     Review of your medicines          Accurate as of April 25, 2023 11:24 AM. If you have any questions, ask your nurse or doctor.            CONTINUE these medicines which may have CHANGED, or have new prescriptions. If we are uncertain of the size of tablets/capsules you have at home, strength may be listed as something that might have changed.      Dose / Directions   QUEtiapine 25 MG tablet  Commonly known as: SEROquel  This may have changed: additional instructions  Used for: Bipolar 1 disorder (H), Suicidal thoughts, Episode of recurrent major depressive disorder, unspecified depression episode severity (H)      Dose: 25 mg  Take 1 tablet (25 mg) by mouth At Bedtime  Quantity: 60 tablet  Refills: 3        CONTINUE these medicines which have NOT CHANGED      Dose / Directions   acetaminophen 325 MG tablet  Commonly known as: TYLENOL  Used for: Acute post-operative pain      Dose: 975 mg  Take 3 tablets (975 mg) by mouth every 6 hours  Quantity: 360 tablet  Refills: 11     amoxicillin 500 MG tablet  Commonly known as: AMOXIL      Dose: 2,000 mg  Take 2,000 mg by mouth as needed (prior to dental appointment)  Refills: 0     ARIPiprazole 15 MG tablet  Commonly known as: ABILIFY      Dose: 15 mg  Take 15 mg by mouth At Bedtime  Refills: 0     aspirin 81 MG EC tablet  Commonly known as: ASA  Used for: S/P reverse total shoulder arthroplasty, left      Dose: 81 mg  Take 1 tablet (81 mg) by mouth daily  Quantity: 60 tablet  Refills:  11     bisacodyl 10 MG suppository  Commonly known as: DULCOLAX  Used for: Slow transit constipation      Dose: 10 mg  Place 1 suppository (10 mg) rectally daily as needed for constipation  Quantity: 8 suppository  Refills: 11     Breo Ellipta 100-25 MCG/ACT inhaler  Generic drug: fluticasone-vilanterol      Dose: 1 puff  Inhale 1 puff into the lungs daily 1PM  Refills: 0     * carboxymethylcellulose 0.5 % Soln ophthalmic solution  Commonly known as: REFRESH PLUS      Dose: 1 drop  Place 1 drop into both eyes in the morning.  Refills: 0     * carboxymethylcellulose 0.5 % Soln ophthalmic solution  Commonly known as: REFRESH PLUS      Dose: 1 drop  Place 1 drop into both eyes 3 times daily as needed for dry eyes  Refills: 0     cetirizine 10 MG tablet  Commonly known as: zyrTEC      Dose: 10 mg  Take 10 mg by mouth daily  Refills: 0     citalopram 20 MG tablet  Commonly known as: celeXA  Used for: Bipolar 1 disorder (H), Suicidal thoughts, Episode of recurrent major depressive disorder, unspecified depression episode severity (H)      Dose: 20 mg  Take 1 tablet (20 mg) by mouth daily  Quantity: 30 tablet  Refills: 11     cyclobenzaprine 5 MG tablet  Commonly known as: FLEXERIL      Dose: 5 mg  Take 5 mg by mouth daily  Refills: 0     diclofenac 1 % topical gel  Commonly known as: VOLTAREN  Used for: Acute post-operative pain      Dose: 4 g  Apply 4 g topically 4 times daily Apply to affected knees.  Refills: 0     ferrous sulfate 325 (65 Fe) MG tablet  Commonly known as: FEROSUL      Dose: 325 mg  Take 1 tablet (325 mg) by mouth Every Mon, Wed, Fri Morning  Refills: 0     Incruse Ellipta 62.5 MCG/ACT inhaler  Generic drug: umeclidinium      Dose: 1 puff  Inhale 1 puff into the lungs daily  Refills: 0     ipratropium - albuterol 0.5 mg/2.5 mg/3 mL 0.5-2.5 (3) MG/3ML neb solution  Commonly known as: DUONEB      Dose: 1 vial  Take 1 vial by nebulization every 6 hours as needed for shortness of breath, wheezing or  cough  Refills: 0     lamoTRIgine 150 MG tablet  Commonly known as: LaMICtal      Dose: 150 mg  Take 150 mg by mouth 2 times daily  Refills: 0     levothyroxine 75 MCG tablet  Commonly known as: SYNTHROID/LEVOTHROID      Dose: 75 mcg  Take 75 mcg by mouth daily  Refills: 0     menthol-zinc oxide 0.44-20.6 % Oint ointment  Commonly known as: CALMOSEPTINE      Apply topically 2 times daily as needed for irritation  Refills: 0     metoprolol tartrate 25 MG tablet  Commonly known as: LOPRESSOR      Dose: 12.5 mg  Take 12.5 mg by mouth 2 times daily HOLD if SBP<100 and/or HR<55  Refills: 0     mirabegron 25 MG 24 hr tablet  Commonly known as: MYRBETRIQ      Dose: 25 mg  Take 25 mg by mouth daily  Refills: 0     MULTIVITAMIN PO  Used for: Gait disturbance, Memory loss, Malignant neoplasm of sigmoid colon (H)      Dose: 1 tablet  Take 1 tablet by mouth daily  Refills: 0     ondansetron 4 MG tablet  Commonly known as: ZOFRAN  Used for: Nausea      Dose: 4 mg  Take 1 tablet (4 mg) by mouth every 6 hours as needed for nausea  Quantity: 30 tablet  Refills: 11     pantoprazole 20 MG EC tablet  Commonly known as: PROTONIX      Dose: 40 mg  Take 40 mg by mouth daily  Refills: 0     polyethylene glycol 17 GM/Dose powder  Commonly known as: MIRALAX  Used for: Drug-induced constipation      Dose: 17 g  Take 17 g by mouth daily  Quantity: 510 g  Refills: 0     Senokot S 8.6-50 MG tablet  Used for: Slow transit constipation  Generic drug: senna-docusate      Dose: 1 tablet  Take 1 tablet by mouth daily AND daily PRN  Quantity: 90 tablet  Refills: 11     vitamin C 500 MG tablet  Commonly known as: ASCORBIC ACID      Dose: 500 mg  Take 500 mg by mouth 2 times daily  Refills: 0     Vitamin D (Cholecalciferol) 25 MCG (1000 UT) Caps      Dose: 1,000 Units  Take 1,000 Units by mouth daily  Refills: 0         * This list has 2 medication(s) that are the same as other medications prescribed for you. Read the directions carefully, and ask your  doctor or other care provider to review them with you.            STOP taking    baclofen 10 MG tablet  Commonly known as: LIORESAL  Stopped by: Treasure Castaneda CNP               ASSESSMENT/PLAN  Encounter Diagnosis   Name Primary?     Encounter for therapeutic drug monitoring Yes       Pain management scheduled Tylenol, baclofen 3 times daily discontinued 4/25/2023 per her request and she was started on Flexeril 5 mg p.o. daily, Voltaren gel 4 times daily to affected knee    COPD on Breo Ellipta) Tropium albuterol nebulizer 4 times daily as needed    Mood disorder continue Celexa    Allergies on Zyrtec    Anemia continue ferrous sulfate 3 times weekly,    Bipolar on Abilify, lamotrigine twice daily, Seroquel 25 mg at bedtime    Hypothyroidism continue levothyroxine TSH 1/10/23 was 0.89    Hypertension on metoprolol tartrate    GERD on Protonix    Electronically signed by: Treasure Castaneda CNP

## 2023-05-01 ENCOUNTER — NURSING HOME VISIT (OUTPATIENT)
Dept: GERIATRICS | Facility: CLINIC | Age: 83
End: 2023-05-01
Payer: MEDICARE

## 2023-05-01 VITALS
BODY MASS INDEX: 29.67 KG/M2 | HEART RATE: 64 BPM | HEIGHT: 60 IN | OXYGEN SATURATION: 93 % | TEMPERATURE: 97.2 F | RESPIRATION RATE: 16 BRPM | SYSTOLIC BLOOD PRESSURE: 125 MMHG | DIASTOLIC BLOOD PRESSURE: 72 MMHG | WEIGHT: 151.1 LBS

## 2023-05-01 DIAGNOSIS — J44.9 CHRONIC OBSTRUCTIVE PULMONARY DISEASE, UNSPECIFIED COPD TYPE (H): ICD-10-CM

## 2023-05-01 DIAGNOSIS — I10 ESSENTIAL HYPERTENSION: Primary | ICD-10-CM

## 2023-05-01 DIAGNOSIS — F31.9 BIPOLAR 1 DISORDER (H): ICD-10-CM

## 2023-05-01 PROCEDURE — 99309 SBSQ NF CARE MODERATE MDM 30: CPT | Performed by: INTERNAL MEDICINE

## 2023-05-01 NOTE — PROGRESS NOTES
[facility] GERIATRICS  REGULATORY VISIT  May 1, 2023      [facility] Medical Record Number:  [id number]  Place of Service where encounter took place:  [facility] () [[id number]]    Chief Complaint   Patient presents with     hospitals Care       HPI:    Amparo Sharma is a 82 year old  (1940), who is being seen today for a federally mandated E/M visit at [facility] where she moved last month after over a year residing at a different facility - first in TCU then in LTC. HPI information obtained from: facility chart records, facility staff, patient report and [facility] chart review.    Today, Ms. Sharma is seen sitting in her wheelchair in the dining room. I had just seen her 5 weeks ago at her prior facility and she remembered me. She really likes the new facility, the food, the activities. She is pleased the baclofen was changed to cyclobenzaprine and we dicussed we have room to go up on both dose and frequency but would want to go slow and give it time to work. No concerns today per discussion with nursing. She's been using the PRN quetiapine for sleep - that's not an indication - so I discontinued the PRN.     ALLERGIES:    Allergies   Allergen Reactions     Amantadine      Antihistamine Allergy Relief [Diphenhydramine] Other (See Comments)     hyperactivity     Bactrim [Sulfamethoxazole-Trimethoprim]      Codeine Itching     Demerol [Meperidine] Nausea     Diazepam Other (See Comments)     Hallucinations/paranoid     Gabapentin      Meloxicam Other (See Comments)     Pentazocine Other (See Comments)     drowsiness     Statins Other (See Comments)     Was hospitalized for possible rhabdo     Sulfamethoxazole-Trimethoprim      Tramadol Nausea        Past Medical, Surgical, Family and Social History: Reviewed and updated in EPIC.    MEDICATIONS:  Current Outpatient Medications   Medication Sig Dispense Refill     acetaminophen (TYLENOL) 325 MG tablet Take 3 tablets (975 mg) by mouth  every 6 hours 360 tablet 11     amoxicillin (AMOXIL) 500 MG tablet Take 2,000 mg by mouth as needed (prior to dental appointment)       ARIPiprazole (ABILIFY) 15 MG tablet Take 15 mg by mouth At Bedtime       aspirin (ASA) 81 MG EC tablet Take 1 tablet (81 mg) by mouth daily 60 tablet 11     bisacodyl (DULCOLAX) 10 MG suppository Place 1 suppository (10 mg) rectally daily as needed for constipation 8 suppository 11     BREO ELLIPTA 100-25 MCG/INH inhaler Inhale 1 puff into the lungs daily 1PM       carboxymethylcellulose (REFRESH PLUS) 0.5 % SOLN ophthalmic solution Place 1 drop into both eyes 3 times daily as needed for dry eyes       carboxymethylcellulose (REFRESH PLUS) 0.5 % SOLN ophthalmic solution Place 1 drop into both eyes in the morning.       cetirizine (ZYRTEC) 10 MG tablet Take 10 mg by mouth daily        citalopram (CELEXA) 20 MG tablet Take 1 tablet (20 mg) by mouth daily 30 tablet 11     cyclobenzaprine (FLEXERIL) 5 MG tablet Take 5 mg by mouth daily       diclofenac (VOLTAREN) 1 % topical gel Apply 4 g topically 4 times daily Apply to affected knees.       ferrous sulfate (FEROSUL) 325 (65 Fe) MG tablet Take 1 tablet (325 mg) by mouth Every Mon, Wed, Fri Morning       INCRUSE ELLIPTA 62.5 MCG/INH Inhaler Inhale 1 puff into the lungs daily        lamoTRIgine (LAMICTAL) 150 MG tablet Take 150 mg by mouth 2 times daily       levothyroxine (SYNTHROID/LEVOTHROID) 75 MCG tablet Take 75 mcg by mouth daily       menthol-zinc oxide (CALMOSEPTINE) 0.44-20.6 % OINT ointment Apply topically 2 times daily as needed for irritation       metoprolol tartrate (LOPRESSOR) 25 MG tablet Take 12.5 mg by mouth 2 times daily HOLD if SBP<100 and/or HR<55       mirabegron (MYRBETRIQ) 25 MG 24 hr tablet Take 25 mg by mouth daily        Multiple Vitamins-Minerals (MULTIVITAMIN PO) Take 1 tablet by mouth daily        ondansetron (ZOFRAN) 4 MG tablet Take 1 tablet (4 mg) by mouth every 6 hours as needed for nausea 30 tablet 11      pantoprazole (PROTONIX) 20 MG EC tablet Take 40 mg by mouth daily       polyethylene glycol (MIRALAX) 17 GM/Dose powder Take 17 g by mouth daily 510 g 0     QUEtiapine (SEROQUEL) 25 MG tablet Take 1 tablet (25 mg) by mouth At Bedtime 60 tablet 3     SENOKOT S 8.6-50 MG tablet Take 1 tablet by mouth daily AND daily PRN 90 tablet 11     vitamin C (ASCORBIC ACID) 500 MG tablet Take 500 mg by mouth 2 times daily       Vitamin D, Cholecalciferol, 25 MCG (1000 UT) CAPS Take 1,000 Units by mouth daily        Medications reviewed:  Medications reconciled to facility chart and changes were made to reflect current medications as identified as above med list. Below are the changes that were made:   Medications stopped since last EPIC medication reconciliation:   Medications Discontinued During This Encounter   Medication Reason     ipratropium - albuterol 0.5 mg/2.5 mg/3 mL (DUONEB) 0.5-2.5 (3) MG/3ML neb solution Med Rec(No AVS / No eCancel)     Medications started since last [facility] medication reconciliation:  No orders of the defined types were placed in this encounter.      REVIEW OF SYSTEMS:  4 point ROS neg other than the symptoms noted above in the HPI.    PHYSICAL EXAM:  /72   Pulse 64   Temp 97.2  F (36.2  C)   Resp 16   Ht 1.524 m (5')   Wt 68.5 kg (151 lb 1.6 oz)   SpO2 93%   BMI 29.51 kg/m    Gen: sitting in wheelchair, alert, cooperative and in no acute distress  Resp: breathing non labored, no tachypnea  Ext: no LE edema  Neuro: CX II-XII grossly in tact; ROM in all four extremities grossly in tact  Psych: alert and oriented x3; normal affect    LABS/IMAGING: Reviewed as per Epic and/or [facility]    ASSESSMENT / PLAN:    HTN  SBPs 120s-130s. HR 60s  -- metoprolol 12.5 mg BID   -- follow BPs and adjust medications as needed    Bipolar Disorder  Is setting into the new place well.   -- aripiprazole 15 mg at bedtime, citalopram 20 mg daily, lamotrigine 150 mg BID and quetiapine 25 mg at  bedtime  -- ongoing supportive cares    COPD  No recent exacerbation.   -- fluticasone-vilanterol 100-25 mcg daily, umeclidinium 62.5 mcg daily  -- follow clinically    Electronically signed by  Megan Bajwa MD

## 2023-05-01 NOTE — LETTER
5/1/2023        RE: Amparo Sharma  [facility] [address] [place] [address]        [facility] GERIATRICS  REGULATORY VISIT  May 1, 2023      [facility] Medical Record Number:  [id number]  Place of Service where encounter took place:  [facility] () [[id number]]    Chief Complaint   Patient presents with     [facility]       HPI:    Amparo Sharma is a 82 year old  (1940), who is being seen today for a federally mandated E/M visit at [facility] where she moved last month after over a year residing at a different facility - first in TCU then in LTC. HPI information obtained from: facility chart records, facility staff, patient report and [facility] chart review.    Today, Ms. Sharma is seen sitting in her wheelchair in the dining room. I had just seen her 5 weeks ago at her prior facility and she remembered me. She really likes the new facility, the food, the activities. She is pleased the baclofen was changed to cyclobenzaprine and we dicussed we have room to go up on both dose and frequency but would want to go slow and give it time to work. No concerns today per discussion with nursing. She's been using the PRN quetiapine for sleep - that's not an indication - so I discontinued the PRN.     ALLERGIES:    Allergies   Allergen Reactions     Amantadine      Antihistamine Allergy Relief [Diphenhydramine] Other (See Comments)     hyperactivity     Bactrim [Sulfamethoxazole-Trimethoprim]      Codeine Itching     Demerol [Meperidine] Nausea     Diazepam Other (See Comments)     Hallucinations/paranoid     Gabapentin      Meloxicam Other (See Comments)     Pentazocine Other (See Comments)     drowsiness     Statins Other (See Comments)     Was hospitalized for possible rhabdo     Sulfamethoxazole-Trimethoprim      Tramadol Nausea        Past Medical, Surgical, Family and Social History: Reviewed and updated in EPIC.    MEDICATIONS:  Current Outpatient Medications    Medication Sig Dispense Refill     acetaminophen (TYLENOL) 325 MG tablet Take 3 tablets (975 mg) by mouth every 6 hours 360 tablet 11     amoxicillin (AMOXIL) 500 MG tablet Take 2,000 mg by mouth as needed (prior to dental appointment)       ARIPiprazole (ABILIFY) 15 MG tablet Take 15 mg by mouth At Bedtime       aspirin (ASA) 81 MG EC tablet Take 1 tablet (81 mg) by mouth daily 60 tablet 11     bisacodyl (DULCOLAX) 10 MG suppository Place 1 suppository (10 mg) rectally daily as needed for constipation 8 suppository 11     BREO ELLIPTA 100-25 MCG/INH inhaler Inhale 1 puff into the lungs daily 1PM       carboxymethylcellulose (REFRESH PLUS) 0.5 % SOLN ophthalmic solution Place 1 drop into both eyes 3 times daily as needed for dry eyes       carboxymethylcellulose (REFRESH PLUS) 0.5 % SOLN ophthalmic solution Place 1 drop into both eyes in the morning.       cetirizine (ZYRTEC) 10 MG tablet Take 10 mg by mouth daily        citalopram (CELEXA) 20 MG tablet Take 1 tablet (20 mg) by mouth daily 30 tablet 11     cyclobenzaprine (FLEXERIL) 5 MG tablet Take 5 mg by mouth daily       diclofenac (VOLTAREN) 1 % topical gel Apply 4 g topically 4 times daily Apply to affected knees.       ferrous sulfate (FEROSUL) 325 (65 Fe) MG tablet Take 1 tablet (325 mg) by mouth Every Mon, Wed, Fri Morning       INCRUSE ELLIPTA 62.5 MCG/INH Inhaler Inhale 1 puff into the lungs daily        lamoTRIgine (LAMICTAL) 150 MG tablet Take 150 mg by mouth 2 times daily       levothyroxine (SYNTHROID/LEVOTHROID) 75 MCG tablet Take 75 mcg by mouth daily       menthol-zinc oxide (CALMOSEPTINE) 0.44-20.6 % OINT ointment Apply topically 2 times daily as needed for irritation       metoprolol tartrate (LOPRESSOR) 25 MG tablet Take 12.5 mg by mouth 2 times daily HOLD if SBP<100 and/or HR<55       mirabegron (MYRBETRIQ) 25 MG 24 hr tablet Take 25 mg by mouth daily        Multiple Vitamins-Minerals (MULTIVITAMIN PO) Take 1 tablet by mouth daily         ondansetron (ZOFRAN) 4 MG tablet Take 1 tablet (4 mg) by mouth every 6 hours as needed for nausea 30 tablet 11     pantoprazole (PROTONIX) 20 MG EC tablet Take 40 mg by mouth daily       polyethylene glycol (MIRALAX) 17 GM/Dose powder Take 17 g by mouth daily 510 g 0     QUEtiapine (SEROQUEL) 25 MG tablet Take 1 tablet (25 mg) by mouth At Bedtime 60 tablet 3     SENOKOT S 8.6-50 MG tablet Take 1 tablet by mouth daily AND daily PRN 90 tablet 11     vitamin C (ASCORBIC ACID) 500 MG tablet Take 500 mg by mouth 2 times daily       Vitamin D, Cholecalciferol, 25 MCG (1000 UT) CAPS Take 1,000 Units by mouth daily        Medications reviewed:  Medications reconciled to facility chart and changes were made to reflect current medications as identified as above med list. Below are the changes that were made:   Medications stopped since last EPIC medication reconciliation:   Medications Discontinued During This Encounter   Medication Reason     ipratropium - albuterol 0.5 mg/2.5 mg/3 mL (DUONEB) 0.5-2.5 (3) MG/3ML neb solution Med Rec(No AVS / No eCancel)     Medications started since last [facility] medication reconciliation:  No orders of the defined types were placed in this encounter.      REVIEW OF SYSTEMS:  4 point ROS neg other than the symptoms noted above in the HPI.    PHYSICAL EXAM:  /72   Pulse 64   Temp 97.2  F (36.2  C)   Resp 16   Ht 1.524 m (5')   Wt 68.5 kg (151 lb 1.6 oz)   SpO2 93%   BMI 29.51 kg/m    Gen: sitting in wheelchair, alert, cooperative and in no acute distress  Resp: breathing non labored, no tachypnea  Ext: no LE edema  Neuro: CX II-XII grossly in tact; ROM in all four extremities grossly in tact  Psych: alert and oriented x3; normal affect    LABS/IMAGING: Reviewed as per Epic and/or [facility]    ASSESSMENT / PLAN:    HTN  SBPs 120s-130s. HR 60s  -- metoprolol 12.5 mg BID   -- follow BPs and adjust medications as needed    Bipolar Disorder  Is setting into the new place well.   --  aripiprazole 15 mg at bedtime, citalopram 20 mg daily, lamotrigine 150 mg BID and quetiapine 25 mg at bedtime  -- ongoing supportive cares    COPD  No recent exacerbation.   -- fluticasone-vilanterol 100-25 mcg daily, umeclidinium 62.5 mcg daily  -- follow clinically    Electronically signed by  Megan Bajwa MD              Sincerely,        Megan Bajwa MD

## 2023-05-02 ENCOUNTER — NURSING HOME VISIT (OUTPATIENT)
Dept: GERIATRICS | Facility: CLINIC | Age: 83
End: 2023-05-02
Payer: MEDICARE

## 2023-05-02 VITALS
BODY MASS INDEX: 29.64 KG/M2 | HEART RATE: 68 BPM | HEIGHT: 60 IN | DIASTOLIC BLOOD PRESSURE: 89 MMHG | WEIGHT: 151 LBS | SYSTOLIC BLOOD PRESSURE: 147 MMHG

## 2023-05-02 DIAGNOSIS — Z51.81 ENCOUNTER FOR THERAPEUTIC DRUG MONITORING: Primary | ICD-10-CM

## 2023-05-02 PROCEDURE — 99309 SBSQ NF CARE MODERATE MDM 30: CPT | Performed by: NURSE PRACTITIONER

## 2023-05-02 NOTE — PROGRESS NOTES
[facility] GERIATRIC SERVICES  Chief Complaint   Patient presents with     intermediate Regulatory     [facility] Medical Record Number:  [id number]  Place of Service where encounter took place: [facility]  Code Status:  Full Code     HISTORY:      HPI:  Amparo Sharma  is 82 year old (1940) currently residing at [facility].  She transferred to this facility from the TCU at [facility] on 3/29/2023.  She is with past medical history COPD, normal pressure hydrocephalus, memory loss, frequent falls, bladder incontinence, abdominal hernia, alcoholism in remission, anemia, arthritis, asthma, bipolar 1 disorder, depression, GERD, hypertension, hyperlipidemia, and hypothyroidism    Today she was seen to review vital signs  and medication encounter.  She was seen today to follow-up on a pharmacy requisition.  Her May repeat x1 Seroquel dose was discontinued and per nursing she has never used it.  She recently transferred from [facility] to [facility]..  She denied chest pain shortness of breath cough and constipation or diarrhea.  Her pain is controlled with Tylenol.  She is wheelchair-bound and EZ stand transfer.      ALLERGIES:Amantadine, Antihistamine allergy relief [diphenhydramine], Bactrim [sulfamethoxazole-trimethoprim], Codeine, Demerol [meperidine], Diazepam, Gabapentin, Meloxicam, Pentazocine, Statins, Sulfamethoxazole-trimethoprim, and Tramadol    PAST MEDICAL HISTORY:   Past Medical History:   Diagnosis Date     Abdominal hernia      Abdominal hernia      Alcoholism in remission (H)      Alcoholism in remission (H) 2017     Anemia      Arthritis      Arthritis      Asthma      Asthma 1/20/2017     Bipolar 1 disorder (H)      Bipolar disorder (H)      Bladder incontinence 2013     Cancer (H)      Cellulitis      Cellulitis 12/12/2016    of left elbow     Chronic pain syndrome      COPD (chronic obstructive pulmonary disease) (H)      COPD with acute exacerbation  (H) 12/21/2018     Dementia (H)      Depression      Depression      Disease of thyroid gland      Falls frequently      Frequent falls 2016     Frequent falls 2017     GERD (gastroesophageal reflux disease)      GERD (gastroesophageal reflux disease)      History of blood transfusion     20 years ago     History of colon cancer     s/p resection 1/2015, treated with 5-6 cycles of Folfox     History of transfusion      HTN (hypertension)      Hydrocephalus (H)      Hydrocephalus (H)      Hyperlipidemia      Hypertension      Hypothyroidism      Hypothyroidism      Infection of skin due to methicillin resistant Staphylococcus aureus (MRSA)      Loss of balance      Memory loss      Memory loss 2017     Normal pressure hydrocephalus (H) 02/03/2017     NPH (normal pressure hydrocephalus) (H)      Parkinson disease (H)      Renal insufficiency      Squamous cell cancer of multiple sites of skin of upper arm, left 2016    Dr. Andrea      Thyroid disease      Urinary incontinence      Urinary incontinence        PAST SURGICAL HISTORY:   has a past surgical history that includes Hysterectomy; multiple knee surgeries; rotator cuff surgeries; Cholecystectomy; colon cancer resection (1/2015); GI surgery; Abdomen surgery; orthopedic surgery; Insert drain lumbar (N/A, 2/5/2017); Optical tracking system implant shunt ventriculoperitoneal (Right, 2/8/2017); GYN surgery; Esophagoscopy, gastroscopy, duodenoscopy (EGD), combined (N/A, 9/7/2017); Colon surgery; Laparoscopic assisted colectomy; Hysterectomy; joint replacement; Arthroscopy shoulder rotator cuff repair; fracture surgery; Laparoscopic cholecystectomy; Implant shunt ventriculoperitoneal (02/03/2017); Lumbar Puncture (02/03/2017); RECONSTR TOTAL SHOULDER IMPLANT (Right, 3/5/2019); Colonoscopy (N/A, 2/3/2020); and Reverse arthroplasty shoulder (Left, 4/11/2022).    FAMILY HISTORY: family history includes Arthritis in her brother; Breast Cancer (age of onset: 60) in her  mother; Depression (age of onset: 55) in her father; Peptic Ulcer Disease in her father; Prostate Cancer in her brother.    SOCIAL HISTORY:  reports that she quit smoking about 26 years ago. Her smoking use included cigarettes. She started smoking about 43 years ago. She smoked an average of .25 packs per day. She has quit using smokeless tobacco. She reports that she does not drink alcohol and does not use drugs.    ROS:  Constitutional: Negative for activity change, appetite change, fatigue and fever. Wheelchair bound   HENT: Negative for congestion.  2  shunts  Respiratory: Negative for cough, shortness of breath and HX wheezing.    Cardiovascular: Negative for chest pain and leg swelling.   Gastrointestinal: Negative for abdominal distention, abdominal pain, constipation, diarrhea and nausea. Ventral hernia   Genitourinary: Negative for dysuria.   Musculoskeletal: Negative for arthralgia. Negative for back pain.   Skin: Negative for color change and wound.   Neurological: Negative for dizziness.   Psychiatric/Behavioral: Negative for agitation, behavioral problems and confusion.     Physical Exam:  Constitutional:       Appearance: Patient is well-developed.   HENT:      Head: Normocephalic.   Eyes:      Conjunctiva/sclera: Conjunctivae normal.   Neck:      Musculoskeletal: Normal range of motion.   Cardiovascular:      Rate and Rhythm: Normal rate and regular rhythm.      Heart sounds: Normal heart sounds. No murmur.   Pulmonary:      Effort: No respiratory distress.      Breath sounds: Normal breath sounds. No wheezing or rales.   Abdominal:      General: Bowel sounds are normal. There is no distension.      Palpations: Ventral hernia     Tenderness: There is no abdominal tenderness.   Musculoskeletal:       Normal range of motion.   Wheelchair bound Easy stand transfer  Skin:General:        Skin is warm.   Neurological:         Mental Status: Patient is alert and oriented to person, place, and time.    Psychiatric:         Behavior: Behavior normal.     Vitals:BP (!) 147/89   Pulse 68   Ht 1.524 m (5')   Wt 68.5 kg (151 lb)   BMI 29.49 kg/m   and Body mass index is 29.49 kg/m .    Lab/Diagnostic data:   No results found for this or any previous visit (from the past 240 hour(s)).    MEDICATIONS:     Review of your medicines          Accurate as of May 2, 2023 11:09 AM. If you have any questions, ask your nurse or doctor.            CONTINUE these medicines which have NOT CHANGED      Dose / Directions   acetaminophen 325 MG tablet  Commonly known as: TYLENOL  Used for: Acute post-operative pain      Dose: 975 mg  Take 3 tablets (975 mg) by mouth every 6 hours  Quantity: 360 tablet  Refills: 11     amoxicillin 500 MG tablet  Commonly known as: AMOXIL      Dose: 2,000 mg  Take 2,000 mg by mouth as needed (prior to dental appointment)  Refills: 0     ARIPiprazole 15 MG tablet  Commonly known as: ABILIFY      Dose: 15 mg  Take 15 mg by mouth At Bedtime  Refills: 0     aspirin 81 MG EC tablet  Commonly known as: ASA  Used for: S/P reverse total shoulder arthroplasty, left      Dose: 81 mg  Take 1 tablet (81 mg) by mouth daily  Quantity: 60 tablet  Refills: 11     bisacodyl 10 MG suppository  Commonly known as: DULCOLAX  Used for: Slow transit constipation      Dose: 10 mg  Place 1 suppository (10 mg) rectally daily as needed for constipation  Quantity: 8 suppository  Refills: 11     Breo Ellipta 100-25 MCG/ACT inhaler  Generic drug: fluticasone-vilanterol      Dose: 1 puff  Inhale 1 puff into the lungs daily 1PM  Refills: 0     * carboxymethylcellulose 0.5 % Soln ophthalmic solution  Commonly known as: REFRESH PLUS      Dose: 1 drop  Place 1 drop into both eyes in the morning.  Refills: 0     * carboxymethylcellulose 0.5 % Soln ophthalmic solution  Commonly known as: REFRESH PLUS      Dose: 1 drop  Place 1 drop into both eyes 3 times daily as needed for dry eyes  Refills: 0     cetirizine 10 MG tablet  Commonly  known as: zyrTEC      Dose: 10 mg  Take 10 mg by mouth daily  Refills: 0     citalopram 20 MG tablet  Commonly known as: celeXA  Used for: Bipolar 1 disorder (H), Suicidal thoughts, Episode of recurrent major depressive disorder, unspecified depression episode severity (H)      Dose: 20 mg  Take 1 tablet (20 mg) by mouth daily  Quantity: 30 tablet  Refills: 11     cyclobenzaprine 5 MG tablet  Commonly known as: FLEXERIL      Dose: 5 mg  Take 5 mg by mouth daily  Refills: 0     diclofenac 1 % topical gel  Commonly known as: VOLTAREN  Used for: Acute post-operative pain      Dose: 4 g  Apply 4 g topically 4 times daily Apply to affected knees.  Refills: 0     ferrous sulfate 325 (65 Fe) MG tablet  Commonly known as: FEROSUL      Dose: 325 mg  Take 1 tablet (325 mg) by mouth Every Mon, Wed, Fri Morning  Refills: 0     Incruse Ellipta 62.5 MCG/ACT inhaler  Generic drug: umeclidinium      Dose: 1 puff  Inhale 1 puff into the lungs daily  Refills: 0     lamoTRIgine 150 MG tablet  Commonly known as: LaMICtal      Dose: 150 mg  Take 150 mg by mouth 2 times daily  Refills: 0     levothyroxine 75 MCG tablet  Commonly known as: SYNTHROID/LEVOTHROID      Dose: 75 mcg  Take 75 mcg by mouth daily  Refills: 0     menthol-zinc oxide 0.44-20.6 % Oint ointment  Commonly known as: CALMOSEPTINE      Apply topically 2 times daily as needed for irritation  Refills: 0     metoprolol tartrate 25 MG tablet  Commonly known as: LOPRESSOR      Dose: 12.5 mg  Take 12.5 mg by mouth 2 times daily HOLD if SBP<100 and/or HR<55  Refills: 0     mirabegron 25 MG 24 hr tablet  Commonly known as: MYRBETRIQ      Dose: 25 mg  Take 25 mg by mouth daily  Refills: 0     MULTIVITAMIN PO  Used for: Gait disturbance, Memory loss, Malignant neoplasm of sigmoid colon (H)      Dose: 1 tablet  Take 1 tablet by mouth daily  Refills: 0     ondansetron 4 MG tablet  Commonly known as: ZOFRAN  Used for: Nausea      Dose: 4 mg  Take 1 tablet (4 mg) by mouth every 6 hours  as needed for nausea  Quantity: 30 tablet  Refills: 11     pantoprazole 20 MG EC tablet  Commonly known as: PROTONIX      Dose: 40 mg  Take 40 mg by mouth daily  Refills: 0     polyethylene glycol 17 GM/Dose powder  Commonly known as: MIRALAX  Used for: Drug-induced constipation      Dose: 17 g  Take 17 g by mouth daily  Quantity: 510 g  Refills: 0     QUEtiapine 25 MG tablet  Commonly known as: SEROquel  Used for: Bipolar 1 disorder (H), Suicidal thoughts, Episode of recurrent major depressive disorder, unspecified depression episode severity (H)      Dose: 25 mg  Take 1 tablet (25 mg) by mouth At Bedtime  Quantity: 60 tablet  Refills: 3     Senokot S 8.6-50 MG tablet  Used for: Slow transit constipation  Generic drug: senna-docusate      Dose: 1 tablet  Take 1 tablet by mouth daily AND daily PRN  Quantity: 90 tablet  Refills: 11     vitamin C 500 MG tablet  Commonly known as: ASCORBIC ACID      Dose: 500 mg  Take 500 mg by mouth 2 times daily  Refills: 0     Vitamin D (Cholecalciferol) 25 MCG (1000 UT) Caps      Dose: 1,000 Units  Take 1,000 Units by mouth daily  Refills: 0         * This list has 2 medication(s) that are the same as other medications prescribed for you. Read the directions carefully, and ask your doctor or other care provider to review them with you.                ASSESSMENT/PLAN  Encounter Diagnosis   Name Primary?     Encounter for therapeutic drug monitoring Yes       Pain management scheduled Tylenol, baclofen 3 times daily recently discontinued 4/25/2023 per her request and she was started on Flexeril 5 mg p.o. daily, Voltaren gel 4 times daily to affected knee    COPD on Breo Ellipta) Tropium albuterol nebulizer 4 times daily as needed    Mood disorder continue Celexa    Allergies on Zyrtec    Anemia continue ferrous sulfate 3 times weekly,    Bipolar on Abilify, lamotrigine twice daily, Seroquel 25 mg at bedtime her repeat x 1 dose was discontinued     Hypothyroidism continue  levothyroxine TSH 1/10/23 was 0.89    Hypertension on metoprolol tartrate    GERD on Protonix    Electronically signed by: Treasure Castaneda CNP

## 2023-05-02 NOTE — LETTER
5/2/2023        RE: Amparo Sharma  [facility]  [address] [place] [address]         HEALTH GERIATRIC SERVICES  Chief Complaint   Patient [facility] with     [facility]     [facility]     [facility] Record Number:  [id number]  Place of Service where encounter took place: [facility]  Code Status:  Full Code     HISTORY:      HPI:  Amparo Sharma  is 82 year old (1940) currently residing at [facility].  She transferred to this facility from the TCU at [facility] on 3/29/2023.  She is with past medical history COPD, normal pressure hydrocephalus, memory loss, frequent falls, bladder incontinence, abdominal hernia, alcoholism in remission, anemia, arthritis, asthma, bipolar 1 disorder, depression, GERD, hypertension, hyperlipidemia, and hypothyroidism    Today she was seen to review vital signs  and medication encounter.  She was seen today to follow-up on a pharmacy requisition.  Her May repeat x1 Seroquel dose was discontinued and per nursing she has never used it.  She recently transferred from [facility] to [facility]..  She denied chest pain shortness of breath cough and constipation or diarrhea.  Her pain is controlled with Tylenol.  She is wheelchair-bound and EZ stand transfer.      ALLERGIES:Amantadine, Antihistamine allergy relief [diphenhydramine], Bactrim [sulfamethoxazole-trimethoprim], Codeine, Demerol [meperidine], Diazepam, Gabapentin, Meloxicam, Pentazocine, Statins, Sulfamethoxazole-trimethoprim, and Tramadol    PAST MEDICAL HISTORY:   Past Medical History:   Diagnosis Date     Abdominal hernia      Abdominal hernia      Alcoholism in remission (H)      Alcoholism in remission (H) 2017     Anemia      Arthritis      Arthritis      Asthma      Asthma 1/20/2017     Bipolar 1 disorder (H)      Bipolar disorder (H)      Bladder incontinence 2013     Cancer (H)      Cellulitis      Cellulitis 12/12/2016    of left elbow      Chronic pain syndrome      COPD (chronic obstructive pulmonary disease) (H)      COPD with acute exacerbation (H) 12/21/2018     Dementia (H)      Depression      Depression      Disease of thyroid gland      Falls frequently      Frequent falls 2016     Frequent falls 2017     GERD (gastroesophageal reflux disease)      GERD (gastroesophageal reflux disease)      History of blood transfusion     20 years ago     History of colon cancer     s/p resection 1/2015, treated with 5-6 cycles of Folfox     History of transfusion      HTN (hypertension)      Hydrocephalus (H)      Hydrocephalus (H)      Hyperlipidemia      Hypertension      Hypothyroidism      Hypothyroidism      Infection of skin due to methicillin resistant Staphylococcus aureus (MRSA)      Loss of balance      Memory loss      Memory loss 2017     Normal pressure hydrocephalus (H) 02/03/2017     NPH (normal pressure hydrocephalus) (H)      Parkinson disease (H)      Renal insufficiency      Squamous cell cancer of multiple sites of skin of upper arm, left 2016    Dr. Andrea      Thyroid disease      Urinary incontinence      Urinary incontinence        PAST SURGICAL HISTORY:   has a past surgical history that includes Hysterectomy; multiple knee surgeries; rotator cuff surgeries; Cholecystectomy; colon cancer resection (1/2015); GI surgery; Abdomen surgery; orthopedic surgery; Insert drain lumbar (N/A, 2/5/2017); Optical tracking system implant shunt ventriculoperitoneal (Right, 2/8/2017); GYN surgery; Esophagoscopy, gastroscopy, duodenoscopy (EGD), combined (N/A, 9/7/2017); Colon surgery; Laparoscopic assisted colectomy; Hysterectomy; joint replacement; Arthroscopy shoulder rotator cuff repair; fracture surgery; Laparoscopic cholecystectomy; Implant shunt ventriculoperitoneal (02/03/2017); Lumbar Puncture (02/03/2017); RECONSTR TOTAL SHOULDER IMPLANT (Right, 3/5/2019); Colonoscopy (N/A, 2/3/2020); and Reverse arthroplasty shoulder (Left,  4/11/2022).    FAMILY HISTORY: family history includes Arthritis in her brother; Breast Cancer (age of onset: 60) in her mother; Depression (age of onset: 55) in her father; Peptic Ulcer Disease in her father; Prostate Cancer in her brother.    SOCIAL HISTORY:  reports that she quit smoking about 26 years ago. Her smoking use included cigarettes. She started smoking about 43 years ago. She smoked an average of .25 packs per day. She has quit using smokeless tobacco. She reports that she does not drink alcohol and does not use drugs.    ROS:  Constitutional: Negative for activity change, appetite change, fatigue and fever. Wheelchair bound   HENT: Negative for congestion.  2  shunts  Respiratory: Negative for cough, shortness of breath and HX wheezing.    Cardiovascular: Negative for chest pain and leg swelling.   Gastrointestinal: Negative for abdominal distention, abdominal pain, constipation, diarrhea and nausea. Ventral hernia   Genitourinary: Negative for dysuria.   Musculoskeletal: Negative for arthralgia. Negative for back pain.   Skin: Negative for color change and wound.   Neurological: Negative for dizziness.   Psychiatric/Behavioral: Negative for agitation, behavioral problems and confusion.     Physical Exam:  Constitutional:       Appearance: Patient is well-developed.   HENT:      Head: Normocephalic.   Eyes:      Conjunctiva/sclera: Conjunctivae normal.   Neck:      Musculoskeletal: Normal range of motion.   Cardiovascular:      Rate and Rhythm: Normal rate and regular rhythm.      Heart sounds: Normal heart sounds. No murmur.   Pulmonary:      Effort: No respiratory distress.      Breath sounds: Normal breath sounds. No wheezing or rales.   Abdominal:      General: Bowel sounds are normal. There is no distension.      Palpations: Ventral hernia     Tenderness: There is no abdominal tenderness.   Musculoskeletal:       Normal range of motion.   Wheelchair bound Easy stand transfer  Skin:General:         Skin is warm.   Neurological:         Mental Status: Patient is alert and oriented to person, place, and time.   Psychiatric:         Behavior: Behavior normal.     Vitals:BP (!) 147/89   Pulse 68   Ht 1.524 m (5')   Wt 68.5 kg (151 lb)   BMI 29.49 kg/m   and Body mass index is 29.49 kg/m .    Lab/Diagnostic data:   No results found for this or any previous visit (from the past 240 hour(s)).    MEDICATIONS:     Review of your medicines          Accurate as of May 2, 2023 11:09 AM. If you have any questions, ask your nurse or doctor.            CONTINUE these medicines which have NOT CHANGED      Dose / Directions   acetaminophen 325 MG tablet  Commonly known as: TYLENOL  Used for: Acute post-operative pain      Dose: 975 mg  Take 3 tablets (975 mg) by mouth every 6 hours  Quantity: 360 tablet  Refills: 11     amoxicillin 500 MG tablet  Commonly known as: AMOXIL      Dose: 2,000 mg  Take 2,000 mg by mouth as needed (prior to dental appointment)  Refills: 0     ARIPiprazole 15 MG tablet  Commonly known as: ABILIFY      Dose: 15 mg  Take 15 mg by mouth At Bedtime  Refills: 0     aspirin 81 MG EC tablet  Commonly known as: ASA  Used for: S/P reverse total shoulder arthroplasty, left      Dose: 81 mg  Take 1 tablet (81 mg) by mouth daily  Quantity: 60 tablet  Refills: 11     bisacodyl 10 MG suppository  Commonly known as: DULCOLAX  Used for: Slow transit constipation      Dose: 10 mg  Place 1 suppository (10 mg) rectally daily as needed for constipation  Quantity: 8 suppository  Refills: 11     Breo Ellipta 100-25 MCG/ACT inhaler  Generic drug: fluticasone-vilanterol      Dose: 1 puff  Inhale 1 puff into the lungs daily 1PM  Refills: 0     * carboxymethylcellulose 0.5 % Soln ophthalmic solution  Commonly known as: REFRESH PLUS      Dose: 1 drop  Place 1 drop into both eyes in the morning.  Refills: 0     * carboxymethylcellulose 0.5 % Soln ophthalmic solution  Commonly known as: REFRESH PLUS      Dose: 1  drop  Place 1 drop into both eyes 3 times daily as needed for dry eyes  Refills: 0     cetirizine 10 MG tablet  Commonly known as: zyrTEC      Dose: 10 mg  Take 10 mg by mouth daily  Refills: 0     citalopram 20 MG tablet  Commonly known as: celeXA  Used for: Bipolar 1 disorder (H), Suicidal thoughts, Episode of recurrent major depressive disorder, unspecified depression episode severity (H)      Dose: 20 mg  Take 1 tablet (20 mg) by mouth daily  Quantity: 30 tablet  Refills: 11     cyclobenzaprine 5 MG tablet  Commonly known as: FLEXERIL      Dose: 5 mg  Take 5 mg by mouth daily  Refills: 0     diclofenac 1 % topical gel  Commonly known as: VOLTAREN  Used for: Acute post-operative pain      Dose: 4 g  Apply 4 g topically 4 times daily Apply to affected knees.  Refills: 0     ferrous sulfate 325 (65 Fe) MG tablet  Commonly known as: FEROSUL      Dose: 325 mg  Take 1 tablet (325 mg) by mouth Every Mon, Wed, Fri Morning  Refills: 0     Incruse Ellipta 62.5 MCG/ACT inhaler  Generic drug: umeclidinium      Dose: 1 puff  Inhale 1 puff into the lungs daily  Refills: 0     lamoTRIgine 150 MG tablet  Commonly known as: LaMICtal      Dose: 150 mg  Take 150 mg by mouth 2 times daily  Refills: 0     levothyroxine 75 MCG tablet  Commonly known as: SYNTHROID/LEVOTHROID      Dose: 75 mcg  Take 75 mcg by mouth daily  Refills: 0     menthol-zinc oxide 0.44-20.6 % Oint ointment  Commonly known as: CALMOSEPTINE      Apply topically 2 times daily as needed for irritation  Refills: 0     metoprolol tartrate 25 MG tablet  Commonly known as: LOPRESSOR      Dose: 12.5 mg  Take 12.5 mg by mouth 2 times daily HOLD if SBP<100 and/or HR<55  Refills: 0     mirabegron 25 MG 24 hr tablet  Commonly known as: MYRBETRIQ      Dose: 25 mg  Take 25 mg by mouth daily  Refills: 0     MULTIVITAMIN PO  Used for: Gait disturbance, Memory loss, Malignant neoplasm of sigmoid colon (H)      Dose: 1 tablet  Take 1 tablet by mouth daily  Refills: 0      ondansetron 4 MG tablet  Commonly known as: ZOFRAN  Used for: Nausea      Dose: 4 mg  Take 1 tablet (4 mg) by mouth every 6 hours as needed for nausea  Quantity: 30 tablet  Refills: 11     pantoprazole 20 MG EC tablet  Commonly known as: PROTONIX      Dose: 40 mg  Take 40 mg by mouth daily  Refills: 0     polyethylene glycol 17 GM/Dose powder  Commonly known as: MIRALAX  Used for: Drug-induced constipation      Dose: 17 g  Take 17 g by mouth daily  Quantity: 510 g  Refills: 0     QUEtiapine 25 MG tablet  Commonly known as: SEROquel  Used for: Bipolar 1 disorder (H), Suicidal thoughts, Episode of recurrent major depressive disorder, unspecified depression episode severity (H)      Dose: 25 mg  Take 1 tablet (25 mg) by mouth At Bedtime  Quantity: 60 tablet  Refills: 3     Senokot S 8.6-50 MG tablet  Used for: Slow transit constipation  Generic drug: senna-docusate      Dose: 1 tablet  Take 1 tablet by mouth daily AND daily PRN  Quantity: 90 tablet  Refills: 11     vitamin C 500 MG tablet  Commonly known as: ASCORBIC ACID      Dose: 500 mg  Take 500 mg by mouth 2 times daily  Refills: 0     Vitamin D (Cholecalciferol) 25 MCG (1000 UT) Caps      Dose: 1,000 Units  Take 1,000 Units by mouth daily  Refills: 0         * This list has 2 medication(s) that are the same as other medications prescribed for you. Read the directions carefully, and ask your doctor or other care provider to review them with you.                ASSESSMENT/PLAN  Encounter Diagnosis   Name Primary?     Encounter for therapeutic drug monitoring Yes       Pain management scheduled Tylenol, baclofen 3 times daily recently discontinued 4/25/2023 per her request and she was started on Flexeril 5 mg p.o. daily, Voltaren gel 4 times daily to affected knee    COPD on Breo Ellipta) Tropium albuterol nebulizer 4 times daily as needed    Mood disorder continue Celexa    Allergies on Zyrtec    Anemia continue ferrous sulfate 3 times weekly,    Bipolar on  Abilify, lamotrigine twice daily, Seroquel 25 mg at bedtime her repeat x 1 dose was discontinued     Hypothyroidism continue levothyroxine TSH 1/10/23 was 0.89    Hypertension on metoprolol tartrate    GERD on Protonix    Electronically signed by: Treasure Castaneda CNP        Sincerely,        Treasure Castaneda CNP

## 2023-05-12 ENCOUNTER — TELEPHONE (OUTPATIENT)
Dept: GERIATRICS | Facility: CLINIC | Age: 83
End: 2023-05-12
Payer: MEDICARE

## 2023-05-12 NOTE — TELEPHONE ENCOUNTER
[place] [facility] NON-EMERGENT ENCOUNTER    Amparo Sharma is a 82 year old  (1940), Message received today regarding:   Hypoxic episode that occurred yesterday night with no complications. Dx of COPD. Pt became hypoxic with O2 sat 82% on RA and audible wheezing. Nurse applied O2 at 2LPM and sat increased to 86%. Nurse administered PRN Albuterol and sat then increased to 91%, was able to maintain at 96% on 2LPM for 2 hours then stopped the oxygen.     Today lung sounds clear, no audible wheezing. O2 sat 93% on RA. Pt refuses any further treatment by provider that may include orders for PRN Albuterol or oxygen. Pt reports feeling better and that this happens often.        Requests    PCP updated, NNO      Electronically signed by:   Tammy Back RN

## 2023-05-31 VITALS
WEIGHT: 155.8 LBS | OXYGEN SATURATION: 94 % | TEMPERATURE: 97.8 F | HEART RATE: 70 BPM | DIASTOLIC BLOOD PRESSURE: 56 MMHG | RESPIRATION RATE: 18 BRPM | BODY MASS INDEX: 30.59 KG/M2 | SYSTOLIC BLOOD PRESSURE: 130 MMHG | HEIGHT: 60 IN

## 2023-05-31 NOTE — PROGRESS NOTES
[facility] GERIATRIC SERVICES  Chief Complaint   Patient presents with     [facility]     [place] Medical Record Number:  [id number]  Place of Service where encounter took place: [facility]  Code Status:  Full Code     HISTORY:      HPI:  Amparo Sharma  is 82 year old (1940) currently residing at [facility].  She transferred to this facility from the TCU at [facility] on 3/29/2023.  She is with past medical history COPD, normal pressure hydrocephalus, memory loss, frequent falls, bladder incontinence, abdominal hernia, alcoholism in remission, anemia, arthritis, asthma, bipolar 1 disorder, depression, GERD, hypertension, hyperlipidemia, and hypothyroidism    Today she was seen to review vital signs, labs, routine visit.   She denied chest pain shortness of breath cough and constipation or diarrhea.  She is with a large ventral hernia Which is not causing her any discomfort.  She takes Tylenol for bilateral chronic knee pain with relief.    She is wheelchair-bound and EZ stand for transfers.  She does wear braces on her legs at night.  She reports she is sleeping well.  She does have a scab right medial heel, she was getting Betadine to the heel, however it was changed to Aquaphor.  Her weights were reviewed she is up 3.7 pounds over the last week.    ALLERGIES:Amantadine, Antihistamine allergy relief [diphenhydramine], Bactrim [sulfamethoxazole-trimethoprim], Codeine, Demerol [meperidine], Diazepam, Gabapentin, Meloxicam, Pentazocine, Statins, Sulfamethoxazole-trimethoprim, and Tramadol    PAST MEDICAL HISTORY:   Past Medical History:   Diagnosis Date     Abdominal hernia      Abdominal hernia      Alcoholism in remission (H)      Alcoholism in remission (H) 2017     Anemia      Arthritis      Arthritis      Asthma      Asthma 1/20/2017     Bipolar 1 disorder (H)      Bipolar disorder (H)      Bladder incontinence 2013     Cancer (H)      Cellulitis      Cellulitis  12/12/2016    of left elbow     Chronic pain syndrome      COPD (chronic obstructive pulmonary disease) (H)      COPD with acute exacerbation (H) 12/21/2018     Dementia (H)      Depression      Depression      Disease of thyroid gland      Falls frequently      Frequent falls 2016     Frequent falls 2017     GERD (gastroesophageal reflux disease)      GERD (gastroesophageal reflux disease)      History of blood transfusion     20 years ago     History of colon cancer     s/p resection 1/2015, treated with 5-6 cycles of Folfox     History of transfusion      HTN (hypertension)      Hydrocephalus (H)      Hydrocephalus (H)      Hyperlipidemia      Hypertension      Hypothyroidism      Hypothyroidism      Infection of skin due to methicillin resistant Staphylococcus aureus (MRSA)      Loss of balance      Memory loss      Memory loss 2017     Normal pressure hydrocephalus (H) 02/03/2017     NPH (normal pressure hydrocephalus) (H)      Parkinson disease (H)      Renal insufficiency      Squamous cell cancer of multiple sites of skin of upper arm, left 2016    Dr. Andrea      Thyroid disease      Urinary incontinence      Urinary incontinence        PAST SURGICAL HISTORY:   has a past surgical history that includes Hysterectomy; multiple knee surgeries; rotator cuff surgeries; Cholecystectomy; colon cancer resection (1/2015); GI surgery; Abdomen surgery; orthopedic surgery; Insert drain lumbar (N/A, 2/5/2017); Optical tracking system implant shunt ventriculoperitoneal (Right, 2/8/2017); GYN surgery; Esophagoscopy, gastroscopy, duodenoscopy (EGD), combined (N/A, 9/7/2017); Colon surgery; Laparoscopic assisted colectomy; Hysterectomy; joint replacement; Arthroscopy shoulder rotator cuff repair; fracture surgery; Laparoscopic cholecystectomy; Implant shunt ventriculoperitoneal (02/03/2017); Lumbar Puncture (02/03/2017); RECONSTR TOTAL SHOULDER IMPLANT (Right, 3/5/2019); Colonoscopy (N/A, 2/3/2020); and Reverse arthroplasty  shoulder (Left, 4/11/2022).    FAMILY HISTORY: family history includes Arthritis in her brother; Breast Cancer (age of onset: 60) in her mother; Depression (age of onset: 55) in her father; Peptic Ulcer Disease in her father; Prostate Cancer in her brother.    SOCIAL HISTORY:  reports that she quit smoking about 26 years ago. Her smoking use included cigarettes. She started smoking about 43 years ago. She smoked an average of .25 packs per day. She has quit using smokeless tobacco. She reports that she does not drink alcohol and does not use drugs.    ROS:  Constitutional: Negative for activity change, appetite change, fatigue and fever. Wheelchair bound   HENT: Negative for congestion.  2  shunts  Respiratory: Negative for cough, shortness of breath and HX wheezing.  No wheezing noted today   Cardiovascular: Negative for chest pain and leg swelling.   Gastrointestinal: Negative for abdominal distention, abdominal pain, constipation, diarrhea and nausea. Ventral hernia   Genitourinary: Negative for dysuria.   Musculoskeletal: Negative for arthralgia. Negative for back pain.  Leg braces at night  Skin: Negative for color change and wound.   Neurological: Negative for dizziness.   Psychiatric/Behavioral: Negative for agitation, behavioral problems and confusion.     Physical Exam:  Constitutional:       Appearance: Patient is well-developed.   HENT:      Head: Normocephalic.   Eyes:      Conjunctiva/sclera: Conjunctivae normal.   Neck:      Musculoskeletal: Normal range of motion.   Cardiovascular:      Rate and Rhythm: Normal rate and regular rhythm.      Heart sounds: Normal heart sounds. No murmur.   Pulmonary:      Effort: No respiratory distress.      Breath sounds: Normal breath sounds. No wheezing or rales.   Abdominal:      General: Bowel sounds are normal. There is no distension.      Palpations: Ventral hernia     Tenderness: There is no abdominal tenderness.   Musculoskeletal:       Normal range of  motion.   Wheelchair bound Easy stand transfer  Skin:General:        Skin is warm.   Neurological:         Mental Status: Patient is alert and oriented to person, place, and time.   Psychiatric:         Behavior: Behavior normal.     Vitals:/56   Pulse 70   Temp 97.8  F (36.6  C)   Resp 18   Ht 1.524 m (5')   Wt 70.7 kg (155 lb 12.8 oz)   SpO2 94%   BMI 30.43 kg/m   and Body mass index is 30.43 kg/m .    Lab/Diagnostic data:   No results found for this or any previous visit (from the past 240 hour(s)).    MEDICATIONS:     Review of your medicines          Accurate as of June 1, 2023 11:59 PM. If you have any questions, ask your nurse or doctor.            CONTINUE these medicines which have NOT CHANGED      Dose / Directions   acetaminophen 325 MG tablet  Commonly known as: TYLENOL  Used for: Acute post-operative pain      Dose: 975 mg  Take 3 tablets (975 mg) by mouth every 6 hours  Quantity: 360 tablet  Refills: 11     amoxicillin 500 MG tablet  Commonly known as: AMOXIL      Dose: 2,000 mg  Take 2,000 mg by mouth as needed (prior to dental appointment)  Refills: 0     ARIPiprazole 15 MG tablet  Commonly known as: ABILIFY      Dose: 15 mg  Take 15 mg by mouth At Bedtime  Refills: 0     aspirin 81 MG EC tablet  Commonly known as: ASA  Used for: S/P reverse total shoulder arthroplasty, left      Dose: 81 mg  Take 1 tablet (81 mg) by mouth daily  Quantity: 60 tablet  Refills: 11     bisacodyl 10 MG suppository  Commonly known as: DULCOLAX  Used for: Slow transit constipation      Dose: 10 mg  Place 1 suppository (10 mg) rectally daily as needed for constipation  Quantity: 8 suppository  Refills: 11     Breo Ellipta 100-25 MCG/ACT inhaler  Generic drug: fluticasone-vilanterol      Dose: 1 puff  Inhale 1 puff into the lungs daily 1PM  Refills: 0     * carboxymethylcellulose 0.5 % Soln ophthalmic solution  Commonly known as: REFRESH PLUS      Dose: 1 drop  Place 1 drop into both eyes in the  morning.  Refills: 0     * carboxymethylcellulose 0.5 % Soln ophthalmic solution  Commonly known as: REFRESH PLUS      Dose: 1 drop  Place 1 drop into both eyes 3 times daily as needed for dry eyes  Refills: 0     cetirizine 10 MG tablet  Commonly known as: zyrTEC      Dose: 10 mg  Take 10 mg by mouth daily  Refills: 0     citalopram 20 MG tablet  Commonly known as: celeXA  Used for: Bipolar 1 disorder (H), Suicidal thoughts, Episode of recurrent major depressive disorder, unspecified depression episode severity (H)      Dose: 20 mg  Take 1 tablet (20 mg) by mouth daily  Quantity: 30 tablet  Refills: 11     cyclobenzaprine 5 MG tablet  Commonly known as: FLEXERIL      Dose: 5 mg  Take 5 mg by mouth daily  Refills: 0     diclofenac 1 % topical gel  Commonly known as: VOLTAREN  Used for: Acute post-operative pain      Dose: 4 g  Apply 4 g topically 4 times daily Apply to affected knees.  Refills: 0     ferrous sulfate 325 (65 Fe) MG tablet  Commonly known as: FEROSUL      Dose: 325 mg  Take 1 tablet (325 mg) by mouth Every Mon, Wed, Fri Morning  Refills: 0     Incruse Ellipta 62.5 MCG/ACT inhaler  Generic drug: umeclidinium      Dose: 1 puff  Inhale 1 puff into the lungs daily  Refills: 0     lamoTRIgine 150 MG tablet  Commonly known as: LaMICtal      Dose: 150 mg  Take 150 mg by mouth 2 times daily  Refills: 0     levothyroxine 75 MCG tablet  Commonly known as: SYNTHROID/LEVOTHROID      Dose: 75 mcg  Take 75 mcg by mouth daily  Refills: 0     menthol-zinc oxide 0.44-20.6 % Oint ointment  Commonly known as: CALMOSEPTINE      Apply topically 2 times daily as needed for irritation  Refills: 0     metoprolol tartrate 25 MG tablet  Commonly known as: LOPRESSOR      Dose: 12.5 mg  Take 12.5 mg by mouth 2 times daily HOLD if SBP<100 and/or HR<55  Refills: 0     mirabegron 25 MG 24 hr tablet  Commonly known as: MYRBETRIQ      Dose: 25 mg  Take 25 mg by mouth daily  Refills: 0     MULTIVITAMIN PO  Used for: Gait disturbance,  Memory loss, Malignant neoplasm of sigmoid colon (H)      Dose: 1 tablet  Take 1 tablet by mouth daily  Refills: 0     ondansetron 4 MG tablet  Commonly known as: ZOFRAN  Used for: Nausea      Dose: 4 mg  Take 1 tablet (4 mg) by mouth every 6 hours as needed for nausea  Quantity: 30 tablet  Refills: 11     pantoprazole 20 MG EC tablet  Commonly known as: PROTONIX      Dose: 40 mg  Take 40 mg by mouth daily  Refills: 0     polyethylene glycol 17 GM/Dose powder  Commonly known as: MIRALAX  Used for: Drug-induced constipation      Dose: 17 g  Take 17 g by mouth daily  Quantity: 510 g  Refills: 0     QUEtiapine 25 MG tablet  Commonly known as: SEROquel  Used for: Bipolar 1 disorder (H), Suicidal thoughts, Episode of recurrent major depressive disorder, unspecified depression episode severity (H)      Dose: 25 mg  Take 1 tablet (25 mg) by mouth At Bedtime  Quantity: 60 tablet  Refills: 3     Senokot S 8.6-50 MG tablet  Used for: Slow transit constipation  Generic drug: senna-docusate      Dose: 1 tablet  Take 1 tablet by mouth daily AND daily PRN  Quantity: 90 tablet  Refills: 11     vitamin C 500 MG tablet  Commonly known as: ASCORBIC ACID      Dose: 500 mg  Take 500 mg by mouth 2 times daily  Refills: 0     Vitamin D (Cholecalciferol) 25 MCG (1000 UT) Caps      Dose: 1,000 Units  Take 1,000 Units by mouth daily  Refills: 0         * This list has 2 medication(s) that are the same as other medications prescribed for you. Read the directions carefully, and ask your doctor or other care provider to review them with you.                ASSESSMENT/PLAN  Encounter Diagnoses   Name Primary?     Essential hypertension Yes     Chronic obstructive pulmonary disease, unspecified COPD type (H)      Hypothyroidism, unspecified type      Bipolar 1 disorder (H)        Pain management scheduled Tylenol, baclofen 3 times daily, Voltaren gel 4 times daily to affected knee    COPD on Breo Ellipta) Tropium albuterol nebulizer 4 times  daily as needed    Mood disorder continue Celexa    Allergies on Zyrtec    Anemia continue ferrous sulfate 3 times weekly, last HGB 12.8    Bipolar on Abilify, lamotrigine twice daily last level on 1/10/2023 was 4.6 within normal limits, Seroquel 25 mg at bedtime    Hypothyroidism continue levothyroxine TSH 1/10/23 was 0.89    Hypertension on metoprolol tartrate    GERD on Protonix    Electronically signed by: Treasure Castaneda CNP

## 2023-06-01 ENCOUNTER — NURSING HOME VISIT (OUTPATIENT)
Dept: GERIATRICS | Facility: CLINIC | Age: 83
End: 2023-06-01
Payer: MEDICARE

## 2023-06-01 DIAGNOSIS — J44.9 CHRONIC OBSTRUCTIVE PULMONARY DISEASE, UNSPECIFIED COPD TYPE (H): ICD-10-CM

## 2023-06-01 DIAGNOSIS — F31.9 BIPOLAR 1 DISORDER (H): ICD-10-CM

## 2023-06-01 DIAGNOSIS — I10 ESSENTIAL HYPERTENSION: Primary | ICD-10-CM

## 2023-06-01 DIAGNOSIS — E03.9 HYPOTHYROIDISM, UNSPECIFIED TYPE: ICD-10-CM

## 2023-06-01 PROCEDURE — 99309 SBSQ NF CARE MODERATE MDM 30: CPT | Performed by: NURSE PRACTITIONER

## 2023-06-01 NOTE — LETTER
6/1/2023        RE: Amparo Sharma  [facility]  [address] [place] [address]         HEALTH GERIATRIC SERVICES  Chief Complaint   Patient presents with     detention Regulatory     [place] Medical Record Number:  [id number]  Place of Service where encounter took place: [facility]  Code Status:  Full Code     HISTORY:      HPI:  Amparo Sharma  is 82 year old (1940) currently residing at [facility].  She transferred to this facility from the TCU at [facility] on 3/29/2023.  She is with past medical history COPD, normal pressure hydrocephalus, memory loss, frequent falls, bladder incontinence, abdominal hernia, alcoholism in remission, anemia, arthritis, asthma, bipolar 1 disorder, depression, GERD, hypertension, hyperlipidemia, and hypothyroidism    Today she was seen to review vital signs, labs, routine visit.   She denied chest pain shortness of breath cough and constipation or diarrhea.  She is with a large ventral hernia Which is not causing her any discomfort.  She takes Tylenol for bilateral chronic knee pain with relief.    She is wheelchair-bound and EZ stand for transfers.  She does wear braces on her legs at night.  She reports she is sleeping well.  She does have a scab right medial heel, she was getting Betadine to the heel, however it was changed to Aquaphor.  Her weights were reviewed she is up 3.7 pounds over the last week.    ALLERGIES:Amantadine, Antihistamine allergy relief [diphenhydramine], Bactrim [sulfamethoxazole-trimethoprim], Codeine, Demerol [meperidine], Diazepam, Gabapentin, Meloxicam, Pentazocine, Statins, Sulfamethoxazole-trimethoprim, and Tramadol    PAST MEDICAL HISTORY:   Past Medical History:   Diagnosis Date     Abdominal hernia      Abdominal hernia      Alcoholism in remission (H)      Alcoholism in remission (H) 2017     Anemia      Arthritis      Arthritis      Asthma      Asthma 1/20/2017     Bipolar 1 disorder (H)       Bipolar disorder (H)      Bladder incontinence 2013     Cancer (H)      Cellulitis      Cellulitis 12/12/2016    of left elbow     Chronic pain syndrome      COPD (chronic obstructive pulmonary disease) (H)      COPD with acute exacerbation (H) 12/21/2018     Dementia (H)      Depression      Depression      Disease of thyroid gland      Falls frequently      Frequent falls 2016     Frequent falls 2017     GERD (gastroesophageal reflux disease)      GERD (gastroesophageal reflux disease)      History of blood transfusion     20 years ago     History of colon cancer     s/p resection 1/2015, treated with 5-6 cycles of Folfox     History of transfusion      HTN (hypertension)      Hydrocephalus (H)      Hydrocephalus (H)      Hyperlipidemia      Hypertension      Hypothyroidism      Hypothyroidism      Infection of skin due to methicillin resistant Staphylococcus aureus (MRSA)      Loss of balance      Memory loss      Memory loss 2017     Normal pressure hydrocephalus (H) 02/03/2017     NPH (normal pressure hydrocephalus) (H)      Parkinson disease (H)      Renal insufficiency      Squamous cell cancer of multiple sites of skin of upper arm, left 2016    Dr. Andrea      Thyroid disease      Urinary incontinence      Urinary incontinence        PAST SURGICAL HISTORY:   has a past surgical history that includes Hysterectomy; multiple knee surgeries; rotator cuff surgeries; Cholecystectomy; colon cancer resection (1/2015); GI surgery; Abdomen surgery; orthopedic surgery; Insert drain lumbar (N/A, 2/5/2017); Optical tracking system implant shunt ventriculoperitoneal (Right, 2/8/2017); GYN surgery; Esophagoscopy, gastroscopy, duodenoscopy (EGD), combined (N/A, 9/7/2017); Colon surgery; Laparoscopic assisted colectomy; Hysterectomy; joint replacement; Arthroscopy shoulder rotator cuff repair; fracture surgery; Laparoscopic cholecystectomy; Implant shunt ventriculoperitoneal (02/03/2017); Lumbar Puncture (02/03/2017);  RECONSTR TOTAL SHOULDER IMPLANT (Right, 3/5/2019); Colonoscopy (N/A, 2/3/2020); and Reverse arthroplasty shoulder (Left, 4/11/2022).    FAMILY HISTORY: family history includes Arthritis in her brother; Breast Cancer (age of onset: 60) in her mother; Depression (age of onset: 55) in her father; Peptic Ulcer Disease in her father; Prostate Cancer in her brother.    SOCIAL HISTORY:  reports that she quit smoking about 26 years ago. Her smoking use included cigarettes. She started smoking about 43 years ago. She smoked an average of .25 packs per day. She has quit using smokeless tobacco. She reports that she does not drink alcohol and does not use drugs.    ROS:  Constitutional: Negative for activity change, appetite change, fatigue and fever. Wheelchair bound   HENT: Negative for congestion.  2  shunts  Respiratory: Negative for cough, shortness of breath and HX wheezing.  No wheezing noted today   Cardiovascular: Negative for chest pain and leg swelling.   Gastrointestinal: Negative for abdominal distention, abdominal pain, constipation, diarrhea and nausea. Ventral hernia   Genitourinary: Negative for dysuria.   Musculoskeletal: Negative for arthralgia. Negative for back pain.  Leg braces at night  Skin: Negative for color change and wound.   Neurological: Negative for dizziness.   Psychiatric/Behavioral: Negative for agitation, behavioral problems and confusion.     Physical Exam:  Constitutional:       Appearance: Patient is well-developed.   HENT:      Head: Normocephalic.   Eyes:      Conjunctiva/sclera: Conjunctivae normal.   Neck:      Musculoskeletal: Normal range of motion.   Cardiovascular:      Rate and Rhythm: Normal rate and regular rhythm.      Heart sounds: Normal heart sounds. No murmur.   Pulmonary:      Effort: No respiratory distress.      Breath sounds: Normal breath sounds. No wheezing or rales.   Abdominal:      General: Bowel sounds are normal. There is no distension.      Palpations: Ventral  hernia     Tenderness: There is no abdominal tenderness.   Musculoskeletal:       Normal range of motion.   Wheelchair bound Easy stand transfer  Skin:General:        Skin is warm.   Neurological:         Mental Status: Patient is alert and oriented to person, place, and time.   Psychiatric:         Behavior: Behavior normal.     Vitals:/56   Pulse 70   Temp 97.8  F (36.6  C)   Resp 18   Ht 1.524 m (5')   Wt 70.7 kg (155 lb 12.8 oz)   SpO2 94%   BMI 30.43 kg/m   and Body mass index is 30.43 kg/m .    Lab/Diagnostic data:   No results found for this or any previous visit (from the past 240 hour(s)).    MEDICATIONS:     Review of your medicines          Accurate as of June 1, 2023 11:59 PM. If you have any questions, ask your nurse or doctor.            CONTINUE these medicines which have NOT CHANGED      Dose / Directions   acetaminophen 325 MG tablet  Commonly known as: TYLENOL  Used for: Acute post-operative pain      Dose: 975 mg  Take 3 tablets (975 mg) by mouth every 6 hours  Quantity: 360 tablet  Refills: 11     amoxicillin 500 MG tablet  Commonly known as: AMOXIL      Dose: 2,000 mg  Take 2,000 mg by mouth as needed (prior to dental appointment)  Refills: 0     ARIPiprazole 15 MG tablet  Commonly known as: ABILIFY      Dose: 15 mg  Take 15 mg by mouth At Bedtime  Refills: 0     aspirin 81 MG EC tablet  Commonly known as: ASA  Used for: S/P reverse total shoulder arthroplasty, left      Dose: 81 mg  Take 1 tablet (81 mg) by mouth daily  Quantity: 60 tablet  Refills: 11     bisacodyl 10 MG suppository  Commonly known as: DULCOLAX  Used for: Slow transit constipation      Dose: 10 mg  Place 1 suppository (10 mg) rectally daily as needed for constipation  Quantity: 8 suppository  Refills: 11     Breo Ellipta 100-25 MCG/ACT inhaler  Generic drug: fluticasone-vilanterol      Dose: 1 puff  Inhale 1 puff into the lungs daily 1PM  Refills: 0     * carboxymethylcellulose 0.5 % Soln ophthalmic  solution  Commonly known as: REFRESH PLUS      Dose: 1 drop  Place 1 drop into both eyes in the morning.  Refills: 0     * carboxymethylcellulose 0.5 % Soln ophthalmic solution  Commonly known as: REFRESH PLUS      Dose: 1 drop  Place 1 drop into both eyes 3 times daily as needed for dry eyes  Refills: 0     cetirizine 10 MG tablet  Commonly known as: zyrTEC      Dose: 10 mg  Take 10 mg by mouth daily  Refills: 0     citalopram 20 MG tablet  Commonly known as: celeXA  Used for: Bipolar 1 disorder (H), Suicidal thoughts, Episode of recurrent major depressive disorder, unspecified depression episode severity (H)      Dose: 20 mg  Take 1 tablet (20 mg) by mouth daily  Quantity: 30 tablet  Refills: 11     cyclobenzaprine 5 MG tablet  Commonly known as: FLEXERIL      Dose: 5 mg  Take 5 mg by mouth daily  Refills: 0     diclofenac 1 % topical gel  Commonly known as: VOLTAREN  Used for: Acute post-operative pain      Dose: 4 g  Apply 4 g topically 4 times daily Apply to affected knees.  Refills: 0     ferrous sulfate 325 (65 Fe) MG tablet  Commonly known as: FEROSUL      Dose: 325 mg  Take 1 tablet (325 mg) by mouth Every Mon, Wed, Fri Morning  Refills: 0     Incruse Ellipta 62.5 MCG/ACT inhaler  Generic drug: umeclidinium      Dose: 1 puff  Inhale 1 puff into the lungs daily  Refills: 0     lamoTRIgine 150 MG tablet  Commonly known as: LaMICtal      Dose: 150 mg  Take 150 mg by mouth 2 times daily  Refills: 0     levothyroxine 75 MCG tablet  Commonly known as: SYNTHROID/LEVOTHROID      Dose: 75 mcg  Take 75 mcg by mouth daily  Refills: 0     menthol-zinc oxide 0.44-20.6 % Oint ointment  Commonly known as: CALMOSEPTINE      Apply topically 2 times daily as needed for irritation  Refills: 0     metoprolol tartrate 25 MG tablet  Commonly known as: LOPRESSOR      Dose: 12.5 mg  Take 12.5 mg by mouth 2 times daily HOLD if SBP<100 and/or HR<55  Refills: 0     mirabegron 25 MG 24 hr tablet  Commonly known as: MYRBETRIQ       Dose: 25 mg  Take 25 mg by mouth daily  Refills: 0     MULTIVITAMIN PO  Used for: Gait disturbance, Memory loss, Malignant neoplasm of sigmoid colon (H)      Dose: 1 tablet  Take 1 tablet by mouth daily  Refills: 0     ondansetron 4 MG tablet  Commonly known as: ZOFRAN  Used for: Nausea      Dose: 4 mg  Take 1 tablet (4 mg) by mouth every 6 hours as needed for nausea  Quantity: 30 tablet  Refills: 11     pantoprazole 20 MG EC tablet  Commonly known as: PROTONIX      Dose: 40 mg  Take 40 mg by mouth daily  Refills: 0     polyethylene glycol 17 GM/Dose powder  Commonly known as: MIRALAX  Used for: Drug-induced constipation      Dose: 17 g  Take 17 g by mouth daily  Quantity: 510 g  Refills: 0     QUEtiapine 25 MG tablet  Commonly known as: SEROquel  Used for: Bipolar 1 disorder (H), Suicidal thoughts, Episode of recurrent major depressive disorder, unspecified depression episode severity (H)      Dose: 25 mg  Take 1 tablet (25 mg) by mouth At Bedtime  Quantity: 60 tablet  Refills: 3     Senokot S 8.6-50 MG tablet  Used for: Slow transit constipation  Generic drug: senna-docusate      Dose: 1 tablet  Take 1 tablet by mouth daily AND daily PRN  Quantity: 90 tablet  Refills: 11     vitamin C 500 MG tablet  Commonly known as: ASCORBIC ACID      Dose: 500 mg  Take 500 mg by mouth 2 times daily  Refills: 0     Vitamin D (Cholecalciferol) 25 MCG (1000 UT) Caps      Dose: 1,000 Units  Take 1,000 Units by mouth daily  Refills: 0         * This list has 2 medication(s) that are the same as other medications prescribed for you. Read the directions carefully, and ask your doctor or other care provider to review them with you.                ASSESSMENT/PLAN  Encounter Diagnoses   Name Primary?     Essential hypertension Yes     Chronic obstructive pulmonary disease, unspecified COPD type (H)      Hypothyroidism, unspecified type      Bipolar 1 disorder (H)        Pain management scheduled Tylenol, baclofen 3 times daily, Voltaren  gel 4 times daily to affected knee    COPD on Breo Ellipta) Tropium albuterol nebulizer 4 times daily as needed    Mood disorder continue Celexa    Allergies on Zyrtec    Anemia continue ferrous sulfate 3 times weekly, last HGB 12.8    Bipolar on Abilify, lamotrigine twice daily last level on 1/10/2023 was 4.6 within normal limits, Seroquel 25 mg at bedtime    Hypothyroidism continue levothyroxine TSH 1/10/23 was 0.89    Hypertension on metoprolol tartrate    GERD on Protonix    Electronically signed by: Treasure Castaneda CNP      Sincerely,        Treasure Castaneda CNP

## 2023-07-07 NOTE — TELEPHONE ENCOUNTER
[facility] Triage Nurse Telephone Encounter    Provider: MARCIANO Jung  Facility: [facility] Facility Type:  LTC    Caller: Jessica  Call Back Number: [phone]    Allergies:    Allergies   Allergen Reactions     Amantadine      Antihistamine Allergy Relief [Diphenhydramine] Other (See Comments)     hyperactivity     Bactrim [Sulfamethoxazole-Trimethoprim]      Codeine Itching     Demerol [Meperidine] Nausea     Diazepam Other (See Comments)     Hallucinations/paranoid     Gabapentin      Meloxicam Other (See Comments)     Pentazocine Other (See Comments)     drowsiness     Statins Other (See Comments)     Was hospitalized for possible rhabdo     Sulfamethoxazole-Trimethoprim      Tramadol Nausea        Reason for call: Nurse is reporting that patient is having increased pain to her right heel where there's a known wound, but also in bilateral legs.  Patient receives quite a few analgesics for leg/knee/foot pain.      Verbal Order/Direction given by Provider: Celebrex 200mg daily PRN.      Provider giving Order:  MARCIANO Jung    Verbal Order given to: Jessica Serra RN

## 2023-07-07 NOTE — TELEPHONE ENCOUNTER
[facility]    Provider: MARCIANO Jung  Facility: [facility] Facility Type:  LTC    Caller: Jessica  Call Back Number: [phone]    Allergies:    Allergies   Allergen Reactions     Amantadine      Antihistamine Allergy Relief [Diphenhydramine] Other (See Comments)     hyperactivity     Bactrim [Sulfamethoxazole-Trimethoprim]      Codeine Itching     Demerol [Meperidine] Nausea     Diazepam Other (See Comments)     Hallucinations/paranoid     Gabapentin      Meloxicam Other (See Comments)     Pentazocine Other (See Comments)     drowsiness     Statins Other (See Comments)     Was hospitalized for possible rhabdo     Sulfamethoxazole-Trimethoprim      Tramadol Nausea        Reason for call: Cyclobenzaprine is not covered by insurance.      Verbal Order/Direction given by Provider: Discontinue Cyclobenzaprine.  Start Tizanidine 2mg daily.      Provider giving Order:  MARCIANO Jung    Verbal Order given to: Jessica Serra RN

## 2023-07-11 NOTE — PROGRESS NOTES
[facility] GERIATRICS  REGULATORY VISIT  July 11, 2023    [facility] Medical Record Number:  [id number]  Place of Service where encounter took place:  [facility] () [[id number]]    Chief Complaint   Patient presents with     skilled nursing Regulatory       HPI:    Amparo Sharma is a 82 year old  (1940), who is being seen today for a federally mandated E/M visit [facility] where she moved in April 2023 after over a year residing at a different facility - first in TCU then in LTC.  HPI information obtained from: facility chart records, facility staff, patient report and [facility] chart review.    Today, Ms Sharma is seen sitting in her wheelchair. It looks different than her last wheelchair and she said it's newer. She's not sure if it's helping her posture yet. Overall is doing well, continues to be very happy with the move from a different facility.       ALLERGIES:    Allergies   Allergen Reactions     Amantadine      Antihistamine Allergy Relief [Diphenhydramine] Other (See Comments)     hyperactivity     Bactrim [Sulfamethoxazole-Trimethoprim]      Codeine Itching     Demerol [Meperidine] Nausea     Diazepam Other (See Comments)     Hallucinations/paranoid     Gabapentin      Meloxicam Other (See Comments)     Pentazocine Other (See Comments)     drowsiness     Statins Other (See Comments)     Was hospitalized for possible rhabdo     Sulfamethoxazole-Trimethoprim      Tramadol Nausea        Past Medical, Surgical, Family and Social History: Reviewed and updated in EPIC.    MEDICATIONS:  Current Outpatient Medications   Medication Sig Dispense Refill     acetaminophen (TYLENOL) 325 MG tablet Take 3 tablets (975 mg) by mouth every 6 hours 360 tablet 11     amoxicillin (AMOXIL) 500 MG tablet Take 2,000 mg by mouth as needed (prior to dental appointment)       ARIPiprazole (ABILIFY) 15 MG tablet Take 15 mg by mouth At Bedtime       aspirin (ASA) 81 MG EC tablet Take 1 tablet (81 mg) by  mouth daily 60 tablet 11     bisacodyl (DULCOLAX) 10 MG suppository Place 1 suppository (10 mg) rectally daily as needed for constipation 8 suppository 11     BREO ELLIPTA 100-25 MCG/INH inhaler Inhale 1 puff into the lungs daily 1PM       carboxymethylcellulose (REFRESH PLUS) 0.5 % SOLN ophthalmic solution Place 1 drop into both eyes 3 times daily as needed for dry eyes       celecoxib (CELEBREX) 200 MG capsule Take 200 mg by mouth daily as needed       citalopram (CELEXA) 20 MG tablet Take 1 tablet (20 mg) by mouth daily 30 tablet 11     ferrous sulfate (FEROSUL) 325 (65 Fe) MG tablet Take 1 tablet (325 mg) by mouth Every Mon, Wed, Fri Morning       INCRUSE ELLIPTA 62.5 MCG/INH Inhaler Inhale 1 puff into the lungs daily        lamoTRIgine (LAMICTAL) 150 MG tablet Take 150 mg by mouth 2 times daily       levothyroxine (SYNTHROID/LEVOTHROID) 75 MCG tablet Take 75 mcg by mouth daily       menthol-zinc oxide (CALMOSEPTINE) 0.44-20.6 % OINT ointment Apply topically 2 times daily as needed for irritation       metoprolol tartrate (LOPRESSOR) 25 MG tablet Take 12.5 mg by mouth 2 times daily HOLD if SBP<100 and/or HR<55       mirabegron (MYRBETRIQ) 25 MG 24 hr tablet Take 25 mg by mouth daily        Multiple Vitamins-Minerals (MULTIVITAMIN PO) Take 1 tablet by mouth daily        ondansetron (ZOFRAN) 4 MG tablet Take 1 tablet (4 mg) by mouth every 6 hours as needed for nausea 30 tablet 11     pantoprazole (PROTONIX) 20 MG EC tablet Take 40 mg by mouth daily       polyethylene glycol (MIRALAX) 17 GM/Dose powder Take 17 g by mouth daily 510 g 0     QUEtiapine (SEROQUEL) 25 MG tablet Take 1 tablet (25 mg) by mouth At Bedtime 60 tablet 3     SENOKOT S 8.6-50 MG tablet Take 1 tablet by mouth daily AND daily PRN 90 tablet 11     tiZANidine (ZANAFLEX) 2 MG tablet Take 2 mg by mouth daily       vitamin C (ASCORBIC ACID) 500 MG tablet Take 500 mg by mouth 2 times daily       Vitamin D, Cholecalciferol, 25 MCG (1000 UT) CAPS Take  1,000 Units by mouth daily        cetirizine (ZYRTEC) 10 MG tablet Take 10 mg by mouth daily        diclofenac (VOLTAREN) 1 % topical gel Apply 4 g topically 4 times daily Apply to affected knees.       Medications reviewed:  Medications reconciled to facility chart and changes were made to reflect current medications as identified as above med list. Below are the changes that were made:   Medications stopped since last EPIC medication reconciliation:   Medications Discontinued During This Encounter   Medication Reason     carboxymethylcellulose (REFRESH PLUS) 0.5 % SOLN ophthalmic solution Med Rec(No AVS / No eCancel)     Medications started since last [facility] medication reconciliation:  No orders of the defined types were placed in this encounter.      REVIEW OF SYSTEMS:  4 point ROS neg other than the symptoms noted above in the HPI.    PHYSICAL EXAM:  /73   Pulse 64   Temp 96.9  F (36.1  C)   Resp 18   Ht 1.524 m (5')   Wt 71.6 kg (157 lb 14.4 oz)   SpO2 93%   BMI 30.84 kg/m    Gen: sitting in wheelchair, alert, cooperative and in no acute distress  Resp: breathing non labored, no tachypnea  Ext: no LE edema  Neuro: CX II-XII grossly in tact; ROM in all four extremities grossly in tact  Psych: alert and oriented to self and general situation     LABS/IMAGING: Reviewed as per Epic and/or [facility]    ASSESSMENT / PLAN:    Bipolar Disorder  Is setting into the new place well. She really likes it here.   -- aripiprazole 15 mg at bedtime, citalopram 20 mg daily, lamotrigine 150 mg BID and quetiapine 25 mg at bedtime  -- ongoing supportive cares    HTN  SBPs 130s-150s. HR 70s  -- metoprolol 12.5 mg BID   -- follow BPs and adjust medications as needed    COPD  No recent exacerbation.   -- fluticasone-vilanterol 100-25 mcg daily, umeclidinium 62.5 mcg daily  -- follow clinically    Hypothyroidisim  TSH 0.89 in January   -- levothyroxine 75 mcg daily   -- annual lab monitoring, goal TSH 3-5        Electronically signed by  Megan Bajwa MD

## 2023-07-11 NOTE — LETTER
7/11/2023        RE: Amparo Sharma  [facility] [address] [place] [address]        [facility] GERIATRICS  REGULATORY VISIT  July 11, 2023    [facility] Medical Record Number:  [id number]  Place of Service where encounter took place:  [facility] () [[id number]]    Chief Complaint   Patient presents with     custodial Regulatory       HPI:    Amparo Sharma is a 82 year old  (1940), who is being seen today for a federally mandated E/M visit [facility] where she moved in April 2023 after over a year residing at a different facility - first in TCU then in LTC.  HPI information obtained from: facility chart records, facility staff, patient report and [facility] chart review.    Today, Ms Sharma is seen sitting in her wheelchair. It looks different than her last wheelchair and she said it's newer. She's not sure if it's helping her posture yet. Overall is doing well, continues to be very happy with the move from a different facility.       ALLERGIES:    Allergies   Allergen Reactions     Amantadine      Antihistamine Allergy Relief [Diphenhydramine] Other (See Comments)     hyperactivity     Bactrim [Sulfamethoxazole-Trimethoprim]      Codeine Itching     Demerol [Meperidine] Nausea     Diazepam Other (See Comments)     Hallucinations/paranoid     Gabapentin      Meloxicam Other (See Comments)     Pentazocine Other (See Comments)     drowsiness     Statins Other (See Comments)     Was hospitalized for possible rhabdo     Sulfamethoxazole-Trimethoprim      Tramadol Nausea        Past Medical, Surgical, Family and Social History: Reviewed and updated in EPIC.    MEDICATIONS:  Current Outpatient Medications   Medication Sig Dispense Refill     acetaminophen (TYLENOL) 325 MG tablet Take 3 tablets (975 mg) by mouth every 6 hours 360 tablet 11     amoxicillin (AMOXIL) 500 MG tablet Take 2,000 mg by mouth as needed (prior to dental appointment)       ARIPiprazole  (ABILIFY) 15 MG tablet Take 15 mg by mouth At Bedtime       aspirin (ASA) 81 MG EC tablet Take 1 tablet (81 mg) by mouth daily 60 tablet 11     bisacodyl (DULCOLAX) 10 MG suppository Place 1 suppository (10 mg) rectally daily as needed for constipation 8 suppository 11     BREO ELLIPTA 100-25 MCG/INH inhaler Inhale 1 puff into the lungs daily 1PM       carboxymethylcellulose (REFRESH PLUS) 0.5 % SOLN ophthalmic solution Place 1 drop into both eyes 3 times daily as needed for dry eyes       celecoxib (CELEBREX) 200 MG capsule Take 200 mg by mouth daily as needed       citalopram (CELEXA) 20 MG tablet Take 1 tablet (20 mg) by mouth daily 30 tablet 11     ferrous sulfate (FEROSUL) 325 (65 Fe) MG tablet Take 1 tablet (325 mg) by mouth Every Mon, Wed, Fri Morning       INCRUSE ELLIPTA 62.5 MCG/INH Inhaler Inhale 1 puff into the lungs daily        lamoTRIgine (LAMICTAL) 150 MG tablet Take 150 mg by mouth 2 times daily       levothyroxine (SYNTHROID/LEVOTHROID) 75 MCG tablet Take 75 mcg by mouth daily       menthol-zinc oxide (CALMOSEPTINE) 0.44-20.6 % OINT ointment Apply topically 2 times daily as needed for irritation       metoprolol tartrate (LOPRESSOR) 25 MG tablet Take 12.5 mg by mouth 2 times daily HOLD if SBP<100 and/or HR<55       mirabegron (MYRBETRIQ) 25 MG 24 hr tablet Take 25 mg by mouth daily        Multiple Vitamins-Minerals (MULTIVITAMIN PO) Take 1 tablet by mouth daily        ondansetron (ZOFRAN) 4 MG tablet Take 1 tablet (4 mg) by mouth every 6 hours as needed for nausea 30 tablet 11     pantoprazole (PROTONIX) 20 MG EC tablet Take 40 mg by mouth daily       polyethylene glycol (MIRALAX) 17 GM/Dose powder Take 17 g by mouth daily 510 g 0     QUEtiapine (SEROQUEL) 25 MG tablet Take 1 tablet (25 mg) by mouth At Bedtime 60 tablet 3     SENOKOT S 8.6-50 MG tablet Take 1 tablet by mouth daily AND daily PRN 90 tablet 11     tiZANidine (ZANAFLEX) 2 MG tablet Take 2 mg by mouth daily       vitamin C (ASCORBIC  ACID) 500 MG tablet Take 500 mg by mouth 2 times daily       Vitamin D, Cholecalciferol, 25 MCG (1000 UT) CAPS Take 1,000 Units by mouth daily        cetirizine (ZYRTEC) 10 MG tablet Take 10 mg by mouth daily        diclofenac (VOLTAREN) 1 % topical gel Apply 4 g topically 4 times daily Apply to affected knees.       Medications reviewed:  Medications reconciled to facility chart and changes were made to reflect current medications as identified as above med list. Below are the changes that were made:   Medications stopped since last EPIC medication reconciliation:   Medications Discontinued During This Encounter   Medication Reason     carboxymethylcellulose (REFRESH PLUS) 0.5 % SOLN ophthalmic solution Med Rec(No AVS / No eCancel)     Medications started since last [facility] medication reconciliation:  No orders of the defined types were placed in this encounter.      REVIEW OF SYSTEMS:  4 point ROS neg other than the symptoms noted above in the HPI.    PHYSICAL EXAM:  /73   Pulse 64   Temp 96.9  F (36.1  C)   Resp 18   Ht 1.524 m (5')   Wt 71.6 kg (157 lb 14.4 oz)   SpO2 93%   BMI 30.84 kg/m    Gen: sitting in wheelchair, alert, cooperative and in no acute distress  Resp: breathing non labored, no tachypnea  Ext: no LE edema  Neuro: CX II-XII grossly in tact; ROM in all four extremities grossly in tact  Psych: alert and oriented to self and general situation     LABS/IMAGING: Reviewed as per Epic and/or [facility]    ASSESSMENT / PLAN:    Bipolar Disorder  Is setting into the new place well. She really likes it here.   -- aripiprazole 15 mg at bedtime, citalopram 20 mg daily, lamotrigine 150 mg BID and quetiapine 25 mg at bedtime  -- ongoing supportive cares    HTN  SBPs 130s-150s. HR 70s  -- metoprolol 12.5 mg BID   -- follow BPs and adjust medications as needed    COPD  No recent exacerbation.   -- fluticasone-vilanterol 100-25 mcg daily, umeclidinium 62.5 mcg daily  -- follow  clinically    Hypothyroidisim  TSH 0.89 in January   -- levothyroxine 75 mcg daily   -- annual lab monitoring, goal TSH 3-5       Electronically signed by  Megan Bajwa MD              Sincerely,        Megan Bajwa MD

## 2023-07-19 NOTE — TELEPHONE ENCOUNTER
[facility] Geriatrics Triage Nurse Telephone Encounter    Provider: MARCIANO Jung  Facility: [facility] Type:  LTC    Caller: Shira  Call Back Number: [phone]    Allergies:    Allergies   Allergen Reactions     Amantadine      Antihistamine Allergy Relief [Diphenhydramine] Other (See Comments)     hyperactivity     Bactrim [Sulfamethoxazole-Trimethoprim]      Codeine Itching     Demerol [Meperidine] Nausea     Diazepam Other (See Comments)     Hallucinations/paranoid     Gabapentin      Meloxicam Other (See Comments)     Pentazocine Other (See Comments)     drowsiness     Statins Other (See Comments)     Was hospitalized for possible rhabdo     Sulfamethoxazole-Trimethoprim      Tramadol Nausea        Reason for call: Nursing is calling to request OT for w/c positioning.     MCS/FGS STANDING ORDER GIVEN:  Initiate OT as needed      Provider giving Order:  MARCIANO Jung    Verbal Order given to: Shira Felix RN

## 2023-07-28 NOTE — TELEPHONE ENCOUNTER
[facility] Geriatrics Triage Nurse Telephone Encounter    Provider: MARCIANO Jung  Facility: [facility] Facility Type:  LTC    Caller: Concepcion  Call Back Number: [phone]    Allergies:    Allergies   Allergen Reactions    Amantadine     Antihistamine Allergy Relief [Diphenhydramine] Other (See Comments)     hyperactivity    Bactrim [Sulfamethoxazole-Trimethoprim]     Codeine Itching    Demerol [Meperidine] Nausea    Diazepam Other (See Comments)     Hallucinations/paranoid    Gabapentin     Meloxicam Other (See Comments)    Pentazocine Other (See Comments)     drowsiness    Statins Other (See Comments)     Was hospitalized for possible rhabdo    Sulfamethoxazole-Trimethoprim     Tramadol Nausea        Reason for call: Patient has a current order of Tylenol 975 mg every 6 hours at 12 AM 6 AM 12 PM and 6 PM, the pt is is requesting to not have the 12am dose as she doesn't want to be woken up. . Nursing is also requesting ACP consult.        Verbal Order/Direction given by Provider: Change tylenol to 975mg TID 6a, 12p and 6p and 975mg prn @ 12am. Ok for ACP consult    Provider giving Order:  MARCIANO Jung    Verbal Order given to: Concepcion Felix RN

## 2023-08-03 PROBLEM — F10.21 ALCOHOL DEPENDENCE IN REMISSION (H): Status: ACTIVE | Noted: 2023-01-01

## 2023-08-03 NOTE — LETTER
8/3/2023        RE: Amparo Sharma  [facility] [address] [place] [address]        [facility] GERIATRICS    Chief Complaint   Patient presents with     RECHECK     HTN     HPI:  Amparo Sharma is a 83 year old  (1940), who is being seen today for an episodic care visit at: [facility] () [[id number]]. T    Her past medical history includes  COPD  normal pressure hydrocephalus  memory loss  frequent falls  bladder incontinence  abdominal hernia  alcoholism in remission  Anemia  Arthritis  Asthma  bipolar 1 disorder  Depression  GERD  Hypertension  hypothyroidism     BP have been elevated.  Facility using hydralazine PRN     Allergies, and PMH/PSH reviewed in EPIC today.  REVIEW OF SYSTEMS:  No discussed    Objective:   BP (!) 142/71   Pulse 70   Temp 97.3  F (36.3  C)   Resp 18   Ht 1.524 m (5')   Wt 74.6 kg (164 lb 6.4 oz)   SpO2 94%   BMI 32.11 kg/m    GENERAL APPEARANCE:  Alert, in no distress  RESP:  lungs clear to auscultation     Most Recent 3 CBC's:  Recent Labs   Lab Test 01/10/23  0952 11/15/22  0537 10/04/22  0709   WBC 5.8 6.5 5.4   HGB 12.8 12.9 10.9*   MCV 93 92 90    248 317     Most Recent 3 BMP's:  Recent Labs   Lab Test 01/10/23  0952 11/15/22  0537 10/04/22  0709    135* 143   POTASSIUM 4.5 5.0 4.5   CHLORIDE 105 99 107   CO2 19* 26 24   BUN 19.3 22.2 23.5*   CR 0.55 0.58 0.55   ANIONGAP 15 10 12   MARTHA 9.6 9.7 9.9   * 88 77       Assessment/Plan:  (I10) Essential hypertension  (primary encounter diagnosis)  (I99.8) Poorly controlled blood pressure.     Comment: chronic, uncontrolled.  Could be due to pain in heel or recent addition of celebrex.   Plan:   Stop metoprolol 12.5 mg BID  Start carvedilol (COREG) 3.125 MG tablet BID            (F10.21) Alcohol dependence in remission (H)  Comment: chronic. controlled  Plan: Continue with plan of care no changes at this time, adjustment as needed    [Z79.2] Prophylactic antibiotic    Comment:  is suppose to take amoxicillin prior to dental appointment.  This likely can be discontinued as pt does not have a artifical heart valve.   Plan: consider discontinuing prophylactic antibiotic prior to dental appointments.      [F31.9]  Bipolar 1 disorder (H)  Comment: chronic.  Stable while taking aripiprazole 15 mg at bedtime, citalopram 20 mg daily, lamotrigine 150 mg BID and quetiapine 25 mg at bedtime   Plan: Continue with plan of care no changes at this time, adjustment as needed      [E03.9]  Hypothyroidism  Comment:  Chronic.   Stable  TSH 0.89 (1/2023) Currently taking levothyroxine 75 mcg daily   Plan: annual TSH.   goal TSH 3-5    [J44.9) COPD  Comment: Chronic stable.  No recent exacerbation. Currently taking fluticasone-vilanterol 100-25 mcg daily, umeclidinium 62.5 mcg daily      [D50.8]  Iron deficiency anemia secondary to inadequate dietary iron intake   Comment: Appears to have resolved.  Last hgb in January 2023 was WNL.  Plan:    Consider Stopping aspirin as she does not have history of CVA or CAD.  Aspirin is no longer recommended for primary prevention.   Consider CBC next visit and if normal discontinue ferrous sulfate    [N32.81 Overactive bladder   Comment: chronic. Currently taking mirabegron 25 mg daily,  Per matrix pt is incontinent of bladder  Plan; consider discontinuing mirabegron 25 mg daily    [L89.613] Pressure injury of right heel, stage 3 (H)    Comment: Acute.  Was a stage II now a stage 3.  Pt using a heel wedge offloading shoes and uses a wheelchair.  Currently taking a MVI.    Plan: appreciate wound rounds.     Orders  Stop metoprolol 12.5 mg BID  Start carvedilol (COREG) 3.125 MG tablet BID  Consider Stopping aspirin as she does not have history of CVA or CAD.  Aspirin is no longer recommended for primary prevention.   Consider CBC next visit and if normal discontinue ferrous sulfate  consider discontinuing mirabegron 25 mg daily      Electronically signed by:     Lorna OKEEFE  KASANDRA Beavers CNP on 8/3/2023 at 8:39 PM         Sincerely,        KASANDRA Hollis CNP

## 2023-08-03 NOTE — PROGRESS NOTES
[facility] GERIATRICS    Chief Complaint   Patient presents with    RECHECK     HTN     HPI:  Amparo Sharma is a 83 year old  (1940), who is being seen today for an episodic care visit at: [facility] () [[id number]]. T    Her past medical history includes  COPD  normal pressure hydrocephalus  memory loss  frequent falls  bladder incontinence  abdominal hernia  alcoholism in remission  Anemia  Arthritis  Asthma  bipolar 1 disorder  Depression  GERD  Hypertension  hypothyroidism     BP have been elevated.  Facility using hydralazine PRN     Allergies, and PMH/PSH reviewed in [facility] today.  REVIEW OF SYSTEMS:  No discussed    Objective:   BP (!) 142/71   Pulse 70   Temp 97.3  F (36.3  C)   Resp 18   Ht 1.524 m (5')   Wt 74.6 kg (164 lb 6.4 oz)   SpO2 94%   BMI 32.11 kg/m    GENERAL APPEARANCE:  Alert, in no distress  RESP:  lungs clear to auscultation     Most Recent 3 CBC's:  Recent Labs   Lab Test 01/10/23  0952 11/15/22  0537 10/04/22  0709   WBC 5.8 6.5 5.4   HGB 12.8 12.9 10.9*   MCV 93 92 90    248 317     Most Recent 3 BMP's:  Recent Labs   Lab Test 01/10/23  0952 11/15/22  0537 10/04/22  0709    135* 143   POTASSIUM 4.5 5.0 4.5   CHLORIDE 105 99 107   CO2 19* 26 24   BUN 19.3 22.2 23.5*   CR 0.55 0.58 0.55   ANIONGAP 15 10 12   MARTHA 9.6 9.7 9.9   * 88 77       Assessment/Plan:  (I10) Essential hypertension  (primary encounter diagnosis)  (I99.8) Poorly controlled blood pressure.     Comment: chronic, uncontrolled.  Could be due to pain in heel or recent addition of celebrex.   Plan:   Stop metoprolol 12.5 mg BID  Start carvedilol (COREG) 3.125 MG tablet BID            (F10.21) Alcohol dependence in remission (H)  Comment: chronic. controlled  Plan: Continue with plan of care no changes at this time, adjustment as needed    [Z79.2] Prophylactic antibiotic    Comment: is suppose to take amoxicillin prior to dental appointment.  This likely can be discontinued as pt does not  have a artifical heart valve.   Plan: consider discontinuing prophylactic antibiotic prior to dental appointments.      [F31.9]  Bipolar 1 disorder (H)  Comment: chronic.  Stable while taking aripiprazole 15 mg at bedtime, citalopram 20 mg daily, lamotrigine 150 mg BID and quetiapine 25 mg at bedtime   Plan: Continue with plan of care no changes at this time, adjustment as needed      [E03.9]  Hypothyroidism  Comment:  Chronic.   Stable  TSH 0.89 (1/2023) Currently taking levothyroxine 75 mcg daily   Plan: annual TSH.   goal TSH 3-5    [J44.9) COPD  Comment: Chronic stable.  No recent exacerbation. Currently taking fluticasone-vilanterol 100-25 mcg daily, umeclidinium 62.5 mcg daily      [D50.8]  Iron deficiency anemia secondary to inadequate dietary iron intake   Comment: Appears to have resolved.  Last hgb in January 2023 was WNL.  Plan:    Consider Stopping aspirin as she does not have history of CVA or CAD.  Aspirin is no longer recommended for primary prevention.   Consider CBC next visit and if normal discontinue ferrous sulfate    [N32.81 Overactive bladder   Comment: chronic. Currently taking mirabegron 25 mg daily,  Per matrix pt is incontinent of bladder  Plan; consider discontinuing mirabegron 25 mg daily    [L89.613] Pressure injury of right heel, stage 3 (H)    Comment: Acute.  Was a stage II now a stage 3.  Pt using a heel wedge offloading shoes and uses a wheelchair.  Currently taking a MVI.    Plan: appreciate wound rounds.     Orders  Stop metoprolol 12.5 mg BID  Start carvedilol (COREG) 3.125 MG tablet BID  Consider Stopping aspirin as she does not have history of CVA or CAD.  Aspirin is no longer recommended for primary prevention.   Consider CBC next visit and if normal discontinue ferrous sulfate  consider discontinuing mirabegron 25 mg daily      Electronically signed by:     KASANDRA Hollis CNP on 8/3/2023 at 8:39 PM

## 2023-08-04 NOTE — TELEPHONE ENCOUNTER
[facility] Geriatrics Triage Nurse Telephone Encounter    Provider: KASANDRA Correia CNP  Facility: [facility] Facility Type:  LTC    Caller: Shira   Call Back Number: [phone]    Allergies:    Allergies   Allergen Reactions    Amantadine     Antihistamine Allergy Relief [Diphenhydramine] Other (See Comments)     hyperactivity    Bactrim [Sulfamethoxazole-Trimethoprim]     Codeine Itching    Demerol [Meperidine] Nausea    Diazepam Other (See Comments)     Hallucinations/paranoid    Gabapentin     Meloxicam Other (See Comments)    Pentazocine Other (See Comments)     drowsiness    Statins Other (See Comments)     Was hospitalized for possible rhabdo    Sulfamethoxazole-Trimethoprim     Tramadol Nausea        Reason for call: Pt has an order for hydralazine 10mg tid prn and the nurses were checking BP's TID. Nursing wants to know if they should be giving the prn hydralazine in the am d/t elevated BP.     Verbal Order/Direction given by Provider: Check AM BP and give scheduled BP meds, check BP every day prior to lunch and if BP >180 give the hydralazine prn. Check BP at lunch today and call NP. Continue with the Hydralazine 10mg TID prn but no need to schedule more BP checks.     Provider giving Order:  KASANDRA Correia CNP    Verbal Order given to: Shira Felix RN

## 2023-08-17 NOTE — LETTER
8/17/2023        RE: Amparo Sharma  [facility]  [address] [place] [address]          Visit Type: Hypertension     Facility:   [facility]) [[id number]]         History of Present Illness: Amparo Sharma is a 83 year old female     Her past medical history includes  COPD  normal pressure hydrocephalus  memory loss  frequent falls  bladder incontinence  abdominal hernia  alcoholism in remission  Anemia  Arthritis  Asthma  bipolar 1 disorder  Depression  GERD  Hypertension  hypothyroidism     Pt's blood pressures were elevated pt was receiving hydralazine prn  Metoprolol was discontinued and coreg started  BP are improved  No dizziness or lightheadedness.           Current Outpatient Medications   Medication Sig Dispense Refill     acetaminophen (TYLENOL) 325 MG tablet Take 325-650 mg by mouth 3 times daily And every day prn       amoxicillin (AMOXIL) 500 MG tablet Take 2,000 mg by mouth as needed (prior to dental appointment)       ARIPiprazole (ABILIFY) 15 MG tablet Take 15 mg by mouth At Bedtime       aspirin (ASA) 81 MG EC tablet Take 1 tablet (81 mg) by mouth daily 60 tablet 11     bisacodyl (DULCOLAX) 10 MG suppository Place 1 suppository (10 mg) rectally daily as needed for constipation 8 suppository 11     BREO ELLIPTA 100-25 MCG/INH inhaler Inhale 1 puff into the lungs daily 1PM       carboxymethylcellulose (REFRESH PLUS) 0.5 % SOLN ophthalmic solution Place 1 drop into both eyes 3 times daily as needed for dry eyes       carvedilol (COREG) 3.125 MG tablet Take 1 tablet (3.125 mg) by mouth 2 times daily (with meals)       celecoxib (CELEBREX) 200 MG capsule Take 200 mg by mouth daily as needed       cetirizine (ZYRTEC) 10 MG tablet Take 10 mg by mouth daily        citalopram (CELEXA) 20 MG tablet Take 1 tablet (20 mg) by mouth daily 30 tablet 11     diclofenac (VOLTAREN) 1 % topical gel Apply 4 g topically 4 times daily Apply to affected knees.       ferrous sulfate (FEROSUL) 325  (65 Fe) MG tablet Take 1 tablet (325 mg) by mouth Every Mon, Wed, Fri Morning       hydrALAZINE (APRESOLINE) 10 MG tablet Take 10 mg by mouth 3 times daily as needed (for SBP >180) Do not give with am BP if elevated in AM       INCRUSE ELLIPTA 62.5 MCG/INH Inhaler Inhale 1 puff into the lungs daily        lamoTRIgine (LAMICTAL) 150 MG tablet Take 150 mg by mouth 2 times daily       levothyroxine (SYNTHROID/LEVOTHROID) 75 MCG tablet Take 75 mcg by mouth daily       menthol-zinc oxide (CALMOSEPTINE) 0.44-20.6 % OINT ointment Apply topically 2 times daily as needed for irritation       mirabegron (MYRBETRIQ) 25 MG 24 hr tablet Take 25 mg by mouth daily        Multiple Vitamins-Minerals (MULTIVITAMIN PO) Take 1 tablet by mouth daily        ondansetron (ZOFRAN) 4 MG tablet Take 1 tablet (4 mg) by mouth every 6 hours as needed for nausea 30 tablet 11     pantoprazole (PROTONIX) 20 MG EC tablet Take 40 mg by mouth daily       polyethylene glycol (MIRALAX) 17 GM/Dose powder Take 17 g by mouth daily 510 g 0     QUEtiapine (SEROQUEL) 25 MG tablet Take 1 tablet (25 mg) by mouth At Bedtime 60 tablet 3     SENOKOT S 8.6-50 MG tablet Take 1 tablet by mouth daily AND daily PRN 90 tablet 11     tiZANidine (ZANAFLEX) 2 MG tablet Take 2 mg by mouth daily       vitamin C (ASCORBIC ACID) 500 MG tablet Take 500 mg by mouth 2 times daily       Vitamin D, Cholecalciferol, 25 MCG (1000 UT) CAPS Take 1,000 Units by mouth daily          Review of Systems   All other systems reviewed and are negative.         Physical Exam  Vitals and nursing note reviewed.   Skin:     General: Skin is warm.   Neurological:      Mental Status: She is alert.   Psychiatric:         Mood and Affect: Mood normal.         Behavior: Behavior normal.           Labs:  Most Recent 3 CBC's:  Recent Labs   Lab Test 01/10/23  0952 11/15/22  0537 10/04/22  0709   WBC 5.8 6.5 5.4   HGB 12.8 12.9 10.9*   MCV 93 92 90    248 317     Most Recent 3 BMP's:  Recent Labs    Lab Test 01/10/23  0952 11/15/22  0537 10/04/22  0709    135* 143   POTASSIUM 4.5 5.0 4.5   CHLORIDE 105 99 107   CO2 19* 26 24   BUN 19.3 22.2 23.5*   CR 0.55 0.58 0.55   ANIONGAP 15 10 12   MARTHA 9.6 9.7 9.9   * 88 77         Assessment/Plan:  (I10) Essential hypertension  (primary encounter diagnosis)  (Z51.81) Encounter for therapeutic drug monitoring  (Z01.30) Examination of blood pressure  (R00.1) Bradycardia  Comment: Chronic.   Stable.  Pt's BP was elevated with slight bradycardia and she was receiving hydralazine prn while taking metoprolol.  Her metoprolol was discontinued and started on coreg  Plan: Continue with plan of care no changes at this time, adjustment as needed    Electronically signed by:KASANDRA Hollis CNP               Sincerely,        KASANDRA Hollis CNP

## 2023-08-17 NOTE — PROGRESS NOTES
Visit Type: Hypertension     Facility:   [facility] () [[id number]]         History of Present Illness: Amparo Sharma is a 83 year old female     Her past medical history includes  COPD  normal pressure hydrocephalus  memory loss  frequent falls  bladder incontinence  abdominal hernia  alcoholism in remission  Anemia  Arthritis  Asthma  bipolar 1 disorder  Depression  GERD  Hypertension  hypothyroidism     Pt's blood pressures were elevated pt was receiving hydralazine prn  Metoprolol was discontinued and coreg started  BP are improved  No dizziness or lightheadedness.           Current Outpatient Medications   Medication Sig Dispense Refill    acetaminophen (TYLENOL) 325 MG tablet Take 325-650 mg by mouth 3 times daily And every day prn      amoxicillin (AMOXIL) 500 MG tablet Take 2,000 mg by mouth as needed (prior to dental appointment)      ARIPiprazole (ABILIFY) 15 MG tablet Take 15 mg by mouth At Bedtime      aspirin (ASA) 81 MG EC tablet Take 1 tablet (81 mg) by mouth daily 60 tablet 11    bisacodyl (DULCOLAX) 10 MG suppository Place 1 suppository (10 mg) rectally daily as needed for constipation 8 suppository 11    BREO ELLIPTA 100-25 MCG/INH inhaler Inhale 1 puff into the lungs daily 1PM      carboxymethylcellulose (REFRESH PLUS) 0.5 % SOLN ophthalmic solution Place 1 drop into both eyes 3 times daily as needed for dry eyes      carvedilol (COREG) 3.125 MG tablet Take 1 tablet (3.125 mg) by mouth 2 times daily (with meals)      celecoxib (CELEBREX) 200 MG capsule Take 200 mg by mouth daily as needed      cetirizine (ZYRTEC) 10 MG tablet Take 10 mg by mouth daily       citalopram (CELEXA) 20 MG tablet Take 1 tablet (20 mg) by mouth daily 30 tablet 11    diclofenac (VOLTAREN) 1 % topical gel Apply 4 g topically 4 times daily Apply to affected knees.      ferrous sulfate (FEROSUL) 325 (65 Fe) MG tablet Take 1 tablet (325 mg) by mouth Every Mon, Wed, Fri Morning      hydrALAZINE (APRESOLINE) 10 MG tablet  Take 10 mg by mouth 3 times daily as needed (for SBP >180) Do not give with am BP if elevated in AM      INCRUSE ELLIPTA 62.5 MCG/INH Inhaler Inhale 1 puff into the lungs daily       lamoTRIgine (LAMICTAL) 150 MG tablet Take 150 mg by mouth 2 times daily      levothyroxine (SYNTHROID/LEVOTHROID) 75 MCG tablet Take 75 mcg by mouth daily      menthol-zinc oxide (CALMOSEPTINE) 0.44-20.6 % OINT ointment Apply topically 2 times daily as needed for irritation      mirabegron (MYRBETRIQ) 25 MG 24 hr tablet Take 25 mg by mouth daily       Multiple Vitamins-Minerals (MULTIVITAMIN PO) Take 1 tablet by mouth daily       ondansetron (ZOFRAN) 4 MG tablet Take 1 tablet (4 mg) by mouth every 6 hours as needed for nausea 30 tablet 11    pantoprazole (PROTONIX) 20 MG EC tablet Take 40 mg by mouth daily      polyethylene glycol (MIRALAX) 17 GM/Dose powder Take 17 g by mouth daily 510 g 0    QUEtiapine (SEROQUEL) 25 MG tablet Take 1 tablet (25 mg) by mouth At Bedtime 60 tablet 3    SENOKOT S 8.6-50 MG tablet Take 1 tablet by mouth daily AND daily PRN 90 tablet 11    tiZANidine (ZANAFLEX) 2 MG tablet Take 2 mg by mouth daily      vitamin C (ASCORBIC ACID) 500 MG tablet Take 500 mg by mouth 2 times daily      Vitamin D, Cholecalciferol, 25 MCG (1000 UT) CAPS Take 1,000 Units by mouth daily          Review of Systems   All other systems reviewed and are negative.         Physical Exam  Vitals and nursing note reviewed.   Skin:     General: Skin is warm.   Neurological:      Mental Status: She is alert.   Psychiatric:         Mood and Affect: Mood normal.         Behavior: Behavior normal.           Labs:  Most Recent 3 CBC's:  Recent Labs   Lab Test 01/10/23  0952 11/15/22  0537 10/04/22  0709   WBC 5.8 6.5 5.4   HGB 12.8 12.9 10.9*   MCV 93 92 90    248 317     Most Recent 3 BMP's:  Recent Labs   Lab Test 01/10/23  0952 11/15/22  0537 10/04/22  0709    135* 143   POTASSIUM 4.5 5.0 4.5   CHLORIDE 105 99 107   CO2 19* 26 24    BUN 19.3 22.2 23.5*   CR 0.55 0.58 0.55   ANIONGAP 15 10 12   MARHTA 9.6 9.7 9.9   * 88 77         Assessment/Plan:  (I10) Essential hypertension  (primary encounter diagnosis)  (Z51.81) Encounter for therapeutic drug monitoring  (Z01.30) Examination of blood pressure  (R00.1) Bradycardia  Comment: Chronic.   Stable.  Pt's BP was elevated with slight bradycardia and she was receiving hydralazine prn while taking metoprolol.  Her metoprolol was discontinued and started on coreg  Plan: Continue with plan of care no changes at this time, adjustment as needed    Electronically signed by:KASANDRA Hollis CNP

## 2023-10-06 NOTE — TELEPHONE ENCOUNTER
[facility] Geriatrics Triage Nurse Telephone Encounter    Provider: FRANK Marquez  Facility: [facility] Facility Type:  LTC    Caller: Charleen  Call Back Number: [phone]    Allergies:    Allergies   Allergen Reactions    Amantadine     Antihistamine Allergy Relief [Diphenhydramine] Other (See Comments)     hyperactivity    Bactrim [Sulfamethoxazole-Trimethoprim]     Codeine Itching    Demerol [Meperidine] Nausea    Diazepam Other (See Comments)     Hallucinations/paranoid    Gabapentin     Meloxicam Other (See Comments)    Pentazocine Other (See Comments)     drowsiness    Statins Other (See Comments)     Was hospitalized for possible rhabdo    Sulfamethoxazole-Trimethoprim     Tramadol Nausea        Reason for call: Nurse is reporting that patient re-admitted from [facility] yesterday after being there with Covid.  Patient was given orders for Xarelto 10mg daily x 28 days due to hypercoagulability secondary to Covid.  She also has orders for ASA 81mg BID, which she was on prior to hospitalization.        Verbal Order/Direction given by Provider: Hold ASA while on Xarelto.  May resume ASA 81mg BID upon completion of Xarelto.      Provider giving Order:  FRANK Marquez    Verbal Order given to: Charleen Serra RN

## 2023-10-06 NOTE — TELEPHONE ENCOUNTER
[facility] Geriatrics Triage Nurse Telephone Encounter    Provider: Megan Bajwa MD  Facility: [facility] Facility Type:  LTC    Caller: Jessica   Call Back Number: [phone]    Allergies:    Allergies   Allergen Reactions    Amantadine     Antihistamine Allergy Relief [Diphenhydramine] Other (See Comments)     hyperactivity    Bactrim [Sulfamethoxazole-Trimethoprim]     Codeine Itching    Demerol [Meperidine] Nausea    Diazepam Other (See Comments)     Hallucinations/paranoid    Gabapentin     Meloxicam Other (See Comments)    Pentazocine Other (See Comments)     drowsiness    Statins Other (See Comments)     Was hospitalized for possible rhabdo    Sulfamethoxazole-Trimethoprim     Tramadol Nausea        Reason for call: The patient re-admitted to the LTC facility yesterday, pt was Full code on admission and then today the family called and would like to change code status to DNR with full treatment.     Verbal Order/Direction given by Provider: Change code status to DNR with full treatment.     Provider giving Order:  Megan Bajwa MD    Verbal Order given to: Jessica Felix RN

## 2023-10-10 NOTE — TELEPHONE ENCOUNTER
[facility] Geriatrics Triage Nurse Telephone Encounter    Provider: Megan Bajwa MD  Facility: [facility] Facility Type:  LTC    Caller: Jessica  Call Back Number: [phone]    Allergies:    Allergies   Allergen Reactions    Amantadine     Antihistamine Allergy Relief [Diphenhydramine] Other (See Comments)     hyperactivity    Bactrim [Sulfamethoxazole-Trimethoprim]     Codeine Itching    Demerol [Meperidine] Nausea    Diazepam Other (See Comments)     Hallucinations/paranoid    Gabapentin     Meloxicam Other (See Comments)    Pentazocine Other (See Comments)     drowsiness    Statins Other (See Comments)     Was hospitalized for possible rhabdo    Sulfamethoxazole-Trimethoprim     Tramadol Nausea        Reason for call: Nurse is calling to clarify an order.      Verbal Order/Direction given by Provider: Discontinue Atarax.  Resume Seroquel 25mg Q HS.      Provider giving Order:  Megan Bajwa MD    Verbal Order given to: Jessica Serra RN

## 2023-11-17 NOTE — LETTER
11/17/2023        RE: Amparo Sharma  [address] [address] [place] [address]        [facility] GERIATRICS  REGULATORY VISIT  November 17, 2023    [facility] Medical Record Number:  [id number]  Place of Service where encounter took place:  [facility] () [[id number]]    Chief Complaint   Patient presents with     USP Regulatory       HPI:    Amparo Sharma is a 83 year old  (1940), who is being seen today for a federally mandated E/M visit at [facility] where she moved in April 2023 after over a year residing at a different facility - first in TCU then in LTC.  HPI information obtained from: facility chart records, facility staff, patient report and [facility] chart review and [facility] CareEverywhere.    She was hospitalized at [facility] in early October with COVID 19 infection with acute respiratory failure and COPD exacerbation.     Today, Ms Sharma is seen sitting in her wheelchair. She feels slowly improving fatigue since the COVID infection. Has some intermittent R sided MSK neck pain - it's like a muscle dull cramp, not sharp, not like zingers. No chest pain or dyspnea.     ALLERGIES:    Allergies   Allergen Reactions     Amantadine      Antihistamine Allergy Relief [Diphenhydramine] Other (See Comments)     hyperactivity     Bactrim [Sulfamethoxazole-Trimethoprim]      Codeine Itching     Demerol [Meperidine] Nausea     Diazepam Other (See Comments)     Hallucinations/paranoid     Gabapentin      Meloxicam Other (See Comments)     Pentazocine Other (See Comments)     drowsiness     Statins Other (See Comments)     Was hospitalized for possible rhabdo     Sulfamethoxazole-Trimethoprim      Tramadol Nausea        Past Medical, Surgical, Family and Social History: Reviewed and updated in EPIC.    MEDICATIONS:  Post Discharge Medication Reconciliation Status: discharge medications reconciled and changed, per note/orders.   Current Outpatient Medications    Medication Sig Dispense Refill     acetaminophen (TYLENOL) 325 MG tablet Take 325-650 mg by mouth 3 times daily And every day prn       amoxicillin (AMOXIL) 500 MG tablet Take 2,000 mg by mouth as needed (prior to dental appointment)       ARIPiprazole (ABILIFY) 15 MG tablet Take 15 mg by mouth At Bedtime       aspirin 81 MG EC tablet Take 81 mg by mouth 2 times daily (Starting 11/3/23)       bisacodyl (DULCOLAX) 10 MG suppository Place 1 suppository (10 mg) rectally daily as needed for constipation 8 suppository 11     BREO ELLIPTA 100-25 MCG/INH inhaler Inhale 1 puff into the lungs daily 1PM       carboxymethylcellulose (REFRESH PLUS) 0.5 % SOLN ophthalmic solution Place 1 drop into both eyes 3 times daily as needed for dry eyes       carvedilol (COREG) 3.125 MG tablet Take 1 tablet (3.125 mg) by mouth 2 times daily (with meals)       celecoxib (CELEBREX) 200 MG capsule Take 200 mg by mouth daily as needed       cetirizine (ZYRTEC) 10 MG tablet Take 10 mg by mouth daily        citalopram (CELEXA) 20 MG tablet Take 1 tablet (20 mg) by mouth daily 30 tablet 11     diclofenac (VOLTAREN) 1 % topical gel Apply 4 g topically 4 times daily Apply to affected knees.       ferrous sulfate (FEROSUL) 325 (65 Fe) MG tablet Take 1 tablet (325 mg) by mouth Every Mon, Wed, Fri Morning       INCRUSE ELLIPTA 62.5 MCG/INH Inhaler Inhale 1 puff into the lungs daily        lamoTRIgine (LAMICTAL) 150 MG tablet Take 150 mg by mouth 2 times daily       levothyroxine (SYNTHROID/LEVOTHROID) 75 MCG tablet Take 75 mcg by mouth daily       menthol-zinc oxide (CALMOSEPTINE) 0.44-20.6 % OINT ointment Apply topically 2 times daily as needed for irritation       mirabegron (MYRBETRIQ) 25 MG 24 hr tablet Take 25 mg by mouth daily        Multiple Vitamins-Minerals (MULTIVITAMIN PO) Take 1 tablet by mouth daily        pantoprazole (PROTONIX) 20 MG EC tablet Take 40 mg by mouth daily       polyethylene glycol (MIRALAX) 17 GM/Dose powder Take 17 g  by mouth daily 510 g 0     QUEtiapine (SEROQUEL) 25 MG tablet Take 1 tablet (25 mg) by mouth At Bedtime 60 tablet 3     SENOKOT S 8.6-50 MG tablet Take 1 tablet by mouth daily AND daily PRN 90 tablet 11     tiZANidine (ZANAFLEX) 2 MG tablet Take 2 mg by mouth daily       vitamin C (ASCORBIC ACID) 500 MG tablet Take 500 mg by mouth 2 times daily       Vitamin D, Cholecalciferol, 25 MCG (1000 UT) CAPS Take 1,000 Units by mouth daily        Medications reviewed:  Medications reconciled to facility chart and changes were made to reflect current medications as identified as above med list. Below are the changes that were made:   Medications stopped since last EPIC medication reconciliation:   Medications Discontinued During This Encounter   Medication Reason     rivaroxaban ANTICOAGULANT (XARELTO) 10 MG TABS tablet Med Rec(No AVS / No eCancel)     ondansetron (ZOFRAN) 4 MG tablet Med Rec(No AVS / No eCancel)     Medications started since last [facility] medication reconciliation:  No orders of the defined types were placed in this encounter.    REVIEW OF SYSTEMS:  4 point ROS neg other than the symptoms noted above in the HPI.    PHYSICAL EXAM:  /77   Pulse 76   Temp 97.2  F (36.2  C)   Resp 16   Ht 1.524 m (5')   Wt 73.9 kg (163 lb)   SpO2 91%   BMI 31.83 kg/m    Gen: sitting in wheelchair, alert, cooperative and in no acute distress  Resp: breathing non labored, no tachypnea  Ext: no LE edema  Neuro: CX II-XII grossly in tact; ROM in all four extremities grossly in tact  Psych: memory, judgement and insight impaired    LABS/IMAGING: Reviewed as per Epic and/or [facility]    ASSESSMENT / PLAN:    HTN  SBPs 110s-140s, most 130s. HR 70s.   -- metoprolol 12.5 mg BID   -- follow BPs and adjust medications as needed    Bipolar Disorder  Stable. Mood and spirits were good today.   -- aripiprazole 15 mg at bedtime, citalopram 20 mg daily, lamotrigine 150 mg BID and quetiapine 25 mg at bedtime  -- ongoing supportive  cares    History of COVID 19 Infection (Oct 2023)  She was hospitalized with concomitant COPD exacerbation. Feels still some fatigue, but this is improving  -- follow clinically  -- consider updated vaccine early in 2024    Electronically signed by  Megan Bajwa MD              Sincerely,        Megan Bajwa MD

## 2023-11-17 NOTE — PROGRESS NOTES
[facility] GERIATRICS  REGULATORY VISIT  November 17, 2023    [facility] Medical Record Number:  [id number]  Place of Service where encounter took place:  [facility] () [[id number]]    Chief Complaint   Patient presents with    residential Regulatory       HPI:    Amparo Sharma is a 83 year old  (1940), who is being seen today for a federally mandated E/M visit at [facility] where she moved in April 2023 after over a year residing at a different facility - first in TCU then in LTC.  HPI information obtained from: facility chart records, facility staff, patient report and [facility] chart review and [facility] CareEverywhere.    She was hospitalized at [facility] in early October with COVID 19 infection with acute respiratory failure and COPD exacerbation.     Today, Ms Sharma is seen sitting in her wheelchair. She feels slowly improving fatigue since the COVID infection. Has some intermittent R sided MSK neck pain - it's like a muscle dull cramp, not sharp, not like zingers. No chest pain or dyspnea.     ALLERGIES:    Allergies   Allergen Reactions    Amantadine     Antihistamine Allergy Relief [Diphenhydramine] Other (See Comments)     hyperactivity    Bactrim [Sulfamethoxazole-Trimethoprim]     Codeine Itching    Demerol [Meperidine] Nausea    Diazepam Other (See Comments)     Hallucinations/paranoid    Gabapentin     Meloxicam Other (See Comments)    Pentazocine Other (See Comments)     drowsiness    Statins Other (See Comments)     Was hospitalized for possible rhabdo    Sulfamethoxazole-Trimethoprim     Tramadol Nausea        Past Medical, Surgical, Family and Social History: Reviewed and updated in EPIC.    MEDICATIONS:  Post Discharge Medication Reconciliation Status: discharge medications reconciled and changed, per note/orders.   Current Outpatient Medications   Medication Sig Dispense Refill    acetaminophen (TYLENOL) 325 MG tablet Take 325-650 mg by mouth 3 times daily And every day  prn      amoxicillin (AMOXIL) 500 MG tablet Take 2,000 mg by mouth as needed (prior to dental appointment)      ARIPiprazole (ABILIFY) 15 MG tablet Take 15 mg by mouth At Bedtime      aspirin 81 MG EC tablet Take 81 mg by mouth 2 times daily (Starting 11/3/23)      bisacodyl (DULCOLAX) 10 MG suppository Place 1 suppository (10 mg) rectally daily as needed for constipation 8 suppository 11    BREO ELLIPTA 100-25 MCG/INH inhaler Inhale 1 puff into the lungs daily 1PM      carboxymethylcellulose (REFRESH PLUS) 0.5 % SOLN ophthalmic solution Place 1 drop into both eyes 3 times daily as needed for dry eyes      carvedilol (COREG) 3.125 MG tablet Take 1 tablet (3.125 mg) by mouth 2 times daily (with meals)      celecoxib (CELEBREX) 200 MG capsule Take 200 mg by mouth daily as needed      cetirizine (ZYRTEC) 10 MG tablet Take 10 mg by mouth daily       citalopram (CELEXA) 20 MG tablet Take 1 tablet (20 mg) by mouth daily 30 tablet 11    diclofenac (VOLTAREN) 1 % topical gel Apply 4 g topically 4 times daily Apply to affected knees.      ferrous sulfate (FEROSUL) 325 (65 Fe) MG tablet Take 1 tablet (325 mg) by mouth Every Mon, Wed, Fri Morning      INCRUSE ELLIPTA 62.5 MCG/INH Inhaler Inhale 1 puff into the lungs daily       lamoTRIgine (LAMICTAL) 150 MG tablet Take 150 mg by mouth 2 times daily      levothyroxine (SYNTHROID/LEVOTHROID) 75 MCG tablet Take 75 mcg by mouth daily      menthol-zinc oxide (CALMOSEPTINE) 0.44-20.6 % OINT ointment Apply topically 2 times daily as needed for irritation      mirabegron (MYRBETRIQ) 25 MG 24 hr tablet Take 25 mg by mouth daily       Multiple Vitamins-Minerals (MULTIVITAMIN PO) Take 1 tablet by mouth daily       pantoprazole (PROTONIX) 20 MG EC tablet Take 40 mg by mouth daily      polyethylene glycol (MIRALAX) 17 GM/Dose powder Take 17 g by mouth daily 510 g 0    QUEtiapine (SEROQUEL) 25 MG tablet Take 1 tablet (25 mg) by mouth At Bedtime 60 tablet 3    SENOKOT S 8.6-50 MG tablet Take  1 tablet by mouth daily AND daily PRN 90 tablet 11    tiZANidine (ZANAFLEX) 2 MG tablet Take 2 mg by mouth daily      vitamin C (ASCORBIC ACID) 500 MG tablet Take 500 mg by mouth 2 times daily      Vitamin D, Cholecalciferol, 25 MCG (1000 UT) CAPS Take 1,000 Units by mouth daily        Medications reviewed:  Medications reconciled to facility chart and changes were made to reflect current medications as identified as above med list. Below are the changes that were made:   Medications stopped since last EPIC medication reconciliation:   Medications Discontinued During This Encounter   Medication Reason    rivaroxaban ANTICOAGULANT (XARELTO) 10 MG TABS tablet Med Rec(No AVS / No eCancel)    ondansetron (ZOFRAN) 4 MG tablet Med Rec(No AVS / No eCancel)     Medications started since last [facility] medication reconciliation:  No orders of the defined types were placed in this encounter.    REVIEW OF SYSTEMS:  4 point ROS neg other than the symptoms noted above in the HPI.    PHYSICAL EXAM:  /77   Pulse 76   Temp 97.2  F (36.2  C)   Resp 16   Ht 1.524 m (5')   Wt 73.9 kg (163 lb)   SpO2 91%   BMI 31.83 kg/m    Gen: sitting in wheelchair, alert, cooperative and in no acute distress  Resp: breathing non labored, no tachypnea  Ext: no LE edema  Neuro: CX II-XII grossly in tact; ROM in all four extremities grossly in tact  Psych: memory, judgement and insight impaired    LABS/IMAGING: Reviewed as per Epic and/or [facility]    ASSESSMENT / PLAN:    HTN  SBPs 110s-140s, most 130s. HR 70s.   -- metoprolol 12.5 mg BID   -- follow BPs and adjust medications as needed    Bipolar Disorder  Stable. Mood and spirits were good today.   -- aripiprazole 15 mg at bedtime, citalopram 20 mg daily, lamotrigine 150 mg BID and quetiapine 25 mg at bedtime  -- ongoing supportive cares    History of COVID 19 Infection (Oct 2023)  She was hospitalized with concomitant COPD exacerbation. Feels still some fatigue, but this is  improving  -- follow clinically  -- consider updated vaccine early in 2024    Electronically signed by  Megan Bajwa MD

## 2023-11-20 NOTE — TELEPHONE ENCOUNTER
[facility] Call Center    Phone Message    May a detailed message be left on voicemail: yes     Reason for Call: Other: Meron is calling from Pt long term care home wanting to make an appt for Pt Shunt check. Please call Meron back to schedule appt.      Action Taken: Message routed to:  [facility] ([facility] Neurosurgery    Travel Screening: Not Applicable

## 2023-11-22 NOTE — TELEPHONE ENCOUNTER
[facility] Call [facility]    Phone Message    May a detailed message be left on voicemail: yes     Reason for Call: Other: [facility] patient coordinator from [facility] is following up on the status of appointment request for a shunt check up. Last appointment was with Jacquelin Covarrubias on 07/08/2022 and [facility] is attempting to schedule a follow up appointment since its been over a year for this. Please call to advise.      Action Taken: Message routed to:  Other: Neurosurgery    Travel Screening: Not Applicable

## 2023-11-24 NOTE — TELEPHONE ENCOUNTER
[facility] Geriatrics Triage Nurse Telephone Encounter    Provider: Megan Bajwa MD  Facility: [facility] Facility Type:  LTC    Caller: Jessica  Call Back Number: [phone]    Allergies:    Allergies   Allergen Reactions    Amantadine     Antihistamine Allergy Relief [Diphenhydramine] Other (See Comments)     hyperactivity    Bactrim [Sulfamethoxazole-Trimethoprim]     Codeine Itching    Demerol [Meperidine] Nausea    Diazepam Other (See Comments)     Hallucinations/paranoid    Gabapentin     Meloxicam Other (See Comments)    Pentazocine Other (See Comments)     drowsiness    Statins Other (See Comments)     Was hospitalized for possible rhabdo    Sulfamethoxazole-Trimethoprim     Tramadol Nausea        Reason for call:   VS stable- 97.2, 71, r16, /80 O2 92 RA   Per Nursing the pt states that she is doing well today.     Verbal Order/Direction given by Provider:   continue to monitor VS q shift x72h and update for any fever, O2 <90% or subjective dyspnea     Provider giving Order:  Megan Bajwa MD    Verbal Order given to: Jessica Felix RN

## 2023-11-24 NOTE — TELEPHONE ENCOUNTER
Resident felt she has aspiration.  At supper had a emesis but swallowed it and felt it went down the wrong pipe.    Having symptoms of respiratory aspiration.  Asking for a CXR.    Orders:  CXR AP and lateral view due to possible aspiration    KASANDRA Navarrete CNP

## 2023-12-04 NOTE — TELEPHONE ENCOUNTER
[facility] Triage Nurse Telephone Encounter    Provider: Megan Bajwa MD  Facility: [facility] Facility Type:  LTC    Caller: Millie  Call Back Number: [phone]    Allergies:    Allergies   Allergen Reactions    Amantadine     Antihistamine Allergy Relief [Diphenhydramine] Other (See Comments)     hyperactivity    Bactrim [Sulfamethoxazole-Trimethoprim]     Codeine Itching    Demerol [Meperidine] Nausea    Diazepam Other (See Comments)     Hallucinations/paranoid    Gabapentin     Meloxicam Other (See Comments)    Pentazocine Other (See Comments)     drowsiness    Statins Other (See Comments)     Was hospitalized for possible rhabdo    Sulfamethoxazole-Trimethoprim     Tramadol Nausea        Reason for call: Nurse is calling to report the chest x-ray results that were done over the weekend due to increased SOB and now requiring O2 again.  See results below:            Verbal Order/Direction given by Provider: Augmentin 875mg BID x 5 days.  Doxycycline 100mg BID x 5 days.  Check VS Q shift and call for:  SBP less than 110, HR greater than 110, fever greater than 100, or increased O2 needs.      Provider giving Order:  Megan Bajwa MD    Verbal Order given to: Millie Serra RN

## 2024-01-01 ENCOUNTER — LAB REQUISITION (OUTPATIENT)
Dept: LAB | Facility: CLINIC | Age: 84
End: 2024-01-01
Payer: MEDICARE

## 2024-01-01 ENCOUNTER — OFFICE VISIT (OUTPATIENT)
Dept: NEUROSURGERY | Facility: CLINIC | Age: 84
End: 2024-01-01
Payer: MEDICARE

## 2024-01-01 ENCOUNTER — NURSING HOME VISIT (OUTPATIENT)
Dept: GERIATRICS | Facility: CLINIC | Age: 84
End: 2024-01-01
Payer: MEDICARE

## 2024-01-01 VITALS
RESPIRATION RATE: 18 BRPM | OXYGEN SATURATION: 92 % | BODY MASS INDEX: 30.55 KG/M2 | HEART RATE: 75 BPM | DIASTOLIC BLOOD PRESSURE: 74 MMHG | WEIGHT: 155.6 LBS | TEMPERATURE: 97.2 F | HEIGHT: 60 IN | SYSTOLIC BLOOD PRESSURE: 136 MMHG

## 2024-01-01 VITALS
HEART RATE: 73 BPM | RESPIRATION RATE: 16 BRPM | OXYGEN SATURATION: 89 % | SYSTOLIC BLOOD PRESSURE: 177 MMHG | BODY MASS INDEX: 32.03 KG/M2 | DIASTOLIC BLOOD PRESSURE: 83 MMHG | WEIGHT: 164 LBS

## 2024-01-01 DIAGNOSIS — E03.9 HYPOTHYROIDISM, UNSPECIFIED: ICD-10-CM

## 2024-01-01 DIAGNOSIS — I10 ESSENTIAL HYPERTENSION: ICD-10-CM

## 2024-01-01 DIAGNOSIS — E03.9 HYPOTHYROIDISM, UNSPECIFIED TYPE: ICD-10-CM

## 2024-01-01 DIAGNOSIS — G91.2 NORMAL PRESSURE HYDROCEPHALUS (H): ICD-10-CM

## 2024-01-01 DIAGNOSIS — I50.9 CHRONIC CONGESTIVE HEART FAILURE, UNSPECIFIED HEART FAILURE TYPE (H): ICD-10-CM

## 2024-01-01 DIAGNOSIS — F01.B3 MODERATE VASCULAR DEMENTIA WITH MOOD DISTURBANCE (H): ICD-10-CM

## 2024-01-01 DIAGNOSIS — N32.81 OVERACTIVE BLADDER: ICD-10-CM

## 2024-01-01 DIAGNOSIS — J44.9 CHRONIC OBSTRUCTIVE PULMONARY DISEASE, UNSPECIFIED COPD TYPE (H): ICD-10-CM

## 2024-01-01 DIAGNOSIS — Z87.440 H/O RECURRENT URINARY TRACT INFECTION: ICD-10-CM

## 2024-01-01 DIAGNOSIS — I10 ESSENTIAL (PRIMARY) HYPERTENSION: ICD-10-CM

## 2024-01-01 DIAGNOSIS — F31.9 BIPOLAR 1 DISORDER (H): Primary | ICD-10-CM

## 2024-01-01 DIAGNOSIS — K42.9 UMBILICAL HERNIA WITHOUT OBSTRUCTION AND WITHOUT GANGRENE: ICD-10-CM

## 2024-01-01 DIAGNOSIS — I27.20 PULMONARY HYPERTENSION (H): ICD-10-CM

## 2024-01-01 DIAGNOSIS — G91.2 NORMAL PRESSURE HYDROCEPHALUS (H): Primary | ICD-10-CM

## 2024-01-01 DIAGNOSIS — J96.11 CHRONIC RESPIRATORY FAILURE WITH HYPOXIA (H): ICD-10-CM

## 2024-01-01 DIAGNOSIS — L89.613 PRESSURE INJURY OF RIGHT HEEL, STAGE 3 (H): ICD-10-CM

## 2024-01-01 DIAGNOSIS — Z86.16 PERSONAL HISTORY OF COVID-19: ICD-10-CM

## 2024-01-01 DIAGNOSIS — F10.21 ALCOHOL DEPENDENCE IN REMISSION (H): ICD-10-CM

## 2024-01-01 LAB
ANION GAP SERPL CALCULATED.3IONS-SCNC: 10 MMOL/L (ref 7–15)
BUN SERPL-MCNC: 21.1 MG/DL (ref 8–23)
CALCIUM SERPL-MCNC: 9.9 MG/DL (ref 8.8–10.2)
CHLORIDE SERPL-SCNC: 103 MMOL/L (ref 98–107)
CREAT SERPL-MCNC: 0.66 MG/DL (ref 0.51–0.95)
DEPRECATED HCO3 PLAS-SCNC: 28 MMOL/L (ref 22–29)
EGFRCR SERPLBLD CKD-EPI 2021: 87 ML/MIN/1.73M2
ERYTHROCYTE [DISTWIDTH] IN BLOOD BY AUTOMATED COUNT: 13.9 % (ref 10–15)
GLUCOSE SERPL-MCNC: 135 MG/DL (ref 70–99)
HCT VFR BLD AUTO: 38.4 % (ref 35–47)
HGB BLD-MCNC: 11.4 G/DL (ref 11.7–15.7)
MCH RBC QN AUTO: 28.9 PG (ref 26.5–33)
MCHC RBC AUTO-ENTMCNC: 29.7 G/DL (ref 31.5–36.5)
MCV RBC AUTO: 98 FL (ref 78–100)
PLATELET # BLD AUTO: 214 10E3/UL (ref 150–450)
POTASSIUM SERPL-SCNC: 4.6 MMOL/L (ref 3.4–5.3)
RBC # BLD AUTO: 3.94 10E6/UL (ref 3.8–5.2)
SODIUM SERPL-SCNC: 141 MMOL/L (ref 135–145)
TSH SERPL DL<=0.005 MIU/L-ACNC: 4.08 UIU/ML (ref 0.3–4.2)
WBC # BLD AUTO: 7.4 10E3/UL (ref 4–11)

## 2024-01-01 PROCEDURE — P9604 ONE-WAY ALLOW PRORATED TRIP: HCPCS | Mod: ORL

## 2024-01-01 PROCEDURE — P9604 ONE-WAY ALLOW PRORATED TRIP: HCPCS | Mod: ORL | Performed by: INTERNAL MEDICINE

## 2024-01-01 PROCEDURE — 99213 OFFICE O/P EST LOW 20 MIN: CPT | Performed by: NURSE PRACTITIONER

## 2024-01-01 PROCEDURE — P9604 ONE-WAY ALLOW PRORATED TRIP: HCPCS | Mod: ORL | Performed by: STUDENT IN AN ORGANIZED HEALTH CARE EDUCATION/TRAINING PROGRAM

## 2024-01-01 PROCEDURE — 85027 COMPLETE CBC AUTOMATED: CPT | Mod: ORL | Performed by: INTERNAL MEDICINE

## 2024-01-01 PROCEDURE — 36415 COLL VENOUS BLD VENIPUNCTURE: CPT | Performed by: STUDENT IN AN ORGANIZED HEALTH CARE EDUCATION/TRAINING PROGRAM

## 2024-01-01 PROCEDURE — P9604 ONE-WAY ALLOW PRORATED TRIP: HCPCS | Performed by: STUDENT IN AN ORGANIZED HEALTH CARE EDUCATION/TRAINING PROGRAM

## 2024-01-01 PROCEDURE — 84443 ASSAY THYROID STIM HORMONE: CPT | Mod: ORL | Performed by: STUDENT IN AN ORGANIZED HEALTH CARE EDUCATION/TRAINING PROGRAM

## 2024-01-01 PROCEDURE — 99309 SBSQ NF CARE MODERATE MDM 30: CPT | Performed by: NURSE PRACTITIONER

## 2024-01-01 PROCEDURE — 36415 COLL VENOUS BLD VENIPUNCTURE: CPT | Mod: ORL | Performed by: INTERNAL MEDICINE

## 2024-01-01 PROCEDURE — 80048 BASIC METABOLIC PNL TOTAL CA: CPT | Performed by: STUDENT IN AN ORGANIZED HEALTH CARE EDUCATION/TRAINING PROGRAM

## 2024-01-01 PROCEDURE — 36415 COLL VENOUS BLD VENIPUNCTURE: CPT | Mod: ORL | Performed by: STUDENT IN AN ORGANIZED HEALTH CARE EDUCATION/TRAINING PROGRAM

## 2024-01-01 RX ORDER — DIAPER,BRIEF,INFANT-TODD,DISP
EACH MISCELLANEOUS
COMMUNITY

## 2024-01-01 RX ORDER — BUPROPION HYDROCHLORIDE 150 MG/1
TABLET ORAL
COMMUNITY
Start: 2024-01-01

## 2024-01-01 RX ORDER — HYDROXYZINE HYDROCHLORIDE 25 MG/1
TABLET, FILM COATED ORAL
COMMUNITY
Start: 2023-01-01

## 2024-01-01 ASSESSMENT — PAIN SCALES - GENERAL: PAINLEVEL: MODERATE PAIN (5)

## 2024-01-10 NOTE — LETTER
1/10/2024       RE: Amparo Sharma  [address] [place] [address]     Dear Colleague,    Thank you for referring your patient, Amparo Sharma, to the [facility] [place] at [facility]. Please see a copy of my visit note below.    [facility]  Department of Neurosurgery      Name: Amparo Sharma  MRN: [id number]  Age: 83 year old  : 1940  Referring provider: No ref. provider found  01/10/2024      Chief Complaint:   Normal Pressure Hydrocephalus  S/p  shunt placement in 2017 (Medtronic Strata)  Follow-up     History of Present Illness:   Amparo Sharma is a 83 year old female with a history of NPH, s/p Right frontal  shunt placement on 2017 by Dr. Dozier  who is seen today for follow up.     Most recently seen in clinic on 2022. Her shunt setting was at 1.5. Today, patient presents for a follow-up. No new neurological concerns. She is wheelchair dependent. Staff at the group home uses a Bonnie lift for transfers. She has a wound on hr right heel from a tennis show and is currently undergoing wound management for this.      Review of Systems:   Pertinent items are noted in HPI or as in patient entered ROS below, remainder of complete ROS is negative.        No data to display                 Physical Exam:   BP (!) 177/83 (BP Location: Right arm, Patient Position: Sitting)   Pulse 73   Resp 16   Wt 74.4 kg (164 lb)   SpO2 (!) 89%   BMI 32.03 kg/m     General: No acute distress.    Neuro: The patient is fully oriented. Speech is normal. Gait: deferred  Psych: Normal mood and affect. Behavior is normal.        Imaging:  No recent imaging.     Procedures:   With patient's permission, her Medtronic Strata Right frontal  shunt was interrogated and found to be set to 1.5.  Rechecked x 3.     Assessment:  Normal Pressure Hydrocephalus  S/p  shunt placement in 2017 (Medtronic  Strata at 1.5)  Follow-up     Plan:  Overall, patient doing well without any new neurological concerns in regards to  shunt. We will plan to see her in 1 year for a routine follow-up.        I spent 20 minutes on patient care activities related to this encounter on the date of service, including time spent reviewing the chart, obtaining history and examination and in counseling the patient, and in documentation in the electronic medical record.        Again, thank you for allowing me to participate in the care of your patient.      Sincerely,    KASANDRA Pimentel CNP

## 2024-01-10 NOTE — PROGRESS NOTES
[facility]  Department of Neurosurgery      Name: Amparo Sharma  MRN: [id number]  Age: 83 year old  : 1940  Referring provider: No ref. provider found  01/10/2024      Chief Complaint:   Normal Pressure Hydrocephalus  S/p  shunt placement in 2017 (Medtronic Strata)  Follow-up     History of Present Illness:   Amparo Sharma is a 83 year old female with a history of NPH, s/p Right frontal  shunt placement on 2017 by Dr. Dozier  who is seen today for follow up.     Most recently seen in clinic on 2022. Her shunt setting was at 1.5. Today, patient presents for a follow-up. No new neurological concerns. She is wheelchair dependent. Staff at the group home uses a Bonnie lift for transfers. She has a wound on hr right heel from a tennis show and is currently undergoing wound management for this.      Review of Systems:   Pertinent items are noted in HPI or as in patient entered ROS below, remainder of complete ROS is negative.        No data to display                 Physical Exam:   BP (!) 177/83 (BP Location: Right arm, Patient Position: Sitting)   Pulse 73   Resp 16   Wt 74.4 kg (164 lb)   SpO2 (!) 89%   BMI 32.03 kg/m     General: No acute distress.    Neuro: The patient is fully oriented. Speech is normal. Gait: deferred  Psych: Normal mood and affect. Behavior is normal.        Imaging:  No recent imaging.     Procedures:   With patient's permission, her Medtronic Strata Right frontal  shunt was interrogated and found to be set to 1.5.  Rechecked x 3.     Assessment:  Normal Pressure Hydrocephalus  S/p  shunt placement in 2017 (Medtronic Strata at 1.5)  Follow-up     Plan:  Overall, patient doing well without any new neurological concerns in regards to  shunt. We will plan to see her in 1 year for a routine follow-up.        I spent 20 minutes on patient care activities related to this encounter on the date of service, including time spent reviewing the chart,  obtaining history and examination and in counseling the patient, and in documentation in the electronic medical record.      Jacquelin SLAUGHTER CNP  Department of Neurosurgery

## 2024-01-18 PROBLEM — Z99.3 WHEELCHAIR DEPENDENCE: Status: ACTIVE | Noted: 2023-01-01

## 2024-01-18 PROBLEM — F32.A DEPRESSIVE DISORDER: Status: ACTIVE | Noted: 2024-01-01

## 2024-01-18 PROBLEM — U07.1 COVID-19 VIRUS INFECTION: Status: ACTIVE | Noted: 2023-01-01

## 2024-01-18 PROBLEM — J96.20 ACUTE ON CHRONIC RESPIRATORY FAILURE (H): Status: ACTIVE | Noted: 2023-01-01

## 2024-01-18 PROBLEM — J44.9 CHRONIC OBSTRUCTIVE PULMONARY DISEASE (H): Status: ACTIVE | Noted: 2023-01-01

## 2024-01-18 PROBLEM — Z86.16 PERSONAL HISTORY OF COVID-19: Status: ACTIVE | Noted: 2023-01-01

## 2024-01-18 NOTE — LETTER
"    2024        RE: Amparo Sharma  [address] [place] [address]        [facility]    Name:   Amparo Sharma  :   1940  MRN:    [id number]     Facility:   [facility] () [[id number]]   Room: [place] [address]  Code Status: FULL CODE and POLST AVAILABLE -     DOS: 2024    PCP:  Markos Stanley CNP     CHIEF COMPLAINT / REASON FOR VISIT:  Chief Complaint   Patient presents with     longterm Regulatory            HPI: Amparo is a 83 year old female with a PMHx significant for mild vascular dementia, normal pressure hydrocephalus ( shunt 2017), essential hypertension, pulmonary hypertension, COPD, congestive heart failure, iron deficiency anemia, recurrent urinary tract infections, history of alcohol dependence and history of COVID-19 (10/2023), who had been living in assisted living prior to her last hospitalization.      CURRENT/RECENT TCU ISSUES    Disposition  -- Diagnosed with mild congestive heart failure last month per CXR on 2023.  This included cardiomegaly and pulmonary venous hypertension.  She was treated with Augmentin and doxycycline.  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    Nonambulatory status  -- Wheelchair use at baseline, though she states she was able to walk with a walker prior to admission.  -- She is nonambulatory and has been transferred with a Bonnie lift, although she says she hates that and would prefer the EZ stand.  We can have therapy evaluate/reevaluate for being able to use the EZ stand.    Right foot wound  -- Bandaged.  She states that it \"hurts a lot.\"  She says it was from wearing the wrong shoes.  -- Wound itself seems healed externally, though the heel is a bit mushy.  -- Continue wearing pressure-relief boot.    Bipolar disorder  Depression  -- She feels depressed lying in bed, unable to do for herself.  This goes for the necessity of a " Bonnie lift as well.  -- Experiences both howie and depression.  -- Continue aripiprazole, lamotrigine, and quetiapine.  -- Continue citalopram.  -- Bupropion ( mg daily) ordered by psych NP (Nohemi Mccann) on 01/05/2024.  Continue.    Congestive heart failure  Essential hypertension  Pulmonary venous hypertension  -- Continue carvedilol    COPD  Chronic respiratory failure with hypoxia  -- Continue Breo Ellipta, Incruse Ellipta    Normal pressure hydrocephalus  -- Seen at [facility] neurosurgery on 01/10/2024.  No neurological concerns.  Follow-up in 1 year.    Irritable bladder  Recurrent urinary tract infections  -- Continue Myrbetriq    History of COVID-19  -- States that she sleeps a lot since the infection.      ROS: 10 point ROS of systems including Constitutional, Eyes, Respiratory, Cardiovascular, Gastroenterology, Genitourinary, Integumentary, Musculoskeletal, and Psychiatric were all negative except for pertinent positives noted in my HPI.      Past Medical History:   Diagnosis Date     Abdominal hernia      Abdominal hernia      Alcoholism in remission (H)      Alcoholism in remission (H) 2017     Anemia      Arthritis      Arthritis      Asthma      Asthma 1/20/2017     Bipolar 1 disorder (H)      Bipolar disorder (H)      Bladder incontinence 2013     Cancer (H)      Cellulitis      Cellulitis 12/12/2016    of left elbow     Chronic pain syndrome      COPD (chronic obstructive pulmonary disease) (H)      COPD with acute exacerbation (H) 12/21/2018     Dementia (H)      Depression      Depression      Disease of thyroid gland      Falls frequently      Frequent falls 2016     Frequent falls 2017     GERD (gastroesophageal reflux disease)      GERD (gastroesophageal reflux disease)      History of blood transfusion     20 years ago     History of colon cancer     s/p resection 1/2015, treated with 5-6 cycles of Folfox     History of transfusion      HTN (hypertension)       Hydrocephalus (H)      Hydrocephalus (H)      Hyperlipidemia      Hypertension      Hypothyroidism      Hypothyroidism      Infection of skin due to methicillin resistant Staphylococcus aureus (MRSA)      Loss of balance      Memory loss      Memory loss 2017     Normal pressure hydrocephalus (H) 2017     NPH (normal pressure hydrocephalus) (H)      Parkinson disease      Renal insufficiency      Squamous cell cancer of multiple sites of skin of upper arm, left 2016    Dr. Andrea      Thyroid disease      Urinary incontinence      Urinary incontinence               Family History   Problem Relation Age of Onset     Breast Cancer Mother 60         of met. br ca 62     Depression Father 55         of suicide     Peptic Ulcer Disease Father      Prostate Cancer Brother      Colon Cancer No family hx of      Arthritis Brother      Social History     Socioeconomic History     Marital status:      Spouse name: None     Number of children: 1     Years of education: None     Highest education level: None   Occupational History     Occupation: nurse   Tobacco Use     Smoking status: Former     Packs/day: .25     Types: Cigarettes     Start date: 1980     Quit date: 1997     Years since quittin.0     Smokeless tobacco: Former   Substance and Sexual Activity     Alcohol use: No     Alcohol/week: 0.0 standard drinks of alcohol     Comment: alcoholism in remission     Drug use: No       MEDICATIONS: Reviewed from the MAR, physician orders, and/or earlier progress notes.    Current Outpatient Medications   Medication Sig     acetaminophen (TYLENOL) 325 MG tablet Take 325-650 mg by mouth 3 times daily And every day prn     amoxicillin (AMOXIL) 500 MG tablet Take 2,000 mg by mouth as needed (prior to dental appointment)     ARIPiprazole (ABILIFY) 15 MG tablet Take 15 mg by mouth At Bedtime     aspirin 81 MG EC tablet Take 81 mg by mouth 2 times daily (Starting 11/3/23)     bisacodyl (DULCOLAX) 10 MG  suppository Place 1 suppository (10 mg) rectally daily as needed for constipation     BREO ELLIPTA 100-25 MCG/INH inhaler Inhale 1 puff into the lungs daily 1PM     buPROPion (WELLBUTRIN XL) 150 MG 24 hr tablet      carboxymethylcellulose (REFRESH PLUS) 0.5 % SOLN ophthalmic solution Place 1 drop into both eyes 3 times daily as needed for dry eyes     carvedilol (COREG) 3.125 MG tablet Take 1 tablet (3.125 mg) by mouth 2 times daily (with meals)     celecoxib (CELEBREX) 200 MG capsule Take 200 mg by mouth daily as needed     cetirizine (ZYRTEC) 10 MG tablet Take 10 mg by mouth daily      citalopram (CELEXA) 20 MG tablet Take 1 tablet (20 mg) by mouth daily     diclofenac (VOLTAREN) 1 % topical gel Apply 4 g topically 4 times daily Apply to affected knees.     ferrous sulfate (FEROSUL) 325 (65 Fe) MG tablet Take 1 tablet (325 mg) by mouth Every Mon, Wed, Fri Morning     hydrocortisone (AQUAPHOR ITCH RELIEF MAX STR) 1 % external ointment APPLY A THIN LAYER TO THE AFFECTED AREA(S) BY TOPICAL ROUTE 2 TIMES PER DAY     hydrOXYzine HCl (ATARAX) 25 MG tablet      INCRUSE ELLIPTA 62.5 MCG/INH Inhaler Inhale 1 puff into the lungs daily      lamoTRIgine (LAMICTAL) 150 MG tablet Take 150 mg by mouth 2 times daily     levothyroxine (SYNTHROID/LEVOTHROID) 75 MCG tablet Take 75 mcg by mouth daily     menthol-zinc oxide (CALMOSEPTINE) 0.44-20.6 % OINT ointment Apply topically 2 times daily as needed for irritation     mirabegron (MYRBETRIQ) 25 MG 24 hr tablet Take 25 mg by mouth daily      Multiple Vitamins-Minerals (MULTIVITAMIN PO) Take 1 tablet by mouth daily      pantoprazole (PROTONIX) 20 MG EC tablet Take 40 mg by mouth daily     polyethylene glycol (MIRALAX) 17 GM/Dose powder Take 17 g by mouth daily     QUEtiapine (SEROQUEL) 25 MG tablet Take 1 tablet (25 mg) by mouth At Bedtime     SENOKOT S 8.6-50 MG tablet Take 1 tablet by mouth daily AND daily PRN     tiZANidine (ZANAFLEX) 2 MG tablet Take 2 mg by mouth daily      "vitamin C (ASCORBIC ACID) 500 MG tablet Take 500 mg by mouth 2 times daily     Vitamin D, Cholecalciferol, 25 MCG (1000 UT) CAPS Take 1,000 Units by mouth daily      No current facility-administered medications for this visit.     ALLERGIES:   Allergies   Allergen Reactions     Amantadine      Antihistamine Allergy Relief [Diphenhydramine] Other (See Comments)     hyperactivity     Bactrim [Sulfamethoxazole-Trimethoprim]      Codeine Itching     Demerol [Meperidine] Nausea     Diazepam Other (See Comments)     Hallucinations/paranoid     Gabapentin      Hydrocodone      Other Reaction(s): Not available     Meloxicam Other (See Comments)     Pentazocine Other (See Comments)     drowsiness     Statins Other (See Comments)     Was hospitalized for possible rhabdo     Sulfamethoxazole-Trimethoprim      Tramadol Nausea       DIET: Regular, regular texture, thin liquids.    Vitals:    01/18/24 1307   BP: 136/74   Pulse: 75   Resp: 18   Temp: 97.2  F (36.2  C)   SpO2: 92%   Weight: 70.6 kg (155 lb 9.6 oz)   Height: 1.524 m (5')     Wt Readings from Last 4 Encounters:   01/18/24 70.6 kg (155 lb 9.6 oz)   01/10/24 74.4 kg (164 lb)   11/17/23 73.9 kg (163 lb)   08/17/23 74.8 kg (164 lb 12.8 oz)     Body mass index is 30.39 kg/m .  Body surface area is 1.73 meters squared.    EXAMINATION:   General: Pleasant elderly female, lying in bed and getting cleaned up, in no immediate distress.  Head: Normocephalic and atraumatic.   Eyes: PERRLA, sclerae clear.   ENT: Dry oral mucosa.  She has just gotten up.  Cardiovascular: Regular rate and rhythm with a 3/6 LISY at the upper LSB..   Respiratory: Some wheezing with diminished lung sounds.  Otherwise, lungs clear to auscultation bilaterally.   Abdomen: Nondistended.  Large epigastric ventral hernia without tenderness.  \"It bothers me a great deal,\" she says, and she states that she plans to see a doctor about surgery.  Musculoskeletal/Extremities: Age-related degenerative joint " disease.  Severe right hallux valgus deformity.  Integument: Right heel wound is not open, just mushy.  No rashes, clinically significant lesions, or skin breakdown.   Cognitive/Psychiatric: Alert and oriented with euthymic affect, though she does have a diagnosis of vascular dementia.    DIAGNOSTICS:   No results found for this or any previous visit (from the past 240 hour(s)).  Last Comprehensive Metabolic Panel:  Sodium   Date Value Ref Range Status   01/10/2023 139 136 - 145 mmol/L Final   10/16/2018 136 133 - 144 mmol/L Final     Potassium   Date Value Ref Range Status   01/10/2023 4.5 3.4 - 5.3 mmol/L Final   06/02/2022 4.0 3.5 - 5.0 mmol/L Final   10/16/2018 4.6 3.4 - 5.3 mmol/L Final     Chloride   Date Value Ref Range Status   01/10/2023 105 98 - 107 mmol/L Final   06/02/2022 103 98 - 107 mmol/L Final   10/16/2018 103 94 - 109 mmol/L Final     Carbon Dioxide   Date Value Ref Range Status   10/16/2018 24 20 - 32 mmol/L Final     Carbon Dioxide (CO2)   Date Value Ref Range Status   01/10/2023 19 (L) 22 - 29 mmol/L Final   06/02/2022 24 22 - 31 mmol/L Final     Anion Gap   Date Value Ref Range Status   01/10/2023 15 7 - 15 mmol/L Final   06/02/2022 14 5 - 18 mmol/L Final   10/16/2018 8 3 - 14 mmol/L Final     Glucose   Date Value Ref Range Status   01/10/2023 117 (H) 70 - 99 mg/dL Final   06/02/2022 106 70 - 125 mg/dL Final   10/16/2018 92 70 - 99 mg/dL Final     Urea Nitrogen   Date Value Ref Range Status   01/10/2023 19.3 8.0 - 23.0 mg/dL Final   06/02/2022 19 8 - 28 mg/dL Final   10/16/2018 24 7 - 30 mg/dL Final     Creatinine   Date Value Ref Range Status   01/10/2023 0.55 0.51 - 0.95 mg/dL Final   10/16/2018 0.91 0.52 - 1.04 mg/dL Final     GFR Estimate   Date Value Ref Range Status   01/10/2023 >90 >60 mL/min/1.73m2 Final     Comment:     Effective December 21, 2021 eGFRcr in adults is calculated using the 2021 CKD-EPI creatinine equation which includes age and gender (Boo et al., NEJM, DOI:  10.1056/FFIHob0433844)   05/05/2021 >60 >60 mL/min/1.73m2 Final   10/16/2018 60 (L) >60 mL/min/1.7m2 Final     Comment:     Non  GFR Calc     Calcium   Date Value Ref Range Status   01/10/2023 9.6 8.8 - 10.2 mg/dL Final   10/16/2018 9.9 8.5 - 10.1 mg/dL Final     Bilirubin Total   Date Value Ref Range Status   10/04/2022 0.2 <=1.2 mg/dL Final   10/16/2018 0.4 0.2 - 1.3 mg/dL Final     Alkaline Phosphatase   Date Value Ref Range Status   10/04/2022 137 (H) 35 - 104 U/L Final   10/16/2018 115 40 - 150 U/L Final     ALT   Date Value Ref Range Status   10/04/2022 10 10 - 35 U/L Final   10/16/2018 43 0 - 50 U/L Final     AST   Date Value Ref Range Status   10/04/2022 22 10 - 35 U/L Final   10/16/2018 30 0 - 45 U/L Final         Lab Results   Component Value Date    WBC 5.8 09/28/2023    WBC 8.4 10/16/2018     Lab Results   Component Value Date    RBC 4.51 09/28/2023    RBC 4.59 10/16/2018     Lab Results   Component Value Date    HGB 13.2 09/28/2023    HGB 13.2 10/16/2018     Lab Results   Component Value Date    HCT 43.7 09/28/2023    HCT 42.0 10/16/2018     Lab Results   Component Value Date    MCV 97 09/28/2023    MCV 92 10/16/2018     Lab Results   Component Value Date    MCH 29.3 09/28/2023    MCH 28.8 10/16/2018     Lab Results   Component Value Date    MCHC 30.2 09/28/2023    MCHC 31.4 10/16/2018     Lab Results   Component Value Date    RDW 13.2 09/28/2023    RDW 14.6 10/16/2018     Lab Results   Component Value Date     09/28/2023     10/16/2018       ASSESSMENT/Plan:      ICD-10-CM    1. Bipolar 1 disorder (H)  F31.9       2. Normal pressure hydrocephalus (H)  G91.2       3. Pulmonary hypertension (H)  I27.20       4. Chronic congestive heart failure, unspecified heart failure type (H)  I50.9       5. Chronic obstructive pulmonary disease, unspecified COPD type (H)  J44.9       6. Chronic respiratory failure with hypoxia (H)  J96.11       7. Essential hypertension  I10       8.  Alcohol dependence in remission (H)  F10.21       9. Hypothyroidism, unspecified type  E03.9       10. Personal history of COVID-19  Z86.16       11. Moderate vascular dementia with mood disturbance (H)  F01.B3       12. Pressure injury of right heel, stage 3 (H)  L89.613       13. Umbilical hernia without obstruction and without gangrene  K42.9       14. Overactive bladder  N32.81       15. H/O recurrent urinary tract infection  Z87.440           CHANGES:    Have OT assess for returning to EZ stand.    CARE PLAN:    The care plan, medications, vital signs, orders, and nursing notes have been reviewed, and all orders signed. Changes to care plan, if any, as noted. Otherwise, continue current plan of care.     The above has been created using voice recognition software. Please be aware that this may unintentionally  produce inaccuracies and/or nonsensical sentences.      Electronically signed by: KASANDRA Munoz CNP      Sincerely,        KASANDRA Munoz CNP

## 2024-01-27 PROBLEM — J96.20 ACUTE ON CHRONIC RESPIRATORY FAILURE (H): Status: RESOLVED | Noted: 2023-01-01 | Resolved: 2024-01-01

## 2024-01-27 PROBLEM — L89.613 PRESSURE INJURY OF RIGHT HEEL, STAGE 3 (H): Status: ACTIVE | Noted: 2024-01-01

## 2024-01-27 PROBLEM — F01.B3 MODERATE VASCULAR DEMENTIA WITH MOOD DISTURBANCE (H): Status: ACTIVE | Noted: 2024-01-01

## 2024-01-27 NOTE — PROGRESS NOTES
"[facility]    Name:   Amparo Sharma  :   1940  MRN:    [id number]     Facility:   [facility] () [[id number]]   Room: [place] [address]  Code Status: FULL CODE and POLST AVAILABLE -     DOS: 2024    PCP:  Markos Stanley CNP     CHIEF COMPLAINT / REASON FOR VISIT:  Chief Complaint   Patient presents with    detention Regulatory            HPI: Amparo is a 83 year old female with a PMHx significant for mild vascular dementia, normal pressure hydrocephalus ( shunt 2017), essential hypertension, pulmonary hypertension, COPD, congestive heart failure, iron deficiency anemia, recurrent urinary tract infections, history of alcohol dependence and history of COVID-19 (10/2023), who had been living in assisted living prior to her last hospitalization.      CURRENT/RECENT TCU ISSUES    Disposition  -- Diagnosed with mild congestive heart failure last month per CXR on 2023.  This included cardiomegaly and pulmonary venous hypertension.  She was treated with Augmentin and doxycycline.  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    Nonambulatory status  -- Wheelchair use at baseline, though she states she was able to walk with a walker prior to admission.  -- She is nonambulatory and has been transferred with a Bonnie lift, although she says she hates that and would prefer the EZ stand.  We can have therapy evaluate/reevaluate for being able to use the EZ stand.    Right foot wound  -- Bandaged.  She states that it \"hurts a lot.\"  She says it was from wearing the wrong shoes.  -- Wound itself seems healed externally, though the heel is a bit mushy.  -- Continue wearing pressure-relief boot.    Bipolar disorder  Depression  -- She feels depressed lying in bed, unable to do for herself.  This goes for the necessity of a Bonnie lift as well.  -- Experiences both howie and depression.  -- Continue aripiprazole, lamotrigine, and " quetiapine.  -- Continue citalopram.  -- Bupropion ( mg daily) ordered by psych NP (Nohemi Mccann) on 01/05/2024.  Continue.    Congestive heart failure  Essential hypertension  Pulmonary venous hypertension  -- Continue carvedilol    COPD  Chronic respiratory failure with hypoxia  -- Continue Breo Ellipta, Incruse Ellipta    Normal pressure hydrocephalus  -- Seen at [facility] neurosurgery on 01/10/2024.  No neurological concerns.  Follow-up in 1 year.    Irritable bladder  Recurrent urinary tract infections  -- Continue Myrbetriq    History of COVID-19  -- States that she sleeps a lot since the infection.      ROS: 10 point ROS of systems including Constitutional, Eyes, Respiratory, Cardiovascular, Gastroenterology, Genitourinary, Integumentary, Musculoskeletal, and Psychiatric were all negative except for pertinent positives noted in my HPI.      Past Medical History:   Diagnosis Date    Abdominal hernia     Abdominal hernia     Alcoholism in remission (H)     Alcoholism in remission (H) 2017    Anemia     Arthritis     Arthritis     Asthma     Asthma 1/20/2017    Bipolar 1 disorder (H)     Bipolar disorder (H)     Bladder incontinence 2013    Cancer (H)     Cellulitis     Cellulitis 12/12/2016    of left elbow    Chronic pain syndrome     COPD (chronic obstructive pulmonary disease) (H)     COPD with acute exacerbation (H) 12/21/2018    Dementia (H)     Depression     Depression     Disease of thyroid gland     Falls frequently     Frequent falls 2016    Frequent falls 2017    GERD (gastroesophageal reflux disease)     GERD (gastroesophageal reflux disease)     History of blood transfusion     20 years ago    History of colon cancer     s/p resection 1/2015, treated with 5-6 cycles of Folfox    History of transfusion     HTN (hypertension)     Hydrocephalus (H)     Hydrocephalus (H)     Hyperlipidemia     Hypertension     Hypothyroidism     Hypothyroidism     Infection of skin due to  methicillin resistant Staphylococcus aureus (MRSA)     Loss of balance     Memory loss     Memory loss     Normal pressure hydrocephalus (H) 2017    NPH (normal pressure hydrocephalus) (H)     Parkinson disease     Renal insufficiency     Squamous cell cancer of multiple sites of skin of upper arm, left 2016    Dr. Andrea     Thyroid disease     Urinary incontinence     Urinary incontinence               Family History   Problem Relation Age of Onset    Breast Cancer Mother 60         of met. br ca 62    Depression Father 55         of suicide    Peptic Ulcer Disease Father     Prostate Cancer Brother     Colon Cancer No family hx of     Arthritis Brother      Social History     Socioeconomic History    Marital status:      Spouse name: None    Number of children: 1    Years of education: None    Highest education level: None   Occupational History    Occupation: nurse   Tobacco Use    Smoking status: Former     Packs/day: .25     Types: Cigarettes     Start date: 1980     Quit date: 1997     Years since quittin.0    Smokeless tobacco: Former   Substance and Sexual Activity    Alcohol use: No     Alcohol/week: 0.0 standard drinks of alcohol     Comment: alcoholism in remission    Drug use: No       MEDICATIONS: Reviewed from the MAR, physician orders, and/or earlier progress notes.    Current Outpatient Medications   Medication Sig    acetaminophen (TYLENOL) 325 MG tablet Take 325-650 mg by mouth 3 times daily And every day prn    amoxicillin (AMOXIL) 500 MG tablet Take 2,000 mg by mouth as needed (prior to dental appointment)    ARIPiprazole (ABILIFY) 15 MG tablet Take 15 mg by mouth At Bedtime    aspirin 81 MG EC tablet Take 81 mg by mouth 2 times daily (Starting 11/3/23)    bisacodyl (DULCOLAX) 10 MG suppository Place 1 suppository (10 mg) rectally daily as needed for constipation    BREO ELLIPTA 100-25 MCG/INH inhaler Inhale 1 puff into the lungs daily 1PM    buPROPion  (WELLBUTRIN XL) 150 MG 24 hr tablet     carboxymethylcellulose (REFRESH PLUS) 0.5 % SOLN ophthalmic solution Place 1 drop into both eyes 3 times daily as needed for dry eyes    carvedilol (COREG) 3.125 MG tablet Take 1 tablet (3.125 mg) by mouth 2 times daily (with meals)    celecoxib (CELEBREX) 200 MG capsule Take 200 mg by mouth daily as needed    cetirizine (ZYRTEC) 10 MG tablet Take 10 mg by mouth daily     citalopram (CELEXA) 20 MG tablet Take 1 tablet (20 mg) by mouth daily    diclofenac (VOLTAREN) 1 % topical gel Apply 4 g topically 4 times daily Apply to affected knees.    ferrous sulfate (FEROSUL) 325 (65 Fe) MG tablet Take 1 tablet (325 mg) by mouth Every Mon, Wed, Fri Morning    hydrocortisone (AQUAPHOR ITCH RELIEF MAX STR) 1 % external ointment APPLY A THIN LAYER TO THE AFFECTED AREA(S) BY TOPICAL ROUTE 2 TIMES PER DAY    hydrOXYzine HCl (ATARAX) 25 MG tablet     INCRUSE ELLIPTA 62.5 MCG/INH Inhaler Inhale 1 puff into the lungs daily     lamoTRIgine (LAMICTAL) 150 MG tablet Take 150 mg by mouth 2 times daily    levothyroxine (SYNTHROID/LEVOTHROID) 75 MCG tablet Take 75 mcg by mouth daily    menthol-zinc oxide (CALMOSEPTINE) 0.44-20.6 % OINT ointment Apply topically 2 times daily as needed for irritation    mirabegron (MYRBETRIQ) 25 MG 24 hr tablet Take 25 mg by mouth daily     Multiple Vitamins-Minerals (MULTIVITAMIN PO) Take 1 tablet by mouth daily     pantoprazole (PROTONIX) 20 MG EC tablet Take 40 mg by mouth daily    polyethylene glycol (MIRALAX) 17 GM/Dose powder Take 17 g by mouth daily    QUEtiapine (SEROQUEL) 25 MG tablet Take 1 tablet (25 mg) by mouth At Bedtime    SENOKOT S 8.6-50 MG tablet Take 1 tablet by mouth daily AND daily PRN    tiZANidine (ZANAFLEX) 2 MG tablet Take 2 mg by mouth daily    vitamin C (ASCORBIC ACID) 500 MG tablet Take 500 mg by mouth 2 times daily    Vitamin D, Cholecalciferol, 25 MCG (1000 UT) CAPS Take 1,000 Units by mouth daily      No current facility-administered  "medications for this visit.     ALLERGIES:   Allergies   Allergen Reactions    Amantadine     Antihistamine Allergy Relief [Diphenhydramine] Other (See Comments)     hyperactivity    Bactrim [Sulfamethoxazole-Trimethoprim]     Codeine Itching    Demerol [Meperidine] Nausea    Diazepam Other (See Comments)     Hallucinations/paranoid    Gabapentin     Hydrocodone      Other Reaction(s): Not available    Meloxicam Other (See Comments)    Pentazocine Other (See Comments)     drowsiness    Statins Other (See Comments)     Was hospitalized for possible rhabdo    Sulfamethoxazole-Trimethoprim     Tramadol Nausea       DIET: Regular, regular texture, thin liquids.    Vitals:    01/18/24 1307   BP: 136/74   Pulse: 75   Resp: 18   Temp: 97.2  F (36.2  C)   SpO2: 92%   Weight: 70.6 kg (155 lb 9.6 oz)   Height: 1.524 m (5')     Wt Readings from Last 4 Encounters:   01/18/24 70.6 kg (155 lb 9.6 oz)   01/10/24 74.4 kg (164 lb)   11/17/23 73.9 kg (163 lb)   08/17/23 74.8 kg (164 lb 12.8 oz)     Body mass index is 30.39 kg/m .  Body surface area is 1.73 meters squared.    EXAMINATION:   General: Pleasant elderly female, lying in bed and getting cleaned up, in no immediate distress.  Head: Normocephalic and atraumatic.   Eyes: PERRLA, sclerae clear.   ENT: Dry oral mucosa.  She has just gotten up.  Cardiovascular: Regular rate and rhythm with a 3/6 LISY at the upper LSB..   Respiratory: Some wheezing with diminished lung sounds.  Otherwise, lungs clear to auscultation bilaterally.   Abdomen: Nondistended.  Large epigastric ventral hernia without tenderness.  \"It bothers me a great deal,\" she says, and she states that she plans to see a doctor about surgery.  Musculoskeletal/Extremities: Age-related degenerative joint disease.  Severe right hallux valgus deformity.  Integument: Right heel wound is not open, just mushy.  No rashes, clinically significant lesions, or skin breakdown.   Cognitive/Psychiatric: Alert and oriented with " euthymic affect, though she does have a diagnosis of vascular dementia.    DIAGNOSTICS:   No results found for this or any previous visit (from the past 240 hour(s)).  Last Comprehensive Metabolic Panel:  Sodium   Date Value Ref Range Status   01/10/2023 139 136 - 145 mmol/L Final   10/16/2018 136 133 - 144 mmol/L Final     Potassium   Date Value Ref Range Status   01/10/2023 4.5 3.4 - 5.3 mmol/L Final   06/02/2022 4.0 3.5 - 5.0 mmol/L Final   10/16/2018 4.6 3.4 - 5.3 mmol/L Final     Chloride   Date Value Ref Range Status   01/10/2023 105 98 - 107 mmol/L Final   06/02/2022 103 98 - 107 mmol/L Final   10/16/2018 103 94 - 109 mmol/L Final     Carbon Dioxide   Date Value Ref Range Status   10/16/2018 24 20 - 32 mmol/L Final     Carbon Dioxide (CO2)   Date Value Ref Range Status   01/10/2023 19 (L) 22 - 29 mmol/L Final   06/02/2022 24 22 - 31 mmol/L Final     Anion Gap   Date Value Ref Range Status   01/10/2023 15 7 - 15 mmol/L Final   06/02/2022 14 5 - 18 mmol/L Final   10/16/2018 8 3 - 14 mmol/L Final     Glucose   Date Value Ref Range Status   01/10/2023 117 (H) 70 - 99 mg/dL Final   06/02/2022 106 70 - 125 mg/dL Final   10/16/2018 92 70 - 99 mg/dL Final     Urea Nitrogen   Date Value Ref Range Status   01/10/2023 19.3 8.0 - 23.0 mg/dL Final   06/02/2022 19 8 - 28 mg/dL Final   10/16/2018 24 7 - 30 mg/dL Final     Creatinine   Date Value Ref Range Status   01/10/2023 0.55 0.51 - 0.95 mg/dL Final   10/16/2018 0.91 0.52 - 1.04 mg/dL Final     GFR Estimate   Date Value Ref Range Status   01/10/2023 >90 >60 mL/min/1.73m2 Final     Comment:     Effective December 21, 2021 eGFRcr in adults is calculated using the 2021 CKD-EPI creatinine equation which includes age and gender ( et al., NEJM, DOI: 10.1056/TETDgs8185180)   05/05/2021 >60 >60 mL/min/1.73m2 Final   10/16/2018 60 (L) >60 mL/min/1.7m2 Final     Comment:     Non  GFR Calc     Calcium   Date Value Ref Range Status   01/10/2023 9.6 8.8 - 10.2  mg/dL Final   10/16/2018 9.9 8.5 - 10.1 mg/dL Final     Bilirubin Total   Date Value Ref Range Status   10/04/2022 0.2 <=1.2 mg/dL Final   10/16/2018 0.4 0.2 - 1.3 mg/dL Final     Alkaline Phosphatase   Date Value Ref Range Status   10/04/2022 137 (H) 35 - 104 U/L Final   10/16/2018 115 40 - 150 U/L Final     ALT   Date Value Ref Range Status   10/04/2022 10 10 - 35 U/L Final   10/16/2018 43 0 - 50 U/L Final     AST   Date Value Ref Range Status   10/04/2022 22 10 - 35 U/L Final   10/16/2018 30 0 - 45 U/L Final         Lab Results   Component Value Date    WBC 5.8 09/28/2023    WBC 8.4 10/16/2018     Lab Results   Component Value Date    RBC 4.51 09/28/2023    RBC 4.59 10/16/2018     Lab Results   Component Value Date    HGB 13.2 09/28/2023    HGB 13.2 10/16/2018     Lab Results   Component Value Date    HCT 43.7 09/28/2023    HCT 42.0 10/16/2018     Lab Results   Component Value Date    MCV 97 09/28/2023    MCV 92 10/16/2018     Lab Results   Component Value Date    MCH 29.3 09/28/2023    MCH 28.8 10/16/2018     Lab Results   Component Value Date    MCHC 30.2 09/28/2023    MCHC 31.4 10/16/2018     Lab Results   Component Value Date    RDW 13.2 09/28/2023    RDW 14.6 10/16/2018     Lab Results   Component Value Date     09/28/2023     10/16/2018       ASSESSMENT/Plan:      ICD-10-CM    1. Bipolar 1 disorder (H)  F31.9       2. Normal pressure hydrocephalus (H)  G91.2       3. Pulmonary hypertension (H)  I27.20       4. Chronic congestive heart failure, unspecified heart failure type (H)  I50.9       5. Chronic obstructive pulmonary disease, unspecified COPD type (H)  J44.9       6. Chronic respiratory failure with hypoxia (H)  J96.11       7. Essential hypertension  I10       8. Alcohol dependence in remission (H)  F10.21       9. Hypothyroidism, unspecified type  E03.9       10. Personal history of COVID-19  Z86.16       11. Moderate vascular dementia with mood disturbance (H)  F01.B3       12.  Pressure injury of right heel, stage 3 (H)  L89.613       13. Umbilical hernia without obstruction and without gangrene  K42.9       14. Overactive bladder  N32.81       15. H/O recurrent urinary tract infection  Z87.440           CHANGES:    Have OT assess for returning to EZ stand.    CARE PLAN:    The care plan, medications, vital signs, orders, and nursing notes have been reviewed, and all orders signed. Changes to care plan, if any, as noted. Otherwise, continue current plan of care.     The above has been created using voice recognition software. Please be aware that this may unintentionally  produce inaccuracies and/or nonsensical sentences.      Electronically signed by: KASANDRA Munoz CNP

## 2024-06-03 NOTE — PROGRESS NOTES
January 3, 2018            Pravez Francis MD   [facility]    [address] [place] [address]      RE:  Amparo Coppola       Dear Dr. Francis:      I had the pleasure to see Amparo back my clinic today for followup after normal pressure hydrocephalus and placement of a ventricular peritoneal shunt.  I think overall she is doing better.  She says that her falling has stopped, although she does continue to use a walker.  Her daughter or her niece who is with her says that her memory is much better than it was before.  She does still have trouble with walking, but she has bad knees.  She was recently at the orthopedic surgeon's office who said that knee replacements are okay but the muscles around the knees weak.  They have recommended that she return to physical therapy.  I would agree with the assessment to go back to physical therapy to help her with her ambulation.      I reviewed her CT from today, and it shows that the ventricles are stable in size.      Overall, I think she is doing very well status post  shunt.  I will plan to see her back in around  in the summertime.      Overall, I spent approximately 25 minutes with Amparo with the majority of this time spent in consultation and developing a treatment plan.      Thank you for your trust and the opportunity to participate in Amparo's care.  If you have any further questions or concerns, please feel free to contact me on my cell phone at [phone].         ZAHRAA BERMEO MD             D: 2018 13:51   T: 2018 07:11   MT: [id number]      Name:     AMPARO COPPOLA   MRN:      [id number]        Account:      [id number]   :      1940           Service Date: 2018      Document: [id number]    
73

## (undated) DEVICE — GLOVE BIOGEL PI ULTRATOUCH G SZ 7.0 42170

## (undated) DEVICE — DRILL BIT PERIPHERAL SCREW 3.2MM MWJ126

## (undated) DEVICE — SU ETHIBOND 2 V-37 4X30" MX69G

## (undated) DEVICE — SOL NACL 0.9% 10ML VIAL 0409-4888-02

## (undated) DEVICE — DRAPE POUCH IRR 1016

## (undated) DEVICE — GLOVE UNDER INDICATOR PI SZ 7.0 LF 41670

## (undated) DEVICE — CUP AND LID 2PK 2OZ STERILE  SSK9006A

## (undated) DEVICE — STPL SKIN PROXIMATE 35 WIDE PMW35

## (undated) DEVICE — PREP POVIDONE IODINE SCRUB 7.5% 120ML

## (undated) DEVICE — LINEN TOWEL PACK X6 WHITE 5487

## (undated) DEVICE — DRSG PRIMAPORE 02X3" 7133

## (undated) DEVICE — SUTURE BOOTS 051003PBX

## (undated) DEVICE — DRSG GAUZE 4X4" TRAY 6939

## (undated) DEVICE — SYR 20ML LL W/O NDL 302830

## (undated) DEVICE — SUTURE VICRYL+ 2-0 27IN CT-1 UND VCP259H

## (undated) DEVICE — SU FIBERWIRE 2 38" T-8 NDL  AR-7206

## (undated) DEVICE — SU ETHILON 3-0 PS-1 18" 1663H

## (undated) DEVICE — KIT ACCESSORY LUMBAR DRAIN 910121

## (undated) DEVICE — DRESSING MEPILEX BORDER POST-OP 4X8

## (undated) DEVICE — BLADE SAW SAGITTAL STRK WIDE 25.4X85X1.2MM 2108-151-000

## (undated) DEVICE — PREP SKIN SCRUB TRAY 4461A

## (undated) DEVICE — SHUNT BECKER EXTERNAL DRAIN 46128

## (undated) DEVICE — NDL BLUNT 18GA 1.5" W/O FILTER 305180

## (undated) DEVICE — SUCTION MANIFOLD NEPTUNE 2 SYS 1 PORT 702-025-000

## (undated) DEVICE — GLOVE BIOGEL PI ULTRATOUCH G SZ 6.5 42165

## (undated) DEVICE — DRSG PRIMAPORE 03 1/8X6" 66000318

## (undated) DEVICE — DRAPE MAYO STAND 23X54 8337

## (undated) DEVICE — SU VICRYL 2-0 CT-2 CR 8X18" J726D

## (undated) DEVICE — DRAPE SHEET REV FOLD 3/4 9349

## (undated) DEVICE — PAD CHUX UNDERPAD 30X30"

## (undated) DEVICE — PREP POVIDONE IODINE SOLUTION 10% 120ML

## (undated) DEVICE — BLADE KNIFE SURG 11 371111

## (undated) DEVICE — SYR 10ML LL W/O NDL

## (undated) DEVICE — ESU GROUND PAD ADULT W/CORD E7507

## (undated) DEVICE — SPONGE RAY-TEC 4X8" 7318

## (undated) DEVICE — SOL WATER IRRIG 1000ML BOTTLE 2F7114

## (undated) DEVICE — DECANTER VIAL 2006S

## (undated) DEVICE — PERFORATOR 14MM CODMAN

## (undated) DEVICE — NDL BLUNT 18GA 1" W/O FILTER 305181

## (undated) DEVICE — PREP CHLORAPREP CLEAR 3ML 260400

## (undated) DEVICE — GUIDEWIRE TORNIER AEQUALIS PERFORM +  2.5X220MM DWD017

## (undated) DEVICE — ESU ELEC BLADE 2.75" COATED/INSULATED E1455

## (undated) DEVICE — SPONGE SURGIFOAM 12 1972

## (undated) DEVICE — CUSTOM PACK OPEN SHOULDER SOP5BOSHEB

## (undated) DEVICE — SPONGE COTTONOID 1/2X1/2" 20-04S

## (undated) DEVICE — SHUNT TUNNELER SHEATH 61CM NT901124

## (undated) DEVICE — NDL 25GA 2"  8881200441

## (undated) DEVICE — GLOVE BIOGEL PI ULTRATOUCH G SZ 8.5 42185

## (undated) DEVICE — PLATE GROUNDING ADULT W/CORD 9165L

## (undated) DEVICE — NDL BLUNT 17GA 1.5" 8881202330

## (undated) DEVICE — SU VICRYL 3-0 SH 8X18" UND J864D

## (undated) DEVICE — SHUNT STYLET 23CM AXIEM NAVIGATION SYSTEM 9735428

## (undated) DEVICE — LINEN TOWEL PACK X5 5464

## (undated) DEVICE — SUCTION MANIFOLD DORNOCH ULTRA CART UL-CL500

## (undated) DEVICE — SPONGE RAY-TEC 4X4" 7317

## (undated) DEVICE — DRAPE IOBAN INCISE 13X13" 6640EZ

## (undated) DEVICE — HOLDER LIMB VELCRO OR 0814-1533

## (undated) DEVICE — DRSG ABD TNDRSRB WET PRUF 8IN X 10IN STRL  9194A

## (undated) DEVICE — SOL NACL 0.9% IRRIG 1000ML BOTTLE 2F7124

## (undated) DEVICE — SPONGE KITTNER 30-101

## (undated) DEVICE — SU SILK 2-0 TIE 12X30" A305H

## (undated) DEVICE — LABEL MEDICATION SYSTEM 3303-P

## (undated) DEVICE — TAPE MICROFOAM 4" 1528-4

## (undated) DEVICE — PREP DURAPREP 26ML APL 8630

## (undated) DEVICE — GLOVE UNDER INDICATOR PI SZ 8.5 LF 41685

## (undated) DEVICE — SUTURE VICRYL+ 0 27IN CT-1 UND VCP260H

## (undated) DEVICE — GUIDE PIN STERILE 3X75MM

## (undated) DEVICE — PREP CHLORAPREP W/ORANGE TINT 10.5ML 930715

## (undated) DEVICE — PACK NEURO MINOR UMMC SNE32MNMU4

## (undated) RX ORDER — LIDOCAINE HYDROCHLORIDE AND EPINEPHRINE 10; 10 MG/ML; UG/ML
INJECTION, SOLUTION INFILTRATION; PERINEURAL
Status: DISPENSED
Start: 2017-02-08

## (undated) RX ORDER — GENTAMICIN 40 MG/ML
INJECTION, SOLUTION INTRAMUSCULAR; INTRAVENOUS
Status: DISPENSED
Start: 2017-02-08

## (undated) RX ORDER — SODIUM CHLORIDE 9 MG/ML
INJECTION, SOLUTION INTRAVENOUS
Status: DISPENSED
Start: 2017-02-08

## (undated) RX ORDER — LIDOCAINE HYDROCHLORIDE AND EPINEPHRINE 10; 10 MG/ML; UG/ML
INJECTION, SOLUTION INFILTRATION; PERINEURAL
Status: DISPENSED
Start: 2017-02-05

## (undated) RX ORDER — LIDOCAINE HYDROCHLORIDE 20 MG/ML
INJECTION, SOLUTION EPIDURAL; INFILTRATION; INTRACAUDAL; PERINEURAL
Status: DISPENSED
Start: 2017-09-07

## (undated) RX ORDER — FENTANYL CITRATE 50 UG/ML
INJECTION, SOLUTION INTRAMUSCULAR; INTRAVENOUS
Status: DISPENSED
Start: 2022-04-11

## (undated) RX ORDER — ONDANSETRON 2 MG/ML
INJECTION INTRAMUSCULAR; INTRAVENOUS
Status: DISPENSED
Start: 2017-09-07

## (undated) RX ORDER — PHENYLEPHRINE HCL IN 0.9% NACL 1 MG/10 ML
SYRINGE (ML) INTRAVENOUS
Status: DISPENSED
Start: 2017-09-07

## (undated) RX ORDER — FENTANYL CITRATE-0.9 % NACL/PF 10 MCG/ML
PLASTIC BAG, INJECTION (ML) INTRAVENOUS
Status: DISPENSED
Start: 2022-04-11

## (undated) RX ORDER — GLYCOPYRROLATE 0.2 MG/ML
INJECTION, SOLUTION INTRAMUSCULAR; INTRAVENOUS
Status: DISPENSED
Start: 2017-09-07

## (undated) RX ORDER — LABETALOL HYDROCHLORIDE 5 MG/ML
INJECTION, SOLUTION INTRAVENOUS
Status: DISPENSED
Start: 2017-02-08